# Patient Record
Sex: FEMALE | Race: BLACK OR AFRICAN AMERICAN | NOT HISPANIC OR LATINO | Employment: OTHER | URBAN - METROPOLITAN AREA
[De-identification: names, ages, dates, MRNs, and addresses within clinical notes are randomized per-mention and may not be internally consistent; named-entity substitution may affect disease eponyms.]

---

## 2017-04-03 ENCOUNTER — HOSPITAL ENCOUNTER (EMERGENCY)
Facility: HOSPITAL | Age: 57
Discharge: HOME | End: 2017-04-03
Attending: EMERGENCY MEDICINE
Payer: MEDICAID

## 2017-04-03 VITALS
HEIGHT: 62 IN | WEIGHT: 119 LBS | HEART RATE: 72 BPM | OXYGEN SATURATION: 97 % | RESPIRATION RATE: 17 BRPM | DIASTOLIC BLOOD PRESSURE: 70 MMHG | TEMPERATURE: 98 F | SYSTOLIC BLOOD PRESSURE: 128 MMHG | BODY MASS INDEX: 21.9 KG/M2

## 2017-04-03 DIAGNOSIS — M94.0 COSTOCHONDRITIS, ACUTE: ICD-10-CM

## 2017-04-03 DIAGNOSIS — R07.9 CHEST PAIN, UNSPECIFIED TYPE: Primary | ICD-10-CM

## 2017-04-03 DIAGNOSIS — R07.9 CHEST PAIN: ICD-10-CM

## 2017-04-03 LAB
ALBUMIN SERPL BCP-MCNC: 4.1 G/DL
ALP SERPL-CCNC: 40 U/L
ALT SERPL W/O P-5'-P-CCNC: 10 U/L
ANION GAP SERPL CALC-SCNC: 12 MMOL/L
AST SERPL-CCNC: 15 U/L
BASOPHILS # BLD AUTO: 0.02 K/UL
BASOPHILS NFR BLD: 0.2 %
BILIRUB SERPL-MCNC: 0.5 MG/DL
BNP SERPL-MCNC: <10 PG/ML
BUN SERPL-MCNC: 16 MG/DL
CALCIUM SERPL-MCNC: 9.4 MG/DL
CHLORIDE SERPL-SCNC: 107 MMOL/L
CO2 SERPL-SCNC: 23 MMOL/L
CREAT SERPL-MCNC: 0.9 MG/DL
D DIMER PPP IA.FEU-MCNC: <0.19 MG/L FEU
DIFFERENTIAL METHOD: ABNORMAL
EOSINOPHIL # BLD AUTO: 0.1 K/UL
EOSINOPHIL NFR BLD: 1.4 %
ERYTHROCYTE [DISTWIDTH] IN BLOOD BY AUTOMATED COUNT: 14.1 %
EST. GFR  (AFRICAN AMERICAN): >60 ML/MIN/1.73 M^2
EST. GFR  (NON AFRICAN AMERICAN): >60 ML/MIN/1.73 M^2
GLUCOSE SERPL-MCNC: 81 MG/DL
HCT VFR BLD AUTO: 35.9 %
HGB BLD-MCNC: 12.7 G/DL
INR PPP: 1
LYMPHOCYTES # BLD AUTO: 3.6 K/UL
LYMPHOCYTES NFR BLD: 44.7 %
MCH RBC QN AUTO: 31.4 PG
MCHC RBC AUTO-ENTMCNC: 35.4 %
MCV RBC AUTO: 89 FL
MONOCYTES # BLD AUTO: 0.7 K/UL
MONOCYTES NFR BLD: 8.8 %
NEUTROPHILS # BLD AUTO: 3.7 K/UL
NEUTROPHILS NFR BLD: 44.9 %
PLATELET # BLD AUTO: 292 K/UL
PMV BLD AUTO: 9.2 FL
POTASSIUM SERPL-SCNC: 3.6 MMOL/L
PROT SERPL-MCNC: 7 G/DL
PROTHROMBIN TIME: 10.7 SEC
RBC # BLD AUTO: 4.04 M/UL
SODIUM SERPL-SCNC: 142 MMOL/L
TROPONIN I SERPL DL<=0.01 NG/ML-MCNC: <0.006 NG/ML
WBC # BLD AUTO: 8.14 K/UL

## 2017-04-03 PROCEDURE — 85610 PROTHROMBIN TIME: CPT

## 2017-04-03 PROCEDURE — 85025 COMPLETE CBC W/AUTO DIFF WBC: CPT

## 2017-04-03 PROCEDURE — 93010 ELECTROCARDIOGRAM REPORT: CPT | Mod: ,,, | Performed by: INTERNAL MEDICINE

## 2017-04-03 PROCEDURE — 80053 COMPREHEN METABOLIC PANEL: CPT

## 2017-04-03 PROCEDURE — 84484 ASSAY OF TROPONIN QUANT: CPT

## 2017-04-03 PROCEDURE — 85379 FIBRIN DEGRADATION QUANT: CPT

## 2017-04-03 PROCEDURE — 83880 ASSAY OF NATRIURETIC PEPTIDE: CPT

## 2017-04-03 PROCEDURE — 93005 ELECTROCARDIOGRAM TRACING: CPT

## 2017-04-03 PROCEDURE — 99284 EMERGENCY DEPT VISIT MOD MDM: CPT | Mod: 25

## 2017-04-03 PROCEDURE — 36415 COLL VENOUS BLD VENIPUNCTURE: CPT

## 2017-04-03 PROCEDURE — 25000003 PHARM REV CODE 250: Performed by: EMERGENCY MEDICINE

## 2017-04-03 RX ORDER — MONTELUKAST SODIUM 10 MG/1
10 TABLET ORAL DAILY
COMMUNITY
End: 2017-05-09

## 2017-04-03 RX ORDER — HYDROCHLOROTHIAZIDE 12.5 MG/1
12.5 CAPSULE ORAL DAILY
COMMUNITY
End: 2017-05-09 | Stop reason: SDUPTHER

## 2017-04-03 RX ORDER — ASPIRIN 325 MG
325 TABLET ORAL
Status: DISCONTINUED | OUTPATIENT
Start: 2017-04-03 | End: 2017-04-03 | Stop reason: HOSPADM

## 2017-04-03 RX ORDER — HYDROCODONE BITARTRATE AND ACETAMINOPHEN 10; 325 MG/1; MG/1
1 TABLET ORAL EVERY 4 HOURS PRN
Qty: 11 TABLET | Refills: 0 | Status: SHIPPED | OUTPATIENT
Start: 2017-04-03 | End: 2017-05-09 | Stop reason: ALTCHOICE

## 2017-04-03 RX ORDER — LOSARTAN POTASSIUM AND HYDROCHLOROTHIAZIDE 12.5; 5 MG/1; MG/1
1 TABLET ORAL DAILY
COMMUNITY
End: 2017-05-09

## 2017-04-03 RX ORDER — NITROGLYCERIN 0.4 MG/1
0.4 TABLET SUBLINGUAL
Status: COMPLETED | OUTPATIENT
Start: 2017-04-03 | End: 2017-04-03

## 2017-04-03 RX ORDER — LORAZEPAM 2 MG/ML
1 INJECTION INTRAMUSCULAR
Status: DISCONTINUED | OUTPATIENT
Start: 2017-04-03 | End: 2017-04-03

## 2017-04-03 RX ADMIN — NITROGLYCERIN 0.4 MG: 0.4 TABLET SUBLINGUAL at 03:04

## 2017-04-03 NOTE — DISCHARGE INSTRUCTIONS
Noncardiac Chest Pain    Based on your visit today, the health care provider doesnt know what is causing your chest pain. In most cases, people who come to the emergency department with chest pain dont have a problem with their heart. Instead, the pain is caused by other conditions. These may be problems with the lungs, muscles, bones, digestive tract, nerves, or mental health.  Lung problems  · Inflammation around the lungs (pleurisy)  · Collapsed lung (pneumothorax)  · Fluid around the lungs (pleural effusion)  · Lung cancer. This is a rare cause of chest pain.  Muscle or bone problems  · Inflamed cartilage between the ribs (pleurisy)  · Fibromyalgia  · Rheumatoid arthritis  Digestive system problems  · Reflux  · Stomach ulcer  · Spasms of the esophagus  · Gall stones  · Gallbladder inflammation  Mental health conditions  · Panic or anxiety attacks  · Emotional distress  Your condition doesnt seem serious and your pain doesnt appear to be coming from your heart. But sometimes the signs of a serious problem take more time to appear. Watch for the warning signs listed below.  Home care  Follow these guidelines when caring for yourself at home:  · Rest today and avoid strenuous activity.  · Take any prescribed medicine as directed.  Follow-up care  Follow up with your health care provider, or as advised, if you dont start to feel better within 24 hours.  When to seek medical advice  Call your health care provider right away if any of these occur:  · A change in the type of pain. Call if it feels different, becomes more serious, lasts longer, or begins to spread into your shoulder, arm, neck, jaw, or back.  · Shortness of breath  · You feel more pain when you breathe  · Cough with dark-colored mucus or blood  · Weakness, dizziness, or fainting  · Fever of 100.4ºF (38ºC) or higher, or as directed by your health care provider  · Swelling, pain, or redness in one leg  Date Last Reviewed: 11/24/2014  © 5393-7297  [employer]. [address] [place] [address], [place] [address]. All rights reserved. This information is not intended as a substitute for professional medical care. Always follow your healthcare professional's instructions.          Uncertain Causes of Chest Pain    Chest pain can happen for a number of reasons. Sometimes the cause can't be determined. If your condition does not seem serious, and your pain does not appear to be coming from your heart, your healthcare provider may recommend watching it closely. Sometimes the signs of a serious problem take more time to appear. Many problems not related to your heart can cause chest pain.These include:  · Musculoskeletal. Costochondritis, an inflammation of the tissues around the ribs that can occur from trauma or overuse injuries  · Respiratory. Pneumonia, pneumothorax, or pneumonitis (inflammation of the lining of the chest and lungs)  · Gastrointestinal. Esophageal reflux, heartburn, or gallbladder disease  · Anxiety and panic disorders  · Nerve compression and neuritis  · Miscellaneous problems such as aortic aneurysm or pulmonary embolism (a blood clot in the lungs)  Home care  After your visit, follow these recommendations:  · Rest today and avoid strenuous activity.  · Take any prescribed medicine as directed.  · Be aware of any recurrent chest pain and notice any changes  Follow-up care  Follow up with your healthcare provider if you do not start to feel better within 24 hours, or as advised.  Call 911  Call 911 if any of these occur:  · A change in the type of pain: if it feels different, becomes more severe, lasts longer, or begins to spread into your shoulder, arm, neck, jaw or back  · Shortness of breath or increased pain with breathing  · Weakness, dizziness, or fainting  · Rapid heart beat  · Crushing sensation in your chest  When to seek medical advice  Call your healthcare provider right away if any of the following occur:  · Cough with dark  colored sputum (phlegm) or blood  · Fever of 100.4ºF (38ºC) or higher, or as directed by your healthcare provider  · Swelling, pain or redness in one leg  · Shortness of breath  Date Last Reviewed: 12/30/2015  © 3234-3457 [employer]. [address] [place] [address]. All rights reserved. This information is not intended as a substitute for professional medical care. Always follow your healthcare professional's instructions.

## 2017-04-03 NOTE — ED PROVIDER NOTES
"SCRIBE #1 NOTE: I, Ever Foss, am scribing for, and in the presence of, Ferny Peoples MD. I have scribed the entire note.     SCRIBE #2 NOTE: I, Corinne Mack, am scribing for, and in the presence of,  Jamel Bey MD. I have scribed the remaining portions of the note not scribed by Scribe #1.     History      Chief Complaint   Patient presents with    Chest Pain     pt states she is having chest pains and when she walks if feels like her heart is being pulled down       Review of patient's allergies indicates:  No Known Allergies     HPI   HPI    4/3/2017, 3:14 PM   History obtained from the patient      History of Present Illness: Karyna Geller is a 57 y.o. female patient who presents to the Emergency Department for midsternal CP which onset gradually 4 days ago. Sxs are intermittent and moderate in severity. Pain is described as a sharp pain. Pt reports a "pulling" sensation in her chest when her left heel strikes the ground. Sxs exacerbated with deep inhalation. There are no other mitigating or exacerbating factors noted. No associated sxs.  Pt denies any fever, N/V/D, palpitations, numbness, diaphoresis, arm pain, leg swelling, calf pain, SOB, cough, and all other sxs at this time. Pt is a daily smoker. No further complaints or concerns at this time.       Arrival mode: Personal vehicle      PCP: Francesca Craig MD       Past Medical History:  Past Medical History:   Diagnosis Date    Anemia     Aneurysm     Anxiety     Asthma     Bipolar disorder     Depression     Schizophrenia     Stroke     Subarachnoid hemorrhage        Past Surgical History:  Past Surgical History:   Procedure Laterality Date    BUNIONECTOMY      PARTIAL HYSTERECTOMY      TRANSCRANIAL DOPPLER STUDY - COMPLETE  8/28/2014         TRANSCRANIAL DOPPLER STUDY - COMPLETE  8/29/2014         TRANSCRANIAL DOPPLER STUDY - COMPLETE  8/30/2014         TRANSCRANIAL DOPPLER STUDY - COMPLETE  8/31/2014         " TRANSCRANIAL DOPPLER STUDY - COMPLETE  9/1/2014         TRANSCRANIAL DOPPLER STUDY - COMPLETE  9/2/2014         TRANSCRANIAL DOPPLER STUDY - COMPLETE  9/3/2014         TRANSCRANIAL DOPPLER STUDY - COMPLETE  9/4/2014         TRANSCRANIAL DOPPLER STUDY - COMPLETE  9/5/2014         TRANSCRANIAL DOPPLER STUDY - COMPLETE  9/6/2014              Family History:  Family History   Problem Relation Age of Onset    Diabetes Mother     Cancer Mother     Glaucoma Mother     Diabetes Sister     Cancer Sister     Cancer Brother        Social History:  Social History     Social History Main Topics    Smoking status: Current Every Day Smoker     Packs/day: 1.00     Types: Cigarettes     Last attempt to quit: 9/1/2014    Smokeless tobacco: unknown    Alcohol use Yes      Comment: occasionally    Drug use: No    Sexual activity: unknown       ROS   Review of Systems   Constitutional: Negative for fever.   HENT: Negative for sore throat.    Respiratory: Negative for cough and shortness of breath.    Cardiovascular: Positive for chest pain. Negative for palpitations and leg swelling.   Gastrointestinal: Negative for abdominal pain, constipation, diarrhea, nausea and vomiting.   Genitourinary: Negative for dysuria.   Musculoskeletal: Negative for back pain.   Skin: Negative for rash.   Neurological: Negative for dizziness, syncope, weakness, numbness and headaches.   Hematological: Does not bruise/bleed easily.     Physical Exam    Initial Vitals   BP Pulse Resp Temp SpO2   04/03/17 1436 04/03/17 1436 04/03/17 1436 04/03/17 1436 04/03/17 1436   131/70 105 16 98.3 °F (36.8 °C) 96 %      Physical Exam  Nursing Notes and Vital Signs Reviewed.  Constitutional: Patient is in no acute distress. Awake and alert. Well-developed and well-nourished.  Head: Atraumatic. Normocephalic.  Eyes: PERRL. EOM intact. Conjunctivae are not pale. No scleral icterus.  ENT: Mucous membranes are moist. Oropharynx is clear and symmetric.    Neck:  "Supple. Full ROM. No lymphadenopathy.  Cardiovascular: Regular rate. Regular rhythm. No murmurs, rubs, or gallops. Distal pulses are 2+ and symmetric.  Pulmonary/Chest: No respiratory distress. Clear to auscultation bilaterally. No wheezing, rales, or rhonchi.  Abdominal: Soft and non-distended.  There is no tenderness.  No rebound, guarding, or rigidity. Good bowel sounds.  Genitourinary: No CVA tenderness  Musculoskeletal: Moves all extremities. Reproducible pain to left 4th intercostal space. No obvious deformities. No edema. No calf tenderness.  Skin: Warm and dry.  Neurological:  Alert, awake, and appropriate.  Normal speech.  No acute focal neurological deficits are appreciated.  Psychiatric: Normal affect. Good eye contact. Appropriate in content.    ED Course    Procedures  ED Vital Signs:  Vitals:    04/03/17 1436 04/03/17 1450 04/03/17 1556 04/03/17 1647   BP: 131/70  123/75 128/70   Pulse: 105 85 71 72   Resp: 16  20 17   Temp: 98.3 °F (36.8 °C)      TempSrc: Oral      SpO2: 96%  98% 97%   Weight: 54 kg (119 lb)      Height: 5' 2" (1.575 m)          Abnormal Lab Results:  Labs Reviewed   CBC W/ AUTO DIFFERENTIAL - Abnormal; Notable for the following:        Result Value    Hematocrit 35.9 (*)     MCH 31.4 (*)     All other components within normal limits    Narrative:     PLEASE REVIEW ORDER START TIME BEFORE MARKING SPECIMEN  COLLECTED.   COMPREHENSIVE METABOLIC PANEL - Abnormal; Notable for the following:     Alkaline Phosphatase 40 (*)     All other components within normal limits    Narrative:     PLEASE REVIEW ORDER START TIME BEFORE MARKING SPECIMEN  COLLECTED.   PROTIME-INR    Narrative:     PLEASE REVIEW ORDER START TIME BEFORE MARKING SPECIMEN  COLLECTED.   TROPONIN I    Narrative:     PLEASE REVIEW ORDER START TIME BEFORE MARKING SPECIMEN  COLLECTED.   B-TYPE NATRIURETIC PEPTIDE    Narrative:     PLEASE REVIEW ORDER START TIME BEFORE MARKING SPECIMEN  COLLECTED.   D DIMER, QUANTITATIVE   D " DIMER, QUANTITATIVE    Narrative:     PLEASE REVIEW ORDER START TIME BEFORE MARKING SPECIMEN  COLLECTED.        All Lab Results:  Results for orders placed or performed during the hospital encounter of 04/03/17   CBC auto differential   Result Value Ref Range    WBC 8.14 3.90 - 12.70 K/uL    RBC 4.04 4.00 - 5.40 M/uL    Hemoglobin 12.7 12.0 - 16.0 g/dL    Hematocrit 35.9 (L) 37.0 - 48.5 %    MCV 89 82 - 98 fL    MCH 31.4 (H) 27.0 - 31.0 pg    MCHC 35.4 32.0 - 36.0 %    RDW 14.1 11.5 - 14.5 %    Platelets 292 150 - 350 K/uL    MPV 9.2 9.2 - 12.9 fL    Gran # 3.7 1.8 - 7.7 K/uL    Lymph # 3.6 1.0 - 4.8 K/uL    Mono # 0.7 0.3 - 1.0 K/uL    Eos # 0.1 0.0 - 0.5 K/uL    Baso # 0.02 0.00 - 0.20 K/uL    Gran% 44.9 38.0 - 73.0 %    Lymph% 44.7 18.0 - 48.0 %    Mono% 8.8 4.0 - 15.0 %    Eosinophil% 1.4 0.0 - 8.0 %    Basophil% 0.2 0.0 - 1.9 %    Differential Method Automated    Comprehensive metabolic panel   Result Value Ref Range    Sodium 142 136 - 145 mmol/L    Potassium 3.6 3.5 - 5.1 mmol/L    Chloride 107 95 - 110 mmol/L    CO2 23 23 - 29 mmol/L    Glucose 81 70 - 110 mg/dL    BUN, Bld 16 6 - 20 mg/dL    Creatinine 0.9 0.5 - 1.4 mg/dL    Calcium 9.4 8.7 - 10.5 mg/dL    Total Protein 7.0 6.0 - 8.4 g/dL    Albumin 4.1 3.5 - 5.2 g/dL    Total Bilirubin 0.5 0.1 - 1.0 mg/dL    Alkaline Phosphatase 40 (L) 55 - 135 U/L    AST 15 10 - 40 U/L    ALT 10 10 - 44 U/L    Anion Gap 12 8 - 16 mmol/L    eGFR if African American >60 >60 mL/min/1.73 m^2    eGFR if non African American >60 >60 mL/min/1.73 m^2   Protime-INR   Result Value Ref Range    Prothrombin Time 10.7 9.0 - 12.5 sec    INR 1.0 0.8 - 1.2   Troponin I   Result Value Ref Range    Troponin I <0.006 0.000 - 0.026 ng/mL   B-Type natriuretic peptide (BNP)   Result Value Ref Range    BNP <10 0 - 99 pg/mL   D dimer, quantitative   Result Value Ref Range    D-Dimer <0.19 <0.50 mg/L FEU         Imaging Results:  Imaging Results         X-Ray Chest PA And Lateral (Final result)  Result time:  04/03/17 15:35:51    Final result by Judd Mills III, MD (04/03/17 15:35:51)    Impression:     No acute disease identified in the chest.      Electronically signed by: JUDD MILLS MD  Date:     04/03/17  Time:    15:35     Narrative:    XR CHEST PA AND LATERAL    Clinical history: Chest Pain    Findings: The lungs appear clear of active disease. There is no evidence of pneumonia, mass, effusion, pneumothorax or other acute pulmonary disease.  Cardiomediastinal silhouette is within normal limits.  No acute osseous abnormality detected.                    The EKG was ordered, reviewed, and independently interpreted by the ED provider.  Interpretation time: 14:42  Rate: 87 BPM  Rhythm: normal sinus rhythm  Interpretation: No acute ST changes. No STEMI.      The Emergency Provider reviewed the vital signs and test results, which are outlined above.    ED Discussion     4:00 PM: Dr. Peoples transfers care of pt to Dr. Bey, pending lab results.    4:58 PM: Reassessed pt at this time. Discussed with pt all pertinent ED information and results. Discussed pt dx and plan of tx. Gave pt all f/u and return to the ED instructions. All questions and concerns were addressed at this time. Pt expresses understanding of information and instructions, and is comfortable with plan to discharge. Pt is stable for discharge.    I discussed with patient and/or family/caretaker that evaluation in the ED does not suggest any emergent or life threatening medical conditions requiring immediate intervention beyond what was provided in the ED, and I believe patient is safe for discharge.  Regardless, an unremarkable evaluation in the ED does not preclude the development or presence of a serious of life threatening condition. As such, patient was instructed to return immediately for any worsening or change in current symptoms.          ED Medication(s):  Medications   nitroGLYCERIN SL tablet 0.4 mg (0.4 mg Sublingual Given  4/3/17 1556)       Discharge Medication List as of 4/3/2017  4:53 PM      START taking these medications    Details   hydrocodone-acetaminophen 10-325mg (NORCO)  mg Tab Take 1 tablet by mouth every 4 (four) hours as needed for Pain., Starting 4/3/2017, Until Discontinued, Print             Follow-up Information     Follow up with Francesca Craig MD In 2 days.    Specialty:  Family Medicine    Contact information:    [address] [facility] [address] [phone]          Follow up with [facility] - .    Specialty:  Emergency Medicine    Why:  If symptoms worsen    Contact information:    [address]  [facility] [address] [phone]            Medical Decision Making    Medical Decision Making:   Clinical Tests:   Lab Tests: Reviewed and Ordered  Radiological Study: Reviewed and Ordered  Medical Tests: Reviewed and Ordered     Additional MDM:   Smoking Cessation: The patient was counseled on tobacco related  health complications.        Scribe Attestation:   Scribe #1: I performed the above scribed service and the documentation accurately describes the services I performed. I attest to the accuracy of the note.    Attending:   Physician Attestation Statement for Scribe #1: I, Ferny Peoples MD, personally performed the services described in this documentation, as scribed by Ever Foss, in my presence, and it is both accurate and complete.       Scribe Attestation:   Scribe #2: I performed the above scribed service and the documentation accurately describes the services I performed. I attest to the accuracy of the note.    Attending Attestation:           Physician Attestation for Scribe:    Physician Attestation Statement for Scribe #2: I, Jamel Bey MD, reviewed documentation, as scribed by Corinne Mack in my presence, and it is both accurate and complete. I also acknowledge and confirm the content of the note done by  Scribe #1.          Clinical Impression       ICD-10-CM ICD-9-CM   1. Chest pain, unspecified type R07.9 786.50   2. Chest pain R07.9 786.50   3. Costochondritis, acute M94.0 733.6       Disposition:   Disposition: Discharged  Condition: Stable         Jamel Bey MD  04/04/17 0034

## 2017-04-03 NOTE — ED AVS SNAPSHOT
[facility] -   [address] [facility] [address]               Karyna Geller   4/3/2017  2:38 PM   ED    Description:  Female : 1960   Department:  [facility]           Your Care was Coordinated By:     Provider Role From To    Jamel Bey MD Attending Provider 17 [phone] --      Reason for Visit     Chest Pain           Diagnoses this Visit        Comments    Chest pain, unspecified type    -  Primary     Chest pain         Costochondritis, acute           ED Disposition     None           To Do List           Follow-up Information     Follow up with Francesca Craig MD In 2 days.    Specialty:  Family Medicine    Contact information:    [address] [facility] [address] [phone]          Follow up with [facility].    Specialty:  Emergency Medicine    Why:  If symptoms worsen    Contact information:    [address] [place] [address] [phone]       These Medications        Disp Refills Start End    hydrocodone-acetaminophen 10-325mg (NORCO)  mg Tab 11 tablet 0 4/3/2017     Take 1 tablet by mouth every 4 (four) hours as needed for Pain. - Oral    Pharmacy: [facility] [address] [place] [address] -  [address] AT [facility] Ph #: [phone]         [facility]     [facility] Nurse Care Line - 24/7 Assistance  Unless otherwise directed by your provider, please contact [facility], our nurse care line that is available for 24/7 assistance.     Registered nurses in the [facility] provide: appointment scheduling, clinical advisement, health education, and other advisory services.  Call: [phone] (toll free)               Medications           Message regarding Medications     Verify the changes and/or additions to your medication regime listed below are the same as discussed with  your clinician today.  If any of these changes or additions are incorrect, please notify your healthcare provider.        START taking these NEW medications        Refills    hydrocodone-acetaminophen 10-325mg (NORCO)  mg Tab 0    Sig: Take 1 tablet by mouth every 4 (four) hours as needed for Pain.    Class: Print    Route: Oral      These medications were administered today        Dose Freq    aspirin tablet 325 mg 325 mg ED 1 Time    Sig: Take 1 tablet (325 mg total) by mouth ED 1 Time.    Class: Normal    Route: Oral    nitroGLYCERIN SL tablet 0.4 mg 0.4 mg ED 1 Time    Sig: Place 1 tablet (0.4 mg total) under the tongue ED 1 Time.    Class: Normal    Route: Sublingual           Verify that the below list of medications is an accurate representation of the medications you are currently taking.  If none reported, the list may be blank. If incorrect, please contact your healthcare provider. Carry this list with you in case of emergency.           Current Medications     alendronate (FOSAMAX) 70 MG tablet Take 70 mg by mouth every 7 days.    atorvastatin (LIPITOR) 40 MG tablet Take 1 tablet (40 mg total) by mouth every evening.    cyproheptadine (PERIACTIN) 4 mg tablet Take 4 mg by mouth 2 (two) times daily as needed.     escitalopram (LEXAPRO) 10 MG tablet Take 10 mg by mouth every evening.     hydrochlorothiazide (MICROZIDE) 12.5 mg capsule Take 12.5 mg by mouth once daily.    losartan-hydrochlorothiazide 50-12.5 mg (HYZAAR) 50-12.5 mg per tablet Take 1 tablet by mouth once daily.    montelukast (SINGULAIR) 10 mg tablet Take 10 mg by mouth every evening.    omeprazole (PRILOSEC) 20 MG capsule Take 20 mg by mouth once daily.    oxcarbazepine (TRILEPTAL) 300 MG Tab Take 300 mg by mouth 3 (three) times daily.     acetaminophen (TYLENOL) 325 MG tablet Take 2 tablets (650 mg total) by mouth every 6 (six) hours as needed.    alprazolam (XANAX) 0.5 MG tablet Take 0.5 mg by mouth before breakfast.     aspirin tablet  "325 mg Take 1 tablet (325 mg total) by mouth ED 1 Time.    hydrocodone-acetaminophen 10-325mg (NORCO)  mg Tab Take 1 tablet by mouth every 4 (four) hours as needed for Pain.    hydrOXYzine pamoate (VISTARIL) 25 MG Cap Take 1 capsule (25 mg total) by mouth every 6 (six) hours as needed (nausea, anxiety).    ibuprofen (ADVIL,MOTRIN) 800 MG tablet Take 800 mg by mouth 3 (three) times daily.    nimodipine (NIMOTOP) 30 MG Cap Take 2 capsules (60 mg total) by mouth every 4 (four) hours.    ondansetron (ZOFRAN-ODT) 8 MG TbDL Take 1 tablet (8 mg total) by mouth every 8 (eight) hours as needed (Nausea).    oxycodone (ROXICODONE) 5 MG immediate release tablet Take 1 tablet (5 mg total) by mouth every 6 (six) hours as needed for Pain.    polyethylene glycol (GLYCOLAX) 17 gram PwPk Take by mouth.    quetiapine 200 mg oral tb24 (SEROQUEL XR) 200 MG Tb24 Take by mouth once daily.           Clinical Reference Information           Your Vitals Were     BP Pulse Temp Resp Height Weight    123/75 71 98.3 °F (36.8 °C) (Oral) 20 5' 2" (1.575 m) 54 kg (119 lb)    SpO2 BMI             98% 21.77 kg/m2         Allergies as of 4/3/2017     No Known Allergies      Immunizations Administered on Date of Encounter - 4/3/2017     None      ED Micro, Lab, POCT     Start Ordered       Status Ordering Provider    04/03/17 1634 04/03/17 1633  D dimer, quantitative  Add-on      Completed     04/03/17 1519 04/03/17 1519  D dimer, quantitative  Once      Final result     04/03/17 1516 04/03/17 1519  CBC auto differential  STAT      Final result     04/03/17 1516 04/03/17 1519  Comprehensive metabolic panel  STAT      Final result     04/03/17 1516 04/03/17 1519  Protime-INR  STAT      Final result     04/03/17 1516 04/03/17 1519  Troponin I  Now then every 3 hours     Comments:  PLEASE REVIEW ORDER START TIME BEFORE MARKING SPECIMEN COLLECTED.   Start Status   04/03/17 1516 Final result   04/03/17 1816 Acknowledged       Acknowledged     04/03/17 " 1516 04/03/17 1519  B-Type natriuretic peptide (BNP)  STAT      Final result       ED Imaging Orders     Start Ordered       Status Ordering Provider    04/03/17 1516 04/03/17 1519  X-Ray Chest PA And Lateral  1 time imaging      Final result         Discharge Instructions         Noncardiac Chest Pain    Based on your visit today, the health care provider doesnt know what is causing your chest pain. In most cases, people who come to the emergency department with chest pain dont have a problem with their heart. Instead, the pain is caused by other conditions. These may be problems with the lungs, muscles, bones, digestive tract, nerves, or mental health.  Lung problems  · Inflammation around the lungs (pleurisy)  · Collapsed lung (pneumothorax)  · Fluid around the lungs (pleural effusion)  · Lung cancer. This is a rare cause of chest pain.  Muscle or bone problems  · Inflamed cartilage between the ribs (pleurisy)  · Fibromyalgia  · Rheumatoid arthritis  Digestive system problems  · Reflux  · Stomach ulcer  · Spasms of the esophagus  · Gall stones  · Gallbladder inflammation  Mental health conditions  · Panic or anxiety attacks  · Emotional distress  Your condition doesnt seem serious and your pain doesnt appear to be coming from your heart. But sometimes the signs of a serious problem take more time to appear. Watch for the warning signs listed below.  Home care  Follow these guidelines when caring for yourself at home:  · Rest today and avoid strenuous activity.  · Take any prescribed medicine as directed.  Follow-up care  Follow up with your health care provider, or as advised, if you dont start to feel better within 24 hours.  When to seek medical advice  Call your health care provider right away if any of these occur:  · A change in the type of pain. Call if it feels different, becomes more serious, lasts longer, or begins to spread into your shoulder, arm, neck, jaw, or back.  · Shortness of breath  · You  feel more pain when you breathe  · Cough with dark-colored mucus or blood  · Weakness, dizziness, or fainting  · Fever of 100.4ºF (38ºC) or higher, or as directed by your health care provider  · Swelling, pain, or redness in one leg  Date Last Reviewed: 11/24/2014  © 6949-7839 [employer]. [address] [place] [address]. All rights reserved. This information is not intended as a substitute for professional medical care. Always follow your healthcare professional's instructions.          Uncertain Causes of Chest Pain    Chest pain can happen for a number of reasons. Sometimes the cause can't be determined. If your condition does not seem serious, and your pain does not appear to be coming from your heart, your healthcare provider may recommend watching it closely. Sometimes the signs of a serious problem take more time to appear. Many problems not related to your heart can cause chest pain.These include:  · Musculoskeletal. Costochondritis, an inflammation of the tissues around the ribs that can occur from trauma or overuse injuries  · Respiratory. Pneumonia, pneumothorax, or pneumonitis (inflammation of the lining of the chest and lungs)  · Gastrointestinal. Esophageal reflux, heartburn, or gallbladder disease  · Anxiety and panic disorders  · Nerve compression and neuritis  · Miscellaneous problems such as aortic aneurysm or pulmonary embolism (a blood clot in the lungs)  Home care  After your visit, follow these recommendations:  · Rest today and avoid strenuous activity.  · Take any prescribed medicine as directed.  · Be aware of any recurrent chest pain and notice any changes  Follow-up care  Follow up with your healthcare provider if you do not start to feel better within 24 hours, or as advised.  Call 911  Call 911 if any of these occur:  · A change in the type of pain: if it feels different, becomes more severe, lasts longer, or begins to spread into your shoulder, arm, neck, jaw or  back  · Shortness of breath or increased pain with breathing  · Weakness, dizziness, or fainting  · Rapid heart beat  · Crushing sensation in your chest  When to seek medical advice  Call your healthcare provider right away if any of the following occur:  · Cough with dark colored sputum (phlegm) or blood  · Fever of 100.4ºF (38ºC) or higher, or as directed by your healthcare provider  · Swelling, pain or redness in one leg  · Shortness of breath  Date Last Reviewed: 12/30/2015  © 1223-6674 [employer]. [address] [place] [address]. All rights reserved. This information is not intended as a substitute for professional medical care. Always follow your healthcare professional's instructions.          Your Scheduled Appointments     Apr 26, 2017  9:20 AM CDT   Established Patient Visit with DIMA Boothe MD   [facility] ([facility])    [address]  [place] [id number] [phone]              Smoking Cessation     If you would like to quit smoking:   You may be eligible for free services if you are a [place] resident and started smoking cigarettes before September 1, 1988.  Call the Smoking Cessation Trust ([facility]) toll free at (318) 645-8474 or (712) 828-7200.   Call 1-800-QUIT-NOW if you do not meet the above criteria.   Contact us via email: [email or web address]   View our website for more information: [email or web address]         [facility]  complies with applicable Federal civil rights laws and does not discriminate on the basis of race, color, national origin, age, disability, or sex.        Language Assistance Services     ATTENTION: Language assistance services are available, free of charge. Please call [phone].      ATENCIÓN: Si habla español, tiene a hebert disposición servicios gratuitos de asistencia lingüística. Llame al [phone].     CHÚ Ý: N?u b?n nói Ti?ng Vi?t, có các d?ch v? h? tr? ngôn ng? mi?n phí dàn  aide jenkins?jakob DURAN?i s? [phone].

## 2017-04-25 RX ORDER — ALBUTEROL SULFATE 0.83 MG/ML
2.5 SOLUTION RESPIRATORY (INHALATION) 2 TIMES DAILY PRN
COMMUNITY
End: 2022-03-10 | Stop reason: SDUPTHER

## 2017-04-25 RX ORDER — TRAMADOL HYDROCHLORIDE 50 MG/1
50 TABLET ORAL EVERY 8 HOURS PRN
COMMUNITY
End: 2017-05-09 | Stop reason: SDUPTHER

## 2017-04-25 RX ORDER — TIOTROPIUM BROMIDE 18 UG/1
18 CAPSULE ORAL; RESPIRATORY (INHALATION) DAILY
COMMUNITY
End: 2017-05-09

## 2017-04-25 RX ORDER — QUETIAPINE FUMARATE 300 MG/1
300 TABLET, FILM COATED ORAL NIGHTLY
COMMUNITY
End: 2019-04-23 | Stop reason: DRUGHIGH

## 2017-04-26 ENCOUNTER — TELEPHONE (OUTPATIENT)
Dept: INTERNAL MEDICINE | Facility: CLINIC | Age: 57
End: 2017-04-26

## 2017-04-26 ENCOUNTER — OFFICE VISIT (OUTPATIENT)
Dept: INTERNAL MEDICINE | Facility: CLINIC | Age: 57
End: 2017-04-26
Payer: MEDICAID

## 2017-04-26 VITALS
BODY MASS INDEX: 21.87 KG/M2 | RESPIRATION RATE: 16 BRPM | HEIGHT: 63 IN | HEART RATE: 73 BPM | DIASTOLIC BLOOD PRESSURE: 72 MMHG | WEIGHT: 123.44 LBS | OXYGEN SATURATION: 98 % | TEMPERATURE: 97 F | SYSTOLIC BLOOD PRESSURE: 130 MMHG

## 2017-04-26 DIAGNOSIS — R63.0 ANOREXIA: Chronic | ICD-10-CM

## 2017-04-26 DIAGNOSIS — Z79.899 LONG-TERM CURRENT USE OF HIGH RISK MEDICATION OTHER THAN ANTICOAGULANT: Chronic | ICD-10-CM

## 2017-04-26 DIAGNOSIS — I60.2: Primary | ICD-10-CM

## 2017-04-26 DIAGNOSIS — E78.5 HYPERLIPIDEMIA, UNSPECIFIED HYPERLIPIDEMIA TYPE: ICD-10-CM

## 2017-04-26 DIAGNOSIS — I10 ESSENTIAL HYPERTENSION: ICD-10-CM

## 2017-04-26 DIAGNOSIS — F20.5 RESIDUAL SCHIZOPHRENIA: Chronic | ICD-10-CM

## 2017-04-26 DIAGNOSIS — Z11.59 NEED FOR HEPATITIS C SCREENING TEST: ICD-10-CM

## 2017-04-26 DIAGNOSIS — F41.9 ANXIETY: Chronic | ICD-10-CM

## 2017-04-26 DIAGNOSIS — F17.210 CIGARETTE NICOTINE DEPENDENCE WITHOUT COMPLICATION: Chronic | ICD-10-CM

## 2017-04-26 DIAGNOSIS — R73.03 PRE-DIABETES: Chronic | ICD-10-CM

## 2017-04-26 PROCEDURE — 99214 OFFICE O/P EST MOD 30 MIN: CPT | Mod: S$PBB,,, | Performed by: FAMILY MEDICINE

## 2017-04-26 PROCEDURE — 99214 OFFICE O/P EST MOD 30 MIN: CPT | Mod: PBBFAC,PO | Performed by: FAMILY MEDICINE

## 2017-04-26 PROCEDURE — 99999 PR PBB SHADOW E&M-EST. PATIENT-LVL IV: CPT | Mod: PBBFAC,,, | Performed by: FAMILY MEDICINE

## 2017-04-26 NOTE — ASSESSMENT & PLAN NOTE
Based on the report of the patient and information available to me at present, this condition appears to be STABLE. Most recent hemoglobin A1c was 6.5 percent.

## 2017-04-26 NOTE — ASSESSMENT & PLAN NOTE
Based on the report of the patient and information available to me at present, this condition appears to be STABLE/COMPENSATED. She has MILD residual ataxia, but it does not appear to be progressing. She demonstrated safe mobility using a cane. I reviewed her previous neurosurgical office records, and it reveals that she is overdue for follow-up with her neurosurgeon for surveillance.

## 2017-04-26 NOTE — ASSESSMENT & PLAN NOTE
Based on the report of the patient and information available to me at present, this condition appears to be FAIRLY CONTROLLED, and this condition appears to be STABLE.

## 2017-04-26 NOTE — ASSESSMENT & PLAN NOTE
Based on the report of the patient and information available to me at present, this condition appears to be FAIRLY CONTROLLED, and this condition appears to be STABLE. She continues to follow with her psychiatrist and mental health counselor. She reports that she is tolerating her medications her psychotropic medications well. She is demonstrating no abnormal movements to suggest development of tardive dyskinesia.

## 2017-04-26 NOTE — PROGRESS NOTES
NOTE: Documentation entered by me for this encounter was done in part using speech-recognition technology. Although I have made an effort to ensure accuracy, malapropisms may exist and should be interpreted in context. GALLO Boothe MD    Patient ID: Karyna Geller is a 57 y.o. female.    CHIEF COMPLAINT   Follow-up   reevaluate blood pressure, poor appetite, metabolism, etc.      HISTORY OF PRESENT ILLNESS:   Schizophrenia  Based on the report of the patient and information available to me at present, this condition appears to be FAIRLY CONTROLLED, and this condition appears to be STABLE. She continues to follow with her psychiatrist and mental health counselor. She reports that she is tolerating her medications her psychotropic medications well. She is demonstrating no abnormal movements to suggest development of tardive dyskinesia.    Anxiety  Based on the report of the patient and information available to me at present, this condition appears to be FAIRLY CONTROLLED, and this condition appears to be STABLE.     Cigarette nicotine dependence without complication  She is currently at the preparation stage of smoking cessation (getting ready to quit). Smoking cessation encouraged. She refused referral for smoking cessation services.    Pre-diabetes  Based on the report of the patient and information available to me at present, this condition appears to be STABLE. Most recent hemoglobin A1c was 6.5 percent.    Anorexia  Based on the report of the patient and information available to me at present, this condition appears to be STABLE. This is a chronic problem that she reports has been going on for years. She has had extensive GI evaluation, and no definitive etiology has been identified. Her weight measured today is 2 pounds increased from her weight in November, 2016. She reports no melena, blood in stool, diarrhea, or vomiting.    Long-term current use of high risk medication other than  anticoagulant  She is on a large number of medications. She did not bring her medications with her, and she appears to have a degree of lack of understanding of her medications. I am concerned about her ability to correctly manage her medications. We discussed strategies to help her with this. She agreed to bring her prescription bottles with her to her next appointment, and I am going to request a prescription dispensing record from her pharmacy so that I can evaluate her adherence to the medication instructions.    Subarachnoid hemorrhage from aneurysm of right anterior communicating artery  Based on the report of the patient and information available to me at present, this condition appears to be STABLE/COMPENSATED. She has MILD residual ataxia, but it does not appear to be progressing. She demonstrated safe mobility using a cane. I reviewed her previous neurosurgical office records, and it reveals that she is overdue for follow-up with her neurosurgeon for surveillance.    Hypertension  Based on the report of the patient and information available to me at present, this condition appears to be WELL CONTROLLED, and this condition appears to be STABLE.     Hyperlipidemia  Appears to be tolerating statin for dyslipidemia without apparent myositis or hepatic dysfunction.    HEALTH MAINTENANCE: She is delinquent on a number of aspects of age-appropriate health maintenance. She agreed to return soon so that we could discuss this further and remedy any shortcomings.    REVIEW OF SYSTEMS:   CONSTITUTIONAL: No fever reported.  CARDIOVASCULAR: No chest pain reported.  PULMONARY: No trouble breathing reported.    PHYSICAL EXAM:   CONSTITUTIONAL: Vital signs noted. No apparent distress. Does not appear acutely ill or septic. Appears adequately hydrated.  HEENT: Normocephalic. Atraumatic. External ears unremarkable. Oropharynx moist.  PULM: Lungs clear. Breathing unlabored.  CV: Heart regular.  GI: Abdomen is soft and  nontender.  DERM: Warm and moist.  PSYCHIATRIC: Alert and oriented x 3. Mood is grossly neutral. Affect appropriate. Judgment and insight not grossly compromised.    ASSESSMENT:       1. Subarachnoid hemorrhage from aneurysm of right anterior communicating artery    2. Essential hypertension    3. Hyperlipidemia, unspecified hyperlipidemia type    4. Pre-diabetes    5. Anorexia    6. Cigarette nicotine dependence without complication    7. Residual schizophrenia    8. Anxiety    9. Need for hepatitis C screening test    10. Long-term current use of high risk medication other than anticoagulant             PLAN:   Subarachnoid hemorrhage from aneurysm of right anterior communicating artery  -     Ambulatory referral to Neurosurgery    Essential hypertension  -     Comprehensive metabolic panel; Future; Expected date: 4/26/17  -     Lipid panel; Future; Expected date: 4/26/17    Hyperlipidemia, unspecified hyperlipidemia type  -     Lipid panel; Future; Expected date: 4/26/17  -     Hemoglobin A1c; Future; Expected date: 4/26/17    Pre-diabetes  -     Hemoglobin A1c; Future; Expected date: 4/26/17    Anorexia    Cigarette nicotine dependence without complication    Residual schizophrenia    Anxiety    Need for hepatitis C screening test  -     Hepatitis C antibody; Future; Expected date: 4/26/17    Long-term current use of high risk medication other than anticoagulant        Medications Discontinued During This Encounter   Medication Reason    ibuprofen (ADVIL,MOTRIN) 800 MG tablet Therapy completed    nimodipine (NIMOTOP) 30 MG Cap Therapy completed    oxycodone (ROXICODONE) 5 MG immediate release tablet Therapy completed

## 2017-04-26 NOTE — ASSESSMENT & PLAN NOTE
She is on a large number of medications. She did not bring her medications with her, and she appears to have a degree of lack of understanding of her medications. I am concerned about her ability to correctly manage her medications. We discussed strategies to help her with this. She agreed to bring her prescription bottles with her to her next appointment, and I am going to request a prescription dispensing record from her pharmacy so that I can evaluate her adherence to the medication instructions.

## 2017-04-26 NOTE — TELEPHONE ENCOUNTER
Per Dr. Boothe, called pt pharmacy and s/w pharmacist, requested report of recent filled rx's to clinic F#[phone].

## 2017-04-26 NOTE — ASSESSMENT & PLAN NOTE
Based on the report of the patient and information available to me at present, this condition appears to be STABLE. This is a chronic problem that she reports has been going on for years. She has had extensive GI evaluation, and no definitive etiology has been identified. Her weight measured today is 2 pounds increased from her weight in November, 2016. She reports no melena, blood in stool, diarrhea, or vomiting.

## 2017-04-26 NOTE — ASSESSMENT & PLAN NOTE
She is currently at the preparation stage of smoking cessation (getting ready to quit). Smoking cessation encouraged. She refused referral for smoking cessation services.

## 2017-04-26 NOTE — MR AVS SNAPSHOT
[facility]  [address] [facility] [place] [address]  Phone: [phone]  Fax: [phone]                  Karyna Geller   2017 9:20 AM   Office Visit    Description:  Female : 1960   Provider:  Anurag Boothe MD   Department:  [facility] - Internal Medicine           Reason for Visit     Follow-up           Diagnoses this Visit        Comments    Subarachnoid hemorrhage from aneurysm of right anterior communicating artery    -  Primary     Cigarette nicotine dependence without complication         Residual schizophrenia         Essential hypertension         Hyperlipidemia, unspecified hyperlipidemia type         Anxiety         Pre-diabetes         Anorexia         Need for hepatitis C screening test         Long-term current use of high risk medication other than anticoagulant                To Do List           Future Appointments        Provider Department Dept Phone    5/3/2017 8:40 AM LABORATORY, [address] [facility] [phone]    2017 10:20 AM Anurag Boothe MD [facility] [phone]      Goals (5 Years of Data)     None      Follow-Up and Disposition     Return in about 2 weeks (around 5/10/2017) for review test results, re-evaluate chronic conditions.      [facility]     [facility] Care [facility] -  Assistance  Unless otherwise directed by your provider, please contact [facility], our nurse care line that is available for  assistance.     Registered nurses in the [facility] provide: appointment scheduling, clinical advisement, health education, and other advisory services.  Call: [phone] (toll free)               Medications           Message regarding Medications     Verify the changes and/or additions to your medication regime listed below are the same as discussed with your clinician today.  If any of these changes or additions are incorrect, please notify your healthcare  provider.        STOP taking these medications     ibuprofen (ADVIL,MOTRIN) 800 MG tablet Take 800 mg by mouth 3 (three) times daily.    nimodipine (NIMOTOP) 30 MG Cap Take 2 capsules (60 mg total) by mouth every 4 (four) hours.    oxycodone (ROXICODONE) 5 MG immediate release tablet Take 1 tablet (5 mg total) by mouth every 6 (six) hours as needed for Pain.           Verify that the below list of medications is an accurate representation of the medications you are currently taking.  If none reported, the list may be blank. If incorrect, please contact your healthcare provider. Carry this list with you in case of emergency.           Current Medications     acetaminophen (TYLENOL) 325 MG tablet Take 2 tablets (650 mg total) by mouth every 6 (six) hours as needed.    albuterol (PROVENTIL) 2.5 mg /3 mL (0.083 %) nebulizer solution Take 2.5 mg by nebulization 2 (two) times daily as needed for Wheezing. Rescue    alendronate (FOSAMAX) 70 MG tablet Take 70 mg by mouth every 7 days.    alprazolam (XANAX) 0.5 MG tablet Take 0.5 mg by mouth daily as needed.     atorvastatin (LIPITOR) 40 MG tablet Take 1 tablet (40 mg total) by mouth every evening.    CALCIUM CARBONATE/VITAMIN D3 (CALCIUM 600 + D,3, ORAL) Take 2 tablets by mouth once daily.    cyproheptadine (PERIACTIN) 4 mg tablet Take 4 mg by mouth 3 (three) times daily.     escitalopram (LEXAPRO) 10 MG tablet Take 20 mg by mouth once daily.     hydrochlorothiazide (MICROZIDE) 12.5 mg capsule Take 12.5 mg by mouth once daily.    hydrocodone-acetaminophen 10-325mg (NORCO)  mg Tab Take 1 tablet by mouth every 4 (four) hours as needed for Pain.    hydrOXYzine pamoate (VISTARIL) 25 MG Cap Take 1 capsule (25 mg total) by mouth every 6 (six) hours as needed (nausea, anxiety).    losartan-hydrochlorothiazide 50-12.5 mg (HYZAAR) 50-12.5 mg per tablet Take 1 tablet by mouth once daily.    montelukast (SINGULAIR) 10 mg tablet Take 10 mg by mouth once daily.     omeprazole  "(PRILOSEC) 20 MG capsule Take 20 mg by mouth every morning.     ondansetron (ZOFRAN-ODT) 8 MG TbDL Take 1 tablet (8 mg total) by mouth every 8 (eight) hours as needed (Nausea).    oxcarbazepine (TRILEPTAL) 300 MG Tab Take 600 mg by mouth 2 (two) times daily.     polyethylene glycol (GLYCOLAX) 17 gram PwPk Take by mouth as needed.     quetiapine (SEROQUEL) 300 MG Tab Take 300 mg by mouth every evening.    tiotropium (SPIRIVA) 18 mcg inhalation capsule Inhale 18 mcg into the lungs once daily. Controller    tramadol (ULTRAM) 50 mg tablet Take 50 mg by mouth every 8 (eight) hours as needed for Pain.           Clinical Reference Information           Your Vitals Were     BP Pulse Temp Resp Height Weight    130/72 (BP Location: Right arm, Patient Position: Sitting, BP Method: Manual) 73 96.8 °F (36 °C) (Tympanic) 16 5' 3" (1.6 m) 56 kg (123 lb 7.3 oz)    SpO2 BMI             98% 21.87 kg/m2         Blood Pressure          Most Recent Value    BP  130/72      Allergies as of 4/26/2017     No Known Allergies      Immunizations Administered on Date of Encounter - 4/26/2017     None      Orders Placed During Today's Visit      Normal Orders This Visit    Ambulatory referral to Neurosurgery     Future Labs/Procedures Expected by Expires    Comprehensive metabolic panel  4/26/2017 6/25/2018    Hemoglobin A1c  4/26/2017 6/25/2018    Hepatitis C antibody  4/26/2017 6/25/2018    Lipid panel  4/26/2017 6/25/2018      Instructions    Please bring all your prescription bottles with you to your next appointment.    Taking an Active Role in Your Medicines  Take the time to learn about your medicine. For instance, why are you taking it? What does it do? Work with your healthcare providers to get the answers you need.    Ask questions about your medicine  Find out the following information:  · What is the name of the medicine?  · Why do I need to take it? When should I take it?  · How should I take it: with water? with food? on an empty " stomach?  · How much do I take?  · What do I do if I miss a dose?  · What side effects could it cause and which ones should I call the healthcare provider about?  · Are there any foods or medicines I should avoid while taking this medicine?  · Will this medicine change how my other medicines work?  Take an active role  Actions to take include the following:  · Fill all your prescriptions at the same pharmacy. This keeps your medicine history in one place.  · Talk to the pharmacist. Make sure you understand how to take each medicine. Ask for a fact sheet about each one.  · Tell your healthcare provider and pharmacist about all the prescription and over-the-counter medicines you take. This includes vitamins, nutrition or health supplements, alcohol or other drugs, and herbal remedies.  · Tell your healthcare provider and pharmacist if you have any medical conditions or allergies to any medicine or food, or if you are pregnant or breastfeeding.  · Keep a list of all your medicines. Use the sample to the right as a guide for the type of information needed.  Name of medicine:    Taken for:    Dose:    Time(s) to take it:     Date Last Reviewed: 8/1/2016  © 4075-4995 [employer]. [address] [place] [address]. All rights reserved. This information is not intended as a substitute for professional medical care. Always follow your healthcare professional's instructions.        Taking Medicine Safely    Medicine is given to help treat or prevent illness. But if you dont take it correctly, it might not help. It might even harm you. Your healthcare provider or pharmacist can help you learn the right way to take your medicine. Listed below are some tips to help you take medicine safely.  Safety tips  Do's and don'ts include the following:   · Have a routine for taking each medicine. Make it part of something you do each day, such as brushing your teeth or eating a meal.  · When you go to the hospital  or your healthcare providers office, bring all your current medicines in their original boxes or bottles. If you cant do that, bring an up-to-date list of your medicines.  · Don't stop taking a prescription medicine unless your healthcare provider tells you to. Doing so could make your condition worse.  · Don't share medicines.  · Let your healthcare provider and pharmacist know of any allergies you have and if there have been any changes in your health since you were last prescribed these medicines.   · Taking prescription medicines with alcohol, street drugs, herbs, supplements, or even some over-the-counter medicines can be harmful. Talk to your healthcare provider or pharmacist before using any of these things while taking a prescription medicine.  · When filling your prescriptions, try using the same pharmacy for all your medicines. If not, let the pharmacist know what medicines you are already on.  · Consider putting a week's worth of medicines in a container marked for each day of the week.   · Keep medicines out of the reach of children and pets. Be sure all medicine bottles have safety caps.  · Keep narcotics and sedatives out of sight or in a locked box or safe.  · Keep your medicines in a dry and cool location (not in the bathroom.)   · Don't use medicine that has  or that doesnt look or smell right. Get rid of it properly. To find out the right way to get rid of medicine:  ¨ Call your [facility] or [facility] hospital [employer] household trash and recycling service and ask if a medicine take-back program is available in your community.  ¨ Call your local pharmacy and ask the right way to get rid of the medicine.  ¨ Go to www.fda.gov/ForConsumers/ConsumerUpdates/svn139848 to learn how to get rid of medicines safely.  · Medicine that comes in a container for a single dose should be used only 1 time. If you use the container a second time, it may have germs in it that can cause illness. These illnesses include  hepatitis B and C. They also include infections of the brain or spinal cord (meningitis and epidural abscess).   Using generic medicines  Medicines have brand names and generic (chemical) names. When a medicine is first made, it is sold only under its brand name. Later, it can be made and sold as a generic. Generic medicines cost less than brand-name medicines and most work just as well. Most people can use the generic medicine instead of the brand-name medicine, unless their healthcare provider says otherwise.   Date Last Reviewed: 8/1/2016  © 8196-8441 [employer]. [address] [place] [address]. All rights reserved. This information is not intended as a substitute for professional medical care. Always follow your healthcare professional's instructions.             Smoking Cessation     If you would like to quit smoking:   You may be eligible for free services if you are a [place] resident and started smoking cigarettes before September 1, 1988.  Call the Smoking Cessation Trust ([facility]) toll free at [phone]) [phone] or [phone].   Call 1-800-QUIT-NOW if you do not meet the above criteria.   Contact us via email: [email or web address]   View our website for more information: [email or web address]        Language Assistance Services     ATTENTION: Language assistance services are available, free of charge. Please call [phone].      ATENCIÓN: Si habla español, tiene a hebert disposición servicios gratuitos de asistencia lingüística. Llame al [phone].     CHÚ Ý: N?u b?n nói Ti?ng Vi?t, có các d?ch v? h? tr? ngôn ng? mi?n phí dành cho b?n. G?i s? [phone].         [facility] complies with applicable Federal civil rights laws and does not discriminate on the basis of race, color, national origin, age, disability, or sex.

## 2017-04-26 NOTE — PATIENT INSTRUCTIONS
Please bring all your prescription bottles with you to your next appointment.    Taking an Active Role in Your Medicines  Take the time to learn about your medicine. For instance, why are you taking it? What does it do? Work with your healthcare providers to get the answers you need.    Ask questions about your medicine  Find out the following information:  · What is the name of the medicine?  · Why do I need to take it? When should I take it?  · How should I take it: with water? with food? on an empty stomach?  · How much do I take?  · What do I do if I miss a dose?  · What side effects could it cause and which ones should I call the healthcare provider about?  · Are there any foods or medicines I should avoid while taking this medicine?  · Will this medicine change how my other medicines work?  Take an active role  Actions to take include the following:  · Fill all your prescriptions at the same pharmacy. This keeps your medicine history in one place.  · Talk to the pharmacist. Make sure you understand how to take each medicine. Ask for a fact sheet about each one.  · Tell your healthcare provider and pharmacist about all the prescription and over-the-counter medicines you take. This includes vitamins, nutrition or health supplements, alcohol or other drugs, and herbal remedies.  · Tell your healthcare provider and pharmacist if you have any medical conditions or allergies to any medicine or food, or if you are pregnant or breastfeeding.  · Keep a list of all your medicines. Use the sample to the right as a guide for the type of information needed.  Name of medicine:    Taken for:    Dose:    Time(s) to take it:     Date Last Reviewed: 8/1/2016 © 2000-2016 [employer]. [address] [place] [address], [place] [address]. All rights reserved. This information is not intended as a substitute for professional medical care. Always follow your healthcare professional's instructions.        Taking Medicine  Safely    Medicine is given to help treat or prevent illness. But if you dont take it correctly, it might not help. It might even harm you. Your healthcare provider or pharmacist can help you learn the right way to take your medicine. Listed below are some tips to help you take medicine safely.  Safety tips  Do's and don'ts include the following:   · Have a routine for taking each medicine. Make it part of something you do each day, such as brushing your teeth or eating a meal.  · When you go to the hospital or your healthcare providers office, bring all your current medicines in their original boxes or bottles. If you cant do that, bring an up-to-date list of your medicines.  · Don't stop taking a prescription medicine unless your healthcare provider tells you to. Doing so could make your condition worse.  · Don't share medicines.  · Let your healthcare provider and pharmacist know of any allergies you have and if there have been any changes in your health since you were last prescribed these medicines.   · Taking prescription medicines with alcohol, street drugs, herbs, supplements, or even some over-the-counter medicines can be harmful. Talk to your healthcare provider or pharmacist before using any of these things while taking a prescription medicine.  · When filling your prescriptions, try using the same pharmacy for all your medicines. If not, let the pharmacist know what medicines you are already on.  · Consider putting a week's worth of medicines in a container marked for each day of the week.   · Keep medicines out of the reach of children and pets. Be sure all medicine bottles have safety caps.  · Keep narcotics and sedatives out of sight or in a locked box or safe.  · Keep your medicines in a dry and cool location (not in the bathroom.)   · Don't use medicine that has  or that doesnt look or smell right. Get rid of it properly. To find out the right way to get rid of medicine:  ¨ Call your city  or [facility] household trash and recycling service and ask if a medicine take-back program is available in your community.  ¨ Call your local pharmacy and ask the right way to get rid of the medicine.  ¨ Go to www.fda.gov/ForConsumers/ConsumerUpdates/eyn726710 to learn how to get rid of medicines safely.  · Medicine that comes in a container for a single dose should be used only 1 time. If you use the container a second time, it may have germs in it that can cause illness. These illnesses include hepatitis B and C. They also include infections of the brain or spinal cord (meningitis and epidural abscess).   Using generic medicines  Medicines have brand names and generic (chemical) names. When a medicine is first made, it is sold only under its brand name. Later, it can be made and sold as a generic. Generic medicines cost less than brand-name medicines and most work just as well. Most people can use the generic medicine instead of the brand-name medicine, unless their healthcare provider says otherwise.   Date Last Reviewed: 8/1/2016 © 2000-2016 [employer]. [address] [place] [address], [place] [address]. All rights reserved. This information is not intended as a substitute for professional medical care. Always follow your healthcare professional's instructions.

## 2017-05-03 ENCOUNTER — LAB VISIT (OUTPATIENT)
Dept: LAB | Facility: HOSPITAL | Age: 57
End: 2017-05-03
Attending: FAMILY MEDICINE
Payer: MEDICAID

## 2017-05-03 DIAGNOSIS — E78.5 HYPERLIPIDEMIA, UNSPECIFIED HYPERLIPIDEMIA TYPE: ICD-10-CM

## 2017-05-03 DIAGNOSIS — Z11.59 NEED FOR HEPATITIS C SCREENING TEST: ICD-10-CM

## 2017-05-03 DIAGNOSIS — R73.03 PRE-DIABETES: Chronic | ICD-10-CM

## 2017-05-03 DIAGNOSIS — I10 ESSENTIAL HYPERTENSION: ICD-10-CM

## 2017-05-03 LAB
ALBUMIN SERPL BCP-MCNC: 4.2 G/DL
ALP SERPL-CCNC: 47 U/L
ALT SERPL W/O P-5'-P-CCNC: 8 U/L
ANION GAP SERPL CALC-SCNC: 6 MMOL/L
AST SERPL-CCNC: 14 U/L
BILIRUB SERPL-MCNC: 0.3 MG/DL
BUN SERPL-MCNC: 12 MG/DL
CALCIUM SERPL-MCNC: 10.3 MG/DL
CHLORIDE SERPL-SCNC: 97 MMOL/L
CHOLEST/HDLC SERPL: 2.5 {RATIO}
CO2 SERPL-SCNC: 32 MMOL/L
CREAT SERPL-MCNC: 1 MG/DL
EST. GFR  (AFRICAN AMERICAN): >60 ML/MIN/1.73 M^2
EST. GFR  (NON AFRICAN AMERICAN): >60 ML/MIN/1.73 M^2
GLUCOSE SERPL-MCNC: 90 MG/DL
HDL/CHOLESTEROL RATIO: 39.7 %
HDLC SERPL-MCNC: 204 MG/DL
HDLC SERPL-MCNC: 81 MG/DL
LDLC SERPL CALC-MCNC: 111.6 MG/DL
NONHDLC SERPL-MCNC: 123 MG/DL
POTASSIUM SERPL-SCNC: 4.2 MMOL/L
PROT SERPL-MCNC: 7.3 G/DL
SODIUM SERPL-SCNC: 135 MMOL/L
TRIGL SERPL-MCNC: 57 MG/DL

## 2017-05-03 PROCEDURE — 80053 COMPREHEN METABOLIC PANEL: CPT

## 2017-05-03 PROCEDURE — 86803 HEPATITIS C AB TEST: CPT

## 2017-05-03 PROCEDURE — 80061 LIPID PANEL: CPT

## 2017-05-03 PROCEDURE — 36415 COLL VENOUS BLD VENIPUNCTURE: CPT

## 2017-05-03 PROCEDURE — 83036 HEMOGLOBIN GLYCOSYLATED A1C: CPT

## 2017-05-04 LAB
ESTIMATED AVG GLUCOSE: 131 MG/DL
HBA1C MFR BLD HPLC: 6.2 %
HCV AB SERPL QL IA: NEGATIVE

## 2017-05-09 ENCOUNTER — OFFICE VISIT (OUTPATIENT)
Dept: INTERNAL MEDICINE | Facility: CLINIC | Age: 57
End: 2017-05-09
Payer: MEDICAID

## 2017-05-09 ENCOUNTER — LAB VISIT (OUTPATIENT)
Dept: LAB | Facility: HOSPITAL | Age: 57
End: 2017-05-09
Attending: FAMILY MEDICINE
Payer: MEDICAID

## 2017-05-09 VITALS
SYSTOLIC BLOOD PRESSURE: 118 MMHG | OXYGEN SATURATION: 96 % | WEIGHT: 123.25 LBS | BODY MASS INDEX: 21.84 KG/M2 | DIASTOLIC BLOOD PRESSURE: 62 MMHG | HEIGHT: 63 IN | HEART RATE: 75 BPM | RESPIRATION RATE: 16 BRPM | TEMPERATURE: 97 F

## 2017-05-09 DIAGNOSIS — E78.5 HYPERLIPIDEMIA, UNSPECIFIED HYPERLIPIDEMIA TYPE: Chronic | ICD-10-CM

## 2017-05-09 DIAGNOSIS — Z12.31 SCREENING MAMMOGRAM, ENCOUNTER FOR: ICD-10-CM

## 2017-05-09 DIAGNOSIS — F20.5 RESIDUAL SCHIZOPHRENIA: Chronic | ICD-10-CM

## 2017-05-09 DIAGNOSIS — Z59.7 INSUFFICIENT SOCIAL INSURANCE AND WELFARE SUPPORT: Chronic | ICD-10-CM

## 2017-05-09 DIAGNOSIS — R73.03 PRE-DIABETES: Chronic | ICD-10-CM

## 2017-05-09 DIAGNOSIS — F41.9 ANXIETY: Chronic | ICD-10-CM

## 2017-05-09 DIAGNOSIS — Z79.899 LONG-TERM CURRENT USE OF HIGH RISK MEDICATION OTHER THAN ANTICOAGULANT: Chronic | ICD-10-CM

## 2017-05-09 DIAGNOSIS — Z90.710 HISTORY OF HYSTERECTOMY FOR BENIGN DISEASE: Chronic | ICD-10-CM

## 2017-05-09 DIAGNOSIS — H53.10 SUBJECTIVE VISION DISTURBANCE, BILATERAL: Chronic | ICD-10-CM

## 2017-05-09 DIAGNOSIS — M19.91 PRIMARY LOCALIZED OSTEOARTHROSIS OF MULTIPLE SITES: Chronic | ICD-10-CM

## 2017-05-09 DIAGNOSIS — I10 ESSENTIAL HYPERTENSION: ICD-10-CM

## 2017-05-09 DIAGNOSIS — F31.9 BIPOLAR 1 DISORDER: Chronic | ICD-10-CM

## 2017-05-09 DIAGNOSIS — F17.210 CIGARETTE NICOTINE DEPENDENCE WITHOUT COMPLICATION: Chronic | ICD-10-CM

## 2017-05-09 DIAGNOSIS — Z23 NEED FOR PNEUMOCOCCAL VACCINATION: ICD-10-CM

## 2017-05-09 DIAGNOSIS — K21.9 GASTRO-ESOPHAGEAL REFLUX DISEASE WITHOUT ESOPHAGITIS: Chronic | ICD-10-CM

## 2017-05-09 DIAGNOSIS — R73.03 PRE-DIABETES: Primary | Chronic | ICD-10-CM

## 2017-05-09 DIAGNOSIS — Z91.199 NONADHERENCE TO MEDICAL TREATMENT: Chronic | ICD-10-CM

## 2017-05-09 LAB
CREAT UR-MCNC: 158 MG/DL
MICROALBUMIN UR DL<=1MG/L-MCNC: 6 UG/ML
MICROALBUMIN/CREATININE RATIO: 3.8 UG/MG

## 2017-05-09 PROCEDURE — 82570 ASSAY OF URINE CREATININE: CPT

## 2017-05-09 PROCEDURE — 99999 PR PBB SHADOW E&M-EST. PATIENT-LVL V: CPT | Mod: PBBFAC,,, | Performed by: FAMILY MEDICINE

## 2017-05-09 PROCEDURE — 99215 OFFICE O/P EST HI 40 MIN: CPT | Mod: S$PBB,,, | Performed by: FAMILY MEDICINE

## 2017-05-09 RX ORDER — TRAMADOL HYDROCHLORIDE 50 MG/1
50 TABLET ORAL EVERY 8 HOURS PRN
Qty: 90 TABLET | Refills: 2 | Status: SHIPPED | OUTPATIENT
Start: 2017-05-09 | End: 2017-05-09 | Stop reason: CLARIF

## 2017-05-09 RX ORDER — OMEPRAZOLE 20 MG/1
20 CAPSULE, DELAYED RELEASE ORAL EVERY MORNING
Qty: 30 CAPSULE | Refills: 11 | Status: SHIPPED | OUTPATIENT
Start: 2017-05-09 | End: 2017-10-05 | Stop reason: ALTCHOICE

## 2017-05-09 RX ORDER — TRAMADOL HYDROCHLORIDE 50 MG/1
50 TABLET ORAL EVERY 8 HOURS PRN
Qty: 60 TABLET | Refills: 2 | Status: SHIPPED | OUTPATIENT
Start: 2017-05-09 | End: 2017-05-09 | Stop reason: CLARIF

## 2017-05-09 RX ORDER — HYDROCHLOROTHIAZIDE 12.5 MG/1
12.5 CAPSULE ORAL DAILY
Qty: 30 CAPSULE | Refills: 11 | Status: SHIPPED | OUTPATIENT
Start: 2017-05-09 | End: 2017-10-05 | Stop reason: ALTCHOICE

## 2017-05-09 RX ORDER — TRAMADOL HYDROCHLORIDE 50 MG/1
50 TABLET ORAL EVERY 8 HOURS PRN
Qty: 90 TABLET | Refills: 2 | Status: SHIPPED | OUTPATIENT
Start: 2017-05-09 | End: 2017-06-26 | Stop reason: SDUPTHER

## 2017-05-09 SDOH — SOCIAL DETERMINANTS OF HEALTH (SDOH): INSUFFICIENT SOCIAL INSURANCE AND WELFARE SUPPORT: Z59.7

## 2017-05-09 NOTE — ASSESSMENT & PLAN NOTE
In large part due to her mental health conditions, she appears to be having a major difficulty adhering to her prescribed medication regimen, and she has a very limited understanding/comprehension of her current medication use. For example, she reported that she believed that her current medication list was accurate and she was taking all these medications. However, review of records obtained from her pharmacy reveals that she has not filled her losartan HCT, cyproheptadine, a TORP a statin, montelukast asked, and alendronate since February. The pharmacy has no record of her filling Spiriva within the last several months. We discussed strategies to help her be more adherent with her medication regimen, and I have discontinued the medications that she obviously has not been taking, because of her nonadherence and lack of compelling indication for the medications.

## 2017-05-09 NOTE — ASSESSMENT & PLAN NOTE
Appears well-controlled on hydrochlorothiazide 12.5 mg daily. No angina or angina equivalent symptoms reported. She reports no active cardiovascular symptoms. She last saw her cardiologist a couple of years ago, and apparently she was deemed to be stable from a cardiovascular standpoint. Nevertheless, she agreed to schedule an appointment with her cardiologist for a reassessment.

## 2017-05-09 NOTE — ASSESSMENT & PLAN NOTE
This appears well controlled when she takes her omeprazole on a consistent basis. However, she says that she often forgets to take the medication and has breakthrough typical GERD symptoms.

## 2017-05-09 NOTE — ASSESSMENT & PLAN NOTE
She reports MILD decreased visual acuity bilaterally, particularly when transitioning from near vision to distance vision, and vice versa, gradual ONSET over the last year or so. She says that she continues to drive herself I cautioned her about driving with impaired vision and with impaired mobility due to her stroke and likely slowed reflexes due to medication side effects.

## 2017-05-09 NOTE — ASSESSMENT & PLAN NOTE
Her hemoglobin A1c dropped from 6.5% to 6.2%. She is on no medications for diabetes or prediabetes. It appears that her Medicaid insurer is of the impression that she has overt diabetes, and they are requesting that I order for her urine microalbumin and diabetic eye exam.

## 2017-05-09 NOTE — PROGRESS NOTES
NOTE: Documentation entered by me for this encounter was done in part using speech-recognition technology. Although I have made an effort to ensure accuracy, malapropisms may exist and should be interpreted in context. GALLO Boothe MD    Patient ID: Karyna Geller is a 57 y.o. female.    CHIEF COMPLAINT   Follow-up (Review abnormal lab results, discuss medications,)    HISTORY OF PRESENT ILLNESS:   Cigarette nicotine dependence without complication  She is currently at the contemplation stage of smoking cessation (thinking about quitting but not ready to quit). Smoking cessation encouraged.     Primary localized osteoarthrosis of multiple sites  She reports continued typical osteoarthritis, primarily of weightbearing joints (hips, knees, low back). Tramadol 3 times daily provide satisfactory analgesia. She is able to ambulate with a cane. We are avoiding NSAIDs due to her history of cerebral hemorrhage. She is demonstrating no behaviors to suggest inappropriate use of her prescription medication.    Insufficient social insurance and welfare support  In large part due to her mental health conditions, she appears to be having a major difficulty adhering to her prescribed medication regimen, and she has a very limited understanding/comprehension of her current medication use. For example, she reported that she believed that her current medication list was accurate and she was taking all these medications. However, review of records obtained from her pharmacy reveals that she has not filled her losartan HCT, cyproheptadine, a TORP a statin, montelukast asked, and alendronate since February. The pharmacy has no record of her filling Spiriva within the last several months. We discussed strategies to help her be more adherent with her medication regimen, and I have discontinued the medications that she obviously has not been taking, because of her nonadherence and lack of compelling indication for the  medications.    Hypertension  Appears well-controlled on hydrochlorothiazide 12.5 mg daily. No angina or angina equivalent symptoms reported. She reports no active cardiovascular symptoms. She last saw her cardiologist a couple of years ago, and apparently she was deemed to be stable from a cardiovascular standpoint. Nevertheless, she agreed to schedule an appointment with her cardiologist for a reassessment.    Hyperlipidemia  I reviewed her recent lab results, including lipid panel. As noted above, it appears that she has not been taking her atorvastatin for the last 2-3 months (she still has the prescription bottle from February, and it is essentially full). Her prior stroke was not thromboembolic in origin, but was hemorrhagic due to an aneurysm. She reports no recent stroke or TIA symptoms.    Gastro-esophageal reflux disease without esophagitis  This appears well controlled when she takes her omeprazole on a consistent basis. However, she says that she often forgets to take the medication and has breakthrough typical GERD symptoms.    Bipolar 1 disorder  Based on the report of the patient and information available to me at present, this condition appears to be FAIRLY CONTROLLED, and this condition appears to be STABLE. She continues to follow with her psychiatrist and mental health counselor.    Anxiety  Based on the report of the patient and information available to me at present, this condition appears to be FAIRLY CONTROLLED, and this condition appears to be STABLE. She continues to follow with her psychiatrist and mental health counselor.    Schizophrenia  Based on the report of the patient and information available to me at present, this condition appears to be FAIRLY CONTROLLED, and this condition appears to be STABLE. She continues to follow with her psychiatrist and mental health counselor.    Pre-diabetes  Her hemoglobin A1c dropped from 6.5% to 6.2%. She is on no medications for diabetes or  prediabetes. It appears that her Medicaid insurer is of the impression that she has overt diabetes, and they are requesting that I order for her urine microalbumin and diabetic eye exam.    Subjective vision disturbance, bilateral  She reports MILD decreased visual acuity bilaterally, particularly when transitioning from near vision to distance vision, and vice versa, gradual ONSET over the last year or so. She says that she continues to drive herself I cautioned her about driving with impaired vision and with impaired mobility due to her stroke and likely slowed reflexes due to medication side effects.      REVIEW OF SYSTEMS:   CONSTITUTIONAL: No fever reported.  CARDIOVASCULAR: No angina or angina equivalent symptoms reported.  PULMONARY: No trouble breathing reported.  PSYCHIATRIC: No suicidal ideations reported.  NEUROLOGIC: No new focal motor deficits reported.  ENDOCRINE: No polyuria or polydipsia reported.  GASTROINTESTINAL: No constipation or diarrhea reported.  GENITOURINARY: No dysuria or hematuria reported.  ENT: No sore throat reported.  OPHTHALMOLOGIC: Subjective visual disturbances as described above.  HEMATOLOGIC: No bleeding problems reported.  MUSCULOSKELETAL: No recent injuries reported.  DERMATOLOGIC: No rash reported.  REMAINDER OF COMPLETE REVIEW OF SYSTEMS is negative or noncontributory to present illness except as noted herein.    PHYSICAL EXAM:   CONSTITUTIONAL: No apparent distress. Does not appear acutely ill or septic. Appears adequately hydrated.  HEENT: External ENT grossly unremarkable. Hearing grossly intact. Oropharynx moist.  NECK: Neck supple without meningismus. Trachea midline.  PULM: Lungs clear. Breathing unlabored.  CV: Heart regular. No jugular venous distention. No carotid bruit.  GI: Abdomen soft and nontender. Bowel sounds present.  DERM: Skin warm and moist with normal turgor.   NEURO: Ambulates with a cane. No gross deficits of cranial nerve  III-XII.  PRE-DIABETIC FOOT EXAM: Exam of feet reveals no open wounds, ulcerations, significant calluses, or evidence of arterial insufficiency. 10 g monofilament sensation is intact.  PSYCH: Alert and oriented x 3. Mood is grossly neutral. Affect appropriate. Her understanding of her health conditions and her recollection of relatively recent treatments (test results, etc.) is somewhat limited, but her judgment and insight do not otherwise appear grossly compromised.  MSK: Ambulates with a cane.    ASSESSMENT:       1. Pre-diabetes    2. Essential hypertension    3. Residual schizophrenia    4. Long-term current use of high risk medication other than anticoagulant    5. Cigarette nicotine dependence without complication    6. Bipolar 1 disorder    7. Gastro-esophageal reflux disease without esophagitis    8. Nonadherence to medical treatment    9. Subjective vision disturbance, bilateral    10. Screening mammogram, encounter for    11. Need for pneumococcal vaccination    12. History of hysterectomy for benign disease    13. Insufficient social insurance and welfare support    14. Primary localized osteoarthrosis of multiple sites    15. Hyperlipidemia, unspecified hyperlipidemia type    16. Anxiety             PLAN:   Pre-diabetes  -     MICROALBUMIN / CREATININE RATIO URINE  -     Ambulatory referral to Optometry  -     MICROALBUMIN / CREATININE RATIO URINE; Future; Expected date: 5/9/17    Essential hypertension  -     hydrochlorothiazide (MICROZIDE) 12.5 mg capsule; Take 1 capsule (12.5 mg total) by mouth once daily. for blood pressure and heart  Dispense: 30 capsule; Refill: 11  -     MICROALBUMIN / CREATININE RATIO URINE  -     Ambulatory referral to Optometry  -     MICROALBUMIN / CREATININE RATIO URINE; Future; Expected date: 5/9/17    Residual schizophrenia  -     Ambulatory referral to Social Work    Long-term current use of high risk medication other than anticoagulant  -     Ambulatory referral to  Social Work    Cigarette nicotine dependence without complication    Bipolar 1 disorder    Gastro-esophageal reflux disease without esophagitis  -     omeprazole (PRILOSEC) 20 MG capsule; Take 1 capsule (20 mg total) by mouth every morning. for stomach acid  Dispense: 30 capsule; Refill: 11    Nonadherence to medical treatment    Subjective vision disturbance, bilateral  -     Ambulatory referral to Optometry    Screening mammogram, encounter for  -     Mammo Digital Screening Bilateral With CAD; Future; Expected date: 5/9/17    Need for pneumococcal vaccination  -     Pneumococcal Polysaccharide Vaccine (23 Valent) (SQ/IM)    History of hysterectomy for benign disease    Insufficient social insurance and welfare support  -     Ambulatory referral to Social Work    Primary localized osteoarthrosis of multiple sites  -     Discontinue: tramadol (ULTRAM) 50 mg tablet; Take 1 tablet (50 mg total) by mouth every 8 (eight) hours as needed for Pain.  Dispense: 60 tablet; Refill: 2  -     Discontinue: tramadol (ULTRAM) 50 mg tablet; Take 1 tablet (50 mg total) by mouth every 8 (eight) hours as needed for Pain.  Dispense: 90 tablet; Refill: 2  -     tramadol (ULTRAM) 50 mg tablet; Take 1 tablet (50 mg total) by mouth every 8 (eight) hours as needed for Pain.  Dispense: 90 tablet; Refill: 2    Hyperlipidemia, unspecified hyperlipidemia type    Anxiety        Medications Discontinued During This Encounter   Medication Reason    hydrOXYzine pamoate (VISTARIL) 25 MG Cap Patient no longer taking    tiotropium (SPIRIVA) 18 mcg inhalation capsule Patient no longer taking    polyethylene glycol (GLYCOLAX) 17 gram PwPk Patient no longer taking    montelukast (SINGULAIR) 10 mg tablet Patient no longer taking    losartan-hydrochlorothiazide 50-12.5 mg (HYZAAR) 50-12.5 mg per tablet Patient no longer taking    ondansetron (ZOFRAN-ODT) 8 MG TbDL Patient no longer taking    alendronate (FOSAMAX) 70 MG tablet Patient no longer  taking    cyproheptadine (PERIACTIN) 4 mg tablet Patient no longer taking    atorvastatin (LIPITOR) 40 MG tablet Patient no longer taking    hydrochlorothiazide (MICROZIDE) 12.5 mg capsule Reorder    omeprazole (PRILOSEC) 20 MG capsule Reorder    hydrocodone-acetaminophen 10-325mg (NORCO)  mg Tab Alternate therapy    tramadol (ULTRAM) 50 mg tablet Reorder    tramadol (ULTRAM) 50 mg tablet Error    tramadol (ULTRAM) 50 mg tablet Error

## 2017-05-09 NOTE — ASSESSMENT & PLAN NOTE
She reports continued typical osteoarthritis, primarily of weightbearing joints (hips, knees, low back). Tramadol 3 times daily provide satisfactory analgesia. She is able to ambulate with a cane. We are avoiding NSAIDs due to her history of cerebral hemorrhage. She is demonstrating no behaviors to suggest inappropriate use of her prescription medication.

## 2017-05-09 NOTE — MR AVS SNAPSHOT
[facility] Medicine  [address] [place] [address]  Phone: [phone]  Fax: [phone]                  Karyna Geller   2017 11:20 AM   Office Visit    Description:  Female : 1960   Provider:  CECILIA Boothe MD   Department:  [facility] - Internal Medicine           Reason for Visit     Follow-up     Belepharitis           Diagnoses this Visit        Comments    Pre-diabetes    -  Primary     Essential hypertension         Residual schizophrenia         Long-term current use of high risk medication other than anticoagulant         Cigarette nicotine dependence without complication         Bipolar 1 disorder         Gastro-esophageal reflux disease without esophagitis         Nonadherence to medical treatment         Subjective vision disturbance, bilateral         Screening mammogram, encounter for         Need for pneumococcal vaccination         History of hysterectomy for benign disease         Insufficient social insurance and welfare support         Primary localized osteoarthrosis of multiple sites                To Do List           Future Appointments        Provider Department Dept Phone    2017 1:50 PM SPECIMEN, [place] [facility] [phone]    2017 2:15 PM [facility] MAMM-SCR [facility] [phone]    2017 9:00 AM CECILIA Boothe MD [facility] [phone]      Goals (5 Years of Data)     None      Follow-Up and Disposition     Return in about 3 months (around 2017) for re-evaluate chronic conditions.       These Medications        Disp Refills Start End    hydrochlorothiazide (MICROZIDE) 12.5 mg capsule 30 capsule 11 2017     Take 1 capsule (12.5 mg total) by mouth once daily. for blood pressure and heart - Oral    Ph[facility]acy: [id number] [place] - [address] [facility] Ph #: [phone]       omeprazole (PRILOSEC) 20 MG capsule 30 capsule 11 2017     Take 1  capsule (20 mg total) by mouth every morning. for stomach acid - Oral    Ph[facility]acy: [id number] - [place]  [address] Ph #: [phone]       tramadol (ULTRAM) 50 mg tablet 90 tablet 2 5/9/2017     Take 1 tablet (50 mg total) by mouth every 8 (eight) hours as needed for Pain. - Oral    Pharmacy: [id number] - [place], [place] - [address] Ph #: [phone]         [facility]     [facility] Nurse Care Line - 24/7 Assistance  Unless otherwise directed by your provider, please contact [facility], our nurse care line that is available for 24/7 assistance.     Registered nurses in the [facility] provide: appointment scheduling, clinical advisement, health education, and other advisory services.  Call: [phone] (toll free)               Medications           Message regarding Medications     Verify the changes and/or additions to your medication regime listed below are the same as discussed with your clinician today.  If any of these changes or additions are incorrect, please notify your healthcare provider.        CHANGE how you are taking these medications     Start Taking Instead of    hydrochlorothiazide (MICROZIDE) 12.5 mg capsule hydrochlorothiazide (MICROZIDE) 12.5 mg capsule    Dosage:  Take 1 capsule (12.5 mg total) by mouth once daily. for blood pressure and heart Dosage:  Take 12.5 mg by mouth once daily.    Reason for Change:  Reorder     omeprazole (PRILOSEC) 20 MG capsule omeprazole (PRILOSEC) 20 MG capsule    Dosage:  Take 1 capsule (20 mg total) by mouth every morning. for stomach acid Dosage:  Take 20 mg by mouth every morning.     Reason for Change:  Reorder     tramadol (ULTRAM) 50 mg tablet tramadol (ULTRAM) 50 mg tablet    Dosage:  Take 1 tablet (50 mg total) by mouth every 8 (eight) hours as needed for Pain. Dosage:  Take 50 mg by mouth every 8 (eight) hours as needed for Pain.    Reason for Change:  Reorder       STOP taking  these medications     hydrOXYzine pamoate (VISTARIL) 25 MG Cap Take 1 capsule (25 mg total) by mouth every 6 (six) hours as needed (nausea, anxiety).    tiotropium (SPIRIVA) 18 mcg inhalation capsule Inhale 18 mcg into the lungs once daily. Controller    polyethylene glycol (GLYCOLAX) 17 gram PwPk Take by mouth as needed.     montelukast (SINGULAIR) 10 mg tablet Take 10 mg by mouth once daily.     losartan-hydrochlorothiazide 50-12.5 mg (HYZAAR) 50-12.5 mg per tablet Take 1 tablet by mouth once daily.    ondansetron (ZOFRAN-ODT) 8 MG TbDL Take 1 tablet (8 mg total) by mouth every 8 (eight) hours as needed (Nausea).    alendronate (FOSAMAX) 70 MG tablet Take 70 mg by mouth every 7 days.    cyproheptadine (PERIACTIN) 4 mg tablet Take 4 mg by mouth 3 (three) times daily.     atorvastatin (LIPITOR) 40 MG tablet Take 1 tablet (40 mg total) by mouth every evening.    hydrocodone-acetaminophen 10-325mg (NORCO)  mg Tab Take 1 tablet by mouth every 4 (four) hours as needed for Pain.           Verify that the below list of medications is an accurate representation of the medications you are currently taking.  If none reported, the list may be blank. If incorrect, please contact your healthcare provider. Carry this list with you in case of emergency.           Current Medications     acetaminophen (TYLENOL) 325 MG tablet Take 2 tablets (650 mg total) by mouth every 6 (six) hours as needed.    albuterol (PROVENTIL) 2.5 mg /3 mL (0.083 %) nebulizer solution Take 2.5 mg by nebulization 2 (two) times daily as needed for Wheezing. Rescue    alprazolam (XANAX) 0.5 MG tablet Take 0.5 mg by mouth daily as needed.     CALCIUM CARBONATE/VITAMIN D3 (CALCIUM 600 + D,3, ORAL) Take 2 tablets by mouth once daily.    escitalopram (LEXAPRO) 10 MG tablet Take 20 mg by mouth once daily.     hydrochlorothiazide (MICROZIDE) 12.5 mg capsule Take 1 capsule (12.5 mg total) by mouth once daily. for blood pressure and heart    omeprazole  "(PRILOSEC) 20 MG capsule Take 1 capsule (20 mg total) by mouth every morning. for stomach acid    oxcarbazepine (TRILEPTAL) 300 MG Tab Take 600 mg by mouth 2 (two) times daily.     quetiapine (SEROQUEL) 300 MG Tab Take 300 mg by mouth every evening.    tramadol (ULTRAM) 50 mg tablet Take 1 tablet (50 mg total) by mouth every 8 (eight) hours as needed for Pain.           Clinical Reference Information           Your Vitals Were     BP Pulse Temp Resp    118/62 (BP Location: Right arm, Patient Position: Sitting, BP Method: Manual) 75 97.2 °F (36.2 °C) (Tympanic) 16    Height Weight SpO2 BMI    5' 3" (1.6 m) 55.9 kg (123 lb 3.8 oz) 96% 21.83 kg/m2      Blood Pressure          Most Recent Value    BP  118/62      Allergies as of 5/9/2017     No Known Allergies      Immunizations Administered on Date of Encounter - 5/9/2017     Name Date Dose VIS Date Route    Pneumococcal Polysaccharide - 23 Valent  Incomplete 0.5 mL 4/24/2015 Intramuscular      Orders Placed During Today's Visit      Normal Orders This Visit    Ambulatory referral to Optometry     Ambulatory referral to Social Work     MICROALBUMIN / CREATININE RATIO URINE     Pneumococcal Polysaccharide Vaccine (23 Valent) (SQ/IM)     Future Labs/Procedures Expected by Expires    Mammo Digital Screening Bilateral With CAD  5/9/2017 7/9/2018    MICROALBUMIN / CREATININE RATIO URINE  5/9/2017 7/8/2018      Smoking Cessation     If you would like to quit smoking:   You may be eligible for free services if you are a [place] resident and started smoking cigarettes before September 1, 1988.  Call the Smoking Cessation Trust ([facility]) toll free at [phone] or [phone].   Call 0-441-QUIT-NOW if you do not meet the above criteria.   Contact us via email: [email or web address]   View our website for more information: [email or web address]        Language Assistance Services     ATTENTION: Language assistance services are available, free of charge. Please " call [phone].      ATENCIÓN: Si habla español, tiene a hebert disposición servicios gratuitos de asistencia lingüística. Llame al [phone].     CHÚ Ý: N?u b?n nói Ti?ng Vi?t, có các d?ch v? h? tr? ngôn ng? mi?n phí dành cho b?n. G?i s? [phone].         [facility] complies with applicable Federal civil rights laws and does not discriminate on the basis of race, color, national origin, age, disability, or sex.

## 2017-05-09 NOTE — ASSESSMENT & PLAN NOTE
She is currently at the contemplation stage of smoking cessation (thinking about quitting but not ready to quit). Smoking cessation encouraged.

## 2017-05-09 NOTE — ASSESSMENT & PLAN NOTE
Based on the report of the patient and information available to me at present, this condition appears to be FAIRLY CONTROLLED, and this condition appears to be STABLE. She continues to follow with her psychiatrist and mental health counselor.

## 2017-05-09 NOTE — ASSESSMENT & PLAN NOTE
I reviewed her recent lab results, including lipid panel. As noted above, it appears that she has not been taking her atorvastatin for the last 2-3 months (she still has the prescription bottle from February, and it is essentially full). Her prior stroke was not thromboembolic in origin, but was hemorrhagic due to an aneurysm. She reports no recent stroke or TIA symptoms.

## 2017-05-09 NOTE — Clinical Note
She has prediabetes, not diabetes. However, it appears that her Medicaid insurance is including her with the diabetic treatment group, requesting periodic A1c, urine microalbumin, etc. I believe I have addressed all these requirements for her over the last 2 office visits, and I performed diabetic foot exam today. Just FYI.

## 2017-05-16 ENCOUNTER — HOSPITAL ENCOUNTER (OUTPATIENT)
Dept: RADIOLOGY | Facility: HOSPITAL | Age: 57
Discharge: HOME | End: 2017-05-16
Attending: FAMILY MEDICINE
Payer: MEDICAID

## 2017-05-16 DIAGNOSIS — Z12.31 SCREENING MAMMOGRAM, ENCOUNTER FOR: ICD-10-CM

## 2017-05-16 PROCEDURE — 77067 SCR MAMMO BI INCL CAD: CPT | Mod: TC

## 2017-05-16 PROCEDURE — 77067 SCR MAMMO BI INCL CAD: CPT | Mod: 26,,, | Performed by: RADIOLOGY

## 2017-05-17 ENCOUNTER — OFFICE VISIT (OUTPATIENT)
Dept: OPHTHALMOLOGY | Facility: CLINIC | Age: 57
End: 2017-05-17
Payer: MEDICAID

## 2017-05-17 DIAGNOSIS — R73.03 PRE-DIABETES: Primary | Chronic | ICD-10-CM

## 2017-05-17 DIAGNOSIS — H52.4 BILATERAL PRESBYOPIA: ICD-10-CM

## 2017-05-17 DIAGNOSIS — H52.03 HYPEROPIA, BILATERAL: ICD-10-CM

## 2017-05-17 DIAGNOSIS — Z01.00 NO RETINOPATHY ON EXAM: ICD-10-CM

## 2017-05-17 PROCEDURE — 99211 OFF/OP EST MAY X REQ PHY/QHP: CPT | Mod: PBBFAC | Performed by: OPTOMETRIST

## 2017-05-17 PROCEDURE — 92004 COMPRE OPH EXAM NEW PT 1/>: CPT | Mod: S$PBB,,, | Performed by: OPTOMETRIST

## 2017-05-17 PROCEDURE — 92015 DETERMINE REFRACTIVE STATE: CPT | Mod: ,,, | Performed by: OPTOMETRIST

## 2017-05-17 PROCEDURE — 99999 PR PBB SHADOW E&M-EST. PATIENT-LVL I: CPT | Mod: PBBFAC,,, | Performed by: OPTOMETRIST

## 2017-05-17 NOTE — LETTER
May 17, 2017      CECILIA Boothe MD  [address] [address]           '[facility]  [address] [place] [address]  Phone: [phone]  Fax: [phone]          Patient: Karyna Geller   MR Number: [id number]   YOB: 1960   Date of Visit: 5/17/2017       Dear Dr. CECILIA Boothe:    Thank you for referring Karyna Geller to me for evaluation. Attached you will find relevant portions of my assessment and plan of care.    If you have questions, please do not hesitate to call me. I look forward to following Karyna Geller along with you.    Sincerely,    Lewis Alejandre, OD    Enclosure  CC:  No Recipients    If you would like to receive this communication electronically, please contact [email or web address] or [phone] to request more information on [email or web address] Link access.    For providers and/or their staff who would like to refer a patient to [facility], please contact us through our one-stop-shop provider referral line, Daiana Tapia, at [phone].    If you feel you have received this communication in error or would no longer like to receive these types of communications, please e-mail [email or web address]

## 2017-05-17 NOTE — PROGRESS NOTES
HPI     Decrease near and distance visual acuity with glasses. Glasses 6 years   old. New patient last eye exam 2 years for contact lenses.  Patient   doesn't wear the contacts much. Update glasses RX. Patient refuses to use   glasses due not being able to see with glasses.       Last edited by Lewis Alejandre, OD on 5/17/2017 11:19 AM.         Assessment /Plan     For exam results, see Encounter Report.    Pre-diabetes    No retinopathy on exam    Hyperopia, bilateral    Bilateral presbyopia    Nuclear cataract, bilateral      No Background Diabetic Retinopathy    Mild cataracts, not surgical    Dispense Final Rx for glasses.  RTC 1 year

## 2017-05-24 ENCOUNTER — TELEPHONE (OUTPATIENT)
Dept: INTERNAL MEDICINE | Facility: CLINIC | Age: 57
End: 2017-05-24

## 2017-05-24 NOTE — TELEPHONE ENCOUNTER
Advised pt Dr. Geller @ [facility] and they sched their own appts, gave her [facility] P#[phone] to call and see if can sched/check status of referral, and advised her to call back with further questions/concerns, pt voiced understanding and conf P#.

## 2017-05-24 NOTE — TELEPHONE ENCOUNTER
----- Message from Cesilia Fernandez sent at 5/22/2017  7:47 AM CDT -----  Contact: Pt  Pt is requesting to speak to the nurse regarding her referral to see Dr. Geller (neurologist). Pls call pt back at [phone].

## 2017-05-26 ENCOUNTER — TELEPHONE (OUTPATIENT)
Dept: NEUROSURGERY | Facility: CLINIC | Age: 57
End: 2017-05-26

## 2017-05-26 DIAGNOSIS — I60.7 CEREBRAL ANEURYSM RUPTURE: Primary | ICD-10-CM

## 2017-05-26 NOTE — TELEPHONE ENCOUNTER
[facility] NOTICE THAT [facility] REQUIRES 3BD TO AUTH CTA. RSLD APPT TO Thursday 6/1 SAME TIME TO OBTAIN AUTH FOR SCAN. LVM WITH PT ABOUT CHANGES.

## 2017-05-26 NOTE — TELEPHONE ENCOUNTER
----- Message from Jayda Buckley sent at 5/25/2017 11:32 AM CDT -----  Contact: PT  Would like a call regarding her symptoms. States she has fallen a few times, and has been having headaches.     She would like to discuss scheduling an appt as soon as possible also.     Call: [phone]

## 2017-05-26 NOTE — TELEPHONE ENCOUNTER
[facility] PT, & SW DR ROY, SCHED CTA IN [place] WHERE SHE LIVES FOR Monday THE 29TH, ALSO SCHED FU WITH DR ROY ON THE FOLLOWING Monday THE 5TH. SHE HAS ALL INFO AND HAS THE LOCATIONS FOR THE APPOINTMENTS. SHE EXPRESSED UNDERSTANDING OF HER NEXT STEPS. APPT SLIP INT HE MAIL ALSO.

## 2017-06-01 ENCOUNTER — HOSPITAL ENCOUNTER (OUTPATIENT)
Dept: RADIOLOGY | Facility: HOSPITAL | Age: 57
Discharge: HOME | End: 2017-06-01
Attending: NEUROLOGICAL SURGERY
Payer: MEDICAID

## 2017-06-01 DIAGNOSIS — I60.7 CEREBRAL ANEURYSM RUPTURE: ICD-10-CM

## 2017-06-01 PROCEDURE — 70496 CT ANGIOGRAPHY HEAD: CPT | Mod: TC

## 2017-06-01 PROCEDURE — 25500020 PHARM REV CODE 255: Performed by: NEUROLOGICAL SURGERY

## 2017-06-01 RX ADMIN — IOHEXOL 100 ML: 350 INJECTION, SOLUTION INTRAVENOUS at 03:06

## 2017-06-05 ENCOUNTER — OFFICE VISIT (OUTPATIENT)
Dept: NEUROSURGERY | Facility: CLINIC | Age: 57
End: 2017-06-05
Payer: MEDICAID

## 2017-06-05 VITALS
TEMPERATURE: 98 F | HEIGHT: 63 IN | BODY MASS INDEX: 20.5 KG/M2 | DIASTOLIC BLOOD PRESSURE: 61 MMHG | SYSTOLIC BLOOD PRESSURE: 117 MMHG | WEIGHT: 115.69 LBS | HEART RATE: 98 BPM

## 2017-06-05 DIAGNOSIS — G44.209 TENSION-TYPE HEADACHE, NOT INTRACTABLE, UNSPECIFIED CHRONICITY PATTERN: ICD-10-CM

## 2017-06-05 DIAGNOSIS — I67.1 CEREBRAL ANEURYSM: Primary | ICD-10-CM

## 2017-06-05 PROCEDURE — 99213 OFFICE O/P EST LOW 20 MIN: CPT | Mod: S$PBB,,, | Performed by: NEUROLOGICAL SURGERY

## 2017-06-05 PROCEDURE — 99213 OFFICE O/P EST LOW 20 MIN: CPT | Mod: PBBFAC | Performed by: NEUROLOGICAL SURGERY

## 2017-06-05 PROCEDURE — 99999 PR PBB SHADOW E&M-EST. PATIENT-LVL III: CPT | Mod: PBBFAC,,, | Performed by: NEUROLOGICAL SURGERY

## 2017-06-05 RX ORDER — BUTALBITAL, ACETAMINOPHEN AND CAFFEINE 50; 325; 40 MG/1; MG/1; MG/1
1 TABLET ORAL EVERY 6 HOURS PRN
Qty: 60 TABLET | Refills: 1 | Status: SHIPPED | OUTPATIENT
Start: 2017-06-05 | End: 2017-06-18 | Stop reason: ALTCHOICE

## 2017-06-05 NOTE — PROGRESS NOTES
This office note has been dictated.  Karyna Geller returned in neurosurgical followup to the office this afternoon.  I   last saw her on 04/06/15.  She has overall been generally neurologically stable,   but does complain of persistent vertex and occipital headaches.  She continues   to have some balance difficulties and left leg weakness and uses a walker or   cane to get around.  She has had no other specific medical issues that have   arisen in the past two years.    On examination today, she is alert and verbal.  Memory seems reasonably good.    Affect is unremarkable.  She has good eye movements.  There is no weakness in   the upper extremities.  Finger-to-nose was done well.  She has some residual   mild weakness in the left leg and also some swelling of the left knee.    CT angiogram was done at [facility] [place] on 06/01/17.  There is some   artifact from the coils.  Ventricular size is normal.  There is no evidence for   new bleeding or other changes.  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  There is no evidence for recurrent aneurysm.  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    The ruptured aneurysm seems well coiled three years after treatment.  I do not   believe we need to continue to follow her for this.  For today, she was given a   prescription for Fioricet for her headaches.  It was recommended that she   followup with [facility] in [place] for headache management.  I did   not make a definite appointment to see her back, but will of course be happy to   see her if any new problems arise.      JORGE/RADHA  dd: 06/05/2017 16:37:15 (CDT)  td: 06/06/2017 15:34:35 (CDT)  Doc ID   #[id number]  Job ID #[id number]    CC: Karyna Geller

## 2017-06-05 NOTE — LETTER
June 5, 2017      CECILIA Boothe MD  [address] [facility] [address]           [facility] - Neurosurgery [facility]  [address]  [place] [address]  Phone: [phone]          Patient: Karyna Geller   MR Number: [id number]   YOB: 1960   Date of Visit: 6/5/2017       Dear Dr. CECILIA Boothe:    Thank you for referring Karyna Geller to me for evaluation. Attached you will find relevant portions of my assessment and plan of care.    If you have questions, please do not hesitate to call me. I look forward to following Karyna Geller along with you.    Sincerely,    Brandon Geller MD    Enclosure  CC:  No Recipients    If you would like to receive this communication electronically, please contact [email or web address] or [phone] to request more information on [employer] access.    For providers and/or their staff who would like to refer a patient to [facility], please contact us through our one-stop-shop provider referral line, [facility] , at [phone].    If you feel you have received this communication in error or would no longer like to receive these types of communications, please e-mail [email or web address]

## 2017-06-18 ENCOUNTER — HOSPITAL ENCOUNTER (EMERGENCY)
Facility: HOSPITAL | Age: 57
Discharge: HOME | End: 2017-06-18
Attending: EMERGENCY MEDICINE
Payer: MEDICAID

## 2017-06-18 VITALS
DIASTOLIC BLOOD PRESSURE: 56 MMHG | WEIGHT: 113 LBS | SYSTOLIC BLOOD PRESSURE: 110 MMHG | OXYGEN SATURATION: 98 % | RESPIRATION RATE: 18 BRPM | HEIGHT: 66 IN | TEMPERATURE: 99 F | BODY MASS INDEX: 18.16 KG/M2 | HEART RATE: 104 BPM

## 2017-06-18 DIAGNOSIS — M79.2 NEUROPATHIC PAIN OF FOOT, UNSPECIFIED LATERALITY: Primary | ICD-10-CM

## 2017-06-18 LAB — ETHANOL SERPL-MCNC: 45 MG/DL

## 2017-06-18 PROCEDURE — 25000003 PHARM REV CODE 250: Performed by: EMERGENCY MEDICINE

## 2017-06-18 PROCEDURE — 99284 EMERGENCY DEPT VISIT MOD MDM: CPT

## 2017-06-18 PROCEDURE — 80320 DRUG SCREEN QUANTALCOHOLS: CPT

## 2017-06-18 RX ORDER — QUETIAPINE FUMARATE 100 MG/1
300 TABLET, FILM COATED ORAL
Status: COMPLETED | OUTPATIENT
Start: 2017-06-18 | End: 2017-06-18

## 2017-06-18 RX ADMIN — QUETIAPINE FUMARATE 300 MG: 100 TABLET, FILM COATED ORAL at 01:06

## 2017-06-18 NOTE — ED NOTES
Pt laying in bed sleeping; respirations even and unlabored, no distress noted. Daughter at bedside.

## 2017-06-18 NOTE — ED PROVIDER NOTES
"SCRIBE #1 NOTE: I, Lewis Adkins, am scribing for, and in the presence of, William White MD. I have scribed the entire note.      History      Chief Complaint   Patient presents with    Foot Pain     sudden, non traumatic footpain.pt ambulatory, bears weight and moves without difficulty.        Review of patient's allergies indicates:  No Known Allergies     HPI   HPI    6/18/2017, 1:20 AM   History obtained from the patient      History of Present Illness: Karyna Geller is a 57 y.o. female patient who presents to the Emergency Department for bilateral foot pain which onset gradually this AM. Symptoms are constant and moderate in severity.  No mitigating or exacerbating factors reported. Pt describes the pain as a "burning, tingling sensation" to the entirety of both feet. Pt states that the pain is so bad that "she just wants her feet cut off." Pt also states that she consumed 1/2 pint of liquor/alcohol PTA. No associated sxs reported. Patient denies any fever, chills, dizziness, gait problem, headache, abd pain, chest pain, n/v/d, and all other sxs at this time. No further complaints or concerns at this time.         Arrival mode: Personal vehicle    PCP: DIMA Boothe MD       Past Medical History:  Past Medical History:   Diagnosis Date    Anemia     Aneurysm     Anorexia 4/26/2017    Anxiety     Asthma     Bipolar disorder     Carpal tunnel syndrome, bilateral     Cerebral aneurysm     Chronic pain syndrome     Cigarette nicotine dependence     Depression     GERD (gastroesophageal reflux disease)     Hyperlipidemia     Hypertension     Insomnia     Osteoporosis     Schizophrenia     Stroke     Subarachnoid hemorrhage        Past Surgical History:  Past Surgical History:   Procedure Laterality Date    BUNIONECTOMY Right     COLONOSCOPY W/ BIOPSIES AND POLYPECTOMY      PARTIAL HYSTERECTOMY      Bilateral Ovaries Remain    TRANSCRANIAL DOPPLER STUDY - COMPLETE  8/28/2014      "    TRANSCRANIAL DOPPLER STUDY - COMPLETE  8/29/2014         TRANSCRANIAL DOPPLER STUDY - COMPLETE  8/30/2014         TRANSCRANIAL DOPPLER STUDY - COMPLETE  8/31/2014         TRANSCRANIAL DOPPLER STUDY - COMPLETE  9/1/2014         TRANSCRANIAL DOPPLER STUDY - COMPLETE  9/2/2014         TRANSCRANIAL DOPPLER STUDY - COMPLETE  9/3/2014         TRANSCRANIAL DOPPLER STUDY - COMPLETE  9/4/2014         TRANSCRANIAL DOPPLER STUDY - COMPLETE  9/5/2014         TRANSCRANIAL DOPPLER STUDY - COMPLETE  9/6/2014         VAGINAL DELIVERY      X 3         Family History:  Family History   Problem Relation Age of Onset    Cancer Mother     Glaucoma Mother     Stroke Mother     Diabetes type II Mother     Heart disease Mother     Hypertension Mother     Brain cancer Sister     Diabetes type II Sister     Cancer Brother     Hypertension Brother     Bone cancer Sister        Social History:  Social History     Social History Main Topics    Smoking status: Current Every Day Smoker     Types: Cigarettes     Start date: 1976    Smokeless tobacco: Never Used      Comment: Current average 10 cigarettes per week.    Alcohol use Yes      Comment: 1-3 times per week, 1-2 drink at a time.    Drug use: No    Sexual activity: No       ROS   Review of Systems   Constitutional: Negative for chills, diaphoresis and fever.   HENT: Negative for sore throat.    Respiratory: Negative for shortness of breath.    Cardiovascular: Negative for chest pain.   Gastrointestinal: Negative for abdominal pain, blood in stool, constipation, diarrhea, nausea and vomiting.   Genitourinary: Negative for difficulty urinating, dysuria, frequency, hematuria and urgency.   Musculoskeletal: Negative for back pain and gait problem.        + bilateral foot pain   Skin: Negative for rash.   Neurological: Negative for dizziness, seizures, syncope, speech difficulty, weakness, light-headedness and headaches.   Hematological: Does not bruise/bleed easily.    All other systems reviewed and are negative.      Physical Exam      Initial Vitals [06/18/17 0119]   BP Pulse Resp Temp SpO2   (!) 181/79 102 19 98.6 °F (37 °C) 99 %       Physical Exam  Nursing Notes and Vital Signs Reviewed.  Constitutional: Patient is in no apparent distress. Well-developed and well-nourished.  Head: Atraumatic. Normocephalic.  Eyes: PERRL. EOM intact. Conjunctivae are not pale. No scleral icterus.  ENT: Mucous membranes are moist. Oropharynx is clear and symmetric.    Neck: Supple. Full ROM. No lymphadenopathy.  Cardiovascular: Regular rate. Regular rhythm. No murmurs, rubs, or gallops. Distal pulses are 2+ and symmetric.  Pulmonary/Chest: No respiratory distress. Clear to auscultation bilaterally. No wheezing, rales, or rhonchi.  Abdominal: Soft and non-distended.  There is no tenderness.  No rebound, guarding, or rigidity. Good bowel sounds.  Genitourinary: No CVA tenderness  Musculoskeletal: Moves all extremities. No obvious deformities. No edema. No calf tenderness.  Right Ankle:  No obvious deformity. There is no swelling.  There is no tenderness.  She is able to bear weight.   Ankle dorsiflexion and ankle plantar flexion are intact.  Intact sensation to light touch. Distal capillary refill takes less than 2 seconds.  PT and DP pulses are 2+ bilaterally.  Left Ankle:  No obvious deformity. There is no swelling.  There is no tenderness.  She is able to bear weight.   Ankle dorsiflexion and ankle plantar flexion are intact.  Intact sensation to light touch. Distal capillary refill takes less than 2 seconds.  PT and DP pulses are 2+ bilaterally.  Skin: Warm and dry.  Neurological:  Alert, awake, and appropriate.  Normal speech.  No acute focal neurological deficits are appreciated.  Psychiatric: Normal affect. Good eye contact. Appropriate in content.    ED Course    Procedures  ED Vital Signs:  Vitals:    06/18/17 0119 06/18/17 0135 06/18/17 0157 06/18/17 0322   BP: (!) 181/79 (!) 144/88   "(!) 110/56   Pulse: 102 80  104   Resp: 19 18 18   Temp: 98.6 °F (37 °C)      TempSrc: Oral      SpO2: 99% 99%  98%   Weight: 54.4 kg (120 lb)  51.3 kg (113 lb)    Height: 5' 6" (1.676 m)          Abnormal Lab Results:  Labs Reviewed   ALCOHOL,MEDICAL (ETHANOL) - Abnormal; Notable for the following:        Result Value    Alcohol, Medical, Serum 45 (*)     All other components within normal limits        All Lab Results:  Results for orders placed or performed during the hospital encounter of 06/18/17   Ethanol   Result Value Ref Range    Alcohol, Medical, Serum 45 (H) <10 mg/dL                The Emergency Provider reviewed the vital signs and test results, which are outlined above.    ED Discussion     4:31 AM: Reassessed pt at this time.  Pt states her condition has improved at this time. Discussed with pt all pertinent ED information and results. Discussed pt dx of neuropathic pain of foot and plan of tx. Gave pt all f/u and return to the ED instructions. All questions and concerns were addressed at this time. Pt expresses understanding of information and instructions, and is comfortable with plan to discharge. Pt is stable for discharge.    I discussed with patient and/or family/caretaker that evaluation in the ED does not suggest any emergent or life threatening medical conditions requiring immediate intervention beyond what was provided in the ED, and I believe patient is safe for discharge.  Regardless, an unremarkable evaluation in the ED does not preclude the development or presence of a serious of life threatening condition. As such, patient was instructed to return immediately for any worsening or change in current symptoms.      ED Medication(s):  Medications   quetiapine tablet 300 mg (300 mg Oral Given 6/18/17 0136)       Discharge Medication List as of 6/18/2017  4:19 AM          Follow-up Information     DIMA Boothe MD in 2 days.    Specialty:  Family Medicine  Contact information:  [address] [facility] [address]  [phone]             [facility] .    Specialty:  Emergency Medicine  Why:  As needed  Contact information:  [address] [facility] [address] [phone]                   Medical Decision Making    Medical Decision Making:   Clinical Tests:   Lab Tests: Ordered and Reviewed           Scribe Attestation:   Scribe #1: I performed the above scribed service and the documentation accurately describes the services I performed. I attest to the accuracy of the note.    Attending:   Physician Attestation Statement for Scribe #1: I, William White MD, personally performed the services described in this documentation, as scribed by Lewis Adkins, in my presence, and it is both accurate and complete.          Clinical Impression       ICD-10-CM ICD-9-CM   1. Neuropathic pain of foot, unspecified laterality G57.90 355.8       Disposition:   Disposition: Discharged  Condition: Stable       William White MD  06/18/17 2022

## 2017-06-18 NOTE — ED NOTES
LOC: The patient is awake, alert and aware of environment with an anxious affect, the patient is oriented x 3 and speaking appropriately.  APPEARANCE: Patient uncomfortably and in no acute distress, patient is clean, patient's clothing is properly fastened.  SKIN: The skin is warm and dry, color consistent with ethnicity, patient has normal skin turgor and moist mucus membranes, skin intact, no breakdown or bruising noted.  MUSCULOSKELETAL: Patient moving all extremities spontaneously, no obvious swelling or deformities noted.  RESPIRATORY: Airway is open and patent, respirations are spontaneous, patient has a normal effort and rate, no accessory muscle use noted, bilateral breath sounds equal and clear to auscultation. Patient reports no shortness of breath, no distress noted.  CARDIAC: Patient has a normal rate and regular rhythm, no periphreal edema noted, capillary refill < 3 seconds.  ABDOMEN: Soft and non tender to palpation, no distention noted, normoactive bowel sounds present in all four quadrants.  NEUROLOGIC: PERRL, 3 mm bilaterally, eyes open spontaneously, behavior appropriate to situation, follows commands, facial expression symmetrical, bilateral hand grasp equal and even, purposeful motor response noted, normal sensation in all extremities when touched with a finger. Complaint of tingling pain to bilateral feet

## 2017-06-21 ENCOUNTER — TELEPHONE (OUTPATIENT)
Dept: INTERNAL MEDICINE | Facility: CLINIC | Age: 57
End: 2017-06-21

## 2017-06-21 NOTE — TELEPHONE ENCOUNTER
----- Message from Francisco Javier Taveras sent at 6/21/2017  7:53 AM CDT -----  Contact: Pt  Pt request call from nurse to get referral to a podiatrist and needs an order for a MRI, Please contact pt at [phone]

## 2017-06-21 NOTE — TELEPHONE ENCOUNTER
S/w pt RE nausea medication request, as well as pt requesting MRI and podiatry referral, advised pt she will need to see Dr. Boothe for appt to discuss/request these things, she voiced understanding. Pt sched for appt on Mon. 06/26/17 @ 3:00pm, pt conf appt date/time.

## 2017-06-26 ENCOUNTER — OFFICE VISIT (OUTPATIENT)
Dept: INTERNAL MEDICINE | Facility: CLINIC | Age: 57
End: 2017-06-26
Payer: MEDICAID

## 2017-06-26 VITALS
TEMPERATURE: 99 F | WEIGHT: 114 LBS | SYSTOLIC BLOOD PRESSURE: 112 MMHG | HEART RATE: 87 BPM | OXYGEN SATURATION: 97 % | RESPIRATION RATE: 18 BRPM | BODY MASS INDEX: 18.32 KG/M2 | DIASTOLIC BLOOD PRESSURE: 70 MMHG | HEIGHT: 66 IN

## 2017-06-26 DIAGNOSIS — R73.03 PRE-DIABETES: Chronic | ICD-10-CM

## 2017-06-26 DIAGNOSIS — I60.2: ICD-10-CM

## 2017-06-26 DIAGNOSIS — G44.209 TENSION HEADACHE: Chronic | ICD-10-CM

## 2017-06-26 DIAGNOSIS — F17.210 CIGARETTE NICOTINE DEPENDENCE WITHOUT COMPLICATION: Chronic | ICD-10-CM

## 2017-06-26 DIAGNOSIS — I10 ESSENTIAL HYPERTENSION: Chronic | ICD-10-CM

## 2017-06-26 DIAGNOSIS — M79.2 PERIPHERAL NEUROPATHIC PAIN: ICD-10-CM

## 2017-06-26 DIAGNOSIS — M19.91 PRIMARY LOCALIZED OSTEOARTHROSIS OF MULTIPLE SITES: Primary | Chronic | ICD-10-CM

## 2017-06-26 PROCEDURE — 99999 PR PBB SHADOW E&M-EST. PATIENT-LVL IV: CPT | Mod: PBBFAC,,, | Performed by: FAMILY MEDICINE

## 2017-06-26 PROCEDURE — 99214 OFFICE O/P EST MOD 30 MIN: CPT | Mod: S$PBB,,, | Performed by: FAMILY MEDICINE

## 2017-06-26 PROCEDURE — 99214 OFFICE O/P EST MOD 30 MIN: CPT | Mod: PBBFAC,PO | Performed by: FAMILY MEDICINE

## 2017-06-26 RX ORDER — BUTALBITAL, ACETAMINOPHEN AND CAFFEINE 50; 325; 40 MG/1; MG/1; MG/1
1 TABLET ORAL EVERY 6 HOURS PRN
COMMUNITY
End: 2017-06-26 | Stop reason: SDUPTHER

## 2017-06-26 RX ORDER — TRAMADOL HYDROCHLORIDE 50 MG/1
50 TABLET ORAL EVERY 8 HOURS PRN
Qty: 90 TABLET | Refills: 2 | Status: SHIPPED | OUTPATIENT
Start: 2017-06-26 | End: 2017-10-05 | Stop reason: SDUPTHER

## 2017-06-26 RX ORDER — BUTALBITAL, ACETAMINOPHEN AND CAFFEINE 50; 325; 40 MG/1; MG/1; MG/1
1 TABLET ORAL EVERY 8 HOURS PRN
Qty: 90 TABLET | Refills: 0 | Status: SHIPPED | OUTPATIENT
Start: 2017-06-26 | End: 2018-03-06 | Stop reason: SDUPTHER

## 2017-06-26 NOTE — ASSESSMENT & PLAN NOTE
Based on the report of the patient and information available to me at present, this condition appears to be SUB-OPTIMALLY CONTROLLED, and this condition appears to be WORSE.

## 2017-06-26 NOTE — PROGRESS NOTES
NOTE: Documentation entered by me for this encounter was done in part using speech-recognition technology. Although I have made an effort to ensure accuracy, malapropisms may exist and should be interpreted in context.  -CECILIA Boothe MD    PATIENT ID: Karyna Geller is a 57 y.o. female.    CHIEF COMPLAINT  Foot Pain (Requesting referral to podiatry for foot pain, tingling, and swelling.) and Nausea (Daily, Requesting rx)      HISTORY OF PRESENT ILLNESS  NOTE: The following condition(s) were addressed during this encounter. The listed order is one of convenience and does not necessarily reflect the chronology of the appointment, nor the relative importance of a condition. It is possible that additional description or status details about condition(s) may be found elsewhere in the documentation for today's encounter.    Problem List Items Addressed This Visit     Subarachnoid hemorrhage from aneurysm of right anterior communicating artery    Current Assessment & Plan     Based on the report of the patient and information available to me at present, this condition appears to be STABLE.          Peripheral neuropathic pain    Current Assessment & Plan     This problem is NEW TO ME. Based on the report of the patient and information available to me at present, this condition REQUIRES FURTHER WORK-UP, EVALUATION, and TREATMENT.          Primary localized osteoarthrosis of multiple sites - Primary (Chronic)    Current Assessment & Plan     Based on the report of the patient and information available to me at present, this condition appears to be FAIRLY CONTROLLED, and this condition appears to be STABLE.          Relevant Medications    tramadol (ULTRAM) 50 mg tablet    Cigarette nicotine dependence without complication (Chronic)    Current Assessment & Plan     She is currently at the contemplation stage of smoking cessation (thinking about quitting but not ready to quit). Smoking cessation encouraged.           Hypertension (Chronic)    Current Assessment & Plan     Based on the report of the patient and information available to me at present, this condition appears to be WELL CONTROLLED.         Pre-diabetes (Chronic)    Current Assessment & Plan     Based on the report of the patient and information available to me at present, this condition appears to be STABLE.          Tension headache (Chronic)    Current Assessment & Plan     Based on the report of the patient and information available to me at present, this condition appears to be SUB-OPTIMALLY CONTROLLED, and this condition appears to be WORSE.          Relevant Medications    butalbital-acetaminophen-caffeine -40 mg (FIORICET, ESGIC) -40 mg per tablet      Other Visit Diagnoses    None.         REVIEW OF SYSTEMS  CONSTITUTIONAL: No fever reported.  CARDIOVASCULAR: No chest pain reported.  PULMONARY: No trouble breathing reported.   ENDOCRINE: No polyuria or polydipsia reported. No symptomatic or otherwise significant hypoglycemia or hyperglycemia reported.     PHYSICAL EXAM  CONSTITUTIONAL: Vital signs (BP, P, T, RR, et al) noted. No apparent distress. Does not appear acutely ill or septic. Appears adequately hydrated.  HEENT: External ENT grossly unremarkable. Hearing grossly intact. Oropharynx moist.  PULM: Lungs clear. Breathing unlabored.  HEART: Auscultation reveals regular rate and rhythm without murmur, gallop or rub. No carotid bruit.  DERM: Skin warm and moist with normal turgor.  NEURO: Strength is grossly normal and reasonably symmetric in major muscle groups bilaterally. There are no gross focal motor deficits or gross deficits of cranial nerves III-XII.  PSYCHIATRIC: Alert and oriented x 3. Mood is grossly neutral. Affect appropriate. Judgment and insight not grossly compromised.   MUSCULOSKELETAL: Mobile using rolling walker.    FOOT EXAM: Inspection of feet reveals no open wounds, ulcerations, significant calluses, or  evidence of arterial insufficiency. 10g monofilament sensation is intact in all 5 locations tested.       DATA REVIEWED  Admission on 06/18/2017, Discharged on 06/18/2017   Component Date Value Ref Range Status    Alcohol, Medical, Serum 06/18/2017 45* <10 mg/dL Final   Lab Visit on 05/09/2017   Component Date Value Ref Range Status    Microalbum.,U,Random 05/09/2017 6.0  ug/mL Final    Creatinine, Random Ur 05/09/2017 158.0  15.0 - 325.0 mg/dL Final    Comment: The random urine reference ranges provided were established   for 24 hour urine collections.  No reference ranges exist for  random urine specimens.  Correlate clinically.      Microalb Creat Ratio 05/09/2017 3.8  0.0 - 30.0 ug/mg Final   Lab Visit on 05/03/2017   Component Date Value Ref Range Status    Sodium 05/03/2017 135* 136 - 145 mmol/L Final    Potassium 05/03/2017 4.2  3.5 - 5.1 mmol/L Final    Chloride 05/03/2017 97  95 - 110 mmol/L Final    CO2 05/03/2017 32* 23 - 29 mmol/L Final    Glucose 05/03/2017 90  70 - 110 mg/dL Final    BUN, Bld 05/03/2017 12  6 - 20 mg/dL Final    Creatinine 05/03/2017 1.0  0.5 - 1.4 mg/dL Final    Calcium 05/03/2017 10.3  8.7 - 10.5 mg/dL Final    Total Protein 05/03/2017 7.3  6.0 - 8.4 g/dL Final    Albumin 05/03/2017 4.2  3.5 - 5.2 g/dL Final    Total Bilirubin 05/03/2017 0.3  0.1 - 1.0 mg/dL Final    Comment: For infants and newborns, interpretation of results should be based  on gestational age, weight and in agreement with clinical  observations.  Premature Infant recommended reference ranges:  Up to 24 hours.............<8.0 mg/dL  Up to 48 hours............<12.0 mg/dL  3-5 days..................<15.0 mg/dL  6-29 days.................<15.0 mg/dL      Alkaline Phosphatase 05/03/2017 47* 55 - 135 U/L Final    AST 05/03/2017 14  10 - 40 U/L Final    ALT 05/03/2017 8* 10 - 44 U/L Final    Anion Gap 05/03/2017 6* 8 - 16 mmol/L Final    eGFR if African American 05/03/2017 >60.0  >60 mL/min/1.73 m^2  Final    eGFR if non African American 05/03/2017 >60.0  >60 mL/min/1.73 m^2 Final    Comment: Calculation used to obtain the estimated glomerular filtration  rate (eGFR) is the CKD-EPI equation. Since race is unknown   in our information system, the eGFR values for   -American and Non--American patients are given   for each creatinine result.      Cholesterol 05/03/2017 204* 120 - 199 mg/dL Final    Comment: The National Cholesterol Education Program (NCEP) has set the  following guidelines (reference ranges) for Cholesterol:  Optimal.....................<200 mg/dL  Borderline High.............200-239 mg/dL  High........................> or = 240 mg/dL      Triglycerides 05/03/2017 57  30 - 150 mg/dL Final    Comment: The National Cholesterol Education Program (NCEP) has set the  following guidelines (reference values) for triglycerides:  Normal......................<150 mg/dL  Borderline High.............150-199 mg/dL  High........................200-499 mg/dL      HDL 05/03/2017 81* 40 - 75 mg/dL Final    Comment: The National Cholesterol Education Program (NCEP) has set the  following guidelines (reference values) for HDL Cholesterol:  Low...............<40 mg/dL  Optimal...........>60 mg/dL      LDL Cholesterol 05/03/2017 111.6  63.0 - 159.0 mg/dL Final    Comment: The National Cholesterol Education Program (NCEP) has set the  following guidelines (reference values) for LDL Cholesterol:  Optimal.......................<130 mg/dL  Borderline High...............130-159 mg/dL  High..........................160-189 mg/dL  Very High.....................>190 mg/dL      HDL/Chol Ratio 05/03/2017 39.7  20.0 - 50.0 % Final    Total Cholesterol/HDL Ratio 05/03/2017 2.5  2.0 - 5.0 Final    Non-HDL Cholesterol 05/03/2017 123  mg/dL Final    Comment: Risk category and Non-HDL cholesterol goals:  Coronary heart disease (CHD)or equivalent (10-year risk of CHD >20%):  Non-HDL cholesterol goal     <130  mg/dL  Two or more CHD risk factors and 10-year risk of CHD <= 20%:  Non-HDL cholesterol goal     <160 mg/dL  0 to 1 CHD risk factor:  Non-HDL cholesterol goal     <190 mg/dL      Hemoglobin A1C 05/04/2017 6.2  4.5 - 6.2 % Final    Comment: According to ADA guidelines, hemoglobin A1C <7.0% represents  optimal control in non-pregnant diabetic patients.  Different  metrics may apply to specific populations.   Standards of Medical Care in Diabetes - 2016.  For the purpose of screening for the presence of diabetes:  <5.7%     Consistent with the absence of diabetes  5.7-6.4%  Consistent with increasing risk for diabetes   (prediabetes)  >or=6.5%  Consistent with diabetes  Currently no consensus exists for use of hemoglobin A1C  for diagnosis of diabetes for children.      Estimated Avg Glucose 05/04/2017 131  68 - 131 mg/dL Final    Hepatitis C Ab 05/04/2017 Negative   Final       Results for orders placed during the hospital encounter of 06/01/17   CTA Head    Narrative EXAM: [id number] HEAD    CLINICAL HISTORY: FU CTA/XH RUPTURED CEREBRAL ANEURYSM    TECHNIQUE: Standard CTA of the intracranial circulation was performed after the administration of intravenous contrast in the arterial phase. This examination was interpreted using multiplanar and 3D reconstructions.    COMPARISON: Previous CT of the head from 09/29/2014 was reviewed.    FINDINGS:    Intracranial:     There are metallic endovascular coils in the right posterior communicating artery region.  The right intracranial internal carotid artery is patent from the skull base to the carotid terminus. The left intracranial internal carotid artery is patent from the skull base to the carotid terminus. The right middle cerebral artery is normal. The left middle cerebral artery is normal. The right anterior cerebral artery is normal. The left anterior cerebral artery demonstrates an aplastic left A1 segment. Anterior communicating artery is normal. The right  posterior cerebral artery is normal. The left posterior cerebral artery is normal. No definite posterior communicating arteries are visualized. No evidence of aneurysm or aneurysm recurrence.     The basilar artery is normal.   Intracranial vertebral arteries demonstrate normal patency to the basilar confluence.    The superior cerebellar arteries are normal. The anterior inferior cerebellar arteries are normal. The posterior inferior cerebellar arteries are normal.     Brain:     No intracranial hemorrhage. There is no mass effect, midline shift, or extra-axial fluid collection. No CTA evidence for cortical-based area of infarction. The ventricles and sulci are appropriate in size and configuration for the patient's age. Basal cisterns are patent. The visualized orbits are normal. Visualized paranasal sinuses and mastoid air cells are normal.    Impression  There are endovascular coils in the region of the right posterior communicating artery without CTA evidence of residual filling.  No additional aneurysms.  No intracranial hemorrhage.    All CT scans at this facility use dose modulation, iterative reconstruction, and/or weight base dosing when appropriate to reduce radiation dose to as low as reasonably achievable.       Electronically signed by: NICOLAS APPIAH M.D.  Date:     06/01/17  Time:    18:48      HEALTH MAINTENANCE - DUE SOON OR OVERDUE  There are no preventive care reminders to display for this patient.     HEALTH MAINTENANCE - COMPLETED  Health Maintenance Topics with due status: Not Due       Topic Last Completion Date    Colonoscopy 04/01/2015    TETANUS VACCINE 10/06/2016    Influenza Vaccine 11/01/2016    Lipid Panel 05/03/2017    Pneumococcal PPSV23 (Medium Risk) 05/09/2017    Mammogram 05/18/2017        CURRENT MEDICATION LIST REVIEWED AND UPDATED  Current Outpatient Prescriptions   Medication Sig    albuterol (PROVENTIL) 2.5 mg /3 mL (0.083 %) nebulizer solution Take 2.5 mg by nebulization 2  (two) times daily as needed for Wheezing. Rescue    alprazolam (XANAX) 0.5 MG tablet Take 0.5 mg by mouth daily as needed.     butalbital-acetaminophen-caffeine -40 mg (FIORICET, ESGIC) -40 mg per tablet Take 1 tablet by mouth every 8 (eight) hours as needed for Pain.    CALCIUM CARBONATE/VITAMIN D3 (CALCIUM 600 + D,3, ORAL) Take 2 tablets by mouth once daily.    escitalopram (LEXAPRO) 10 MG tablet Take 20 mg by mouth once daily.     hydrochlorothiazide (MICROZIDE) 12.5 mg capsule Take 1 capsule (12.5 mg total) by mouth once daily. for blood pressure and heart    omeprazole (PRILOSEC) 20 MG capsule Take 1 capsule (20 mg total) by mouth every morning. for stomach acid    oxcarbazepine (TRILEPTAL) 300 MG Tab Take 600 mg by mouth 2 (two) times daily.     quetiapine (SEROQUEL) 300 MG Tab Take 300 mg by mouth every evening.    tramadol (ULTRAM) 50 mg tablet Take 1 tablet (50 mg total) by mouth every 8 (eight) hours as needed for Pain.     No current facility-administered medications for this visit.        ALLERGY LIST REVIEWED AND UPDATED  Review of patient's allergies indicates:  No Known Allergies    MEDICAL HISTORY REVIEWED AND UPDATED  She has a past medical history of Anemia; Aneurysm; Anorexia; Anxiety; Asthma; Bipolar disorder; Carpal tunnel syndrome, bilateral; Cerebral aneurysm; Chronic pain syndrome; Cigarette nicotine dependence; Depression; GERD (gastroesophageal reflux disease); Hyperlipidemia; Hypertension; Insomnia; Osteoporosis; Schizophrenia; Stroke; and Subarachnoid hemorrhage.    SURGICAL HISTORY REVIEWED AND UPDATED  She has a past surgical history that includes Partial hysterectomy; Bunionectomy (Right); Transcranial Doppler Study - Art (8/28/2014); Transcranial Doppler Study - Art (8/29/2014); Transcranial Doppler Study - Art (8/30/2014); Transcranial Doppler Study - Art (8/31/2014); Transcranial Doppler Study - Art (9/1/2014); Transcranial Doppler Study - Art (9/2/2014);  "Transcranial Doppler Study - Art (9/3/2014); Transcranial Doppler Study - Art (9/4/2014); Transcranial Doppler Study - Art (9/5/2014); Transcranial Doppler Study - Art (9/6/2014); Colonoscopy w/ biopsies and polypectomy; and Vaginal delivery.    SOCIAL HISTORY REVIEWED AND UPDATED  She reports that she has been smoking Cigarettes.  She started smoking about 41 years ago. She has never used smokeless tobacco. She reports that she drinks alcohol. She reports that she does not use drugs.    ASSESSMENT and PLAN  NOTE: Unless specified otherwise, the PLAN for a listed ASSESSMENT is to continue present treatment as prescribed. The planned follow-up and additional "Patient Instructions" may also be found in the After Visit Summary printed and provided to the patient.    Primary localized osteoarthrosis of multiple sites  -     tramadol (ULTRAM) 50 mg tablet; Take 1 tablet (50 mg total) by mouth every 8 (eight) hours as needed for Pain.  Dispense: 90 tablet; Refill: 2    Essential hypertension    Cigarette nicotine dependence without complication    Pre-diabetes    Subarachnoid hemorrhage from aneurysm of right anterior communicating artery    Peripheral neuropathic pain    Tension headache  -     butalbital-acetaminophen-caffeine -40 mg (FIORICET, ESGIC) -40 mg per tablet; Take 1 tablet by mouth every 8 (eight) hours as needed for Pain.  Dispense: 90 tablet; Refill: 0        Medication List with Changes/Refills   Current Medications    ALBUTEROL (PROVENTIL) 2.5 MG /3 ML (0.083 %) NEBULIZER SOLUTION    Take 2.5 mg by nebulization 2 (two) times daily as needed for Wheezing. Rescue    ALPRAZOLAM (XANAX) 0.5 MG TABLET    Take 0.5 mg by mouth daily as needed.     CALCIUM CARBONATE/VITAMIN D3 (CALCIUM 600 + D,3, ORAL)    Take 2 tablets by mouth once daily.    ESCITALOPRAM (LEXAPRO) 10 MG TABLET    Take 20 mg by mouth once daily.     HYDROCHLOROTHIAZIDE (MICROZIDE) 12.5 MG CAPSULE    Take 1 capsule (12.5 mg total) by " mouth once daily. for blood pressure and heart    OMEPRAZOLE (PRILOSEC) 20 MG CAPSULE    Take 1 capsule (20 mg total) by mouth every morning. for stomach acid    OXCARBAZEPINE (TRILEPTAL) 300 MG TAB    Take 600 mg by mouth 2 (two) times daily.     QUETIAPINE (SEROQUEL) 300 MG TAB    Take 300 mg by mouth every evening.   Changed and/or Refilled Medications    Modified Medication Previous Medication    BUTALBITAL-ACETAMINOPHEN-CAFFEINE -40 MG (FIORICET, ESGIC) -40 MG PER TABLET butalbital-acetaminophen-caffeine -40 mg (FIORICET, ESGIC) -40 mg per tablet       Take 1 tablet by mouth every 8 (eight) hours as needed for Pain.    Take 1 tablet by mouth every 6 (six) hours as needed for Pain.    TRAMADOL (ULTRAM) 50 MG TABLET tramadol (ULTRAM) 50 mg tablet       Take 1 tablet (50 mg total) by mouth every 8 (eight) hours as needed for Pain.    Take 1 tablet (50 mg total) by mouth every 8 (eight) hours as needed for Pain.   Discontinued Medications    ACETAMINOPHEN (TYLENOL) 325 MG TABLET    Take 2 tablets (650 mg total) by mouth every 6 (six) hours as needed.       Return in 3 months (on 9/26/2017), or if symptoms worsen or fail to improve.

## 2017-07-26 NOTE — ASSESSMENT & PLAN NOTE
Based on the report of the patient and information available to me at present, this condition appears to be STABLE.

## 2017-07-26 NOTE — ASSESSMENT & PLAN NOTE
Based on the report of the patient and information available to me at present, this condition appears to be WELL CONTROLLED.

## 2017-10-05 ENCOUNTER — OFFICE VISIT (OUTPATIENT)
Dept: INTERNAL MEDICINE | Facility: CLINIC | Age: 57
End: 2017-10-05
Payer: MEDICAID

## 2017-10-05 VITALS
SYSTOLIC BLOOD PRESSURE: 98 MMHG | TEMPERATURE: 97 F | WEIGHT: 117.06 LBS | OXYGEN SATURATION: 98 % | DIASTOLIC BLOOD PRESSURE: 58 MMHG | BODY MASS INDEX: 18.81 KG/M2 | HEART RATE: 89 BPM | HEIGHT: 66 IN

## 2017-10-05 DIAGNOSIS — I10 ESSENTIAL HYPERTENSION: Chronic | ICD-10-CM

## 2017-10-05 DIAGNOSIS — Z59.7: Chronic | ICD-10-CM

## 2017-10-05 DIAGNOSIS — F17.210 CIGARETTE NICOTINE DEPENDENCE WITHOUT COMPLICATION: Chronic | ICD-10-CM

## 2017-10-05 DIAGNOSIS — K21.9 GASTRO-ESOPHAGEAL REFLUX DISEASE WITHOUT ESOPHAGITIS: Primary | Chronic | ICD-10-CM

## 2017-10-05 DIAGNOSIS — M19.91 PRIMARY LOCALIZED OSTEOARTHROSIS OF MULTIPLE SITES: Chronic | ICD-10-CM

## 2017-10-05 PROCEDURE — 90686 IIV4 VACC NO PRSV 0.5 ML IM: CPT | Mod: PBBFAC,PO

## 2017-10-05 PROCEDURE — 99999 PR PBB SHADOW E&M-EST. PATIENT-LVL IV: CPT | Mod: PBBFAC,,, | Performed by: FAMILY MEDICINE

## 2017-10-05 PROCEDURE — 99214 OFFICE O/P EST MOD 30 MIN: CPT | Mod: PBBFAC,PO,25 | Performed by: FAMILY MEDICINE

## 2017-10-05 PROCEDURE — 99214 OFFICE O/P EST MOD 30 MIN: CPT | Mod: S$PBB,,, | Performed by: FAMILY MEDICINE

## 2017-10-05 RX ORDER — TRAMADOL HYDROCHLORIDE 50 MG/1
50 TABLET ORAL EVERY 8 HOURS PRN
Qty: 90 TABLET | Refills: 5 | Status: SHIPPED | OUTPATIENT
Start: 2017-10-05 | End: 2018-03-06 | Stop reason: SDUPTHER

## 2017-10-05 RX ORDER — ONDANSETRON HYDROCHLORIDE 8 MG/1
8 TABLET, FILM COATED ORAL EVERY 12 HOURS PRN
COMMUNITY
End: 2019-04-23 | Stop reason: SDUPTHER

## 2017-10-05 RX ORDER — PANTOPRAZOLE SODIUM 40 MG/1
40 TABLET, DELAYED RELEASE ORAL DAILY
Qty: 30 TABLET | Refills: 11 | Status: SHIPPED | OUTPATIENT
Start: 2017-10-05 | End: 2018-09-20 | Stop reason: SDUPTHER

## 2017-10-05 SDOH — SOCIAL DETERMINANTS OF HEALTH (SDOH): INSUFFICIENT SOCIAL INSURANCE AND WELFARE SUPPORT: Z59.7

## 2017-10-05 NOTE — ASSESSMENT & PLAN NOTE
BP Readings from Last 3 Encounters:   10/05/17 (!) 98/58   06/26/17 112/70   06/18/17 (!) 110/56     Wt Readings from Last 3 Encounters:   10/05/17 53.1 kg (117 lb 1 oz)   06/26/17 51.7 kg (113 lb 15.7 oz)   06/18/17 51.3 kg (113 lb)     BMI Readings from Last 3 Encounters:   10/05/17 18.89 kg/m²   06/26/17 18.40 kg/m²   06/18/17 18.24 kg/m²     Lab Results   Component Value Date    ESTGFRAFRICA >60.0 05/03/2017    EGFRNONAA >60.0 05/03/2017    CREATININE 1.0 05/03/2017    BUN 12 05/03/2017     PLAN: d/c HCTZ

## 2017-10-05 NOTE — LETTER
10/05/2017        Karyna Geller  [address] [place] [address]   1960; MRN [id number]    Dear Karyna,    Here is a copy of all your labs done this year. Please share these with your psychiatrist.    Let me know if you need anything else.    Thanks for letting me care for you.    Kind regards,     CECILIA Boothe MD, FAAFP                                        PATIENT:   Karyna Geller   YOB: 1960       Admission on 2017, Discharged on 2017   Component Date Value Ref Range Status    Alcohol, Medical, Serum 2017 45* <10 mg/dL Final   Lab Visit on 2017   Component Date Value Ref Range Status    Microalbum.,U,Random 2017 6.0  ug/mL Final    Creatinine, Random Ur 2017 158.0  15.0 - 325.0 mg/dL Final    Comment: The random urine reference ranges provided were established   for 24 hour urine collections.  No reference ranges exist for  random urine specimens.  Correlate clinically.      Microalb Creat Ratio 2017 3.8  0.0 - 30.0 ug/mg Final   Lab Visit on 2017   Component Date Value Ref Range Status    Sodium 2017 135* 136 - 145 mmol/L Final    Potassium 2017 4.2  3.5 - 5.1 mmol/L Final    Chloride 2017 97  95 - 110 mmol/L Final    CO2 2017 32* 23 - 29 mmol/L Final    Glucose 2017 90  70 - 110 mg/dL Final    BUN, Bld 2017 12  6 - 20 mg/dL Final    Creatinine 2017 1.0  0.5 - 1.4 mg/dL Final    Calcium 2017 10.3  8.7 - 10.5 mg/dL Final    Total Protein 2017 7.3  6.0 - 8.4 g/dL Final    Albumin 2017 4.2  3.5 - 5.2 g/dL Final    Total Bilirubin 2017 0.3  0.1 - 1.0 mg/dL Final    Comment: For infants and newborns, interpretation of results should be based  on gestational age, weight and in agreement with clinical  observations.  Premature Infant recommended reference ranges:  Up to 24 hours.............<8.0 mg/dL  Up to 48 hours............<12.0  mg/dL  3-5 days..................<15.0 mg/dL  6-29 days.................<15.0 mg/dL      Alkaline Phosphatase 05/03/2017 47* 55 - 135 U/L Final    AST 05/03/2017 14  10 - 40 U/L Final    ALT 05/03/2017 8* 10 - 44 U/L Final    Anion Gap 05/03/2017 6* 8 - 16 mmol/L Final    eGFR if African American 05/03/2017 >60.0  >60 mL/min/1.73 m^2 Final    eGFR if non African American 05/03/2017 >60.0  >60 mL/min/1.73 m^2 Final    Comment: Calculation used to obtain the estimated glomerular filtration  rate (eGFR) is the CKD-EPI equation. Since race is unknown   in our information system, the eGFR values for   -American and Non--American patients are given   for each creatinine result.      Cholesterol 05/03/2017 204* 120 - 199 mg/dL Final    Comment: The National Cholesterol Education Program (NCEP) has set the  following guidelines (reference ranges) for Cholesterol:  Optimal.....................<200 mg/dL  Borderline High.............200-239 mg/dL  High........................> or = 240 mg/dL      Triglycerides 05/03/2017 57  30 - 150 mg/dL Final    Comment: The National Cholesterol Education Program (NCEP) has set the  following guidelines (reference values) for triglycerides:  Normal......................<150 mg/dL  Borderline High.............150-199 mg/dL  High........................200-499 mg/dL      HDL 05/03/2017 81* 40 - 75 mg/dL Final    Comment: The National Cholesterol Education Program (NCEP) has set the  following guidelines (reference values) for HDL Cholesterol:  Low...............<40 mg/dL  Optimal...........>60 mg/dL      LDL Cholesterol 05/03/2017 111.6  63.0 - 159.0 mg/dL Final    Comment: The National Cholesterol Education Program (NCEP) has set the  following guidelines (reference values) for LDL Cholesterol:  Optimal.......................<130 mg/dL  Borderline High...............130-159 mg/dL  High..........................160-189 mg/dL  Very High.....................>190 mg/dL       HDL/Chol Ratio 05/03/2017 39.7  20.0 - 50.0 % Final    Total Cholesterol/HDL Ratio 05/03/2017 2.5  2.0 - 5.0 Final    Non-HDL Cholesterol 05/03/2017 123  mg/dL Final    Comment: Risk category and Non-HDL cholesterol goals:  Coronary heart disease (CHD)or equivalent (10-year risk of CHD >20%):  Non-HDL cholesterol goal     <130 mg/dL  Two or more CHD risk factors and 10-year risk of CHD <= 20%:  Non-HDL cholesterol goal     <160 mg/dL  0 to 1 CHD risk factor:  Non-HDL cholesterol goal     <190 mg/dL      Hemoglobin A1C 05/04/2017 6.2  4.5 - 6.2 % Final    Comment: According to ADA guidelines, hemoglobin A1C <7.0% represents  optimal control in non-pregnant diabetic patients.  Different  metrics may apply to specific populations.   Standards of Medical Care in Diabetes - 2016.  For the purpose of screening for the presence of diabetes:  <5.7%     Consistent with the absence of diabetes  5.7-6.4%  Consistent with increasing risk for diabetes   (prediabetes)  >or=6.5%  Consistent with diabetes  Currently no consensus exists for use of hemoglobin A1C  for diagnosis of diabetes for children.      Estimated Avg Glucose 05/04/2017 131  68 - 131 mg/dL Final    Hepatitis C Ab 05/04/2017 Negative   Final   Admission on 04/03/2017, Discharged on 04/03/2017   Component Date Value Ref Range Status    WBC 04/03/2017 8.14  3.90 - 12.70 K/uL Final    RBC 04/03/2017 4.04  4.00 - 5.40 M/uL Final    Hemoglobin 04/03/2017 12.7  12.0 - 16.0 g/dL Final    Hematocrit 04/03/2017 35.9* 37.0 - 48.5 % Final    MCV 04/03/2017 89  82 - 98 fL Final    MCH 04/03/2017 31.4* 27.0 - 31.0 pg Final    MCHC 04/03/2017 35.4  32.0 - 36.0 % Final    RDW 04/03/2017 14.1  11.5 - 14.5 % Final    Platelets 04/03/2017 292  150 - 350 K/uL Final    MPV 04/03/2017 9.2  9.2 - 12.9 fL Final    Gran # 04/03/2017 3.7  1.8 - 7.7 K/uL Final    Lymph # 04/03/2017 3.6  1.0 - 4.8 K/uL Final    Mono # 04/03/2017 0.7  0.3 - 1.0 K/uL Final    Eos #  04/03/2017 0.1  0.0 - 0.5 K/uL Final    Baso # 04/03/2017 0.02  0.00 - 0.20 K/uL Final    Gran% 04/03/2017 44.9  38.0 - 73.0 % Final    Lymph% 04/03/2017 44.7  18.0 - 48.0 % Final    Mono% 04/03/2017 8.8  4.0 - 15.0 % Final    Eosinophil% 04/03/2017 1.4  0.0 - 8.0 % Final    Basophil% 04/03/2017 0.2  0.0 - 1.9 % Final    Differential Method 04/03/2017 Automated   Final    Sodium 04/03/2017 142  136 - 145 mmol/L Final    Potassium 04/03/2017 3.6  3.5 - 5.1 mmol/L Final    Chloride 04/03/2017 107  95 - 110 mmol/L Final    CO2 04/03/2017 23  23 - 29 mmol/L Final    Glucose 04/03/2017 81  70 - 110 mg/dL Final    BUN, Bld 04/03/2017 16  6 - 20 mg/dL Final    Creatinine 04/03/2017 0.9  0.5 - 1.4 mg/dL Final    Calcium 04/03/2017 9.4  8.7 - 10.5 mg/dL Final    Total Protein 04/03/2017 7.0  6.0 - 8.4 g/dL Final    Albumin 04/03/2017 4.1  3.5 - 5.2 g/dL Final    Total Bilirubin 04/03/2017 0.5  0.1 - 1.0 mg/dL Final    Comment: For infants and newborns, interpretation of results should be based  on gestational age, weight and in agreement with clinical  observations.  Premature Infant recommended reference ranges:  Up to 24 hours.............<8.0 mg/dL  Up to 48 hours............<12.0 mg/dL  3-5 days..................<15.0 mg/dL  6-29 days.................<15.0 mg/dL      Alkaline Phosphatase 04/03/2017 40* 55 - 135 U/L Final    AST 04/03/2017 15  10 - 40 U/L Final    ALT 04/03/2017 10  10 - 44 U/L Final    Anion Gap 04/03/2017 12  8 - 16 mmol/L Final    eGFR if African American 04/03/2017 >60  >60 mL/min/1.73 m^2 Final    eGFR if non African American 04/03/2017 >60  >60 mL/min/1.73 m^2 Final    Comment: Calculation used to obtain the estimated glomerular filtration  rate (eGFR) is the CKD-EPI equation. Since race is unknown   in our information system, the eGFR values for   -American and Non--American patients are given   for each creatinine result.      Prothrombin Time 04/03/2017 10.7   9.0 - 12.5 sec Final    INR 04/03/2017 1.0  0.8 - 1.2 Final    Comment: Coumadin Therapy:  2.0 - 3.0 for INR for all indicators except mechanical heart valves  and antiphospholipid syndromes which should use 2.5 - 3.5.      Troponin I 04/03/2017 <0.006  0.000 - 0.026 ng/mL Final    Comment: The reference interval for Troponin I represents the 99th percentile   cutoff   for our facility and is consistent with 3rd generation assay   performance.      BNP 04/03/2017 <10  0 - 99 pg/mL Final    D-Dimer 04/03/2017 <0.19  <0.50 mg/L FEU Final    Comment: The quantitative D-dimer assay should be used as an aid in   the diagnosis of deep vein thrombosis and pulmonary embolism  in patients with the appropriate presentation and clinical  history. Causes of a positive (>0.50 mg/L FEU) D-Dimer test  include, but are not limited to: DVT, PE, DIC, thrombolytic   therapy, anticoagulant therapy, recent surgery, trauma, or   pregnancy, disseminated malignancy, aortic aneurysm, cirrhosis,  and severe infection. False negative results may occur in   patients with distal DVT.

## 2017-10-23 NOTE — PROGRESS NOTES
HEALTH MAINTENANCE REVIEW  Health Maintenance   Topic Date Due    Mammogram  05/18/2019    Lipid Panel  05/03/2022    Colonoscopy  04/01/2025    Hepatitis C Screening  Completed        HEALTH MAINTENANCE INTERVENTIONS - DUE OR DUE SOON  There are no preventive care reminders to display for this patient.    FUTURE APPOINTMENTS  Future Appointments  Date Time Provider Department Center   5/23/2018 10:30 AM Lewis Alejandre OD [facility]       CHIEF COMPLAINT  Osteoarthritis      HISTORY OF PRESENT ILLNESS  PROBLEM/CONDITION: Osteoarthritis appears fairly controlled on chronic stable dose tramadol. She is demonstrating no behaviors to suggest inappropriate use of her medication.    PROBLEM/CONDITION:Hypertension is asymptomatic and she appears supra-therapeutic on current treatment. She reports no angina or angina equivalent symptoms, orthopnea, syncope or near syncope.    PROBLEM/CONDITION: Gastroesophageal reflux disease appears fairly well-controlled. She is noted to be on dual proton pump inhibitor therapy, so I instructed her to discontinue the omeprazole and continue the pen taupe resolved.    PROBLEM/CONDITION:She remains pre-contemplative regarding smoking cessation.    No other complaints or concerns reported.    Problem List Items Addressed This Visit     Primary localized osteoarthrosis of multiple sites (Chronic)    Relevant Medications    tramadol (ULTRAM) 50 mg tablet    Cigarette nicotine dependence without complication (Chronic)    Hypertension (Chronic)    Current Assessment & Plan     BP Readings from Last 3 Encounters:   10/05/17 (!) 98/58   06/26/17 112/70   06/18/17 (!) 110/56     Wt Readings from Last 3 Encounters:   10/05/17 53.1 kg (117 lb 1 oz)   06/26/17 51.7 kg (113 lb 15.7 oz)   06/18/17 51.3 kg (113 lb)     BMI Readings from Last 3 Encounters:   10/05/17 18.89 kg/m²   06/26/17 18.40 kg/m²   06/18/17 18.24 kg/m²     Lab Results   Component Value Date    ELISA  ">60.0 05/03/2017    EGFRNONAA >60.0 05/03/2017    CREATININE 1.0 05/03/2017    BUN 12 05/03/2017     PLAN: d/c HCTZ         Gastro-esophageal reflux disease without esophagitis - Primary (Chronic)    Relevant Medications    pantoprazole (PROTONIX) 40 MG tablet    Insufficient social insurance or welfare support (Chronic)          REVIEW OF SYSTEMS  CONSTITUTIONAL: No fever reported.  CARDIOVASCULAR: No chest pain reported.  PULMONARY: No trouble breathing reported.     PHYSICAL EXAM  Vitals:    10/05/17 1414   BP: (!) 98/58   BP Location: Right arm   Patient Position: Sitting   BP Method: Small (Manual)   Pulse: 89   Temp: 97.2 °F (36.2 °C)   TempSrc: Tympanic   SpO2: 98%   Weight: 53.1 kg (117 lb 1 oz)   Height: 5' 6" (1.676 m)     CONSTITUTIONAL: Vital signs noted. No apparent distress. Does not appear acutely ill or septic. Appears adequately hydrated.  HEENT: External ENT grossly unremarkable. Hearing grossly intact. Oropharynx moist.  PULM: Lungs clear. Breathing unlabored.  HEART: Auscultation reveals regular rate and rhythm without murmur, gallop or rub.  DERM: Skin warm and moist with normal turgor.  NEURO: There are no gross focal motor deficits or gross deficits of cranial nerves III-XII.  PSYCHIATRIC: Alert and oriented x 3. Mood is grossly neutral. Affect appropriate. Judgment and insight not grossly compromised.  MUSCULOSKELETAL: Grossly normal stance and gait.     PAST MEDICAL HISTORY, FAMILY HISTORY, SOCIAL HISTORY, CURRENT MEDICATION LIST, and ALLERGY LIST reviewed by me (CECILIA Boothe MD) and are updated consistent with the patient's report.    ASSESSMENT and PLAN  Gastro-esophageal reflux disease without esophagitis  -     pantoprazole (PROTONIX) 40 MG tablet; Take 1 tablet (40 mg total) by mouth once daily.  Dispense: 30 tablet; Refill: 11    Essential hypertension    Insufficient social insurance or welfare support    Cigarette nicotine dependence without complication    Primary " localized osteoarthrosis of multiple sites  -     tramadol (ULTRAM) 50 mg tablet; Take 1 tablet (50 mg total) by mouth every 8 (eight) hours as needed for Pain.  Dispense: 90 tablet; Refill: 5    Other orders  -     Influenza - Quadrivalent (3 years & older) (PF)        Medication List with Changes/Refills   New Medications    PANTOPRAZOLE (PROTONIX) 40 MG TABLET    Take 1 tablet (40 mg total) by mouth once daily.   Current Medications    ALBUTEROL (PROVENTIL) 2.5 MG /3 ML (0.083 %) NEBULIZER SOLUTION    Take 2.5 mg by nebulization 2 (two) times daily as needed for Wheezing. Rescue    ALPRAZOLAM (XANAX) 0.5 MG TABLET    Take 0.5 mg by mouth daily as needed.     BUTALBITAL-ACETAMINOPHEN-CAFFEINE -40 MG (FIORICET, ESGIC) -40 MG PER TABLET    Take 1 tablet by mouth every 8 (eight) hours as needed for Pain.    CALCIUM CARBONATE/VITAMIN D3 (CALCIUM 600 + D,3, ORAL)    Take 2 tablets by mouth once daily.    ESCITALOPRAM (LEXAPRO) 10 MG TABLET    Take 20 mg by mouth once daily.     ONDANSETRON (ZOFRAN) 8 MG TABLET    Take 8 mg by mouth every 12 (twelve) hours as needed for Nausea.    OXCARBAZEPINE (TRILEPTAL) 300 MG TAB    Take 600 mg by mouth 2 (two) times daily.     QUETIAPINE (SEROQUEL) 300 MG TAB    Take 300 mg by mouth every evening.   Changed and/or Refilled Medications    Modified Medication Previous Medication    TRAMADOL (ULTRAM) 50 MG TABLET tramadol (ULTRAM) 50 mg tablet       Take 1 tablet (50 mg total) by mouth every 8 (eight) hours as needed for Pain.    Take 1 tablet (50 mg total) by mouth every 8 (eight) hours as needed for Pain.   Discontinued Medications    HYDROCHLOROTHIAZIDE (MICROZIDE) 12.5 MG CAPSULE    Take 1 capsule (12.5 mg total) by mouth once daily. for blood pressure and heart    OMEPRAZOLE (PRILOSEC) 20 MG CAPSULE    Take 1 capsule (20 mg total) by mouth every morning. for stomach acid       Return in about 6 months (around 4/5/2018) for re-evaluate problem(s) discussed  "today.    ABOUT THIS DOCUMENTATION:  · The order of the conditions listed in the HPI is one of convenience and does not necessarily reflect the chronology of the appointment, nor the relative importance of a condition. It is possible that additional description or status details about condition(s) may be found elsewhere in the documentation for today's encounter.  · Documentation entered by me for this encounter was done in part using speech-recognition technology. Although I have made an effort to ensure accuracy, "sound like" errors may exist and should be interpreted in context.                        -CECILIA Boothe MD    There are no Patient Instructions on file for this visit.    "

## 2017-12-11 ENCOUNTER — TELEPHONE (OUTPATIENT)
Dept: INTERNAL MEDICINE | Facility: CLINIC | Age: 57
End: 2017-12-11

## 2017-12-11 NOTE — TELEPHONE ENCOUNTER
Per e-request received, initated prior auth request to [employer] for Tramadol 50mg, submitted online via [email or web address] Request [id number].

## 2017-12-12 ENCOUNTER — TELEPHONE (OUTPATIENT)
Dept: INTERNAL MEDICINE | Facility: CLINIC | Age: 57
End: 2017-12-12

## 2017-12-12 NOTE — TELEPHONE ENCOUNTER
----- Message from Meme Brewer sent at 12/12/2017 11:29 AM CST -----  Contact: Pt  Pt needs a prior authorization on her TRAMADOL. Please give pt a call at ..[phone] (home)         ..  [id number] - [place] - [address]  [address]  [place] [address]  Phone: [phone] Fax: [phone]

## 2017-12-13 ENCOUNTER — TELEPHONE (OUTPATIENT)
Dept: INTERNAL MEDICINE | Facility: CLINIC | Age: 57
End: 2017-12-13

## 2017-12-13 NOTE — TELEPHONE ENCOUNTER
----- Message from Perla Chu sent at 12/13/2017  8:19 AM CST -----  Contact: Patient  Patient returned call to [facility]. She can be contacted at [phone].    Thanks,  Perla

## 2017-12-13 NOTE — TELEPHONE ENCOUNTER
Spoke with patient advised pre auth has already been started by Shelly Atkinson, voices understanding

## 2017-12-29 ENCOUNTER — TELEPHONE (OUTPATIENT)
Dept: INTERNAL MEDICINE | Facility: CLINIC | Age: 57
End: 2017-12-29

## 2017-12-29 NOTE — TELEPHONE ENCOUNTER
Per e-request received, initiated prior auth request for tramadol 50mg rx to pt's insurance [facility], submitted online via [email or web address] Request [id number].

## 2018-03-06 ENCOUNTER — OFFICE VISIT (OUTPATIENT)
Dept: INTERNAL MEDICINE | Facility: CLINIC | Age: 58
End: 2018-03-06
Payer: MEDICAID

## 2018-03-06 VITALS
BODY MASS INDEX: 20.3 KG/M2 | TEMPERATURE: 97 F | OXYGEN SATURATION: 98 % | DIASTOLIC BLOOD PRESSURE: 74 MMHG | SYSTOLIC BLOOD PRESSURE: 126 MMHG | HEIGHT: 66 IN | HEART RATE: 85 BPM | WEIGHT: 126.31 LBS

## 2018-03-06 DIAGNOSIS — J30.2 CHRONIC SEASONAL ALLERGIC RHINITIS, UNSPECIFIED TRIGGER: Chronic | ICD-10-CM

## 2018-03-06 DIAGNOSIS — Z79.899 LONG-TERM CURRENT USE OF HIGH RISK MEDICATION OTHER THAN ANTICOAGULANT: Chronic | ICD-10-CM

## 2018-03-06 DIAGNOSIS — M19.91 PRIMARY LOCALIZED OSTEOARTHROSIS OF MULTIPLE SITES: Primary | Chronic | ICD-10-CM

## 2018-03-06 DIAGNOSIS — F25.0 SCHIZOAFFECTIVE DISORDER, BIPOLAR TYPE: Chronic | ICD-10-CM

## 2018-03-06 DIAGNOSIS — G44.209 TENSION HEADACHE: Chronic | ICD-10-CM

## 2018-03-06 DIAGNOSIS — G56.03 BILATERAL CARPAL TUNNEL SYNDROME: Chronic | ICD-10-CM

## 2018-03-06 PROBLEM — R73.03 PRE-DIABETES: Chronic | Status: RESOLVED | Noted: 2017-04-26 | Resolved: 2018-03-06

## 2018-03-06 PROCEDURE — 99214 OFFICE O/P EST MOD 30 MIN: CPT | Mod: S$PBB,,, | Performed by: FAMILY MEDICINE

## 2018-03-06 PROCEDURE — 99214 OFFICE O/P EST MOD 30 MIN: CPT | Mod: PBBFAC,PO | Performed by: FAMILY MEDICINE

## 2018-03-06 PROCEDURE — 99999 PR PBB SHADOW E&M-EST. PATIENT-LVL IV: CPT | Mod: PBBFAC,,, | Performed by: FAMILY MEDICINE

## 2018-03-06 RX ORDER — BUTALBITAL, ACETAMINOPHEN AND CAFFEINE 50; 325; 40 MG/1; MG/1; MG/1
1 TABLET ORAL EVERY 8 HOURS PRN
Qty: 90 TABLET | Refills: 0 | Status: SHIPPED | OUTPATIENT
Start: 2018-03-06 | End: 2018-12-07 | Stop reason: SDUPTHER

## 2018-03-06 RX ORDER — FLUTICASONE PROPIONATE 50 MCG
2 SPRAY, SUSPENSION (ML) NASAL DAILY
Qty: 1 BOTTLE | Refills: 11 | Status: SHIPPED | OUTPATIENT
Start: 2018-03-06 | End: 2019-03-06

## 2018-03-06 RX ORDER — IBUPROFEN 200 MG
1 TABLET ORAL
COMMUNITY
End: 2018-11-15

## 2018-03-06 RX ORDER — PNEUMOCOCCAL VACCINE POLYVALENT 25; 25; 25; 25; 25; 25; 25; 25; 25; 25; 25; 25; 25; 25; 25; 25; 25; 25; 25; 25; 25; 25; 25 UG/.5ML; UG/.5ML; UG/.5ML; UG/.5ML; UG/.5ML; UG/.5ML; UG/.5ML; UG/.5ML; UG/.5ML; UG/.5ML; UG/.5ML; UG/.5ML; UG/.5ML; UG/.5ML; UG/.5ML; UG/.5ML; UG/.5ML; UG/.5ML; UG/.5ML; UG/.5ML; UG/.5ML; UG/.5ML; UG/.5ML
INJECTION, SOLUTION INTRAMUSCULAR; SUBCUTANEOUS
Refills: 0 | COMMUNITY
Start: 2018-02-16 | End: 2019-04-23 | Stop reason: ALTCHOICE

## 2018-03-06 RX ORDER — TRAMADOL HYDROCHLORIDE 50 MG/1
50 TABLET ORAL EVERY 8 HOURS PRN
Qty: 90 TABLET | Refills: 5 | Status: SHIPPED | OUTPATIENT
Start: 2018-03-06 | End: 2018-09-20 | Stop reason: SDUPTHER

## 2018-03-06 NOTE — PROGRESS NOTES
HEALTH MAINTENANCE REVIEW  Health Maintenance   Topic Date Due    Zoster Vaccine  01/19/2018    Mammogram  05/18/2019    Lipid Panel  05/03/2022    Pneumococcal PPSV23 (Medium Risk) (2) 01/19/2023    Colonoscopy  04/01/2025    TETANUS VACCINE  10/06/2026    Hepatitis C Screening  Completed    Influenza Vaccine  Completed        HEALTH MAINTENANCE INTERVENTIONS - DUE OR DUE SOON  Health Maintenance Due   Topic Date Due    Zoster Vaccine  01/19/2018       FUTURE APPOINTMENTS  Future Appointments  Date Time Provider Department Center   3/6/2018 4:40 PM CECILIA Boothe MD [facility]   3/7/2018 8:45 AM LABORATORY, [facility]   5/23/2018 10:30 AM [facility]       CHIEF COMPLAINT  Follow-up      HISTORY OF PRESENT ILLNESS  PROBLEM/CONDITION: Her schizoaffective disorder appears compensated and well-controlled. She continues to follow with her psychiatrist. I received correspondence from her psychiatrist, Dr. Tim Lewis ([facility], [address] [place] [address], PHONE [phone], FAX: [phone]) requesting that I order complete blood count, fasting labs, comprehensive metabolic panel, and hemoglobin A1c due to her current chronic psychotropic medication drug use.    PROBLEM/CONDITION: Her chronic recurrent tension type headaches appear reasonably well controlled. She says that she needs/uses the butalbital/acetaminophen/caffeine typically no more than 2 tablets per week. She still has some remaining from the prescription for quantity of 60 provided in August of last year.    PROBLEM/CONDITION: She has long-standing chronic osteoarthritic pain of low back and weight-bearing joints, and intermittent carpal tunnel syndrome symptoms. She reports no loss of strength in hands. She has been on stable dose chronic tramadol 3 times daily with satisfactory symptom relief. I have observed no behaviors to  "suggest inappropriate use of prescribed medications. [place] Board of Pharmacy Controlled Prescription Drug Monitoring database was queried and showed no activity to suggest abuse, diversion, or other inappropriate use of prescription medications.    PROBLEM/CONDITION: She reports chronic allergic rhinitis symptoms manifest primarily as clear postnasal drip, EXACERBATED by changing seasons, but without clearly identified offending allergen. She has never tried nasal corticosteroids.    No other complaints or concerns reported.    Problem List Items Addressed This Visit     Primary localized osteoarthrosis of multiple sites - Primary (Chronic)    Relevant Medications    traMADol (ULTRAM) 50 mg tablet    Schizoaffective disorder, bipolar type (Chronic)    Relevant Orders    CBC auto differential    Lipid panel    Comprehensive metabolic panel    Hemoglobin A1c    Long-term current use of high risk medication other than anticoagulant (Chronic)    Relevant Orders    CBC auto differential    Lipid panel    Comprehensive metabolic panel    Hemoglobin A1c    Tension headache (Chronic)    Relevant Medications    butalbital-acetaminophen-caffeine -40 mg (FIORICET, ESGIC) -40 mg per tablet    Bilateral carpal tunnel syndrome (Chronic)    Relevant Medications    traMADol (ULTRAM) 50 mg tablet    Chronic seasonal allergic rhinitis (Chronic)    Relevant Medications    fluticasone (FLONASE) 50 mcg/actuation nasal spray          REVIEW OF SYSTEMS  PSYCHIATRIC: No suicidal ideations or hallucinations reported.  NEUROLOGIC: No seizures or disordered movement reported.  ENDOCRINE: No polyuria or polydipsia reported.    PHYSICAL EXAM  Vitals:    03/06/18 1116   BP: 126/74   BP Location: Right arm   Patient Position: Sitting   BP Method: Medium (Manual)   Pulse: 85   Temp: 96.7 °F (35.9 °C)   TempSrc: Tympanic   SpO2: 98%   Weight: 57.3 kg (126 lb 5.2 oz)   Height: 5' 6" (1.676 m)     CONST: Vital signs noted. No apparent " distress. Does not appear acutely ill or septic. Appears adequately hydrated.  EYES: Pupils equal and reactive. Extraocular movements intact. Sclerae anicteric. Lids and conjunctiva unremarkable.  ENT: External ENT grossly unremarkable. Ear canals and visualized tympanic membranes are unremarkable. Hearing grossly intact. Nasal mucosa pink-blue and boggy. Sinuses nontender to percussion. Oropharynx moist without lesion, inflammation or exudate. Posterior oropharynx is symmetric.  NECK: Trachea midline. No significant cervical lymphadenopathy.  PULM: Lungs clear. Breathing unlabored.  HEART: Auscultation reveals regular rate and rhythm without murmur, gallop or rub.  DERM: Skin warm and moist with normal turgor.  NEURO: Strength is reasonably symmetric without gross focal motor deficits or gross deficits of cranial nerves III-XII.  PSYCH: Alert and oriented x 3. Mood is grossly euthymic. Affect appropriate. Judgment and insight not grossly compromised.  MSK: Grossly normal stance and gait.     PAST MEDICAL HISTORY, FAMILY HISTORY, SOCIAL HISTORY, CURRENT MEDICATION LIST, and ALLERGY LIST reviewed by me (CECILIA Boothe MD) and are updated consistent with the patient's report.    ASSESSMENT and PLAN  Primary localized osteoarthrosis of multiple sites  -     traMADol (ULTRAM) 50 mg tablet; Take 1 tablet (50 mg total) by mouth every 8 (eight) hours as needed for Pain.  Dispense: 90 tablet; Refill: 5    Tension headache  -     butalbital-acetaminophen-caffeine -40 mg (FIORICET, ESGIC) -40 mg per tablet; Take 1 tablet by mouth every 8 (eight) hours as needed for Headaches.  Dispense: 90 tablet; Refill: 0    Schizoaffective disorder, bipolar type  -     CBC auto differential; Future; Expected date: 03/07/2018  -     Lipid panel; Future; Expected date: 03/07/2018  -     Comprehensive metabolic panel; Future; Expected date: 03/07/2018  -     Hemoglobin A1c; Future; Expected date: 03/07/2018    Bilateral carpal  tunnel syndrome  -     traMADol (ULTRAM) 50 mg tablet; Take 1 tablet (50 mg total) by mouth every 8 (eight) hours as needed for Pain.  Dispense: 90 tablet; Refill: 5    Chronic seasonal allergic rhinitis, unspecified trigger  -     fluticasone (FLONASE) 50 mcg/actuation nasal spray; 2 sprays (100 mcg total) by Each Nare route once daily.  Dispense: 1 Bottle; Refill: 11    Long-term current use of high risk medication other than anticoagulant  -     CBC auto differential; Future; Expected date: 03/07/2018  -     Lipid panel; Future; Expected date: 03/07/2018  -     Comprehensive metabolic panel; Future; Expected date: 03/07/2018  -     Hemoglobin A1c; Future; Expected date: 03/07/2018        PRESCRIPTION MEDICATION MANAGEMENT  Medication List with Changes/Refills   New Medications    FLUTICASONE (FLONASE) 50 MCG/ACTUATION NASAL SPRAY    2 sprays (100 mcg total) by Each Nare route once daily.   Current Medications    ALBUTEROL (PROVENTIL) 2.5 MG /3 ML (0.083 %) NEBULIZER SOLUTION    Take 2.5 mg by nebulization 2 (two) times daily as needed for Wheezing. Rescue    ALPRAZOLAM (XANAX) 0.5 MG TABLET    Take 0.5 mg by mouth daily as needed.     CALCIUM CARBONATE/VITAMIN D3 (CALCIUM 600 + D,3, ORAL)    Take 2 tablets by mouth once daily.    ESCITALOPRAM (LEXAPRO) 10 MG TABLET    Take 20 mg by mouth once daily.     FLUCELVAX QUAD 4860-2684, PF, 60 MCG (15 MCG X 4)/0.5 ML SYRG VACCINE    inject 0.5 milliliter intramuscularly    NICOTINE (NICODERM CQ) 21 MG/24 HR    Place 1 patch onto the skin every 24 hours.    ONDANSETRON (ZOFRAN) 8 MG TABLET    Take 8 mg by mouth every 12 (twelve) hours as needed for Nausea.    OXCARBAZEPINE (TRILEPTAL) 300 MG TAB    Take 600 mg by mouth 2 (two) times daily.     PANTOPRAZOLE (PROTONIX) 40 MG TABLET    Take 1 tablet (40 mg total) by mouth once daily.    PNEUMOVAX 23 25 MCG/0.5 ML SYRG    inject 0.5 milliliter intramuscularly    QUETIAPINE (SEROQUEL) 300 MG TAB    Take 300 mg by mouth every  "evening.   Changed and/or Refilled Medications    Modified Medication Previous Medication    BUTALBITAL-ACETAMINOPHEN-CAFFEINE -40 MG (FIORICET, ESGIC) -40 MG PER TABLET butalbital-acetaminophen-caffeine -40 mg (FIORICET, ESGIC) -40 mg per tablet       Take 1 tablet by mouth every 8 (eight) hours as needed for Headaches.    Take 1 tablet by mouth every 8 (eight) hours as needed for Pain.    TRAMADOL (ULTRAM) 50 MG TABLET tramadol (ULTRAM) 50 mg tablet       Take 1 tablet (50 mg total) by mouth every 8 (eight) hours as needed for Pain.    Take 1 tablet (50 mg total) by mouth every 8 (eight) hours as needed for Pain.       Follow-up in about 6 months (around 8/21/2018) for re-evaluate problem(s) discussed today, any new complaints or concerns.    ABOUT THIS DOCUMENTATION:  · The order of the conditions listed in the HPI is one of convenience and does not necessarily reflect the chronology of the appointment, nor the relative importance of a condition. It is possible that additional description or status details about condition(s) may be found elsewhere in the EHR documentation for today's encounter.  · Documentation entered by me for this encounter was done in part using speech-recognition technology. Although I have made an effort to ensure accuracy, "sound like" errors may exist and should be interpreted in context.                        -CECILIA Boothe MD    There are no Patient Instructions on file for this visit.    "

## 2018-03-06 NOTE — Clinical Note
Please see that a copy of her labs get sent to her psychiatrist, Dr. Tim Lewis ([facility], [address] [place] [address], [facility], [place] [address], PHONE [phone], FAX: [phone]). Thanks!

## 2018-03-08 ENCOUNTER — LAB VISIT (OUTPATIENT)
Dept: LAB | Facility: HOSPITAL | Age: 58
End: 2018-03-08
Attending: FAMILY MEDICINE
Payer: MEDICAID

## 2018-03-08 DIAGNOSIS — Z79.899 LONG-TERM CURRENT USE OF HIGH RISK MEDICATION OTHER THAN ANTICOAGULANT: Chronic | ICD-10-CM

## 2018-03-08 DIAGNOSIS — F25.0 SCHIZOAFFECTIVE DISORDER, BIPOLAR TYPE: Chronic | ICD-10-CM

## 2018-03-08 LAB
ALBUMIN SERPL BCP-MCNC: 4.1 G/DL
ALP SERPL-CCNC: 50 U/L
ALT SERPL W/O P-5'-P-CCNC: 14 U/L
ANION GAP SERPL CALC-SCNC: 8 MMOL/L
AST SERPL-CCNC: 18 U/L
BASOPHILS # BLD AUTO: 0.04 K/UL
BASOPHILS NFR BLD: 0.7 %
BILIRUB SERPL-MCNC: 0.5 MG/DL
BUN SERPL-MCNC: 14 MG/DL
CALCIUM SERPL-MCNC: 9.6 MG/DL
CHLORIDE SERPL-SCNC: 103 MMOL/L
CHOLEST SERPL-MCNC: 173 MG/DL
CHOLEST/HDLC SERPL: 2.3 {RATIO}
CO2 SERPL-SCNC: 28 MMOL/L
CREAT SERPL-MCNC: 0.8 MG/DL
DACRYOCYTES BLD QL SMEAR: ABNORMAL
DIFFERENTIAL METHOD: ABNORMAL
EOSINOPHIL # BLD AUTO: 0.1 K/UL
EOSINOPHIL NFR BLD: 1 %
ERYTHROCYTE [DISTWIDTH] IN BLOOD BY AUTOMATED COUNT: 13.4 %
EST. GFR  (AFRICAN AMERICAN): >60 ML/MIN/1.73 M^2
EST. GFR  (NON AFRICAN AMERICAN): >60 ML/MIN/1.73 M^2
ESTIMATED AVG GLUCOSE: 114 MG/DL
GLUCOSE SERPL-MCNC: 85 MG/DL
HBA1C MFR BLD HPLC: 5.6 %
HCT VFR BLD AUTO: 37.3 %
HDLC SERPL-MCNC: 76 MG/DL
HDLC SERPL: 43.9 %
HGB BLD-MCNC: 12.8 G/DL
LDLC SERPL CALC-MCNC: 90 MG/DL
LYMPHOCYTES # BLD AUTO: 3.4 K/UL
LYMPHOCYTES NFR BLD: 58.9 %
MCH RBC QN AUTO: 30.8 PG
MCHC RBC AUTO-ENTMCNC: 34.3 G/DL
MCV RBC AUTO: 90 FL
MONOCYTES # BLD AUTO: 0.4 K/UL
MONOCYTES NFR BLD: 7.3 %
NEUTROPHILS # BLD AUTO: 1.9 K/UL
NEUTROPHILS NFR BLD: 32.1 %
NONHDLC SERPL-MCNC: 97 MG/DL
PLATELET # BLD AUTO: 290 K/UL
PMV BLD AUTO: 8.3 FL
POIKILOCYTOSIS BLD QL SMEAR: SLIGHT
POTASSIUM SERPL-SCNC: 3.7 MMOL/L
PROT SERPL-MCNC: 7.1 G/DL
RBC # BLD AUTO: 4.15 M/UL
SODIUM SERPL-SCNC: 139 MMOL/L
TARGETS BLD QL SMEAR: ABNORMAL
TRIGL SERPL-MCNC: 35 MG/DL
WBC # BLD AUTO: 5.79 K/UL

## 2018-03-08 PROCEDURE — 80053 COMPREHEN METABOLIC PANEL: CPT

## 2018-03-08 PROCEDURE — 83036 HEMOGLOBIN GLYCOSYLATED A1C: CPT

## 2018-03-08 PROCEDURE — 85025 COMPLETE CBC W/AUTO DIFF WBC: CPT

## 2018-03-08 PROCEDURE — 36415 COLL VENOUS BLD VENIPUNCTURE: CPT

## 2018-03-08 PROCEDURE — 80061 LIPID PANEL: CPT

## 2018-03-12 NOTE — PROGRESS NOTES
"TASKS:  (1) Set a "Remind Me" reminder in Epic to verify in 1 week that she has read the patient portal message I sent with my interpretation of her test results.  (2) If she has not read the message by then, call her to share my message with her by phone.  (3) If you can't reach her by phone (personally, not voice-mail) after trying for 2 business days, then copy-and-past my message in a letter (correspondence) to her using the "[email or web address]" Smart Phrase, and print and mail the letter to her.    Thanks!      CECILIA Boothe MD  --------------------------------------------------------------------------------   PATIENT CONTACT INFORMATION  Home Phone      [phone]  Work Phone      Not on file.  [employer]          [phone]    --------------------------------------------------------------------------------   Karyna's future appointments include:  5/23/2018  10:30 AM   Lewis Alejandre OD         [facility]  --------------------------------------------------------------------------------    Karyna Vogel.    I have reviewed the results of recent tests you had done.    The CBC (complete blood count) checks for anemia and measures the red blood cells, white blood cells, and platelets in your blood. Your CBC is ESSENTIALLY NORMAL.    The CMP (comprehensive metabolic panel) checks kidney function, liver function, sodium, potassium, glucose (sugar) and other aspects of your metabolism. Your CMP is ESSENTIALLY NORMAL.    The hemoglobin A1c ("A1c") test is used to test for diabetes. Your hemoglobin A1c is NORMAL.    Your lipid (cholesterol) panel shows that your cholesterol levels are GOOD.    To learn more about your test results and what they mean, click on the "About this test" link from your [facility] account ([email or web address]) or from your [employer] smartphone carlie. Also, we can discuss your results further when you return for your next appointment.    Thank you for letting me care for you. I look " forward to seeing you again. Let me know if you have any questions in the meantime.    Wishing you health and happiness,    CECILIA Boothe MD  ----------------------------------------------------------------  UPCOMING APPOINTMENTS  ----------------------------------------------------------------  Your future appointments include:  5/23/2018  10:30 AM   Lewis [place], OD         [facility]    ----------------------------------------------------------------  TO SCHEDULE OR CHANGE AN APPOINTMENT  ----------------------------------------------------------------  *Login to your [facility] account at [email or web address]  *Use the [employer] carlie on your mobile device   or  *Call our appointment desk at [phone].    ----------------------------------------------------------------  ADDITIONAL IMPORTANT INFORMATION  ----------------------------------------------------------------  *[employer] is NOT to be used for urgent needs.  *Although we try to respond to messages within 2 business days, a response can take longer, depending on circumstances.  *For medical emergencies, dial 911.    You can get help with [employer] and [facility] by email at  [email or web address] or by phone at [phone]. The [facility] Patient Support Team is available Monday through Friday from 9 a.m. until 5 p.m.  (After hours, you can leave a message requesting assistance.)

## 2018-06-04 ENCOUNTER — TELEPHONE (OUTPATIENT)
Dept: INTERNAL MEDICINE | Facility: CLINIC | Age: 58
End: 2018-06-04

## 2018-06-04 RX ORDER — HYDROCHLOROTHIAZIDE 12.5 MG/1
CAPSULE ORAL
Qty: 30 CAPSULE | Refills: 2 | OUTPATIENT
Start: 2018-06-04

## 2018-06-04 RX ORDER — CYPROHEPTADINE HYDROCHLORIDE 4 MG/1
4 TABLET ORAL 3 TIMES DAILY
Qty: 90 TABLET | Refills: 0 | OUTPATIENT
Start: 2018-06-04

## 2018-06-04 NOTE — TELEPHONE ENCOUNTER
Medication refused due to failing protocol.    Requested Prescriptions   Pending Prescriptions Disp Refills    hydroCHLOROthiazide (MICROZIDE) 12.5 mg capsule [Pharmacy Med Name: HYDROCHLOROT 12.5 MG     CAP] 30 capsule 2     Sig: TAKE ONE CAPSULE BY MOUTH ONE TIME DAILY FOR BLOOD PRESSURE AND HEART    Diuretics Protocol Failed    5/26/2018 11:28 AM       Failed - Active on medication list       Passed - Recent visit with authorizing provider       Passed - No active pregnancy on record       Passed - Blood pressure on record    BP Readings from Last 3 Encounters:   03/06/18 126/74   10/05/17 (!) 98/58   06/26/17 112/70            Passed - Normal serum potassium in past 6 months    Lab Results   Component Value Date    K 3.7 03/08/2018                 Passed - Normal serum sodium in past 6 months    Lab Results   Component Value Date     03/08/2018                 Passed - Normal serum creatinine in past 6 months    Lab Results   Component Value Date    CREATININE 0.8 03/08/2018             Passed - Normal BUN in past 12 months    Lab Results   Component Value Date    BUN 14 03/08/2018                Please contact her to offer a follow-up appointment to discuss her treatment options.

## 2018-06-04 NOTE — TELEPHONE ENCOUNTER
Medication refused.    Requested Prescriptions   Pending Prescriptions Disp Refills    cyproheptadine (PERIACTIN) 4 mg tablet 90 tablet 0     Sig: Take 1 tablet (4 mg total) by mouth 3 (three) times daily.    There is no refill protocol information for this order          Please contact her to offer a follow-up appointment to discuss her treatment options.

## 2018-06-04 NOTE — TELEPHONE ENCOUNTER
Notified patient Dr. Boothe received 3 requests from pharmacy for previously discontinued medications. Informed patient per Dr. Boothe, patient would need follow-up appointment to address need for restarting medications in question (Periactin, Singulair, and hydrochlorothiazide). Patient opted for the following appointment:    Future Appointments  Date Time Provider Department Center   6/14/2018 8:00 AM CECILIA Boothe MD [facility]     Patient verbalized understanding of appointment date, time, and clinic location.

## 2018-07-05 RX ORDER — HYDROCHLOROTHIAZIDE 12.5 MG/1
CAPSULE ORAL
Qty: 30 CAPSULE | Refills: 2 | OUTPATIENT
Start: 2018-07-05

## 2018-07-25 RX ORDER — HYDROCHLOROTHIAZIDE 12.5 MG/1
CAPSULE ORAL
Qty: 30 CAPSULE | Refills: 2 | OUTPATIENT
Start: 2018-07-25

## 2018-07-29 ENCOUNTER — HOSPITAL ENCOUNTER (EMERGENCY)
Facility: HOSPITAL | Age: 58
Discharge: HOME | End: 2018-07-29
Attending: EMERGENCY MEDICINE
Payer: MEDICAID

## 2018-07-29 VITALS
BODY MASS INDEX: 23.29 KG/M2 | HEIGHT: 62 IN | TEMPERATURE: 99 F | HEART RATE: 86 BPM | DIASTOLIC BLOOD PRESSURE: 70 MMHG | OXYGEN SATURATION: 96 % | SYSTOLIC BLOOD PRESSURE: 133 MMHG | WEIGHT: 126.56 LBS | RESPIRATION RATE: 16 BRPM

## 2018-07-29 DIAGNOSIS — S91.109A AVULSION OF SKIN OF TOE, INITIAL ENCOUNTER: ICD-10-CM

## 2018-07-29 DIAGNOSIS — S99.922A INJURY OF TOE ON LEFT FOOT, INITIAL ENCOUNTER: ICD-10-CM

## 2018-07-29 DIAGNOSIS — M79.675 PAIN OF TOE OF LEFT FOOT: Primary | ICD-10-CM

## 2018-07-29 PROCEDURE — 25000003 PHARM REV CODE 250: Performed by: REGISTERED NURSE

## 2018-07-29 PROCEDURE — 99283 EMERGENCY DEPT VISIT LOW MDM: CPT | Mod: 25

## 2018-07-29 RX ORDER — HYDROCODONE BITARTRATE AND ACETAMINOPHEN 10; 325 MG/1; MG/1
1 TABLET ORAL
Status: COMPLETED | OUTPATIENT
Start: 2018-07-29 | End: 2018-07-29

## 2018-07-29 RX ADMIN — HYDROCODONE BITARTRATE AND ACETAMINOPHEN 1 TABLET: 10; 325 TABLET ORAL at 07:07

## 2018-07-29 NOTE — ED PROVIDER NOTES
SCRIBE #1 NOTE: I, Anila Sharpe, am scribing for, and in the presence of, Anthony Weber Jr., FNP. I have scribed the entire note.      History      Chief Complaint   Patient presents with    Toe Injury     pain and swelling to left great toe        Review of patient's allergies indicates:  No Known Allergies     HPI   HPI    7/29/2018, 6:31 PM   History obtained from the patient      History of Present Illness: Karyna Geller is a 58 y.o. female patient who presents to the Emergency Department for L great toe injury which onset today. Pt reports hitting her foot on an iron door. Symptoms are constant and moderate in severity. No mitigating or exacerbating factors reported. Patient denies any fever, chills, increased erythema/swelling to the area, decreased ROM to L foot, extremity numbness/weakness, n/v and all other sxs at this time. No further complaints or concerns at this time.         Arrival mode: Personal vehicle    PCP: DIMA Boothe MD       Past Medical History:  Past Medical History:   Diagnosis Date    Anemia     Aneurysm     Anorexia 4/26/2017    Anxiety     Asthma     Bipolar disorder     Carpal tunnel syndrome, bilateral     Cerebral aneurysm     Chronic pain syndrome     Cigarette nicotine dependence     Depression     GERD (gastroesophageal reflux disease)     Hyperlipidemia     Hypertension     Insomnia     Osteoporosis     Schizophrenia     Stroke     Subarachnoid hemorrhage        Past Surgical History:  Past Surgical History:   Procedure Laterality Date    BUNIONECTOMY Right     COLONOSCOPY W/ BIOPSIES AND POLYPECTOMY      PARTIAL HYSTERECTOMY      Bilateral Ovaries Remain    TRANSCRANIAL DOPPLER STUDY - COMPLETE  8/28/2014         TRANSCRANIAL DOPPLER STUDY - COMPLETE  8/29/2014         TRANSCRANIAL DOPPLER STUDY - COMPLETE  8/30/2014         TRANSCRANIAL DOPPLER STUDY - COMPLETE  8/31/2014         TRANSCRANIAL DOPPLER STUDY - COMPLETE  9/1/2014          TRANSCRANIAL DOPPLER STUDY - COMPLETE  9/2/2014         TRANSCRANIAL DOPPLER STUDY - COMPLETE  9/3/2014         TRANSCRANIAL DOPPLER STUDY - COMPLETE  9/4/2014         TRANSCRANIAL DOPPLER STUDY - COMPLETE  9/5/2014         TRANSCRANIAL DOPPLER STUDY - COMPLETE  9/6/2014         VAGINAL DELIVERY      X 3         Family History:  Family History   Problem Relation Age of Onset    Cancer Mother     Glaucoma Mother     Stroke Mother     Diabetes type II Mother     Heart disease Mother     Hypertension Mother     Brain cancer Sister     Diabetes type II Sister     Cancer Brother     Hypertension Brother     Bone cancer Sister        Social History:  Social History     Social History Main Topics    Smoking status: Current Every Day Smoker     Packs/day: 0.20     Types: Cigarettes     Start date: 1976    Smokeless tobacco: Never Used    Alcohol use Yes      Comment: 1-3 times per week, 1-2 drink at a time.    Drug use: No    Sexual activity: No       ROS   Review of Systems   Constitutional: Negative for chills, diaphoresis, fatigue and fever.   HENT: Negative for congestion and sore throat.    Respiratory: Negative for cough and shortness of breath.    Cardiovascular: Negative for chest pain.   Gastrointestinal: Negative for abdominal pain, diarrhea, nausea and vomiting.   Genitourinary: Negative for dysuria, frequency, hematuria and urgency.   Musculoskeletal: Negative for arthralgias, back pain, myalgias and neck pain.        (+) L great toe injury  (-) Decreased ROM of L foot  (-) Increased swelling to area  (-) Increased erythema to area   Skin: Negative for rash.   Neurological: Negative for dizziness, weakness, numbness and headaches.   Hematological: Does not bruise/bleed easily.   All other systems reviewed and are negative.    Physical Exam      Initial Vitals [07/29/18 1813]   BP Pulse Resp Temp SpO2   (!) 146/73 98 17 98.4 °F (36.9 °C) 95 %      MAP       --          Physical  "Exam  Nursing Notes and Vital Signs Reviewed.  Constitutional: Patient is in no acute distress. Well-developed and well-nourished.  Head: Atraumatic. Normocephalic.  Eyes: PERRL. EOM intact. Conjunctivae are not pale. No scleral icterus.  ENT: Mucous membranes are moist. Oropharynx is clear and symmetric.    Neck: Supple. Full ROM. No lymphadenopathy.  Cardiovascular: Regular rate. Regular rhythm. No murmurs, rubs, or gallops. Distal pulses are 2+ and symmetric.  Pulmonary/Chest: No respiratory distress. Clear to auscultation bilaterally. No wheezing or rales.  Abdominal: Soft and non-distended.  There is no tenderness.  No rebound, guarding, or rigidity. Good bowel sounds.  Musculoskeletal: Moves all extremities. No obvious deformities. No edema. No calf tenderness.  Skin: Warm and dry. Superficial skin avulsion to distal aspect of the great toe. No nail bed involvement.  Neurological:  Alert, awake, and appropriate.  Normal speech.  No acute focal neurological deficits are appreciated.  Psychiatric: Normal affect. Good eye contact. Appropriate in content.    ED Course    Procedures  ED Vital Signs:  Vitals:    07/29/18 1813 07/29/18 2001   BP: (!) 146/73 133/70   Pulse: 98 86   Resp: 17 16   Temp: 98.4 °F (36.9 °C) 98.6 °F (37 °C)   TempSrc: Oral Oral   SpO2: 95% 96%   Weight: 57.4 kg (126 lb 8.7 oz)    Height: 5' 2" (1.575 m)        Abnormal Lab Results:  Labs Reviewed - No data to display     All Lab Results:  None    Imaging Results:  Imaging Results          X-Ray Toe 2 or More Views Left (Final result)  Result time 07/29/18 19:17:01    Final result by Feliz Son MD (07/29/18 19:17:01)                 Impression:      Negative exam.      Electronically signed by: Feliz Son MD  Date:    07/29/2018  Time:    19:17             Narrative:    EXAMINATION:  XR TOE 2 OR MORE VIEWS LEFT    CLINICAL HISTORY:  left great toe pain;    TECHNIQUE:  Standard radiography " performed.    COMPARISON:  None    FINDINGS:  Bone density and architecture are normal.  No acute findings.                                        The Emergency Provider reviewed the vital signs and test results, which are outlined above.    ED Discussion     7:26 PM: Reassessed pt at this time.  Pt states her condition has improved at this time. Discussed with pt all pertinent ED information and results. Discussed pt dx  and plan of tx. Gave pt all f/u and return to the ED instructions. All questions and concerns were addressed at this time. Pt expresses understanding of information and instructions, and is comfortable with plan to discharge. Pt is stable for discharge.      ED Medication(s):  Medications   HYDROcodone-acetaminophen  mg per tablet 1 tablet (1 tablet Oral Given 7/29/18 5855)       Discharge Medication List as of 7/29/2018  7:25 PM          Follow-up Information     IDMA Boothe MD In 3 days.    Specialty:  Family Medicine  Contact information:  [address] [place] [address] [phone]                     Medical Decision Making    Medical Decision Making:   Clinical Tests:   Radiological Study: Reviewed and Ordered           Scribe Attestation:   Scribe #1: I performed the above scribed service and the documentation accurately describes the services I performed. I attest to the accuracy of the note.    Attending:   Physician Attestation Statement for Scribe #1: I, VERA Levin Jr., personally performed the services described in this documentation, as scribed by Anila Sharpe, in my presence, and it is both accurate and complete.          Clinical Impression       ICD-10-CM ICD-9-CM   1. Pain of toe of left foot M79.675 729.5   2. Avulsion of skin of toe, initial encounter S91.109A 893.0   3. Injury of toe on left foot, initial encounter S99.922A 959.7       Disposition:   Disposition: Discharged  Condition: Stable         VERA Levin Jr.  07/31/18  2413

## 2018-08-21 ENCOUNTER — TELEPHONE (OUTPATIENT)
Dept: INTERNAL MEDICINE | Facility: CLINIC | Age: 58
End: 2018-08-21

## 2018-08-21 NOTE — TELEPHONE ENCOUNTER
----- Message from Bridgette Scott sent at 8/21/2018  8:04 AM CDT -----  Contact: pt  The pt wants to be worked in before 10/4 for a med refill appt, the pt can be reached at [phone]///[id number]

## 2018-08-30 RX ORDER — HYDROCHLOROTHIAZIDE 12.5 MG/1
CAPSULE ORAL
Qty: 30 CAPSULE | Refills: 2 | OUTPATIENT
Start: 2018-08-30

## 2018-08-30 NOTE — TELEPHONE ENCOUNTER
This refill request was NOT approved for the reason stated below.    TASK: Please notify her by copying-and-pasting my note note into a patient-portal message AND/OR by phone call. Thanks.     Karyna Vogel.    I am not able to approve your refill request for the following reason:  According to my records, this is not supposed to be one of your current medications.    Please schedule an appointment at your earliest convenience. When you return, we can discuss strategies to help you avoid running out of your medications like this.    Thanks for letting me care for you.    Kind regards,    CECILIA Boothe MD

## 2018-08-30 NOTE — TELEPHONE ENCOUNTER
Spoke with patients daughter advised her the patient needs a appointment, she states she will call back to schedule

## 2018-09-18 ENCOUNTER — TELEPHONE (OUTPATIENT)
Dept: INTERNAL MEDICINE | Facility: CLINIC | Age: 58
End: 2018-09-18

## 2018-09-18 NOTE — TELEPHONE ENCOUNTER
----- Message from Meme Brewer sent at 9/18/2018  3:02 PM CDT -----  Contact: Pt daughter (Joshua)  Please give pt daughter a call at [phone] regarding some messages her mom left to be seen and no one has returned her call.

## 2018-09-18 NOTE — TELEPHONE ENCOUNTER
----- Message from Akil Sánchez sent at 9/18/2018  3:49 PM CDT -----  Contact: Pt. Daughter   Pt daughter is returning  Missed call ..[phone] (home)

## 2018-09-20 ENCOUNTER — OFFICE VISIT (OUTPATIENT)
Dept: INTERNAL MEDICINE | Facility: CLINIC | Age: 58
End: 2018-09-20
Payer: MEDICAID

## 2018-09-20 VITALS
DIASTOLIC BLOOD PRESSURE: 75 MMHG | HEIGHT: 62 IN | SYSTOLIC BLOOD PRESSURE: 138 MMHG | BODY MASS INDEX: 20.53 KG/M2 | HEART RATE: 85 BPM | OXYGEN SATURATION: 97 % | TEMPERATURE: 98 F | WEIGHT: 111.56 LBS

## 2018-09-20 DIAGNOSIS — I10 ESSENTIAL HYPERTENSION: Chronic | ICD-10-CM

## 2018-09-20 DIAGNOSIS — F31.9 BIPOLAR 1 DISORDER: Chronic | ICD-10-CM

## 2018-09-20 DIAGNOSIS — Z59.7: Chronic | ICD-10-CM

## 2018-09-20 DIAGNOSIS — M19.91 PRIMARY LOCALIZED OSTEOARTHROSIS OF MULTIPLE SITES: Chronic | ICD-10-CM

## 2018-09-20 DIAGNOSIS — K21.9 GASTRO-ESOPHAGEAL REFLUX DISEASE WITHOUT ESOPHAGITIS: Chronic | ICD-10-CM

## 2018-09-20 DIAGNOSIS — F25.0 SCHIZOAFFECTIVE DISORDER, BIPOLAR TYPE: Chronic | ICD-10-CM

## 2018-09-20 DIAGNOSIS — G62.9 PERIPHERAL POLYNEUROPATHY: Primary | Chronic | ICD-10-CM

## 2018-09-20 PROCEDURE — 99999 PR PBB SHADOW E&M-EST. PATIENT-LVL IV: CPT | Mod: PBBFAC,,, | Performed by: FAMILY MEDICINE

## 2018-09-20 PROCEDURE — 99214 OFFICE O/P EST MOD 30 MIN: CPT | Mod: PBBFAC,PO | Performed by: FAMILY MEDICINE

## 2018-09-20 PROCEDURE — 99214 OFFICE O/P EST MOD 30 MIN: CPT | Mod: S$PBB,,, | Performed by: FAMILY MEDICINE

## 2018-09-20 RX ORDER — PANTOPRAZOLE SODIUM 40 MG/1
40 TABLET, DELAYED RELEASE ORAL DAILY
Qty: 30 TABLET | Refills: 11 | Status: SHIPPED | OUTPATIENT
Start: 2018-09-20 | End: 2019-07-25 | Stop reason: SDUPTHER

## 2018-09-20 RX ORDER — TRAMADOL HYDROCHLORIDE 50 MG/1
50 TABLET ORAL EVERY 8 HOURS PRN
Qty: 90 TABLET | Refills: 5 | Status: SHIPPED | OUTPATIENT
Start: 2018-09-20 | End: 2019-04-23 | Stop reason: SDUPTHER

## 2018-09-20 SDOH — SOCIAL DETERMINANTS OF HEALTH (SDOH): INSUFFICIENT SOCIAL INSURANCE AND WELFARE SUPPORT: Z59.7

## 2018-09-20 NOTE — LETTER
PATIENT:   Karyna Geller,  1960; MRN [id number]  YOB: 1960   DATE OF VISIT:  18    ORDERS  1. ***    SUPPORTING DIAGNOSES    ICD-10-CM ICD-9-CM   1. Peripheral polyneuropathy G62.9 356.9   2. Bipolar 1 disorder F31.9 296.7   3. Insufficient social insurance or welfare support Z59.7 V60.89   4. Schizoaffective disorder, bipolar type F25.0 295.70   5. Essential hypertension I10 401.9          CECILIA Boothe MD

## 2018-09-20 NOTE — PROGRESS NOTES
CHIEF COMPLAINT  Follow-up      HISTORY OF PRESENT ILLNESS      PROBLEM/CONDITION: She persists with tingling of hands and feet in a stocking-glove distribution, which she says that she has had for greater than a year. It is not restricted to the distribution of the median nerve. It is not limiting her function in any way. Her  strength is normal. Gross evaluation of tactile sensation is normal. We discussed differential diagnosis.    PROBLEM/CONDITION: Schizoaffective disorder with bipolar disorder appears reasonably compensated, with a MILD degree of hypomania today. She says that she is following with her psychiatrist regular.    PROBLEM/CONDITION: Hypertension appears well-controlled. No angina or angina equivalent symptoms reported.    PROBLEM/CONDITION: Gastroesophageal reflux disease appears well-controlled.    PROBLEM/CONDITION: Osteoarthritis appears well-controlled.    Problem List Items Addressed This Visit        Neuro    Peripheral polyneuropathy - Primary (Chronic)    Overview     Tingling of hands and feet in stocking-glove distribution.         Relevant Orders    EMG W/ ULTRASOUND AND NERVE CONDUCTION TEST 4 Extremities       Psychiatric    Schizoaffective disorder, bipolar type (Chronic)    Insufficient social insurance or welfare support (Chronic)    Bipolar 1 disorder (Chronic)       Cardiac/Vascular    Hypertension (Chronic)       GI    Gastro-esophageal reflux disease without esophagitis (Chronic)    Relevant Medications    pantoprazole (PROTONIX) 40 MG tablet       Orthopedic    Primary localized osteoarthrosis of multiple sites (Chronic)    Relevant Medications    traMADol (ULTRAM) 50 mg tablet          PAST MEDICAL HISTORY, FAMILY HISTORY and SOCIAL HISTORY, CURRENT MEDICATION LIST, and ALLERGY LIST reviewed by me (CECILIA Boothe MD) and are updated consistent with the patient's report.    REVIEW OF SYSTEMS  CONSTITUTIONAL: No fever reported.  CARDIOVASCULAR: No chest pain  "reported.  PULMONARY: No trouble breathing reported.   PSYCHIATRIC: She reports no no hallucinations and no suicidal ideations.     PHYSICAL EXAM  Vitals:    09/20/18 1018   BP: 138/75   Pulse: 85   Temp: 98.2 °F (36.8 °C)   TempSrc: Oral   SpO2: 97%   Weight: 50.6 kg (111 lb 8.8 oz)   Height: 5' 2" (1.575 m)     CONSTITUTIONAL: Vital signs noted. No apparent distress. Does not appear acutely ill or septic. Appears adequately hydrated.  HEENT: External ENT grossly unremarkable. Hearing grossly intact. Oropharynx moist.  PULM: Lungs clear. Breathing unlabored.  HEART: Auscultation reveals regular rate and rhythm without murmur, gallop or rub.  DERM: Skin warm and moist with normal turgor.  NEURO: There are no gross focal motor deficits or gross deficits of cranial nerves III-XII.  PSYCHIATRIC: Alert and oriented x 3. Mood is mildly hypomanic. Affect is mood-congruent. Judgment and insight not grossly compromised.    ASSESSMENT and PLAN  Peripheral polyneuropathy  -     EMG W/ ULTRASOUND AND NERVE CONDUCTION TEST 4 Extremities; Future; Expected date: 09/27/2018    Bipolar 1 disorder    Insufficient social insurance or welfare support    Schizoaffective disorder, bipolar type    Essential hypertension    Gastro-esophageal reflux disease without esophagitis  -     pantoprazole (PROTONIX) 40 MG tablet; Take 1 tablet (40 mg total) by mouth once daily.  Dispense: 30 tablet; Refill: 11    Primary localized osteoarthrosis of multiple sites  -     traMADol (ULTRAM) 50 mg tablet; Take 1 tablet (50 mg total) by mouth every 8 (eight) hours as needed for Pain.  Dispense: 90 tablet; Refill: 5        PRESCRIPTION MEDICATION MANAGEMENT  Except as noted below, CURRENT MEDICATIONS are to remain unchanged from that listed above.  Medications Ordered This Encounter   Medications    pantoprazole (PROTONIX) 40 MG tablet     Sig: Take 1 tablet (40 mg total) by mouth once daily.     Dispense:  30 tablet     Refill:  11     This REPLACES " "omeprazole. DISCONTINUE any existing prescription omeprazole.    traMADol (ULTRAM) 50 mg tablet     Sig: Take 1 tablet (50 mg total) by mouth every 8 (eight) hours as needed for Pain.     Dispense:  90 tablet     Refill:  5     DISCONTINUE any prior prescription for this drug. This prescription may be filled in a timely manner after completion of prior dispensed supply of this medication.      Medications Discontinued During This Encounter   Medication Reason    FLUCELVAX QUAD 8300-4013, PF, 60 mcg (15 mcg x 4)/0.5 mL Syrg vaccine Patient no longer taking    pantoprazole (PROTONIX) 40 MG tablet Reorder    traMADol (ULTRAM) 50 mg tablet Reorder       Follow-up in about 2 months (around 11/20/2018) for review test results and discuss treatment plan.    There are no Patient Instructions on file for this visit.    ABOUT THIS DOCUMENTATION:  · The order of the conditions listed in the HPI is one of convenience and does not necessarily reflect the chronology of the appointment, nor the relative importance of a condition.  · Documentation entered by me for this encounter was done in part using speech-recognition technology. Although I have made an effort to ensure accuracy, "sound like" errors may exist and should be interpreted in context.                        -CECILIA Boothe MD     "

## 2018-09-21 ENCOUNTER — TELEPHONE (OUTPATIENT)
Dept: PHYSICAL MEDICINE AND REHAB | Facility: CLINIC | Age: 58
End: 2018-09-21

## 2018-09-21 ENCOUNTER — TELEPHONE (OUTPATIENT)
Dept: INTERNAL MEDICINE | Facility: CLINIC | Age: 58
End: 2018-09-21

## 2018-09-21 NOTE — TELEPHONE ENCOUNTER
----- Message from Destin Samuel LPN sent at 9/21/2018 10:31 AM CDT -----  emg is scheduled on 10/23; left a message for the pt.    Lori Nieves    ----- Message -----  From: Ivania Scott LPN  Sent: 9/21/2018   7:34 AM  To: Destin Samuel LPN    Please call patient to schedule EMG

## 2018-09-24 RX ORDER — HYDROCHLOROTHIAZIDE 12.5 MG/1
CAPSULE ORAL
Qty: 30 CAPSULE | Refills: 2 | OUTPATIENT
Start: 2018-09-24

## 2018-09-24 NOTE — TELEPHONE ENCOUNTER
This refill request was NOT approved for the reason stated below.    TASK: Please notify her by copying-and-pasting my note note into a patient-portal message AND/OR by phone call. Thanks.     Karyna Vogel.    I am not able to approve your refill request for the following reason:  My notes reflect that we STOPPED this medication late last year, and based on our discussions at your previous appointments, this is NOT one of your current medications.    We can discuss this further at your next appointment.    Thanks for letting me care for you.    Kind regards,    CECILIA Boothe MD

## 2018-09-25 NOTE — TELEPHONE ENCOUNTER
Left message for patient to call to schedule a office visit to discuss medications, advised that her refill request was denied

## 2018-10-23 ENCOUNTER — OFFICE VISIT (OUTPATIENT)
Dept: PHYSICAL MEDICINE AND REHAB | Facility: CLINIC | Age: 58
End: 2018-10-23
Payer: MEDICAID

## 2018-10-23 VITALS
HEIGHT: 62 IN | HEART RATE: 77 BPM | WEIGHT: 111 LBS | RESPIRATION RATE: 14 BRPM | DIASTOLIC BLOOD PRESSURE: 69 MMHG | BODY MASS INDEX: 20.43 KG/M2 | SYSTOLIC BLOOD PRESSURE: 118 MMHG

## 2018-10-23 DIAGNOSIS — G56.03 BILATERAL CARPAL TUNNEL SYNDROME: Primary | ICD-10-CM

## 2018-10-23 DIAGNOSIS — G56.23 CUBITAL TUNNEL SYNDROME, BILATERAL: ICD-10-CM

## 2018-10-23 DIAGNOSIS — M54.17 LUMBOSACRAL RADICULOPATHY AT S1: ICD-10-CM

## 2018-10-23 PROCEDURE — 99213 OFFICE O/P EST LOW 20 MIN: CPT | Mod: PBBFAC,PO,25 | Performed by: PHYSICAL MEDICINE & REHABILITATION

## 2018-10-23 PROCEDURE — 95885 MUSC TST DONE W/NERV TST LIM: CPT | Mod: PBBFAC,PO | Performed by: PHYSICAL MEDICINE & REHABILITATION

## 2018-10-23 PROCEDURE — 95886 MUSC TEST DONE W/N TEST COMP: CPT | Mod: 26,S$PBB,, | Performed by: PHYSICAL MEDICINE & REHABILITATION

## 2018-10-23 PROCEDURE — 95913 NRV CNDJ TEST 13/> STUDIES: CPT | Mod: PBBFAC,PO | Performed by: PHYSICAL MEDICINE & REHABILITATION

## 2018-10-23 PROCEDURE — 95913 NRV CNDJ TEST 13/> STUDIES: CPT | Mod: 26,S$PBB,, | Performed by: PHYSICAL MEDICINE & REHABILITATION

## 2018-10-23 PROCEDURE — 99204 OFFICE O/P NEW MOD 45 MIN: CPT | Mod: 25,S$PBB,, | Performed by: PHYSICAL MEDICINE & REHABILITATION

## 2018-10-23 PROCEDURE — 99999 PR PBB SHADOW E&M-EST. PATIENT-LVL III: CPT | Mod: PBBFAC,,, | Performed by: PHYSICAL MEDICINE & REHABILITATION

## 2018-10-23 NOTE — LETTER
October 23, 2018      CECILIA Boothe MD  [address] [facility] [address]           [facility]  [address] [address]  Phone: [phone]  Fax: [phone]          Patient: Karyna Geller   MR Number: [id number]   YOB: 1960   Date of Visit: 10/23/2018       Dear Dr. CECILIA Boothe:    Thank you for referring Karyna Geller to me for evaluation. Attached you will find relevant portions of my assessment and plan of care.    If you have questions, please do not hesitate to call me. I look forward to following Karyna Gleler along with you.    Sincerely,    Windy Stephens MD    Enclosure  CC:  No Recipients    If you would like to receive this communication electronically, please contact [email or web address] or [phone] to request more information on [employer] access.    For providers and/or their staff who would like to refer a patient to [facility], please contact us through our one-stop-shop provider referral line, [facility], at [phone].    If you feel you have received this communication in error or would no longer like to receive these types of communications, please e-mail [email or web address]

## 2018-10-23 NOTE — PROGRESS NOTES
[facility] [address] [place] [address]  Phone: [phone]          Full Name: delbert grewal YOB: 1960  Patient ID: [id number]      Visit Date: 10/23/2018 13:57  Age: 58 Years 9 Months Old  Examining Physician: Windy Stephens M.D.  Referring Physician:   Reason for Referral: ue/le numbness    Chief Complaint   Patient presents with    Numbness     hand and feet       HPI: This is a 58 y.o.  female being seen in clinic today for evaluation of chronic numbness/tingling in her hands and feet that is constant throughout the day.  With increased arm usage she may have an increase of discomfort.  She has started to drop things from her hands.  Rest, rubbing, or shaking doesn't provide significant relief.     History obtained from patient    Past family, medical, social, and surgical history reviewed in chart    Review of Systems:     General- denies lethargy, weight change, fever, chills  Head/neck- denies swallowing difficulties  ENT- denies hearing changes  Cardiovascular-denies chest pain  Pulmonary- denies shortness of breath  GI- denies constipation or bowel incontinence  - denies bladder incontinence  Skin- denies wounds or rashes  Musculoskeletal- denies weakness, +pain  Neurologic- +numbness and tingling  Psychiatric-+bipolar, +schizophrenia, +anxiety and depression  Lymphatic-denies swelling  Endocrine- denies hypoglycemic symptoms/DM history  All other pertinent systems negative     Physical Examination:  General: Well developed, well nourished female, NAD  HEENT:NCAT EOMI bilaterally   Pulmonary:Normal respirations    Spinal Examination: CERVICAL  Active ROM is within normal limits.  Inspection: No deformity of spinal alignment.    Spinal Examination: LUMBAR or THORACIC  Active ROM is within normal limits.  Inspection: No deformity of spinal alignment.      Musculoskeletal Tests:  Phalen: neg  Elbow compression (ulnar): neg  Tinels at wrist:  neg    Bilateral Upper and Lower Extremities:  Pulses are 2+ at radial bilaterally.  Shoulder/Elbow/Wrist/Hand ROM wnl  Hip/Knee/Ankle ROM wnl  Bilateral Extremities show normal capillary refill.  No signs of cyanosis, rubor, edema, skin changes, or dysvascular changes of appendages.  Nails appear intact.    Neurological Exam:  Cranial Nerves:  II-XII grossly intact    Manual Muscle Testing: (Motor 5=normal)  4+/5 strength bilateral upper/4/5 bilateral lower extremities    No focal atrophy is noted of either upper or lower extremity.    Bilateral Reflexes:hypo at bic tric br patellar  Stacy's response is absent bilaterally.  No clonus at knee or ankle.    Sensation: tested to light touch  - intact in all four limbs.    Gait: Narrow base and good arm swing.      Entire procedure explained to patient prior to proceeding.  Verbal consent obtained      SNC      Nerve / Sites Rec. Site Onset Lat Peak Lat Amp Segments Distance Velocity     ms ms µV  mm m/s   R Median - Digit II (Antidromic)      Wrist Dig II 3.1 4.0 15.5 Wrist - Dig  46   L Median - Digit II (Antidromic)      Wrist Dig II 3.3 4.2 14.3 Wrist - Dig  42   R Ulnar - Digit V (Antidromic)      Wrist Dig V 2.7 3.6 10.6 Wrist - Dig V 140 52   L Ulnar - Digit V (Antidromic)      Wrist Dig V 2.7 3.4 14.2 Wrist - Dig V 140 52   R Radial - Anatomical snuff box (Forearm)      Forearm Wrist 1.7 2.3 15.1 Forearm - Wrist 100 60   L Radial - Anatomical snuff box (Forearm)      Forearm Wrist 1.8 2.3 12.3 Forearm - Wrist 100 56   R Sural - Ankle (Calf)      Calf Ankle 3.2 4.1 6.7 Calf - Ankle 140 43       MNC      Nerve / Sites Muscle Latency Amplitude Duration Rel Amp Segments Distance Lat Diff Velocity     ms mV ms %  mm ms m/s   R Median - APB      Wrist APB 3.5 7.6 6.4 100 Wrist - APB 80        Elbow APB 8.2 6.8 7.0 89.5 Elbow - Wrist 230 4.6 50   L Median - APB      Wrist APB 4.5 5.9 7.7 100 Wrist - APB 80        Elbow APB 9.4 5.2 8.3 89.2 Elbow - Wrist  230 4.9 47   R Ulnar - ADM      Wrist ADM 2.6 5.1 6.1 100 Wrist - ADM 80        B.Elbow ADM 6.5 5.6 6.9 109 B.Elbow - Wrist 200 3.9 51      A.Elbow ADM 9.0 5.0 7.4 89.2 A.Elbow - B.Elbow 100 2.5 40         A.Elbow - Wrist  6.4    L Ulnar - ADM      Wrist ADM 2.6 5.0 6.9 100 Wrist - ADM 80        B.Elbow ADM 6.3 5.2 6.6 103 B.Elbow - Wrist 210 3.6 58      A.Elbow ADM 10.0 5.0 6.3 96.9 A.Elbow - B.Elbow 120 3.8 32         A.Elbow - Wrist  7.4    R Peroneal - EDB      Ankle EDB 3.6 5.3 5.6 100 Ankle - EDB 80        Fib head EDB 9.7 4.2 6.9 79.2 Fib head - Ankle 310 6.1 50      Pop fossa EDB 11.7 4.2 6.2 99.7 Pop fossa - Fib head 90 2.0 45         Pop fossa - Ankle  8.1    R Tibial - AH      Ankle AH 3.8 6.4 4.3 100 Ankle - AH 80        Pop fossa AH 13.6 5.3 5.5 83 Pop fossa - Ankle 400 9.8 41       EMG            EMG Summary Table     Spontaneous MUAP Recruitment   Muscle IA Fib PSW Fasc Other Amp Dur Polys Pattern Effort   R. Tibialis anterior N None None None . N n N N Max   R. Gastrocnemius (Medial head) N None None None . N n N N Max   R. Abductor hallucis N None None None . Sl Incr Sl Incr 1+ sl red Max   R. Extensor digitorum longus N None None None . N n N sl red Max   R. Peroneus longus N None None None . N n N sl red Max   R. Vastus lateralis N None None None . N n N N Max                                              INTERPRETATION  -Bilateral median motor nerve conduction study showed prolonged latency on the left, normal amplitude, and dec conduction velocity on the left  -Bilateral median sensory nerve conduction study showed prolonged peak latency and normal amplitude  -Bilateral ulnar motor nerve conduction study showed normal latency, amplitude, and dec conduction velocity across the elbows  -Bilateral ulnar sensory nerve conduction study showed normal peak latency and amplitude  -Bilateral radial sensory nerve conduction study showed normal peak latency and amplitude  -Right sural sensory nerve   conduction study showed normal peak latency and amplitude  -Right peroneal motor nerve conduction study showed normal latency, amplitude, and conduction velocity  -Right tibial motor nerve conduction study showed normal latency, amplitude, and conduction velocity  -Needle EMG examination performed to above mentioned muscles       IMPRESSION  1. ABNORMAL study  2. 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  There is also evidence of a CHRONIC lumbar radiculopathy of S1    PLAN  1. Follow up with referring provider: Dr. CECILIA Boothe  2. Cont wrist braces. Handouts on CTS, cubital tunnel, back care, exercise, etc provided. If necessary, can be referred to ortho for left cts release eval as well as PT for back/leg strengthening and modalities   3. This study is good for one year. If symptoms worsen or do not improve, please re-consult.    Windy Stephens M.D.  Physical Medicine and Rehab

## 2018-10-23 NOTE — PATIENT INSTRUCTIONS
Carpal Tunnel Syndrome    Carpal tunnel syndrome is a painful condition of the wrist and arm. It is caused by pressure on the median nerve.  The median nerve is one of the nerves that give feeling and movement to the hand. It passes through a tunnel in the wrist called the carpal tunnel. This tunnel is made up of bones and ligaments. Narrowing of this tunnel or swelling of the tissues inside the tunnel puts pressure on the median nerve. This causes numbness, pins and needles, or electric shooting pains in your hand and forearm. Often the pain is worse at night and may wake you when you are asleep.  Carpal tunnel syndrome may occur during pregnancy and with use of birth control pills. It is more common in workers who must often bend their wrists. It is also common in people who work with power tools that cause strong vibrations.  Home care  · Rest the painful wrist. Avoid repeated bending of the wrist back and forth. This puts pressure on the median nerve. Avoid using power tools with strong vibrations.  · If you were given a splint, wear it at night while you sleep. You may also wear it during the day for comfort.  · Move your fingers and wrists often to avoid stiffness.  · Elevate your arms on pillows when you lie down.  · Try using the unaffected hand more.  · Try not to hold your wrists in a bent, downward position.  · Sometimes changes in the work place may ease symptoms. If you type most of the day, it may help to change the position of your keyboard or add a wrist support. Your wrist should be in a neutral position and not bent back when typing.  · You may use over-the-counter pain medicine to treat pain and inflammation, unless another medicine was prescribed. Anti-inflammatory pain medicines, such as ibuprofen or naproxen may be more effective than acetaminophen, which treats pain, but not inflammation. If you have chronic liver or kidney disease or ever had a stomach ulcer or GI bleeding, talk with your  doctor before using these medicines.  · Opioid pain medicine will only give temporary relief and does not treat the problem. If pain continues, you may need a shot of a steroid drug into your wrist.  · If the above methods fail, you may need surgery. This will open the carpal tunnel and release the pressure on the trapped nerve.  Follow-up care  Follow up with your healthcare provider, or as advised, if the pain doesnt begin to improve within the next week.  If X-rays were taken, you will be notified of any new findings that may affect your care.  When to seek medical advice  Call your healthcare provider right away if any of these occur:  · Pain not improving with the above treatment  · Fingers or hand become cold, blue, numb, or tingly  · Your whole arm becomes swollen or weak  Date Last Reviewed: 11/23/2015  © 0372-3075 [employer]. [address] [place] [address]. All rights reserved. This information is not intended as a substitute for professional medical care. Always follow your healthcare professional's instructions.        Carpal Tunnel Syndrome Prevention Tips  Some repetitive hand activities put you at higher risk for carpal tunnel syndrome (CTS). But you can reduce your risk. Learn how to change the way you use your hands. Below are tips for at home and on the job. Be sure to also follow the hand and wrist safety policies at your workplace.      Keep your wrist in a neutral (straight) position when exercising.      Keep your wrist in neutral  Keep a neutral (straight) wrist position as often as you can. Dont use your wrist in a bent (flexed) position for long periods of time. This includes extended or twisted positions.  Watch your   Dont just use your thumb and index finger to grasp or lift. This can put stress on your wrist. When you can, use your whole hand and all its fingers to grasp an object.  Minimize repetition  Dont move your arms or hands or hold an object in  the same way for long periods of time. Even simple, light tasks can cause injury this way. Instead, alternate tasks or switch hands.  Rest your hands  Give your hands a break from time to time with a rest. Even a few minutes once an hour can help.  Reduce speed and force  Slow down the speed in which you do a forceful, repetitive motion. This gives your wrist time to recover from the effort. Use power tools to help reduce the force.  Strengthen the muscles  Weak muscles may lead to a poor wrist or arm position. Exercises will make your hand and arm muscles stronger. This can help you keep a better position.  Date Last Reviewed: 9/11/2015 © 2000-2017 [employer] [address] [place] [address], [place] [address]. All rights reserved. This information is not intended as a substitute for professional medical care. Always follow your healthcare professional's instructions.        Understanding Carpal Tunnel Syndrome    The carpal tunnel is a narrow space inside the wrist. It is ringed by bone and a band of tough tissue called the transverse carpal ligament. A major nerve called the median nerve runs from the forearm into the hand through the carpal tunnel. Tendons also run through the carpal tunnel.  With carpal tunnel syndrome, the tendons or nearby tissues within the carpal tunnel may swell or thicken. Or the transverse carpal ligament may harden and shorten. This narrows the space in the carpal tunnel and puts pressure on the median nerve. This pressure leads to tingling and numbness of the hand and wrist. In time, the condition can make even simple tasks hard to do.  What causes carpal tunnel syndrome?  Doctors arent entirely clear why the condition occurs. Certain things may make a person more likely to have it. These include:  · Being female  · Being pregnant  · Being overweight  · Having diabetes or rheumatoid arthritis  Symptoms of carpal tunnel syndrome  Symptoms often come and go. At first, symptoms  may occur mainly at night. Later, they may be noticed during the day as well. They may get worse with activities such as driving, reading, typing, or holding a phone. Symptoms can include:  · Tingling and numbness in the hand or wrist  · Sharp pain that shoots up the arm or down to the fingers  · Hand stiffness or cramping, especially in the morning  · Trouble making a fist  · Hand weakness and clumsiness  Treatment for carpal tunnel syndrome  Certain treatments help reduce the pressure on the median nerve and relieve symptoms. Choices for treatment may include one or more of the following:  · Wrist splint. This involves wearing a special brace on the wrist and hand. The splint holds the wrist straight, in a neutral position. This helps keep the carpal tunnel as open as possible.  · Cortisone shots. Cortisone is a medicine that helps reduce swelling. It is injected directly into the wrist. It helps shrink tissues inside the carpal tunnel. This relieves symptoms for a time.  · Pain medicines. You may take over-the-counter or prescription medicines to help reduce swelling and relieve symptoms.  · Surgery. If the condition doesnt respond to other treatments and doesnt go away on its own, you may need surgery. During surgery, the surgeon cuts the transverse carpal ligament to relieve pressure on the median nerve.     When to call your healthcare provider  Call your healthcare provider right away if you have any of these:  · Fever of 100.4°F (38°C) or higher, or as directed  · Symptoms that dont get better, or get worse  · New symptoms   Date Last Reviewed: 3/10/2016  © 8945-2396 [employer]. [address] [place] [address], [place] [address]. All rights reserved. This information is not intended as a substitute for professional medical care. Always follow your healthcare professional's instructions.        What Is Cubital Tunnel Syndrome?  Cubital tunnel syndrome is a set of symptoms that may occur if the ulnar  nerve in your elbow gets pinched. This may happen if you bend or lean on your elbows often.    Your cubital tunnel  The cubital tunnel is a groove in a bone near your elbow. This narrow groove provides a passage for the ulnar nerve, one of the main nerves in your arm. The ulnar nerve can cause funny bone pain if your elbow gets bumped. Your cubital tunnel helps protect this nerve as it passes through your elbow and down to your fingers.  Compressing the ulnar nerve  Bending your elbow compresses the ulnar nerve inside the cubital tunnel. The nerve can get inflamed (irritated) after constant bending and pinching or after getting hurt. Over time, this can lead to pain or numbness. The pain is often felt in your ring fingers and little fingers.     Bending your elbow as you hold a phone can cause problems over time.    What are its symptoms?  · Numbness or tingling in the ring fingers and little fingers  · Loss of finger or hand strength  · Inability to straighten fingers  · Sharp, sudden pain when elbow is touched  The road to healing  You can keep cubital tunnel syndrome from flaring up. Avoid pinching the ulnar nerve by keeping your arm straight as much as you can, even while sleeping. And use phone headsets and elbow pads. If you still have pain, tell your doctor.   Date Last Reviewed: 9/8/2015  © 4507-8262 [employer]. [address] [place] [address], [place] [address]. All rights reserved. This information is not intended as a substitute for professional medical care. Always follow your healthcare professional's instructions.        Exercises to Strengthen Your Lower Back  Strong lower back and abdominal muscles work together to support your spine. The exercises below will help strengthen the lower back. It is important that you begin exercising slowly and increase levels gradually.  Always begin any exercise program with stretching. If you feel pain while doing any of these exercises, stop and talk to  your doctor about a more specific exercise program that better suits your condition.   Low back stretch  The point of stretching is to make you more flexible and increase your range of motion. Stretch only as much as you are able. Stretch slowly. Do not push your stretch to the limit. If at any point you feel pain while stretching, this is your (temporary) limit.  · Lie on your back with your knees bent and both feet on the ground.  · Slowly raise your left knee to your chest as you flatten your lower back against the floor. Hold for 5 seconds.  · Relax and repeat the exercise with your right knee.  · Do 10 of these exercises for each leg.  · Repeat hugging both knees to your chest at the same time.  Building lower back strength  Start your exercise routine with 10 to 30 minutes a day, 1 to 3 times a day.  Initial exercises  Lying on your back:  1. Ankle pumps: Move your foot up and down, towards your head, and then away. Repeat 10 times with each foot.  2. Heel slides: Slowly bend your knee, drawing the heel of your foot towards you. Then slide your heel/foot from you, straightening your knee. Do not lift your foot off the floor (this is not a leg lift).  3. Abdominal contraction: Bend your knees and put your hands on your stomach. Tighten your stomach muscles. Hold for 5 seconds, then relax. Repeat 10 times.  4. Straight leg raise: Bend one leg at the knee and keep the other leg straight. Tighten your stomach muscles. Slowly lift your straight leg 6 to 12 inches off the floor and hold for up to 5 seconds. Repeat 10 times on each side.  Standin. Wall squats: Stand with your back against the wall. Move your feet about 12 inches away from the wall. Tighten your stomach muscles, and slowly bend your knees until they are at about a 45 degree angle. Do not go down too far. Hold about 5 seconds. Then slowly return to your starting position. Repeat 10 times.  2. Heel raises: Stand facing the wall. Slowly raise the  heels of your feet up and down, while keeping your toes on the floor. If you have trouble balancing, you can touch the wall with your hands. Repeat 10 times.  More advanced exercises  When you feel comfortable enough, try these exercises.  1. Kneeling lumbar extension: Begin on your hands and knees. At the same time, raise and straighten your right arm and left leg until they are parallel to the ground. Hold for 2 seconds and come back slowly to a starting position. Repeat with left arm and right leg, alternating 10 times.  2. Prone lumbar extension: Lie face down, arms extended overhead, palms on the floor. At the same time, raise your right arm and left leg as high as comfortably possible. Hold for 10 seconds and slowly return to start. Repeat with left arm and right leg, alternating 10 times. Gradually build up to 20 times. (Advanced: Repeat this exercise raising both arms and both legs a few inches off the floor at the same time. Hold for 5 seconds and release.)  3. Pelvic tilt: Lie on the floor on your back with your knees bent at 90 degrees. Your feet should be flat on the floor. Inhale, exhale, then slowly contract your abdominal muscles bringing your navel toward your spine. Let your pelvis rock back until your lower back is flat on the floor. Hold for 10 seconds while breathing smoothly.  4. Abdominal crunch: Perform a pelvic tilt (above) flattening your lower back against the floor. Holding the tension in your abdominal muscles, take another breath and raise your shoulder blades off the ground (this is not a full sit-up). Keep your head in line with your body (dont bend your neck forward). Hold for 2 seconds, then slowly lower.  Date Last Reviewed: 6/1/2016  © 1059-9771 [employer]. [address] [place] [address], [place] [address]. All rights reserved. This information is not intended as a substitute for professional medical care. Always follow your healthcare professional's  instructions.        Back Care Tips    Caring for your back  These are things you can do to prevent a recurrence of acute back pain and to reduce symptoms from chronic back pain:  · Maintain a healthy weight. If you are overweight, losing weight will help most types of back pain.  · Exercise is an important part of recovery from most types of back pain. The muscles behind and in front of the spine support the back. This means strengthening both the back muscles and the abdominal muscles will provide better support for your spine.   · Swimming and brisk walking are good overall exercises to improve your fitness level.  · Practice safe lifting methods (below).  · Practice good posture when sitting, standing and walking. Avoid prolonged sitting. This puts more stress on the lower back than standing or walking.  · Wear quality shoes with sufficient arch support. Foot and ankle alignment can affect back symptoms. Women should avoid wearing high heels.  · Therapeutic massage can help relax the back muscles without stretching them.  · During the first 24 to 72 hours after an acute injury or flare-up of chronic back pain, apply an ice pack to the painful area for 20 minutes and then remove it for 20 minutes, over a period of 60 to 90 minutes, or several times a day. As a safety precaution, do not use a heating pad at bedtime. Sleeping on a heating pad can lead to skin burns or tissue damage.  · You can alternate ice and heat therapies.  Medications  Talk to your healthcare provider before using medicines, especially if you have other medical problems or are taking other medicines.  · You may use acetaminophen or ibuprofen to control pain, unless your healthcare provider prescribed other pain medicine. If you have chronic conditions like diabetes, liver or kidney disease, stomach ulcers, or gastrointestinal bleeding, or are taking blood thinners, talk with your healthcare provider before taking any medicines.  · Be careful  if you are given prescription pain medicines, narcotics, or medicine for muscle spasm. They can cause drowsiness, affect your coordination, reflexes, and judgment. Do not drive or operate heavy machinery while taking these types of medicines. Take prescription pain medicine only as prescribed by your healthcare provider.  Lumbar stretch  Here is a simple stretching exercise that will help relax muscle spasm and keep your back more limber. If exercise makes your back pain worse, dont do it.  · Lie on your back with your knees bent and both feet on the ground.  · Slowly raise your left knee to your chest as you flatten your lower back against the floor. Hold for 5 seconds.  · Relax and repeat the exercise with your right knee.  · Do 10 of these exercises for each leg.  Safe lifting method  · Dont bend over at the waist to lift an object off the floor.  Instead, bend your knees and hips in a squat.   · Keep your back and head upright  · Hold the object close to your body, directly in front of you.  · Straighten your legs to lift the object.   · Lower the object to the floor in the reverse fashion.  · If you must slide something across the floor, push it.  Posture tips  Sitting  Sit in chairs with straight backs or low-back support. Keep your knees lower than your hips, with your feet flat on the floor.  When driving, sit up straight. Adjust the seat forward so you are not leaning toward the steering wheel.  A small pillow or rolled towel behind your lower back may help if you are driving long distances.   Standing  When standing for long periods, shift most of your weight to one leg at a time. Alternate legs every few minutes.   Sleeping  The best way to sleep is on your side with your knees bent. Put a low pillow under your head to support your neck in a neutral spine position. Avoid thick pillows that bend your neck to one side. Put a pillow between your legs to further relax your lower back. If you sleep on your  back, put pillows under your knees to support your legs in a slightly flexed position. Use a firm mattress. If your mattress sags, replace it, or use a 1/2-inch plywood board under the mattress to add support.  Follow-up care  Follow up with your healthcare provider, or as advised.  If X-rays, a CT scan or an MRI scan were taken, they will be reviewed by a radiologist. You will be notified of any new findings that may affect your care.  Call 911  Seek emergency medical care if any of the following occur:  · Trouble breathing  · Confusion  · Very drowsy  · Fainting or loss of consciousness  · Rapid or very slow heart rate  · Loss of  bowel or bladder control  When to seek medical care  Call your healthcare provider if any of the following occur:  · Pain becomes worse or spreads to your arms or legs  · Weakness or numbness in one or both arms or legs  · Numbness in the groin area  Date Last Reviewed: 6/1/2016  © 2298-5211 [employer]. [address] [place] [address]. All rights reserved. This information is not intended as a substitute for professional medical care. Always follow your healthcare professional's instructions.        Possible Causes of Low Back or Leg Pain    The symptoms in your back or leg may be due to pressure on a nerve. This pressure may be caused by a damaged disk or by abnormal bone growth. Either way, you may feel pain, burning, tingling, or numbness. If you have pressure on a nerve that connects to the sciatic nerve, pain may shoot down your leg.    Pressure from the disk  Constant wear and tear can weaken a disk over time and cause back pain. The disk can then be damaged by a sudden movement or injury. If its soft center begins to bulge, the disk may press on a nerve. Or the outside of the disk may tear, and the soft center may squeeze through and pinch a nerve.    Pressure from bone  As a disk wears out, the vertebrae right above and below the disk begin to touch. This  can put pressure on a nerve. Often, abnormal bone (called bone spurs) grows where the vertebrae rub against each other. This can cause the foramen or the spinal canal to narrow (called stenosis) and press against a nerve.  Date Last Reviewed: 10/4/2015  © 7471-7825 [employer]. [address] [place] [address]. All rights reserved. This information is not intended as a substitute for professional medical care. Always follow your healthcare professional's instructions.        Understanding Lumbar Radiculopathy    Lumbar radiculopathy is irritation or inflammation of a nerve root in the low back. It causes symptoms that spread out from the back down one or both legs. To understand this condition, it helps to understand the parts of the spine:  · Vertebrae. These are bones that stack to form the spine. The lumbar spine contains the 5 bottom vertebrae.  · Disks. These are soft pads of tissue between the vertebrae. They act as shock absorbers for the spine.  · Spinal canal. This is a tunnel formed within the stacked vertebrae. In the lumbar spine, nerves run through this canal.  · Nerves. These branch off and leave the spinal canal, traveling out to parts of the body. As they leave the spinal canal, nerves pass through openings between the vertebrae. The nerve root is the part of the nerve that is closest to the spinal canal.  · Sciatic nerve. This is a large nerve formed from several nerve roots in the low back. This nerve extends down the back of the leg to the foot.  With lumbar radiculopathy, nerve roots in the low back become irritated. This leads to pain and symptoms. The sciatic nerve is commonly involved, so the condition is often called sciatica.  What causes lumbar radiculopathy?  Aging, injury, poor posture, extra body weight, and other issues can lead to problems in the low back. These problems may then irritate nerve roots. They include:  · Damage to a disk in the lumbar spine. The  damaged disk may then press on nearby nerve roots.  · Degeneration from wear and tear, and aging. This can lead to narrowing (stenosis) of the openings between the vertebrae. The narrowed openings press on nerve roots as they leave the spinal canal.  · Unstable spine. This is when a vertebra slips forward. It can then press on a nerve root.  Other, less common things can put pressure on nerves in the low back. These include diabetes, infection, or a tumor.  Symptoms of lumbar radiculopathy  These include:  · Pain in the low back  · Pain, numbness, tingling, or weakness that travels into the buttocks, hip, groin, or leg  · Muscle spasms  Treatment for lumbar radiculopathy  In most cases, your healthcare provider will first try treatments that help relieve symptoms. These may include:  · Prescription and over-the-counter pain medicines. These help relieve pain, swelling, and irritation.  · Limits on positions and activities that increase pain. But lying in bed or avoiding all movement is only recommended for a short period of time.  · Physical therapy, including exercises and stretches. This helps decrease pain and increase movement and function.  · Steroid shots into the lower back. This may help relieve symptoms for a time.  · Weight-loss program. If you are overweight, losing extra pounds may help relieve symptoms.  In some cases, you may need surgery to fix the underlying problem. This depends on the cause, the symptoms, and how long the pain has lasted.  Possible complications  Over time, an irritated and inflamed nerve may become damaged. This may lead to long-lasting (permanent) numbness or weakness in your legs and feet. If symptoms change suddenly or get worse, be sure to let your healthcare provider know.  When to call your healthcare provider  Call your healthcare provider right away if you have any of these:  · New pain or pain that gets worse  · New or increasing weakness, tingling, or numbness in your  leg or foot  · Problems controlling your bladder or bowel   Date Last Reviewed: 3/10/2016  © 4681-3713 [employer]. [address] [place] [address], [place] [address]. All rights reserved. This information is not intended as a substitute for professional medical care. Always follow your healthcare professional's instructions.

## 2018-10-24 RX ORDER — MONTELUKAST SODIUM 10 MG/1
10 TABLET ORAL NIGHTLY
Qty: 30 TABLET | Refills: 0 | OUTPATIENT
Start: 2018-10-24 | End: 2018-11-23

## 2018-10-24 RX ORDER — HYDROCHLOROTHIAZIDE 12.5 MG/1
CAPSULE ORAL
Qty: 30 CAPSULE | Refills: 2 | OUTPATIENT
Start: 2018-10-24

## 2018-10-24 NOTE — TELEPHONE ENCOUNTER
This refill request was NOT approved for the reason stated below.    TASK: Please notify her by copying-and-pasting my note note into a patient-portal message AND/OR by phone call. Thanks.     Karyna Vogel.    I am not able to approve your refill request for the following reason:  These medications are not on your current medication list.    Please schedule an appointment with me and bring all your current prescription bottles with you so that we can make sure that you are taking the correct medications.    Thanks for letting me care for you.    Kind regards,    CECILIA Boothe MD

## 2018-10-24 NOTE — TELEPHONE ENCOUNTER
Notified patient of results, medication, and recommendations. Patient verbalized understanding. States she will discuss medication refills with provider during visit on 11/1/2018.//ddw

## 2018-11-01 ENCOUNTER — TELEPHONE (OUTPATIENT)
Dept: INTERNAL MEDICINE | Facility: CLINIC | Age: 58
End: 2018-11-01

## 2018-11-01 DIAGNOSIS — G44.209 TENSION HEADACHE: Chronic | ICD-10-CM

## 2018-11-01 DIAGNOSIS — Z59.7: Primary | Chronic | ICD-10-CM

## 2018-11-01 DIAGNOSIS — F31.9 BIPOLAR 1 DISORDER: Chronic | ICD-10-CM

## 2018-11-01 RX ORDER — ONDANSETRON HYDROCHLORIDE 8 MG/1
8 TABLET, FILM COATED ORAL EVERY 12 HOURS PRN
Qty: 6 TABLET | Refills: 0 | OUTPATIENT
Start: 2018-11-01

## 2018-11-01 RX ORDER — OXCARBAZEPINE 300 MG/1
600 TABLET, FILM COATED ORAL 2 TIMES DAILY
Qty: 45 TABLET | Refills: 0 | Status: SHIPPED | OUTPATIENT
Start: 2018-11-01 | End: 2020-07-02 | Stop reason: SDUPTHER

## 2018-11-01 RX ORDER — BUTALBITAL, ACETAMINOPHEN AND CAFFEINE 50; 325; 40 MG/1; MG/1; MG/1
1 TABLET ORAL EVERY 8 HOURS PRN
Qty: 90 TABLET | Refills: 0 | OUTPATIENT
Start: 2018-11-01

## 2018-11-01 SDOH — SOCIAL DETERMINANTS OF HEALTH (SDOH): INSUFFICIENT SOCIAL INSURANCE AND WELFARE SUPPORT: Z59.7

## 2018-11-01 NOTE — TELEPHONE ENCOUNTER
TASK: Please read Patient Portal Message (below), and then contact her to verify her receipt and understanding. Thanks.  --------------------------------------------------------------------------------  aKryna Vogel.    I received refill requests for your oxcarbazepine (TRILEPTAL), ondansetron (ZOFRAN), and your FIORICET.    Since I am not the doctor who prescribed your TRILEPTAL and ZOFRAN, you need to request refills of these medicines from the doctor who prescribed them for you.    I gave you a 1 week emergency refill of the TRILEPTAL to make sure you didn't run out.    I will need to see you for re-evaluation before I can give you a prescription for FIORICET.    PLEASE BRING ALL YOUR PRESCRIPTION BOTTLES TO YOUR APPOINTMENT.    Thanks for letting me care for you.    Sincerely,    CECILIA Boothe MD     Medications Ordered This Encounter   Medications    OXcarbazepine (TRILEPTAL) 300 MG Tab     Sig: Take 2 tablets (600 mg total) by mouth 2 (two) times daily.     Dispense:  45 tablet     Refill:  0

## 2018-11-01 NOTE — TELEPHONE ENCOUNTER
Patient needs a refill on trileptal, periactin, fiorcet, and zofran. Pharmacy verified ([facility]).//ddw

## 2018-11-01 NOTE — TELEPHONE ENCOUNTER
Patient was calling about the status of her refills. Informed patient that the medication was sent to the provider for approval. Also informed patient once the provider has responded to the refill request she will receive an update on the status of the medication. Patient states she will not have enough medication until her visit to the office on 11/8/2018.//ddw

## 2018-11-01 NOTE — TELEPHONE ENCOUNTER
----- Message from Maty Aguiar sent at 11/1/2018  2:02 PM CDT -----  Pt is requesting a call from nurse to f/u on a refill.          Please call pt back at [phone]

## 2018-11-02 DIAGNOSIS — G44.209 TENSION HEADACHE: Chronic | ICD-10-CM

## 2018-11-02 RX ORDER — BUTALBITAL, ACETAMINOPHEN AND CAFFEINE 50; 325; 40 MG/1; MG/1; MG/1
TABLET ORAL
Qty: 90 TABLET | Refills: 0 | OUTPATIENT
Start: 2018-11-02

## 2018-11-15 ENCOUNTER — CLINICAL SUPPORT (OUTPATIENT)
Dept: CARDIOLOGY | Facility: CLINIC | Age: 58
End: 2018-11-15
Payer: MEDICAID

## 2018-11-15 ENCOUNTER — OFFICE VISIT (OUTPATIENT)
Dept: INTERNAL MEDICINE | Facility: CLINIC | Age: 58
End: 2018-11-15
Payer: MEDICAID

## 2018-11-15 VITALS
SYSTOLIC BLOOD PRESSURE: 136 MMHG | HEIGHT: 62 IN | HEART RATE: 82 BPM | OXYGEN SATURATION: 96 % | BODY MASS INDEX: 21.14 KG/M2 | DIASTOLIC BLOOD PRESSURE: 84 MMHG | WEIGHT: 114.88 LBS | TEMPERATURE: 97 F

## 2018-11-15 DIAGNOSIS — G56.03 BILATERAL CARPAL TUNNEL SYNDROME: Primary | Chronic | ICD-10-CM

## 2018-11-15 DIAGNOSIS — G56.23 CUBITAL TUNNEL SYNDROME, BILATERAL: ICD-10-CM

## 2018-11-15 DIAGNOSIS — R07.89 OTHER CHEST PAIN: ICD-10-CM

## 2018-11-15 DIAGNOSIS — F25.0 SCHIZOAFFECTIVE DISORDER, BIPOLAR TYPE: Chronic | ICD-10-CM

## 2018-11-15 DIAGNOSIS — R11.0 CHRONIC NAUSEA: ICD-10-CM

## 2018-11-15 DIAGNOSIS — M54.17 LUMBOSACRAL RADICULOPATHY AT S1: ICD-10-CM

## 2018-11-15 DIAGNOSIS — I60.2: ICD-10-CM

## 2018-11-15 DIAGNOSIS — G44.209 TENSION HEADACHE: Chronic | ICD-10-CM

## 2018-11-15 DIAGNOSIS — K21.9 GASTRO-ESOPHAGEAL REFLUX DISEASE WITHOUT ESOPHAGITIS: Chronic | ICD-10-CM

## 2018-11-15 PROCEDURE — 99214 OFFICE O/P EST MOD 30 MIN: CPT | Mod: S$PBB,,, | Performed by: FAMILY MEDICINE

## 2018-11-15 PROCEDURE — 99999 PR PBB SHADOW E&M-EST. PATIENT-LVL V: CPT | Mod: PBBFAC,,, | Performed by: FAMILY MEDICINE

## 2018-11-15 PROCEDURE — 93010 ELECTROCARDIOGRAM REPORT: CPT | Mod: S$PBB,,, | Performed by: INTERNAL MEDICINE

## 2018-11-15 PROCEDURE — 93005 ELECTROCARDIOGRAM TRACING: CPT | Mod: PBBFAC,PO | Performed by: INTERNAL MEDICINE

## 2018-11-15 PROCEDURE — 99215 OFFICE O/P EST HI 40 MIN: CPT | Mod: PBBFAC,25,PO | Performed by: FAMILY MEDICINE

## 2018-11-15 RX ORDER — ONDANSETRON 8 MG/1
8 TABLET, ORALLY DISINTEGRATING ORAL EVERY 8 HOURS PRN
Qty: 30 TABLET | Refills: 0 | Status: SHIPPED | OUTPATIENT
Start: 2018-11-15 | End: 2018-11-16

## 2018-11-15 NOTE — PATIENT INSTRUCTIONS

## 2018-11-15 NOTE — PROGRESS NOTES
CHIEF COMPLAINT  Follow-up      HISTORY OF PRESENT ILLNESS    PROBLEM/CONDITION: I reviewed her nerve conduction study test results with her, showing bilateral carpal tunnel syndrome and bilateral cubital tunnel syndrome and S1 neuropathy. She complains of she says she has been using her carpal tunnel splints regularly, with modest improvement.       PROBLEM/CONDITION: She appears stable from a neurologic standpoint with regard to her cerebral aneurysm. I reviewed the last progress note from her neurosurgeon, who recommended that she see neurology for follow-up of her persistent headaches. She continues to complain of near-daily tension type headaches. She says that Fioricet helps, but based on her report, it sounds as though she takes them in excess when she has access to them. She agreed to see neurology for further evaluation and treatment of her headaches.       PROBLEM/CONDITION: In September, we switch her from omeprazole 20 mg daily to pantoprazole 40 mg daily. It is unclear if if she has been taking the medication, because she is requesting a refill of her omeprazole. I reviewed her current medications with her and emphasized importance of taking them as directed and bringing her medication bottles with her to future appointments. She reports persistent and worsening nausea without emesis, which may be related to gastroesophageal reflux. She agreed to see gastroenterology for further evaluation and treatment.       PROBLEM/CONDITION: Her schizoaffective disorder appears stable, and she continues to follow with her psychiatrist.       PROBLEM/CONDITION: She reports transient atypical chest pain (sharp, focal, not EXACERBATED by physical exertion or stress and not ALLEVIATED by rest). She says that she is not smoking. She reports no syncope or near syncope. She reports no hemoptysis or trouble breathing. She reports no fever. I reviewed her prior electrocardiogram results. My suspicion for cardiac etiologies  "is EXTREMELY low. However, given her prior abnormal EKG, she agreed to accept my referral to cardiology for further evaluation and treatment.        Problem List Items Addressed This Visit        Neuro    Subarachnoid hemorrhage from aneurysm of right anterior communicating artery    Overview     NEUROSURGEON: Brandon Geller MD         Cubital tunnel syndrome, bilateral    Overview     October 23, 2018 EMG: There is electrodiagnostic evidence of a MODERATE demyelinating median neuropathy (Carpal tunnel syndrome) across the LEFT wrist and a MILD demyelinating CTS across the RIGHT wrist. 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  There is also evidence of a CHRONIC lumbar radiculopathy of S1  Dr. Stephens, "mild demyelinating bilaterally-slightly worse on the left."         Relevant Orders    Ambulatory Referral to Physical/Occupational Therapy    Lumbosacral radiculopathy at S1    Overview     October 23, 2018 EMG: There is electrodiagnostic evidence of a MODERATE demyelinating median neuropathy (Carpal tunnel syndrome) across the LEFT wrist and a MILD demyelinating CTS across the RIGHT wrist. 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  There is also evidence of a CHRONIC lumbar radiculopathy of S1         Relevant Orders    Ambulatory Referral to Physical/Occupational Therapy    Tension headache (Chronic)    Relevant Orders    Ambulatory referral to Neurology    Bilateral carpal tunnel syndrome - Primary (Chronic)    Overview     October 23, 2018 EMG: There is electrodiagnostic evidence of a MODERATE demyelinating median neuropathy (Carpal tunnel syndrome) across the LEFT wrist and a MILD demyelinating CTS across the RIGHT wrist. There is additional evidence of a MILD demyelinating ulnar neuropathy (Cubital " "tunnel syndrome) across BILATERAL elbows-slightly worse on the left.  There is also evidence of a CHRONIC lumbar radiculopathy of S1         Relevant Orders    Ambulatory Referral to Physical/Occupational Therapy       Psychiatric    Schizoaffective disorder, bipolar type (Chronic)       GI    Chronic nausea    Relevant Orders    Ambulatory referral to Gastroenterology    Gastro-esophageal reflux disease without esophagitis (Chronic)    Relevant Orders    Ambulatory referral to Gastroenterology      Other Visit Diagnoses     Other chest pain        Relevant Orders    Ambulatory referral to Cardiology    SCHEDULED EKG 12-LEAD (to Muse) (Completed)          PAST MEDICAL HISTORY, FAMILY HISTORY and SOCIAL HISTORY, CURRENT MEDICATION LIST, and ALLERGY LIST reviewed by me (CECILIA Boothe MD) and are updated consistent with the patient's report.    REVIEW OF SYSTEMS  CONSTITUTIONAL: No fever or chills reported.   CARDIOVASCULAR: Non-anginal chest pain as above. No angina or orthopnea reported.   PULMONARY: No hemoptysis or trouble breathing reported.   ENDOCRINE: No polyuria or polydipsia reported.     PHYSICAL EXAM  Vitals:    11/15/18 1050   BP: 136/84   BP Location: Right arm   Patient Position: Sitting   BP Method: Medium (Manual)   Pulse: 82   Temp: 97.2 °F (36.2 °C)   TempSrc: Tympanic   SpO2: 96%   Weight: 52.1 kg (114 lb 13.8 oz)   Height: 5' 2" (1.575 m)     CONSTITUTIONAL: Vital signs noted. No apparent distress. Does not appear acutely ill or septic. Appears adequately hydrated.  HEENT: External ENT grossly unremarkable. Hearing grossly intact. Oropharynx moist.  PULM: Lungs clear. Breathing unlabored.  HEART: Auscultation reveals regular rate and rhythm without murmur, gallop or rub.  DERM: Skin warm and moist with normal turgor.  NEURO: There are no gross focal motor deficits or gross deficits of cranial nerves III-XII.  PSYCHIATRIC: Alert and oriented x 3. Mood is grossly neutral. Affect appropriate. " Judgment and insight not grossly compromised.  MUSCULOSKELETAL: Grossly normal stance and gait.     ASSESSMENT and PLAN  Bilateral carpal tunnel syndrome  -     Ambulatory Referral to Physical/Occupational Therapy    Cubital tunnel syndrome, bilateral  -     Ambulatory Referral to Physical/Occupational Therapy    Schizoaffective disorder, bipolar type    Lumbosacral radiculopathy at S1  -     Ambulatory Referral to Physical/Occupational Therapy    Subarachnoid hemorrhage from aneurysm of right anterior communicating artery    Gastro-esophageal reflux disease without esophagitis  -     Discontinue: ondansetron (ZOFRAN-ODT) 8 MG TbDL; Take 1 tablet (8 mg total) by mouth every 8 (eight) hours as needed (nausea).  Dispense: 30 tablet; Refill: 0  -     Ambulatory referral to Gastroenterology    Chronic nausea  -     Discontinue: ondansetron (ZOFRAN-ODT) 8 MG TbDL; Take 1 tablet (8 mg total) by mouth every 8 (eight) hours as needed (nausea).  Dispense: 30 tablet; Refill: 0  -     Ambulatory referral to Gastroenterology    Other chest pain  -     Ambulatory referral to Cardiology  -     SCHEDULED EKG 12-LEAD (to Muse); Future    Tension headache  -     Ambulatory referral to Neurology        PRESCRIPTION MEDICATION MANAGEMENT     Medications Discontinued During This Encounter   Medication Reason    nicotine (NICODERM CQ) 21 mg/24 hr Patient no longer taking       Follow-up in about 3 weeks (around 12/6/2018).    Patient Instructions     Uncertain Causes of Chest Pain    Chest pain can happen for a number of reasons. Sometimes the cause can't be determined. If your condition does not seem serious, and your pain does not appear to be coming from your heart, your healthcare provider may recommend watching it closely. Sometimes the signs of a serious problem take more time to appear. Many problems not related to your heart can cause chest pain.These include:  · Musculoskeletal. Costochondritis, an inflammation of the tissues  around the ribs that can occur from trauma or overuse injuries  · Respiratory. Pneumonia, pneumothorax, or pneumonitis (inflammation of the lining of the chest and lungs)  · Gastrointestinal. Esophageal reflux, heartburn, or gallbladder disease  · Anxiety and panic disorders  · Nerve compression and neuritis  · Miscellaneous problems such as aortic aneurysm or pulmonary embolism (a blood clot in the lungs)  Home care  After your visit, follow these recommendations:  · Rest today and avoid strenuous activity.  · Take any prescribed medicine as directed.  · Be aware of any recurrent chest pain and notice any changes  Follow-up care  Follow up with your healthcare provider if you do not start to feel better within 24 hours, or as advised.  Call 911  Call 911 if any of these occur:  · A change in the type of pain: if it feels different, becomes more severe, lasts longer, or begins to spread into your shoulder, arm, neck, jaw or back  · Shortness of breath or increased pain with breathing  · Weakness, dizziness, or fainting  · Rapid heart beat  · Crushing sensation in your chest  When to seek medical advice  Call your healthcare provider right away if any of the following occur:  · Cough with dark colored sputum (phlegm) or blood  · Fever of 100.4ºF (38ºC) or higher, or as directed by your healthcare provider  · Swelling, pain or redness in one leg  · Shortness of breath  Date Last Reviewed: 12/30/2015  © 0663-8319 [employer]. [address] [place] [address]. All rights reserved. This information is not intended as a substitute for professional medical care. Always follow your healthcare professional's instructions.            ABOUT THIS DOCUMENTATION:  · The order of the conditions listed in the HPI is one of convenience and does not necessarily reflect the chronology of the appointment, nor the relative importance of a condition.  · Documentation entered by me for this encounter was done in  "part using speech-recognition technology. Although I have made an effort to ensure accuracy, "sound like" errors may exist and should be interpreted in context.                        -CECILIA Boothe MD     "

## 2018-11-16 NOTE — LETTER
FAX  TO: [facility]  FAX: [phone]  FROM: CECILIA Boothe MD  RE: Karyna Geller,  1960; MRN [id number]      Dear Pharmacist:    Thank you for the accompanying notice.    DO NOT FILL her prescription for ondansetron. DISCONTINUE any existing prescription for ondansetron.    Thank you for your contribution to the care of my patients.    Sincerely,     CECILIA Boothe MD  [facility]  [address] [place] [id number] [phone]                          CONFIDENTIALITY NOTICE: The accompanying facsimile is intended solely for the use of the recipient designated above. Document(s) transmitted herewith may contain information that is confidential and privileged. Delivery, distribution or dissemination of this communication other than to the intended recipient is strictly prohibited. If you have received this facsimile in error, please notify [facility]'s Compliance and Privacy Department immediately by telephone at [phone]. Thank you.

## 2018-11-29 DIAGNOSIS — G44.209 TENSION HEADACHE: Chronic | ICD-10-CM

## 2018-11-30 RX ORDER — BUTALBITAL, ACETAMINOPHEN AND CAFFEINE 50; 325; 40 MG/1; MG/1; MG/1
1 TABLET ORAL EVERY 8 HOURS PRN
Qty: 90 TABLET | Refills: 0 | OUTPATIENT
Start: 2018-11-30

## 2018-11-30 RX ORDER — ONDANSETRON HYDROCHLORIDE 8 MG/1
8 TABLET, FILM COATED ORAL EVERY 12 HOURS PRN
Qty: 6 TABLET | OUTPATIENT
Start: 2018-11-30

## 2018-12-04 NOTE — PROGRESS NOTES
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  Has worn CT braces with some improvement.   Surgical Procedure: n/a  Imaging: EMG October 23, 2018 EMG: There is electrodiagnostic evidence of a MODERATE demyelinating median neuropathy (Carpal tunnel syndrome) across the LEFT wrist and a MILD demyelinating CTS across the RIGHT wrist. 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  There is also evidence of a CHRONIC lumbar radiculopathy of S1  Dr. Stephens, "mild demyelinating bilaterally-slightly worse on the left."  Previous Therapy: none    Patient's Goals for Therapy: "To get rid of the pain"    Pain:  R pa>L  Functional Pain Scale Rating 0-10:   7/10 on average  5/10 at best  10/10 at worst  Location: Pain starts at B thumb CMC joints and wrist joints and radiates into forearm.s   Description: Sharp, stabbing, pins  Aggravating Factors: carrying objects  Easing Factors: braces, Tramadol    Occupation:  Disabled      Functional Limitations/Social History:    Previous functional status includes: Independent with all ADLs.     Current FunctionalStatus   Home/Living environment : lives " alone      Limitation of Functional Status as follows:   ADLs/IADLs:  Gripping shopping bags, housework, meal prep, laundry-all impaired    - Feeding: cutting meat, holding utensils    - Bathing: I    - Dressing/Grooming: difficulty combing hair,     - Driving: hands lock on wheel     Leisure: Gardening-limited currently, reading books-holding books are difficult, hands give out or become painful      Past Medical History/Physical Systems Review:   Karyna Geller  has a past medical history of Anemia, Aneurysm, Anorexia, Anxiety, Asthma, Bipolar disorder, Carpal tunnel syndrome, bilateral, Cerebral aneurysm, Chronic pain syndrome, Cigarette nicotine dependence, Depression, GERD (gastroesophageal reflux disease), Hyperlipidemia, Hypertension, Insomnia, Osteoporosis, Schizophrenia, Stroke, and Subarachnoid hemorrhage.    Karyna Geller  has a past surgical history that includes Partial hysterectomy; Bunionectomy (Right); Transcranial Doppler Study - Art (8/28/2014); Transcranial Doppler Study - Art (8/29/2014); Transcranial Doppler Study - Art (8/30/2014); Transcranial Doppler Study - Art (8/31/2014); Transcranial Doppler Study - Art (9/1/2014); Transcranial Doppler Study - Art (9/2/2014); Transcranial Doppler Study - Art (9/3/2014); Transcranial Doppler Study - Art (9/4/2014); Transcranial Doppler Study - Art (9/5/2014); Transcranial Doppler Study - Art (9/6/2014); Colonoscopy w/ biopsies and polypectomy; Vaginal delivery; ANGIOGRAM-CEREBRAL (N/A, 3/31/2015); and ANGIOGRAM-CEREBRAL possible coiling (N/A, 8/27/2014).    Karyna has a current medication list which includes the following prescription(s): albuterol, alprazolam, butalbital-acetaminophen-caffeine -40 mg, calcium carbonate/vitamin d3, escitalopram oxalate, fluticasone, ondansetron, oxcarbazepine, pantoprazole, pneumovax 23, quetiapine, and tramadol.    Review of patient's allergies indicates:  No Known Allergies       Objective      Observation: Presents with B wrist braces donned. Palpable dorsal subluxation at B CMC-1 with R worse than L.  Skin graft L palm from when pt was a child.     Sensation: Intact to light touch. Paresthesias reported intermittently in B hands and into brachiums. Reports increased sensation at volar ulnar palm vs. Dorsal ulnar palm.  Informal Steele-Boby assessment: 3.61 B hands r/u fingertips. Pt felt some 2.83 at both hands but was not reproducible.     Edema:         (in cm) L R   Proximal Wrist Crease 14.6 14.5   MPs 20.0 20.3   Thumb Proximal Phalanx 5.6 5.6       Range of Motion:   Full fist, fair hook fist, L opposition to SF PFC, R opposition to MF only. All labored. Avoids CMC flexion with thumb composite flexion.  Difficulty flattening R palm on table.     Wrist E/F L 88/76 R 74/73   Wrist RD/UD L 15/14 R 21/23   Thumb R/P Abd L 60/50  R 40/39   Thumb MP flex L 0+/75  R  0/55   Thumb IP flex L 0/75  R 0/83   Thumb [place] See above        FA Supinaton 85 B, pronation 80 B  Elbow WNL B                        Manual Muscle Test: L palmar bd 3+/5, R 4/5; 3/5 B wrist flexion  4/5 wrist extension B.                                          and Pinch Strength: not tested at this time due to pain/inflammation.     and Pinch Strength (in pounds, psi's): 1 trial only   Left Right    II 30 25    III     Lateral 0.3 1   Tripod 1.3 0.25   Tip 1 0.25         Special Tests: (-) Tinel's at B CuT, CT, and Guyon's canal, (+) Reverse Phalen's and elbow flexion test though unable to discern which fingers; (-) Phalen's      CMS Impairment/Limitation/Restriction for Quick DASH Survey    Therapist reviewed Quick DASH scores for Karyna Geller on 12/5/2018.   Quick DASH documents entered into [facility] - see Media section.    The Quick DASH Questionnaire- The following scores are based on patient reported assessment at the time of the initial occupational therapy evaluation:    Activity:  1. Open a tight jar:  severe Difficulty  2. Do heavy household chores: moderate Difficulty  3. Carry a shopping bag or briefcase: severe modeDifficulty  4. Wash your back: moderate Difficulty  5.Use a knife to cut food:moderate Difficulty  6.. Recreational activities requiring force through arm: moderate Difficulty    7. Social Limitation: moderatelyLimited  8. Work/ADL Limitation: moderately Limited    Severity of Symptoms (over the past week)  9. Pain: severe  10. Tingling: moderate    11. Sleeping Limitation: moderate Difficulty    Limitation Score: 56%         Treatment     Treatment Time In: 1:50 pm  Treatment Time Out: 2:30 pm  Total Treatment time separate from Evaluation time:40    Karyna received therapeutic exercises for 40 minutes includin reps ea; B  -joint blocks thumb and fingers  -wave, hook, straight fist, fist, lifts  -median nerve glide 3 reps, B    Orthosis Assessment: Modified angles of B prefab wrist cock up orthoses to neutral as they were in moderate extension.   Home Exercise Program/Education:  Issued HEP (see patient instructions in EMR) and educated on modality use for pain management . Exercises were reviewed and Karyna was able to demonstrate them prior to the end of the session.   Pt received a written copy of exercises to perform at home. Karyna demonstrated good  understanding of the education provided.  Pt was advised to perform these exercises free of pain, and to stop performing them if pain occurs.    Patient/Family Education: role of OT, goals for OT, scheduling/cancellations - pt verbalized understanding. Discussed insurance limitations with patient.    Additional Education provided: Precautions, Aggravating Factors, Activity modification, use of cold packs PRN, wearing orthosis at night and weaning out during the day    Assessment     Karyna Geller is a 58 y.o. female referred to outpatient occupational/hand therapy and presents with a medical diagnosis of B CTS and CuTS, resulting in  Paresthesias, pain, edema, decreased A/PROM, strength, and functional use of B UE and demonstrates limitations as described in the chart below.     The patient's rehab potential is Fair.     Anticipated barriers to occupational therapy: anxiety, depression, schizoaffective d/o, insomnia, chronic pain syndrome  Pt has no cultural, educational or language barriers to learning provided.    Profile and History Assessment of Occupational Performance Level of Clinical Decision Making Complexity Score   Occupational Profile:   Karyna Geller is a 58 y.o. female who lives alone and is currently disabilty. Karyna Geller has difficulty with  feeding and grooming  driving/transportation management, shopping and housework/household chores  affecting his/her daily functional abilities. His/her main goal for therapy is be painfree.     Comorbidities:   anxiety, depression, difficulty sleeping, history of CVA and schizoaffective disorder, chronic pain syndrome, HTN, osteoporosis    Medical and Therapy History Review:   Brief               Performance Deficits    Physical:  Muscle Power/Strength  Muscle Endurance   Strength  Pinch Strength  Fine Motor Coordination  Pain  paresthesias    Cognitive:  No Deficits    Psychosocial:    No Deficits     Clinical Decision Making:  moderate    Assessment Process:  Detailed Assessments    Modification/Need for Assistance:  Minimal-Moderate Modifications/Assistance    Intervention Selection:  Multiple Treatment Options       moderate  Based on PMHX, co morbidities , data from assessments and functional level of assistance required with task and clinical presentation directly impacting function.       The following goals were discussed with the patient and patient is in agreement with them as to be addressed in the treatment plan.     Goals:   Short Term Goals: (4 weeks)  1. Pt will be independent with HEP in 3 visits.  2. Pt will report decreased pain to a 5-6/10 at worst.  3.  Pt will report decreased frequency or intensity of paresthesias in B hands  4. Pt will increase L wrist RD/UD by 5 degrees to improve ADL performance.       Long Term Goals: (by discharge)  1. Pt will report decreased pain to 1-2/10 with ADLs.   2. Pt will exhibit a significant increase (30+ points) in the Quick DASH assessment.  3.Pt will exhibit smooth, controlled motion during exercises.   4. Pt will be independent with self management of future exacerbations.   5. Pt will report significant reduction of paresthesias in B hands.         Plan   Certification Period/Plan of care expiration: 12/5/2018 to 2/27/19.    Outpatient Occupational Therapy 2 times weekly for 12 weeks to include the following interventions:       Modalities to decrease pain (moist heat, paraffin, fluidotherapy, US, iontophoresis), manual therapy/joint mobilizations,A/PROM, therapeutic exercises/activities, strengthening, desensitization/sensory re-education, orthotic fitting/fabrication/training PRN, joint protection, energy conservation,  HEP/education as well as any other treatments deemed necessary based on the patient's needs or progress.      DULCE Yanes, CHT

## 2018-12-05 ENCOUNTER — CLINICAL SUPPORT (OUTPATIENT)
Dept: REHABILITATION | Facility: HOSPITAL | Age: 58
End: 2018-12-05
Attending: FAMILY MEDICINE
Payer: MEDICAID

## 2018-12-05 DIAGNOSIS — M79.642 LEFT HAND PAIN: ICD-10-CM

## 2018-12-05 DIAGNOSIS — R20.2 PARESTHESIAS: ICD-10-CM

## 2018-12-05 DIAGNOSIS — M25.531 RIGHT WRIST PAIN: Primary | ICD-10-CM

## 2018-12-05 PROCEDURE — 97166 OT EVAL MOD COMPLEX 45 MIN: CPT

## 2018-12-07 DIAGNOSIS — G44.209 TENSION HEADACHE: Chronic | ICD-10-CM

## 2018-12-10 ENCOUNTER — PATIENT MESSAGE (OUTPATIENT)
Dept: INTERNAL MEDICINE | Facility: CLINIC | Age: 58
End: 2018-12-10

## 2018-12-10 DIAGNOSIS — R94.31 ABNORMAL ECG: Primary | ICD-10-CM

## 2018-12-10 NOTE — TELEPHONE ENCOUNTER
TASK: She has appointment at 10:00 AM today. Her EKG was abnormal. Please schedule her and send her up for EKG to be done before I see her, and have them send copy of EKG back down with her for my review. Thanks.    Problem List Items Addressed This Visit     None      Visit Diagnoses     Abnormal ECG    -  Primary    Relevant Orders    SCHEDULED EKG 12-LEAD (to Muse)

## 2018-12-11 RX ORDER — BUTALBITAL, ACETAMINOPHEN AND CAFFEINE 50; 325; 40 MG/1; MG/1; MG/1
1 TABLET ORAL EVERY 8 HOURS PRN
Qty: 45 TABLET | Refills: 0 | Status: SHIPPED | OUTPATIENT
Start: 2018-12-11 | End: 2019-04-23 | Stop reason: SDUPTHER

## 2018-12-11 NOTE — TELEPHONE ENCOUNTER
I sent eRx refill of the requested medications. An office visit is required prior to additional refills.    TASK: Contact the patient and have her schedule a timely follow-up appointment with me soon to discuss any additional refills and to discuss her abnormal ECG.

## 2018-12-11 NOTE — PROGRESS NOTES
Occupational Therapy Daily Treatment Note     Name: Karyna Sanders [place]  Clinic Number: [id number]    Therapy Diagnosis:   Encounter Diagnoses   Name Primary?   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   she was compliant with home exercise program given last session.   Response to previous treatment:no complaints  Functional change: trying to wear wrist braces more at night and only when painful during the day    Pain: 7/10 on average  5/10 at best  10/10 at worst  Location: Pain starts at B thumb CMC joints and wrist joints and radiates into forearm.s     Objective     Karyna received the following supervised modalities after being cleared for contradictions for 10 minutes:   -Moist Heat applied to B hands x 10 min to decrease pain and  increase blood flow and tissue elasticity prior to treatment.     Karyna received the following manual therapy techniques for 10 minutes:   -STM to B palms and B flexor pronator mass  -Traction with oscillation to B wrists    Karyna received therapeutic exercises for 35 minutes including:  -joint blocks thumb and fingers  -wave, hook, straight fist, fist, lifts  -median nerve glide 3 reps, B  -ulnar nerve glides B  -Extrinsic stretches      Home Exercises and Education Provided     Education provided:   - Cubital tunnel syndrome symptoms, precautions, aggravating factors, and activity modification  -heelbo pad for day use and either towel worn around elbow at night or  pil-o-splint for night use (pt going to see if covered by insurance)  -Thumb CMC arthritis likely cause of some pain at thumb and radial wrist      Written Home Exercises Provided: Patient instructed to cont prior HEP. Added ulnar nerve glides and extrinsic stretches.  Exercises were reviewed and Karyna was able to demonstrate them prior to the end of the session.  Karyna demonstrated good  understanding of the education provided.   .     Assessment     Pt would continue to benefit from skilled OT. Pt would benefit from education and exercises re: thumb CMC arthritis as well.  Pt performs exercises tensely with shoulder hiking and rounding and holding fingers flexed doing joint blocks. Reviewed trying to relax shoulders and neck, performing exercises in a painfree range and with less contortion of fingers for joint blocks by coming across supinated palm with other hand to pinch finger.     - Progress towards goals: Partially met STG 1     Karyna is progressing well towards her goals and there are no updates to goals at this time. Pt prognosis is Fair.     Pt will continue to benefit from skilled outpatient occupational therapy to address the deficits listed in the problem list on initial evaluation provide pt/family education and to maximize pt's level of independence in the home and community environment.     Anticipated barriers to occupational therapy: anxiety, depression, schizoaffective d/o, insomnia, chronic pain syndrome    Pt's spiritual, cultural and educational needs considered and pt agreeable to plan of care and goals.    Goals:  Short Term Goals: (4 weeks)  1. Pt will be independent with HEP in 3 visits. (partially met 12/12/18)  2. Pt will report decreased pain to a 5-6/10 at worst.  3. Pt will report decreased frequency or intensity of paresthesias in B hands  4. Pt will increase L wrist RD/UD by 5 degrees to improve ADL performance.        Long Term Goals: (by discharge)  1. Pt will report decreased  pain to 1-2/10 with ADLs.   2. Pt will exhibit a significant increase (30+ points) in the Quick DASH assessment.  3.Pt will exhibit smooth, controlled motion during exercises.   4. Pt will be independent with self management of future exacerbations.   5. Pt will report significant reduction of paresthesias in B hands    Plan   Continue Occupational Therapy 1-2  times per week for 12 weeks to decrease pain and edema, and increase A/PROM, strength, and functional use of B upper extremities.     Updates/Grading for next session: add median nerve sheath stretch. Provide education and therex for thumb arthritis when able.       DULCE Yanes, CHT     No Exposure

## 2018-12-12 ENCOUNTER — CLINICAL SUPPORT (OUTPATIENT)
Dept: REHABILITATION | Facility: HOSPITAL | Age: 58
End: 2018-12-12
Attending: FAMILY MEDICINE
Payer: MEDICAID

## 2018-12-12 DIAGNOSIS — M79.642 LEFT HAND PAIN: ICD-10-CM

## 2018-12-12 DIAGNOSIS — R20.2 PARESTHESIAS: ICD-10-CM

## 2018-12-12 DIAGNOSIS — M25.531 RIGHT WRIST PAIN: Primary | ICD-10-CM

## 2018-12-12 PROCEDURE — 97110 THERAPEUTIC EXERCISES: CPT

## 2018-12-12 PROCEDURE — 97140 MANUAL THERAPY 1/> REGIONS: CPT

## 2018-12-17 NOTE — PROGRESS NOTES
Occupational Therapy Daily Treatment Note     Name: Karyna Sanders [place]  Clinic Number: [id number]    Therapy Diagnosis:   Encounter Diagnoses   Name Primary?   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 No change in paresthesias.   she was compliant with home exercise program given last session.   Response to previous treatment:no complaints  Functional change: trying to wear wrist braces more at night and only when painful during the day    Pain: 6/10 on average  5/10 at best  10/10 at worst  Location: Pain starts at B thumb CMC joints and wrist joints and radiates into forearm.s     Objective     Wrist E/F L 88/76 R 74/73 (=/=)   Wrist RD/UD L 18/28 (+3/+14) R 23/23 (+2/=)   Thumb R/P Abd L 60/50  R 45/45 (+5/+6)   Thumb MP flex L 0+/75  R  0/60 (+5)   Thumb IP flex L 0/75  R 0/83   Thumb [place] See above         FA Supinaton 85 B, pronation 80 B  Elbow WNL B      Karyna received the following supervised modalities after being cleared for contradictions for 15 minutes:   -Moist Heat applied to L hands x 15 min to decrease pain and  increase blood flow and tissue elasticity prior to treatment.   -Fluidotherapy x 15 min to R hand/arm  to decrease pain, desensitize, and increase tissue extensibility.       Karyna received the following manual therapy techniques for 3 minutes:   -STM to B palms and B flexor pronator mass (NT)  -Traction with oscillation to B wrists     Karyna received therapeutic exercises for 42 minutes including:  -joint  blocks thumb and fingers  -wave, hook, straight fist, fist, lifts  -median nerve glide 3 reps, B  -ulnar nerve glides B  -Extrinsic stretches, B 2 reps ea, 20-30 sec holds  -Median nerve sheath stretch, B 10 sec holds 3 reps  -AAROM wrist all planes, B  -in hand manipulation with medium pom poms 2 containers each  -wrist maze 3 min B  -Wrist AROM with dowel over wedge 20x ea 2 ways  -       Home Exercises and Education Provided     Education provided:   -Median nerve sheath stretches.     Written Home Exercises Provided: Patient instructed to cont prior HEP. Added Median nerve sheath stretches to HEP.   Exercises were reviewed and Karyna was able to demonstrate them prior to the end of the session.  Karyna demonstrated good  understanding of the education provided.   .     Assessment     Pt would continue to benefit from skilled OT.  Pt with smoother motion at R wrist after fluido compared to RD/UD during measurements. Some cogwheel rigidity like motion at L wrist in flexion with AAROM that resolved after a few reps. Initiated light functional exercises to facilitate smoother, less labored motion. Fatigued post tx. AROM improved for R thumb abd and L wrist RD/UD.     - Progress towards goals: met STG 1, STG 4 partially met    Karyna is progressing well towards her goals and there are no updates to goals at this time. Pt prognosis is Fair.     Pt will continue to benefit from skilled outpatient occupational therapy to address the deficits listed in the problem list on initial evaluation provide pt/family education and to maximize pt's level of independence in the home and community environment.     Anticipated barriers to occupational therapy: anxiety, depression, schizoaffective d/o, insomnia, chronic pain syndrome    Pt's spiritual, cultural and educational needs considered and pt agreeable to plan of care and goals.    Goals:  Short Term Goals: (4 weeks)  1. Pt will be independent with HEP in 3 visits. (met  12/18/18)  2. Pt will report decreased pain to a 5-6/10 at worst.  3. Pt will report decreased frequency or intensity of paresthesias in B hands  4. Pt will increase L wrist RD/UD by 5 degrees to improve ADL performance.  (in progress 12/18/18)        Long Term Goals: (by discharge)  1. Pt will report decreased pain to 1-2/10 with ADLs.   2. Pt will exhibit a significant increase (30+ points) in the Quick DASH assessment.  3.Pt will exhibit smooth, controlled motion during exercises.   4. Pt will be independent with self management of future exacerbations.   5. Pt will report significant reduction of paresthesias in B hands    Plan   Continue Occupational Therapy 1-2  times per week for 12 weeks to decrease pain and edema, and increase A/PROM, strength, and functional use of B upper extremities.     Updates/Grading for next session: Provide education and therex for thumb arthritis when able.       DULCE Yanes, CHT

## 2018-12-18 ENCOUNTER — CLINICAL SUPPORT (OUTPATIENT)
Dept: REHABILITATION | Facility: HOSPITAL | Age: 58
End: 2018-12-18
Attending: FAMILY MEDICINE
Payer: MEDICAID

## 2018-12-18 DIAGNOSIS — M25.531 RIGHT WRIST PAIN: Primary | ICD-10-CM

## 2018-12-18 DIAGNOSIS — M79.642 LEFT HAND PAIN: ICD-10-CM

## 2018-12-18 DIAGNOSIS — R20.2 PARESTHESIAS: ICD-10-CM

## 2018-12-18 PROCEDURE — 97110 THERAPEUTIC EXERCISES: CPT

## 2018-12-18 PROCEDURE — 97022 WHIRLPOOL THERAPY: CPT

## 2019-01-09 ENCOUNTER — CLINICAL SUPPORT (OUTPATIENT)
Dept: REHABILITATION | Facility: HOSPITAL | Age: 59
End: 2019-01-09
Attending: FAMILY MEDICINE
Payer: MEDICAID

## 2019-01-09 DIAGNOSIS — M25.531 RIGHT WRIST PAIN: Primary | ICD-10-CM

## 2019-01-09 DIAGNOSIS — M79.642 LEFT HAND PAIN: ICD-10-CM

## 2019-01-09 DIAGNOSIS — R20.2 PARESTHESIAS: ICD-10-CM

## 2019-01-09 PROCEDURE — 97110 THERAPEUTIC EXERCISES: CPT

## 2019-01-09 PROCEDURE — 97140 MANUAL THERAPY 1/> REGIONS: CPT

## 2019-01-09 PROCEDURE — 97022 WHIRLPOOL THERAPY: CPT

## 2019-01-09 NOTE — PROGRESS NOTES
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 Base of thumb/wrist has been very painful and does not look "normal to her"  she was compliant with home exercise program given last session.   Response to previous treatment:no complaints  Functional change: none reported  Pain: 6/10 on average  5/10 at best  6/10 at worst  Location: Pain starts at B thumb CMC joints and wrist joints and radiates into forearm.s     Objective        Karyna received the following supervised modalities after being cleared for contradictions for 15 minutes:   -Moist Heat applied to L hands x 15 min to decrease pain and  increase blood flow and tissue elasticity prior to treatment.   -Fluidotherapy x 15 min to R hand/arm  to decrease pain, desensitize, and increase tissue extensibility.       Karyna received the following manual therapy techniques for 8 minutes:   -STM to B palms and B flexor pronator mass and B thumb CMC x 5 min  -Traction with oscillation to B wrists with A/PS/I glides    Karyna received therapeutic exercises for 37 minutes including:  -joint blocks thumb and fingers (NT)  -wave, hook, straight fist, fist, lifts R hand in fluido  -median nerve glide 3 reps, B (NT)  -ulnar nerve glides B (NT)  -Extrinsic stretches, B 2 reps ea, 20-30 sec holds (NT)  -Median nerve sheath stretch, B 10 sec holds 3 reps (NT)  -AAROM wrist all planes, B (NT)  -in hand " manipulation with medium pom poms 2 containers each (NT)  -wrist maze 3 min B (NT)  -Wrist AROM with dowel over wedge 20x ea 2 ways (NT)  -Clip in webspace B, 1 min ea  -Thenar stretch 1 min ea, B  -C pinch position, 10 sec hold, 10x ea, B  -C pinch isometrics, 10 sec holds, 10x ea, B  -1st DI isometrics, 10 sec hold, 10x ea, B      Home Exercises and Education Provided     Education provided:   -Initiated education on thumb CMC arthritis, diagnosis, anatomy, precautions/aggravating factors and initiated discussion on adaptive equipment and activity modification.   -Issued conservative management of arthritis handout and discussed  -Issued Joint Protection handout  -Issued painful thumb HEP to be performed as pain as free as possible     Written Home Exercises Provided: Patient instructed to cont prior HEP. Added painful thumb HEP.  Exercises were reviewed and Karyna was able to demonstrate them prior to the end of the session.  Karyna demonstrated good  understanding of the education provided.   .     Assessment     Pt would continue to benefit from skilled OT.  Pt reported some pain with thumb exercises and fatigue afterwards. Exhibits good pinch posture until moderate pressure applied. Reports pain and paresthesias have not improved significantly at this time.   - Progress towards goals: met STG 2,     Karyna is progressing well towards her goals and there are no updates to goals at this time. Pt prognosis is Fair.     Pt will continue to benefit from skilled outpatient occupational therapy to address the deficits listed in the problem list on initial evaluation provide pt/family education and to maximize pt's level of independence in the home and community environment.     Anticipated barriers to occupational therapy: anxiety, depression, schizoaffective d/o, insomnia, chronic pain syndrome    Pt's spiritual, cultural and educational needs considered and pt agreeable to plan of care and goals.    Goals:  Short Term  Goals: (4 weeks)  1. Pt will be independent with HEP in 3 visits. (met 12/18/18)  2. Pt will report decreased pain to a 5-6/10 at worst. (met 1/9/19)  3. Pt will report decreased frequency or intensity of paresthesias in B hands  4. Pt will increase L wrist RD/UD by 5 degrees to improve ADL performance.  (in progress 12/18/18)        Long Term Goals: (by discharge)  1. Pt will report decreased pain to 1-2/10 with ADLs.   2. Pt will exhibit a significant increase (30+ points) in the Quick DASH assessment.  3.Pt will exhibit smooth, controlled motion during exercises.   4. Pt will be independent with self management of future exacerbations.   5. Pt will report significant reduction of paresthesias in B hands    Plan   Continue Occupational Therapy 1-2  times per week for 12 weeks to decrease pain and edema, and increase A/PROM, strength, and functional use of B upper extremities.     Updates/Grading for next session: Discuss joint protection handout and issue functional solutions catalog. Try push metagrips.        DULCE Yanes, CHT

## 2019-02-06 ENCOUNTER — HOSPITAL ENCOUNTER (EMERGENCY)
Facility: HOSPITAL | Age: 59
Discharge: HOME | End: 2019-02-06
Payer: MEDICAID

## 2019-02-06 VITALS
DIASTOLIC BLOOD PRESSURE: 65 MMHG | OXYGEN SATURATION: 95 % | BODY MASS INDEX: 21.53 KG/M2 | HEIGHT: 62 IN | WEIGHT: 117 LBS | SYSTOLIC BLOOD PRESSURE: 117 MMHG | RESPIRATION RATE: 18 BRPM | HEART RATE: 94 BPM | TEMPERATURE: 98 F

## 2019-02-06 DIAGNOSIS — M25.551 RIGHT HIP PAIN: ICD-10-CM

## 2019-02-06 PROCEDURE — 99283 EMERGENCY DEPT VISIT LOW MDM: CPT | Mod: 25

## 2019-02-06 PROCEDURE — 25000003 PHARM REV CODE 250: Performed by: PHYSICIAN ASSISTANT

## 2019-02-06 RX ORDER — HYDROCODONE BITARTRATE AND ACETAMINOPHEN 7.5; 325 MG/1; MG/1
1 TABLET ORAL
Status: COMPLETED | OUTPATIENT
Start: 2019-02-06 | End: 2019-02-06

## 2019-02-06 RX ORDER — METHYLPREDNISOLONE 4 MG/1
TABLET ORAL
Qty: 1 PACKAGE | Refills: 0 | Status: SHIPPED | OUTPATIENT
Start: 2019-02-06 | End: 2019-04-23 | Stop reason: ALTCHOICE

## 2019-02-06 RX ADMIN — HYDROCODONE BITARTRATE AND ACETAMINOPHEN 1 TABLET: 7.5; 325 TABLET ORAL at 05:02

## 2019-02-06 NOTE — ED PROVIDER NOTES
History      Chief Complaint   Patient presents with    Leg Pain     R leg pain started last night pt denies injury       Review of patient's allergies indicates:  No Known Allergies     HPI   HPI    2/6/2019, 5:51 PM   History obtained from the patient      History of Present Illness: Karyna Geller is a 59 y.o. female patient who presents to the Emergency Department for right lateral hip pain since last night.  Denies injury. Symptoms are constant and moderate in severity.  The patient describes the symptoms as achy.  Denies bladder/bowel dysfunction, fever, saddle anesthesia, or focal weakness.   No further complaints or concerns at this time.           PCP: DIMA Boothe MD       Past Medical History:  Past Medical History:   Diagnosis Date    Anemia     Aneurysm     Anorexia 4/26/2017    Anxiety     Asthma     Bipolar disorder     Carpal tunnel syndrome, bilateral     Cerebral aneurysm     Chronic pain syndrome     Cigarette nicotine dependence     Depression     GERD (gastroesophageal reflux disease)     Hyperlipidemia     Hypertension     Insomnia     Osteoporosis     Schizophrenia     Stroke     Subarachnoid hemorrhage          Past Surgical History:  Past Surgical History:   Procedure Laterality Date    ANGIOGRAM-CEREBRAL N/A 3/31/2015    Performed by [facility]me Montgomery [facility] at [facility] OR 2ND FLR    ANGIOGRAM-CEREBRAL possible coiling N/A 8/27/2014    Performed by Nancy Surgeon at [facility] NANCY    BUNIONECTOMY Right     COLONOSCOPY W/ BIOPSIES AND POLYPECTOMY      PARTIAL HYSTERECTOMY      Bilateral Ovaries Remain    TRANSCRANIAL DOPPLER STUDY - COMPLETE  8/28/2014         TRANSCRANIAL DOPPLER STUDY - COMPLETE  8/29/2014         TRANSCRANIAL DOPPLER STUDY - COMPLETE  8/30/2014         TRANSCRANIAL DOPPLER STUDY - COMPLETE  8/31/2014         TRANSCRANIAL DOPPLER STUDY - COMPLETE  9/1/2014         TRANSCRANIAL DOPPLER STUDY - COMPLETE  9/2/2014         TRANSCRANIAL DOPPLER  STUDY - COMPLETE  9/3/2014         TRANSCRANIAL DOPPLER STUDY - COMPLETE  9/4/2014         TRANSCRANIAL DOPPLER STUDY - COMPLETE  9/5/2014         TRANSCRANIAL DOPPLER STUDY - COMPLETE  9/6/2014         VAGINAL DELIVERY      X 3           Family History:  Family History   Problem Relation Age of Onset    Cancer Mother     Glaucoma Mother     Stroke Mother     Diabetes type II Mother     Heart disease Mother     Hypertension Mother     Brain cancer Sister     Diabetes type II Sister     Cancer Brother     Hypertension Brother     Bone cancer Sister            Social History:  Social History     Tobacco Use    Smoking status: Current Every Day Smoker     Packs/day: 0.20     Types: Cigarettes     Start date: 1976    Smokeless tobacco: Never Used   Substance and Sexual Activity    Alcohol use: Yes     Comment: 1-3 times per week, 1-2 drink at a time.    Drug use: No    Sexual activity: No     Partners: Male       ROS   Review of Systems   Constitutional: Negative for chills and fever.   HENT: Negative for sore throat.    Respiratory: Negative for shortness of breath.    Cardiovascular: Negative for chest pain.   Gastrointestinal: Negative for nausea and vomiting.   Genitourinary: Negative for decreased urine volume, difficulty urinating, dysuria and flank pain.   Musculoskeletal: Positive for HIP PAIN. Negative for neck stiffness.   Skin: Negative for rash and wound.   Neurological: Negative for weakness and numbness.   Hematological: Does not bruise/bleed easily.   All other systems reviewed and are negative.    Review of Systems    Physical Exam      Initial Vitals [02/06/19 1708]   BP Pulse Resp Temp SpO2   117/65 94 18 98.4 °F (36.9 °C) 95 %      MAP       --         Physical Exam  Vital signs and nursing notes reviewed.  Constitutional: Patient is in NAD. Awake and alert. Well-developed and well-nourished.  Head: Atraumatic. Normocephalic.  Eyes: PERRL. EOM intact. Conjunctivae nl. No scleral  "icterus.  ENT: Mucous membranes are moist. Oropharynx is clear.  Neck: Supple. No JVD. No lymphadenopathy.  No meningismus.  Nontender  Cardiovascular: Regular rate and rhythm. No murmurs, rubs, or gallops. Distal pulses are 2+ and symmetric.  Pulmonary/Chest: No respiratory distress. Clear to auscultation bilaterally. No wheezing, rales, or rhonchi.  Abdominal: Soft. Non-distended. No TTP. No rebound, guarding, or rigidity. Good bowel sounds.  Genitourinary: No CVA tenderness  Musculoskeletal: Moves all extremities. No edema.   Non tender c/t spine.  Lumbar spine with mild bilateral paraspinous ttp, no midline ttp or step. FROM right hip, no erythema, edema, heat or rash.  Mild lateral ttp.  No groin ttp.    Skin: Warm and dry.  Neurological: Awake and alert. No acute focal neurological deficits are appreciated.  5/5 x 4 strength.  Strong and equal foot plantar and dorsiflexion.  Psychiatric: Normal affect. Good eye contact. Appropriate in content.      ED Course      Procedures  ED Vital Signs:  Vitals:    02/06/19 1708   BP: 117/65   Pulse: 94   Resp: 18   Temp: 98.4 °F (36.9 °C)   TempSrc: Oral   SpO2: 95%   Weight: 53.1 kg (117 lb)   Height: 5' 2" (1.575 m)                 Imaging Results:  Imaging Results          X-Ray Hip 2 View Right (Final result)  Result time 02/06/19 17:33:14    Final result by Amaury Riley III, MD (02/06/19 17:33:14)                 Impression:      Negative right hip series.      Electronically signed by: Amaury Riley MD  Date:    02/06/2019  Time:    17:33             Narrative:    EXAMINATION:  XR HIP 2 VIEW RIGHT    CLINICAL HISTORY:  Pain in right hip    COMPARISON:  None    FINDINGS:  No fracture or dislocation.  No significant arthritic change.  Bony pelvis is grossly intact without fracture or diastasis.                                   The Emergency Provider reviewed the vital signs and test results, which are outlined above.    ED Discussion         Medication(s) " given in the ER:  Medications   HYDROcodone-acetaminophen 7.5-325 mg per tablet 1 tablet (1 tablet Oral Given 2/6/19 8527)           Follow-up Information     L Yonas Boothe MD In 2 days.    Specialty:  Family Medicine  Contact information:  [address]  [place] [address]  [phone]                       Discharge Medication List as of 2/6/2019  5:52 PM      START taking these medications    Details   methylPREDNISolone (MEDROL DOSEPACK) 4 mg tablet As directed, Print                Medical Decision Making        All findings were reviewed with the patient/family in detail.   All remaining questions and concerns were addressed at that time.  Patient/family has been counseled regarding the need for follow-up as well as the indication to return to the emergency room should new or worrisome developments occur.          MDM               Clinical Impression:        ICD-10-CM ICD-9-CM   1. Right hip pain M25.551 719.45             Genevieve Ruiz PA-C  02/06/19 1959

## 2019-04-15 DIAGNOSIS — G44.209 TENSION HEADACHE: Chronic | ICD-10-CM

## 2019-04-15 DIAGNOSIS — M19.91 PRIMARY LOCALIZED OSTEOARTHROSIS OF MULTIPLE SITES: Chronic | ICD-10-CM

## 2019-04-17 RX ORDER — TRAMADOL HYDROCHLORIDE 50 MG/1
TABLET ORAL
Qty: 90 TABLET | Refills: 4 | OUTPATIENT
Start: 2019-04-17

## 2019-04-17 RX ORDER — BUTALBITAL, ACETAMINOPHEN AND CAFFEINE 50; 325; 40 MG/1; MG/1; MG/1
1 TABLET ORAL EVERY 8 HOURS PRN
Qty: 45 TABLET | Refills: 0 | OUTPATIENT
Start: 2019-04-17

## 2019-04-17 NOTE — TELEPHONE ENCOUNTER
I am unable to give her any refills until she comes in and allows me to re-evaluate her. Please contact her to let him know and help her schedule an appointment SOON. Thanks.

## 2019-04-18 ENCOUNTER — TELEPHONE (OUTPATIENT)
Dept: INTERNAL MEDICINE | Facility: CLINIC | Age: 59
End: 2019-04-18

## 2019-04-18 NOTE — TELEPHONE ENCOUNTER
Spoke with pt regarding Rx refill. Pt understands that refill could not be granted until f/u appt was scheduled. Pt scheduled appt for 4/23/2019 with Dr. Boothe. AGUSTIN

## 2019-04-18 NOTE — TELEPHONE ENCOUNTER
Spoke with pt regarding refills. Pt has made f/u appt and informed that Rx was sent to pharmacy of choice. AGUSTIN

## 2019-04-18 NOTE — TELEPHONE ENCOUNTER
----- Message from Gayatri Tran sent at 4/18/2019  8:26 AM CDT -----  patient calling regarding status of med refills called in last week...[phone] (needs today)..    [id number] - [place] - [address] [place] [facility]  [address] [facility]  [place] [address]  Phone: [phone] Fax: [phone]

## 2019-04-23 ENCOUNTER — OFFICE VISIT (OUTPATIENT)
Dept: INTERNAL MEDICINE | Facility: CLINIC | Age: 59
End: 2019-04-23
Payer: MEDICAID

## 2019-04-23 ENCOUNTER — LAB VISIT (OUTPATIENT)
Dept: LAB | Facility: HOSPITAL | Age: 59
End: 2019-04-23
Attending: FAMILY MEDICINE
Payer: MEDICAID

## 2019-04-23 ENCOUNTER — TELEPHONE (OUTPATIENT)
Dept: GASTROENTEROLOGY | Facility: CLINIC | Age: 59
End: 2019-04-23

## 2019-04-23 VITALS
OXYGEN SATURATION: 95 % | HEART RATE: 80 BPM | DIASTOLIC BLOOD PRESSURE: 74 MMHG | WEIGHT: 130.06 LBS | TEMPERATURE: 97 F | BODY MASS INDEX: 23.79 KG/M2 | SYSTOLIC BLOOD PRESSURE: 116 MMHG

## 2019-04-23 DIAGNOSIS — F31.9 BIPOLAR 1 DISORDER: Chronic | ICD-10-CM

## 2019-04-23 DIAGNOSIS — F17.210 CIGARETTE NICOTINE DEPENDENCE WITHOUT COMPLICATION: Chronic | ICD-10-CM

## 2019-04-23 DIAGNOSIS — M19.91 PRIMARY LOCALIZED OSTEOARTHROSIS OF MULTIPLE SITES: Chronic | ICD-10-CM

## 2019-04-23 DIAGNOSIS — M54.17 LUMBOSACRAL RADICULOPATHY AT S1: ICD-10-CM

## 2019-04-23 DIAGNOSIS — F25.0 SCHIZOAFFECTIVE DISORDER, BIPOLAR TYPE: Primary | Chronic | ICD-10-CM

## 2019-04-23 DIAGNOSIS — R94.31 ABNORMAL ECG: ICD-10-CM

## 2019-04-23 DIAGNOSIS — R11.0 CHRONIC NAUSEA: ICD-10-CM

## 2019-04-23 DIAGNOSIS — Z12.39 SCREENING FOR BREAST CANCER: ICD-10-CM

## 2019-04-23 DIAGNOSIS — Z79.899 ENCOUNTER FOR LONG-TERM CURRENT USE OF MEDICATION: ICD-10-CM

## 2019-04-23 DIAGNOSIS — G56.23 CUBITAL TUNNEL SYNDROME, BILATERAL: ICD-10-CM

## 2019-04-23 DIAGNOSIS — F25.0 SCHIZOAFFECTIVE DISORDER, BIPOLAR TYPE: Chronic | ICD-10-CM

## 2019-04-23 DIAGNOSIS — R63.0 ANOREXIA: Chronic | ICD-10-CM

## 2019-04-23 DIAGNOSIS — G44.209 TENSION HEADACHE: Chronic | ICD-10-CM

## 2019-04-23 DIAGNOSIS — G56.03 BILATERAL CARPAL TUNNEL SYNDROME: Chronic | ICD-10-CM

## 2019-04-23 LAB
BASOPHILS # BLD AUTO: 0.03 K/UL (ref 0–0.2)
BASOPHILS NFR BLD: 0.4 % (ref 0–1.9)
DIFFERENTIAL METHOD: ABNORMAL
EOSINOPHIL # BLD AUTO: 0.1 K/UL (ref 0–0.5)
EOSINOPHIL NFR BLD: 1.7 % (ref 0–8)
ERYTHROCYTE [DISTWIDTH] IN BLOOD BY AUTOMATED COUNT: 13.6 % (ref 11.5–14.5)
ESTIMATED AVG GLUCOSE: 111 MG/DL (ref 68–131)
HBA1C MFR BLD HPLC: 5.5 % (ref 4–5.6)
HCT VFR BLD AUTO: 36.6 % (ref 37–48.5)
HGB BLD-MCNC: 12.3 G/DL (ref 12–16)
IMM GRANULOCYTES # BLD AUTO: 0.01 K/UL (ref 0–0.04)
IMM GRANULOCYTES NFR BLD AUTO: 0.1 % (ref 0–0.5)
LYMPHOCYTES # BLD AUTO: 3.9 K/UL (ref 1–4.8)
LYMPHOCYTES NFR BLD: 46.9 % (ref 18–48)
MCH RBC QN AUTO: 31.2 PG (ref 27–31)
MCHC RBC AUTO-ENTMCNC: 33.6 G/DL (ref 32–36)
MCV RBC AUTO: 93 FL (ref 82–98)
MONOCYTES # BLD AUTO: 0.7 K/UL (ref 0.3–1)
MONOCYTES NFR BLD: 8.7 % (ref 4–15)
NEUTROPHILS # BLD AUTO: 3.6 K/UL (ref 1.8–7.7)
NEUTROPHILS NFR BLD: 42.2 % (ref 38–73)
NRBC BLD-RTO: 0 /100 WBC
PLATELET # BLD AUTO: 294 K/UL (ref 150–350)
PMV BLD AUTO: 10.2 FL (ref 9.2–12.9)
RBC # BLD AUTO: 3.94 M/UL (ref 4–5.4)
WBC # BLD AUTO: 8.4 K/UL (ref 3.9–12.7)

## 2019-04-23 PROCEDURE — 85025 COMPLETE CBC W/AUTO DIFF WBC: CPT

## 2019-04-23 PROCEDURE — 99214 OFFICE O/P EST MOD 30 MIN: CPT | Mod: S$PBB,,, | Performed by: FAMILY MEDICINE

## 2019-04-23 PROCEDURE — 80053 COMPREHEN METABOLIC PANEL: CPT

## 2019-04-23 PROCEDURE — 99999 PR PBB SHADOW E&M-EST. PATIENT-LVL V: ICD-10-PCS | Mod: PBBFAC,,, | Performed by: FAMILY MEDICINE

## 2019-04-23 PROCEDURE — 99214 PR OFFICE/OUTPT VISIT, EST, LEVL IV, 30-39 MIN: ICD-10-PCS | Mod: S$PBB,,, | Performed by: FAMILY MEDICINE

## 2019-04-23 PROCEDURE — 36415 COLL VENOUS BLD VENIPUNCTURE: CPT

## 2019-04-23 PROCEDURE — 80061 LIPID PANEL: CPT

## 2019-04-23 PROCEDURE — 99999 PR PBB SHADOW E&M-EST. PATIENT-LVL V: CPT | Mod: PBBFAC,,, | Performed by: FAMILY MEDICINE

## 2019-04-23 PROCEDURE — 99215 OFFICE O/P EST HI 40 MIN: CPT | Mod: PBBFAC,PN | Performed by: FAMILY MEDICINE

## 2019-04-23 PROCEDURE — 83036 HEMOGLOBIN GLYCOSYLATED A1C: CPT

## 2019-04-23 RX ORDER — ATORVASTATIN CALCIUM 40 MG/1
TABLET, FILM COATED ORAL
COMMUNITY
End: 2021-01-06 | Stop reason: SDUPTHER

## 2019-04-23 RX ORDER — TRAMADOL HYDROCHLORIDE 50 MG/1
TABLET ORAL
COMMUNITY
End: 2019-04-23 | Stop reason: SDUPTHER

## 2019-04-23 RX ORDER — MONTELUKAST SODIUM 10 MG/1
TABLET ORAL
COMMUNITY
End: 2019-04-23 | Stop reason: ALTCHOICE

## 2019-04-23 RX ORDER — CITALOPRAM 40 MG/1
TABLET, FILM COATED ORAL
COMMUNITY
End: 2019-04-23 | Stop reason: ALTCHOICE

## 2019-04-23 RX ORDER — QUETIAPINE FUMARATE 100 MG/1
100 TABLET, FILM COATED ORAL DAILY
COMMUNITY

## 2019-04-23 RX ORDER — BUTALBITAL, ACETAMINOPHEN AND CAFFEINE 50; 325; 40 MG/1; MG/1; MG/1
1 TABLET ORAL EVERY 8 HOURS PRN
Qty: 45 TABLET | Refills: 1 | Status: SHIPPED | OUTPATIENT
Start: 2019-04-23 | End: 2019-07-25 | Stop reason: SDUPTHER

## 2019-04-23 RX ORDER — PHENOL/SODIUM PHENOLATE
AEROSOL, SPRAY (ML) MUCOUS MEMBRANE
COMMUNITY
End: 2019-04-23

## 2019-04-23 RX ORDER — VARENICLINE TARTRATE 0.5 MG/1
TABLET, FILM COATED ORAL
COMMUNITY
End: 2019-07-25

## 2019-04-23 RX ORDER — CYPROHEPTADINE HYDROCHLORIDE 4 MG/1
TABLET ORAL
COMMUNITY
End: 2019-04-23 | Stop reason: ALTCHOICE

## 2019-04-23 RX ORDER — ALENDRONATE SODIUM 70 MG/1
TABLET ORAL
COMMUNITY
End: 2019-04-23

## 2019-04-23 RX ORDER — ONDANSETRON 8 MG/1
TABLET, ORALLY DISINTEGRATING ORAL
COMMUNITY
End: 2019-07-25 | Stop reason: SDUPTHER

## 2019-04-23 RX ORDER — NITROGLYCERIN 0.4 MG/1
TABLET SUBLINGUAL
COMMUNITY
End: 2019-05-02 | Stop reason: SDUPTHER

## 2019-04-23 RX ORDER — HYDROXYZINE HYDROCHLORIDE 25 MG/1
TABLET, FILM COATED ORAL
COMMUNITY
End: 2019-04-23

## 2019-04-23 RX ORDER — TIZANIDINE 4 MG/1
TABLET ORAL
COMMUNITY
End: 2022-01-27

## 2019-04-23 RX ORDER — QUETIAPINE FUMARATE 200 MG/1
TABLET, FILM COATED ORAL
COMMUNITY
End: 2019-04-23 | Stop reason: DRUGHIGH

## 2019-04-23 RX ORDER — FLUTICASONE PROPIONATE AND SALMETEROL 250; 50 UG/1; UG/1
POWDER RESPIRATORY (INHALATION)
COMMUNITY
End: 2022-03-10 | Stop reason: SDUPTHER

## 2019-04-23 RX ORDER — TRAMADOL HYDROCHLORIDE 50 MG/1
50 TABLET ORAL EVERY 8 HOURS PRN
Qty: 90 TABLET | Refills: 5 | Status: SHIPPED | OUTPATIENT
Start: 2019-04-23 | End: 2019-07-25 | Stop reason: SDUPTHER

## 2019-04-23 RX ORDER — POLYETHYLENE GLYCOL 3350 17 G/17G
17 POWDER, FOR SOLUTION ORAL
COMMUNITY
End: 2021-04-08 | Stop reason: SDUPTHER

## 2019-04-23 RX ORDER — OXCARBAZEPINE 300 MG/1
TABLET, FILM COATED ORAL
COMMUNITY
End: 2019-07-25

## 2019-04-23 NOTE — ASSESSMENT & PLAN NOTE
Stable.  Unchanged.  She says that she has not yet run out of her Fioricet, indicating that she is averaging fewer than 15 tablets per week.  I have previously referred her to Neurology for second opinion, but it appears that this appointment was never scheduled.  I am asking my staff to remedy this.

## 2019-04-23 NOTE — ASSESSMENT & PLAN NOTE
Smoking about 0.3 PPD. She is currently at the contemplation stage of smoking cessation (thinking about quitting but not ready to quit). Smoking cessation encouraged.

## 2019-04-23 NOTE — ASSESSMENT & PLAN NOTE
She reports longstanding chronic nausea and anorexia, but she is gaining weight.  I referred her to Gastroenterology, but it appears that this appointment has not been scheduled.  I have asked my staff to remedy this.

## 2019-04-23 NOTE — TELEPHONE ENCOUNTER
----- Message from Donald Patel sent at 4/23/2019  3:43 PM CDT -----  Contact: pt  She's needing to scheduling a first available  NP appt per Dr. Boothe, Pls see orders in pt's chart, she can be  reached at [phone] (cell)

## 2019-04-23 NOTE — PROGRESS NOTES
CHIEF COMPLAINT  Follow-up      ENCOUNTER DIAGNOSES  1. Schizoaffective disorder, bipolar type    2. Abnormal ECG    3. Bipolar 1 disorder    4. Encounter for long-term current use of medication    5. Primary localized osteoarthrosis of multiple sites    6. Tension headache    7. Cigarette nicotine dependence without complication    8. Bilateral carpal tunnel syndrome    9. Cubital tunnel syndrome, bilateral    10. Lumbosacral radiculopathy at S1    11. Chronic nausea    12. Anorexia    13. Screening for breast cancer        NEW ORDERS SUMMARY  Orders Placed This Encounter    CBC auto differential    Lipid panel    Comprehensive metabolic panel    Hemoglobin A1c    Ambulatory referral to Smoking Cessation Program    SCHEDULED EKG 12-LEAD (to [facility])    traMADol (ULTRAM) 50 mg tablet    butalbital-acetaminophen-caffeine -40 mg (FIORICET, ESGIC) -40 mg per tablet       HISTORY, ASSESSMENT, and PLAN    Problem List Items Addressed This Visit        Neuro    Bilateral carpal tunnel syndrome (Chronic)    Overview     October 23, 2018 EMG: There is electrodiagnostic evidence of a MODERATE demyelinating median neuropathy (Carpal tunnel syndrome) across the LEFT wrist and a MILD demyelinating CTS across the RIGHT wrist. 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  There is also evidence of a CHRONIC lumbar radiculopathy of S1         Current Assessment & Plan     Stable.  She reports no new symptoms. I referred her for physical therapy, but it appears that she canceled her appointment.  She is not motivated to pursue additional treatment at this point.         Cubital tunnel syndrome, bilateral    Overview     October 23, 2018 EMG: There is electrodiagnostic evidence of a MODERATE demyelinating median neuropathy (Carpal tunnel syndrome) across the LEFT wrist and a MILD demyelinating CTS across the RIGHT  "wrist. 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  There is also evidence of a CHRONIC lumbar radiculopathy of S1  Dr. Stephens, "mild demyelinating bilaterally-slightly worse on the left."         Current Assessment & Plan     Stable.  She reports no new symptoms. I referred her for physical therapy, but it appears that she canceled her appointment.  She is not motivated to pursue additional treatment at this point.         Lumbosacral radiculopathy at S1    Overview     October 23, 2018 EMG: There is electrodiagnostic evidence of a MODERATE demyelinating median neuropathy (Carpal tunnel syndrome) across the LEFT wrist and a MILD demyelinating CTS across the RIGHT wrist. 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  There is also evidence of a CHRONIC lumbar radiculopathy of S1         Current Assessment & Plan     Stable.  She reports no new symptoms. I referred her for physical therapy, but it appears that she canceled her appointment.  She is not motivated to pursue additional treatment at this point.         Tension headache (Chronic)    Current Assessment & Plan     Stable.  Unchanged.  She says that she has not yet run out of her Fioricet, indicating that she is averaging fewer than 15 tablets per week.  I have previously referred her to Neurology for second opinion, but it appears that this appointment was never scheduled.  I am asking my staff to remedy this.         Relevant Medications    butalbital-acetaminophen-caffeine -40 mg (FIORICET, ESGIC) -40 mg per tablet       Psychiatric    Bipolar 1 disorder (Chronic)    Current Assessment & Plan     Appears stable/compensated.  I am ordering labs at request of psychiatrist.         Relevant Orders    CBC auto differential (Completed)    Lipid " panel (Completed)    Comprehensive metabolic panel (Completed)    Hemoglobin A1c (Completed)    Schizoaffective disorder, bipolar type - Primary (Chronic)    Current Assessment & Plan     Appears stable/compensated.  I am ordering labs at request of psychiatrist.         Relevant Orders    CBC auto differential (Completed)    Lipid panel (Completed)    Comprehensive metabolic panel (Completed)    Hemoglobin A1c (Completed)       Cardiac/Vascular    Abnormal ECG    Overview     November 15, 2018 ECG  Vent. Rate : 064 BPM     Atrial Rate : 064 BPM     P-R Int : 126 ms          QRS Dur : 080 ms      QT Int : 398 ms       P-R-T Axes : 044 054 052 degrees     QTc Int : 410 ms    Normal sinus rhythm  Septal infarct (cited on or before 06-SEP-2014)  Abnormal ECG  When compared with ECG of 03-APR-2017 14:42,  Questionable change in initial forces of Septal leads         Relevant Orders    SCHEDULED EKG 12-LEAD (to [place])       GI    Chronic nausea    Current Assessment & Plan     She reports longstanding chronic nausea and anorexia, but she is gaining weight.  I referred her to Gastroenterology, but it appears that this appointment has not been scheduled.  I have asked my staff to remedy this.            Orthopedic    Primary localized osteoarthrosis of multiple sites (Chronic)    Current Assessment & Plan     She reports satisfactory symptom relief with stable dose tramadol, without adverse effects. She is demonstrating no behaviors to suggest inappropriate use of prescribed medications. [place] Board of Pharmacy Controlled Prescription Drug Monitoring database was queried and showed no activity to suggest abuse, diversion, or other inappropriate use of prescription medications.          Relevant Medications    traMADol (ULTRAM) 50 mg tablet       Other    Anorexia (Chronic)    Current Assessment & Plan     She reports longstanding chronic nausea and anorexia, but she is gaining weight.  I referred her to  Gastroenterology, but it appears that this appointment has not been scheduled.  I have asked my staff to remedy this.         Cigarette nicotine dependence without complication (Chronic)    Current Assessment & Plan     Smoking about 0.3 PPD. She is currently at the contemplation stage of smoking cessation (thinking about quitting but not ready to quit). Smoking cessation encouraged.          Relevant Orders    Ambulatory referral to Smoking Cessation Program      Other Visit Diagnoses     Encounter for long-term current use of medication        Relevant Orders    CBC auto differential (Completed)    Lipid panel (Completed)    Comprehensive metabolic panel (Completed)    Hemoglobin A1c (Completed)    Screening for breast cancer                    Outpatient Medications Prior to Visit   Medication Sig Dispense Refill    albuterol (PROVENTIL) 2.5 mg /3 mL (0.083 %) nebulizer solution Take 2.5 mg by nebulization 2 (two) times daily as needed for Wheezing. Rescue      ALBUTEROL SULFATE INHL albuterol sulfate      alprazolam (XANAX) 0.5 MG tablet Take 0.5 mg by mouth daily as needed.       atorvastatin (LIPITOR) 40 MG tablet atorvastatin 40 mg tablet   TK 1 T PO QPM      CALCIUM CARBONATE/VITAMIN D3 (CALCIUM 600 + D,3, ORAL) Take 2 tablets by mouth once daily.      escitalopram (LEXAPRO) 10 MG tablet Take 20 mg by mouth once daily.       OXcarbazepine (TRILEPTAL) 300 MG Tab Take 2 tablets (600 mg total) by mouth 2 (two) times daily. 45 tablet 0    OXcarbazepine (TRILEPTAL) 300 MG Tab oxcarbazepine 300 mg tablet   Take 1 tablet twice a day by oral route.      pantoprazole (PROTONIX) 40 MG tablet Take 1 tablet (40 mg total) by mouth once daily. 30 tablet 11    polyethylene glycol (GLYCOLAX) 17 gram/dose powder 17 g.      QUEtiapine (SEROQUEL) 100 MG Tab Take 100 mg by mouth once daily.      cyproheptadine (PERIACTIN) 4 mg tablet cyproheptadine 4 mg tablet   TAKE ONE TABLET BY MOUTH 3 TIMES A DAY FOR 30 DAYS.       traMADol (ULTRAM) 50 mg tablet Take 1 tablet (50 mg total) by mouth every 8 (eight) hours as needed for Pain. 90 tablet 5    fluticasone-salmeterol diskus inhaler 250-50 mcg Advair Diskus 250 mcg-50 mcg/dose powder for inhalation   Inhale 1 puff twice a day by inhalation route.      ondansetron (ZOFRAN-ODT) 8 MG TbDL ondansetron 8 mg disintegrating tablet   DISSOLVE 1 T ON THE TONGUE Q 8 H PRF NAUSEA      tiZANidine (ZANAFLEX) 4 MG tablet tizanidine 4 mg tablet   TK 1 T PO BID      varenicline (CHANTIX) 0.5 MG Tab Chantix 0.5 mg tablet   Take 1 tablet twice a day by oral route for 3 days.      alendronate (FOSAMAX) 70 MG tablet alendronate 70 mg tablet   Take 1 tablet every week by oral route.      butalbital-acetaminophen-caffeine -40 mg (FIORICET, ESGIC) -40 mg per tablet Take 1 tablet by mouth every 8 (eight) hours as needed for Headaches. - APPOINTMENT REQUIRED FOR MORE REFILLS 45 tablet 0    citalopram (CELEXA) 40 MG tablet citalopram 40 mg tablet   Take 1 tablet every day by oral route.      hydrOXYzine HCl (ATARAX) 25 MG tablet hydroxyzine HCl 25 mg tablet   Take 1 tablet 4 times a day by oral route.      methylPREDNISolone (MEDROL DOSEPACK) 4 mg tablet As directed 1 Package 0    montelukast (SINGULAIR) 10 mg tablet Singulair 10 mg tablet   Take 1 tablet every day by oral route.      nitroGLYCERIN (NITROSTAT) 0.4 MG SL tablet Nitrostat 0.4 mg sublingual tablet   PLACE 1 TABLET (0.4 MG) BY SUBLINGUAL ROUTE AT THE 1ST SIGN OF ATTACK; MAY REPEAT EVERY 5 MIN UNTIL RELIEF; IF PAIN PERSISTS AFTER 3 TABLETS IN 15 MIN, PROMPT MEDICAL ATTENTION IS RECOMMENDED      omeprazole 20 mg TbEC omeprazole 20 mg capsule,delayed release   Take 1 capsule every day by oral route.      ondansetron (ZOFRAN) 8 MG tablet Take 8 mg by mouth every 12 (twelve) hours as needed for Nausea.      peg 3350-electrolytes 227.1-21.5-6.36 gram PwPk 4,000 mLs.      PNEUMOVAX 23 25 mcg/0.5 mL Syrg inject 0.5  milliliter intramuscularly  0    QUEtiapine (SEROQUEL) 200 MG Tab quetiapine 200 mg tablet   Take 1 tablet twice a day by oral route.      quetiapine (SEROQUEL) 300 MG Tab Take 300 mg by mouth every evening.      tiotropium bromide (SPIRIVA WITH HANDIHALER INHL) Spiriva with HandiHaler      traMADol (ULTRAM) 50 mg tablet tramadol 50 mg disintegrating tablet       No facility-administered medications prior to visit.         Medications Ordered This Encounter   Medications    butalbital-acetaminophen-caffeine -40 mg (FIORICET, ESGIC) -40 mg per tablet     Sig: Take 1 tablet by mouth every 8 (eight) hours as needed for Headaches. DO NOT TAKE MORE THAN 5 PER WEEK     Dispense:  45 tablet     Refill:  1    traMADol (ULTRAM) 50 mg tablet     Sig: Take 1 tablet (50 mg total) by mouth every 8 (eight) hours as needed for Pain.     Dispense:  90 tablet     Refill:  5     Medically necessary.       Medications Discontinued During This Encounter   Medication Reason    alendronate (FOSAMAX) 70 MG tablet Patient no longer taking    citalopram (CELEXA) 40 MG tablet Alternate therapy    hydrOXYzine HCl (ATARAX) 25 MG tablet Patient no longer taking    methylPREDNISolone (MEDROL DOSEPACK) 4 mg tablet Therapy completed    montelukast (SINGULAIR) 10 mg tablet Discontinued by another clinician    omeprazole 20 mg TbEC Patient no longer taking    ondansetron (ZOFRAN) 8 MG tablet Duplicate Order    PNEUMOVAX 23 25 mcg/0.5 mL Syrg Therapy completed    peg 3350-electrolytes 227.1-21.5-6.36 gram PwPk Therapy completed    QUEtiapine (SEROQUEL) 200 MG Tab Dose adjustment    quetiapine (SEROQUEL) 300 MG Tab Dose adjustment    tiotropium bromide (SPIRIVA WITH HANDIHALER INHL) Patient no longer taking    traMADol (ULTRAM) 50 mg tablet Duplicate Order    traMADol (ULTRAM) 50 mg tablet Reorder    butalbital-acetaminophen-caffeine -40 mg (FIORICET, ESGIC) -40 mg per tablet Reorder    cyproheptadine  "(PERIACTIN) 4 mg tablet Therapy completed        Follow up in about 3 months (around 2019).    REVIEW OF SYSTEMS  CARDIOVASCULAR: No angina or orthopnea reported.   NEUROLOGIC: No seizures or focal deficits reported.     PHYSICAL EXAM  Vitals:    19 1407   BP: 116/74   BP Location: Right arm   Patient Position: Sitting   BP Method: Medium (Manual)   Pulse: 80   Temp: 96.7 °F (35.9 °C)   TempSrc: Tympanic   SpO2: 95%   Weight: 59 kg (130 lb 1.1 oz)     CONSTITUTIONAL: Vital signs noted. No apparent distress. Does not appear acutely ill or septic. Appears adequately hydrated.  HEENT: External ENT grossly unremarkable. Hearing grossly intact. Oropharynx moist.  PULM: Lungs clear. Breathing unlabored.  HEART: Auscultation reveals regular rate and rhythm without murmur, gallop or rub.  DERM: Skin warm and moist with normal turgor.  NEURO: There are no gross focal motor deficits or gross deficits of cranial nerves III-XII.  PSYCHIATRIC: Alert and conversant. Mood grossly neutral. Judgment and insight not grossly compromised.     Documentation entered by me for this encounter may have been done in part using speech-recognition technology. Although I have made an effort to ensure accuracy, "sound like" errors may exist and should be interpreted in context. -CECILIA Boothe MD              2019        LAKEISHA TRISTAN MD  [facility]  [address] [place] [address]           AND VIA -[phone]     RE:  Karyna Geller ,  1960     Dear Dr. Tristan,    Thank you for taking care of Karyna.    The labs you requested are attached.    Let me know if I can do anything else to help.    Sincerely,     CECILIA Boothe MD  [facility] [address] [place] [address]  PH: [phone]  FX: [phone]    "

## 2019-04-23 NOTE — ASSESSMENT & PLAN NOTE
She reports satisfactory symptom relief with stable dose tramadol, without adverse effects. She is demonstrating no behaviors to suggest inappropriate use of prescribed medications. [place] Board  Pharmacy Controlled Prescription Drug Monitoring database was queried and showed no activity to suggest abuse, diversion, or other inappropriate use of prescription medications.

## 2019-04-23 NOTE — Clinical Note
Please verify that her labs were sent to her psychiatrist as provided for in the documentation of her progress note for her last appointment with me.  Thanks.

## 2019-04-23 NOTE — LETTER
2019        LAKEISHA LEWIS MD  [facility]  [address] [place] [address]           AND VIA -[phone]     RE:  Karyna Geller ,  1960     Dear Dr. Lewis,    Thank you for taking care of Karyna.    The labs you requested are attached.    Let me know if I can do anything else to help.    Sincerely,     CECILIA Boothe MD  [facility] [address] [place] [address]  PH: [phone]  FX: [phone]

## 2019-04-23 NOTE — ASSESSMENT & PLAN NOTE
Stable.  She reports no new symptoms. I referred her for physical therapy, but it appears that she canceled her appointment.  She is not motivated to pursue additional treatment at this point.

## 2019-04-24 LAB
ALBUMIN SERPL BCP-MCNC: 3.9 G/DL (ref 3.5–5.2)
ALP SERPL-CCNC: 53 U/L (ref 55–135)
ALT SERPL W/O P-5'-P-CCNC: 12 U/L (ref 10–44)
ANION GAP SERPL CALC-SCNC: 6 MMOL/L (ref 8–16)
AST SERPL-CCNC: 18 U/L (ref 10–40)
BILIRUB SERPL-MCNC: 0.1 MG/DL (ref 0.1–1)
BUN SERPL-MCNC: 15 MG/DL (ref 8–23)
CALCIUM SERPL-MCNC: 9.7 MG/DL (ref 8.7–10.5)
CHLORIDE SERPL-SCNC: 105 MMOL/L (ref 95–110)
CHOLEST SERPL-MCNC: 201 MG/DL (ref 120–199)
CHOLEST/HDLC SERPL: 2.8 {RATIO} (ref 2–5)
CO2 SERPL-SCNC: 31 MMOL/L (ref 23–29)
CREAT SERPL-MCNC: 1 MG/DL (ref 0.5–1.4)
EST. GFR  (AFRICAN AMERICAN): >60 ML/MIN/1.73 M^2
EST. GFR  (NON AFRICAN AMERICAN): >60 ML/MIN/1.73 M^2
GLUCOSE SERPL-MCNC: 82 MG/DL (ref 70–110)
HDLC SERPL-MCNC: 71 MG/DL (ref 40–75)
HDLC SERPL: 35.3 % (ref 20–50)
LDLC SERPL CALC-MCNC: 99.2 MG/DL (ref 63–159)
NONHDLC SERPL-MCNC: 130 MG/DL
POTASSIUM SERPL-SCNC: 4.1 MMOL/L (ref 3.5–5.1)
PROT SERPL-MCNC: 6.8 G/DL (ref 6–8.4)
SODIUM SERPL-SCNC: 142 MMOL/L (ref 136–145)
TRIGL SERPL-MCNC: 154 MG/DL (ref 30–150)

## 2019-04-30 ENCOUNTER — OFFICE VISIT (OUTPATIENT)
Dept: GASTROENTEROLOGY | Facility: CLINIC | Age: 59
End: 2019-04-30
Payer: MEDICAID

## 2019-04-30 ENCOUNTER — HOSPITAL ENCOUNTER (OUTPATIENT)
Dept: RADIOLOGY | Facility: HOSPITAL | Age: 59
Discharge: HOME | End: 2019-04-30
Attending: FAMILY MEDICINE
Payer: MEDICAID

## 2019-04-30 VITALS
DIASTOLIC BLOOD PRESSURE: 78 MMHG | HEIGHT: 62 IN | BODY MASS INDEX: 23 KG/M2 | WEIGHT: 125 LBS | HEART RATE: 68 BPM | SYSTOLIC BLOOD PRESSURE: 142 MMHG

## 2019-04-30 DIAGNOSIS — Z12.39 SCREENING FOR BREAST CANCER: ICD-10-CM

## 2019-04-30 DIAGNOSIS — R14.0 ABDOMINAL BLOATING: Primary | ICD-10-CM

## 2019-04-30 DIAGNOSIS — R11.0 NAUSEA: ICD-10-CM

## 2019-04-30 PROCEDURE — 77067 MAMMO DIGITAL SCREENING BILAT WITH TOMOSYNTHESIS_CAD: ICD-10-PCS | Mod: 26,,, | Performed by: RADIOLOGY

## 2019-04-30 PROCEDURE — 77067 SCR MAMMO BI INCL CAD: CPT | Mod: TC

## 2019-04-30 PROCEDURE — 77067 SCR MAMMO BI INCL CAD: CPT | Mod: 26,,, | Performed by: RADIOLOGY

## 2019-04-30 PROCEDURE — 99999 PR PBB SHADOW E&M-EST. PATIENT-LVL III: ICD-10-PCS | Mod: PBBFAC,,, | Performed by: NURSE PRACTITIONER

## 2019-04-30 PROCEDURE — 99213 OFFICE O/P EST LOW 20 MIN: CPT | Mod: PBBFAC,PN | Performed by: NURSE PRACTITIONER

## 2019-04-30 PROCEDURE — 77063 BREAST TOMOSYNTHESIS BI: CPT | Mod: 26,,, | Performed by: RADIOLOGY

## 2019-04-30 PROCEDURE — 99999 PR PBB SHADOW E&M-EST. PATIENT-LVL III: CPT | Mod: PBBFAC,,, | Performed by: NURSE PRACTITIONER

## 2019-04-30 PROCEDURE — 99204 PR OFFICE/OUTPT VISIT, NEW, LEVL IV, 45-59 MIN: ICD-10-PCS | Mod: S$PBB,,, | Performed by: NURSE PRACTITIONER

## 2019-04-30 PROCEDURE — 77063 MAMMO DIGITAL SCREENING BILAT WITH TOMOSYNTHESIS_CAD: ICD-10-PCS | Mod: 26,,, | Performed by: RADIOLOGY

## 2019-04-30 PROCEDURE — 99204 OFFICE O/P NEW MOD 45 MIN: CPT | Mod: S$PBB,,, | Performed by: NURSE PRACTITIONER

## 2019-04-30 NOTE — PROGRESS NOTES
Clinic Consult:  [facility] Consultation Note    Reason for Consult:  The primary encounter diagnosis was Abdominal bloating. A diagnosis of Nausea was also pertinent to this visit.    PCP: DIMA Boothe   [address] [facility] / [place] [address]    HPI:  This is a 59 y.o. female here for evaluation of the above. She was referred by primary care for chronic nausea. Onset of symptoms started 4 years ago. She denies any vomiting. She has associated abdominal bloating. Nausea and bloating are intermittent and may occur at any point in the day. She is unable to identify any aggravating or relieving factors. She denies any constipation. No hematochezia or melena.     Review of Systems   Constitutional: Negative for fever, malaise/fatigue and weight loss.   HENT: Negative for sore throat.    Respiratory: Negative for cough and wheezing.    Cardiovascular: Negative for chest pain and palpitations.   Gastrointestinal: Positive for nausea. Negative for abdominal pain, blood in stool, constipation, diarrhea, heartburn, melena and vomiting.   Genitourinary: Negative for dysuria and frequency.   Musculoskeletal: Negative for back pain, joint pain, myalgias and neck pain.   Skin: Negative for itching and rash.   Neurological: Negative for dizziness, speech change, seizures, loss of consciousness and headaches.   Psychiatric/Behavioral: Negative for depression and substance abuse. The patient is not nervous/anxious.        Medical History:  has a past medical history of Anemia, Aneurysm, Anorexia (4/26/2017), Anxiety, Asthma, Bipolar disorder, Carpal tunnel syndrome, bilateral, Cerebral aneurysm, Chronic pain syndrome, Cigarette nicotine dependence, Depression, GERD (gastroesophageal reflux disease), Hyperlipidemia, Hypertension, Insomnia, Osteoporosis, Schizophrenia, Stroke, and Subarachnoid hemorrhage.    Surgical History:  has a past surgical history that includes Partial hysterectomy; Bunionectomy (Right);  Transcranial Doppler Study - Art (8/28/2014); Transcranial Doppler Study - Art (8/29/2014); Transcranial Doppler Study - Art (8/30/2014); Transcranial Doppler Study - Art (8/31/2014); Transcranial Doppler Study - Art (9/1/2014); Transcranial Doppler Study - Art (9/2/2014); Transcranial Doppler Study - Art (9/3/2014); Transcranial Doppler Study - Art (9/4/2014); Transcranial Doppler Study - Art (9/5/2014); Transcranial Doppler Study - Art (9/6/2014); Colonoscopy w/ biopsies and polypectomy; Vaginal delivery; and Hysterectomy.    Family History: family history includes Bone cancer in her sister; Brain cancer in her sister; Cancer in her brother and mother; Diabetes type II in her mother and sister; Glaucoma in her mother; Heart disease in her mother; Hypertension in her brother and mother; Stroke in her mother..     Social History:  reports that she has been smoking cigarettes.  She started smoking about 43 years ago. She has been smoking about 0.20 packs per day. She has never used smokeless tobacco. She reports that she drinks alcohol. She reports that she does not use drugs.    Allergies: Reviewed    Home Medications:   Current Outpatient Medications on File Prior to Visit   Medication Sig Dispense Refill    albuterol (PROVENTIL) 2.5 mg /3 mL (0.083 %) nebulizer solution Take 2.5 mg by nebulization 2 (two) times daily as needed for Wheezing. Rescue      ALBUTEROL SULFATE INHL albuterol sulfate      alprazolam (XANAX) 0.5 MG tablet Take 0.5 mg by mouth daily as needed.       atorvastatin (LIPITOR) 40 MG tablet atorvastatin 40 mg tablet   TK 1 T PO QPM      butalbital-acetaminophen-caffeine -40 mg (FIORICET, ESGIC) -40 mg per tablet Take 1 tablet by mouth every 8 (eight) hours as needed for Headaches. DO NOT TAKE MORE THAN 5 PER WEEK 45 tablet 1    CALCIUM CARBONATE/VITAMIN D3 (CALCIUM 600 + D,3, ORAL) Take 2 tablets by mouth once daily.      escitalopram (LEXAPRO) 10 MG tablet Take 20 mg by mouth  "once daily.       fluticasone-salmeterol diskus inhaler 250-50 mcg Advair Diskus 250 mcg-50 mcg/dose powder for inhalation   Inhale 1 puff twice a day by inhalation route.      nitroGLYCERIN (NITROSTAT) 0.4 MG SL tablet Nitrostat 0.4 mg sublingual tablet   PLACE 1 TABLET (0.4 MG) BY SUBLINGUAL ROUTE AT THE 1ST SIGN OF ATTACK; MAY REPEAT EVERY 5 MIN UNTIL RELIEF; IF PAIN PERSISTS AFTER 3 TABLETS IN 15 MIN, PROMPT MEDICAL ATTENTION IS RECOMMENDED      ondansetron (ZOFRAN-ODT) 8 MG TbDL ondansetron 8 mg disintegrating tablet   DISSOLVE 1 T ON THE TONGUE Q 8 H PRF NAUSEA      OXcarbazepine (TRILEPTAL) 300 MG Tab Take 2 tablets (600 mg total) by mouth 2 (two) times daily. 45 tablet 0    OXcarbazepine (TRILEPTAL) 300 MG Tab oxcarbazepine 300 mg tablet   Take 1 tablet twice a day by oral route.      pantoprazole (PROTONIX) 40 MG tablet Take 1 tablet (40 mg total) by mouth once daily. 30 tablet 11    polyethylene glycol (GLYCOLAX) 17 gram/dose powder 17 g.      QUEtiapine (SEROQUEL) 100 MG Tab Take 100 mg by mouth once daily.      tiZANidine (ZANAFLEX) 4 MG tablet tizanidine 4 mg tablet   TK 1 T PO BID      traMADol (ULTRAM) 50 mg tablet Take 1 tablet (50 mg total) by mouth every 8 (eight) hours as needed for Pain. 90 tablet 5    varenicline (CHANTIX) 0.5 MG Tab Chantix 0.5 mg tablet   Take 1 tablet twice a day by oral route for 3 days.       No current facility-administered medications on file prior to visit.        Physical Exam:  BP (!) 142/78   Pulse 68   Ht 5' 2" (1.575 m)   Wt 56.7 kg (125 lb)   BMI 22.86 kg/m²   Body mass index is 22.86 kg/m².  Physical Exam   Constitutional: She is oriented to person, place, and time and well-developed, well-nourished, and in no distress. No distress.   HENT:   Head: Normocephalic.   Eyes: Pupils are equal, round, and reactive to light. Conjunctivae are normal.   Cardiovascular: Normal rate, regular rhythm and normal heart sounds.   Pulmonary/Chest: Effort normal and " breath sounds normal. No respiratory distress.   Abdominal: Soft. Bowel sounds are normal. She exhibits no distension. There is no tenderness.   Neurological: She is alert and oriented to person, place, and time. No cranial nerve deficit.   Skin: Skin is warm and dry. No rash noted.   Psychiatric: Mood and affect normal.       Labs: Pertinent labs reviewed.    Assessment:  1. Abdominal bloating    2. Nausea         Recommendations:  - check xray and ultrasound  - further recommendations to follow   -     X-Ray Abdomen Flat And Erect; Future; Expected date: 04/30/2019  -     US Abdomen Limited; Future; Expected date: 04/30/2019    Follow up to be determined after results.    Thank you so much for allowing me to participate in the care of Karyna Sanders LORENZA Ellis

## 2019-04-30 NOTE — LETTER
April 30, 2019      CECILIA Boothe MD  [address] [facility] [address]           [facility]  [address] [facility] [address]  Phone: [phone]  Fax: [phone]          Patient: Karyna Geller   MR Number: [id number]   YOB: 1960   Date of Visit: 4/30/2019       Dear Dr. CECILIA Boothe:    Thank you for referring Karyna Geller to me for evaluation. Attached you will find relevant portions of my assessment and plan of care.    If you have questions, please do not hesitate to call me. I look forward to following Karyna Geller along with you.    Sincerely,    Charlene Medina, NP    Enclosure  CC:  No Recipients    If you would like to receive this communication electronically, please contact [email or web address] or [phone] to request more information on [employer] Link access.    For providers and/or their staff who would like to refer a patient to [facility], please contact us through our one-stop-shop provider referral line, [facility], at [phone].    If you feel you have received this communication in error or would no longer like to receive these types of communications, please e-mail [email or web address]

## 2019-05-01 ENCOUNTER — TELEPHONE (OUTPATIENT)
Dept: RADIOLOGY | Facility: HOSPITAL | Age: 59
End: 2019-05-01

## 2019-05-02 ENCOUNTER — HOSPITAL ENCOUNTER (OUTPATIENT)
Dept: RADIOLOGY | Facility: HOSPITAL | Age: 59
Discharge: HOME | End: 2019-05-02
Attending: NURSE PRACTITIONER
Payer: MEDICAID

## 2019-05-02 ENCOUNTER — CLINICAL SUPPORT (OUTPATIENT)
Dept: CARDIOLOGY | Facility: CLINIC | Age: 59
End: 2019-05-02
Payer: MEDICAID

## 2019-05-02 ENCOUNTER — OFFICE VISIT (OUTPATIENT)
Dept: CARDIOLOGY | Facility: CLINIC | Age: 59
End: 2019-05-02
Payer: MEDICAID

## 2019-05-02 VITALS
HEART RATE: 72 BPM | BODY MASS INDEX: 23.13 KG/M2 | WEIGHT: 125.69 LBS | SYSTOLIC BLOOD PRESSURE: 144 MMHG | HEIGHT: 62 IN | DIASTOLIC BLOOD PRESSURE: 84 MMHG

## 2019-05-02 DIAGNOSIS — K82.4 GALLBLADDER POLYP: Primary | ICD-10-CM

## 2019-05-02 DIAGNOSIS — I10 ESSENTIAL HYPERTENSION: Chronic | ICD-10-CM

## 2019-05-02 DIAGNOSIS — R11.0 NAUSEA: ICD-10-CM

## 2019-05-02 DIAGNOSIS — I60.2: ICD-10-CM

## 2019-05-02 DIAGNOSIS — I10 HTN (HYPERTENSION): ICD-10-CM

## 2019-05-02 DIAGNOSIS — R14.0 ABDOMINAL BLOATING: ICD-10-CM

## 2019-05-02 DIAGNOSIS — F17.200 SMOKER: ICD-10-CM

## 2019-05-02 DIAGNOSIS — F17.210 CIGARETTE NICOTINE DEPENDENCE WITHOUT COMPLICATION: Chronic | ICD-10-CM

## 2019-05-02 DIAGNOSIS — R07.89 OTHER CHEST PAIN: Primary | ICD-10-CM

## 2019-05-02 DIAGNOSIS — E78.5 HYPERLIPIDEMIA, UNSPECIFIED HYPERLIPIDEMIA TYPE: Chronic | ICD-10-CM

## 2019-05-02 PROCEDURE — 99204 OFFICE O/P NEW MOD 45 MIN: CPT | Mod: S$PBB,,, | Performed by: INTERNAL MEDICINE

## 2019-05-02 PROCEDURE — 74019 XR ABDOMEN FLAT AND ERECT: ICD-10-PCS | Mod: 26,,, | Performed by: RADIOLOGY

## 2019-05-02 PROCEDURE — 76705 US ABDOMEN LIMITED: ICD-10-PCS | Mod: 26,,, | Performed by: RADIOLOGY

## 2019-05-02 PROCEDURE — 99999 PR PBB SHADOW E&M-EST. PATIENT-LVL III: ICD-10-PCS | Mod: PBBFAC,,, | Performed by: INTERNAL MEDICINE

## 2019-05-02 PROCEDURE — 99204 PR OFFICE/OUTPT VISIT, NEW, LEVL IV, 45-59 MIN: ICD-10-PCS | Mod: S$PBB,,, | Performed by: INTERNAL MEDICINE

## 2019-05-02 PROCEDURE — 76705 ECHO EXAM OF ABDOMEN: CPT | Mod: 26,,, | Performed by: RADIOLOGY

## 2019-05-02 PROCEDURE — 76705 ECHO EXAM OF ABDOMEN: CPT | Mod: TC

## 2019-05-02 PROCEDURE — 74019 RADEX ABDOMEN 2 VIEWS: CPT | Mod: 26,,, | Performed by: RADIOLOGY

## 2019-05-02 PROCEDURE — 99213 OFFICE O/P EST LOW 20 MIN: CPT | Mod: PBBFAC,25 | Performed by: INTERNAL MEDICINE

## 2019-05-02 PROCEDURE — 74019 RADEX ABDOMEN 2 VIEWS: CPT | Mod: TC

## 2019-05-02 PROCEDURE — 93005 ELECTROCARDIOGRAM TRACING: CPT | Mod: PBBFAC | Performed by: INTERNAL MEDICINE

## 2019-05-02 PROCEDURE — 93010 EKG 12-LEAD: ICD-10-PCS | Mod: S$PBB,,, | Performed by: INTERNAL MEDICINE

## 2019-05-02 PROCEDURE — 93010 ELECTROCARDIOGRAM REPORT: CPT | Mod: S$PBB,,, | Performed by: INTERNAL MEDICINE

## 2019-05-02 PROCEDURE — 99999 PR PBB SHADOW E&M-EST. PATIENT-LVL III: CPT | Mod: PBBFAC,,, | Performed by: INTERNAL MEDICINE

## 2019-05-02 RX ORDER — NITROGLYCERIN 0.4 MG/1
0.4 TABLET SUBLINGUAL EVERY 5 MIN PRN
Qty: 25 TABLET | Refills: 5 | Status: SHIPPED | OUTPATIENT
Start: 2019-05-02 | End: 2020-02-07

## 2019-05-02 NOTE — LETTER
May 2, 2019      CECILIA Boothe MD  [address]  [place] [address]           [facility]  [address]  [place] [address]  Phone: [phone]  Fax: [phone]          Patient: Karyna Geller   MR Number: [id number]   YOB: 1960   Date of Visit: 5/2/2019       Dear Dr. CECILIA Boothe:    Thank you for referring Karyna Geller to me for evaluation. Attached you will find relevant portions of my assessment and plan of care.    If you have questions, please do not hesitate to call me. I look forward to following Karyna Geller along with you.    Sincerely,    Kaveh Acharya MD    Enclosure  CC:  No Recipients    If you would like to receive this communication electronically, please contact [email or web address] or [phone] to request more information on [employer] access.    For providers and/or their staff who would like to refer a patient to [facility], please contact us through our one-stop-shop provider referral line, [facility], at [phone].    If you feel you have received this communication in error or would no longer like to receive these types of communications, please e-mail [email or web address]

## 2019-05-03 DIAGNOSIS — I10 HTN (HYPERTENSION): Primary | ICD-10-CM

## 2019-05-08 ENCOUNTER — HOSPITAL ENCOUNTER (OUTPATIENT)
Dept: RADIOLOGY | Facility: HOSPITAL | Age: 59
Discharge: HOME | End: 2019-05-08
Attending: FAMILY MEDICINE
Payer: MEDICAID

## 2019-05-08 DIAGNOSIS — R92.8 ABNORMAL MAMMOGRAM: ICD-10-CM

## 2019-05-08 PROCEDURE — 77061 MAMMO DIGITAL DIAGNOSTIC LEFT WITH TOMOSYNTHESIS_CAD: ICD-10-PCS | Mod: 26,LT,, | Performed by: RADIOLOGY

## 2019-05-08 PROCEDURE — 76642 ULTRASOUND BREAST LIMITED: CPT | Mod: 26,LT,, | Performed by: RADIOLOGY

## 2019-05-08 PROCEDURE — 77065 MAMMO DIGITAL DIAGNOSTIC LEFT WITH TOMOSYNTHESIS_CAD: ICD-10-PCS | Mod: 26,LT,, | Performed by: RADIOLOGY

## 2019-05-08 PROCEDURE — 77061 BREAST TOMOSYNTHESIS UNI: CPT | Mod: TC,LT

## 2019-05-08 PROCEDURE — 76642 ULTRASOUND BREAST LIMITED: CPT | Mod: TC,LT

## 2019-05-08 PROCEDURE — 76642 US BREAST LEFT LIMITED: ICD-10-PCS | Mod: 26,LT,, | Performed by: RADIOLOGY

## 2019-05-08 PROCEDURE — 77061 BREAST TOMOSYNTHESIS UNI: CPT | Mod: 26,LT,, | Performed by: RADIOLOGY

## 2019-05-08 PROCEDURE — 77065 DX MAMMO INCL CAD UNI: CPT | Mod: 26,LT,, | Performed by: RADIOLOGY

## 2019-05-08 PROCEDURE — 77065 DX MAMMO INCL CAD UNI: CPT | Mod: TC,LT

## 2019-05-10 ENCOUNTER — TELEPHONE (OUTPATIENT)
Dept: RADIOLOGY | Facility: HOSPITAL | Age: 59
End: 2019-05-10

## 2019-05-13 ENCOUNTER — CLINICAL SUPPORT (OUTPATIENT)
Dept: SMOKING CESSATION | Facility: CLINIC | Age: 59
End: 2019-05-13
Payer: COMMERCIAL

## 2019-05-13 VITALS — DIASTOLIC BLOOD PRESSURE: 80 MMHG | SYSTOLIC BLOOD PRESSURE: 140 MMHG | HEART RATE: 88 BPM

## 2019-05-13 DIAGNOSIS — F17.210 MODERATE SMOKER (20 OR LESS PER DAY): Primary | ICD-10-CM

## 2019-05-13 PROCEDURE — 99999 PR PBB SHADOW E&M-EST. PATIENT-LVL II: ICD-10-PCS | Mod: PBBFAC,,,

## 2019-05-13 PROCEDURE — 99404 PR PREVENT COUNSEL,INDIV,60 MIN: ICD-10-PCS | Mod: S$GLB,,,

## 2019-05-13 PROCEDURE — 99999 PR PBB SHADOW E&M-EST. PATIENT-LVL II: CPT | Mod: PBBFAC,,,

## 2019-05-13 PROCEDURE — 99404 PREV MED CNSL INDIV APPRX 60: CPT | Mod: S$GLB,,,

## 2019-05-13 RX ORDER — DIPHENHYDRAMINE HCL 25 MG
4 CAPSULE ORAL
Qty: 310 EACH | Refills: 0 | Status: SHIPPED | OUTPATIENT
Start: 2019-05-13 | End: 2019-07-25

## 2019-05-13 RX ORDER — IBUPROFEN 200 MG
1 TABLET ORAL DAILY
Qty: 14 PATCH | Refills: 0 | Status: SHIPPED | OUTPATIENT
Start: 2019-05-13 | End: 2022-09-22

## 2019-05-13 NOTE — Clinical Note
1st time in program. Patient quit for 1 year then 2 years cod turkey. She is ready to quit but not very sure if she can. She wanted to try Chantix but does have contraindicated diagnosis. Will try 21 mg nicotine patch and 4 mg nicotine gum. Did explain program requirements in detail an she stated she understood. The patient will continue individual sessions and medication monitoring by CTTS. Prescribed medication management will be by practitoner.

## 2019-05-28 ENCOUNTER — TELEPHONE (OUTPATIENT)
Dept: SMOKING CESSATION | Facility: CLINIC | Age: 59
End: 2019-05-28

## 2019-05-28 NOTE — TELEPHONE ENCOUNTER
1st attempt left message with Kalyn about 0800 appt. Explained not here and wanted to see if coming. Gave the other cell number to call. Stated she was not home and had left. Explained would like to know how she is doing and if needed anything. Left [phone] and  number to call to answer questions or the reschedule.

## 2019-06-10 ENCOUNTER — TELEPHONE (OUTPATIENT)
Dept: SMOKING CESSATION | Facility: CLINIC | Age: 59
End: 2019-06-10

## 2019-06-10 NOTE — TELEPHONE ENCOUNTER
2nd attempt follow up for progress and support.  Left [phone] number to call for update and needs.

## 2019-06-17 ENCOUNTER — TELEPHONE (OUTPATIENT)
Dept: SMOKING CESSATION | Facility: CLINIC | Age: 59
End: 2019-06-17

## 2019-06-17 NOTE — TELEPHONE ENCOUNTER
3rd attempt to follow up for progress and support.  Left [phone] number to call for update and needs.  Will let patient contact us.

## 2019-07-16 ENCOUNTER — PATIENT OUTREACH (OUTPATIENT)
Dept: ADMINISTRATIVE | Facility: HOSPITAL | Age: 59
End: 2019-07-16

## 2019-07-22 ENCOUNTER — TELEPHONE (OUTPATIENT)
Dept: INTERNAL MEDICINE | Facility: CLINIC | Age: 59
End: 2019-07-22

## 2019-07-22 NOTE — TELEPHONE ENCOUNTER
Spoke with patient and she wanted a sooner appointment with Dr. Boothe since she wasn't the one to cancel her original appointment and the next available was in October. Rescheduled patient for 11:20 am on 7/25/19. She verbalized understanding.

## 2019-07-22 NOTE — TELEPHONE ENCOUNTER
----- Message from Bridgette Scott sent at 7/22/2019  3:22 PM CDT -----  Contact: pt  Type:  Sooner Apoointment Request    Caller is requesting a sooner appointment.  Caller declined first available appointment listed below.  Caller will not accept being placed on the waitlist and is requesting a message be sent to doctor.  Name of Caller: the pt   When is the first available appointment? 10/24/2019  Symptoms: 3 month f/u///baj  Would the patient rather a call back or a response via [facility]? Call back  Best Call Back Number: [phone]  Additional Information: n/a

## 2019-07-24 ENCOUNTER — OFFICE VISIT (OUTPATIENT)
Dept: NEUROLOGY | Facility: CLINIC | Age: 59
End: 2019-07-24
Payer: MEDICAID

## 2019-07-24 VITALS
BODY MASS INDEX: 22.8 KG/M2 | HEIGHT: 62 IN | DIASTOLIC BLOOD PRESSURE: 81 MMHG | WEIGHT: 123.88 LBS | SYSTOLIC BLOOD PRESSURE: 130 MMHG | HEART RATE: 87 BPM

## 2019-07-24 DIAGNOSIS — F17.200 SMOKER: ICD-10-CM

## 2019-07-24 DIAGNOSIS — M54.17 LUMBOSACRAL RADICULOPATHY AT S1: ICD-10-CM

## 2019-07-24 DIAGNOSIS — Z59.7: Chronic | ICD-10-CM

## 2019-07-24 DIAGNOSIS — F25.0 SCHIZOAFFECTIVE DISORDER, BIPOLAR TYPE: Chronic | ICD-10-CM

## 2019-07-24 DIAGNOSIS — Z86.79 HISTORY OF SUBARACHNOID HEMORRHAGE: ICD-10-CM

## 2019-07-24 DIAGNOSIS — G56.23 CUBITAL TUNNEL SYNDROME, BILATERAL: ICD-10-CM

## 2019-07-24 DIAGNOSIS — G44.209 TENSION HEADACHE: Chronic | ICD-10-CM

## 2019-07-24 DIAGNOSIS — H53.10 SUBJECTIVE VISION DISTURBANCE, BILATERAL: Chronic | ICD-10-CM

## 2019-07-24 DIAGNOSIS — E78.5 HYPERLIPIDEMIA, UNSPECIFIED HYPERLIPIDEMIA TYPE: Chronic | ICD-10-CM

## 2019-07-24 DIAGNOSIS — I60.2: ICD-10-CM

## 2019-07-24 DIAGNOSIS — G43.119 INTRACTABLE MIGRAINE WITH AURA WITHOUT STATUS MIGRAINOSUS: Primary | ICD-10-CM

## 2019-07-24 DIAGNOSIS — R73.03 PRE-DIABETES: Chronic | ICD-10-CM

## 2019-07-24 DIAGNOSIS — F31.9 BIPOLAR 1 DISORDER: Chronic | ICD-10-CM

## 2019-07-24 DIAGNOSIS — G56.03 BILATERAL CARPAL TUNNEL SYNDROME: Chronic | ICD-10-CM

## 2019-07-24 DIAGNOSIS — Z23 NEED FOR PNEUMOCOCCAL VACCINATION: ICD-10-CM

## 2019-07-24 DIAGNOSIS — G62.9 PERIPHERAL POLYNEUROPATHY: Chronic | ICD-10-CM

## 2019-07-24 DIAGNOSIS — I10 ESSENTIAL HYPERTENSION: Chronic | ICD-10-CM

## 2019-07-24 DIAGNOSIS — Z79.899 LONG-TERM CURRENT USE OF HIGH RISK MEDICATION OTHER THAN ANTICOAGULANT: Chronic | ICD-10-CM

## 2019-07-24 DIAGNOSIS — R07.89 OTHER CHEST PAIN: ICD-10-CM

## 2019-07-24 DIAGNOSIS — F17.210 CIGARETTE NICOTINE DEPENDENCE WITHOUT COMPLICATION: Chronic | ICD-10-CM

## 2019-07-24 DIAGNOSIS — G44.40 MEDICATION OVERUSE HEADACHE: ICD-10-CM

## 2019-07-24 DIAGNOSIS — F41.9 ANXIETY: Chronic | ICD-10-CM

## 2019-07-24 DIAGNOSIS — K21.9 GASTRO-ESOPHAGEAL REFLUX DISEASE WITHOUT ESOPHAGITIS: Chronic | ICD-10-CM

## 2019-07-24 DIAGNOSIS — R94.31 ABNORMAL ECG: ICD-10-CM

## 2019-07-24 DIAGNOSIS — J30.2 CHRONIC SEASONAL ALLERGIC RHINITIS: Chronic | ICD-10-CM

## 2019-07-24 DIAGNOSIS — Z90.710 HISTORY OF HYSTERECTOMY FOR BENIGN DISEASE: Chronic | ICD-10-CM

## 2019-07-24 DIAGNOSIS — R63.0 ANOREXIA: Chronic | ICD-10-CM

## 2019-07-24 DIAGNOSIS — R11.0 CHRONIC NAUSEA: ICD-10-CM

## 2019-07-24 DIAGNOSIS — M19.91 PRIMARY LOCALIZED OSTEOARTHROSIS OF MULTIPLE SITES: Chronic | ICD-10-CM

## 2019-07-24 DIAGNOSIS — Z91.199 NONADHERENCE TO MEDICAL TREATMENT: Chronic | ICD-10-CM

## 2019-07-24 PROBLEM — Z12.31 SCREENING MAMMOGRAM, ENCOUNTER FOR: Status: RESOLVED | Noted: 2017-05-09 | Resolved: 2019-07-24

## 2019-07-24 PROCEDURE — 99204 OFFICE O/P NEW MOD 45 MIN: CPT | Mod: S$PBB,,, | Performed by: PSYCHIATRY & NEUROLOGY

## 2019-07-24 PROCEDURE — 99204 PR OFFICE/OUTPT VISIT, NEW, LEVL IV, 45-59 MIN: ICD-10-PCS | Mod: S$PBB,,, | Performed by: PSYCHIATRY & NEUROLOGY

## 2019-07-24 PROCEDURE — 99999 PR PBB SHADOW E&M-EST. PATIENT-LVL IV: CPT | Mod: PBBFAC,,, | Performed by: PSYCHIATRY & NEUROLOGY

## 2019-07-24 PROCEDURE — 99214 OFFICE O/P EST MOD 30 MIN: CPT | Mod: PBBFAC | Performed by: PSYCHIATRY & NEUROLOGY

## 2019-07-24 PROCEDURE — 99999 PR PBB SHADOW E&M-EST. PATIENT-LVL IV: ICD-10-PCS | Mod: PBBFAC,,, | Performed by: PSYCHIATRY & NEUROLOGY

## 2019-07-24 RX ORDER — AMITRIPTYLINE HYDROCHLORIDE 75 MG/1
75 TABLET ORAL NIGHTLY
Qty: 30 TABLET | Refills: 11 | Status: SHIPPED | OUTPATIENT
Start: 2019-07-24 | End: 2020-12-10

## 2019-07-24 SDOH — SOCIAL DETERMINANTS OF HEALTH (SDOH): INSUFFICIENT SOCIAL INSURANCE AND WELFARE SUPPORT: Z59.7

## 2019-07-24 NOTE — PATIENT INSTRUCTIONS
Amitriptyline tablets  What is this medicine?  AMITRIPTYLINE (a wayne TRIP ti jenny) is used to treat depression.  How should I use this medicine?  Take this medicine by mouth with a drink of water. Follow the directions on the prescription label. You can take the tablets with or without food. Take your medicine at regular intervals. Do not take it more often than directed. Do not stop taking this medicine suddenly except upon the advice of your doctor. Stopping this medicine too quickly may cause serious side effects or your condition may worsen.  A special MedGuide will be given to you by the pharmacist with each prescription and refill. Be sure to read this information carefully each time.  Talk to your pediatrician regarding the use of this medicine in children. Special care may be needed.  What side effects may I notice from receiving this medicine?  Side effects that you should report to your doctor or health care professional as soon as possible:  · allergic reactions like skin rash, itching or hives, swelling of the face, lips, or tongue  · abnormal production of milk in females  · breast enlargement in both males and females  · breathing problems  · confusion, hallucinations  · fast, irregular heartbeat  · fever with increased sweating  · muscle stiffness, or spasms  · pain or difficulty passing urine, loss of bladder control  · seizures  · suicidal thoughts or other mood changes  · swelling of the testicles  · tingling, pain, or numbness in the feet or hands  · yellowing of the eyes or skin  Side effects that usually do not require medical attention (report to your doctor or health care professional if they continue or are bothersome):  · change in sex drive or performance  · constipation or diarrhea  · nausea, vomiting  · weight gain or loss  What may interact with this medicine?  Do not take this medicine with any of the following medications:  · arsenic trioxide  · certain medicines used to regulate  abnormal heartbeat or to treat other heart conditions  · cisapride  · droperidol  · halofantrine  · linezolid  · MAOIs like Carbex, Eldepryl, Marplan, Nardil, and Parnate  · methylene blue  · other medicines for mental depression  · phenothiazines like perphenazine, thioridazine and chlorpromazine  · pimozide  · probucol  · procarbazine  · sparfloxacin  · Karla's Wort  · ziprasidone  This medicine may also interact with the following medications:  · atropine and related drugs like hyoscyamine, scopolamine, tolterodine and others  · barbiturate medicines for inducing sleep or treating seizures, like phenobarbital  · cimetidine  · disulfiram  · ethchlorvynol  · thyroid hormones such as levothyroxine  What if I miss a dose?  If you miss a dose, take it as soon as you can. If it is almost time for your next dose, take only that dose. Do not take double or extra doses.  Where should I keep my medicine?  Keep out of the reach of children.  Store at room temperature between 20 and 25 degrees C (68 and 77 degrees F). Throw away any unused medicine after the expiration date.  What should I tell my health care provider before I take this medicine?  They need to know if you have any of these conditions:  · an alcohol problem  · asthma, difficulty breathing  · bipolar disorder or schizophrenia  · difficulty passing urine, prostate trouble  · glaucoma  · heart disease or previous heart attack  · liver disease  · over active thyroid  · seizures  · thoughts or plans of suicide, a previous suicide attempt, or family history of suicide attempt  · an unusual or allergic reaction to amitriptyline, other medicines, foods, dyes, or preservatives  · pregnant or trying to get pregnant  · breast-feeding  What should I watch for while using this medicine?  Tell your doctor if your symptoms do not get better or if they get worse. Visit your doctor or health care professional for regular checks on your progress. Because it may take several  weeks to see the full effects of this medicine, it is important to continue your treatment as prescribed by your doctor.  Patients and their families should watch out for new or worsening thoughts of suicide or depression. Also watch out for sudden changes in feelings such as feeling anxious, agitated, panicky, irritable, hostile, aggressive, impulsive, severely restless, overly excited and hyperactive, or not being able to sleep. If this happens, especially at the beginning of treatment or after a change in dose, call your health care professional.  You may get drowsy or dizzy. Do not drive, use machinery, or do anything that needs mental alertness until you know how this medicine affects you. Do not stand or sit up quickly, especially if you are an older patient. This reduces the risk of dizzy or fainting spells. Alcohol may interfere with the effect of this medicine. Avoid alcoholic drinks.  Do not treat yourself for coughs, colds, or allergies without asking your doctor or health care professional for advice. Some ingredients can increase possible side effects.  Your mouth may get dry. Chewing sugarless gum or sucking hard candy, and drinking plenty of water will help. Contact your doctor if the problem does not go away or is severe.  This medicine may cause dry eyes and blurred vision. If you wear contact lenses you may feel some discomfort. Lubricating drops may help. See your eye doctor if the problem does not go away or is severe.  This medicine can cause constipation. Try to have a bowel movement at least every 2 to 3 days. If you do not have a bowel movement for 3 days, call your doctor or health care professional.  This medicine can make you more sensitive to the sun. Keep out of the sun. If you cannot avoid being in the sun, wear protective clothing and use sunscreen. Do not use sun lamps or tanning beds/booths.  NOTE:This sheet is a summary. It may not cover all possible information. If you have  questions about this medicine, talk to your doctor, pharmacist, or health care provider. Copyright© 2017 [employer]        Preventing Migraine Headaches: Medicines and Lifestyle Changes     Going to bed and getting up at the same time each day, including weekends, may help prevent migraines.     A migraine is a type of severe headache. Having a migraine can be very painful. But there are steps you can take to help prevent migraines.  Medicines to help prevent migraines  · Your healthcare provider may prescribe certain medicines to help prevent migraines. These medicines may need to be taken daily. Or they may only need to be taken at times when youre likely to have a migraine.  · Common medicines used to help prevent migraines include:  ¨ Triptans (serotonin receptor agonists)  ¨ Nonsteroidal anti-inflammatory drugs (available over-the-counter)  ¨ Beta-blockers  ¨ Anticonvulsants  ¨ Tricyclic antidepressants  ¨ Calcium channel blockers  ¨ Certain vitamins, minerals, and plant extracts  ¨ Botulinum toxin injection (Botox) for certain chronic migraines   ¨ CGRP (calcitonin gene-related peptide) agnonists are being reviewed by the Food and Drug Administration (FDA)  Lifestyle changes for long-term prevention  Here are some suggestions:  · Exercise. Regular exercise can help prevent migraines and improve your health. (If exercise triggers your migraines, talk to your healthcare provider.)  · Keep regular habits. Dont skip or delay meals. Drink plenty of water. And go to bed and get up at about the same time each day. This includes weekends.  · Try alternative treatments. These are treatments that do not involve the use of medicines or surgery. They may help relieve symptoms and prevent migraines. Some treatment options include biofeedback and acupuncture. Ask your healthcare provider to tell you more about these treatments if you have questions.  · Limit caffeine. You may find that caffeine helps relieve pain  during an attack. But too much caffeine can also trigger migraines. So, limit the amount of caffeine you consume.  Date Last Reviewed: 10/11/2015  © 7073-6437 [employer]. [address] [place] [address], [place] [address]. All rights reserved. This information is not intended as a substitute for professional medical care. Always follow your healthcare professional's instructions.        Acetaminophen; Butalbital; Caffeine tablets or capsules  What is this medicine?  ACETAMINOPHEN; BUTALBITAL; CAFFEINE (a set a ZIGGY madalyn fen; byoo YONI bi yoni; KAF een) is a pain reliever. It is used to treat tension headaches.  How should I use this medicine?  Take this medicine by mouth with a full glass of water. Follow the directions on the prescription label. If the medicine upsets your stomach, take the medicine with food or milk. Do not take more than you are told to take.  Talk to your pediatrician regarding the use of this medicine in children. Special care may be needed.  What side effects may I notice from receiving this medicine?  Side effects that you should report to your doctor or health care professional as soon as possible:  · allergic reactions like skin rash, itching or hives, swelling of the face, lips, or tongue  · breathing problems  · confusion  · feeling faint or lightheaded, falls  · redness, blistering, peeling or loosening of the skin, including inside the mouth  · seizure  · stomach pain  · yellowing of the eyes or skin  Side effects that usually do not require medical attention (report to your doctor or health care professional if they continue or are bothersome):  · constipation  · nausea, vomiting  What may interact with this medicine?  · alcohol or medicines that contain alcohol  · antidepressants, especially MAOIs like isocarboxazid, phenelzine, tranylcypromine, and selegiline  · antihistamines  · benzodiazepines  · carbamazepine  · isoniazid  · medicines for pain like pentazocine, buprenorphine,  butorphanol, nalbuphine, tramadol, and propoxyphene  · muscle relaxants  · naltrexone  · phenobarbital, phenytoin, and fosphenytoin  · phenothiazines like perphenazine, thioridazine, chlorpromazine, mesoridazine, fluphenazine, prochlorperazine, promazine, and trifluoperazine  · voriconazole  What if I miss a dose?  If you miss a dose, take it as soon as you can. If it is almost time for your next dose, take only that dose. Do not take double or extra doses.  Where should I keep my medicine?  Keep out of the reach of children. This medicine can be abused. Keep your medicine in a safe place to protect it from theft. Do not share this medicine with anyone. Selling or giving away this medicine is dangerous and against the law.  This medicine may cause accidental overdose and death if it taken by other adults, children, or pets. Mix any unused medicine with a substance like cat litter or coffee grounds. Then throw the medicine away in a sealed container like a sealed bag or a coffee can with a lid. Do not use the medicine after the expiration date.  Store at room temperature between 15 and 30 degrees C (59 and 86 degrees F).  What should I tell my health care provider before I take this medicine?  They need to know if you have any of these conditions:  · drug abuse or addiction  · heart or circulation problems  · if you often drink alcohol  · kidney disease or problems going to the bathroom  · liver disease  · lung disease, asthma, or breathing problems  · porphyria  · an unusual or allergic reaction to acetaminophen, butalbital or other barbiturates, caffeine, other medicines, foods, dyes, or preservatives  · pregnant or trying to get pregnant  · breast-feeding  What should I watch for while using this medicine?  Tell your doctor or health care professional if your pain does not go away, if it gets worse, or if you have new or a different type of pain. You may develop tolerance to the medicine. Tolerance means that you  will need a higher dose of the medicine for pain relief. Tolerance is normal and is expected if you take the medicine for a long time.  Do not suddenly stop taking your medicine because you may develop a severe reaction. Your body becomes used to the medicine. This does NOT mean you are addicted. Addiction is a behavior related to getting and using a drug for a non-medical reason. If you have pain, you have a medical reason to take pain medicine. Your doctor will tell you how much medicine to take. If your doctor wants you to stop the medicine, the dose will be slowly lowered over time to avoid any side effects.  You may get drowsy or dizzy when you first start taking the medicine or change doses. Do not drive, use machinery, or do anything that may be dangerous until you know how the medicine affects you. Stand or sit up slowly.  Do not take other medicines that contain acetaminophen with this medicine. Always read labels carefully. If you have questions, ask your doctor or pharmacist.  If you take too much acetaminophen get medical help right away. Too much acetaminophen can be very dangerous and cause liver damage. Even if you do not have symptoms, it is important to get help right away.  NOTE:This sheet is a summary. It may not cover all possible information. If you have questions about this medicine, talk to your doctor, pharmacist, or health care provider. Copyright© 2017 [employer]

## 2019-07-24 NOTE — PROGRESS NOTES
"Subjective:       Patient ID: Karyna Geller is a 59 y.o. female.    Chief Complaint: Headache and Dizziness    HPI       The patient is here for headache evaluation. The patient sustained Aneurysmal SAH in 2014 S/P RT PCOM coiling. Since then she has been having headaches. On 06- CTA head showed no new change or additional aneurysms. The headaches progress from different areas and involves different sides with a throbbing nature. It can involve the right side, the left side or both sides. The patient does report visual aura. The headache is associated with nausea and light, sound, temperature and smell sensitivity.  The patient was recently started on barbiturate-narcotic-caffeine-based medication and unfortunately she has been taking it daily. The headaches last for several hours. The patient feels "down" during the headache with no racing.  The headache builds up slowly towards the middle of the day or the end of the day.   The headache is associated with scalp sensitivity. Lack of sleep is a significant trigger of the headache and she does not sleep well at night.  No neck pain with radiation. No TMJ problems.  The patient denies blurry vision and ear ringing. The headache is not positional or postural but worsens with movement and valsalva. Sleep help lessen the headache. No history of seizures. No vertigo. No blacking out.  No fever, chills or rigors. No history of significant memory loss..  The patient cannot think of food that aggravates the headache.       Review of Systems   Constitutional: Negative for appetite change and fatigue.   HENT: Negative for hearing loss and tinnitus.    Eyes: Negative for photophobia and visual disturbance.   Respiratory: Negative for apnea and shortness of breath.    Cardiovascular: Negative for chest pain and palpitations.   Gastrointestinal: Negative for nausea and vomiting.   Endocrine: Negative for cold intolerance and heat intolerance.   Genitourinary: " Negative for difficulty urinating and urgency.   Musculoskeletal: Negative for arthralgias, back pain, gait problem, joint swelling, myalgias, neck pain and neck stiffness.   Skin: Negative for color change and rash.   Allergic/Immunologic: Negative for environmental allergies and immunocompromised state.   Neurological: Positive for headaches. Negative for dizziness, tremors, seizures, syncope, facial asymmetry, speech difficulty, weakness, light-headedness and numbness.   Hematological: Negative for adenopathy. Does not bruise/bleed easily.   Psychiatric/Behavioral: Positive for dysphoric mood. Negative for agitation, behavioral problems, confusion, decreased concentration, hallucinations, self-injury, sleep disturbance and suicidal ideas. The patient is nervous/anxious. The patient is not hyperactive.          Current Outpatient Medications:     albuterol (PROVENTIL) 2.5 mg /3 mL (0.083 %) nebulizer solution, Take 2.5 mg by nebulization 2 (two) times daily as needed for Wheezing. Rescue, Disp: , Rfl:     ALBUTEROL SULFATE INHL, albuterol sulfate, Disp: , Rfl:     alprazolam (XANAX) 0.5 MG tablet, Take 0.5 mg by mouth daily as needed. , Disp: , Rfl:     atorvastatin (LIPITOR) 40 MG tablet, atorvastatin 40 mg tablet  TK 1 T PO QPM, Disp: , Rfl:     butalbital-acetaminophen-caffeine -40 mg (FIORICET, ESGIC) -40 mg per tablet, Take 1 tablet by mouth every 8 (eight) hours as needed for Headaches. DO NOT TAKE MORE THAN 5 PER WEEK, Disp: 45 tablet, Rfl: 1    CALCIUM CARBONATE/VITAMIN D3 (CALCIUM 600 + D,3, ORAL), Take 2 tablets by mouth once daily., Disp: , Rfl:     escitalopram (LEXAPRO) 10 MG tablet, Take 20 mg by mouth once daily. , Disp: , Rfl:     fluticasone-salmeterol diskus inhaler 250-50 mcg, Advair Diskus 250 mcg-50 mcg/dose powder for inhalation  Inhale 1 puff twice a day by inhalation route., Disp: , Rfl:     nicotine (NICODERM CQ) 21 mg/24 hr, Place 1 patch onto the skin once daily.,  Disp: 14 patch, Rfl: 0    nicotine polacrilex (NICORETTE) 4 MG Gum, Take 1 each (4 mg total) by mouth 6 (six) times daily., Disp: 310 each, Rfl: 0    nitroGLYCERIN (NITROSTAT) 0.4 MG SL tablet, Place 1 tablet (0.4 mg total) under the tongue every 5 (five) minutes as needed for Chest pain., Disp: 25 tablet, Rfl: 5    ondansetron (ZOFRAN-ODT) 8 MG TbDL, ondansetron 8 mg disintegrating tablet  DISSOLVE 1 T ON THE TONGUE Q 8 H PRF NAUSEA, Disp: , Rfl:     OXcarbazepine (TRILEPTAL) 300 MG Tab, Take 2 tablets (600 mg total) by mouth 2 (two) times daily., Disp: 45 tablet, Rfl: 0    OXcarbazepine (TRILEPTAL) 300 MG Tab, oxcarbazepine 300 mg tablet  Take 1 tablet twice a day by oral route., Disp: , Rfl:     pantoprazole (PROTONIX) 40 MG tablet, Take 1 tablet (40 mg total) by mouth once daily., Disp: 30 tablet, Rfl: 11    polyethylene glycol (GLYCOLAX) 17 gram/dose powder, 17 g., Disp: , Rfl:     QUEtiapine (SEROQUEL) 100 MG Tab, Take 100 mg by mouth once daily., Disp: , Rfl:     tiZANidine (ZANAFLEX) 4 MG tablet, tizanidine 4 mg tablet  TK 1 T PO BID, Disp: , Rfl:     traMADol (ULTRAM) 50 mg tablet, Take 1 tablet (50 mg total) by mouth every 8 (eight) hours as needed for Pain., Disp: 90 tablet, Rfl: 5    varenicline (CHANTIX) 0.5 MG Tab, Chantix 0.5 mg tablet  Take 1 tablet twice a day by oral route for 3 days., Disp: , Rfl:     amitriptyline (ELAVIL) 75 MG tablet, Take 1 tablet (75 mg total) by mouth every evening., Disp: 30 tablet, Rfl: 11  Past Medical History:   Diagnosis Date    Anemia     Aneurysm     Anorexia 4/26/2017    Anxiety     Asthma     Bipolar disorder     Carpal tunnel syndrome, bilateral     Cerebral aneurysm     Chronic pain syndrome     Cigarette nicotine dependence     Depression     GERD (gastroesophageal reflux disease)     Hyperlipidemia     Hypertension     Insomnia     Osteoporosis     Schizophrenia     Stroke     Subarachnoid hemorrhage      Past Surgical History:    Procedure Laterality Date    ANGIOGRAM-CEREBRAL N/A 3/31/2015    Performed by [facility] at [facility] OR 2ND FLR    ANGIOGRAM-CEREBRAL possible coiling N/A 8/27/2014    Performed by Nancy Surgeon at [facility] NANCY    BUNIONECTOMY Right     COLONOSCOPY W/ BIOPSIES AND POLYPECTOMY      HYSTERECTOMY      PARTIAL HYSTERECTOMY      Bilateral Ovaries Remain    TRANSCRANIAL DOPPLER STUDY - COMPLETE  8/28/2014         TRANSCRANIAL DOPPLER STUDY - COMPLETE  8/29/2014         TRANSCRANIAL DOPPLER STUDY - COMPLETE  8/30/2014         TRANSCRANIAL DOPPLER STUDY - COMPLETE  8/31/2014         TRANSCRANIAL DOPPLER STUDY - COMPLETE  9/1/2014         TRANSCRANIAL DOPPLER STUDY - COMPLETE  9/2/2014         TRANSCRANIAL DOPPLER STUDY - COMPLETE  9/3/2014         TRANSCRANIAL DOPPLER STUDY - COMPLETE  9/4/2014         TRANSCRANIAL DOPPLER STUDY - COMPLETE  9/5/2014         TRANSCRANIAL DOPPLER STUDY - COMPLETE  9/6/2014         VAGINAL DELIVERY      X 3     Social History     Socioeconomic History    Marital status:      Spouse name: Not on file    Number of children: 3    Years of education: 11th Grade    Highest education level: Not on file   Occupational History    Not on file   Social Needs    Financial resource strain: Not on file    Food insecurity:     Worry: Not on file     Inability: Not on file    Transportation needs:     Medical: Not on file     Non-medical: Not on file   Tobacco Use    Smoking status: Current Every Day Smoker     Packs/day: 1.00     Years: 51.00     Pack years: 51.00     Types: Cigarettes, Cigars, Vaping with nicotine     Start date: 1976    Smokeless tobacco: Never Used    Tobacco comment: in smoking program 5/13/19   Substance and Sexual Activity    Alcohol use: Yes     Comment: 1-3 times per week, 1-2 drink at a time.    Drug use: No    Sexual activity: Never     Partners: Male   Lifestyle    Physical activity:     Days per week: Not on file     Minutes per  session: Not on file    Stress: Not on file   Relationships    Social connections:     Talks on phone: Not on file     Gets together: Not on file     Attends Rastafarian service: Not on file     Active member of club or organization: Not on file     Attends meetings of clubs or organizations: Not on file     Relationship status: Not on file   Other Topics Concern    Not on file   Social History Narrative    Patient describes herself as disabled.       Objective:     Vital signs reviewed     GENERAL APPEARANCE:     The patient looks comfortable.    No signs of medical or psychiatric distress.    Normal breathing pattern.    No dysmorphic features    Normal eye contact.     GENERAL MEDICAL EXAM:    HEENT:  Head is atraumatic normocephalic. No tender temporal arteries.     Neck and Axillae: No JVD. No carotid bruits. No thyromegaly. No lymphadenopathy.    Cardiopulmonary: No cyanosis. No tachypnea. Normal respiratory effort.  Clear breath sounds. Normal heart sounds with regular rhythm and no murmurs.    Gastrointestinal:  No stomas or lesions. No hernias.  Abdomen is soft non-tender. No masses or organomegaly.    Skin, Hair and Nails: No pathognonomic skin rash. No neurofibromatosis.   No stigmata of autoimmune disease.     Limbs: No varicose veins. No edema. Symmetric pulses.     Muskoskeletal: No deformities.No spine tenderness.   No signs of longstanding neuropathy. No dislocations or fractures.        Neurologic Exam     Mental Status   Oriented to person, place, and time.   Registration: recalls 3 of 3 objects. Recall at 5 minutes: recalls 3 of 3 objects. Follows 3 step commands.   Attention: normal. Concentration: normal.   Speech: speech is normal   Level of consciousness: alert  Knowledge: good and consistent with education. Able to perform simple calculations.   Able to name object. Able to read. Able to repeat. Able to write. Normal comprehension.     Cranial Nerves     CN II   Visual fields full to  confrontation.   Visual acuity: normal  Right visual field deficit: none  Left visual field deficit: none     CN III, IV, VI   Pupils are equal, round, and reactive to light.  Extraocular motions are normal.   Right pupil: Size: 2 mm. Shape: regular. Reactivity: brisk. Consensual response: intact. Accommodation: intact.   Left pupil: Size: 2 mm. Shape: regular. Reactivity: brisk. Consensual response: intact. Accommodation: intact.   CN III: no CN III palsy  CN VI: no CN VI palsy  Nystagmus: none   Diplopia: none  Ophthalmoparesis: none  Upgaze: normal  Downgaze: normal  Conjugate gaze: present  Vestibulo-ocular reflex: present    CN V   Facial sensation intact.   Right facial sensation deficit: none  Left facial sensation deficit: none  Right corneal reflex: normal  Left corneal reflex: normal    CN VII   Right facial weakness: none  Left facial weakness: none  Right taste: normal  Left taste: normal    CN VIII   CN VIII normal.   Hearing: intact  Right Rinne: AC > BC  Left Rinne: AC > BC  Mejias: does not lateralize     CN IX, X   CN IX normal.   CN X normal.   Palate: symmetric  Right gag reflex: normal  Left gag reflex: normal    CN XI   CN XI normal.   Right sternocleidomastoid strength: normal  Left sternocleidomastoid strength: normal  Right trapezius strength: normal  Left trapezius strength: normal    CN XII   CN XII normal.   Tongue: not atrophic  Fasciculations: absent  Tongue deviation: none    Motor Exam   Muscle bulk: normal  Overall muscle tone: normal  Right arm tone: normal  Left arm tone: normal  Right arm pronator drift: absent  Left arm pronator drift: absent  Right leg tone: normal  Left leg tone: normal    Strength   Strength 5/5 throughout.   Right neck flexion: 5/5  Left neck flexion: 5/5  Right neck extension: 5/5  Left neck extension: 5/5  Right deltoid: 5/5  Left deltoid: 5/5  Right biceps: 5/5  Left biceps: 5/5  Right triceps: 5/5  Left triceps: 5/5  Right wrist flexion: 5/5  Left wrist  flexion: 5/5  Right wrist extension: 5/5  Left wrist extension: 5/5  Right interossei: 5/5  Left interossei: 5/5  Right abdominals: 5/5  Left abdominals: 5/5  Right iliopsoas: 5/5  Left iliopsoas: 5/5  Right quadriceps: 5/5  Left quadriceps: 5/5  Right hamstrin/5  Left hamstrin/5  Right glutei: 5/5  Left glutei: 5/5  Right anterior tibial: 5/5  Left anterior tibial: 5/5  Right posterior tibial: 5/5  Left posterior tibial: 5/5  Right peroneal: 5/5  Left peroneal: 5/5  Right gastroc: 5/5  Left gastroc: 5/5    Sensory Exam   Light touch normal.   Right arm light touch: normal  Left arm light touch: normal  Right leg light touch: normal  Left leg light touch: normal  Vibration normal.   Right arm vibration: normal  Left arm vibration: normal  Right leg vibration: normal  Left leg vibration: normal  Proprioception normal.   Right arm proprioception: normal  Left arm proprioception: normal  Right leg proprioception: normal  Left leg proprioception: normal  Pinprick normal.   Right arm pinprick: normal  Left arm pinprick: normal  Right leg pinprick: normal  Left leg pinprick: normal  Graphesthesia: normal  Stereognosis: normal    Gait, Coordination, and Reflexes     Gait  Gait: normal    Coordination   Romberg: negative  Finger to nose coordination: normal  Heel to shin coordination: normal  Tandem walking coordination: normal    Tremor   Resting tremor: absent  Intention tremor: absent  Action tremor: absent    Reflexes   Right brachioradialis: 2+  Left brachioradialis: 2+  Right biceps: 2+  Left biceps: 2+  Right triceps: 2+  Left triceps: 2+  Right patellar: 2+  Left patellar: 2+  Right achilles: 2+  Left achilles: 2+  Right : 2+  Left : 2+  Right plantar: normal  Left plantar: normal  Right Stacy: absent  Left Stacy: absent  Right ankle clonus: absent  Left ankle clonus: absent  Right pendular knee jerk: absent  Left pendular knee jerk: absent      Lab Results   Component Value Date    WBC 8.40  04/23/2019    HGB 12.3 04/23/2019    HCT 36.6 (L) 04/23/2019    MCV 93 04/23/2019     04/23/2019     Sodium   Date Value Ref Range Status   04/23/2019 142 136 - 145 mmol/L Final     Potassium   Date Value Ref Range Status   04/23/2019 4.1 3.5 - 5.1 mmol/L Final     Chloride   Date Value Ref Range Status   04/23/2019 105 95 - 110 mmol/L Final     CO2   Date Value Ref Range Status   04/23/2019 31 (H) 23 - 29 mmol/L Final     Glucose   Date Value Ref Range Status   04/23/2019 82 70 - 110 mg/dL Final     BUN, Bld   Date Value Ref Range Status   04/23/2019 15 8 - 23 mg/dL Final     Creatinine   Date Value Ref Range Status   04/23/2019 1.0 0.5 - 1.4 mg/dL Final     Calcium   Date Value Ref Range Status   04/23/2019 9.7 8.7 - 10.5 mg/dL Final     Total Protein   Date Value Ref Range Status   04/23/2019 6.8 6.0 - 8.4 g/dL Final     Albumin   Date Value Ref Range Status   04/23/2019 3.9 3.5 - 5.2 g/dL Final     Total Bilirubin   Date Value Ref Range Status   04/23/2019 0.1 0.1 - 1.0 mg/dL Final     Comment:     For infants and newborns, interpretation of results should be based  on gestational age, weight and in agreement with clinical  observations.  Premature Infant recommended reference ranges:  Up to 24 hours.............<8.0 mg/dL  Up to 48 hours............<12.0 mg/dL  3-5 days..................<15.0 mg/dL  6-29 days.................<15.0 mg/dL       Alkaline Phosphatase   Date Value Ref Range Status   04/23/2019 53 (L) 55 - 135 U/L Final     AST   Date Value Ref Range Status   04/23/2019 18 10 - 40 U/L Final     ALT   Date Value Ref Range Status   04/23/2019 12 10 - 44 U/L Final     Anion Gap   Date Value Ref Range Status   04/23/2019 6 (L) 8 - 16 mmol/L Final     eGFR if    Date Value Ref Range Status   04/23/2019 >60.0 >60 mL/min/1.73 m^2 Final     eGFR if non    Date Value Ref Range Status   04/23/2019 >60.0 >60 mL/min/1.73 m^2 Final     Comment:     Calculation used to obtain  the estimated glomerular filtration  rate (eGFR) is the CKD-EPI equation.        No results found for: [id number]  Lab Results   Component Value Date    TSH 0.728 08/27/2014 06-     CTA head showed no new change or additional aneurysms.    Reviewed the neuroimaging independently       Assessment:       1. Intractable migraine with aura without status migrainosus    2. Schizoaffective disorder, bipolar type    3. Bipolar 1 disorder    4. Anxiety    5. Cigarette nicotine dependence without complication    6. Essential hypertension    7. Hyperlipidemia, unspecified hyperlipidemia type    8. Anorexia    9. Long-term current use of high risk medication other than anticoagulant    10. Gastro-esophageal reflux disease without esophagitis    11. Nonadherence to medical treatment    12. Subjective vision disturbance, bilateral    13. History of hysterectomy for benign disease    14. Insufficient social insurance or welfare support    15. Primary localized osteoarthrosis of multiple sites    16. Peripheral polyneuropathy    17. Tension headache    18. Bilateral carpal tunnel syndrome    19. Chronic seasonal allergic rhinitis    20. Pre-diabetes    21. Subarachnoid hemorrhage from aneurysm of right anterior communicating artery    22. Need for pneumococcal vaccination    23. Cubital tunnel syndrome, bilateral    24. Lumbosacral radiculopathy at S1    25. Chronic nausea    26. Abnormal ECG    27. Other chest pain    28. Smoker    29. History of subarachnoid hemorrhage    30. Medication overuse headache        Plan:         CLASSICAL MIGRAINE VS. POST-COILING HEADACHE      Headache Diary     Lifestyle changes:    Good sleep hygiene  Avoid triggers like certain foods, TV and computer work   Minimize physical and emotional stress  Smoking avoidance and cessation  Tapering off caffeine   Good hydration   Small frequent meals   Moderate 30-minute-long aerobic exercises 3 times/week         Abortive  medications:    Triptans and NSAIDs C/I due SAH and Stroke.    Limit Fioricet to only 10-15 tabs a month     Preventative medications:    Since the patients headache is very frequent a lengthy discussion about preventative medications was carried out.  The patient understands that prevention means DECREASING frequency and severity and NOT elimination.    Amitriptyline/Elavil which can cause sleepiness, dry eyes, dry mouth, urinary retention and rarely cardiac arrhythmias          MEDICAL/SURGICAL COMORBIDITIES     All relevant medical comorbidities noted and managed by primary care physician and medical care team.          RTC in 2-3 months                   Judson Sawant MD, FAAN    Attending Neurologist/Epileptologist         Diplomate, American Board-Psychiatry and Neurology (Neurology)    Diplomate, American Board-Clinical Neurophysiology (Epilpesy-Neuromuscular)     Fellow, American Academy of Neurology

## 2019-07-25 ENCOUNTER — OFFICE VISIT (OUTPATIENT)
Dept: INTERNAL MEDICINE | Facility: CLINIC | Age: 59
End: 2019-07-25
Payer: MEDICAID

## 2019-07-25 VITALS
HEART RATE: 73 BPM | OXYGEN SATURATION: 98 % | SYSTOLIC BLOOD PRESSURE: 98 MMHG | BODY MASS INDEX: 22.48 KG/M2 | HEIGHT: 62 IN | WEIGHT: 122.13 LBS | TEMPERATURE: 97 F | DIASTOLIC BLOOD PRESSURE: 76 MMHG

## 2019-07-25 DIAGNOSIS — R11.0 CHRONIC NAUSEA: Primary | Chronic | ICD-10-CM

## 2019-07-25 DIAGNOSIS — Z12.2 ENCOUNTER FOR SCREENING FOR LUNG CANCER: ICD-10-CM

## 2019-07-25 DIAGNOSIS — K21.9 GASTRO-ESOPHAGEAL REFLUX DISEASE WITHOUT ESOPHAGITIS: Chronic | ICD-10-CM

## 2019-07-25 DIAGNOSIS — Z12.9 SCREENING FOR CANCER: ICD-10-CM

## 2019-07-25 DIAGNOSIS — J30.2 CHRONIC SEASONAL ALLERGIC RHINITIS: Chronic | ICD-10-CM

## 2019-07-25 DIAGNOSIS — F17.210 CIGARETTE NICOTINE DEPENDENCE WITHOUT COMPLICATION: Chronic | ICD-10-CM

## 2019-07-25 DIAGNOSIS — G44.209 TENSION HEADACHE: Chronic | ICD-10-CM

## 2019-07-25 DIAGNOSIS — M19.91 PRIMARY LOCALIZED OSTEOARTHROSIS OF MULTIPLE SITES: Chronic | ICD-10-CM

## 2019-07-25 PROBLEM — R94.31 ABNORMAL ECG: Status: RESOLVED | Noted: 2019-04-23 | Resolved: 2019-07-25

## 2019-07-25 PROCEDURE — 99214 PR OFFICE/OUTPT VISIT, EST, LEVL IV, 30-39 MIN: ICD-10-PCS | Mod: S$PBB,,, | Performed by: FAMILY MEDICINE

## 2019-07-25 PROCEDURE — 99214 OFFICE O/P EST MOD 30 MIN: CPT | Mod: PBBFAC | Performed by: FAMILY MEDICINE

## 2019-07-25 PROCEDURE — 99999 PR PBB SHADOW E&M-EST. PATIENT-LVL IV: ICD-10-PCS | Mod: PBBFAC,,, | Performed by: FAMILY MEDICINE

## 2019-07-25 PROCEDURE — 99214 OFFICE O/P EST MOD 30 MIN: CPT | Mod: S$PBB,,, | Performed by: FAMILY MEDICINE

## 2019-07-25 PROCEDURE — 99999 PR PBB SHADOW E&M-EST. PATIENT-LVL IV: CPT | Mod: PBBFAC,,, | Performed by: FAMILY MEDICINE

## 2019-07-25 RX ORDER — ONDANSETRON 8 MG/1
8 TABLET, ORALLY DISINTEGRATING ORAL 2 TIMES DAILY PRN
Qty: 45 TABLET | Refills: 5 | Status: SHIPPED | OUTPATIENT
Start: 2019-07-25 | End: 2020-02-02

## 2019-07-25 RX ORDER — FLUTICASONE PROPIONATE 50 MCG
2 SPRAY, SUSPENSION (ML) NASAL DAILY
Qty: 1 BOTTLE | Refills: 11 | Status: SHIPPED | OUTPATIENT
Start: 2019-07-25 | End: 2020-07-02 | Stop reason: SDUPTHER

## 2019-07-25 RX ORDER — BUTALBITAL, ACETAMINOPHEN AND CAFFEINE 50; 325; 40 MG/1; MG/1; MG/1
1 TABLET ORAL EVERY 8 HOURS PRN
Qty: 45 TABLET | Refills: 1 | Status: SHIPPED | OUTPATIENT
Start: 2019-07-25 | End: 2019-09-20 | Stop reason: SDUPTHER

## 2019-07-25 RX ORDER — CYPROHEPTADINE HYDROCHLORIDE 4 MG/1
4 TABLET ORAL 3 TIMES DAILY PRN
Qty: 90 TABLET | Refills: 11 | Status: SHIPPED | OUTPATIENT
Start: 2019-07-25 | End: 2020-07-02 | Stop reason: SDUPTHER

## 2019-07-25 RX ORDER — PANTOPRAZOLE SODIUM 40 MG/1
40 TABLET, DELAYED RELEASE ORAL DAILY
Qty: 30 TABLET | Refills: 11 | Status: SHIPPED | OUTPATIENT
Start: 2019-07-25 | End: 2020-07-02 | Stop reason: SDUPTHER

## 2019-07-25 RX ORDER — TRAMADOL HYDROCHLORIDE 50 MG/1
50 TABLET ORAL EVERY 8 HOURS PRN
Qty: 90 TABLET | Refills: 5 | Status: SHIPPED | OUTPATIENT
Start: 2019-07-25 | End: 2020-02-02

## 2019-07-25 NOTE — PROGRESS NOTES
CHIEF COMPLAINT  Follow-up and Medication Refill      HISTORY, ASSESSMENT, and PLAN    1. Chronic nausea    2. Gastro-esophageal reflux disease without esophagitis    3. Primary localized osteoarthrosis of multiple sites    4. Tension headache    5. Chronic seasonal allergic rhinitis    6. Screening for cancer    7. Encounter for screening for lung cancer    8. Cigarette nicotine dependence without complication      Nausea is improved and she is eating better with Periactin. She wants to continue that medicine.    Gastroesophageal reflux disease symptoms are controlled when she takes her pantoprazole.    Chronic osteoarthritis of multiple sites, primarily weight-bearing joints and back, stable. Satisfactory symptom control with current treatment.    Episodic tension type headache control with relatively in frequent use of Fioricet.    Allergy symptoms mildly worse.    We discussed risks and benefits of lung cancer screening.    Smoking cessation encouraged.    Orders Placed This Encounter    CT Chest Lung Screening Low Dose    cyproheptadine (PERIACTIN) 4 mg tablet    pantoprazole (PROTONIX) 40 MG tablet    traMADol (ULTRAM) 50 mg tablet    butalbital-acetaminophen-caffeine -40 mg (FIORICET, ESGIC) -40 mg per tablet    fluticasone propionate (FLONASE) 50 mcg/actuation nasal spray    ondansetron (ZOFRAN-ODT) 8 MG TbDL       Problem List Items Addressed This Visit        Neuro    Tension headache (Chronic)    Relevant Medications    butalbital-acetaminophen-caffeine -40 mg (FIORICET, ESGIC) -40 mg per tablet       GI    Chronic nausea - Primary (Chronic)    Relevant Medications    cyproheptadine (PERIACTIN) 4 mg tablet    ondansetron (ZOFRAN-ODT) 8 MG TbDL    Gastro-esophageal reflux disease without esophagitis (Chronic)    Relevant Medications    pantoprazole (PROTONIX) 40 MG tablet       Orthopedic    Primary localized osteoarthrosis of multiple sites (Chronic)    Relevant Medications     traMADol (ULTRAM) 50 mg tablet       Other    Chronic seasonal allergic rhinitis (Chronic)    Relevant Medications    fluticasone propionate (FLONASE) 50 mcg/actuation nasal spray    Cigarette nicotine dependence without complication (Chronic)    Relevant Orders    CT Chest Lung Screening Low Dose      Other Visit Diagnoses     Screening for cancer        Encounter for screening for lung cancer        Relevant Orders    CT Chest Lung Screening Low Dose           Outpatient Medications Prior to Visit   Medication Sig Dispense Refill    albuterol (PROVENTIL) 2.5 mg /3 mL (0.083 %) nebulizer solution Take 2.5 mg by nebulization 2 (two) times daily as needed for Wheezing. Rescue      ALBUTEROL SULFATE INHL albuterol sulfate      alprazolam (XANAX) 0.5 MG tablet Take 0.5 mg by mouth daily as needed.       atorvastatin (LIPITOR) 40 MG tablet atorvastatin 40 mg tablet   TK 1 T PO QPM      CALCIUM CARBONATE/VITAMIN D3 (CALCIUM 600 + D,3, ORAL) Take 2 tablets by mouth once daily.      escitalopram (LEXAPRO) 10 MG tablet Take 20 mg by mouth once daily.       fluticasone-salmeterol diskus inhaler 250-50 mcg Advair Diskus 250 mcg-50 mcg/dose powder for inhalation   Inhale 1 puff twice a day by inhalation route.      OXcarbazepine (TRILEPTAL) 300 MG Tab Take 2 tablets (600 mg total) by mouth 2 (two) times daily. 45 tablet 0    polyethylene glycol (GLYCOLAX) 17 gram/dose powder 17 g.      QUEtiapine (SEROQUEL) 100 MG Tab Take 100 mg by mouth once daily.      tiZANidine (ZANAFLEX) 4 MG tablet tizanidine 4 mg tablet   TK 1 T PO BID      butalbital-acetaminophen-caffeine -40 mg (FIORICET, ESGIC) -40 mg per tablet Take 1 tablet by mouth every 8 (eight) hours as needed for Headaches. DO NOT TAKE MORE THAN 5 PER WEEK 45 tablet 1    ondansetron (ZOFRAN-ODT) 8 MG TbDL ondansetron 8 mg disintegrating tablet   DISSOLVE 1 T ON THE TONGUE Q 8 H PRF NAUSEA      pantoprazole (PROTONIX) 40 MG tablet Take 1 tablet  (40 mg total) by mouth once daily. 30 tablet 11    traMADol (ULTRAM) 50 mg tablet Take 1 tablet (50 mg total) by mouth every 8 (eight) hours as needed for Pain. 90 tablet 5    amitriptyline (ELAVIL) 75 MG tablet Take 1 tablet (75 mg total) by mouth every evening. 30 tablet 11    nicotine (NICODERM CQ) 21 mg/24 hr Place 1 patch onto the skin once daily. 14 patch 0    nitroGLYCERIN (NITROSTAT) 0.4 MG SL tablet Place 1 tablet (0.4 mg total) under the tongue every 5 (five) minutes as needed for Chest pain. 25 tablet 5    nicotine polacrilex (NICORETTE) 4 MG Gum Take 1 each (4 mg total) by mouth 6 (six) times daily. 310 each 0    OXcarbazepine (TRILEPTAL) 300 MG Tab oxcarbazepine 300 mg tablet   Take 1 tablet twice a day by oral route.      varenicline (CHANTIX) 0.5 MG Tab Chantix 0.5 mg tablet   Take 1 tablet twice a day by oral route for 3 days.       No facility-administered medications prior to visit.         Medications Ordered This Encounter   Medications    butalbital-acetaminophen-caffeine -40 mg (FIORICET, ESGIC) -40 mg per tablet     Sig: Take 1 tablet by mouth every 8 (eight) hours as needed for Headaches. DO NOT TAKE MORE THAN 5 PER WEEK     Dispense:  45 tablet     Refill:  1    cyproheptadine (PERIACTIN) 4 mg tablet     Sig: Take 1 tablet (4 mg total) by mouth 3 (three) times daily as needed (FOR APPETITE).     Dispense:  90 tablet     Refill:  11    fluticasone propionate (FLONASE) 50 mcg/actuation nasal spray     Si sprays (100 mcg total) by Each Nare route once daily.     Dispense:  1 Bottle     Refill:  11    ondansetron (ZOFRAN-ODT) 8 MG TbDL     Sig: Take 1 tablet (8 mg total) by mouth 2 (two) times daily as needed (FOR NAUSEA).     Dispense:  45 tablet     Refill:  5    pantoprazole (PROTONIX) 40 MG tablet     Sig: Take 1 tablet (40 mg total) by mouth once daily.     Dispense:  30 tablet     Refill:  11    traMADol (ULTRAM) 50 mg tablet     Sig: Take 1 tablet (50 mg  "total) by mouth every 8 (eight) hours as needed for Pain.     Dispense:  90 tablet     Refill:  5     Medically necessary.       Medications Discontinued During This Encounter   Medication Reason    nicotine polacrilex (NICORETTE) 4 MG Gum Patient no longer taking    OXcarbazepine (TRILEPTAL) 300 MG Tab Patient no longer taking    varenicline (CHANTIX) 0.5 MG Tab Patient no longer taking    ondansetron (ZOFRAN-ODT) 8 MG TbDL Reorder    pantoprazole (PROTONIX) 40 MG tablet Reorder    traMADol (ULTRAM) 50 mg tablet Reorder    butalbital-acetaminophen-caffeine -40 mg (FIORICET, ESGIC) -40 mg per tablet Reorder        Follow up in about 5 months (around 12/25/2019) for re-evaluate problem(s) discussed today.    REVIEW OF SYSTEMS  CONSTITUTIONAL: No fever reported.  CARDIOVASCULAR: No chest pain reported.  PULMONARY: No trouble breathing reported.     PHYSICAL EXAM  Vitals:    07/25/19 1142   BP: 98/76   BP Location: Right arm   Patient Position: Sitting   Pulse: 73   Temp: 96.6 °F (35.9 °C)   TempSrc: Tympanic   SpO2: 98%   Weight: 55.4 kg (122 lb 2.2 oz)   Height: 5' 2" (1.575 m)     CONSTITUTIONAL: Vital signs noted. No apparent distress. Does not appear acutely ill or septic. Appears adequately hydrated.  ENT: Oropharynx moist.  PULM: Breathing unlabored.  CV: Heart regular.  ABDOMEN: Soft and nontender. Bowel sounds present.   DERM: Skin normothermic.  PSYCHIATRIC: Alert and conversant. Grossly oriented. Mood is grossly neutral. Affect appropriate. Judgment and insight not grossly compromised.     Documentation entered by me for this encounter may have been done in part using speech-recognition technology. Although I have made an effort to ensure accuracy, "sound like" errors may exist and should be interpreted in context. -CECILIA Boothe MD.   "

## 2019-08-20 ENCOUNTER — TELEPHONE (OUTPATIENT)
Dept: INTERNAL MEDICINE | Facility: CLINIC | Age: 59
End: 2019-08-20

## 2019-08-20 NOTE — TELEPHONE ENCOUNTER
----- Message from Meme Brewer sent at 8/20/2019 10:04 AM CDT -----  Contact: Pt  Please give pt a call at .[phone] (home) she is calling to see if she can be worked in for severe headaches. Due to medicaid December is the next available.

## 2019-08-20 NOTE — TELEPHONE ENCOUNTER
Spoke with patient and she stated that she is still having severe headaches and wants to know if she can be worked in for an appointment as soon as possible.

## 2019-08-20 NOTE — TELEPHONE ENCOUNTER
Spoke with patient and informed her that Dr. Boothe said if her headache is severe and the Fioricet is not helping, then she should report to the emergency room. She stated that her blood pressure is also high at 161/114 now and earlier today it was higher. I informed her due to her history of stroke, she should go directly to the emergency room. She verbalized understanding.

## 2019-09-20 DIAGNOSIS — G44.209 TENSION HEADACHE: Chronic | ICD-10-CM

## 2019-10-08 ENCOUNTER — TELEPHONE (OUTPATIENT)
Dept: INTERNAL MEDICINE | Facility: CLINIC | Age: 59
End: 2019-10-08

## 2019-10-08 NOTE — TELEPHONE ENCOUNTER
----- Message from Tammie Aparicio sent at 10/8/2019 11:24 AM CDT -----  Contact: pt  .Type:  Sooner Apoointment Request    Caller is requesting a sooner appointment.  Caller declined first available appointment listed below.  Caller will not accept being placed on the waitlist and is requesting a message be sent to doctor.  Name of Caller:.Karyna Geller  When is the first available appointment?12/04/19  Symptoms:n/a  Would the patient rather a call back or a response via [facility]? Call back  Best Call Back Number:[phone]  Additional Information: pt would like to come in 10/11/19 in the evening around 4 due to sister only transportion

## 2019-10-08 NOTE — TELEPHONE ENCOUNTER
Spoke with patient. She would like to know if you will see her this Friday 10/11/19 at 4 pm. She has someone who can only bring her at that time. It will be for a f/u and med refill. Told her I will have to check with Dr Boothe to get the ok and I will call back once he responds. She only prefers to see you / no mid levels.

## 2019-10-09 RX ORDER — BUTALBITAL, ACETAMINOPHEN AND CAFFEINE 50; 325; 40 MG/1; MG/1; MG/1
1 TABLET ORAL EVERY 8 HOURS PRN
Qty: 45 TABLET | Refills: 0 | Status: SHIPPED | OUTPATIENT
Start: 2019-10-09 | End: 2019-12-02 | Stop reason: SDUPTHER

## 2019-10-09 NOTE — TELEPHONE ENCOUNTER
Requested Prescriptions     Signed Prescriptions Disp Refills    butalbital-acetaminophen-caffeine -40 mg (FIORICET, ESGIC) -40 mg per tablet 45 tablet 0     Sig: TAKE 1 TABLET BY MOUTH EVERY 8 (EIGHT) HOURS AS NEEDED FOR HEADACHES. DO NOT TAKE MORE THAN 5 PER WEEK     Authorizing Provider: CECILIA CHEUNG

## 2019-10-24 ENCOUNTER — TELEPHONE (OUTPATIENT)
Dept: NEUROLOGY | Facility: CLINIC | Age: 59
End: 2019-10-24

## 2019-10-24 ENCOUNTER — OFFICE VISIT (OUTPATIENT)
Dept: NEUROLOGY | Facility: CLINIC | Age: 59
End: 2019-10-24
Payer: MEDICAID

## 2019-10-24 ENCOUNTER — HOSPITAL ENCOUNTER (OUTPATIENT)
Dept: RADIOLOGY | Facility: HOSPITAL | Age: 59
Discharge: HOME | End: 2019-10-24
Attending: PSYCHIATRY & NEUROLOGY
Payer: MEDICAID

## 2019-10-24 VITALS
HEIGHT: 62 IN | WEIGHT: 140 LBS | BODY MASS INDEX: 25.76 KG/M2 | DIASTOLIC BLOOD PRESSURE: 72 MMHG | HEART RATE: 82 BPM | SYSTOLIC BLOOD PRESSURE: 118 MMHG | RESPIRATION RATE: 16 BRPM

## 2019-10-24 DIAGNOSIS — F17.210 CIGARETTE NICOTINE DEPENDENCE WITHOUT COMPLICATION: Chronic | ICD-10-CM

## 2019-10-24 DIAGNOSIS — Z86.79 HISTORY OF SUBARACHNOID HEMORRHAGE: ICD-10-CM

## 2019-10-24 DIAGNOSIS — Z59.7: Chronic | ICD-10-CM

## 2019-10-24 DIAGNOSIS — F17.200 SMOKER: ICD-10-CM

## 2019-10-24 DIAGNOSIS — K21.9 GASTRO-ESOPHAGEAL REFLUX DISEASE WITHOUT ESOPHAGITIS: Chronic | ICD-10-CM

## 2019-10-24 DIAGNOSIS — I10 ESSENTIAL HYPERTENSION: Chronic | ICD-10-CM

## 2019-10-24 DIAGNOSIS — Z90.710 HISTORY OF HYSTERECTOMY FOR BENIGN DISEASE: Chronic | ICD-10-CM

## 2019-10-24 DIAGNOSIS — G43.119 INTRACTABLE MIGRAINE WITH AURA WITHOUT STATUS MIGRAINOSUS: ICD-10-CM

## 2019-10-24 DIAGNOSIS — Z79.899 LONG-TERM CURRENT USE OF HIGH RISK MEDICATION OTHER THAN ANTICOAGULANT: Chronic | ICD-10-CM

## 2019-10-24 DIAGNOSIS — G44.209 TENSION HEADACHE: Chronic | ICD-10-CM

## 2019-10-24 DIAGNOSIS — I67.1 CEREBRAL ANEURYSM: ICD-10-CM

## 2019-10-24 DIAGNOSIS — Z91.199 NONADHERENCE TO MEDICAL TREATMENT: Chronic | ICD-10-CM

## 2019-10-24 DIAGNOSIS — Z23 NEED FOR PNEUMOCOCCAL VACCINATION: ICD-10-CM

## 2019-10-24 DIAGNOSIS — M19.91 PRIMARY LOCALIZED OSTEOARTHROSIS OF MULTIPLE SITES: Chronic | ICD-10-CM

## 2019-10-24 DIAGNOSIS — M54.17 LUMBOSACRAL RADICULOPATHY AT S1: ICD-10-CM

## 2019-10-24 DIAGNOSIS — R07.89 OTHER CHEST PAIN: ICD-10-CM

## 2019-10-24 DIAGNOSIS — R11.0 CHRONIC NAUSEA: Chronic | ICD-10-CM

## 2019-10-24 DIAGNOSIS — H53.10 SUBJECTIVE VISION DISTURBANCE, BILATERAL: Chronic | ICD-10-CM

## 2019-10-24 DIAGNOSIS — I67.1 CEREBRAL ANEURYSM: Primary | ICD-10-CM

## 2019-10-24 DIAGNOSIS — F31.9 BIPOLAR 1 DISORDER: Chronic | ICD-10-CM

## 2019-10-24 DIAGNOSIS — G56.23 CUBITAL TUNNEL SYNDROME, BILATERAL: ICD-10-CM

## 2019-10-24 DIAGNOSIS — G62.9 PERIPHERAL POLYNEUROPATHY: Chronic | ICD-10-CM

## 2019-10-24 DIAGNOSIS — G56.03 BILATERAL CARPAL TUNNEL SYNDROME: Chronic | ICD-10-CM

## 2019-10-24 DIAGNOSIS — F41.9 ANXIETY: Chronic | ICD-10-CM

## 2019-10-24 DIAGNOSIS — I60.2: ICD-10-CM

## 2019-10-24 DIAGNOSIS — J30.2 CHRONIC SEASONAL ALLERGIC RHINITIS: Chronic | ICD-10-CM

## 2019-10-24 DIAGNOSIS — R63.0 ANOREXIA: Chronic | ICD-10-CM

## 2019-10-24 DIAGNOSIS — G44.40 MEDICATION OVERUSE HEADACHE: ICD-10-CM

## 2019-10-24 DIAGNOSIS — F25.0 SCHIZOAFFECTIVE DISORDER, BIPOLAR TYPE: Primary | Chronic | ICD-10-CM

## 2019-10-24 DIAGNOSIS — E78.5 HYPERLIPIDEMIA, UNSPECIFIED HYPERLIPIDEMIA TYPE: Chronic | ICD-10-CM

## 2019-10-24 PROCEDURE — 99214 PR OFFICE/OUTPT VISIT, EST, LEVL IV, 30-39 MIN: ICD-10-PCS | Mod: S$PBB,,, | Performed by: PSYCHIATRY & NEUROLOGY

## 2019-10-24 PROCEDURE — 99999 PR PBB SHADOW E&M-EST. PATIENT-LVL IV: CPT | Mod: PBBFAC,,, | Performed by: PSYCHIATRY & NEUROLOGY

## 2019-10-24 PROCEDURE — 99214 OFFICE O/P EST MOD 30 MIN: CPT | Mod: S$PBB,,, | Performed by: PSYCHIATRY & NEUROLOGY

## 2019-10-24 PROCEDURE — 25500020 PHARM REV CODE 255: Performed by: PSYCHIATRY & NEUROLOGY

## 2019-10-24 PROCEDURE — 99999 PR PBB SHADOW E&M-EST. PATIENT-LVL IV: ICD-10-PCS | Mod: PBBFAC,,, | Performed by: PSYCHIATRY & NEUROLOGY

## 2019-10-24 PROCEDURE — 70496 CT ANGIOGRAPHY HEAD: CPT | Mod: TC

## 2019-10-24 PROCEDURE — 99214 OFFICE O/P EST MOD 30 MIN: CPT | Mod: PBBFAC | Performed by: PSYCHIATRY & NEUROLOGY

## 2019-10-24 RX ADMIN — IOHEXOL 100 ML: 350 INJECTION, SOLUTION INTRAVENOUS at 01:10

## 2019-10-24 SDOH — SOCIAL DETERMINANTS OF HEALTH (SDOH): INSUFFICIENT SOCIAL INSURANCE AND WELFARE SUPPORT: Z59.7

## 2019-10-24 NOTE — PATIENT INSTRUCTIONS
What Is a Brain Aneurysm?  Arteries are the blood vessels that lead from the heart to organs such as the brain. A brain aneurysm is a balloon-like bulge in the wall of a brain artery. If this bulge tears and bleeds, nearby cells may be damaged. A brain aneurysm can occur in an artery wall that is weak or has a defect. Aneurysm is often associated with hardening of the arteries. High blood pressure, heredity, smoking, alcohol abuse, cocaine abuse, and a head injury are also risk factors.    Symptoms  In most cases, a brain aneurysm has no symptoms until it bleeds or tears. Symptoms of bleeding or tearing include a combination of:  · Severe headache, nausea, and vomiting  · Neck stiffness  · Brief blackout  · Confusion or sluggishness  · Vision or speech problems  · Paralysis or weakness on one side of the body  · Clumsiness  · Jerking movements, such as seizures or convulsions  · Coma  Prompt treatment can save a life  A brain aneurysm needs to be evaluated immediately and treated if possible. Doing so may save a person's life. Some aneurysmal bleeding can only be treated with supportive medical care. If the aneurysm has bled, treatment may not reverse the resulting brain damage. But in many cases, surgery may help prevent more bleeding, remove trapped blood in and around the brain, or relieve excessive brain pressure. Other forms of therapy, such as envascular coiling or microvascular clipping, to prevent additional bleeding may be considered.  Note to family and friends  Your loved ones healthcare team will answer any questions you have. After special tests are done and the cause is known, specialists are called. Treatment will begin right away if the aneurysm has already bled. The person may be too ill to know whats going on. You may need to decide on the extent of his or her treatment. Choose a few family members to talk to the healthcare team. These family members can share what they learn with others. Doing  this will make it simpler to keep everyone informed. Sometimes, the aneurysm leads to devastating brain injury that results in such severe injury that life support is required. Sometimes, even the most intensive treatment is not effective in saving someone's life.   Date Last Reviewed: 9/14/2015 © 2000-2017 [employer]. [address] [place] [address], [place] [address]. All rights reserved. This information is not intended as a substitute for professional medical care. Always follow your healthcare professional's instructions.        Diagnosing a Brain Aneurysm  Often, the first symptom of a brain aneurysm is a sudden, severe headache. Many patients describe it as the worst headache of their lives. A physical exam and a health history help to pinpoint the problem. If a brain aneurysm is suspected, special tests can confirm it. Test results can also help your health care team plan treatment.        A CT scan can show blood that has leaked from a torn aneurysm.    A CT scan  A computerized tomography (CT) scan is a fast and painless test that creates an image of your brain. It shows whether any blood has leaked around or into your brain. In some cases, CT angiography may be done. This test produces an enhanced image that can show a brain aneurysm. For the test, a contrast fluid is injected into your vein. This fluid travels to your brain arteries. Then the CT scan is done to locate bleeding or other problems.  An MRI scan  A magnetic resonance imaging (MRI) scan uses computer-generated radio waves and a powerful magnetic field to create detailed pictures of the brain. MR angiography is performed similarly to CT angiography above, produces more detailed images of blood vessels.  A spinal tap  Cerebrospinal fluid (CSF) flows in and around your brain. It also flows around your spinal cord. A spinal tap, also called a lumbar puncture, can be done to show if blood has leaked into your CSF. Your health care provider  numbs your lower back with a local anesthetic (pain medicine). Then he or she inserts a needle into your lower spine. Fluid is removed through the needle and examined to check if blood is present and rule out other problems, such as infection.     An arteriogram uses a contrast fluid to show an aneurysm.    An arteriogram (or angiogram)  An arteriogram shows the size, shape, and location of an aneurysm. It can also reveal any vasospasm. This can occur after an aneurysm ruptures. It results in limiting the normal blood flow to your brain. For this procedure, you receive a local pain medicine. Then, your doctor guides a thin tube (catheter) through your arteries, from your groin to your neck. Contrast fluid is released. It travels to your brain. X-rays are then taken, showing your arteries and any aneurysms. This test has some risks. Your doctor will  explain them before the test. If vasospasm is present, treatments to limit its effects can be used.  Date Last Reviewed: 9/14/2015 © 2000-2017 [employer]. [address] [place] [address]. All rights reserved. This information is not intended as a substitute for professional medical care. Always follow your healthcare professional's instructions.        Bleeding Brain Aneurysms  When an aneurysm bleeds, most often the bleeding stops quickly on its own. But if the blood touches brain cells, the cells may be damaged. Blood in the cerebrospinal fluid (CSF) increases pressure on the brain, which can damage brain tissue. Leaked blood may also touch nearby arteries. This may cause these arteries to spasm and narrow, which decreases oxygen flow to the brain. This can cause further damage to the brain.    Damage to brain cells  Blood from an aneurysm can leak into the CSF in the space around the brain (the subarachnoid space). The pool of blood forms a clot, called a hematoma. Blood can irritate, damage, or destroy nearby brain cells. This may cause  problems with body functions or mental skills.    Brain fluid buildup  Blood from a torn aneurysm can block CSF circulation. This can lead to fluid buildup and increased pressure on the brain. The open spaces in the brain (ventricles) then enlarge. This problem is called hydrocephalus. It can make a patient lethargic, and confused. To remove leaked blood and trapped CSF, a drain may be placed in the ventricles.    Narrowing arteries  An artery may clamp down if leaked blood touches it. This response, called vasospasm, may happen up to 14 days after an aneurysm bleeds. Vasospasm can decrease blood needed in other parts of the brain. It can be fatal. To treat vasospasm, the person's blood pressure and fluid intake are increased. Drugs such as calcium channel blockers can be given in your veins or arteries. This increases the force of the blood and widens the artery.  A note to the family  The health care team will want to prevent further bleeding and control complications. The timing of surgery may depend on your loved ones condition. After treatment, your loved one will be closely observed to see how well the surgery worked.   Date Last Reviewed: 9/14/2015  © 8541-4083 [employer]. [address] [place] [address]. All rights reserved. This information is not intended as a substitute for professional medical care. Always follow your healthcare professional's instructions.        Types of Brain Aneurysms  There are several types of brain aneurysms classified by shape. These are saccular (also known as berry aneurysms), which are by far the most common. The less common fusiform and dissecting types. Brain aneurysms may also be classified by size (small, large, and giant), by their location, or by the cause such as a mycotic aneurysm (caused by an infection).  Most aneurysms occur where an artery branches, often at the base of the brain. The treatment options vary, depending on the type of  aneurysm, its size, and its location.    Date Last Reviewed: 9/14/2015  © 8578-0191 [employer]. [address] [place] [address], [place] [address]. All rights reserved. This information is not intended as a substitute for professional medical care. Always follow your healthcare professional's instructions.

## 2019-10-24 NOTE — TELEPHONE ENCOUNTER
----- Message from Judson Sawant MD sent at 10/24/2019  4:50 PM CDT -----  [id number]    CTA Brain No new changes     Patient is status post prior posterior communicating artery aneurysm coiling.  No new aneurysms are identified.  No evidence of residual filling or enlargement of the aneurysm identified.  No interval change compared to the prior study.

## 2019-10-24 NOTE — PROGRESS NOTES
[id number]    CTA Brain No new changes     Patient is status post prior posterior communicating artery aneurysm coiling.  No new aneurysms are identified.  No evidence of residual filling or enlargement of the aneurysm identified.  No interval change compared to the prior study.

## 2019-10-24 NOTE — PROGRESS NOTES
"Subjective:       Patient ID: Karyna Geller is a 59 y.o. female.    Chief Complaint: Headache    Headache    Pertinent negatives include no back pain, dizziness, hearing loss, nausea, neck pain, numbness, photophobia, seizures, tinnitus, vomiting or weakness.        BACKGROUND HISTORY       The patient is here for headache evaluation. The patient sustained Aneurysmal SAH in 2014 S/P RT PCOM coiling. Since then she has been having headaches. On 06- CTA head showed no new change or additional aneurysms. The headaches progress from different areas and involves different sides with a throbbing nature. It can involve the right side, the left side or both sides. The patient does report visual aura. The headache is associated with nausea and light, sound, temperature and smell sensitivity.  The patient was recently started on barbiturate-narcotic-caffeine-based medication and unfortunately she has been taking it daily. The headaches last for several hours. The patient feels "down" during the headache with no racing.  The headache builds up slowly towards the middle of the day or the end of the day.   The headache is associated with scalp sensitivity. Lack of sleep is a significant trigger of the headache and she does not sleep well at night.  No neck pain with radiation. No TMJ problems.  The patient denies blurry vision and ear ringing. The headache is not positional or postural but worsens with movement and valsalva. Sleep help lessen the headache. No history of seizures. No vertigo. No blacking out.  No fever, chills or rigors. No history of significant memory loss..  The patient cannot think of food that aggravates the headache. Tried Amitriptyline/Elavil.      INTERVAL HISTORY    Amitriptyline/Elavil 75 mg QHS has reduced the headaches by 45-50%.    No red flags for SAH.        Review of Systems   Constitutional: Negative for appetite change and fatigue.   HENT: Negative for hearing loss and tinnitus.  "   Eyes: Negative for photophobia and visual disturbance.   Respiratory: Negative for apnea and shortness of breath.    Cardiovascular: Negative for chest pain and palpitations.   Gastrointestinal: Negative for nausea and vomiting.   Endocrine: Negative for cold intolerance and heat intolerance.   Genitourinary: Negative for difficulty urinating and urgency.   Musculoskeletal: Negative for arthralgias, back pain, gait problem, joint swelling, myalgias, neck pain and neck stiffness.   Skin: Negative for color change and rash.   Allergic/Immunologic: Negative for environmental allergies and immunocompromised state.   Neurological: Positive for headaches. Negative for dizziness, tremors, seizures, syncope, facial asymmetry, speech difficulty, weakness, light-headedness and numbness.   Hematological: Negative for adenopathy. Does not bruise/bleed easily.   Psychiatric/Behavioral: Positive for dysphoric mood. Negative for agitation, behavioral problems, confusion, decreased concentration, hallucinations, self-injury, sleep disturbance and suicidal ideas. The patient is nervous/anxious. The patient is not hyperactive.          Current Outpatient Medications:     albuterol (PROVENTIL) 2.5 mg /3 mL (0.083 %) nebulizer solution, Take 2.5 mg by nebulization 2 (two) times daily as needed for Wheezing. Rescue, Disp: , Rfl:     ALBUTEROL SULFATE INHL, albuterol sulfate, Disp: , Rfl:     alprazolam (XANAX) 0.5 MG tablet, Take 0.5 mg by mouth daily as needed. , Disp: , Rfl:     amitriptyline (ELAVIL) 75 MG tablet, Take 1 tablet (75 mg total) by mouth every evening., Disp: 30 tablet, Rfl: 11    atorvastatin (LIPITOR) 40 MG tablet, atorvastatin 40 mg tablet  TK 1 T PO QPM, Disp: , Rfl:     butalbital-acetaminophen-caffeine -40 mg (FIORICET, ESGIC) -40 mg per tablet, TAKE 1 TABLET BY MOUTH EVERY 8 (EIGHT) HOURS AS NEEDED FOR HEADACHES. DO NOT TAKE MORE THAN 5 PER WEEK, Disp: 45 tablet, Rfl: 0    CALCIUM  CARBONATE/VITAMIN D3 (CALCIUM 600 + D,3, ORAL), Take 2 tablets by mouth once daily., Disp: , Rfl:     cyproheptadine (PERIACTIN) 4 mg tablet, Take 1 tablet (4 mg total) by mouth 3 (three) times daily as needed (FOR APPETITE)., Disp: 90 tablet, Rfl: 11    escitalopram (LEXAPRO) 10 MG tablet, Take 20 mg by mouth once daily. , Disp: , Rfl:     fluticasone propionate (FLONASE) 50 mcg/actuation nasal spray, 2 sprays (100 mcg total) by Each Nare route once daily., Disp: 1 Bottle, Rfl: 11    fluticasone-salmeterol diskus inhaler 250-50 mcg, Advair Diskus 250 mcg-50 mcg/dose powder for inhalation  Inhale 1 puff twice a day by inhalation route., Disp: , Rfl:     nicotine (NICODERM CQ) 21 mg/24 hr, Place 1 patch onto the skin once daily., Disp: 14 patch, Rfl: 0    nitroGLYCERIN (NITROSTAT) 0.4 MG SL tablet, Place 1 tablet (0.4 mg total) under the tongue every 5 (five) minutes as needed for Chest pain., Disp: 25 tablet, Rfl: 5    ondansetron (ZOFRAN-ODT) 8 MG TbDL, Take 1 tablet (8 mg total) by mouth 2 (two) times daily as needed (FOR NAUSEA)., Disp: 45 tablet, Rfl: 5    OXcarbazepine (TRILEPTAL) 300 MG Tab, Take 2 tablets (600 mg total) by mouth 2 (two) times daily., Disp: 45 tablet, Rfl: 0    pantoprazole (PROTONIX) 40 MG tablet, Take 1 tablet (40 mg total) by mouth once daily., Disp: 30 tablet, Rfl: 11    polyethylene glycol (GLYCOLAX) 17 gram/dose powder, 17 g., Disp: , Rfl:     QUEtiapine (SEROQUEL) 100 MG Tab, Take 100 mg by mouth once daily., Disp: , Rfl:     tiZANidine (ZANAFLEX) 4 MG tablet, tizanidine 4 mg tablet  TK 1 T PO BID, Disp: , Rfl:     traMADol (ULTRAM) 50 mg tablet, Take 1 tablet (50 mg total) by mouth every 8 (eight) hours as needed for Pain., Disp: 90 tablet, Rfl: 5  Past Medical History:   Diagnosis Date    Anemia     Aneurysm     Anorexia 4/26/2017    Anxiety     Asthma     Bipolar disorder     Carpal tunnel syndrome, bilateral     Cerebral aneurysm     Chronic nausea 11/15/2018     Chronic pain syndrome     Cigarette nicotine dependence     Depression     GERD (gastroesophageal reflux disease)     Hyperlipidemia     Hypertension     Insomnia     Osteoporosis     Schizophrenia     Stroke     Subarachnoid hemorrhage      Past Surgical History:   Procedure Laterality Date    BUNIONECTOMY Right     COLONOSCOPY W/ BIOPSIES AND POLYPECTOMY      HYSTERECTOMY      PARTIAL HYSTERECTOMY      Bilateral Ovaries Remain    TRANSCRANIAL DOPPLER STUDY - COMPLETE  8/28/2014         TRANSCRANIAL DOPPLER STUDY - COMPLETE  8/29/2014         TRANSCRANIAL DOPPLER STUDY - COMPLETE  8/30/2014         TRANSCRANIAL DOPPLER STUDY - COMPLETE  8/31/2014         TRANSCRANIAL DOPPLER STUDY - COMPLETE  9/1/2014         TRANSCRANIAL DOPPLER STUDY - COMPLETE  9/2/2014         TRANSCRANIAL DOPPLER STUDY - COMPLETE  9/3/2014         TRANSCRANIAL DOPPLER STUDY - COMPLETE  9/4/2014         TRANSCRANIAL DOPPLER STUDY - COMPLETE  9/5/2014         TRANSCRANIAL DOPPLER STUDY - COMPLETE  9/6/2014         VAGINAL DELIVERY      X 3     Social History     Socioeconomic History    Marital status:      Spouse name: Not on file    Number of children: 3    Years of education: 11th Grade    Highest education level: Not on file   Occupational History    Not on file   Social Needs    Financial resource strain: Not on file    Food insecurity:     Worry: Not on file     Inability: Not on file    Transportation needs:     Medical: Not on file     Non-medical: Not on file   Tobacco Use    Smoking status: Current Every Day Smoker     Packs/day: 1.00     Years: 51.00     Pack years: 51.00     Types: Cigarettes, Cigars, Vaping with nicotine     Start date: 1976    Smokeless tobacco: Never Used    Tobacco comment: in smoking program 5/13/19   Substance and Sexual Activity    Alcohol use: Yes     Comment: 1-3 times per week, 1-2 drink at a time.    Drug use: No    Sexual activity: Never     Partners:  Male   Lifestyle    Physical activity:     Days per week: Not on file     Minutes per session: Not on file    Stress: Not on file   Relationships    Social connections:     Talks on phone: Not on file     Gets together: Not on file     Attends Baptism service: Not on file     Active member of club or organization: Not on file     Attends meetings of clubs or organizations: Not on file     Relationship status: Not on file   Other Topics Concern    Not on file   Social History Narrative    Patient describes herself as disabled.       Objective:     Vital signs reviewed     GENERAL APPEARANCE:     The patient looks comfortable.    No signs of medical or psychiatric distress.    Normal breathing pattern.    No dysmorphic features    Normal eye contact.     GENERAL MEDICAL EXAM:    HEENT:  Head is atraumatic normocephalic.     Neck and Axillae: No JVD.     Cardiopulmonary: No cyanosis. No tachypnea. Normal respiratory effort.    Gastrointestinal:  No stomas or lesions. No hernias.    Skin, Hair and Nails: No pathognonomic skin rash. No neurofibromatosis. No stigmata of autoimmune disease.     Limbs: No varicose veins. No edema.     Muskoskeletal: No deformities.No signs of longstanding neuropathy. No dislocations or fractures.        Neurologic Exam     Mental Status   Oriented to person, place, and time.   Registration: recalls 3 of 3 objects. Recall at 5 minutes: recalls 3 of 3 objects. Follows 3 step commands.   Attention: normal. Concentration: normal.   Speech: speech is normal   Level of consciousness: alert  Knowledge: good and consistent with education. Able to perform simple calculations.   Able to name object. Able to read. Able to repeat. Able to write. Normal comprehension.     Cranial Nerves     CN II   Visual fields full to confrontation.   Visual acuity: normal  Right visual field deficit: none  Left visual field deficit: none     CN III, IV, VI   Pupils are equal, round, and reactive to  light.  Extraocular motions are normal.   Right pupil: Size: 2 mm. Shape: regular. Reactivity: brisk. Consensual response: intact. Accommodation: intact.   Left pupil: Size: 2 mm. Shape: regular. Reactivity: brisk. Consensual response: intact. Accommodation: intact.   CN III: no CN III palsy  CN VI: no CN VI palsy  Nystagmus: none   Diplopia: none  Ophthalmoparesis: none  Upgaze: normal  Downgaze: normal  Conjugate gaze: present  Vestibulo-ocular reflex: present    CN V   Facial sensation intact.   Right facial sensation deficit: none  Left facial sensation deficit: none  Right corneal reflex: normal  Left corneal reflex: normal    CN VII   Right facial weakness: none  Left facial weakness: none  Right taste: normal  Left taste: normal    CN VIII   CN VIII normal.   Hearing: intact  Right Rinne: AC > BC  Left Rinne: AC > BC  Mejias: does not lateralize     CN IX, X   CN IX normal.   CN X normal.   Palate: symmetric  Right gag reflex: normal  Left gag reflex: normal    CN XI   CN XI normal.   Right sternocleidomastoid strength: normal  Left sternocleidomastoid strength: normal  Right trapezius strength: normal  Left trapezius strength: normal    CN XII   CN XII normal.   Tongue: not atrophic  Fasciculations: absent  Tongue deviation: none    Motor Exam   Muscle bulk: normal  Overall muscle tone: normal  Right arm tone: normal  Left arm tone: normal  Right arm pronator drift: absent  Left arm pronator drift: absent  Right leg tone: normal  Left leg tone: normal    Strength   Strength 5/5 throughout.   Right neck flexion: 5/5  Left neck flexion: 5/5  Right neck extension: 5/5  Left neck extension: 5/5  Right deltoid: 5/5  Left deltoid: 5/5  Right biceps: 5/5  Left biceps: 5/5  Right triceps: 5/5  Left triceps: 5/5  Right wrist flexion: 5/5  Left wrist flexion: 5/5  Right wrist extension: 5/5  Left wrist extension: 5/5  Right interossei: 5/5  Left interossei: 5/5  Right abdominals: 5/5  Left abdominals: 5/5  Right  iliopsoas: 5/5  Left iliopsoas: 5/5  Right quadriceps: 5/5  Left quadriceps: 5/5  Right hamstrin/5  Left hamstrin/5  Right glutei: 5/5  Left glutei: 5/5  Right anterior tibial: 5/5  Left anterior tibial: 5/5  Right posterior tibial: 5/5  Left posterior tibial: 5/5  Right peroneal: 5/5  Left peroneal: 5/5  Right gastroc: 5/5  Left gastroc: 5/5    Sensory Exam   Light touch normal.   Right arm light touch: normal  Left arm light touch: normal  Right leg light touch: normal  Left leg light touch: normal  Vibration normal.   Right arm vibration: normal  Left arm vibration: normal  Right leg vibration: normal  Left leg vibration: normal  Proprioception normal.   Right arm proprioception: normal  Left arm proprioception: normal  Right leg proprioception: normal  Left leg proprioception: normal  Pinprick normal.   Right arm pinprick: normal  Left arm pinprick: normal  Right leg pinprick: normal  Left leg pinprick: normal  Graphesthesia: normal  Stereognosis: normal    Gait, Coordination, and Reflexes     Gait  Gait: normal    Coordination   Romberg: negative  Finger to nose coordination: normal  Heel to shin coordination: normal  Tandem walking coordination: normal    Tremor   Resting tremor: absent  Intention tremor: absent  Action tremor: absent    Reflexes   Right brachioradialis: 2+  Left brachioradialis: 2+  Right biceps: 2+  Left biceps: 2+  Right triceps: 2+  Left triceps: 2+  Right patellar: 2+  Left patellar: 2+  Right achilles: 2+  Left achilles: 2+  Right : 2+  Left : 2+  Right plantar: normal  Left plantar: normal  Right Stacy: absent  Left Stacy: absent  Right ankle clonus: absent  Left ankle clonus: absent  Right pendular knee jerk: absent  Left pendular knee jerk: absent      Lab Results   Component Value Date    WBC 8.40 2019    HGB 12.3 2019    HCT 36.6 (L) 2019    MCV 93 2019     2019     Sodium   Date Value Ref Range Status   2019 142 136 -  145 mmol/L Final     Potassium   Date Value Ref Range Status   04/23/2019 4.1 3.5 - 5.1 mmol/L Final     Chloride   Date Value Ref Range Status   04/23/2019 105 95 - 110 mmol/L Final     CO2   Date Value Ref Range Status   04/23/2019 31 (H) 23 - 29 mmol/L Final     Glucose   Date Value Ref Range Status   04/23/2019 82 70 - 110 mg/dL Final     BUN, Bld   Date Value Ref Range Status   04/23/2019 15 8 - 23 mg/dL Final     Creatinine   Date Value Ref Range Status   04/23/2019 1.0 0.5 - 1.4 mg/dL Final     Calcium   Date Value Ref Range Status   04/23/2019 9.7 8.7 - 10.5 mg/dL Final     Total Protein   Date Value Ref Range Status   04/23/2019 6.8 6.0 - 8.4 g/dL Final     Albumin   Date Value Ref Range Status   04/23/2019 3.9 3.5 - 5.2 g/dL Final     Total Bilirubin   Date Value Ref Range Status   04/23/2019 0.1 0.1 - 1.0 mg/dL Final     Comment:     For infants and newborns, interpretation of results should be based  on gestational age, weight and in agreement with clinical  observations.  Premature Infant recommended reference ranges:  Up to 24 hours.............<8.0 mg/dL  Up to 48 hours............<12.0 mg/dL  3-5 days..................<15.0 mg/dL  6-29 days.................<15.0 mg/dL       Alkaline Phosphatase   Date Value Ref Range Status   04/23/2019 53 (L) 55 - 135 U/L Final     AST   Date Value Ref Range Status   04/23/2019 18 10 - 40 U/L Final     ALT   Date Value Ref Range Status   04/23/2019 12 10 - 44 U/L Final     Anion Gap   Date Value Ref Range Status   04/23/2019 6 (L) 8 - 16 mmol/L Final     eGFR if    Date Value Ref Range Status   04/23/2019 >60.0 >60 mL/min/1.73 m^2 Final     eGFR if non    Date Value Ref Range Status   04/23/2019 >60.0 >60 mL/min/1.73 m^2 Final     Comment:     Calculation used to obtain the estimated glomerular filtration  rate (eGFR) is the CKD-EPI equation.        No results found for: [id number]  Lab Results   Component Value Date    TSH 0.728  08/27/2014 06-     CTA head showed no new change or additional aneurysms.    Reviewed the neuroimaging independently       Assessment:       1. Schizoaffective disorder, bipolar type    2. Bipolar 1 disorder    3. Anxiety    4. Cigarette nicotine dependence without complication    5. Essential hypertension    6. Hyperlipidemia, unspecified hyperlipidemia type    7. Anorexia    8. Long-term current use of high risk medication other than anticoagulant    9. Gastro-esophageal reflux disease without esophagitis    10. Nonadherence to medical treatment    11. Subjective vision disturbance, bilateral    12. History of hysterectomy for benign disease    13. Insufficient social insurance or welfare support    14. Primary localized osteoarthrosis of multiple sites    15. Peripheral polyneuropathy    16. Tension headache    17. Bilateral carpal tunnel syndrome    18. Chronic seasonal allergic rhinitis    19. Chronic nausea    20. Subarachnoid hemorrhage from aneurysm of right anterior communicating artery    21. Need for pneumococcal vaccination    22. Cubital tunnel syndrome, bilateral    23. Lumbosacral radiculopathy at S1    24. Other chest pain    25. Smoker    26. History of subarachnoid hemorrhage    27. Intractable migraine with aura without status migrainosus    28. Medication overuse headache        Plan:             CLASSICAL MIGRAINE VS. POST-COILING HEADACHE      Headache Diary     Lifestyle changes:    Good sleep hygiene  Avoid triggers like certain foods, TV and computer work   Minimize physical and emotional stress  Smoking avoidance and cessation  Tapering off caffeine   Good hydration   Small frequent meals   Moderate 30-minute-long aerobic exercises 3 times/week         Abortive medications:    Triptans and NSAIDs C/I due SAH and Stroke.    Limit Fioricet to only 10-15 tabs a month     Preventative medications:        Continue Amitriptyline/Elavil which can cause sleepiness, dry eyes, dry  mouth, urinary retention and rarely cardiac arrhythmias        ANEURYSMALSAH in 2014 S/P RT PCOM COILING      CTA Brain annually and now    Avoid blood thinners    BP control    Avoid straining/valsalva or anything that could increase ICP          MEDICAL/SURGICAL COMORBIDITIES     All relevant medical comorbidities noted and managed by primary care physician and medical care team.          RTC in 6 months                   Judson Sawant MD, FAAN    Attending Neurologist/Epileptologist         Diplomate, American Board-Psychiatry and Neurology (Neurology)    Diplomate, American Board-Clinical Neurophysiology (Epilpesy-Neuromuscular)     Fellow, American Academy of Neurology

## 2019-10-25 ENCOUNTER — TELEPHONE (OUTPATIENT)
Dept: NEUROLOGY | Facility: CLINIC | Age: 59
End: 2019-10-25

## 2019-10-25 NOTE — TELEPHONE ENCOUNTER
----- Message from Henrietta Perry sent at 10/25/2019  8:14 AM CDT -----  Contact: Patient  Type:  Patient Returning Call    Who Called:  Patient  Who Left Message for Patient: Zee  Does the patient know what this is regarding?:  no  Best Call Back Number:  [phone] (home)    Additional Information:  na

## 2019-10-29 DIAGNOSIS — G44.209 TENSION HEADACHE: Chronic | ICD-10-CM

## 2019-10-29 DIAGNOSIS — M19.91 PRIMARY LOCALIZED OSTEOARTHROSIS OF MULTIPLE SITES: Chronic | ICD-10-CM

## 2019-10-29 NOTE — TELEPHONE ENCOUNTER
----- Message from Mary Torres sent at 10/29/2019  1:35 PM CDT -----  Contact: Pt  Type:  Patient Returning Call    Who Called:Karyna Geller  Who Left Message for Patient: MARIBELL NARAYAN  Does the patient know what this is regarding?:  Would the patient rather a call back or a response via [facility]? Call Back  Best Call Back Number:[phone] (home)   Additional Information:

## 2019-10-29 NOTE — TELEPHONE ENCOUNTER
----- Message from Jacquelin Atkinson sent at 10/29/2019 11:06 AM CDT -----  Contact: pt  Type:  RX Refill Request    Who Called: pt   Refill or New Rx:refill  RX Name and Strength:esgic & tramadol  How is the patient currently taking it? (ex. 1XDay):as needed  Is this a 30 day or 90 day RX:30  Preferred Pharmacy with phone number:.  [id number] [place] - [address] [facility]  [address] [place] [address]  Phone: [phone] Fax: [phone]  Local or Mail Order:local  Ordering Provider:Dr Boothe  Would the patient rather a call back or a response via [facility]? Call back  Best Call Back Number:[phone]  Additional Information: .    Thank you

## 2019-10-29 NOTE — TELEPHONE ENCOUNTER
Spoke with patient and informed her that she has refills left of her Tramadol at the [facility] pharmacy on [address]and that I am sending her refill request for Fioricet to Dr. Boothe to approve or deny. She verbalized understanding.

## 2019-10-30 RX ORDER — BUTALBITAL, ACETAMINOPHEN AND CAFFEINE 50; 325; 40 MG/1; MG/1; MG/1
1 TABLET ORAL EVERY 8 HOURS PRN
Qty: 45 TABLET | Refills: 0 | OUTPATIENT
Start: 2019-10-30

## 2019-10-31 NOTE — TELEPHONE ENCOUNTER
REFILL REFUSED. REASON:  Refill not needed. See below.        Requested Prescriptions     Refused Prescriptions Disp Refills    butalbital-acetaminophen-caffeine -40 mg (FIORICET, ESGIC) -40 mg per tablet 45 tablet 0     Sig: Take 1 tablet by mouth every 8 (eight) hours as needed for Headaches. DO NOT TAKE MORE THAN 5 PER WEEK     Refused By: DIMA CHEUNG     Reason for Refusal: Refill not appropriate

## 2019-11-15 ENCOUNTER — TELEPHONE (OUTPATIENT)
Dept: SMOKING CESSATION | Facility: CLINIC | Age: 59
End: 2019-11-15

## 2019-12-02 DIAGNOSIS — G44.209 TENSION HEADACHE: Chronic | ICD-10-CM

## 2019-12-06 ENCOUNTER — TELEPHONE (OUTPATIENT)
Dept: SMOKING CESSATION | Facility: CLINIC | Age: 59
End: 2019-12-06

## 2019-12-06 RX ORDER — BUTALBITAL, ACETAMINOPHEN AND CAFFEINE 50; 325; 40 MG/1; MG/1; MG/1
1 TABLET ORAL EVERY 8 HOURS PRN
Qty: 45 TABLET | Refills: 0 | Status: SHIPPED | OUTPATIENT
Start: 2019-12-06 | End: 2020-04-23 | Stop reason: SDUPTHER

## 2019-12-06 NOTE — TELEPHONE ENCOUNTER
REFILL APPROVED     Future Appointments   Date Time Provider Department Center   12/19/2019  2:00 PM CECILIA Boothe MD [facility]        Requested Prescriptions     Signed Prescriptions Disp Refills    butalbital-acetaminophen-caffeine -40 mg (FIORICET, ESGIC) -40 mg per tablet 45 tablet 0     Sig: TAKE 1 TABLET BY MOUTH EVERY 8 (EIGHT) HOURS AS NEEDED FOR HEADACHES. DO NOT TAKE MORE THAN 5 PER WEEK     Authorizing Provider: CECILIA BOOTHE

## 2019-12-26 ENCOUNTER — TELEPHONE (OUTPATIENT)
Dept: SMOKING CESSATION | Facility: CLINIC | Age: 59
End: 2019-12-26

## 2020-02-01 DIAGNOSIS — M19.91 PRIMARY LOCALIZED OSTEOARTHROSIS OF MULTIPLE SITES: Chronic | ICD-10-CM

## 2020-02-01 DIAGNOSIS — R11.0 CHRONIC NAUSEA: Chronic | ICD-10-CM

## 2020-02-02 RX ORDER — TRAMADOL HYDROCHLORIDE 50 MG/1
TABLET ORAL
Qty: 90 TABLET | Refills: 0 | Status: SHIPPED | OUTPATIENT
Start: 2020-02-02 | End: 2020-03-16

## 2020-02-02 RX ORDER — ONDANSETRON 8 MG/1
TABLET, ORALLY DISINTEGRATING ORAL
Qty: 45 TABLET | Refills: 0 | Status: SHIPPED | OUTPATIENT
Start: 2020-02-02 | End: 2020-03-16

## 2020-02-03 NOTE — TELEPHONE ENCOUNTER
ACTION NEEDED:  Please contact her to verify her receipt and understanding of my message (below) and to help her schedule her appointment. Thanks.  --------------------------------------------------------------------------------  --------------------------------------------------------------------------------   Karyna Vogel.    I received a request for refill for your medicine listed below. I approved a limited refill of your medicine.    IMPORTANT: Before I can give you any additional refills, I'm going to need to re-evaluate you with an office visit.    Please take the time right now to schedule your appointment within 3 weeks. You can schedule your appointment from your [facility] account or from the [facility] carlie on your smartphone or by calling our appointment desk at [phone].    Take care, and be well.     -LM    Medications Ordered This Encounter   Medications    ondansetron (ZOFRAN-ODT) 8 MG TbDL     Sig: DISSOLVE ONE TABLET IN MOUTH TWICE A DAY AS NEEDED FOR NAUSEA     Dispense:  45 tablet     Refill:  0    traMADol (ULTRAM) 50 mg tablet     Sig: TAKE ONE TABLET BY MOUTH EVERY EIGHT HOURS AS NEEDED FOR PAIN     Dispense:  90 tablet     Refill:  0     GREATER THAN 7-DAY SUPPLY IS MEDICALLY NECESSARY.

## 2020-02-06 NOTE — TELEPHONE ENCOUNTER
Spoke with patient and verified that she got her patient message from Dr. Boothe about her prescription refills and that she has an appointment set up with him within the next 3 weeks. She verbalized understanding and already has a follow up appointment made.

## 2020-02-07 RX ORDER — NITROGLYCERIN 0.4 MG/1
0.4 TABLET SUBLINGUAL EVERY 5 MIN PRN
Qty: 25 TABLET | Refills: 4 | Status: SHIPPED | OUTPATIENT
Start: 2020-02-07 | End: 2020-03-17

## 2020-03-16 DIAGNOSIS — M19.91 PRIMARY LOCALIZED OSTEOARTHROSIS OF MULTIPLE SITES: Chronic | ICD-10-CM

## 2020-03-16 DIAGNOSIS — R11.0 CHRONIC NAUSEA: Chronic | ICD-10-CM

## 2020-03-16 RX ORDER — TRAMADOL HYDROCHLORIDE 50 MG/1
50 TABLET ORAL EVERY 8 HOURS PRN
Qty: 90 TABLET | Refills: 1 | Status: SHIPPED | OUTPATIENT
Start: 2020-03-16 | End: 2020-05-26 | Stop reason: SDUPTHER

## 2020-03-16 RX ORDER — ONDANSETRON 8 MG/1
TABLET, ORALLY DISINTEGRATING ORAL
Qty: 45 TABLET | Refills: 2 | Status: SHIPPED | OUTPATIENT
Start: 2020-03-16 | End: 2020-07-02 | Stop reason: SDUPTHER

## 2020-03-16 NOTE — TELEPHONE ENCOUNTER
----- Message from Ludmila Adams sent at 3/16/2020  2:55 PM CDT -----  Contact: pt  .Type:  RX Refill Request    Who Called:  pt  Refill or New Rx: refill   RX Name and Strength: tramadol and zofran   How is the patient currently taking it? (ex. 1XDay):  Is this a 30 day or 90 day RX:   Preferred Pharmacy with phone number:       [id number] - [place] - [address] [address]  [address] [address]  Phone: [phone] Fax: [phone]      Local or Mail Order: local   Ordering Provider:   Would the patient rather a call back or a response via [facility]?call back   Best Call Back Number: [phone] (home)   Additional Information:

## 2020-03-16 NOTE — TELEPHONE ENCOUNTER
ACTION NEEDED:  Please contact her to verify her receipt and understanding of my message (below) and to help her schedule this. Thanks.  Orders Placed This Encounter    ondansetron (ZOFRAN-ODT) 8 MG TbDL    traMADoL (ULTRAM) 50 mg tablet     --------------------------------------------------------------------------------  --------------------------------------------------------------------------------   Karyna Vogel.     I received a request for refill for your medicine listed below. You are overdue to come in for re-evaluation. However, because of the COVID-19 pandemic, I approved a limited refill of your medicine.    IMPORTANT: Before I can give you any additional refills, I'm going to need to re-evaluate you with an office visit.    Your next appointment with me is currently scheduled for Thursday, March 19, 2020 at 2:40 PM.    Because of the COVID-19 pandemic, I recommend that you reschedule that appointment to a later date, but BEFORE you need any more refills.    If you have any difficulty, send my staff a message, and they will be glad to help.    I look forward to seeing you at your next appointment.     Thanks for letting me care for you, thanks for trusting us with your healthcare needs, and thanks for using [facility].    Sincerely,    CECILIA Boothe MD     Medications Ordered This Encounter   Medications    ondansetron (ZOFRAN-ODT) 8 MG TbDL     Sig: DISSOLVE ONE TABLET IN MOUTH TWICE A DAY AS NEEDED FOR NAUSEA     Dispense:  45 tablet     Refill:  2     APPOINTMENT REQUIRED FOR MORE REFILLS    traMADoL (ULTRAM) 50 mg tablet     Sig: Take 1 tablet (50 mg total) by mouth every 8 (eight) hours as needed for Pain.     Dispense:  90 tablet     Refill:  1     GREATER THAN 7-DAY SUPPLY IS MEDICALLY NECESSARY. APPOINTMENT REQUIRED FOR MORE REFILLS

## 2020-03-17 RX ORDER — NITROGLYCERIN 0.4 MG/1
0.4 TABLET SUBLINGUAL EVERY 5 MIN PRN
Qty: 25 TABLET | Refills: 4 | Status: SHIPPED | OUTPATIENT
Start: 2020-03-17 | End: 2023-03-15 | Stop reason: SDUPTHER

## 2020-03-23 NOTE — TELEPHONE ENCOUNTER
Spoke with patient and informed her of medication refills and needed follow up appointment. She verbalized understanding.

## 2020-04-22 ENCOUNTER — TELEPHONE (OUTPATIENT)
Dept: NEUROLOGY | Facility: CLINIC | Age: 60
End: 2020-04-22

## 2020-04-23 DIAGNOSIS — G44.209 TENSION HEADACHE: Chronic | ICD-10-CM

## 2020-04-23 RX ORDER — BUTALBITAL, ACETAMINOPHEN AND CAFFEINE 50; 325; 40 MG/1; MG/1; MG/1
1 TABLET ORAL EVERY 8 HOURS PRN
Qty: 45 TABLET | Refills: 0 | Status: SHIPPED | OUTPATIENT
Start: 2020-04-23 | End: 2020-07-02 | Stop reason: SDUPTHER

## 2020-05-14 ENCOUNTER — TELEPHONE (OUTPATIENT)
Dept: SMOKING CESSATION | Facility: CLINIC | Age: 60
End: 2020-05-14

## 2020-05-21 ENCOUNTER — CLINICAL SUPPORT (OUTPATIENT)
Dept: SMOKING CESSATION | Facility: CLINIC | Age: 60
End: 2020-05-21
Payer: COMMERCIAL

## 2020-05-21 DIAGNOSIS — F17.200 NICOTINE DEPENDENCE: Primary | ICD-10-CM

## 2020-05-21 PROCEDURE — 99406 BEHAV CHNG SMOKING 3-10 MIN: CPT | Mod: S$GLB,,,

## 2020-05-21 PROCEDURE — 99406 PR TOBACCO USE CESSATION INTERMEDIATE 3-10 MINUTES: ICD-10-PCS | Mod: S$GLB,,,

## 2020-05-21 NOTE — PROGRESS NOTES
Called pt to f/u on her 12 month smoking cessation quit status. Pt's lizabeth stated she is still smoking. Informed her she has benefits available and is able to rejoin. Daughter stated she will let her know. Informed her of benefit period, phone follow ups, and contact information. Will complete smart form and resolve quit #1 episode.

## 2020-05-26 DIAGNOSIS — M19.91 PRIMARY LOCALIZED OSTEOARTHROSIS OF MULTIPLE SITES: Chronic | ICD-10-CM

## 2020-05-26 NOTE — TELEPHONE ENCOUNTER
----- Message from Albertina Newman sent at 5/26/2020  9:10 AM CDT -----  Contact: patient  Type:  RX Refill Request    Who Called: patient  Refill or New Rx:refill  RX Name and Strength:Tramadol. 50mg  How is the patient currently taking it? (ex. 1XDay):once daily  Is this a 30 day or 90 day RX:  [facility] number[facility]  Local or Mail Order:local  Ordering Provider:Dr Boothe  Would the patient rather a call back or a response via [facility]? call  Best Call Back Number:[phone]  Additional Information:

## 2020-05-27 RX ORDER — TRAMADOL HYDROCHLORIDE 50 MG/1
50 TABLET ORAL EVERY 8 HOURS PRN
Qty: 90 TABLET | Refills: 0 | Status: SHIPPED | OUTPATIENT
Start: 2020-05-27 | End: 2020-07-02 | Stop reason: SDUPTHER

## 2020-05-29 DIAGNOSIS — M19.91 PRIMARY LOCALIZED OSTEOARTHROSIS OF MULTIPLE SITES: Chronic | ICD-10-CM

## 2020-06-26 DIAGNOSIS — M19.91 PRIMARY LOCALIZED OSTEOARTHROSIS OF MULTIPLE SITES: Chronic | ICD-10-CM

## 2020-06-26 RX ORDER — TRAMADOL HYDROCHLORIDE 50 MG/1
50 TABLET ORAL EVERY 8 HOURS PRN
Qty: 90 TABLET | Refills: 0 | Status: CANCELLED | OUTPATIENT
Start: 2020-06-26

## 2020-06-26 NOTE — TELEPHONE ENCOUNTER
----- Message from Aguilar Key sent at 6/26/2020  7:39 AM CDT -----  Contact: self  Type:  RX Refill Request    Who Called: pt  Refill or New Rx:refill  RX Name and Strength:tramadol  How is the patient currently taking it? (ex. 1XDay): as needed  Is this a 30 day or 90 day Rx:n/a  Preferred Pharmacy with phone number:  [id number] [place] - [address] [facility]  [address] [address] [place] [address]  Phone: [phone] Fax: [phone]  Local or Mail Order:local  Ordering Provider:kenny  Would the patient rather a call back or a response via [facility]? Call back  Best Call Back Number:[phone]  Additional Information: none

## 2020-06-26 NOTE — TELEPHONE ENCOUNTER
Spoke with patient and informed her that I am waiting on a response from Dr. Boothe to see when I can schedule her appointment.

## 2020-06-28 RX ORDER — TRAMADOL HYDROCHLORIDE 50 MG/1
TABLET ORAL
Qty: 90 TABLET | Refills: 0 | OUTPATIENT
Start: 2020-06-28

## 2020-06-29 NOTE — TELEPHONE ENCOUNTER
Please contact her to let her know that I am unable to give her any refills of this medicine until she allows me to re-evaluate her with an appointment. Offer to schedule an appointment for her if she doesn't already have one scheduled. Thanks.    Requested Prescriptions   Pending Prescriptions Disp Refills    traMADoL (ULTRAM) 50 mg tablet [Pharmacy Med Name: TRAMADOL HCL 50 MG       TAB] 90 tablet 0     Sig: TAKE ONE TABLET BY MOUTH EVERY EIGHT HOURS AS NEEDED FOR PAIN

## 2020-07-02 ENCOUNTER — OFFICE VISIT (OUTPATIENT)
Dept: INTERNAL MEDICINE | Facility: CLINIC | Age: 60
End: 2020-07-02
Payer: MEDICAID

## 2020-07-02 ENCOUNTER — LAB VISIT (OUTPATIENT)
Dept: LAB | Facility: HOSPITAL | Age: 60
End: 2020-07-02
Attending: FAMILY MEDICINE
Payer: MEDICAID

## 2020-07-02 VITALS
DIASTOLIC BLOOD PRESSURE: 84 MMHG | WEIGHT: 156.75 LBS | OXYGEN SATURATION: 97 % | HEIGHT: 62 IN | TEMPERATURE: 96 F | SYSTOLIC BLOOD PRESSURE: 128 MMHG | HEART RATE: 84 BPM | BODY MASS INDEX: 28.84 KG/M2

## 2020-07-02 DIAGNOSIS — Z79.899 ENCOUNTER FOR LONG-TERM CURRENT USE OF MEDICATION: ICD-10-CM

## 2020-07-02 DIAGNOSIS — R11.0 CHRONIC NAUSEA: Chronic | ICD-10-CM

## 2020-07-02 DIAGNOSIS — G44.209 TENSION HEADACHE: Chronic | ICD-10-CM

## 2020-07-02 DIAGNOSIS — F31.9 BIPOLAR 1 DISORDER: Chronic | ICD-10-CM

## 2020-07-02 DIAGNOSIS — M19.91 PRIMARY LOCALIZED OSTEOARTHROSIS OF MULTIPLE SITES: Primary | Chronic | ICD-10-CM

## 2020-07-02 DIAGNOSIS — K21.9 GASTRO-ESOPHAGEAL REFLUX DISEASE WITHOUT ESOPHAGITIS: Chronic | ICD-10-CM

## 2020-07-02 DIAGNOSIS — J30.2 CHRONIC SEASONAL ALLERGIC RHINITIS: Chronic | ICD-10-CM

## 2020-07-02 LAB
ALBUMIN SERPL BCP-MCNC: 3.8 G/DL (ref 3.5–5.2)
ALP SERPL-CCNC: 73 U/L (ref 55–135)
ALT SERPL W/O P-5'-P-CCNC: 15 U/L (ref 10–44)
ANION GAP SERPL CALC-SCNC: 6 MMOL/L (ref 8–16)
AST SERPL-CCNC: 19 U/L (ref 10–40)
BILIRUB SERPL-MCNC: 0.1 MG/DL (ref 0.1–1)
BUN SERPL-MCNC: 10 MG/DL (ref 8–23)
CALCIUM SERPL-MCNC: 9.6 MG/DL (ref 8.7–10.5)
CHLORIDE SERPL-SCNC: 104 MMOL/L (ref 95–110)
CO2 SERPL-SCNC: 30 MMOL/L (ref 23–29)
CREAT SERPL-MCNC: 0.9 MG/DL (ref 0.5–1.4)
ERYTHROCYTE [DISTWIDTH] IN BLOOD BY AUTOMATED COUNT: 14.2 % (ref 11.5–14.5)
EST. GFR  (AFRICAN AMERICAN): >60 ML/MIN/1.73 M^2
EST. GFR  (NON AFRICAN AMERICAN): >60 ML/MIN/1.73 M^2
GLUCOSE SERPL-MCNC: 96 MG/DL (ref 70–110)
HCT VFR BLD AUTO: 39.1 % (ref 37–48.5)
HGB BLD-MCNC: 12.5 G/DL (ref 12–16)
MCH RBC QN AUTO: 30.3 PG (ref 27–31)
MCHC RBC AUTO-ENTMCNC: 32 G/DL (ref 32–36)
MCV RBC AUTO: 95 FL (ref 82–98)
PLATELET # BLD AUTO: 334 K/UL (ref 150–350)
PMV BLD AUTO: 9.9 FL (ref 9.2–12.9)
POTASSIUM SERPL-SCNC: 3.9 MMOL/L (ref 3.5–5.1)
PROT SERPL-MCNC: 6.8 G/DL (ref 6–8.4)
RBC # BLD AUTO: 4.13 M/UL (ref 4–5.4)
SODIUM SERPL-SCNC: 140 MMOL/L (ref 136–145)
WBC # BLD AUTO: 8.88 K/UL (ref 3.9–12.7)

## 2020-07-02 PROCEDURE — 80053 COMPREHEN METABOLIC PANEL: CPT

## 2020-07-02 PROCEDURE — 99999 PR PBB SHADOW E&M-EST. PATIENT-LVL III: CPT | Mod: PBBFAC,,, | Performed by: FAMILY MEDICINE

## 2020-07-02 PROCEDURE — 99214 PR OFFICE/OUTPT VISIT, EST, LEVL IV, 30-39 MIN: ICD-10-PCS | Mod: S$PBB,,, | Performed by: FAMILY MEDICINE

## 2020-07-02 PROCEDURE — 80183 DRUG SCRN QUANT OXCARBAZEPIN: CPT

## 2020-07-02 PROCEDURE — 99214 OFFICE O/P EST MOD 30 MIN: CPT | Mod: S$PBB,,, | Performed by: FAMILY MEDICINE

## 2020-07-02 PROCEDURE — 99999 PR PBB SHADOW E&M-EST. PATIENT-LVL III: ICD-10-PCS | Mod: PBBFAC,,, | Performed by: FAMILY MEDICINE

## 2020-07-02 PROCEDURE — 36415 COLL VENOUS BLD VENIPUNCTURE: CPT

## 2020-07-02 PROCEDURE — 85027 COMPLETE CBC AUTOMATED: CPT

## 2020-07-02 PROCEDURE — 99213 OFFICE O/P EST LOW 20 MIN: CPT | Mod: PBBFAC | Performed by: FAMILY MEDICINE

## 2020-07-02 RX ORDER — PANTOPRAZOLE SODIUM 40 MG/1
40 TABLET, DELAYED RELEASE ORAL DAILY
Qty: 30 TABLET | Refills: 11 | Status: SHIPPED | OUTPATIENT
Start: 2020-07-02 | End: 2021-02-22

## 2020-07-02 RX ORDER — BUTALBITAL, ACETAMINOPHEN AND CAFFEINE 50; 325; 40 MG/1; MG/1; MG/1
1 TABLET ORAL EVERY 8 HOURS PRN
Qty: 45 TABLET | Refills: 0 | Status: SHIPPED | OUTPATIENT
Start: 2020-07-02 | End: 2020-08-07

## 2020-07-02 RX ORDER — OXCARBAZEPINE 600 MG/1
600 TABLET, FILM COATED ORAL 2 TIMES DAILY
Qty: 60 TABLET | Refills: 5 | Status: SHIPPED | OUTPATIENT
Start: 2020-07-02 | End: 2021-04-08 | Stop reason: SDUPTHER

## 2020-07-02 RX ORDER — TRAMADOL HYDROCHLORIDE 50 MG/1
50 TABLET ORAL EVERY 8 HOURS PRN
Qty: 90 TABLET | Refills: 5 | Status: SHIPPED | OUTPATIENT
Start: 2020-07-02 | End: 2021-01-20 | Stop reason: SDUPTHER

## 2020-07-02 RX ORDER — CYPROHEPTADINE HYDROCHLORIDE 4 MG/1
4 TABLET ORAL 3 TIMES DAILY PRN
Qty: 90 TABLET | Refills: 11 | Status: SHIPPED | OUTPATIENT
Start: 2020-07-02 | End: 2021-01-06 | Stop reason: SDUPTHER

## 2020-07-02 RX ORDER — ONDANSETRON 8 MG/1
8 TABLET, ORALLY DISINTEGRATING ORAL EVERY 12 HOURS PRN
Qty: 45 TABLET | Refills: 5 | Status: SHIPPED | OUTPATIENT
Start: 2020-07-02 | End: 2021-04-08 | Stop reason: SDUPTHER

## 2020-07-02 RX ORDER — FLUTICASONE PROPIONATE 50 MCG
2 SPRAY, SUSPENSION (ML) NASAL DAILY
Qty: 16 G | Refills: 11 | Status: SHIPPED | OUTPATIENT
Start: 2020-07-02 | End: 2021-04-08 | Stop reason: SDUPTHER

## 2020-07-02 NOTE — PROGRESS NOTES
CHIEF COMPLAINT  Follow-up and Medication Refill      DIAGNOSES, HISTORY, ASSESSMENT, AND PLAN  Assessment   1. Primary localized osteoarthrosis of multiple sites    2. Bipolar 1 disorder    3. Chronic nausea    4. Chronic seasonal allergic rhinitis    5. Gastro-esophageal reflux disease without esophagitis    6. Tension headache    7. Encounter for long-term current use of medication         Plan   Problem List Items Addressed This Visit     Bipolar 1 disorder (Chronic)    Current Assessment & Plan     Compensated/controlled and stable.  Mood appears euthymic and reportedly stable.         Relevant Medications    OXcarbazepine (TRILEPTAL) 600 MG Tab    Other Relevant Orders    CBC Without Differential    Comprehensive metabolic panel    OXCARBAZEPINE METABOLITE ([facility])    Chronic nausea (Chronic)    Current Assessment & Plan     Compensated/controlled and stable.          Relevant Medications    ondansetron (ZOFRAN-ODT) 8 MG TbDL    cyproheptadine (PERIACTIN) 4 mg tablet    Chronic seasonal allergic rhinitis (Chronic)    Current Assessment & Plan     Compensated/controlled and stable.         Relevant Medications    fluticasone propionate (FLONASE) 50 mcg/actuation nasal spray    Gastro-esophageal reflux disease without esophagitis (Chronic)    Current Assessment & Plan     Well controlled when she takes PPI.         Relevant Medications    pantoprazole (PROTONIX) 40 MG tablet    Primary localized osteoarthrosis of multiple sites - Primary (Chronic)    Current Assessment & Plan     She reports satisfactory pain relief with her current medications, and she says that they result in increased mobility and level of functioning.  No sedation or other significant side effects reported. She is demonstrating no behaviors to suggest inappropriate use of prescribed medications. [place] Board of Pharmacy Controlled Prescription Drug Monitoring database was queried and showed no activity to suggest abuse, diversion, or other  inappropriate use of prescription medications.         Relevant Medications    traMADoL (ULTRAM) 50 mg tablet    Tension headache (Chronic)    Current Assessment & Plan     Chronic recurrence, unchanged in characteristics.  She says that she needs/uses the Fioricet a maximum of 5 times per week.         Relevant Medications    butalbital-acetaminophen-caffeine -40 mg (FIORICET, ESGIC) -40 mg per tablet      Other Visit Diagnoses     Encounter for long-term current use of medication        Relevant Orders    CBC Without Differential    Comprehensive metabolic panel    OXCARBAZEPINE METABOLITE ([facility])          Orders Placed This Encounter    CBC Without Differential    Comprehensive metabolic panel    OXCARBAZEPINE METABOLITE ([facility])    OXcarbazepine (TRILEPTAL) 600 MG Tab    ondansetron (ZOFRAN-ODT) 8 MG TbDL    cyproheptadine (PERIACTIN) 4 mg tablet    fluticasone propionate (FLONASE) 50 mcg/actuation nasal spray    pantoprazole (PROTONIX) 40 MG tablet    traMADoL (ULTRAM) 50 mg tablet    butalbital-acetaminophen-caffeine -40 mg (FIORICET, ESGIC) -40 mg per tablet       Outpatient Medications Prior to Visit   Medication Sig Dispense Refill    albuterol (PROVENTIL) 2.5 mg /3 mL (0.083 %) nebulizer solution Take 2.5 mg by nebulization 2 (two) times daily as needed for Wheezing. Rescue      ALBUTEROL SULFATE INHL albuterol sulfate      alprazolam (XANAX) 0.5 MG tablet Take 0.5 mg by mouth daily as needed.       amitriptyline (ELAVIL) 75 MG tablet Take 1 tablet (75 mg total) by mouth every evening. 30 tablet 11    CALCIUM CARBONATE/VITAMIN D3 (CALCIUM 600 + D,3, ORAL) Take 2 tablets by mouth once daily.      escitalopram (LEXAPRO) 10 MG tablet Take 20 mg by mouth once daily.       nitroGLYCERIN (NITROSTAT) 0.4 MG SL tablet PLACE 1 TABLET (0.4 MG TOTAL) UNDER THE TONGUE EVERY 5 (FIVE) MINUTES AS NEEDED FOR CHEST PAIN. 25 tablet 4    polyethylene glycol (GLYCOLAX) 17 gram/dose powder  17 g.      QUEtiapine (SEROQUEL) 100 MG Tab Take 100 mg by mouth once daily.      butalbital-acetaminophen-caffeine -40 mg (FIORICET, ESGIC) -40 mg per tablet Take 1 tablet by mouth every 8 (eight) hours as needed for Headaches. DO NOT TAKE MORE THAN 5 PER WEEK 45 tablet 0    cyproheptadine (PERIACTIN) 4 mg tablet Take 1 tablet (4 mg total) by mouth 3 (three) times daily as needed (FOR APPETITE). 90 tablet 11    fluticasone propionate (FLONASE) 50 mcg/actuation nasal spray 2 sprays (100 mcg total) by Each Nare route once daily. 1 Bottle 11    ondansetron (ZOFRAN-ODT) 8 MG TbDL DISSOLVE ONE TABLET IN MOUTH TWICE A DAY AS NEEDED FOR NAUSEA 45 tablet 2    OXcarbazepine (TRILEPTAL) 300 MG Tab Take 2 tablets (600 mg total) by mouth 2 (two) times daily. 45 tablet 0    pantoprazole (PROTONIX) 40 MG tablet Take 1 tablet (40 mg total) by mouth once daily. 30 tablet 11    traMADoL (ULTRAM) 50 mg tablet Take 1 tablet (50 mg total) by mouth every 8 (eight) hours as needed for Pain. 90 tablet 0    atorvastatin (LIPITOR) 40 MG tablet atorvastatin 40 mg tablet   TK 1 T PO QPM      fluticasone-salmeterol diskus inhaler 250-50 mcg Advair Diskus 250 mcg-50 mcg/dose powder for inhalation   Inhale 1 puff twice a day by inhalation route.      nicotine (NICODERM CQ) 21 mg/24 hr Place 1 patch onto the skin once daily. (Patient not taking: Reported on 7/2/2020) 14 patch 0    tiZANidine (ZANAFLEX) 4 MG tablet tizanidine 4 mg tablet   TK 1 T PO BID       No facility-administered medications prior to visit.         Medications Discontinued During This Encounter   Medication Reason    OXcarbazepine (TRILEPTAL) 300 MG Tab Reorder    cyproheptadine (PERIACTIN) 4 mg tablet Reorder    pantoprazole (PROTONIX) 40 MG tablet Reorder    fluticasone propionate (FLONASE) 50 mcg/actuation nasal spray Reorder    ondansetron (ZOFRAN-ODT) 8 MG TbDL Reorder    butalbital-acetaminophen-caffeine -40 mg (FIORICET, ESGIC)  -40 mg per tablet Reorder    traMADoL (ULTRAM) 50 mg tablet Reorder        Medications Ordered This Encounter   Medications    butalbital-acetaminophen-caffeine -40 mg (FIORICET, ESGIC) -40 mg per tablet     Sig: Take 1 tablet by mouth every 8 (eight) hours as needed for Headaches. DO NOT TAKE MORE THAN 5 PER WEEK     Dispense:  45 tablet     Refill:  0    cyproheptadine (PERIACTIN) 4 mg tablet     Sig: Take 1 tablet (4 mg total) by mouth 3 (three) times daily as needed (FOR APPETITE).     Dispense:  90 tablet     Refill:  11    fluticasone propionate (FLONASE) 50 mcg/actuation nasal spray     Si sprays (100 mcg total) by Each Nostril route once daily.     Dispense:  16 g     Refill:  11    ondansetron (ZOFRAN-ODT) 8 MG TbDL     Sig: Take 1 tablet (8 mg total) by mouth every 12 (twelve) hours as needed (for nausea).     Dispense:  45 tablet     Refill:  5    OXcarbazepine (TRILEPTAL) 600 MG Tab     Sig: Take 1 tablet (600 mg total) by mouth 2 (two) times daily.     Dispense:  60 tablet     Refill:  5     NOTE DOSE CHANGE. (Prior dose was (300mg x 2) BID.) DISCONTINUE any prior prescription for this drug.    pantoprazole (PROTONIX) 40 MG tablet     Sig: Take 1 tablet (40 mg total) by mouth once daily.     Dispense:  30 tablet     Refill:  11    traMADoL (ULTRAM) 50 mg tablet     Sig: Take 1 tablet (50 mg total) by mouth every 8 (eight) hours as needed for Pain.     Dispense:  90 tablet     Refill:  5     GREATER THAN 7-DAY SUPPLY IS MEDICALLY NECESSARY.       Follow up in about 6 months (around 2021) for re-evaluate problem(s) discussed today.     Subjective   Review of Systems   Constitutional: Negative for chills and fever.   Respiratory: Negative for cough, chest tightness and shortness of breath.    Cardiovascular: Negative for chest pain.   Endocrine: Negative for polydipsia and polyuria.   Psychiatric/Behavioral: Negative for agitation, behavioral problems, hallucinations and  "suicidal ideas. The patient is not nervous/anxious.         Objective   Vitals:    07/02/20 1546   BP: 128/84   BP Location: Right arm   Patient Position: Sitting   BP Method: Large (Manual)   Pulse: 84   Temp: 96 °F (35.6 °C)   TempSrc: Tympanic   SpO2: 97%   Weight: 71.1 kg (156 lb 12 oz)   Height: 5' 2" (1.575 m)     Physical Exam  Vitals signs reviewed.   Constitutional:       General: She is not in acute distress.     Appearance: Normal appearance. She is not ill-appearing, toxic-appearing or diaphoretic.   HENT:      Head: Normocephalic and atraumatic.      Ears:      Comments: Hearing grossly intact.  Eyes:      General: No scleral icterus.  Cardiovascular:      Rate and Rhythm: Normal rate.   Pulmonary:      Effort: Pulmonary effort is normal.   Neurological:      General: No focal deficit present.      Mental Status: She is alert and oriented to person, place, and time.   Psychiatric:         Mood and Affect: Mood normal.         Behavior: Behavior normal.         Judgment: Judgment normal.           Documentation entered by me for this encounter may have been done in part using speech-recognition technology. Although I have made an effort to ensure accuracy, "sound like" errors may exist and should be interpreted in context. -CECILIA Boothe MD.  "

## 2020-07-02 NOTE — ASSESSMENT & PLAN NOTE
She reports satisfactory pain relief with her current medications, and she says that they result in increased mobility and level of functioning.  No sedation or other significant side effects reported. She is demonstrating no behaviors to suggest inappropriate use of prescribed medications. [place] Board of Pharmacy Controlled Prescription Drug Monitoring database was queried and showed no activity to suggest abuse, diversion, or other inappropriate use of prescription medications.

## 2020-07-02 NOTE — ASSESSMENT & PLAN NOTE
Chronic recurrence, unchanged in characteristics.  She says that she needs/uses the Fioricet a maximum of 5 times per week.

## 2020-07-08 LAB — OXCARBAZEPINE METABOLITE: 13 MCG/ML (ref 3–35)

## 2020-07-13 ENCOUNTER — HOSPITAL ENCOUNTER (EMERGENCY)
Facility: HOSPITAL | Age: 60
Discharge: HOME | End: 2020-07-13
Attending: EMERGENCY MEDICINE
Payer: MEDICAID

## 2020-07-13 VITALS
BODY MASS INDEX: 28.89 KG/M2 | WEIGHT: 157 LBS | OXYGEN SATURATION: 99 % | HEIGHT: 62 IN | TEMPERATURE: 98 F | HEART RATE: 67 BPM | SYSTOLIC BLOOD PRESSURE: 143 MMHG | RESPIRATION RATE: 18 BRPM | DIASTOLIC BLOOD PRESSURE: 70 MMHG

## 2020-07-13 DIAGNOSIS — R10.9 ABDOMINAL PAIN, UNSPECIFIED ABDOMINAL LOCATION: Primary | ICD-10-CM

## 2020-07-13 LAB
ALBUMIN SERPL BCP-MCNC: 4.1 G/DL (ref 3.5–5.2)
ALP SERPL-CCNC: 54 U/L (ref 55–135)
ALT SERPL W/O P-5'-P-CCNC: 15 U/L (ref 10–44)
ANION GAP SERPL CALC-SCNC: 12 MMOL/L (ref 8–16)
AST SERPL-CCNC: 18 U/L (ref 10–40)
BACTERIA #/AREA URNS HPF: NEGATIVE /HPF
BASOPHILS # BLD AUTO: 0.03 K/UL (ref 0–0.2)
BASOPHILS NFR BLD: 0.4 % (ref 0–1.9)
BILIRUB SERPL-MCNC: 0.4 MG/DL (ref 0.1–1)
BILIRUB UR QL STRIP: NEGATIVE
BUN SERPL-MCNC: 12 MG/DL (ref 8–23)
CALCIUM SERPL-MCNC: 9.7 MG/DL (ref 8.7–10.5)
CHLORIDE SERPL-SCNC: 102 MMOL/L (ref 95–110)
CLARITY UR: CLEAR
CO2 SERPL-SCNC: 26 MMOL/L (ref 23–29)
COLOR UR: YELLOW
CREAT SERPL-MCNC: 0.9 MG/DL (ref 0.5–1.4)
DIFFERENTIAL METHOD: NORMAL
EOSINOPHIL # BLD AUTO: 0.1 K/UL (ref 0–0.5)
EOSINOPHIL NFR BLD: 1.6 % (ref 0–8)
ERYTHROCYTE [DISTWIDTH] IN BLOOD BY AUTOMATED COUNT: 14.1 % (ref 11.5–14.5)
EST. GFR  (AFRICAN AMERICAN): >60 ML/MIN/1.73 M^2
EST. GFR  (NON AFRICAN AMERICAN): >60 ML/MIN/1.73 M^2
GLUCOSE SERPL-MCNC: 99 MG/DL (ref 70–110)
GLUCOSE UR QL STRIP: NEGATIVE
HCT VFR BLD AUTO: 37.7 % (ref 37–48.5)
HGB BLD-MCNC: 12.4 G/DL (ref 12–16)
HGB UR QL STRIP: ABNORMAL
HYALINE CASTS #/AREA URNS LPF: 2 /LPF
IMM GRANULOCYTES # BLD AUTO: 0.02 K/UL (ref 0–0.04)
IMM GRANULOCYTES NFR BLD AUTO: 0.3 % (ref 0–0.5)
KETONES UR QL STRIP: NEGATIVE
LEUKOCYTE ESTERASE UR QL STRIP: NEGATIVE
LIPASE SERPL-CCNC: 29 U/L (ref 4–60)
LYMPHOCYTES # BLD AUTO: 2.6 K/UL (ref 1–4.8)
LYMPHOCYTES NFR BLD: 35.3 % (ref 18–48)
MCH RBC QN AUTO: 29.9 PG (ref 27–31)
MCHC RBC AUTO-ENTMCNC: 32.9 G/DL (ref 32–36)
MCV RBC AUTO: 91 FL (ref 82–98)
MICROSCOPIC COMMENT: ABNORMAL
MONOCYTES # BLD AUTO: 0.7 K/UL (ref 0.3–1)
MONOCYTES NFR BLD: 8.8 % (ref 4–15)
NEUTROPHILS # BLD AUTO: 4 K/UL (ref 1.8–7.7)
NEUTROPHILS NFR BLD: 53.6 % (ref 38–73)
NITRITE UR QL STRIP: NEGATIVE
NRBC BLD-RTO: 0 /100 WBC
PH UR STRIP: 6 [PH] (ref 5–8)
PLATELET # BLD AUTO: 310 K/UL (ref 150–350)
PMV BLD AUTO: 9.5 FL (ref 9.2–12.9)
POTASSIUM SERPL-SCNC: 3.7 MMOL/L (ref 3.5–5.1)
PROT SERPL-MCNC: 7.3 G/DL (ref 6–8.4)
PROT UR QL STRIP: NEGATIVE
RBC # BLD AUTO: 4.15 M/UL (ref 4–5.4)
RBC #/AREA URNS HPF: 10 /HPF (ref 0–4)
SODIUM SERPL-SCNC: 140 MMOL/L (ref 136–145)
SP GR UR STRIP: 1.02 (ref 1–1.03)
SQUAMOUS #/AREA URNS HPF: 1 /HPF
URN SPEC COLLECT METH UR: ABNORMAL
UROBILINOGEN UR STRIP-ACNC: ABNORMAL EU/DL
WBC # BLD AUTO: 7.39 K/UL (ref 3.9–12.7)
WBC #/AREA URNS HPF: 0 /HPF (ref 0–5)

## 2020-07-13 PROCEDURE — 80053 COMPREHEN METABOLIC PANEL: CPT

## 2020-07-13 PROCEDURE — 83690 ASSAY OF LIPASE: CPT

## 2020-07-13 PROCEDURE — 36415 COLL VENOUS BLD VENIPUNCTURE: CPT

## 2020-07-13 PROCEDURE — 85025 COMPLETE CBC W/AUTO DIFF WBC: CPT

## 2020-07-13 PROCEDURE — 99283 EMERGENCY DEPT VISIT LOW MDM: CPT

## 2020-07-13 PROCEDURE — 81001 URINALYSIS AUTO W/SCOPE: CPT

## 2020-07-13 RX ORDER — DICYCLOMINE HYDROCHLORIDE 20 MG/1
20 TABLET ORAL 4 TIMES DAILY
Qty: 8 TABLET | Refills: 0 | Status: SHIPPED | OUTPATIENT
Start: 2020-07-13 | End: 2020-07-15

## 2020-07-14 NOTE — DISCHARGE INSTRUCTIONS
Please return if any severe abdominal pain.  Please return for fever chills nausea vomiting weakness.

## 2020-07-14 NOTE — ED NOTES
Pt reports onset earlier with pain to right side and abdomen with pain now resolved and dc orders noted.

## 2020-07-14 NOTE — ED PROVIDER NOTES
Encounter Date: 7/13/2020       History     Chief Complaint   Patient presents with    Abdominal Pain     Chief complaint is diffuse abdominal pain described as a crampy pain starting at 2:00 a.m. today.  It was associated with 1 episode of nausea vomiting.  At the present time she has absolutely no complaints of abdominal pain cramping nausea.  She denies any fever chills earache sore throat.  No complaints of chest pain at the present time no complaints of productive cough.  No complaints of trouble urinating.        Review of patient's allergies indicates:  No Known Allergies  Past Medical History:   Diagnosis Date    Anemia     Aneurysm     Anorexia 4/26/2017    Anxiety     Asthma     Bipolar disorder     Carpal tunnel syndrome, bilateral     Cerebral aneurysm     Chronic nausea 11/15/2018    Chronic pain syndrome     Cigarette nicotine dependence     Depression     GERD (gastroesophageal reflux disease)     Hyperlipidemia     Hypertension     Insomnia     Osteoporosis     Schizophrenia     Stroke     Subarachnoid hemorrhage      Past Surgical History:   Procedure Laterality Date    BUNIONECTOMY Right     COLONOSCOPY W/ BIOPSIES AND POLYPECTOMY      HYSTERECTOMY      PARTIAL HYSTERECTOMY      Bilateral Ovaries Remain    TRANSCRANIAL DOPPLER STUDY - COMPLETE  8/28/2014         TRANSCRANIAL DOPPLER STUDY - COMPLETE  8/29/2014         TRANSCRANIAL DOPPLER STUDY - COMPLETE  8/30/2014         TRANSCRANIAL DOPPLER STUDY - COMPLETE  8/31/2014         TRANSCRANIAL DOPPLER STUDY - COMPLETE  9/1/2014         TRANSCRANIAL DOPPLER STUDY - COMPLETE  9/2/2014         TRANSCRANIAL DOPPLER STUDY - COMPLETE  9/3/2014         TRANSCRANIAL DOPPLER STUDY - COMPLETE  9/4/2014         TRANSCRANIAL DOPPLER STUDY - COMPLETE  9/5/2014         TRANSCRANIAL DOPPLER STUDY - COMPLETE  9/6/2014         VAGINAL DELIVERY      X 3     Family History   Problem Relation Age of Onset    Cancer Mother      Glaucoma Mother     Stroke Mother     Diabetes type II Mother     Heart disease Mother     Hypertension Mother     Brain cancer Sister     Diabetes type II Sister     Cancer Brother     Hypertension Brother     Bone cancer Sister      Social History     Tobacco Use    Smoking status: Current Every Day Smoker     Packs/day: 1.00     Years: 51.00     Pack years: 51.00     Types: Cigarettes, Cigars, Vaping with nicotine     Start date: 1976    Smokeless tobacco: Never Used    Tobacco comment: in smoking program 5/13/19   Substance Use Topics    Alcohol use: Yes     Comment: 1-3 times per week, 1-2 drink at a time.    Drug use: No     Review of Systems   Constitutional: Negative for chills and fever.   HENT: Negative for ear pain, rhinorrhea and sore throat.    Eyes: Negative for pain and visual disturbance.   Respiratory: Negative for cough and shortness of breath.    Cardiovascular: Negative for chest pain and palpitations.   Gastrointestinal: Positive for abdominal pain. Negative for constipation, diarrhea, nausea and vomiting.   Genitourinary: Negative for dysuria, frequency, hematuria and urgency.   Musculoskeletal: Negative for back pain, joint swelling and myalgias.   Skin: Negative for rash.   Neurological: Negative for dizziness, seizures, weakness and headaches.   Psychiatric/Behavioral: Negative for dysphoric mood. The patient is not nervous/anxious.        Physical Exam     Initial Vitals [07/13/20 1542]   BP Pulse Resp Temp SpO2   (!) 158/103 82 20 98.4 °F (36.9 °C) 99 %      MAP       --         Physical Exam    Nursing note and vitals reviewed.  Constitutional: She appears well-developed and well-nourished.   HENT:   Head: Normocephalic and atraumatic.   Eyes: Conjunctivae, EOM and lids are normal. Pupils are equal, round, and reactive to light.   Neck: Trachea normal and normal range of motion. Neck supple. No thyroid mass and no thyromegaly present.   Cardiovascular: Normal rate, regular  rhythm and normal heart sounds.   Pulmonary/Chest: Effort normal and breath sounds normal.   Abdominal: Soft. Normal appearance and bowel sounds are normal. She exhibits no distension. There is no abdominal tenderness. There is no rebound and no guarding.   Musculoskeletal: Normal range of motion.   Neurological: She is alert and oriented to person, place, and time. She has normal strength and normal reflexes. No cranial nerve deficit or sensory deficit.   Skin: Skin is warm and dry.   Psychiatric: She has a normal mood and affect. Her speech is normal and behavior is normal. Judgment and thought content normal.         ED Course   Procedures  Labs Reviewed   COMPREHENSIVE METABOLIC PANEL - Abnormal; Notable for the following components:       Result Value    Alkaline Phosphatase 54 (*)     All other components within normal limits   URINALYSIS, REFLEX TO URINE CULTURE - Abnormal; Notable for the following components:    Occult Blood UA 1+ (*)     Urobilinogen, UA 2.0-3.0 (*)     All other components within normal limits    Narrative:     Specimen Source->Urine   URINALYSIS MICROSCOPIC - Abnormal; Notable for the following components:    RBC, UA 10 (*)     Hyaline Casts, UA 2 (*)     All other components within normal limits    Narrative:     Specimen Source->Urine   CBC W/ AUTO DIFFERENTIAL   LIPASE          Imaging Results    None          Medical Decision Making:   Initial Assessment:   Chief complaint is abdominal cramping pain.  Differential Diagnosis:   Includes bowel obstruction, gallbladder attack, appendicitis, diverticulitis, pancreatitis hepatitis among others  ED Management:  The patient physical exam is absolutely no abdominal pain.  No rebound good bowel sounds no tenderness.  Lab workup was reviewed and appears normal.  She will be discharged with instructions and Bentyl                   ED Course as of Jul 13 1942   Mon Jul 13, 2020   1543 Generalized abdominal pain radiating to right flank  NV      [KN]      ED Course User Index  [KN] Odalis Gan NP                Clinical Impression:       ICD-10-CM ICD-9-CM   1. Abdominal pain, unspecified abdominal location  R10.9 789.00                                Anthony Murillo MD  07/13/20 1959

## 2020-07-31 DIAGNOSIS — G44.209 TENSION HEADACHE: Chronic | ICD-10-CM

## 2020-08-07 RX ORDER — BUTALBITAL, ACETAMINOPHEN AND CAFFEINE 50; 325; 40 MG/1; MG/1; MG/1
1 TABLET ORAL EVERY 8 HOURS PRN
Qty: 45 TABLET | Refills: 3 | Status: SHIPPED | OUTPATIENT
Start: 2020-08-07 | End: 2021-04-08 | Stop reason: SDUPTHER

## 2020-08-07 NOTE — TELEPHONE ENCOUNTER
REFILL APPROVED    Medications Ordered This Encounter   Medications    butalbital-acetaminophen-caffeine -40 mg (FIORICET, ESGIC) -40 mg per tablet     Sig: TAKE 1 TABLET BY MOUTH EVERY 8 (EIGHT) HOURS AS NEEDED FOR HEADACHES. DO NOT TAKE MORE THAN 5 PER WEEK     Dispense:  45 tablet     Refill:  3     Each prescription should last 9 weeks. Dispense accordingly. Do not request premature refills.

## 2020-12-11 ENCOUNTER — PATIENT MESSAGE (OUTPATIENT)
Dept: OTHER | Facility: OTHER | Age: 60
End: 2020-12-11

## 2020-12-31 ENCOUNTER — TELEPHONE (OUTPATIENT)
Dept: INTERNAL MEDICINE | Facility: CLINIC | Age: 60
End: 2020-12-31

## 2020-12-31 NOTE — TELEPHONE ENCOUNTER
Spoke with patient and informed her that Dr. Boothe is away working at the hospital and that we need to cancel her appointment for 01/07/2021. She verbalized understanding, however, states that she needs to see another doctor as soon as possible for her medication refills. I informed her that I will reach out to the on call physician and see if there is an available appointment soon. She verbalized understanding.

## 2021-01-06 ENCOUNTER — OFFICE VISIT (OUTPATIENT)
Dept: INTERNAL MEDICINE | Facility: CLINIC | Age: 61
End: 2021-01-06
Payer: MEDICAID

## 2021-01-06 ENCOUNTER — HOSPITAL ENCOUNTER (OUTPATIENT)
Dept: CARDIOLOGY | Facility: HOSPITAL | Age: 61
Discharge: HOME | End: 2021-01-06
Payer: MEDICAID

## 2021-01-06 ENCOUNTER — TELEPHONE (OUTPATIENT)
Dept: INTERNAL MEDICINE | Facility: CLINIC | Age: 61
End: 2021-01-06

## 2021-01-06 ENCOUNTER — PATIENT MESSAGE (OUTPATIENT)
Dept: INTERNAL MEDICINE | Facility: CLINIC | Age: 61
End: 2021-01-06

## 2021-01-06 VITALS
OXYGEN SATURATION: 97 % | TEMPERATURE: 99 F | SYSTOLIC BLOOD PRESSURE: 150 MMHG | BODY MASS INDEX: 29.78 KG/M2 | HEART RATE: 89 BPM | DIASTOLIC BLOOD PRESSURE: 92 MMHG | HEIGHT: 62 IN | WEIGHT: 161.81 LBS

## 2021-01-06 DIAGNOSIS — Z11.4 SCREENING FOR HIV (HUMAN IMMUNODEFICIENCY VIRUS): ICD-10-CM

## 2021-01-06 DIAGNOSIS — R11.0 CHRONIC NAUSEA: Chronic | ICD-10-CM

## 2021-01-06 DIAGNOSIS — I10 ESSENTIAL HYPERTENSION: Chronic | ICD-10-CM

## 2021-01-06 DIAGNOSIS — E78.2 MIXED HYPERLIPIDEMIA: Chronic | ICD-10-CM

## 2021-01-06 DIAGNOSIS — F17.200 SMOKER: ICD-10-CM

## 2021-01-06 DIAGNOSIS — H53.8 BLURRED VISION, BILATERAL: ICD-10-CM

## 2021-01-06 DIAGNOSIS — F17.210 CIGARETTE NICOTINE DEPENDENCE WITHOUT COMPLICATION: Primary | Chronic | ICD-10-CM

## 2021-01-06 DIAGNOSIS — Z12.2 ENCOUNTER FOR SCREENING FOR MALIGNANT NEOPLASM OF RESPIRATORY ORGANS: ICD-10-CM

## 2021-01-06 DIAGNOSIS — Z12.31 ENCOUNTER FOR SCREENING MAMMOGRAM FOR MALIGNANT NEOPLASM OF BREAST: ICD-10-CM

## 2021-01-06 PROCEDURE — 93010 ELECTROCARDIOGRAM REPORT: CPT | Mod: ,,, | Performed by: INTERNAL MEDICINE

## 2021-01-06 PROCEDURE — 93005 ELECTROCARDIOGRAM TRACING: CPT

## 2021-01-06 PROCEDURE — 99214 PR OFFICE/OUTPT VISIT, EST, LEVL IV, 30-39 MIN: ICD-10-PCS | Mod: S$PBB,,, | Performed by: PHYSICIAN ASSISTANT

## 2021-01-06 PROCEDURE — 99214 OFFICE O/P EST MOD 30 MIN: CPT | Mod: S$PBB,,, | Performed by: PHYSICIAN ASSISTANT

## 2021-01-06 PROCEDURE — 99215 OFFICE O/P EST HI 40 MIN: CPT | Mod: PBBFAC,25 | Performed by: PHYSICIAN ASSISTANT

## 2021-01-06 PROCEDURE — 93010 EKG 12-LEAD: ICD-10-PCS | Mod: ,,, | Performed by: INTERNAL MEDICINE

## 2021-01-06 PROCEDURE — 99999 PR PBB SHADOW E&M-EST. PATIENT-LVL V: CPT | Mod: PBBFAC,,, | Performed by: PHYSICIAN ASSISTANT

## 2021-01-06 PROCEDURE — 99999 PR PBB SHADOW E&M-EST. PATIENT-LVL V: ICD-10-PCS | Mod: PBBFAC,,, | Performed by: PHYSICIAN ASSISTANT

## 2021-01-06 RX ORDER — NEBULIZER AND COMPRESSOR
EACH MISCELLANEOUS
Qty: 1 EACH | Refills: 0 | Status: SHIPPED | OUTPATIENT
Start: 2021-01-06 | End: 2021-01-06 | Stop reason: SDUPTHER

## 2021-01-06 RX ORDER — NEBULIZER AND COMPRESSOR
EACH MISCELLANEOUS
Qty: 1 EACH | Refills: 0 | Status: SHIPPED | OUTPATIENT
Start: 2021-01-06 | End: 2021-01-07 | Stop reason: SDUPTHER

## 2021-01-06 RX ORDER — CYPROHEPTADINE HYDROCHLORIDE 4 MG/1
4 TABLET ORAL 3 TIMES DAILY PRN
Qty: 90 TABLET | Refills: 11 | Status: SHIPPED | OUTPATIENT
Start: 2021-01-06 | End: 2021-02-22 | Stop reason: ALTCHOICE

## 2021-01-06 RX ORDER — AMLODIPINE BESYLATE 5 MG/1
5 TABLET ORAL DAILY
Qty: 30 TABLET | Refills: 1 | Status: SHIPPED | OUTPATIENT
Start: 2021-01-06 | End: 2021-02-22 | Stop reason: SDUPTHER

## 2021-01-06 RX ORDER — ATORVASTATIN CALCIUM 40 MG/1
TABLET, FILM COATED ORAL
Qty: 90 TABLET | Refills: 3 | Status: SHIPPED | OUTPATIENT
Start: 2021-01-06 | End: 2021-01-07

## 2021-01-07 DIAGNOSIS — I10 ESSENTIAL HYPERTENSION: Chronic | ICD-10-CM

## 2021-01-07 DIAGNOSIS — E78.2 MIXED HYPERLIPIDEMIA: Chronic | ICD-10-CM

## 2021-01-07 RX ORDER — NEBULIZER AND COMPRESSOR
EACH MISCELLANEOUS
Qty: 1 EACH | Refills: 0 | Status: SHIPPED | OUTPATIENT
Start: 2021-01-07

## 2021-01-07 RX ORDER — ATORVASTATIN CALCIUM 40 MG/1
80 TABLET, FILM COATED ORAL NIGHTLY
Qty: 90 TABLET | Refills: 3 | Status: SHIPPED | OUTPATIENT
Start: 2021-01-07 | End: 2021-04-08 | Stop reason: ALTCHOICE

## 2021-01-15 ENCOUNTER — HOSPITAL ENCOUNTER (OUTPATIENT)
Dept: RADIOLOGY | Facility: HOSPITAL | Age: 61
Discharge: HOME | End: 2021-01-15
Attending: PHYSICIAN ASSISTANT
Payer: MEDICAID

## 2021-01-15 VITALS — BODY MASS INDEX: 29.78 KG/M2 | WEIGHT: 161.81 LBS | HEIGHT: 62 IN

## 2021-01-15 DIAGNOSIS — F17.200 SMOKER: ICD-10-CM

## 2021-01-15 DIAGNOSIS — F17.210 CIGARETTE NICOTINE DEPENDENCE WITHOUT COMPLICATION: ICD-10-CM

## 2021-01-15 DIAGNOSIS — Z12.2 ENCOUNTER FOR SCREENING FOR MALIGNANT NEOPLASM OF RESPIRATORY ORGANS: ICD-10-CM

## 2021-01-15 DIAGNOSIS — Z12.31 ENCOUNTER FOR SCREENING MAMMOGRAM FOR MALIGNANT NEOPLASM OF BREAST: ICD-10-CM

## 2021-01-15 PROCEDURE — 71271 CT CHEST LUNG SCREENING LOW DOSE: ICD-10-PCS | Mod: 26,,, | Performed by: RADIOLOGY

## 2021-01-15 PROCEDURE — 77067 SCR MAMMO BI INCL CAD: CPT | Mod: TC

## 2021-01-15 PROCEDURE — 77063 BREAST TOMOSYNTHESIS BI: CPT | Mod: 26,,, | Performed by: RADIOLOGY

## 2021-01-15 PROCEDURE — 71271 CT THORAX LUNG CANCER SCR C-: CPT | Mod: 26,,, | Performed by: RADIOLOGY

## 2021-01-15 PROCEDURE — 77063 MAMMO DIGITAL SCREENING BILAT WITH TOMO: ICD-10-PCS | Mod: 26,,, | Performed by: RADIOLOGY

## 2021-01-15 PROCEDURE — 77067 MAMMO DIGITAL SCREENING BILAT WITH TOMO: ICD-10-PCS | Mod: 26,,, | Performed by: RADIOLOGY

## 2021-01-15 PROCEDURE — 77067 SCR MAMMO BI INCL CAD: CPT | Mod: 26,,, | Performed by: RADIOLOGY

## 2021-01-15 PROCEDURE — 71271 CT THORAX LUNG CANCER SCR C-: CPT | Mod: TC

## 2021-01-20 DIAGNOSIS — M19.91 PRIMARY LOCALIZED OSTEOARTHROSIS OF MULTIPLE SITES: Chronic | ICD-10-CM

## 2021-01-20 RX ORDER — TRAMADOL HYDROCHLORIDE 50 MG/1
50 TABLET ORAL EVERY 8 HOURS PRN
Qty: 90 TABLET | Refills: 0 | Status: SHIPPED | OUTPATIENT
Start: 2021-01-20 | End: 2021-02-22 | Stop reason: SDUPTHER

## 2021-02-05 DIAGNOSIS — M19.91 PRIMARY LOCALIZED OSTEOARTHROSIS OF MULTIPLE SITES: Chronic | ICD-10-CM

## 2021-02-05 RX ORDER — TRAMADOL HYDROCHLORIDE 50 MG/1
TABLET ORAL
Qty: 90 TABLET | OUTPATIENT
Start: 2021-02-05

## 2021-02-22 ENCOUNTER — OFFICE VISIT (OUTPATIENT)
Dept: INTERNAL MEDICINE | Facility: CLINIC | Age: 61
End: 2021-02-22
Payer: MEDICAID

## 2021-02-22 ENCOUNTER — PATIENT MESSAGE (OUTPATIENT)
Dept: ADMINISTRATIVE | Facility: OTHER | Age: 61
End: 2021-02-22

## 2021-02-22 VITALS
DIASTOLIC BLOOD PRESSURE: 84 MMHG | BODY MASS INDEX: 29.82 KG/M2 | OXYGEN SATURATION: 97 % | HEIGHT: 62 IN | TEMPERATURE: 98 F | HEART RATE: 84 BPM | WEIGHT: 162.06 LBS | SYSTOLIC BLOOD PRESSURE: 136 MMHG

## 2021-02-22 DIAGNOSIS — M19.91 PRIMARY LOCALIZED OSTEOARTHROSIS OF MULTIPLE SITES: Chronic | ICD-10-CM

## 2021-02-22 DIAGNOSIS — K21.9 GASTRO-ESOPHAGEAL REFLUX DISEASE WITHOUT ESOPHAGITIS: Chronic | ICD-10-CM

## 2021-02-22 DIAGNOSIS — F17.210 CIGARETTE NICOTINE DEPENDENCE WITHOUT COMPLICATION: Chronic | ICD-10-CM

## 2021-02-22 DIAGNOSIS — E78.00 PURE HYPERCHOLESTEROLEMIA: Chronic | ICD-10-CM

## 2021-02-22 DIAGNOSIS — R73.03 PREDIABETES: ICD-10-CM

## 2021-02-22 DIAGNOSIS — F25.0 SCHIZOAFFECTIVE DISORDER, BIPOLAR TYPE: Chronic | ICD-10-CM

## 2021-02-22 DIAGNOSIS — G56.03 BILATERAL CARPAL TUNNEL SYNDROME: Chronic | ICD-10-CM

## 2021-02-22 DIAGNOSIS — I10 ESSENTIAL HYPERTENSION: Primary | Chronic | ICD-10-CM

## 2021-02-22 PROCEDURE — 99396 PR PREVENTIVE VISIT,EST,40-64: ICD-10-PCS | Mod: S$PBB,,, | Performed by: FAMILY MEDICINE

## 2021-02-22 PROCEDURE — 99213 OFFICE O/P EST LOW 20 MIN: CPT | Mod: PBBFAC | Performed by: FAMILY MEDICINE

## 2021-02-22 PROCEDURE — 99999 PR PBB SHADOW E&M-EST. PATIENT-LVL III: CPT | Mod: PBBFAC,,, | Performed by: FAMILY MEDICINE

## 2021-02-22 PROCEDURE — 99396 PREV VISIT EST AGE 40-64: CPT | Mod: S$PBB,,, | Performed by: FAMILY MEDICINE

## 2021-02-22 PROCEDURE — 99999 PR PBB SHADOW E&M-EST. PATIENT-LVL III: ICD-10-PCS | Mod: PBBFAC,,, | Performed by: FAMILY MEDICINE

## 2021-02-22 RX ORDER — OMEPRAZOLE 20 MG/1
40 CAPSULE, DELAYED RELEASE ORAL DAILY
Qty: 180 CAPSULE | Refills: 4 | Status: SHIPPED | OUTPATIENT
Start: 2021-02-22 | End: 2022-03-10 | Stop reason: SDUPTHER

## 2021-02-22 RX ORDER — AMLODIPINE BESYLATE 5 MG/1
5 TABLET ORAL DAILY
Qty: 90 TABLET | Refills: 4 | Status: SHIPPED | OUTPATIENT
Start: 2021-02-22 | End: 2022-03-10 | Stop reason: SDUPTHER

## 2021-02-22 RX ORDER — TRAMADOL HYDROCHLORIDE 50 MG/1
50 TABLET ORAL EVERY 8 HOURS PRN
Qty: 90 TABLET | Refills: 5 | Status: SHIPPED | OUTPATIENT
Start: 2021-02-22 | End: 2021-08-13

## 2021-02-22 RX ORDER — OMEPRAZOLE 20 MG/1
2 CAPSULE, DELAYED RELEASE ORAL DAILY
COMMUNITY
End: 2021-02-22 | Stop reason: SDUPTHER

## 2021-02-22 RX ORDER — CHLORTHALIDONE 25 MG/1
25 TABLET ORAL DAILY
Qty: 30 TABLET | Refills: 1 | Status: SHIPPED | OUTPATIENT
Start: 2021-02-22 | End: 2021-04-08 | Stop reason: SDUPTHER

## 2021-03-27 ENCOUNTER — PATIENT MESSAGE (OUTPATIENT)
Dept: ADMINISTRATIVE | Facility: OTHER | Age: 61
End: 2021-03-27

## 2021-04-07 ENCOUNTER — PATIENT OUTREACH (OUTPATIENT)
Dept: ADMINISTRATIVE | Facility: OTHER | Age: 61
End: 2021-04-07

## 2021-04-08 ENCOUNTER — LAB VISIT (OUTPATIENT)
Dept: LAB | Facility: HOSPITAL | Age: 61
End: 2021-04-08
Attending: FAMILY MEDICINE
Payer: MEDICAID

## 2021-04-08 ENCOUNTER — OFFICE VISIT (OUTPATIENT)
Dept: OPHTHALMOLOGY | Facility: CLINIC | Age: 61
End: 2021-04-08
Payer: MEDICAID

## 2021-04-08 ENCOUNTER — OFFICE VISIT (OUTPATIENT)
Dept: INTERNAL MEDICINE | Facility: CLINIC | Age: 61
End: 2021-04-08
Payer: MEDICAID

## 2021-04-08 VITALS
BODY MASS INDEX: 29.35 KG/M2 | TEMPERATURE: 97 F | OXYGEN SATURATION: 98 % | HEART RATE: 76 BPM | DIASTOLIC BLOOD PRESSURE: 76 MMHG | WEIGHT: 160.5 LBS | SYSTOLIC BLOOD PRESSURE: 112 MMHG

## 2021-04-08 DIAGNOSIS — K21.9 GASTRO-ESOPHAGEAL REFLUX DISEASE WITHOUT ESOPHAGITIS: ICD-10-CM

## 2021-04-08 DIAGNOSIS — G44.209 TENSION HEADACHE: Chronic | ICD-10-CM

## 2021-04-08 DIAGNOSIS — R11.0 CHRONIC NAUSEA: Chronic | ICD-10-CM

## 2021-04-08 DIAGNOSIS — E78.2 MIXED HYPERLIPIDEMIA: Chronic | ICD-10-CM

## 2021-04-08 DIAGNOSIS — K59.09 CHRONIC CONSTIPATION: ICD-10-CM

## 2021-04-08 DIAGNOSIS — G56.03 BILATERAL CARPAL TUNNEL SYNDROME: Chronic | ICD-10-CM

## 2021-04-08 DIAGNOSIS — H52.03 HYPEROPIA WITH ASTIGMATISM AND PRESBYOPIA, BILATERAL: ICD-10-CM

## 2021-04-08 DIAGNOSIS — H25.13 NUCLEAR SCLEROSIS, BILATERAL: ICD-10-CM

## 2021-04-08 DIAGNOSIS — M19.91 PRIMARY LOCALIZED OSTEOARTHROSIS OF MULTIPLE SITES: Chronic | ICD-10-CM

## 2021-04-08 DIAGNOSIS — I10 ESSENTIAL HYPERTENSION: Chronic | ICD-10-CM

## 2021-04-08 DIAGNOSIS — R73.03 PREDIABETES: Primary | ICD-10-CM

## 2021-04-08 DIAGNOSIS — H53.8 BLURRED VISION, BILATERAL: ICD-10-CM

## 2021-04-08 DIAGNOSIS — E78.00 PURE HYPERCHOLESTEROLEMIA: Chronic | ICD-10-CM

## 2021-04-08 DIAGNOSIS — G43.119 INTRACTABLE MIGRAINE WITH AURA WITHOUT STATUS MIGRAINOSUS: ICD-10-CM

## 2021-04-08 DIAGNOSIS — I10 ESSENTIAL HYPERTENSION: Primary | Chronic | ICD-10-CM

## 2021-04-08 DIAGNOSIS — H52.203 HYPEROPIA WITH ASTIGMATISM AND PRESBYOPIA, BILATERAL: ICD-10-CM

## 2021-04-08 DIAGNOSIS — F25.0 SCHIZOAFFECTIVE DISORDER, BIPOLAR TYPE: Chronic | ICD-10-CM

## 2021-04-08 DIAGNOSIS — J30.2 CHRONIC SEASONAL ALLERGIC RHINITIS: Chronic | ICD-10-CM

## 2021-04-08 DIAGNOSIS — H52.4 HYPEROPIA WITH ASTIGMATISM AND PRESBYOPIA, BILATERAL: ICD-10-CM

## 2021-04-08 PROBLEM — R07.89 OTHER CHEST PAIN: Status: RESOLVED | Noted: 2019-05-02 | Resolved: 2021-04-08

## 2021-04-08 PROBLEM — Z23 NEED FOR PNEUMOCOCCAL VACCINATION: Status: RESOLVED | Noted: 2017-05-09 | Resolved: 2021-04-08

## 2021-04-08 PROBLEM — H53.10 SUBJECTIVE VISION DISTURBANCE, BILATERAL: Chronic | Status: RESOLVED | Noted: 2017-05-09 | Resolved: 2021-04-08

## 2021-04-08 PROCEDURE — 92004 PR EYE EXAM, NEW PATIENT,COMPREHESV: ICD-10-PCS | Mod: S$PBB,,, | Performed by: OPTOMETRIST

## 2021-04-08 PROCEDURE — 92015 DETERMINE REFRACTIVE STATE: CPT | Mod: ,,, | Performed by: OPTOMETRIST

## 2021-04-08 PROCEDURE — 99999 PR PBB SHADOW E&M-EST. PATIENT-LVL IV: ICD-10-PCS | Mod: PBBFAC,,, | Performed by: OPTOMETRIST

## 2021-04-08 PROCEDURE — 99999 PR PBB SHADOW E&M-EST. PATIENT-LVL IV: ICD-10-PCS | Mod: PBBFAC,,, | Performed by: FAMILY MEDICINE

## 2021-04-08 PROCEDURE — 99214 OFFICE O/P EST MOD 30 MIN: CPT | Mod: PBBFAC,27 | Performed by: OPTOMETRIST

## 2021-04-08 PROCEDURE — 92004 COMPRE OPH EXAM NEW PT 1/>: CPT | Mod: S$PBB,,, | Performed by: OPTOMETRIST

## 2021-04-08 PROCEDURE — 99396 PREV VISIT EST AGE 40-64: CPT | Mod: S$PBB,,, | Performed by: FAMILY MEDICINE

## 2021-04-08 PROCEDURE — 99999 PR PBB SHADOW E&M-EST. PATIENT-LVL IV: CPT | Mod: PBBFAC,,, | Performed by: OPTOMETRIST

## 2021-04-08 PROCEDURE — 99999 PR PBB SHADOW E&M-EST. PATIENT-LVL IV: CPT | Mod: PBBFAC,,, | Performed by: FAMILY MEDICINE

## 2021-04-08 PROCEDURE — 84132 ASSAY OF SERUM POTASSIUM: CPT | Performed by: FAMILY MEDICINE

## 2021-04-08 PROCEDURE — 99396 PR PREVENTIVE VISIT,EST,40-64: ICD-10-PCS | Mod: S$PBB,,, | Performed by: FAMILY MEDICINE

## 2021-04-08 PROCEDURE — 36415 COLL VENOUS BLD VENIPUNCTURE: CPT | Performed by: FAMILY MEDICINE

## 2021-04-08 PROCEDURE — 99214 OFFICE O/P EST MOD 30 MIN: CPT | Mod: PBBFAC | Performed by: FAMILY MEDICINE

## 2021-04-08 PROCEDURE — 92015 PR REFRACTION: ICD-10-PCS | Mod: ,,, | Performed by: OPTOMETRIST

## 2021-04-08 RX ORDER — POLYETHYLENE GLYCOL 3350 17 G/17G
17 POWDER, FOR SOLUTION ORAL DAILY PRN
Qty: 510 G | Refills: 11 | Status: SHIPPED | OUTPATIENT
Start: 2021-04-08 | End: 2023-11-16 | Stop reason: SDUPTHER

## 2021-04-08 RX ORDER — ONDANSETRON 8 MG/1
8 TABLET, ORALLY DISINTEGRATING ORAL EVERY 12 HOURS PRN
Qty: 45 TABLET | Refills: 11 | Status: SHIPPED | OUTPATIENT
Start: 2021-04-08 | End: 2022-03-10

## 2021-04-08 RX ORDER — BUTALBITAL, ACETAMINOPHEN AND CAFFEINE 50; 325; 40 MG/1; MG/1; MG/1
1 TABLET ORAL EVERY 8 HOURS PRN
Qty: 45 TABLET | Refills: 3 | Status: SHIPPED | OUTPATIENT
Start: 2021-04-08 | End: 2022-03-10 | Stop reason: SDUPTHER

## 2021-04-08 RX ORDER — AMITRIPTYLINE HYDROCHLORIDE 75 MG/1
75 TABLET ORAL NIGHTLY
Qty: 90 TABLET | Refills: 3 | Status: SHIPPED | OUTPATIENT
Start: 2021-04-08 | End: 2022-03-10 | Stop reason: SDUPTHER

## 2021-04-08 RX ORDER — ATORVASTATIN CALCIUM 80 MG/1
80 TABLET, FILM COATED ORAL NIGHTLY
Qty: 90 TABLET | Refills: 3 | Status: SHIPPED | OUTPATIENT
Start: 2021-04-08 | End: 2022-03-10 | Stop reason: SDUPTHER

## 2021-04-08 RX ORDER — OXCARBAZEPINE 600 MG/1
150 TABLET, FILM COATED ORAL 2 TIMES DAILY
COMMUNITY

## 2021-04-08 RX ORDER — ESCITALOPRAM OXALATE 20 MG
20 TABLET ORAL DAILY
COMMUNITY
Start: 2021-02-12 | End: 2023-03-15

## 2021-04-08 RX ORDER — FLUTICASONE PROPIONATE 50 MCG
2 SPRAY, SUSPENSION (ML) NASAL DAILY
Qty: 16 G | Refills: 11 | Status: SHIPPED | OUTPATIENT
Start: 2021-04-08 | End: 2022-03-10 | Stop reason: SDUPTHER

## 2021-04-08 RX ORDER — CHLORTHALIDONE 25 MG/1
25 TABLET ORAL NIGHTLY
Qty: 90 TABLET | Refills: 3 | Status: SHIPPED | OUTPATIENT
Start: 2021-04-08 | End: 2022-03-10

## 2021-04-09 LAB — POTASSIUM SERPL-SCNC: 3.7 MMOL/L (ref 3.5–5.1)

## 2021-05-02 ENCOUNTER — PATIENT MESSAGE (OUTPATIENT)
Dept: ADMINISTRATIVE | Facility: HOSPITAL | Age: 61
End: 2021-05-02

## 2021-08-10 ENCOUNTER — PATIENT MESSAGE (OUTPATIENT)
Dept: INTERNAL MEDICINE | Facility: CLINIC | Age: 61
End: 2021-08-10

## 2021-09-14 ENCOUNTER — TELEPHONE (OUTPATIENT)
Dept: INTERNAL MEDICINE | Facility: CLINIC | Age: 61
End: 2021-09-14

## 2021-09-15 ENCOUNTER — HOSPITAL ENCOUNTER (EMERGENCY)
Facility: HOSPITAL | Age: 61
Discharge: HOME | End: 2021-09-15
Attending: EMERGENCY MEDICINE
Payer: MEDICAID

## 2021-09-15 VITALS
BODY MASS INDEX: 25.32 KG/M2 | DIASTOLIC BLOOD PRESSURE: 77 MMHG | RESPIRATION RATE: 18 BRPM | WEIGHT: 138.44 LBS | OXYGEN SATURATION: 98 % | SYSTOLIC BLOOD PRESSURE: 125 MMHG | HEART RATE: 107 BPM

## 2021-09-15 DIAGNOSIS — S60.221A CONTUSION OF RIGHT HAND, INITIAL ENCOUNTER: Primary | ICD-10-CM

## 2021-09-15 PROCEDURE — 99283 EMERGENCY DEPT VISIT LOW MDM: CPT | Mod: 25

## 2021-10-06 ENCOUNTER — OFFICE VISIT (OUTPATIENT)
Dept: INTERNAL MEDICINE | Facility: CLINIC | Age: 61
End: 2021-10-06
Payer: MEDICAID

## 2021-10-06 ENCOUNTER — HOSPITAL ENCOUNTER (EMERGENCY)
Facility: HOSPITAL | Age: 61
Discharge: HOME | End: 2021-10-06
Attending: EMERGENCY MEDICINE
Payer: MEDICAID

## 2021-10-06 VITALS
BODY MASS INDEX: 25.15 KG/M2 | SYSTOLIC BLOOD PRESSURE: 114 MMHG | HEART RATE: 78 BPM | OXYGEN SATURATION: 95 % | DIASTOLIC BLOOD PRESSURE: 70 MMHG | WEIGHT: 137.5 LBS | TEMPERATURE: 97 F | RESPIRATION RATE: 18 BRPM

## 2021-10-06 VITALS
WEIGHT: 140.19 LBS | OXYGEN SATURATION: 96 % | TEMPERATURE: 97 F | BODY MASS INDEX: 25.8 KG/M2 | HEIGHT: 62 IN | HEART RATE: 98 BPM

## 2021-10-06 DIAGNOSIS — Z20.822 LAB TEST NEGATIVE FOR COVID-19 VIRUS: Primary | ICD-10-CM

## 2021-10-06 DIAGNOSIS — Z20.822 SUSPECTED COVID-19 VIRUS INFECTION: ICD-10-CM

## 2021-10-06 DIAGNOSIS — R06.02 SHORTNESS OF BREATH: Primary | ICD-10-CM

## 2021-10-06 LAB
ALBUMIN SERPL BCP-MCNC: 3.7 G/DL (ref 3.5–5.2)
ALP SERPL-CCNC: 57 U/L (ref 55–135)
ALT SERPL W/O P-5'-P-CCNC: 17 U/L (ref 10–44)
ANION GAP SERPL CALC-SCNC: 8 MMOL/L (ref 8–16)
AST SERPL-CCNC: 17 U/L (ref 10–40)
BASOPHILS # BLD AUTO: 0.01 K/UL (ref 0–0.2)
BASOPHILS NFR BLD: 0.1 % (ref 0–1.9)
BILIRUB SERPL-MCNC: 0.2 MG/DL (ref 0.1–1)
BUN SERPL-MCNC: 6 MG/DL (ref 8–23)
CALCIUM SERPL-MCNC: 9.9 MG/DL (ref 8.7–10.5)
CHLORIDE SERPL-SCNC: 103 MMOL/L (ref 95–110)
CK SERPL-CCNC: 61 U/L (ref 20–180)
CO2 SERPL-SCNC: 28 MMOL/L (ref 23–29)
CREAT SERPL-MCNC: 0.9 MG/DL (ref 0.5–1.4)
CRP SERPL-MCNC: 1 MG/L (ref 0–8.2)
CTP QC/QA: YES
DIFFERENTIAL METHOD: ABNORMAL
EOSINOPHIL # BLD AUTO: 0 K/UL (ref 0–0.5)
EOSINOPHIL NFR BLD: 0 % (ref 0–8)
ERYTHROCYTE [DISTWIDTH] IN BLOOD BY AUTOMATED COUNT: 14.6 % (ref 11.5–14.5)
EST. GFR  (AFRICAN AMERICAN): >60 ML/MIN/1.73 M^2
EST. GFR  (NON AFRICAN AMERICAN): >60 ML/MIN/1.73 M^2
FERRITIN SERPL-MCNC: 20 NG/ML (ref 20–300)
GLUCOSE SERPL-MCNC: 160 MG/DL (ref 70–110)
HCT VFR BLD AUTO: 36.1 % (ref 37–48.5)
HGB BLD-MCNC: 12 G/DL (ref 12–16)
IMM GRANULOCYTES # BLD AUTO: 0.03 K/UL (ref 0–0.04)
IMM GRANULOCYTES NFR BLD AUTO: 0.4 % (ref 0–0.5)
LACTATE SERPL-SCNC: 1.3 MMOL/L (ref 0.5–2.2)
LDH SERPL L TO P-CCNC: 187 U/L (ref 110–260)
LYMPHOCYTES # BLD AUTO: 1.3 K/UL (ref 1–4.8)
LYMPHOCYTES NFR BLD: 16.5 % (ref 18–48)
MCH RBC QN AUTO: 30.1 PG (ref 27–31)
MCHC RBC AUTO-ENTMCNC: 33.2 G/DL (ref 32–36)
MCV RBC AUTO: 91 FL (ref 82–98)
MONOCYTES # BLD AUTO: 0.2 K/UL (ref 0.3–1)
MONOCYTES NFR BLD: 2.5 % (ref 4–15)
NEUTROPHILS # BLD AUTO: 6.4 K/UL (ref 1.8–7.7)
NEUTROPHILS NFR BLD: 80.5 % (ref 38–73)
NRBC BLD-RTO: 0 /100 WBC
PLATELET # BLD AUTO: 301 K/UL (ref 150–450)
PMV BLD AUTO: 8.6 FL (ref 9.2–12.9)
POTASSIUM SERPL-SCNC: 3.9 MMOL/L (ref 3.5–5.1)
PROT SERPL-MCNC: 6.7 G/DL (ref 6–8.4)
RBC # BLD AUTO: 3.99 M/UL (ref 4–5.4)
SARS-COV-2 RDRP RESP QL NAA+PROBE: NEGATIVE
SODIUM SERPL-SCNC: 139 MMOL/L (ref 136–145)
TROPONIN I SERPL DL<=0.01 NG/ML-MCNC: <0.006 NG/ML (ref 0–0.03)
WBC # BLD AUTO: 7.94 K/UL (ref 3.9–12.7)

## 2021-10-06 PROCEDURE — 84484 ASSAY OF TROPONIN QUANT: CPT | Performed by: NURSE PRACTITIONER

## 2021-10-06 PROCEDURE — 80053 COMPREHEN METABOLIC PANEL: CPT | Performed by: NURSE PRACTITIONER

## 2021-10-06 PROCEDURE — 99999 PR PBB SHADOW E&M-EST. PATIENT-LVL III: CPT | Mod: PBBFAC,,, | Performed by: FAMILY MEDICINE

## 2021-10-06 PROCEDURE — 83605 ASSAY OF LACTIC ACID: CPT | Performed by: NURSE PRACTITIONER

## 2021-10-06 PROCEDURE — 99213 PR OFFICE/OUTPT VISIT, EST, LEVL III, 20-29 MIN: ICD-10-PCS | Mod: S$PBB,,, | Performed by: FAMILY MEDICINE

## 2021-10-06 PROCEDURE — 99213 OFFICE O/P EST LOW 20 MIN: CPT | Mod: S$PBB,,, | Performed by: FAMILY MEDICINE

## 2021-10-06 PROCEDURE — 93005 ELECTROCARDIOGRAM TRACING: CPT

## 2021-10-06 PROCEDURE — 99213 OFFICE O/P EST LOW 20 MIN: CPT | Mod: PBBFAC,25,27 | Performed by: FAMILY MEDICINE

## 2021-10-06 PROCEDURE — 85025 COMPLETE CBC W/AUTO DIFF WBC: CPT | Performed by: NURSE PRACTITIONER

## 2021-10-06 PROCEDURE — 93010 EKG 12-LEAD: ICD-10-PCS | Mod: ,,, | Performed by: INTERNAL MEDICINE

## 2021-10-06 PROCEDURE — 83615 LACTATE (LD) (LDH) ENZYME: CPT | Performed by: NURSE PRACTITIONER

## 2021-10-06 PROCEDURE — 82550 ASSAY OF CK (CPK): CPT | Performed by: NURSE PRACTITIONER

## 2021-10-06 PROCEDURE — 86140 C-REACTIVE PROTEIN: CPT | Performed by: NURSE PRACTITIONER

## 2021-10-06 PROCEDURE — 93010 ELECTROCARDIOGRAM REPORT: CPT | Mod: ,,, | Performed by: INTERNAL MEDICINE

## 2021-10-06 PROCEDURE — 82728 ASSAY OF FERRITIN: CPT | Performed by: NURSE PRACTITIONER

## 2021-10-06 PROCEDURE — 99999 PR PBB SHADOW E&M-EST. PATIENT-LVL III: ICD-10-PCS | Mod: PBBFAC,,, | Performed by: FAMILY MEDICINE

## 2021-10-06 PROCEDURE — U0002 COVID-19 LAB TEST NON-CDC: HCPCS | Performed by: NURSE PRACTITIONER

## 2021-10-06 PROCEDURE — 99284 EMERGENCY DEPT VISIT MOD MDM: CPT | Mod: 25

## 2021-10-08 ENCOUNTER — TELEPHONE (OUTPATIENT)
Dept: INTERNAL MEDICINE | Facility: CLINIC | Age: 61
End: 2021-10-08

## 2021-12-10 ENCOUNTER — TELEPHONE (OUTPATIENT)
Dept: INTERNAL MEDICINE | Facility: CLINIC | Age: 61
End: 2021-12-10

## 2021-12-10 ENCOUNTER — OFFICE VISIT (OUTPATIENT)
Dept: INTERNAL MEDICINE | Facility: CLINIC | Age: 61
End: 2021-12-10
Payer: MEDICAID

## 2021-12-10 VITALS
SYSTOLIC BLOOD PRESSURE: 120 MMHG | OXYGEN SATURATION: 97 % | WEIGHT: 138.88 LBS | HEIGHT: 62 IN | HEART RATE: 100 BPM | DIASTOLIC BLOOD PRESSURE: 70 MMHG | BODY MASS INDEX: 25.55 KG/M2 | TEMPERATURE: 97 F

## 2021-12-10 DIAGNOSIS — Z12.2 ENCOUNTER FOR SCREENING FOR LUNG CANCER: ICD-10-CM

## 2021-12-10 DIAGNOSIS — F25.0 SCHIZOAFFECTIVE DISORDER, BIPOLAR TYPE: Chronic | ICD-10-CM

## 2021-12-10 DIAGNOSIS — Z11.1 SCREENING FOR TUBERCULOSIS: ICD-10-CM

## 2021-12-10 DIAGNOSIS — Z11.4 SCREENING FOR HIV WITHOUT PRESENCE OF RISK FACTORS: ICD-10-CM

## 2021-12-10 DIAGNOSIS — I10 ESSENTIAL HYPERTENSION: Chronic | ICD-10-CM

## 2021-12-10 DIAGNOSIS — E78.00 PURE HYPERCHOLESTEROLEMIA: Chronic | ICD-10-CM

## 2021-12-10 DIAGNOSIS — Z00.00 PREVENTATIVE HEALTH CARE: ICD-10-CM

## 2021-12-10 DIAGNOSIS — R63.4 WEIGHT LOSS: Primary | ICD-10-CM

## 2021-12-10 DIAGNOSIS — R68.81 EARLY SATIETY: ICD-10-CM

## 2021-12-10 DIAGNOSIS — Z79.891 CHRONIC USE OF OPIATE DRUG FOR THERAPEUTIC PURPOSE: ICD-10-CM

## 2021-12-10 DIAGNOSIS — Z11.59 ENCOUNTER FOR HEPATITIS C SCREENING TEST FOR LOW RISK PATIENT: ICD-10-CM

## 2021-12-10 DIAGNOSIS — G24.01 TARDIVE DYSKINESIA: Chronic | ICD-10-CM

## 2021-12-10 DIAGNOSIS — Z91.199 NONADHERENCE TO MEDICAL TREATMENT: Chronic | ICD-10-CM

## 2021-12-10 DIAGNOSIS — M19.91 PRIMARY LOCALIZED OSTEOARTHROSIS OF MULTIPLE SITES: Chronic | ICD-10-CM

## 2021-12-10 DIAGNOSIS — J30.2 CHRONIC SEASONAL ALLERGIC RHINITIS: Chronic | ICD-10-CM

## 2021-12-10 DIAGNOSIS — F17.210 CIGARETTE NICOTINE DEPENDENCE WITHOUT COMPLICATION: ICD-10-CM

## 2021-12-10 DIAGNOSIS — F31.9 BIPOLAR 1 DISORDER: Chronic | ICD-10-CM

## 2021-12-10 DIAGNOSIS — R63.0 ANOREXIA: ICD-10-CM

## 2021-12-10 DIAGNOSIS — R73.03 PREDIABETES: ICD-10-CM

## 2021-12-10 DIAGNOSIS — R73.09 ELEVATED GLUCOSE: ICD-10-CM

## 2021-12-10 DIAGNOSIS — R10.84 GENERALIZED ABDOMINAL PAIN: ICD-10-CM

## 2021-12-10 LAB
AMPHET+METHAMPHET UR QL: NEGATIVE
BARBITURATES UR QL SCN>200 NG/ML: NEGATIVE
BENZODIAZ UR QL SCN>200 NG/ML: NEGATIVE
BILIRUB UR QL STRIP: NEGATIVE
BZE UR QL SCN: NEGATIVE
CANNABINOIDS UR QL SCN: NEGATIVE
CHOLEST SERPL-MCNC: 182 MG/DL (ref 120–199)
CHOLEST/HDLC SERPL: 2.8 {RATIO} (ref 2–5)
CLARITY UR: ABNORMAL
COLOR UR: YELLOW
CREAT SERPL-MCNC: 0.7 MG/DL (ref 0.5–1.4)
CREAT UR-MCNC: 158 MG/DL (ref 15–325)
EST. GFR  (AFRICAN AMERICAN): >60 ML/MIN/1.73 M^2
EST. GFR  (NON AFRICAN AMERICAN): >60 ML/MIN/1.73 M^2
ESTIMATED AVG GLUCOSE: 123 MG/DL (ref 68–131)
ETHANOL UR-MCNC: <10 MG/DL
GLUCOSE UR QL STRIP: NEGATIVE
HBA1C MFR BLD: 5.9 % (ref 4–5.6)
HDLC SERPL-MCNC: 66 MG/DL (ref 40–75)
HDLC SERPL: 36.3 % (ref 20–50)
HGB UR QL STRIP: ABNORMAL
KETONES UR QL STRIP: NEGATIVE
LDLC SERPL CALC-MCNC: 104.6 MG/DL (ref 63–159)
LEUKOCYTE ESTERASE UR QL STRIP: NEGATIVE
METHADONE UR QL SCN>300 NG/ML: NEGATIVE
NITRITE UR QL STRIP: NEGATIVE
NONHDLC SERPL-MCNC: 116 MG/DL
OPIATES UR QL SCN: NEGATIVE
PCP UR QL SCN>25 NG/ML: NEGATIVE
PH UR STRIP: 8 [PH] (ref 5–8)
PROT UR QL STRIP: NEGATIVE
SP GR UR STRIP: 1.01 (ref 1–1.03)
TOXICOLOGY INFORMATION: NORMAL
TRIGL SERPL-MCNC: 57 MG/DL (ref 30–150)
TSH SERPL DL<=0.005 MIU/L-ACNC: 1.36 UIU/ML (ref 0.4–4)
URN SPEC COLLECT METH UR: ABNORMAL

## 2021-12-10 PROCEDURE — 87389 HIV-1 AG W/HIV-1&-2 AB AG IA: CPT | Performed by: FAMILY MEDICINE

## 2021-12-10 PROCEDURE — 80061 LIPID PANEL: CPT | Performed by: FAMILY MEDICINE

## 2021-12-10 PROCEDURE — 99214 OFFICE O/P EST MOD 30 MIN: CPT | Mod: PBBFAC | Performed by: FAMILY MEDICINE

## 2021-12-10 PROCEDURE — 86480 TB TEST CELL IMMUN MEASURE: CPT | Performed by: FAMILY MEDICINE

## 2021-12-10 PROCEDURE — 82565 ASSAY OF CREATININE: CPT | Performed by: FAMILY MEDICINE

## 2021-12-10 PROCEDURE — 99999 PR PBB SHADOW E&M-EST. PATIENT-LVL IV: ICD-10-PCS | Mod: PBBFAC,,, | Performed by: FAMILY MEDICINE

## 2021-12-10 PROCEDURE — 86803 HEPATITIS C AB TEST: CPT | Performed by: FAMILY MEDICINE

## 2021-12-10 PROCEDURE — 80307 DRUG TEST PRSMV CHEM ANLYZR: CPT | Performed by: FAMILY MEDICINE

## 2021-12-10 PROCEDURE — 99215 PR OFFICE/OUTPT VISIT, EST, LEVL V, 40-54 MIN: ICD-10-PCS | Mod: S$PBB,,, | Performed by: FAMILY MEDICINE

## 2021-12-10 PROCEDURE — 83036 HEMOGLOBIN GLYCOSYLATED A1C: CPT | Performed by: FAMILY MEDICINE

## 2021-12-10 PROCEDURE — 81003 URINALYSIS AUTO W/O SCOPE: CPT | Performed by: FAMILY MEDICINE

## 2021-12-10 PROCEDURE — 84443 ASSAY THYROID STIM HORMONE: CPT | Performed by: FAMILY MEDICINE

## 2021-12-10 PROCEDURE — 99215 OFFICE O/P EST HI 40 MIN: CPT | Mod: S$PBB,,, | Performed by: FAMILY MEDICINE

## 2021-12-10 PROCEDURE — 99999 PR PBB SHADOW E&M-EST. PATIENT-LVL IV: CPT | Mod: PBBFAC,,, | Performed by: FAMILY MEDICINE

## 2021-12-10 RX ORDER — CYPROHEPTADINE HYDROCHLORIDE 4 MG/1
TABLET ORAL
COMMUNITY
Start: 2021-10-12 | End: 2021-12-10

## 2021-12-10 RX ORDER — CYPROHEPTADINE HYDROCHLORIDE 4 MG/1
8 TABLET ORAL
Qty: 180 TABLET | Refills: 5 | Status: SHIPPED | OUTPATIENT
Start: 2021-12-10 | End: 2022-06-11

## 2021-12-10 RX ORDER — TRAMADOL HYDROCHLORIDE 50 MG/1
50 TABLET ORAL EVERY 8 HOURS PRN
Qty: 90 TABLET | Refills: 2 | Status: SHIPPED | OUTPATIENT
Start: 2021-12-10 | End: 2022-03-10 | Stop reason: SDUPTHER

## 2021-12-10 RX ORDER — MIRTAZAPINE 15 MG/1
15 TABLET, FILM COATED ORAL NIGHTLY
COMMUNITY
Start: 2021-11-10 | End: 2023-03-15

## 2021-12-13 LAB
HCV AB SERPL QL IA: NEGATIVE
HIV 1+2 AB+HIV1 P24 AG SERPL QL IA: NEGATIVE

## 2021-12-14 LAB
GAMMA INTERFERON BACKGROUND BLD IA-ACNC: 0.07 IU/ML
M TB IFN-G CD4+ BCKGRND COR BLD-ACNC: 0.05 IU/ML
MITOGEN IGNF BCKGRD COR BLD-ACNC: >10 IU/ML
TB GOLD PLUS: NEGATIVE
TB2 - NIL: 0.06 IU/ML

## 2021-12-30 ENCOUNTER — PATIENT MESSAGE (OUTPATIENT)
Dept: RADIOLOGY | Facility: HOSPITAL | Age: 61
End: 2021-12-30
Payer: MEDICAID

## 2022-01-03 ENCOUNTER — HOSPITAL ENCOUNTER (OUTPATIENT)
Dept: RADIOLOGY | Facility: HOSPITAL | Age: 62
Discharge: HOME | End: 2022-01-03
Attending: FAMILY MEDICINE
Payer: MEDICAID

## 2022-01-03 DIAGNOSIS — R10.84 GENERALIZED ABDOMINAL PAIN: ICD-10-CM

## 2022-01-03 DIAGNOSIS — Z12.2 ENCOUNTER FOR SCREENING FOR LUNG CANCER: ICD-10-CM

## 2022-01-03 DIAGNOSIS — F17.210 CIGARETTE NICOTINE DEPENDENCE WITHOUT COMPLICATION: ICD-10-CM

## 2022-01-03 PROCEDURE — 25500020 PHARM REV CODE 255: Performed by: FAMILY MEDICINE

## 2022-01-03 PROCEDURE — 74177 CT ABD & PELVIS W/CONTRAST: CPT | Mod: 26,,, | Performed by: RADIOLOGY

## 2022-01-03 PROCEDURE — A9698 NON-RAD CONTRAST MATERIALNOC: HCPCS | Performed by: FAMILY MEDICINE

## 2022-01-03 PROCEDURE — 71271 CT THORAX LUNG CANCER SCR C-: CPT | Mod: 26,,, | Performed by: RADIOLOGY

## 2022-01-03 PROCEDURE — 74177 CT ABDOMEN PELVIS WITH CONTRAST: ICD-10-PCS | Mod: 26,,, | Performed by: RADIOLOGY

## 2022-01-03 PROCEDURE — 74177 CT ABD & PELVIS W/CONTRAST: CPT | Mod: TC

## 2022-01-03 PROCEDURE — 71271 CT CHEST LUNG SCREENING LOW DOSE: ICD-10-PCS | Mod: 26,,, | Performed by: RADIOLOGY

## 2022-01-03 PROCEDURE — 71271 CT THORAX LUNG CANCER SCR C-: CPT | Mod: TC

## 2022-01-03 RX ADMIN — IOHEXOL 75 ML: 350 INJECTION, SOLUTION INTRAVENOUS at 08:01

## 2022-01-03 RX ADMIN — IOHEXOL 1000 ML: 12 SOLUTION ORAL at 07:01

## 2022-01-05 PROBLEM — I70.0 ATHEROSCLEROSIS OF AORTA: Chronic | Status: ACTIVE | Noted: 2022-01-05

## 2022-01-06 NOTE — PROGRESS NOTES
Karyna Vogel.    I'm happy to report that these results are fine and do not require a change in treatment at this point.    Thanks for letting me care for you, and thanks for trusting [facility] with your healthcare needs.    All the best,    CECILIA Boothe MD

## 2022-01-06 NOTE — PROGRESS NOTES
"Hi, Karyna.    I am proud of you for getting your lung cancer screening done!    I was very happy to see that the radiologist interpreted your test result findings as "BENIGN."    This is considered a NEGATIVE screening test result. (In this case, negative means that nothing bad was found.)    Thanks for letting me care for you, and thanks for trusting [facility] with your healthcare needs.    Sincerely,    CECILIA Boothe MD "

## 2022-01-20 LAB — NONINV COLON CA DNA+OCC BLD SCRN STL QL: NEGATIVE

## 2022-01-25 ENCOUNTER — HOSPITAL ENCOUNTER (OUTPATIENT)
Dept: RADIOLOGY | Facility: HOSPITAL | Age: 62
Discharge: HOME | End: 2022-01-25
Attending: FAMILY MEDICINE
Payer: MEDICAID

## 2022-01-25 VITALS — WEIGHT: 138.88 LBS | BODY MASS INDEX: 25.55 KG/M2 | HEIGHT: 62 IN

## 2022-01-25 DIAGNOSIS — Z00.00 PREVENTATIVE HEALTH CARE: ICD-10-CM

## 2022-01-25 PROCEDURE — 77067 SCR MAMMO BI INCL CAD: CPT | Mod: TC

## 2022-01-25 PROCEDURE — 77067 SCR MAMMO BI INCL CAD: CPT | Mod: 26,,, | Performed by: RADIOLOGY

## 2022-01-25 PROCEDURE — 77063 BREAST TOMOSYNTHESIS BI: CPT | Mod: 26,,, | Performed by: RADIOLOGY

## 2022-01-25 PROCEDURE — 77063 MAMMO DIGITAL SCREENING BILAT WITH TOMO: ICD-10-PCS | Mod: 26,,, | Performed by: RADIOLOGY

## 2022-01-25 PROCEDURE — 77063 BREAST TOMOSYNTHESIS BI: CPT | Mod: TC

## 2022-01-25 PROCEDURE — 77067 MAMMO DIGITAL SCREENING BILAT WITH TOMO: ICD-10-PCS | Mod: 26,,, | Performed by: RADIOLOGY

## 2022-01-25 NOTE — PROGRESS NOTES
Hi, Karyna.    I am happy to report that your recent breast imaging did NOT show evidence of cancer.    An annual mammogram is the best test to screen for breast cancer, but it is not perfect, and it can miss some cancers. So, even though your mammogram was normal, if you notice any lump or change in one of your breasts, please schedule an appointment with me for a proper evaluation.    Thanks for letting me care for you, and thanks for trusting [facility] with your healthcare needs.    Sincerely,    CECILIA Boothe MD

## 2022-01-27 ENCOUNTER — OFFICE VISIT (OUTPATIENT)
Dept: GASTROENTEROLOGY | Facility: CLINIC | Age: 62
End: 2022-01-27
Payer: MEDICAID

## 2022-01-27 VITALS
DIASTOLIC BLOOD PRESSURE: 70 MMHG | HEIGHT: 62 IN | SYSTOLIC BLOOD PRESSURE: 120 MMHG | BODY MASS INDEX: 26.37 KG/M2 | HEART RATE: 82 BPM | WEIGHT: 143.31 LBS

## 2022-01-27 DIAGNOSIS — K82.4 GALLBLADDER POLYP: ICD-10-CM

## 2022-01-27 DIAGNOSIS — R63.4 WEIGHT LOSS: Primary | ICD-10-CM

## 2022-01-27 DIAGNOSIS — R68.81 EARLY SATIETY: ICD-10-CM

## 2022-01-27 DIAGNOSIS — R63.0 ANOREXIA: ICD-10-CM

## 2022-01-27 PROCEDURE — 1159F MED LIST DOCD IN RCRD: CPT | Mod: CPTII,,, | Performed by: NURSE PRACTITIONER

## 2022-01-27 PROCEDURE — 3078F DIAST BP <80 MM HG: CPT | Mod: CPTII,,, | Performed by: NURSE PRACTITIONER

## 2022-01-27 PROCEDURE — 99999 PR PBB SHADOW E&M-EST. PATIENT-LVL V: ICD-10-PCS | Mod: PBBFAC,,, | Performed by: NURSE PRACTITIONER

## 2022-01-27 PROCEDURE — 99215 OFFICE O/P EST HI 40 MIN: CPT | Mod: PBBFAC | Performed by: NURSE PRACTITIONER

## 2022-01-27 PROCEDURE — 99214 OFFICE O/P EST MOD 30 MIN: CPT | Mod: S$PBB,,, | Performed by: NURSE PRACTITIONER

## 2022-01-27 PROCEDURE — 1159F PR MEDICATION LIST DOCUMENTED IN MEDICAL RECORD: ICD-10-PCS | Mod: CPTII,,, | Performed by: NURSE PRACTITIONER

## 2022-01-27 PROCEDURE — 3074F PR MOST RECENT SYSTOLIC BLOOD PRESSURE < 130 MM HG: ICD-10-PCS | Mod: CPTII,,, | Performed by: NURSE PRACTITIONER

## 2022-01-27 PROCEDURE — 99999 PR PBB SHADOW E&M-EST. PATIENT-LVL V: CPT | Mod: PBBFAC,,, | Performed by: NURSE PRACTITIONER

## 2022-01-27 PROCEDURE — 1160F PR REVIEW ALL MEDS BY PRESCRIBER/CLIN PHARMACIST DOCUMENTED: ICD-10-PCS | Mod: CPTII,,, | Performed by: NURSE PRACTITIONER

## 2022-01-27 PROCEDURE — 3078F PR MOST RECENT DIASTOLIC BLOOD PRESSURE < 80 MM HG: ICD-10-PCS | Mod: CPTII,,, | Performed by: NURSE PRACTITIONER

## 2022-01-27 PROCEDURE — 3074F SYST BP LT 130 MM HG: CPT | Mod: CPTII,,, | Performed by: NURSE PRACTITIONER

## 2022-01-27 PROCEDURE — 3008F PR BODY MASS INDEX (BMI) DOCUMENTED: ICD-10-PCS | Mod: CPTII,,, | Performed by: NURSE PRACTITIONER

## 2022-01-27 PROCEDURE — 3008F BODY MASS INDEX DOCD: CPT | Mod: CPTII,,, | Performed by: NURSE PRACTITIONER

## 2022-01-27 PROCEDURE — 1160F RVW MEDS BY RX/DR IN RCRD: CPT | Mod: CPTII,,, | Performed by: NURSE PRACTITIONER

## 2022-01-27 PROCEDURE — 99214 PR OFFICE/OUTPT VISIT, EST, LEVL IV, 30-39 MIN: ICD-10-PCS | Mod: S$PBB,,, | Performed by: NURSE PRACTITIONER

## 2022-01-27 RX ORDER — SOD SULF/POT CHLORIDE/MAG SULF 1.479 G
12 TABLET ORAL DAILY
Qty: 24 TABLET | Refills: 0 | Status: ON HOLD | OUTPATIENT
Start: 2022-01-27 | End: 2022-02-07 | Stop reason: HOSPADM

## 2022-01-27 RX ORDER — ONDANSETRON 4 MG/1
TABLET, ORALLY DISINTEGRATING ORAL
Qty: 4 TABLET | Refills: 0 | Status: SHIPPED | OUTPATIENT
Start: 2022-01-27 | End: 2023-03-15 | Stop reason: SDUPTHER

## 2022-01-27 RX ORDER — METHOCARBAMOL 750 MG/1
TABLET, FILM COATED ORAL
COMMUNITY
Start: 2021-10-15 | End: 2022-01-27

## 2022-01-27 NOTE — PROGRESS NOTES
Clinic Consult:  [facility] Consultation Note    Reason for Consult:  The primary encounter diagnosis was Weight loss. Diagnoses of Anorexia, Early satiety, and Gallbladder polyp were also pertinent to this visit.    PCP: DIMA Boothe   [id number] [facility] / [place] [id number]    HPI:  This is a 62 y.o. female here for evaluation of weight loss and loss of apatite. She has a documented weight loss of 24.5 lbs over the last year but is currently stable. She was recently placed on periactin which she believes is helping.     She has a history of gallbladder polyp found on ultrasound in 2019. She had a recent CT chest/abdomen/pelvis that showed normal gallbladder.   She has not had a recent colonoscopy.     Review of Systems   Constitutional: Positive for weight loss.   Gastrointestinal: Negative for abdominal pain, blood in stool, constipation, diarrhea, heartburn, melena, nausea and vomiting.       Medical History:  has a past medical history of Anemia, Aneurysm, Anorexia (4/26/2017), Anxiety, Asthma, Atherosclerosis of aorta (1/5/2022), Bipolar disorder, Carpal tunnel syndrome, bilateral, Cerebral aneurysm, Chronic nausea (11/15/2018), Chronic pain syndrome, Cigarette nicotine dependence, Depression, Essential hypertension (8/29/2014), GERD (gastroesophageal reflux disease), Hyperlipidemia, Hypertension, Insomnia, Osteoporosis, Pure hypercholesterolemia (9/8/2014), Schizophrenia, Stroke, and Subarachnoid hemorrhage.    Surgical History:  has a past surgical history that includes Partial hysterectomy; Bunionectomy (Right); Transcranial Doppler Study - Art (8/28/2014); Transcranial Doppler Study - Art (8/29/2014); Transcranial Doppler Study - Art (8/30/2014); Transcranial Doppler Study - Art (8/31/2014); Transcranial Doppler Study - Art (9/1/2014); Transcranial Doppler Study - Art (9/2/2014); Transcranial Doppler Study - Art (9/3/2014); Transcranial Doppler Study - Art (9/4/2014); Transcranial  Doppler Study - Art (9/5/2014); Transcranial Doppler Study - Art (9/6/2014); Colonoscopy w/ biopsies and polypectomy; Vaginal delivery; and Hysterectomy.    Family History: family history includes Bone cancer in her sister; Brain cancer in her sister; Cancer in her brother and mother; Diabetes type II in her mother and sister; Glaucoma in her mother; Heart disease in her mother; Hypertension in her brother and mother; Stroke in her mother..     Social History:  reports that she has been smoking cigarettes, cigars, and vaping with nicotine. She started smoking about 46 years ago. She has a 51.00 pack-year smoking history. She has never used smokeless tobacco. She reports current alcohol use. She reports that she does not use drugs.    Allergies: Reviewed    Home Medications:   Current Outpatient Medications on File Prior to Visit   Medication Sig Dispense Refill    albuterol (PROVENTIL) 2.5 mg /3 mL (0.083 %) nebulizer solution Take 2.5 mg by nebulization 2 (two) times daily as needed for Wheezing. Rescue      ALBUTEROL SULFATE INHL albuterol sulfate      alprazolam (XANAX) 0.5 MG tablet Take 0.5 mg by mouth daily as needed.       amitriptyline (ELAVIL) 75 MG tablet Take 1 tablet (75 mg total) by mouth every evening. 90 tablet 3    amLODIPine (NORVASC) 5 MG tablet Take 1 tablet (5 mg total) by mouth once daily. 90 tablet 4    atorvastatin (LIPITOR) 80 MG tablet Take 1 tablet (80 mg total) by mouth every evening. 90 tablet 3    BLOOD PRESSURE CUFF Misc Use to check blood pressure once daily 1 each 0    butalbital-acetaminophen-caffeine -40 mg (FIORICET, ESGIC) -40 mg per tablet Take 1 tablet by mouth every 8 (eight) hours as needed for Headaches. DO NOT TAKE MORE THAN 5 PER WEEK 45 tablet 3    CALCIUM CARBONATE/VITAMIN D3 (CALCIUM 600 + D,3, ORAL) Take 2 tablets by mouth once daily.      chlorthalidone (HYGROTEN) 25 MG Tab Take 1 tablet (25 mg total) by mouth every evening. 90 tablet 3     "cyproheptadine (PERIACTIN) 4 mg tablet Take 2 tablets (8 mg total) by mouth 3 (three) times daily before meals. 180 tablet 5    fluticasone propionate (FLONASE) 50 mcg/actuation nasal spray 2 sprays (100 mcg total) by Each Nostril route once daily. 16 g 11    fluticasone-salmeterol diskus inhaler 250-50 mcg Advair Diskus 250 mcg-50 mcg/dose powder for inhalation   Inhale 1 puff twice a day by inhalation route.      mirtazapine (REMERON) 15 MG tablet Take 15 mg by mouth nightly.      nitroGLYCERIN (NITROSTAT) 0.4 MG SL tablet PLACE 1 TABLET (0.4 MG TOTAL) UNDER THE TONGUE EVERY 5 (FIVE) MINUTES AS NEEDED FOR CHEST PAIN. 25 tablet 4    omeprazole (PRILOSEC) 20 MG capsule Take 2 capsules (40 mg total) by mouth once daily. 180 capsule 4    ondansetron (ZOFRAN-ODT) 8 MG TbDL Take 1 tablet (8 mg total) by mouth every 12 (twelve) hours as needed (for nausea). 45 tablet 11    OXcarbazepine (TRILEPTAL) 600 MG Tab Take 150 mg by mouth 2 (two) times daily.      polyethylene glycol (GLYCOLAX) 17 gram/dose powder Take 17 g by mouth daily as needed (for constipation). 510 g 11    QUEtiapine (SEROQUEL) 100 MG Tab Take 100 mg by mouth once daily.      traMADoL (ULTRAM) 50 mg tablet Take 1 tablet (50 mg total) by mouth every 8 (eight) hours as needed for Pain. 90 tablet 2    LEXAPRO 20 mg tablet Take 20 mg by mouth once daily.      nicotine (NICODERM CQ) 21 mg/24 hr Place 1 patch onto the skin once daily. (Patient not taking: No sig reported) 14 patch 0    [DISCONTINUED] methocarbamoL (ROBAXIN) 750 MG Tab       [DISCONTINUED] tiZANidine (ZANAFLEX) 4 MG tablet tizanidine 4 mg tablet   TK 1 T PO BID       No current facility-administered medications on file prior to visit.       Physical Exam:  /70 (BP Location: Left arm, Patient Position: Sitting, BP Method: Medium (Manual))   Pulse 82   Ht 5' 2" (1.575 m)   Wt 65 kg (143 lb 4.8 oz)   BMI 26.21 kg/m²   Body mass index is 26.21 kg/m².  Physical " Exam  Constitutional:       General: She is not in acute distress.  HENT:      Head: Normocephalic and atraumatic.   Eyes:      General: No scleral icterus.     Conjunctiva/sclera: Conjunctivae normal.   Cardiovascular:      Rate and Rhythm: Normal rate and regular rhythm.      Heart sounds: No murmur heard.      Pulmonary:      Effort: Pulmonary effort is normal. No respiratory distress.      Breath sounds: Normal breath sounds. No wheezing.   Abdominal:      General: Abdomen is flat. Bowel sounds are normal.      Palpations: Abdomen is soft.      Tenderness: There is no abdominal tenderness.   Skin:     General: Skin is warm and dry.   Neurological:      General: No focal deficit present.      Mental Status: She is alert and oriented to person, place, and time.      Cranial Nerves: No cranial nerve deficit.   Psychiatric:         Mood and Affect: Mood normal.         Judgment: Judgment normal.         Labs: Pertinent labs reviewed.    Assessment:  1. Weight loss    2. Anorexia    3. Early satiety    4. Gallbladder polyp      Unclear etiology of weight loss and loss of appetite.   Gallbladder polyp no longer identified on cross-sectional imaging.   No recent EGD or colonoscopy.     Recommendations:   - EGD and colonoscopy     Weight loss  -     Ambulatory referral/consult to Gastroenterology  -     Case Request Endoscopy: EGD (ESOPHAGOGASTRODUODENOSCOPY), COLONOSCOPY  -     sod sulf-pot chloride-mag sulf (SUTAB) 1.479-0.188- 0.225 gram tablet; Take 12 tablets by mouth once daily. Take according to package instructions with indicated amount of water.  Dispense: 24 tablet; Refill: 0  -     ondansetron (ZOFRAN-ODT) 4 MG TbDL; 1-2 tablets PO every 6 hours as needed for nausea/vomiting while completing bowel prep  Dispense: 4 tablet; Refill: 0    Anorexia  -     Ambulatory referral/consult to Gastroenterology  -     Case Request Endoscopy: EGD (ESOPHAGOGASTRODUODENOSCOPY), COLONOSCOPY  -     sod sulf-pot chloride-mag  sulf (SUTAB) 1.479-0.188- 0.225 gram tablet; Take 12 tablets by mouth once daily. Take according to package instructions with indicated amount of water.  Dispense: 24 tablet; Refill: 0  -     ondansetron (ZOFRAN-ODT) 4 MG TbDL; 1-2 tablets PO every 6 hours as needed for nausea/vomiting while completing bowel prep  Dispense: 4 tablet; Refill: 0    Early satiety  -     Ambulatory referral/consult to Gastroenterology  -     Case Request Endoscopy: EGD (ESOPHAGOGASTRODUODENOSCOPY), COLONOSCOPY  -     sod sulf-pot chloride-mag sulf (SUTAB) 1.479-0.188- 0.225 gram tablet; Take 12 tablets by mouth once daily. Take according to package instructions with indicated amount of water.  Dispense: 24 tablet; Refill: 0  -     ondansetron (ZOFRAN-ODT) 4 MG TbDL; 1-2 tablets PO every 6 hours as needed for nausea/vomiting while completing bowel prep  Dispense: 4 tablet; Refill: 0    Gallbladder polyp      Follow up to be determined after results/ procedure(s).    Thank you so much for allowing me to participate in the care of Karyna LORENZA Thomas

## 2022-02-04 ENCOUNTER — TELEPHONE (OUTPATIENT)
Dept: PREADMISSION TESTING | Facility: HOSPITAL | Age: 62
End: 2022-02-04
Payer: MEDICAID

## 2022-02-04 ENCOUNTER — ANESTHESIA EVENT (OUTPATIENT)
Dept: ENDOSCOPY | Facility: HOSPITAL | Age: 62
End: 2022-02-04
Payer: MEDICAID

## 2022-02-04 RX ORDER — SODIUM, POTASSIUM,MAG SULFATES 17.5-3.13G
1 SOLUTION, RECONSTITUTED, ORAL ORAL DAILY
Qty: 1 KIT | Refills: 0 | Status: ON HOLD | OUTPATIENT
Start: 2022-02-04 | End: 2022-02-07 | Stop reason: HOSPADM

## 2022-02-04 NOTE — TELEPHONE ENCOUNTER
After speaking to Dr. Rebolledo, she stated the patient's history is more from 2014 and is ok to proceed with colonoscopy/EGD. Spoke with patient. Explained everything in depth (30+ minutes on phone with patient), call patient's pharmacy to confirm they have prep in stock, sent order to NP for prep, answered all questions, and reviewed all instructions multiple times and sent via [employer]. Patient is aware of arrival time of NOON here at the [facility] and to take second dose of prep at 5 am and to be on ONLY clear liquids all day Sunday. Her daughter will be with her and she is aware of her needing to stay the entire time. Rapid covid will be performed upon arrival.

## 2022-02-04 NOTE — PRE-PROCEDURE INSTRUCTIONS
After speaking to Dr. Rebolledo, she stated the patient's history is more from 2014 and is ok to proceed with colonoscopy/EGD. Spoke with patient. Explained everything in depth (30+ minutes on phone with patient), call patient's pharmacy to confirm they have prep in stock, sent order to NP for prep, answered all questions, and reviewed all instructions multiple times and sent via [employer]. Patient is aware of arrival time of NOON here at the [facility] and to take second dose of prep at 5 am and to be on ONLY clear liquids all day Sunday. Her daughter will be with her and she is aware of her needing to stay the entire time. Rapid covid will be performed upon arrival.

## 2022-02-04 NOTE — ANESTHESIA PREPROCEDURE EVALUATION
02/04/2022  Karyna Geller is a 62 y.o., female.    Anesthesia Evaluation    I have reviewed the Patient Summary Reports.    I have reviewed the Nursing Notes. I have reviewed the NPO Status.   I have reviewed the Medications.     Review of Systems  Social:  Smoker, Alcohol Use    Hematology/Oncology:         -- Anemia:   EENT/Dental:   chronic allergic rhinitis   Cardiovascular:   Hypertension hyperlipidemia    Pulmonary:   Asthma    Hepatic/GI:   GERD    Musculoskeletal:   Arthritis   Denies Musculoskeletal General/Symptoms    Neurological:   CVA Neuromuscular Disease, Headaches    Psych:   Psychiatric History anxiety Schizophrenia  Bipolar d.o.Sleep Disorder and Insomnia. Sleep Disorder and Insomnia.        Physical Exam  General:  Well nourished    Airway/Jaw/Neck:  Airway Findings: Mouth Opening: Normal Tongue: Normal  General Airway Assessment: Adult  Mallampati: II  TM Distance: Normal, at least 6 cm     Eyes/Ears/Nose:  Eyes/Ears/Nose Findings:    Dental:  Dental Findings: Upper Dentures, Lower Dentures        Mental Status:  Mental Status Findings:  Cooperative, Alert and Oriented         Anesthesia Plan  Type of Anesthesia, risks & benefits discussed:  Anesthesia Type:  general    Patient's Preference:   Plan Factors:          Intra-op Monitoring Plan: standard ASA monitors  Intra-op Monitoring Plan Comments:   Post Op Pain Control Plan: per primary service following discharge from PACU  Post Op Pain Control Plan Comments:     Induction:   IV  Beta Blocker:  Patient is not currently on a Beta-Blocker (No further documentation required).       Informed Consent: Patient understands risks and agrees with Anesthesia plan.  Questions answered. Anesthesia consent signed with patient.  ASA Score: 3     Day of Surgery Review of History & Physical:    H&P update referred to the provider.         Ready  For Surgery From Anesthesia Perspective.     Past Medical History:   Diagnosis Date    Anemia     Aneurysm     Anorexia 4/26/2017    Anxiety     Asthma     Atherosclerosis of aorta 1/5/2022    CT ABDOMEN PELVIS WITH CONTRAST 01/03/2021 FINDINGS: Mild atherosclerosis within the abdominal aorta which is nonaneurysmal.    Bipolar disorder     Carpal tunnel syndrome, bilateral     Cerebral aneurysm     Chronic nausea 11/15/2018    Chronic pain syndrome     Cigarette nicotine dependence     Depression     Essential hypertension 8/29/2014    GERD (gastroesophageal reflux disease)     Hyperlipidemia     Hypertension     Insomnia     Osteoporosis     Pure hypercholesterolemia 9/8/2014    Schizophrenia     Stroke     Subarachnoid hemorrhage      Past Surgical History:   Procedure Laterality Date    BUNIONECTOMY Right     COLONOSCOPY W/ BIOPSIES AND POLYPECTOMY      HYSTERECTOMY      PARTIAL HYSTERECTOMY      Bilateral Ovaries Remain    TRANSCRANIAL DOPPLER STUDY - COMPLETE  8/28/2014         TRANSCRANIAL DOPPLER STUDY - COMPLETE  8/29/2014         TRANSCRANIAL DOPPLER STUDY - COMPLETE  8/30/2014         TRANSCRANIAL DOPPLER STUDY - COMPLETE  8/31/2014         TRANSCRANIAL DOPPLER STUDY - COMPLETE  9/1/2014         TRANSCRANIAL DOPPLER STUDY - COMPLETE  9/2/2014         TRANSCRANIAL DOPPLER STUDY - COMPLETE  9/3/2014         TRANSCRANIAL DOPPLER STUDY - COMPLETE  9/4/2014         TRANSCRANIAL DOPPLER STUDY - COMPLETE  9/5/2014         TRANSCRANIAL DOPPLER STUDY - COMPLETE  9/6/2014         VAGINAL DELIVERY      X 3

## 2022-02-07 ENCOUNTER — HOSPITAL ENCOUNTER (OUTPATIENT)
Facility: HOSPITAL | Age: 62
Discharge: HOME | End: 2022-02-07
Attending: INTERNAL MEDICINE | Admitting: INTERNAL MEDICINE
Payer: MEDICAID

## 2022-02-07 ENCOUNTER — ANESTHESIA (OUTPATIENT)
Dept: ENDOSCOPY | Facility: HOSPITAL | Age: 62
End: 2022-02-07
Payer: MEDICAID

## 2022-02-07 VITALS
HEART RATE: 90 BPM | DIASTOLIC BLOOD PRESSURE: 69 MMHG | OXYGEN SATURATION: 99 % | SYSTOLIC BLOOD PRESSURE: 119 MMHG | TEMPERATURE: 97 F | RESPIRATION RATE: 20 BRPM | WEIGHT: 137.88 LBS | BODY MASS INDEX: 25.37 KG/M2 | HEIGHT: 62 IN

## 2022-02-07 DIAGNOSIS — R68.81 EARLY SATIETY: ICD-10-CM

## 2022-02-07 DIAGNOSIS — R63.4 WEIGHT LOSS: Primary | ICD-10-CM

## 2022-02-07 LAB — SARS-COV-2 RNA NPH QL NAA+NON-PROBE: NOT DETECTED

## 2022-02-07 PROCEDURE — 88305 TISSUE EXAM BY PATHOLOGIST: CPT | Mod: 26,,, | Performed by: PATHOLOGY

## 2022-02-07 PROCEDURE — 88342 IMHCHEM/IMCYTCHM 1ST ANTB: CPT | Performed by: PATHOLOGY

## 2022-02-07 PROCEDURE — 43239 PR EGD, FLEX, W/BIOPSY, SGL/MULTI: ICD-10-PCS | Mod: 51,,, | Performed by: INTERNAL MEDICINE

## 2022-02-07 PROCEDURE — 88342 CHG IMMUNOCYTOCHEMISTRY: ICD-10-PCS | Mod: 26,,, | Performed by: PATHOLOGY

## 2022-02-07 PROCEDURE — 45380 PR COLONOSCOPY,BIOPSY: ICD-10-PCS | Mod: ,,, | Performed by: INTERNAL MEDICINE

## 2022-02-07 PROCEDURE — 88305 TISSUE EXAM BY PATHOLOGIST: CPT | Mod: 59 | Performed by: PATHOLOGY

## 2022-02-07 PROCEDURE — 43239 EGD BIOPSY SINGLE/MULTIPLE: CPT | Mod: 51,,, | Performed by: INTERNAL MEDICINE

## 2022-02-07 PROCEDURE — D9220A PRA ANESTHESIA: ICD-10-PCS | Mod: ANES,,, | Performed by: STUDENT IN AN ORGANIZED HEALTH CARE EDUCATION/TRAINING PROGRAM

## 2022-02-07 PROCEDURE — 37000009 HC ANESTHESIA EA ADD 15 MINS: Performed by: INTERNAL MEDICINE

## 2022-02-07 PROCEDURE — 45380 COLONOSCOPY AND BIOPSY: CPT | Performed by: INTERNAL MEDICINE

## 2022-02-07 PROCEDURE — 88342 IMHCHEM/IMCYTCHM 1ST ANTB: CPT | Mod: 26,,, | Performed by: PATHOLOGY

## 2022-02-07 PROCEDURE — 63600175 PHARM REV CODE 636 W HCPCS: Performed by: INTERNAL MEDICINE

## 2022-02-07 PROCEDURE — 25000003 PHARM REV CODE 250: Performed by: NURSE ANESTHETIST, CERTIFIED REGISTERED

## 2022-02-07 PROCEDURE — 63600175 PHARM REV CODE 636 W HCPCS: Performed by: NURSE ANESTHETIST, CERTIFIED REGISTERED

## 2022-02-07 PROCEDURE — D9220A PRA ANESTHESIA: Mod: CRNA,,, | Performed by: NURSE ANESTHETIST, CERTIFIED REGISTERED

## 2022-02-07 PROCEDURE — 45380 COLONOSCOPY AND BIOPSY: CPT | Mod: ,,, | Performed by: INTERNAL MEDICINE

## 2022-02-07 PROCEDURE — D9220A PRA ANESTHESIA: ICD-10-PCS | Mod: CRNA,,, | Performed by: NURSE ANESTHETIST, CERTIFIED REGISTERED

## 2022-02-07 PROCEDURE — 43239 EGD BIOPSY SINGLE/MULTIPLE: CPT | Performed by: INTERNAL MEDICINE

## 2022-02-07 PROCEDURE — 27201012 HC FORCEPS, HOT/COLD, DISP: Performed by: INTERNAL MEDICINE

## 2022-02-07 PROCEDURE — 00813 ANES UPR LWR GI NDSC PX: CPT | Performed by: INTERNAL MEDICINE

## 2022-02-07 PROCEDURE — D9220A PRA ANESTHESIA: Mod: ANES,,, | Performed by: STUDENT IN AN ORGANIZED HEALTH CARE EDUCATION/TRAINING PROGRAM

## 2022-02-07 PROCEDURE — 88305 TISSUE EXAM BY PATHOLOGIST: ICD-10-PCS | Mod: 26,,, | Performed by: PATHOLOGY

## 2022-02-07 PROCEDURE — 37000008 HC ANESTHESIA 1ST 15 MINUTES: Performed by: INTERNAL MEDICINE

## 2022-02-07 RX ORDER — LIDOCAINE HCL/PF 100 MG/5ML
SYRINGE (ML) INTRAVENOUS
Status: DISCONTINUED | OUTPATIENT
Start: 2022-02-07 | End: 2022-02-07

## 2022-02-07 RX ORDER — MIDAZOLAM HYDROCHLORIDE 1 MG/ML
INJECTION INTRAMUSCULAR; INTRAVENOUS
Status: DISCONTINUED | OUTPATIENT
Start: 2022-02-07 | End: 2022-02-07

## 2022-02-07 RX ORDER — SODIUM CHLORIDE 0.9 % (FLUSH) 0.9 %
10 SYRINGE (ML) INJECTION
Status: DISCONTINUED | OUTPATIENT
Start: 2022-02-07 | End: 2022-02-07 | Stop reason: HOSPADM

## 2022-02-07 RX ORDER — SODIUM CHLORIDE, SODIUM LACTATE, POTASSIUM CHLORIDE, CALCIUM CHLORIDE 600; 310; 30; 20 MG/100ML; MG/100ML; MG/100ML; MG/100ML
INJECTION, SOLUTION INTRAVENOUS CONTINUOUS
Status: DISCONTINUED | OUTPATIENT
Start: 2022-02-07 | End: 2023-08-29 | Stop reason: ALTCHOICE

## 2022-02-07 RX ORDER — PROPOFOL 10 MG/ML
VIAL (ML) INTRAVENOUS
Status: DISCONTINUED | OUTPATIENT
Start: 2022-02-07 | End: 2022-02-07

## 2022-02-07 RX ORDER — SODIUM CHLORIDE, SODIUM LACTATE, POTASSIUM CHLORIDE, CALCIUM CHLORIDE 600; 310; 30; 20 MG/100ML; MG/100ML; MG/100ML; MG/100ML
INJECTION, SOLUTION INTRAVENOUS CONTINUOUS
Status: DISCONTINUED | OUTPATIENT
Start: 2022-02-07 | End: 2022-02-07 | Stop reason: HOSPADM

## 2022-02-07 RX ADMIN — PROPOFOL 20 MG: 10 INJECTION, EMULSION INTRAVENOUS at 11:02

## 2022-02-07 RX ADMIN — SODIUM CHLORIDE, SODIUM LACTATE, POTASSIUM CHLORIDE, AND CALCIUM CHLORIDE: .6; .31; .03; .02 INJECTION, SOLUTION INTRAVENOUS at 11:02

## 2022-02-07 RX ADMIN — GLYCOPYRROLATE 0.2 MG: 0.2 INJECTION, SOLUTION INTRAMUSCULAR; INTRAVITREAL at 11:02

## 2022-02-07 RX ADMIN — MIDAZOLAM HYDROCHLORIDE 1 MG: 1 INJECTION INTRAMUSCULAR; INTRAVENOUS at 11:02

## 2022-02-07 RX ADMIN — Medication 50 MG: at 11:02

## 2022-02-07 RX ADMIN — PROPOFOL 50 MG: 10 INJECTION, EMULSION INTRAVENOUS at 11:02

## 2022-02-07 RX ADMIN — PROPOFOL 100 MG: 10 INJECTION, EMULSION INTRAVENOUS at 11:02

## 2022-02-07 NOTE — H&P
Short Stay Endoscopy History and Physical    PCP - DIMA Boothe MD    Procedure - EGD and colonoscopy  ASA - 2  Mallampati - per anesthesia  History of Anesthesia problems - no  Family history Anesthesia problems -  no     HPI:  This is a 62 y.o. female here for evaluation of :   Active Hospital Problems    Diagnosis  POA    *Weight loss, involuntary, due to anorexia [R63.4]  Yes    Anorexia [R63.0]  Yes    Early satiety [R68.81]  Yes      Resolved Hospital Problems   No resolved problems to display.         Health Maintenance       Date Due Completion Date    COVID-19 Vaccine (1) Never done ---    LDCT Lung Screen 01/03/2023 1/3/2022    Pneumococcal Vaccines (Age 0-64) (2 of 2 - PPSV23) 01/19/2023 5/9/2017    Mammogram 01/25/2023 1/25/2022    Override on 1/16/2015: Done    Colorectal Cancer Screening 01/11/2025 1/11/2022    Lipid Panel 12/10/2026 12/10/2021    Override on 3/11/2016: Done    TETANUS VACCINE 03/02/2028 3/2/2018        ROS:  CONSTITUTIONAL: Denies weight change,  fatigue, fevers, chills, night sweats.  CARDIOVASCULAR: Denies chest pain, shortness of breath, orthopnea and edema.  RESPIRATORY: Denies cough, hemoptysis, dyspnea, and wheezing.  GI: See HPI.    Medical History:   Past Medical History:   Diagnosis Date    Anemia     Aneurysm     Anorexia 4/26/2017    Anxiety     Asthma     Atherosclerosis of aorta 1/5/2022    CT ABDOMEN PELVIS WITH CONTRAST 01/03/2021 FINDINGS: Mild atherosclerosis within the abdominal aorta which is nonaneurysmal.    Bipolar disorder     Carpal tunnel syndrome, bilateral     Cerebral aneurysm     Chronic nausea 11/15/2018    Chronic pain syndrome     Cigarette nicotine dependence     Depression     Essential hypertension 8/29/2014    GERD (gastroesophageal reflux disease)     Hyperlipidemia     Hypertension     Insomnia     Osteoporosis     Pure hypercholesterolemia 9/8/2014    Schizophrenia     Stroke     Subarachnoid hemorrhage         Surgical History:   Past Surgical History:   Procedure Laterality Date    BUNIONECTOMY Right     COLONOSCOPY W/ BIOPSIES AND POLYPECTOMY      HYSTERECTOMY      PARTIAL HYSTERECTOMY      Bilateral Ovaries Remain    TRANSCRANIAL DOPPLER STUDY - COMPLETE  8/28/2014         TRANSCRANIAL DOPPLER STUDY - COMPLETE  8/29/2014         TRANSCRANIAL DOPPLER STUDY - COMPLETE  8/30/2014         TRANSCRANIAL DOPPLER STUDY - COMPLETE  8/31/2014         TRANSCRANIAL DOPPLER STUDY - COMPLETE  9/1/2014         TRANSCRANIAL DOPPLER STUDY - COMPLETE  9/2/2014         TRANSCRANIAL DOPPLER STUDY - COMPLETE  9/3/2014         TRANSCRANIAL DOPPLER STUDY - COMPLETE  9/4/2014         TRANSCRANIAL DOPPLER STUDY - COMPLETE  9/5/2014         TRANSCRANIAL DOPPLER STUDY - COMPLETE  9/6/2014         VAGINAL DELIVERY      X 3       Family History:   Family History   Problem Relation Age of Onset    Cancer Mother     Glaucoma Mother     Stroke Mother     Diabetes type II Mother     Heart disease Mother     Hypertension Mother     Brain cancer Sister     Diabetes type II Sister     Cancer Brother     Hypertension Brother     Bone cancer Sister        Social History:   Social History     Tobacco Use    Smoking status: Current Every Day Smoker     Packs/day: 1.00     Years: 51.00     Pack years: 51.00     Types: Cigarettes, Cigars, Vaping with nicotine     Start date: 1976    Smokeless tobacco: Never Used    Tobacco comment: in smoking program 5/13/19   Substance Use Topics    Alcohol use: Yes     Comment: 1-3 times per week, 1-2 drink at a time.    Drug use: No       Allergies:   Review of patient's allergies indicates:  No Known Allergies    Medications:   No current facility-administered medications on file prior to encounter.     Current Outpatient Medications on File Prior to Encounter   Medication Sig Dispense Refill    albuterol (PROVENTIL) 2.5 mg /3 mL (0.083 %) nebulizer solution Take 2.5 mg by nebulization  2 (two) times daily as needed for Wheezing. Rescue      alprazolam (XANAX) 0.5 MG tablet Take 0.5 mg by mouth daily as needed.       amitriptyline (ELAVIL) 75 MG tablet Take 1 tablet (75 mg total) by mouth every evening. 90 tablet 3    amLODIPine (NORVASC) 5 MG tablet Take 1 tablet (5 mg total) by mouth once daily. 90 tablet 4    atorvastatin (LIPITOR) 80 MG tablet Take 1 tablet (80 mg total) by mouth every evening. 90 tablet 3    LEXAPRO 20 mg tablet Take 20 mg by mouth once daily.      mirtazapine (REMERON) 15 MG tablet Take 15 mg by mouth nightly.      omeprazole (PRILOSEC) 20 MG capsule Take 2 capsules (40 mg total) by mouth once daily. 180 capsule 4    OXcarbazepine (TRILEPTAL) 600 MG Tab Take 150 mg by mouth 2 (two) times daily.      QUEtiapine (SEROQUEL) 100 MG Tab Take 100 mg by mouth once daily.      traMADoL (ULTRAM) 50 mg tablet Take 1 tablet (50 mg total) by mouth every 8 (eight) hours as needed for Pain. 90 tablet 2    ALBUTEROL SULFATE INHL albuterol sulfate      BLOOD PRESSURE CUFF Misc Use to check blood pressure once daily 1 each 0    butalbital-acetaminophen-caffeine -40 mg (FIORICET, ESGIC) -40 mg per tablet Take 1 tablet by mouth every 8 (eight) hours as needed for Headaches. DO NOT TAKE MORE THAN 5 PER WEEK 45 tablet 3    CALCIUM CARBONATE/VITAMIN D3 (CALCIUM 600 + D,3, ORAL) Take 2 tablets by mouth once daily.      chlorthalidone (HYGROTEN) 25 MG Tab Take 1 tablet (25 mg total) by mouth every evening. 90 tablet 3    cyproheptadine (PERIACTIN) 4 mg tablet Take 2 tablets (8 mg total) by mouth 3 (three) times daily before meals. 180 tablet 5    fluticasone propionate (FLONASE) 50 mcg/actuation nasal spray 2 sprays (100 mcg total) by Each Nostril route once daily. 16 g 11    fluticasone-salmeterol diskus inhaler 250-50 mcg Advair Diskus 250 mcg-50 mcg/dose powder for inhalation   Inhale 1 puff twice a day by inhalation route.      nicotine (NICODERM CQ) 21 mg/24 hr  Place 1 patch onto the skin once daily. (Patient not taking: No sig reported) 14 patch 0    nitroGLYCERIN (NITROSTAT) 0.4 MG SL tablet PLACE 1 TABLET (0.4 MG TOTAL) UNDER THE TONGUE EVERY 5 (FIVE) MINUTES AS NEEDED FOR CHEST PAIN. 25 tablet 4    ondansetron (ZOFRAN-ODT) 4 MG TbDL 1-2 tablets PO every 6 hours as needed for nausea/vomiting while completing bowel prep 4 tablet 0    ondansetron (ZOFRAN-ODT) 8 MG TbDL Take 1 tablet (8 mg total) by mouth every 12 (twelve) hours as needed (for nausea). 45 tablet 11    polyethylene glycol (GLYCOLAX) 17 gram/dose powder Take 17 g by mouth daily as needed (for constipation). 510 g 11    sod sulf-pot chloride-mag sulf (SUTAB) 1.479-0.188- 0.225 gram tablet Take 12 tablets by mouth once daily. Take according to package instructions with indicated amount of water. 24 tablet 0       Physical Exam:  Vital Signs: There were no vitals filed for this visit.  General Appearance: Well appearing in no acute distress  ENT: OP clear  Chest: CTA B  CV: RRR, no m/r/g  Abd: s/nt/nd/nabs  Ext: no edema    Labs:Reviewed    IMP:  Active Hospital Problems    Diagnosis  POA    *Weight loss, involuntary, due to anorexia [R63.4]  Yes    Anorexia [R63.0]  Yes    Early satiety [R68.81]  Yes      Resolved Hospital Problems   No resolved problems to display.         Plan:   I have explained the risks and benefits of upper endoscopy and colonoscopy to the patient including but not limited to bleeding, perforation, infection, and death. The patient wishes to proceed.

## 2022-02-07 NOTE — ANESTHESIA POSTPROCEDURE EVALUATION
Anesthesia Post Evaluation    Patient: Karyna Geller    Procedure(s) Performed: Procedure(s) (LRB):  EGD (ESOPHAGOGASTRODUODENOSCOPY) (N/A)  COLONOSCOPY (N/A)    Final Anesthesia Type: general      Patient location during evaluation: PACU  Patient participation: Yes- Able to Participate  Level of consciousness: awake and alert and oriented  Post-procedure vital signs: reviewed and stable  Pain management: adequate  Airway patency: patent    PONV status at discharge: No PONV  Anesthetic complications: no      Cardiovascular status: blood pressure returned to baseline and stable  Respiratory status: unassisted, spontaneous ventilation and room air  Hydration status: euvolemic  Follow-up not needed.          Vitals Value Taken Time   /69 02/07/22 1236   Temp 36.3 °C (97.4 °F) 02/07/22 1207   Pulse 90 02/07/22 1236   Resp 20 02/07/22 1236   SpO2 99 % 02/07/22 1236         Event Time   Out of Recovery 12:38:05         Pain/Asa Score: Asa Score: 10 (2/7/2022 12:27 PM)

## 2022-02-07 NOTE — [employer] PATIENT INSTRUCTIONS
Discharge Summary/Instructions after an Endoscopic Procedure  Patient Name: Karyna Geller  Patient MRN: [id number]  Patient YOB: 1960 Monday, February 7, 2022  Kamila Lund MD  Dear patient,  As a result of recent federal legislation (The Federal Cures Act), you may   receive lab or pathology results from your procedure in your [facility]   account before your physician is able to contact you. Your physician or   their representative will relay the results to you with their   recommendations at their soonest availability.  Thank you,  RESTRICTIONS:  During your procedure today, you received medications for sedation.  These   medications may affect your judgment, balance and coordination.  Therefore,   for 24 hours, you have the following restrictions:   - DO NOT drive a car, operate machinery, make legal/financial decisions,   sign important papers or drink alcohol.    ACTIVITY:  Today: no heavy lifting, straining or running due to procedural   sedation/anesthesia.  The following day: return to full activity including work.  DIET:  Eat and drink normally unless instructed otherwise.     TREATMENT FOR COMMON SIDE EFFECTS:  - Mild abdominal pain, nausea, belching, bloating or excessive gas:  rest,   eat lightly and use a heating pad.  - Sore Throat: treat with throat lozenges and/or gargle with warm salt   water.  - Because air was used during the procedure, expelling large amounts of air   from your rectum or belching is normal.  - If a bowel prep was taken, you may not have a bowel movement for 1-3 days.    This is normal.  SYMPTOMS TO WATCH FOR AND REPORT TO YOUR PHYSICIAN:  1. Abdominal pain or bloating, other than gas cramps.  2. Chest pain.  3. Back pain.  4. Signs of infection such as: chills or fever occurring within 24 hours   after the procedure.  5. Rectal bleeding, which would show as bright red, maroon, or black stools.   (A tablespoon of blood from the rectum is not serious, especially  if   hemorrhoids are present.)  6. Vomiting.  7. Weakness or dizziness.  GO DIRECTLY TO THE NEAREST EMERGENCY ROOM IF YOU HAVE ANY OF THE FOLLOWING:      Difficulty breathing              Chills and/or fever over 101 F   Persistent vomiting and/or vomiting blood   Severe abdominal pain   Severe chest pain   Black, tarry stools   Bleeding- more than one tablespoon   Any other symptom or condition that you feel may need urgent attention  Your doctor recommends these additional instructions:  If any biopsies were taken, your doctors clinic will contact you in 1 to 2   weeks with any results.  - Discharge patient to home (via wheelchair).   - Resume previous diet.   - Continue present medications.   - Await pathology results.   - Repeat colonoscopy in 7 years for surveillance.   - Telephone GI clinic for pathology results in 2 weeks.   - Patient has a contact number available for emergencies.  The signs and   symptoms of potential delayed complications were discussed with the   patient.  Return to normal activities tomorrow.  Written discharge   instructions were provided to the patient.  For questions, problems or results please call your physician Kamila Lund MD at Work:  [phone]  If you have any questions about the above instructions, call the GI   department at [phone] or call the endoscopy unit at [phone]   from 7am until 3 pm.  [facility] [place], EMERGENCY ROOM PHONE NUMBER:   [phone]  IF A COMPLICATION OR EMERGENCY SITUATION ARISES AND YOU ARE UNABLE TO REACH   YOUR PHYSICIAN - GO DIRECTLY TO THE EMERGENCY ROOM.  I have read or have had read to me these discharge instructions for my   procedure and have received a written copy.  I understand these   instructions and will follow-up with my physician if I have any questions.     __________________________________       _____________________________________  Nurse Signature                                           Patient/Designated   Responsible Party Signature  MD Kamila Sheth MD  2/7/2022 12:06:17 PM  [employer]

## 2022-02-07 NOTE — PLAN OF CARE
Discharge instructions reviewed with pt and family, handouts given, verbalized understanding with no further questions at this time. Doctor spoke to pt at bedside, reviewed procedure and answered questions aware they are awaiting biopsy results with MD telephone number provided per AVS sheet. VSS on LA, no pain or nausea noted, tolerating po fluids without difficulty, no other complaints noted. Fall precautions reviewed, consents in chart, PIV to be removed at discharge.

## 2022-02-07 NOTE — TRANSFER OF CARE
"Anesthesia Transfer of Care Note    Patient: Karyna Geller    Procedure(s) Performed: Procedure(s) (LRB):  EGD (ESOPHAGOGASTRODUODENOSCOPY) (N/A)  COLONOSCOPY (N/A)    Patient location: PACU    Anesthesia Type: general    Transport from OR: Transported from OR on room air with adequate spontaneous ventilation    Post pain: adequate analgesia    Post assessment: no apparent anesthetic complications    Post vital signs: stable    Level of consciousness: awake    Nausea/Vomiting: no nausea/vomiting    Complications: none    Transfer of care protocol was followed      Last vitals:   Visit Vitals  BP (!) 140/65 (BP Location: Right arm, Patient Position: Sitting)   Pulse 101   Temp 36.6 °C (97.8 °F) (Temporal)   Resp 18   Ht 5' 2" (1.575 m)   Wt 62.5 kg (137 lb 14.4 oz)   SpO2 98%   Breastfeeding No   BMI 25.22 kg/m²     "

## 2022-02-07 NOTE — [employer] PATIENT INSTRUCTIONS
Discharge Summary/Instructions after an Endoscopic Procedure  Patient Name: Karyna Geller  Patient MRN: [id number]  Patient YOB: 1960 Monday, February 7, 2022  Kamila Lund MD  Dear patient,  As a result of recent federal legislation (The Federal Cures Act), you may   receive lab or pathology results from your procedure in your [facility]   account before your physician is able to contact you. Your physician or   their representative will relay the results to you with their   recommendations at their soonest availability.  Thank you,  RESTRICTIONS:  During your procedure today, you received medications for sedation.  These   medications may affect your judgment, balance and coordination.  Therefore,   for 24 hours, you have the following restrictions:   - DO NOT drive a car, operate machinery, make legal/financial decisions,   sign important papers or drink alcohol.    ACTIVITY:  Today: no heavy lifting, straining or running due to procedural   sedation/anesthesia.  The following day: return to full activity including work.  DIET:  Eat and drink normally unless instructed otherwise.     TREATMENT FOR COMMON SIDE EFFECTS:  - Mild abdominal pain, nausea, belching, bloating or excessive gas:  rest,   eat lightly and use a heating pad.  - Sore Throat: treat with throat lozenges and/or gargle with warm salt   water.  - Because air was used during the procedure, expelling large amounts of air   from your rectum or belching is normal.  - If a bowel prep was taken, you may not have a bowel movement for 1-3 days.    This is normal.  SYMPTOMS TO WATCH FOR AND REPORT TO YOUR PHYSICIAN:  1. Abdominal pain or bloating, other than gas cramps.  2. Chest pain.  3. Back pain.  4. Signs of infection such as: chills or fever occurring within 24 hours   after the procedure.  5. Rectal bleeding, which would show as bright red, maroon, or black stools.   (A tablespoon of blood from the rectum is not serious, especially  if   hemorrhoids are present.)  6. Vomiting.  7. Weakness or dizziness.  GO DIRECTLY TO THE NEAREST EMERGENCY ROOM IF YOU HAVE ANY OF THE FOLLOWING:      Difficulty breathing              Chills and/or fever over 101 F   Persistent vomiting and/or vomiting blood   Severe abdominal pain   Severe chest pain   Black, tarry stools   Bleeding- more than one tablespoon   Any other symptom or condition that you feel may need urgent attention  Your doctor recommends these additional instructions:  If any biopsies were taken, your doctors clinic will contact you in 1 to 2   weeks with any results.  - Discharge patient to home (via wheelchair).   - Resume previous diet.   - Continue present medications.   - Await pathology results.   - Telephone GI clinic for pathology results in 2 weeks.   - Patient has a contact number available for emergencies.  The signs and   symptoms of potential delayed complications were discussed with the   patient.  Return to normal activities tomorrow.  Written discharge   instructions were provided to the patient.   - Resume anticoagulant at prior dose.  For questions, problems or results please call your physician Kamila Lund MD at Work:  [phone]  If you have any questions about the above instructions, call the GI   department at [phone] or call the endoscopy unit at [phone]   from 7am until 3 pm.  [facility] [place], EMERGENCY ROOM PHONE NUMBER:   [phone]  IF A COMPLICATION OR EMERGENCY SITUATION ARISES AND YOU ARE UNABLE TO REACH   YOUR PHYSICIAN - GO DIRECTLY TO THE EMERGENCY ROOM.  I have read or have had read to me these discharge instructions for my   procedure and have received a written copy.  I understand these   instructions and will follow-up with my physician if I have any questions.     __________________________________       _____________________________________  Nurse Signature                                           Patient/Designated   Responsible Party Signature  MD Kamila Sheth MD  2/7/2022 12:07:51 PM  [employer]

## 2022-02-07 NOTE — DISCHARGE SUMMARY
[facility]  Discharge Note  Short Stay    Procedure(s) (LRB):  EGD (ESOPHAGOGASTRODUODENOSCOPY) (N/A)  COLONOSCOPY (N/A)    OUTCOME: Patient tolerated treatment/procedure well without complication and is now ready for discharge.    DISPOSITION: Home or Self Care    FINAL DIAGNOSIS:  Weight loss    FOLLOWUP: With primary care provider    DISCHARGE INSTRUCTIONS:  No discharge procedures on file.

## 2022-02-14 LAB
FINAL PATHOLOGIC DIAGNOSIS: NORMAL
GROSS: NORMAL
Lab: NORMAL

## 2022-03-10 ENCOUNTER — OFFICE VISIT (OUTPATIENT)
Dept: INTERNAL MEDICINE | Facility: CLINIC | Age: 62
End: 2022-03-10
Payer: MEDICAID

## 2022-03-10 VITALS
HEIGHT: 62 IN | TEMPERATURE: 97 F | BODY MASS INDEX: 27.55 KG/M2 | OXYGEN SATURATION: 97 % | HEART RATE: 85 BPM | DIASTOLIC BLOOD PRESSURE: 86 MMHG | SYSTOLIC BLOOD PRESSURE: 138 MMHG | WEIGHT: 149.69 LBS

## 2022-03-10 DIAGNOSIS — D12.6 TUBULAR ADENOMA OF COLON: ICD-10-CM

## 2022-03-10 DIAGNOSIS — F17.210 CIGARETTE NICOTINE DEPENDENCE WITHOUT COMPLICATION: Chronic | ICD-10-CM

## 2022-03-10 DIAGNOSIS — J41.0 SIMPLE CHRONIC BRONCHITIS: ICD-10-CM

## 2022-03-10 DIAGNOSIS — J30.2 CHRONIC SEASONAL ALLERGIC RHINITIS: Chronic | ICD-10-CM

## 2022-03-10 DIAGNOSIS — K21.9 GASTRO-ESOPHAGEAL REFLUX DISEASE WITHOUT ESOPHAGITIS: Chronic | ICD-10-CM

## 2022-03-10 DIAGNOSIS — I10 ESSENTIAL HYPERTENSION: Chronic | ICD-10-CM

## 2022-03-10 DIAGNOSIS — J20.9 CHRONIC BRONCHITIS WITH ACUTE EXACERBATION: ICD-10-CM

## 2022-03-10 DIAGNOSIS — M19.91 PRIMARY LOCALIZED OSTEOARTHROSIS OF MULTIPLE SITES: Chronic | ICD-10-CM

## 2022-03-10 DIAGNOSIS — G43.119 INTRACTABLE MIGRAINE WITH AURA WITHOUT STATUS MIGRAINOSUS: ICD-10-CM

## 2022-03-10 DIAGNOSIS — F31.9 BIPOLAR 1 DISORDER: Chronic | ICD-10-CM

## 2022-03-10 DIAGNOSIS — J42 CHRONIC BRONCHITIS WITH ACUTE EXACERBATION: ICD-10-CM

## 2022-03-10 DIAGNOSIS — Z00.00 PREVENTATIVE HEALTH CARE: Primary | ICD-10-CM

## 2022-03-10 DIAGNOSIS — E78.00 PURE HYPERCHOLESTEROLEMIA: Chronic | ICD-10-CM

## 2022-03-10 DIAGNOSIS — G44.209 TENSION HEADACHE: Chronic | ICD-10-CM

## 2022-03-10 DIAGNOSIS — F25.0 SCHIZOAFFECTIVE DISORDER, BIPOLAR TYPE: Chronic | ICD-10-CM

## 2022-03-10 PROCEDURE — 99396 PR PREVENTIVE VISIT,EST,40-64: ICD-10-PCS | Mod: S$PBB,,, | Performed by: FAMILY MEDICINE

## 2022-03-10 PROCEDURE — 99214 OFFICE O/P EST MOD 30 MIN: CPT | Mod: PBBFAC | Performed by: FAMILY MEDICINE

## 2022-03-10 PROCEDURE — 1160F PR REVIEW ALL MEDS BY PRESCRIBER/CLIN PHARMACIST DOCUMENTED: ICD-10-PCS | Mod: CPTII,,, | Performed by: FAMILY MEDICINE

## 2022-03-10 PROCEDURE — 99999 PR PBB SHADOW E&M-EST. PATIENT-LVL IV: ICD-10-PCS | Mod: PBBFAC,,, | Performed by: FAMILY MEDICINE

## 2022-03-10 PROCEDURE — 99999 PR PBB SHADOW E&M-EST. PATIENT-LVL IV: CPT | Mod: PBBFAC,,, | Performed by: FAMILY MEDICINE

## 2022-03-10 PROCEDURE — 1160F RVW MEDS BY RX/DR IN RCRD: CPT | Mod: CPTII,,, | Performed by: FAMILY MEDICINE

## 2022-03-10 PROCEDURE — 1159F MED LIST DOCD IN RCRD: CPT | Mod: CPTII,,, | Performed by: FAMILY MEDICINE

## 2022-03-10 PROCEDURE — 1159F PR MEDICATION LIST DOCUMENTED IN MEDICAL RECORD: ICD-10-PCS | Mod: CPTII,,, | Performed by: FAMILY MEDICINE

## 2022-03-10 PROCEDURE — 3079F PR MOST RECENT DIASTOLIC BLOOD PRESSURE 80-89 MM HG: ICD-10-PCS | Mod: CPTII,,, | Performed by: FAMILY MEDICINE

## 2022-03-10 PROCEDURE — 3008F PR BODY MASS INDEX (BMI) DOCUMENTED: ICD-10-PCS | Mod: CPTII,,, | Performed by: FAMILY MEDICINE

## 2022-03-10 PROCEDURE — 3079F DIAST BP 80-89 MM HG: CPT | Mod: CPTII,,, | Performed by: FAMILY MEDICINE

## 2022-03-10 PROCEDURE — 3075F PR MOST RECENT SYSTOLIC BLOOD PRESS GE 130-139MM HG: ICD-10-PCS | Mod: CPTII,,, | Performed by: FAMILY MEDICINE

## 2022-03-10 PROCEDURE — 3008F BODY MASS INDEX DOCD: CPT | Mod: CPTII,,, | Performed by: FAMILY MEDICINE

## 2022-03-10 PROCEDURE — 3075F SYST BP GE 130 - 139MM HG: CPT | Mod: CPTII,,, | Performed by: FAMILY MEDICINE

## 2022-03-10 PROCEDURE — 99396 PREV VISIT EST AGE 40-64: CPT | Mod: S$PBB,,, | Performed by: FAMILY MEDICINE

## 2022-03-10 RX ORDER — BUTALBITAL, ACETAMINOPHEN AND CAFFEINE 50; 325; 40 MG/1; MG/1; MG/1
1 TABLET ORAL EVERY 8 HOURS PRN
Qty: 45 TABLET | Refills: 3 | Status: SHIPPED | OUTPATIENT
Start: 2022-03-10 | End: 2022-09-22 | Stop reason: SDUPTHER

## 2022-03-10 RX ORDER — PREDNISONE 10 MG/1
TABLET ORAL
Qty: 20 TABLET | Refills: 0 | Status: SHIPPED | OUTPATIENT
Start: 2022-03-10 | End: 2022-03-18

## 2022-03-10 RX ORDER — TRAMADOL HYDROCHLORIDE 50 MG/1
50 TABLET ORAL EVERY 8 HOURS PRN
Qty: 90 TABLET | Refills: 5 | Status: SHIPPED | OUTPATIENT
Start: 2022-03-10 | End: 2022-09-22 | Stop reason: SDUPTHER

## 2022-03-10 RX ORDER — OMEPRAZOLE 20 MG/1
40 CAPSULE, DELAYED RELEASE ORAL DAILY
Qty: 180 CAPSULE | Refills: 4 | Status: SHIPPED | OUTPATIENT
Start: 2022-03-10 | End: 2022-09-22 | Stop reason: SDUPTHER

## 2022-03-10 RX ORDER — AMITRIPTYLINE HYDROCHLORIDE 75 MG/1
75 TABLET ORAL NIGHTLY
Qty: 90 TABLET | Refills: 3 | Status: SHIPPED | OUTPATIENT
Start: 2022-03-10 | End: 2022-09-22 | Stop reason: SDUPTHER

## 2022-03-10 RX ORDER — ATORVASTATIN CALCIUM 80 MG/1
80 TABLET, FILM COATED ORAL NIGHTLY
Qty: 90 TABLET | Refills: 3 | Status: SHIPPED | OUTPATIENT
Start: 2022-03-10 | End: 2022-09-22 | Stop reason: SDUPTHER

## 2022-03-10 RX ORDER — NEBULIZER AND COMPRESSOR
EACH MISCELLANEOUS
Qty: 1 EACH | Refills: 0 | Status: SHIPPED | OUTPATIENT
Start: 2022-03-10

## 2022-03-10 RX ORDER — FLUTICASONE PROPIONATE AND SALMETEROL 250; 50 UG/1; UG/1
1 POWDER RESPIRATORY (INHALATION) 2 TIMES DAILY
Qty: 60 EACH | Refills: 11 | Status: SHIPPED | OUTPATIENT
Start: 2022-03-10 | End: 2023-08-29

## 2022-03-10 RX ORDER — DOXYCYCLINE 100 MG/1
100 CAPSULE ORAL 2 TIMES DAILY
Qty: 20 CAPSULE | Refills: 0 | Status: SHIPPED | OUTPATIENT
Start: 2022-03-10 | End: 2022-03-20

## 2022-03-10 RX ORDER — AMLODIPINE BESYLATE 5 MG/1
5 TABLET ORAL DAILY
Qty: 90 TABLET | Refills: 4 | Status: SHIPPED | OUTPATIENT
Start: 2022-03-10 | End: 2022-09-22 | Stop reason: SDUPTHER

## 2022-03-10 RX ORDER — ALBUTEROL SULFATE 0.83 MG/ML
2.5 SOLUTION RESPIRATORY (INHALATION) EVERY 6 HOURS PRN
Qty: 150 ML | Refills: 1 | Status: SHIPPED | OUTPATIENT
Start: 2022-03-10 | End: 2023-08-29 | Stop reason: SDUPTHER

## 2022-03-10 RX ORDER — FLUTICASONE PROPIONATE 50 MCG
2 SPRAY, SUSPENSION (ML) NASAL DAILY
Qty: 16 G | Refills: 11 | Status: SHIPPED | OUTPATIENT
Start: 2022-03-10 | End: 2022-09-22 | Stop reason: SDUPTHER

## 2022-03-10 NOTE — ASSESSMENT & PLAN NOTE
BP Readings from Last 6 Encounters:   03/10/22 138/86   02/07/22 119/69   01/27/22 120/70   12/10/21 120/70   10/06/21 114/70   09/15/21 125/77      Last 5 Patient Entered Readings                Current 30 Day Average: 122/77  Recent Readings 3/7/2022 3/7/2022 3/6/2022 3/6/2022 3/4/2022   SBP (mmHg) 93 139 112 123 120   DBP (mmHg) 66 120 70 85 70   Pulse 96 95 85 110 99             She says she hasn't been taking the chlorthalidone for months.

## 2022-03-10 NOTE — PROGRESS NOTES
WELLNESS VISIT (PREVENTIVE SERVICES)  3/10/22 10:30 AM CST  LAST ENCOUNTER WITH ME:  12/10/2021   HEALTH MAINTENANCE INTERVENTIONS - UP TO DATE  Health Maintenance Topics with due status: Not Due       Topic Last Completion Date    TETANUS VACCINE 03/02/2018    Lipid Panel 12/10/2021    LDCT Lung Screen 01/03/2022    Mammogram 01/25/2022    Colorectal Cancer Screening 02/07/2022       HEALTH MAINTENANCE INTERVENTIONS - DUE OR DUE SOON  Health Maintenance Due   Topic Date Due    COVID-19 Vaccine (1) Never done     CHIEF COMPLAINT: Annual Wellness Visit (Preventive Services)    She describes daily productive cough on majority of days for > last 2 years, consistent with chronic obstructive bronchitis, which is consistent with her smoking history. No fever, chest pain, or hemoptysis reported.     DIAGNOSES SPECIFICALLY EVALUATED AND TREATED THIS ENCOUNTER  1. Preventative health care    2. Tubular adenoma of transverse colon (Next colonoscopy due 12/2026)    3. Essential hypertension  - amLODIPine (NORVASC) 5 MG tablet; Take 1 tablet (5 mg total) by mouth once daily.  Dispense: 90 tablet; Refill: 4    4. Cigarette nicotine dependence without complication    5. Simple chronic bronchitis  - albuterol (PROVENTIL) 2.5 mg /3 mL (0.083 %) nebulizer solution; Take 3 mLs (2.5 mg total) by nebulization every 6 (six) hours as needed for Wheezing. Rescue  Dispense: 150 mL; Refill: 1  - nebulizer accessories Kit; NEBULIZER ACCESSORIES - Use as directed for nebulized medications prescribed by me.  Dispense: 10 kit; Refill: 11  - nebulizer and compressor Heaven; NEBULIZER DEVICE - Use as directed  Dispense: 1 each; Refill: 0  - Ambulatory referral/consult to Pulmonology; Future  - fluticasone-salmeterol diskus inhaler 250-50 mcg; Inhale 1 puff into the lungs 2 (two) times a day. Controller  Dispense: 60 each; Refill: 11    6. Intractable migraine with aura without status migrainosus  - amitriptyline (ELAVIL) 75 MG tablet; Take 1  tablet (75 mg total) by mouth every evening.  Dispense: 90 tablet; Refill: 3    7. Pure hypercholesterolemia  - atorvastatin (LIPITOR) 80 MG tablet; Take 1 tablet (80 mg total) by mouth every evening.  Dispense: 90 tablet; Refill: 3    8. Tension headache  - butalbital-acetaminophen-caffeine -40 mg (FIORICET, ESGIC) -40 mg per tablet; Take 1 tablet by mouth every 8 (eight) hours as needed for Headaches. DO NOT TAKE MORE THAN 5 PER WEEK  Dispense: 45 tablet; Refill: 3    9. Chronic seasonal allergic rhinitis  - fluticasone propionate (FLONASE) 50 mcg/actuation nasal spray; 2 sprays (100 mcg total) by Each Nostril route once daily.  Dispense: 16 g; Refill: 11    10. Primary localized osteoarthrosis of multiple sites  - traMADoL (ULTRAM) 50 mg tablet; Take 1 tablet (50 mg total) by mouth every 8 (eight) hours as needed for Pain.  Dispense: 90 tablet; Refill: 5    11. Gastro-esophageal reflux disease with gastritis but without esophagitis  - omeprazole (PRILOSEC) 20 MG capsule; Take 2 capsules (40 mg total) by mouth once daily.  Dispense: 180 capsule; Refill: 4    12. Chronic bronchitis with acute exacerbation  - doxycycline (VIBRAMYCIN) 100 MG Cap; Take 1 capsule (100 mg total) by mouth 2 (two) times daily. for 10 days  Dispense: 20 capsule; Refill: 0  - predniSONE (DELTASONE) 10 MG tablet; Take 4 tablets (40 mg total) by mouth once daily for 2 days, THEN 3 tablets (30 mg total) once daily for 2 days, THEN 2 tablets (20 mg total) once daily for 2 days, THEN 1 tablet (10 mg total) once daily for 2 days.  Dispense: 20 tablet; Refill: 0    13. Schizoaffective disorder, bipolar type    14. Bipolar 1 disorder     Follow up in about 5 months (around 8/10/2022).    Problem List Items Addressed This Visit     Primary localized osteoarthrosis of multiple sites (Chronic)    Relevant Medications    traMADoL (ULTRAM) 50 mg tablet    Schizoaffective disorder, bipolar type (Chronic)    Overview     Psychiatrist = Marilin  Heaven Hernández MD           Cigarette nicotine dependence without complication (Chronic)    Current Assessment & Plan     Smoking 1 PPD. She is currently at the contemplation stage of cessation (thinking about quitting). Cessation encouraged.           Essential hypertension (Chronic)    Current Assessment & Plan     BP Readings from Last 6 Encounters:   03/10/22 138/86   02/07/22 119/69   01/27/22 120/70   12/10/21 120/70   10/06/21 114/70   09/15/21 125/77      Last 5 Patient Entered Readings                Current 30 Day Average: 122/77  Recent Readings 3/7/2022 3/7/2022 3/6/2022 3/6/2022 3/4/2022   SBP (mmHg) 93 139 112 123 120   DBP (mmHg) 66 120 70 85 70   Pulse 96 95 85 110 99             She says she hasn't been taking the chlorthalidone for months.           Relevant Medications    amLODIPine (NORVASC) 5 MG tablet    Pure hypercholesterolemia (Chronic)    Relevant Medications    atorvastatin (LIPITOR) 80 MG tablet    Gastro-esophageal reflux disease without esophagitis (Chronic)    Relevant Medications    omeprazole (PRILOSEC) 20 MG capsule    Bipolar 1 disorder (Chronic)    Overview     Psychiatrist = Marilin Hernández MD           Tension headache (Chronic)    Relevant Medications    butalbital-acetaminophen-caffeine -40 mg (FIORICET, ESGIC) -40 mg per tablet    Chronic seasonal allergic rhinitis (Chronic)    Relevant Medications    fluticasone propionate (FLONASE) 50 mcg/actuation nasal spray    Intractable migraine with aura without status migrainosus    Relevant Medications    amitriptyline (ELAVIL) 75 MG tablet    Tubular adenoma of transverse colon (Next colonoscopy due 12/2026)    Chronic bronchitis    Relevant Medications    albuterol (PROVENTIL) 2.5 mg /3 mL (0.083 %) nebulizer solution    nebulizer accessories Kit    nebulizer and compressor Heaven    fluticasone-salmeterol diskus inhaler 250-50 mcg    Other Relevant Orders    Ambulatory referral/consult to Pulmonology      Other Visit  Diagnoses     [facility] health care    -  Primary    Chronic bronchitis with acute exacerbation          Unless noted herein, any chronic conditions are represented as and appear compensated/controlled and stable, and no other significant complaints or concerns were reported.    PRESCRIPTION DRUG MANAGEMENT  Outpatient Medications Prior to Visit   Medication Sig Dispense Refill    alprazolam (XANAX) 0.5 MG tablet Take 0.5 mg by mouth daily as needed.       BLOOD PRESSURE CUFF Misc Use to check blood pressure once daily 1 each 0    CALCIUM CARBONATE/VITAMIN D3 (CALCIUM 600 + D,3, ORAL) Take 2 tablets by mouth once daily.      cyproheptadine (PERIACTIN) 4 mg tablet Take 2 tablets (8 mg total) by mouth 3 (three) times daily before meals. 180 tablet 5    LEXAPRO 20 mg tablet Take 20 mg by mouth once daily.      mirtazapine (REMERON) 15 MG tablet Take 15 mg by mouth nightly.      nicotine (NICODERM CQ) 21 mg/24 hr Place 1 patch onto the skin once daily. 14 patch 0    nitroGLYCERIN (NITROSTAT) 0.4 MG SL tablet PLACE 1 TABLET (0.4 MG TOTAL) UNDER THE TONGUE EVERY 5 (FIVE) MINUTES AS NEEDED FOR CHEST PAIN. 25 tablet 4    ondansetron (ZOFRAN-ODT) 4 MG TbDL 1-2 tablets PO every 6 hours as needed for nausea/vomiting while completing bowel prep 4 tablet 0    OXcarbazepine (TRILEPTAL) 600 MG Tab Take 150 mg by mouth 2 (two) times daily.      polyethylene glycol (GLYCOLAX) 17 gram/dose powder Take 17 g by mouth daily as needed (for constipation). 510 g 11    QUEtiapine (SEROQUEL) 100 MG Tab Take 100 mg by mouth once daily.      albuterol (PROVENTIL) 2.5 mg /3 mL (0.083 %) nebulizer solution Take 2.5 mg by nebulization 2 (two) times daily as needed for Wheezing. Rescue      ALBUTEROL SULFATE INHL albuterol sulfate      amitriptyline (ELAVIL) 75 MG tablet Take 1 tablet (75 mg total) by mouth every evening. 90 tablet 3    atorvastatin (LIPITOR) 80 MG tablet Take 1 tablet (80 mg total) by mouth every evening. 90  tablet 3    butalbital-acetaminophen-caffeine -40 mg (FIORICET, ESGIC) -40 mg per tablet Take 1 tablet by mouth every 8 (eight) hours as needed for Headaches. DO NOT TAKE MORE THAN 5 PER WEEK 45 tablet 3    fluticasone propionate (FLONASE) 50 mcg/actuation nasal spray 2 sprays (100 mcg total) by Each Nostril route once daily. 16 g 11    fluticasone-salmeterol diskus inhaler 250-50 mcg Advair Diskus 250 mcg-50 mcg/dose powder for inhalation   Inhale 1 puff twice a day by inhalation route.      traMADoL (ULTRAM) 50 mg tablet Take 1 tablet (50 mg total) by mouth every 8 (eight) hours as needed for Pain. 90 tablet 2    amLODIPine (NORVASC) 5 MG tablet Take 1 tablet (5 mg total) by mouth once daily. 90 tablet 4    chlorthalidone (HYGROTEN) 25 MG Tab Take 1 tablet (25 mg total) by mouth every evening. 90 tablet 3    omeprazole (PRILOSEC) 20 MG capsule Take 2 capsules (40 mg total) by mouth once daily. 180 capsule 4    ondansetron (ZOFRAN-ODT) 8 MG TbDL Take 1 tablet (8 mg total) by mouth every 12 (twelve) hours as needed (for nausea). 45 tablet 11     Facility-Administered Medications Prior to Visit   Medication Dose Route Frequency Provider Last Rate Last Admin    lactated ringers infusion   Intravenous Continuous Kamlia Lund  mL/hr at 02/07/22 1125 Rate Change at 02/07/22 1125     Medications Discontinued During This Encounter   Medication Reason    chlorthalidone (HYGROTEN) 25 MG Tab Patient no longer taking    ALBUTEROL SULFATE INHL Reorder    albuterol (PROVENTIL) 2.5 mg /3 mL (0.083 %) nebulizer solution Reorder    ondansetron (ZOFRAN-ODT) 8 MG TbDL Patient no longer taking    fluticasone-salmeterol diskus inhaler 250-50 mcg Reorder    omeprazole (PRILOSEC) 20 MG capsule Reorder    amLODIPine (NORVASC) 5 MG tablet Reorder    amitriptyline (ELAVIL) 75 MG tablet Reorder    fluticasone propionate (FLONASE) 50 mcg/actuation nasal spray Reorder     butalbital-acetaminophen-caffeine -40 mg (FIORICET, ESGIC) -40 mg per tablet Reorder    atorvastatin (LIPITOR) 80 MG tablet Reorder    traMADoL (ULTRAM) 50 mg tablet Reorder     Medications Ordered This Encounter   Medications    albuterol (PROVENTIL) 2.5 mg /3 mL (0.083 %) nebulizer solution     Sig: Take 3 mLs (2.5 mg total) by nebulization every 6 (six) hours as needed for Wheezing. Rescue     Dispense:  150 mL     Refill:  1     DISCONTINUE any prescription for this drug issued prior to 03/10/2022.    amitriptyline (ELAVIL) 75 MG tablet     Sig: Take 1 tablet (75 mg total) by mouth every evening.     Dispense:  90 tablet     Refill:  3     DISCONTINUE any prescription for this drug issued prior to 2021.    amLODIPine (NORVASC) 5 MG tablet     Sig: Take 1 tablet (5 mg total) by mouth once daily.     Dispense:  90 tablet     Refill:  4     DISCONTINUE any prescription for this drug issued prior to 03/10/2022.    atorvastatin (LIPITOR) 80 MG tablet     Sig: Take 1 tablet (80 mg total) by mouth every evening.     Dispense:  90 tablet     Refill:  3     DISCONTINUE any prescription for this drug issued prior to 03/10/2022.    butalbital-acetaminophen-caffeine -40 mg (FIORICET, ESGIC) -40 mg per tablet     Sig: Take 1 tablet by mouth every 8 (eight) hours as needed for Headaches. DO NOT TAKE MORE THAN 5 PER WEEK     Dispense:  45 tablet     Refill:  3     DISCONTINUE any prescription for this drug issued prior to 03/10/2022.    doxycycline (VIBRAMYCIN) 100 MG Cap     Sig: Take 1 capsule (100 mg total) by mouth 2 (two) times daily. for 10 days     Dispense:  20 capsule     Refill:  0     DISCONTINUE any prescription for this drug issued prior to 03/10/2022.    fluticasone propionate (FLONASE) 50 mcg/actuation nasal spray     Si sprays (100 mcg total) by Each Nostril route once daily.     Dispense:  16 g     Refill:  11     DISCONTINUE any prescription for this drug issued  "prior to 03/10/2022.    fluticasone-salmeterol diskus inhaler 250-50 mcg     Sig: Inhale 1 puff into the lungs 2 (two) times a day. Controller     Dispense:  60 each     Refill:  11     DISCONTINUE any prescription for this drug issued prior to 03/10/2022.    nebulizer accessories Kit     Sig: NEBULIZER ACCESSORIES - Use as directed for nebulized medications prescribed by me.     Dispense:  10 kit     Refill:  11     DISCONTINUE any prescription for this drug issued prior to 03/10/2022.    nebulizer and compressor Heaven     Sig: NEBULIZER DEVICE - Use as directed     Dispense:  1 each     Refill:  0     DISCONTINUE any prescription for this drug issued prior to 03/10/2022.    omeprazole (PRILOSEC) 20 MG capsule     Sig: Take 2 capsules (40 mg total) by mouth once daily.     Dispense:  180 capsule     Refill:  4     DISCONTINUE any prescription for this drug issued prior to 03/10/2022.    predniSONE (DELTASONE) 10 MG tablet     Sig: Take 4 tablets (40 mg total) by mouth once daily for 2 days, THEN 3 tablets (30 mg total) once daily for 2 days, THEN 2 tablets (20 mg total) once daily for 2 days, THEN 1 tablet (10 mg total) once daily for 2 days.     Dispense:  20 tablet     Refill:  0    traMADoL (ULTRAM) 50 mg tablet     Sig: Take 1 tablet (50 mg total) by mouth every 8 (eight) hours as needed for Pain.     Dispense:  90 tablet     Refill:  5     DISCONTINUE any prescription for this drug issued prior to 03/10/2022. GREATER THAN 7-DAY SUPPLY IS MEDICALLY NECESSARY.     PHYSICAL EXAM  Vitals:    03/10/22 1017   BP: 138/86   BP Location: Left arm   Patient Position: Sitting   BP Method: Medium (Manual)   Pulse: 85   Temp: 96.8 °F (36 °C)   TempSrc: Tympanic   SpO2: 97%   Weight: 67.9 kg (149 lb 11.1 oz)   Height: 5' 2" (1.575 m)   Physical Exam  Vitals reviewed.   Constitutional:       General: She is not in acute distress.     Appearance: Normal appearance. She is not ill-appearing or diaphoretic. "   Cardiovascular:      Rate and Rhythm: Normal rate and regular rhythm.   Pulmonary:      Effort: Pulmonary effort is normal. No respiratory distress.      Breath sounds: Wheezing present.   Skin:     General: Skin is warm and dry.   Neurological:      Mental Status: She is alert and oriented to person, place, and time. Mental status is at baseline.   Psychiatric:         Mood and Affect: Mood normal.         Behavior: Behavior normal.         Judgment: Judgment normal.          PAST MEDICAL HISTORY  Karyna has a past medical history of Anemia, Aneurysm, Anorexia, Anxiety, Asthma, Atherosclerosis of aorta, Bipolar disorder, Carpal tunnel syndrome, bilateral, Cerebral aneurysm, Chronic nausea, Chronic pain syndrome, Cigarette nicotine dependence, Depression, Essential hypertension, GERD (gastroesophageal reflux disease), Hyperlipidemia, Hypertension, Insomnia, Osteoporosis, Pure hypercholesterolemia, Schizophrenia, Stroke, and Subarachnoid hemorrhage.    SURGICAL HISTORY  Karyna has a past surgical history that includes Partial hysterectomy; Bunionectomy (Right); Transcranial Doppler Study - Art (8/28/2014); Transcranial Doppler Study - Art (8/29/2014); Transcranial Doppler Study - Art (8/30/2014); Transcranial Doppler Study - Art (8/31/2014); Transcranial Doppler Study - Art (9/1/2014); Transcranial Doppler Study - Art (9/2/2014); Transcranial Doppler Study - Art (9/3/2014); Transcranial Doppler Study - Art (9/4/2014); Transcranial Doppler Study - Art (9/5/2014); Transcranial Doppler Study - Art (9/6/2014); Colonoscopy w/ biopsies and polypectomy; Vaginal delivery; Hysterectomy; Esophagogastroduodenoscopy (N/A, 2/7/2022); and Colonoscopy (N/A, 2/7/2022).    FAMILY HISTORY  Karyna family history includes Bone cancer in her sister; Brain cancer in her sister; Cancer in her brother and mother; Diabetes type II in her mother and sister; Glaucoma in her mother; Heart disease in her mother; Hypertension in her brother and  "mother; Stroke in her mother.     ALLERGIES  Review of patient's allergies indicates:  No Known Allergies    SOCIAL HISTORY  Karyna  reports that she has been smoking cigarettes, cigars, and vaping with nicotine. She started smoking about 46 years ago. She has a 51.00 pack-year smoking history. She has never used smokeless tobacco. She reports current alcohol use. She reports that she does not use drugs.     Documentation entered by me for this encounter may have been done in part using speech-recognition technology. Although I have made an effort to ensure accuracy, "sound like" errors may exist and should be interpreted in context.   "

## 2022-03-10 NOTE — ASSESSMENT & PLAN NOTE
Smoking 1 PPD. She is currently at the contemplation stage of cessation (thinking about quitting). Cessation encouraged.

## 2022-03-10 NOTE — Clinical Note
Hi, Imi.  Karyna says she hasn't taken her chlorthalidone in several months, so I D/C'd it.  This is just FYI only. No action required unless you need to contact the patient.  Thank you for your help caring for our patients!  -TRACEY

## 2022-03-23 ENCOUNTER — TELEPHONE (OUTPATIENT)
Dept: INTERNAL MEDICINE | Facility: CLINIC | Age: 62
End: 2022-03-23
Payer: MEDICAID

## 2022-03-23 NOTE — TELEPHONE ENCOUNTER
----- Message from Leanne Jones sent at 3/23/2022 10:46 AM CDT -----  .Type:  RX Refill Request    Refill or New Rx: Nebulizer  Machine   RX Name and Strength:   How is the patient currently taking it? (ex. 1XDay):     Is this a 30 day or 90 day RX:   Preferred Pharmacy with phone number:   [id number] - [facility] [place] - [address]  [address] [address] [place] [address]  Phone: [phone] Fax: [phone]    Would the patient rather a call back or a response via [facility]?   Best Call Back Number:  [phone]  Additional Information: Authorization is needed for neb machine

## 2022-04-05 ENCOUNTER — PATIENT MESSAGE (OUTPATIENT)
Dept: ADMINISTRATIVE | Facility: OTHER | Age: 62
End: 2022-04-05
Payer: MEDICAID

## 2022-05-09 ENCOUNTER — PATIENT MESSAGE (OUTPATIENT)
Dept: SMOKING CESSATION | Facility: CLINIC | Age: 62
End: 2022-05-09
Payer: MEDICAID

## 2022-05-19 ENCOUNTER — TELEPHONE (OUTPATIENT)
Dept: INTERNAL MEDICINE | Facility: CLINIC | Age: 62
End: 2022-05-19
Payer: MEDICAID

## 2022-05-19 ENCOUNTER — HOSPITAL ENCOUNTER (EMERGENCY)
Facility: HOSPITAL | Age: 62
Discharge: HOME | End: 2022-05-19
Attending: EMERGENCY MEDICINE
Payer: MEDICAID

## 2022-05-19 VITALS
DIASTOLIC BLOOD PRESSURE: 80 MMHG | WEIGHT: 144.38 LBS | HEIGHT: 62 IN | TEMPERATURE: 98 F | OXYGEN SATURATION: 95 % | SYSTOLIC BLOOD PRESSURE: 134 MMHG | RESPIRATION RATE: 20 BRPM | BODY MASS INDEX: 26.57 KG/M2 | HEART RATE: 90 BPM

## 2022-05-19 DIAGNOSIS — M62.838 MUSCLE SPASM: Primary | ICD-10-CM

## 2022-05-19 DIAGNOSIS — R53.1 WEAKNESS: ICD-10-CM

## 2022-05-19 LAB
ALBUMIN SERPL BCP-MCNC: 4 G/DL (ref 3.5–5.2)
ALP SERPL-CCNC: 63 U/L (ref 55–135)
ALT SERPL W/O P-5'-P-CCNC: 14 U/L (ref 10–44)
ANION GAP SERPL CALC-SCNC: 12 MMOL/L (ref 8–16)
AST SERPL-CCNC: 21 U/L (ref 10–40)
BASOPHILS # BLD AUTO: 0.02 K/UL (ref 0–0.2)
BASOPHILS NFR BLD: 0.2 % (ref 0–1.9)
BILIRUB SERPL-MCNC: 0.4 MG/DL (ref 0.1–1)
BUN SERPL-MCNC: 7 MG/DL (ref 8–23)
CALCIUM SERPL-MCNC: 9.9 MG/DL (ref 8.7–10.5)
CHLORIDE SERPL-SCNC: 105 MMOL/L (ref 95–110)
CK SERPL-CCNC: 174 U/L (ref 20–180)
CO2 SERPL-SCNC: 25 MMOL/L (ref 23–29)
CREAT SERPL-MCNC: 0.9 MG/DL (ref 0.5–1.4)
DIFFERENTIAL METHOD: ABNORMAL
EOSINOPHIL # BLD AUTO: 0.1 K/UL (ref 0–0.5)
EOSINOPHIL NFR BLD: 1.1 % (ref 0–8)
ERYTHROCYTE [DISTWIDTH] IN BLOOD BY AUTOMATED COUNT: 13.8 % (ref 11.5–14.5)
EST. GFR  (AFRICAN AMERICAN): >60 ML/MIN/1.73 M^2
EST. GFR  (NON AFRICAN AMERICAN): >60 ML/MIN/1.73 M^2
GLUCOSE SERPL-MCNC: 99 MG/DL (ref 70–110)
HCT VFR BLD AUTO: 35.9 % (ref 37–48.5)
HGB BLD-MCNC: 12.4 G/DL (ref 12–16)
IMM GRANULOCYTES # BLD AUTO: 0.02 K/UL (ref 0–0.04)
IMM GRANULOCYTES NFR BLD AUTO: 0.2 % (ref 0–0.5)
LYMPHOCYTES # BLD AUTO: 2.7 K/UL (ref 1–4.8)
LYMPHOCYTES NFR BLD: 32.3 % (ref 18–48)
MCH RBC QN AUTO: 30.5 PG (ref 27–31)
MCHC RBC AUTO-ENTMCNC: 34.5 G/DL (ref 32–36)
MCV RBC AUTO: 88 FL (ref 82–98)
MONOCYTES # BLD AUTO: 0.6 K/UL (ref 0.3–1)
MONOCYTES NFR BLD: 7.2 % (ref 4–15)
NEUTROPHILS # BLD AUTO: 4.9 K/UL (ref 1.8–7.7)
NEUTROPHILS NFR BLD: 59 % (ref 38–73)
NRBC BLD-RTO: 0 /100 WBC
PLATELET # BLD AUTO: 294 K/UL (ref 150–450)
PMV BLD AUTO: 9 FL (ref 9.2–12.9)
POTASSIUM SERPL-SCNC: 3.7 MMOL/L (ref 3.5–5.1)
PROT SERPL-MCNC: 6.8 G/DL (ref 6–8.4)
RBC # BLD AUTO: 4.06 M/UL (ref 4–5.4)
SODIUM SERPL-SCNC: 142 MMOL/L (ref 136–145)
TROPONIN I SERPL DL<=0.01 NG/ML-MCNC: <0.006 NG/ML (ref 0–0.03)
WBC # BLD AUTO: 8.31 K/UL (ref 3.9–12.7)

## 2022-05-19 PROCEDURE — 84484 ASSAY OF TROPONIN QUANT: CPT | Performed by: NURSE PRACTITIONER

## 2022-05-19 PROCEDURE — 99285 EMERGENCY DEPT VISIT HI MDM: CPT | Mod: 25

## 2022-05-19 PROCEDURE — 82550 ASSAY OF CK (CPK): CPT | Performed by: NURSE PRACTITIONER

## 2022-05-19 PROCEDURE — 63600175 PHARM REV CODE 636 W HCPCS: Performed by: EMERGENCY MEDICINE

## 2022-05-19 PROCEDURE — 85025 COMPLETE CBC W/AUTO DIFF WBC: CPT | Performed by: NURSE PRACTITIONER

## 2022-05-19 PROCEDURE — 93010 EKG 12-LEAD: ICD-10-PCS | Mod: ,,, | Performed by: INTERNAL MEDICINE

## 2022-05-19 PROCEDURE — 93005 ELECTROCARDIOGRAM TRACING: CPT

## 2022-05-19 PROCEDURE — 80053 COMPREHEN METABOLIC PANEL: CPT | Performed by: NURSE PRACTITIONER

## 2022-05-19 PROCEDURE — 96374 THER/PROPH/DIAG INJ IV PUSH: CPT

## 2022-05-19 PROCEDURE — 93010 ELECTROCARDIOGRAM REPORT: CPT | Mod: ,,, | Performed by: INTERNAL MEDICINE

## 2022-05-19 RX ORDER — CYCLOBENZAPRINE HCL 10 MG
5 TABLET ORAL 3 TIMES DAILY PRN
Qty: 15 TABLET | Refills: 0 | Status: SHIPPED | OUTPATIENT
Start: 2022-05-19 | End: 2023-08-29

## 2022-05-19 RX ORDER — DIAZEPAM 10 MG/2ML
2 INJECTION INTRAMUSCULAR
Status: COMPLETED | OUTPATIENT
Start: 2022-05-19 | End: 2022-05-19

## 2022-05-19 RX ADMIN — DIAZEPAM 2 MG: 10 INJECTION, SOLUTION INTRAMUSCULAR; INTRAVENOUS at 12:05

## 2022-05-19 NOTE — ED PROVIDER NOTES
SCRIBE #1 NOTE: I, Aime Toledo, am scribing for, and in the presence of, Gustavo Vang Jr., MD. I have scribed the entire note.      History      Chief Complaint   Patient presents with    Spasms     Pt reports L upper arm spasm started 3 days ago but has now spread to entire body. Concerned about hx stroke but denies any speech issue, weakness, facial asymmetry       Review of patient's allergies indicates:  No Known Allergies     HPI   HPI    5/19/2022, 12:20 PM   History obtained from the patient      History of Present Illness: Karyna Geller is a 62 y.o. female patient with a PMHx of subarachnoid hemorrhage who presents to the Emergency Department for generalized muscle spasms, onset 3 days PTA. Pt states that the spasms start in her LUE and radiate to her entire body. Symptoms are intermittent and moderate in severity. No mitigating or exacerbating factors reported. Associated sxs intermittent generalized tingling. Patient denies any fever, chills, n/v/d, SOB, CP, weakness, dizziness, headache, and all other sxs at this time. No prior Tx reported. No further complaints or concerns at this time.     Arrival mode: Personal vehicle    PCP: DIMA Boothe MD       Past Medical History:  Past Medical History:   Diagnosis Date    Anemia     Aneurysm     Anorexia 4/26/2017    Anxiety     Asthma     Atherosclerosis of aorta 1/5/2022    CT ABDOMEN PELVIS WITH CONTRAST 01/03/2021 FINDINGS: Mild atherosclerosis within the abdominal aorta which is nonaneurysmal.    Bipolar disorder     Carpal tunnel syndrome, bilateral     Cerebral aneurysm     Chronic nausea 11/15/2018    Chronic pain syndrome     Cigarette nicotine dependence     Depression     Essential hypertension 8/29/2014    GERD (gastroesophageal reflux disease)     Hyperlipidemia     Hypertension     Insomnia     Osteoporosis     Pure hypercholesterolemia 9/8/2014    Schizophrenia     Stroke     Subarachnoid hemorrhage         Past Surgical History:  Past Surgical History:   Procedure Laterality Date    BUNIONECTOMY Right     COLONOSCOPY N/A 2/7/2022    Procedure: COLONOSCOPY;  Surgeon: Kamila Lund MD;  Location: [facility];  Service: Endoscopy;  Laterality: N/A;    COLONOSCOPY W/ BIOPSIES AND POLYPECTOMY      ESOPHAGOGASTRODUODENOSCOPY N/A 2/7/2022    Procedure: EGD (ESOPHAGOGASTRODUODENOSCOPY);  Surgeon: Kamila Lund MD;  Location: [facility];  Service: Endoscopy;  Laterality: N/A;    HYSTERECTOMY      PARTIAL HYSTERECTOMY      Bilateral Ovaries Remain    TRANSCRANIAL DOPPLER STUDY - COMPLETE  8/28/2014         TRANSCRANIAL DOPPLER STUDY - COMPLETE  8/29/2014         TRANSCRANIAL DOPPLER STUDY - COMPLETE  8/30/2014         TRANSCRANIAL DOPPLER STUDY - COMPLETE  8/31/2014         TRANSCRANIAL DOPPLER STUDY - COMPLETE  9/1/2014         TRANSCRANIAL DOPPLER STUDY - COMPLETE  9/2/2014         TRANSCRANIAL DOPPLER STUDY - COMPLETE  9/3/2014         TRANSCRANIAL DOPPLER STUDY - COMPLETE  9/4/2014         TRANSCRANIAL DOPPLER STUDY - COMPLETE  9/5/2014         TRANSCRANIAL DOPPLER STUDY - COMPLETE  9/6/2014         VAGINAL DELIVERY      X 3         Family History:  Family History   Problem Relation Age of Onset    Cancer Mother     Glaucoma Mother     Stroke Mother     Diabetes type II Mother     Heart disease Mother     Hypertension Mother     Brain cancer Sister     Diabetes type II Sister     Cancer Brother     Hypertension Brother     Bone cancer Sister        Social History:  Social History     Tobacco Use    Smoking status: Current Every Day Smoker     Packs/day: 1.00     Years: 51.00     Pack years: 51.00     Types: Cigarettes, Cigars, Vaping with nicotine     Start date: 1976    Smokeless tobacco: Never Used    Tobacco comment: in smoking program 5/13/19   Substance and Sexual Activity    Alcohol use: Yes     Comment: 1-3 times per week, 1-2 drink at a time.    Drug use: No    Sexual  activity: Never     Partners: Male       ROS   Review of Systems   Constitutional: Negative for chills and fever.   HENT: Negative for sore throat.    Respiratory: Negative for shortness of breath.    Cardiovascular: Negative for chest pain.   Gastrointestinal: Negative for constipation, diarrhea, nausea and vomiting.   Genitourinary: Negative for dysuria.   Musculoskeletal: Positive for myalgias (generalized intermittent muscle spasms). Negative for back pain.   Skin: Negative for rash.   Neurological: Positive for numbness (intermittent, generalized). Negative for dizziness, weakness, light-headedness and headaches.   Hematological: Does not bruise/bleed easily.   All other systems reviewed and are negative.    Physical Exam      Initial Vitals [05/19/22 1047]   BP Pulse Resp Temp SpO2   (!) 169/106 109 20 99 °F (37.2 °C) 97 %      MAP       --          Physical Exam  Nursing Notes and Vital Signs Reviewed.  Constitutional: Patient is in no acute distress. Well-developed and well-nourished.  Head: Atraumatic. Normocephalic.  Eyes:  EOM intact.  No scleral icterus.  ENT: Mucous membranes are moist.  Nares clear   Neck:  Full ROM. No JVD.  Cardiovascular: Regular rate. Regular rhythm No murmurs, rubs, or gallops. Distal pulses are 2+ and symmetric  Pulmonary/Chest: No respiratory distress. Clear to auscultation bilaterally. No wheezing or rales.  Equal chest wall rise bilaterally  Abdominal: Soft and non-distended.  There is no tenderness.  No rebound, guarding, or rigidity. Good bowel sounds.  Genitourinary: No CVA tenderness.  No suprapubic tenderness  Musculoskeletal: Moves all extremities. No obvious deformities.  5 x 5 strength in all extremities   Skin: Warm and dry.  Neurological:  Alert, awake, and appropriate.  Normal speech.  No acute focal neurological deficits are appreciated.  Two through 12 intact bilaterally.  Psychiatric: Normal affect. Good eye contact. Appropriate in content.    ED Course   "  Procedures  ED Vital Signs:  Vitals:    05/19/22 1047 05/19/22 1230   BP: (!) 169/106 125/72   Pulse: 109 94   Resp: 20 16   Temp: 99 °F (37.2 °C) 98 °F (36.7 °C)   TempSrc: Oral Oral   SpO2: 97% 96%   Weight: 65.5 kg (144 lb 6.4 oz)    Height: 5' 2" (1.575 m)        Abnormal Lab Results:  Labs Reviewed   CBC W/ AUTO DIFFERENTIAL - Abnormal; Notable for the following components:       Result Value    Hematocrit 35.9 (*)     MPV 9.0 (*)     All other components within normal limits   COMPREHENSIVE METABOLIC PANEL - Abnormal; Notable for the following components:    BUN 7 (*)     All other components within normal limits   CK   TROPONIN I        All Lab Results:  Results for orders placed or performed during the hospital encounter of 05/19/22   CBC auto differential   Result Value Ref Range    WBC 8.31 3.90 - 12.70 K/uL    RBC 4.06 4.00 - 5.40 M/uL    Hemoglobin 12.4 12.0 - 16.0 g/dL    Hematocrit 35.9 (L) 37.0 - 48.5 %    MCV 88 82 - 98 fL    MCH 30.5 27.0 - 31.0 pg    MCHC 34.5 32.0 - 36.0 g/dL    RDW 13.8 11.5 - 14.5 %    Platelets 294 150 - 450 K/uL    MPV 9.0 (L) 9.2 - 12.9 fL    Immature Granulocytes 0.2 0.0 - 0.5 %    Gran # (ANC) 4.9 1.8 - 7.7 K/uL    Immature Grans (Abs) 0.02 0.00 - 0.04 K/uL    Lymph # 2.7 1.0 - 4.8 K/uL    Mono # 0.6 0.3 - 1.0 K/uL    Eos # 0.1 0.0 - 0.5 K/uL    Baso # 0.02 0.00 - 0.20 K/uL    nRBC 0 0 /100 WBC    Gran % 59.0 38.0 - 73.0 %    Lymph % 32.3 18.0 - 48.0 %    Mono % 7.2 4.0 - 15.0 %    Eosinophil % 1.1 0.0 - 8.0 %    Basophil % 0.2 0.0 - 1.9 %    Differential Method Automated    Comprehensive metabolic panel   Result Value Ref Range    Sodium 142 136 - 145 mmol/L    Potassium 3.7 3.5 - 5.1 mmol/L    Chloride 105 95 - 110 mmol/L    CO2 25 23 - 29 mmol/L    Glucose 99 70 - 110 mg/dL    BUN 7 (L) 8 - 23 mg/dL    Creatinine 0.9 0.5 - 1.4 mg/dL    Calcium 9.9 8.7 - 10.5 mg/dL    Total Protein 6.8 6.0 - 8.4 g/dL    Albumin 4.0 3.5 - 5.2 g/dL    Total Bilirubin 0.4 0.1 - 1.0 mg/dL "    Alkaline Phosphatase 63 55 - 135 U/L    AST 21 10 - 40 U/L    ALT 14 10 - 44 U/L    Anion Gap 12 8 - 16 mmol/L    eGFR if African American >60 >60 mL/min/1.73 m^2    eGFR if non African American >60 >60 mL/min/1.73 m^2   CPK   Result Value Ref Range     20 - 180 U/L   Troponin I   Result Value Ref Range    Troponin I <0.006 0.000 - 0.026 ng/mL     Imaging Results:  Imaging Results          CT Head Without Contrast (Final result)  Result time 05/19/22 13:01:58    Final result by Amaury Centeno MD (05/19/22 13:01:58)                 Impression:      No acute findings.  If there is additional clinical concern for acute infarct, MRI with diffusion weighted imaging recommended.      Electronically signed by: Amaury Centeno MD  Date:    05/19/2022  Time:    13:01             Narrative:    EXAMINATION:  CT HEAD WITHOUT CONTRAST    CLINICAL HISTORY:  Altered;    TECHNIQUE:  Low dose axial CT images obtained throughout the head without intravenous contrast. Sagittal and coronal reconstructions were performed.  All CT scans at this facility use dose modulation, iterative reconstruction and/or weight based dosing when appropriate to reduce radiation dose to as low as reasonably achievable.    COMPARISON:  09/29/2014    FINDINGS:  Intracranial compartment:    The brain parenchyma demonstrates areas of decreased attenuation with mild to moderate periventricular white matter consistent with chronic microvascular ischemic changes..  No parenchymal mass, hemorrhage, edema or major vascular distribution infarct.  Vascular calcifications are noted.  Artifact in the suprasellar region on the right consistent with prior aneurysm clip recoiling.    Mild prominence of the sulci and ventricles are consistent with age-related involutional changes.    No extra-axial blood or fluid collections.    Skull/extracranial contents (limited evaluation): No fracture. Mastoid air cells and paranasal sinuses are essentially clear.                                X-Ray Chest 1 View (Final result)  Result time 05/19/22 13:02:30    Final result by Zia Kitchen MD (05/19/22 13:02:30)                 Impression:      No acute abnormality.      Electronically signed by: Zia Kitchen  Date:    05/19/2022  Time:    13:02             Narrative:    EXAMINATION:  XR CHEST 1 VIEW    CLINICAL HISTORY:  Weakness    TECHNIQUE:  Single frontal view of the chest was performed.    COMPARISON:  Multiple priors.    FINDINGS:  The lungs are clear, with normal appearance of pulmonary vasculature and no pleural effusion or pneumothorax.    The cardiac silhouette is normal in size. The hilar and mediastinal contours are unremarkable.    Bones are intact.                               The EKG was ordered, reviewed, and independently interpreted by the ED provider.  Interpretation time: 12:18  Rate: 88 BPM  Rhythm: Sinus rhythm with occasional PVCs  Interpretation: No acute ST changes. No STEMI.           The Emergency Provider reviewed the vital signs and test results, which are outlined above.    ED Discussion     1:07 PM: Reassessed pt at this time. Discussed with pt all pertinent ED information and results. Discussed pt dx and plan of tx. Gave pt all f/u and return to the ED instructions. All questions and concerns were addressed at this time. Pt expresses understanding of information and instructions, and is comfortable with plan to discharge. Pt is stable for discharge.    I discussed with patient and/or family/caretaker that evaluation in the ED does not suggest any emergent or life threatening medical conditions requiring immediate intervention beyond what was provided in the ED, and I believe patient is safe for discharge.  Regardless, an unremarkable evaluation in the ED does not preclude the development or presence of a serious of life threatening condition. As such, patient was instructed to return immediately for any worsening or change in current  symptoms.      patient is stable nontoxic looks very well.  Workup is benign and negative with the head CT that is normal for her with 3 days of symptoms.  Does not appear to be a TIA but more muscular spasm.  Will treat with Flexeril and close follow-up.  Patient verbalized understanding room with all instructions reliable.  She is stable safe discharge my opinion.    ED Medication(s):  Medications   diazePAM injection 2 mg (2 mg Intravenous Given 5/19/22 4589)        Follow-up Information     L Yonas Boothe MD.    Specialty: Family Medicine  Contact information:  [id number]  [place] [address] [phone]                        New Prescriptions    CYCLOBENZAPRINE (FLEXERIL) 10 MG TABLET    Take 0.5 tablets (5 mg total) by mouth 3 (three) times daily as needed for Muscle spasms.         Medical Decision Making    Medical Decision Making:   Clinical Tests:   Lab Tests: Ordered and Reviewed  Radiological Study: Ordered and Reviewed  Medical Tests: Ordered and Reviewed           Scribe Attestation:   Scribe #1: I performed the above scribed service and the documentation accurately describes the services I performed. I attest to the accuracy of the note.    Attending:   Physician Attestation Statement for Scribe #1: I, Gustavo Vang Jr., MD, personally performed the services described in this documentation, as scribed by Aime Toledo, in my presence, and it is both accurate and complete.          Clinical Impression       ICD-10-CM ICD-9-CM   1. Muscle spasm  M62.838 728.85   2. Weakness  R53.1 780.79       Disposition:   Disposition: Discharged  Condition: Stable         Gustavo Vang Jr., MD  05/19/22 1322

## 2022-05-19 NOTE — FIRST PROVIDER EVALUATION
"Medical screening exam completed.  I have conducted a focused provider triage encounter, findings are as follows:    Brief history of present illness:  62-year-old female with complaint of muscle spasm and left arm that is progressed to entire body.  Patient reports difficulty walking secondary to muscle spasm.    Vitals:    05/19/22 1047   BP: (!) 169/106   BP Location: Left arm   Patient Position: Sitting   Pulse: 109   Resp: 20   Temp: 99 °F (37.2 °C)   TempSrc: Oral   SpO2: 97%   Weight: 65.5 kg (144 lb 6.4 oz)   Height: 5' 2" (1.575 m)       Pertinent physical exam:  AAOX3  "

## 2022-05-19 NOTE — TELEPHONE ENCOUNTER
----- Message from Camilla Hilario sent at 5/18/2022  4:41 PM CDT -----  Contact: self/[phone]  Patient is a stroke patient and is in severe pain would like to receive a callback regarding a refill on some type of muscle relaxer pill to help her. She said her face and body is hurting muscle spasms.  Her hand are closing up on her.      Please call her back at [phone]  Thanks KL

## 2022-05-19 NOTE — TELEPHONE ENCOUNTER
Spoke with pt regarding her having muscle spasms face and body pain pt was informed to go to the E.R. the patient stated she understand.//LB

## 2022-06-09 DIAGNOSIS — R63.0 ANOREXIA: ICD-10-CM

## 2022-06-09 DIAGNOSIS — R63.4 WEIGHT LOSS: ICD-10-CM

## 2022-06-09 NOTE — TELEPHONE ENCOUNTER
Refill Routing Note   Medication(s) are not appropriate for processing by [facility] for the following reason(s):      - Outside of protocol    [facility] action(s):  Route          Medication reconciliation completed: No     Appointments  past 12m or future 3m with PCP    Date Provider   Last Visit   3/10/2022 CECILIA Boothe MD   Next Visit   9/22/2022 CECILIA Boothe MD   ED visits in past 90 days: 1        Note composed:11:15 AM 06/09/2022

## 2022-06-09 NOTE — TELEPHONE ENCOUNTER
No new care gaps identified.  [facility] Embedded Care Gaps. Reference number: [id number]. 6/09/2022   10:28:19 AM PHANIT

## 2022-06-11 RX ORDER — CYPROHEPTADINE HYDROCHLORIDE 4 MG/1
TABLET ORAL
Qty: 180 TABLET | Refills: 5 | Status: SHIPPED | OUTPATIENT
Start: 2022-06-11 | End: 2022-09-22 | Stop reason: SDUPTHER

## 2022-06-12 NOTE — TELEPHONE ENCOUNTER
REFILL APPROVED. Will address further refills at upcoming appointment with me listed below.  Requested Prescriptions   Pending Prescriptions Disp Refills    cyproheptadine (PERIACTIN) 4 mg tablet [Pharmacy Med Name: CYPROHEPTAD 4 MG         TAB] 180 tablet 5     Sig: TAKE 2 TABLETS BY MOUTH THREE TIMES DAILY BEFORE MEALS    #LMRX   --------------------------------  Future Appointments   Date Time Provider Department Center   9/22/2022  2:00 PM CECILIA Boothe MD [facility]

## 2022-08-10 ENCOUNTER — TELEPHONE (OUTPATIENT)
Dept: INTERNAL MEDICINE | Facility: CLINIC | Age: 62
End: 2022-08-10
Payer: MEDICAID

## 2022-08-10 NOTE — TELEPHONE ENCOUNTER
----- Message from Blair Aguiar sent at 8/10/2022 12:36 PM CDT -----  Contact: [phone]  Type:  Sooner Apoointment Request    Caller is requesting a sooner appointment.  Caller declined first available appointment listed below.  Caller will not accept being placed on the waitlist and is requesting a message be sent to doctor.  Name of Caller:Karyna   When is the first available appointment?09/22/022  Symptoms:blood pressure elevated  Would the patient rather a call back or a response via [facility]? Call back   Best Call Back Number:[phone]   Additional Information:

## 2022-08-10 NOTE — TELEPHONE ENCOUNTER
Spoke with pt regarding elevated blood pressure pt stated that her number are in her chart. The pt is schedule for 8/24/22.//LB

## 2022-08-12 NOTE — TELEPHONE ENCOUNTER
Spoke with patient and gave her the message per Dr. Boothe that she should keep her appointment with Brie Jhaveri PA-C on 8/22/22 to address her high blood pressure. She verbalized understanding.

## 2022-08-22 ENCOUNTER — OFFICE VISIT (OUTPATIENT)
Dept: INTERNAL MEDICINE | Facility: CLINIC | Age: 62
End: 2022-08-22
Payer: MEDICAID

## 2022-08-22 ENCOUNTER — HOSPITAL ENCOUNTER (OUTPATIENT)
Dept: RADIOLOGY | Facility: HOSPITAL | Age: 62
Discharge: HOME | End: 2022-08-22
Attending: PHYSICIAN ASSISTANT
Payer: MEDICAID

## 2022-08-22 VITALS
OXYGEN SATURATION: 95 % | HEIGHT: 62 IN | TEMPERATURE: 99 F | BODY MASS INDEX: 24.99 KG/M2 | DIASTOLIC BLOOD PRESSURE: 82 MMHG | WEIGHT: 135.81 LBS | HEART RATE: 93 BPM | SYSTOLIC BLOOD PRESSURE: 128 MMHG
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
## 2022-08-22 DIAGNOSIS — I10 ESSENTIAL HYPERTENSION: Chronic | ICD-10-CM

## 2022-08-22 PROCEDURE — 99999 PR PBB SHADOW E&M-EST. PATIENT-LVL V: ICD-10-PCS | Mod: PBBFAC,,, | Performed by: PHYSICIAN ASSISTANT

## 2022-08-22 PROCEDURE — 73562 XR KNEE ORTHO LEFT: ICD-10-PCS | Mod: 26,LT,, | Performed by: RADIOLOGY

## 2022-08-22 PROCEDURE — 3074F SYST BP LT 130 MM HG: CPT | Mod: CPTII,,, | Performed by: PHYSICIAN ASSISTANT

## 2022-08-22 PROCEDURE — 99215 OFFICE O/P EST HI 40 MIN: CPT | Mod: PBBFAC | Performed by: PHYSICIAN ASSISTANT

## 2022-08-22 PROCEDURE — 73562 X-RAY EXAM OF KNEE 3: CPT | Mod: 26,LT,, | Performed by: RADIOLOGY

## 2022-08-22 PROCEDURE — 99214 OFFICE O/P EST MOD 30 MIN: CPT | Mod: S$PBB,,, | Performed by: PHYSICIAN ASSISTANT

## 2022-08-22 PROCEDURE — 73562 X-RAY EXAM OF KNEE 3: CPT | Mod: TC,LT

## 2022-08-22 PROCEDURE — 3008F BODY MASS INDEX DOCD: CPT | Mod: CPTII,,, | Performed by: PHYSICIAN ASSISTANT

## 2022-08-22 PROCEDURE — 1159F PR MEDICATION LIST DOCUMENTED IN MEDICAL RECORD: ICD-10-PCS | Mod: CPTII,,, | Performed by: PHYSICIAN ASSISTANT

## 2022-08-22 PROCEDURE — 3079F PR MOST RECENT DIASTOLIC BLOOD PRESSURE 80-89 MM HG: ICD-10-PCS | Mod: CPTII,,, | Performed by: PHYSICIAN ASSISTANT

## 2022-08-22 PROCEDURE — 1160F RVW MEDS BY RX/DR IN RCRD: CPT | Mod: CPTII,,, | Performed by: PHYSICIAN ASSISTANT

## 2022-08-22 PROCEDURE — 72080 X-RAY EXAM THORACOLMB 2/> VW: CPT | Mod: TC

## 2022-08-22 PROCEDURE — 1160F PR REVIEW ALL MEDS BY PRESCRIBER/CLIN PHARMACIST DOCUMENTED: ICD-10-PCS | Mod: CPTII,,, | Performed by: PHYSICIAN ASSISTANT

## 2022-08-22 PROCEDURE — 73560 X-RAY EXAM OF KNEE 1 OR 2: CPT | Mod: 26,RT,, | Performed by: RADIOLOGY

## 2022-08-22 PROCEDURE — 72080 X-RAY EXAM THORACOLMB 2/> VW: CPT | Mod: 26,,, | Performed by: RADIOLOGY

## 2022-08-22 PROCEDURE — 73560 X-RAY EXAM OF KNEE 1 OR 2: CPT | Mod: TC,RT

## 2022-08-22 PROCEDURE — 99999 PR PBB SHADOW E&M-EST. PATIENT-LVL V: CPT | Mod: PBBFAC,,, | Performed by: PHYSICIAN ASSISTANT

## 2022-08-22 PROCEDURE — 3079F DIAST BP 80-89 MM HG: CPT | Mod: CPTII,,, | Performed by: PHYSICIAN ASSISTANT

## 2022-08-22 PROCEDURE — 73560 XR KNEE ORTHO LEFT: ICD-10-PCS | Mod: 26,RT,, | Performed by: RADIOLOGY

## 2022-08-22 PROCEDURE — 72080 XR THORACOLUMBAR SPINE AP LATERAL: ICD-10-PCS | Mod: 26,,, | Performed by: RADIOLOGY

## 2022-08-22 PROCEDURE — 99214 PR OFFICE/OUTPT VISIT, EST, LEVL IV, 30-39 MIN: ICD-10-PCS | Mod: S$PBB,,, | Performed by: PHYSICIAN ASSISTANT

## 2022-08-22 PROCEDURE — 1159F MED LIST DOCD IN RCRD: CPT | Mod: CPTII,,, | Performed by: PHYSICIAN ASSISTANT

## 2022-08-22 PROCEDURE — 3008F PR BODY MASS INDEX (BMI) DOCUMENTED: ICD-10-PCS | Mod: CPTII,,, | Performed by: PHYSICIAN ASSISTANT

## 2022-08-22 PROCEDURE — 3074F PR MOST RECENT SYSTOLIC BLOOD PRESSURE < 130 MM HG: ICD-10-PCS | Mod: CPTII,,, | Performed by: PHYSICIAN ASSISTANT

## 2022-08-22 NOTE — PROGRESS NOTES
Subjective:      Patient ID: Karyna Sanders is a 62 y.o. female.    Chief Complaint: Hypertension    HPI   Ms. Sanders is here today for a follow up for her blood pressure. Concerned her BP is not controlled. Having technical issues with digital HTN program.   Blood pressure at home is running 140s systolic over 80s diastolic. Based on her chart and her readings it has been controlled and in good range. Pt needs to discuss her technical problems with the [facility].   Psychiatrist: Marilin Hernández MD. Up to date.     Also had a fall last Thursday in the kitchen. Fell forward on her knees. Would like her left knee checked out today. No weakness or instability with walking. TTP. No bruising or obvious swelling.   Also some pain in her mid back as well. No abrasions.       Patient Active Problem List   Diagnosis    Schizoaffective disorder, bipolar type    Bipolar 1 disorder    Anxiety    Cigarette nicotine dependence without complication    Subarachnoid hemorrhage from aneurysm of right anterior communicating artery    Essential hypertension    Pure hypercholesterolemia    Prediabetes    Weight loss, involuntary, due to anorexia    Long-term current use of high risk medication other than anticoagulant    Gastro-esophageal reflux disease without esophagitis    Nonadherence to medical treatment    History of hysterectomy for benign disease    Insufficient social insurance or welfare support    Primary localized osteoarthrosis of multiple sites    Peripheral polyneuropathy    Tension headache    Bilateral carpal tunnel syndrome    Chronic seasonal allergic rhinitis    Cubital tunnel syndrome, bilateral    Lumbosacral radiculopathy at S1    Chronic nausea    History of subarachnoid hemorrhage    Intractable migraine with aura without status migrainosus    Medication overuse headache    Chronic use of tramadol for therapeutic purpose    Tardive dyskinesia    Anorexia    Early satiety     Atherosclerosis of aorta    Tubular adenoma of transverse colon (Next colonoscopy due 12/2026)    Chronic bronchitis       Current Outpatient Medications:     albuterol (PROVENTIL) 2.5 mg /3 mL (0.083 %) nebulizer solution, Take 3 mLs (2.5 mg total) by nebulization every 6 (six) hours as needed for Wheezing. Rescue, Disp: 150 mL, Rfl: 1    alprazolam (XANAX) 0.5 MG tablet, Take 0.5 mg by mouth daily as needed. , Disp: , Rfl:     amitriptyline (ELAVIL) 75 MG tablet, Take 1 tablet (75 mg total) by mouth every evening., Disp: 90 tablet, Rfl: 3    amLODIPine (NORVASC) 5 MG tablet, Take 1 tablet (5 mg total) by mouth once daily., Disp: 90 tablet, Rfl: 4    atorvastatin (LIPITOR) 80 MG tablet, Take 1 tablet (80 mg total) by mouth every evening., Disp: 90 tablet, Rfl: 3    BLOOD PRESSURE CUFF Misc, Use to check blood pressure once daily, Disp: 1 each, Rfl: 0    butalbital-acetaminophen-caffeine -40 mg (FIORICET, ESGIC) -40 mg per tablet, Take 1 tablet by mouth every 8 (eight) hours as needed for Headaches. DO NOT TAKE MORE THAN 5 PER WEEK, Disp: 45 tablet, Rfl: 3    CALCIUM CARBONATE/VITAMIN D3 (CALCIUM 600 + D,3, ORAL), Take 2 tablets by mouth once daily., Disp: , Rfl:     cyclobenzaprine (FLEXERIL) 10 MG tablet, Take 0.5 tablets (5 mg total) by mouth 3 (three) times daily as needed for Muscle spasms., Disp: 15 tablet, Rfl: 0    cyproheptadine (PERIACTIN) 4 mg tablet, TAKE 2 TABLETS BY MOUTH THREE TIMES DAILY BEFORE MEALS, Disp: 180 tablet, Rfl: 5    fluticasone propionate (FLONASE) 50 mcg/actuation nasal spray, 2 sprays (100 mcg total) by Each Nostril route once daily., Disp: 16 g, Rfl: 11    fluticasone-salmeterol diskus inhaler 250-50 mcg, Inhale 1 puff into the lungs 2 (two) times a day. Controller, Disp: 60 each, Rfl: 11    LEXAPRO 20 mg tablet, Take 20 mg by mouth once daily., Disp: , Rfl:     mirtazapine (REMERON) 15 MG tablet, Take 15 mg by mouth nightly., Disp: , Rfl:     nebulizer  accessories Kit, NEBULIZER ACCESSORIES - Use as directed for nebulized medications prescribed by me., Disp: 10 kit, Rfl: 11    nebulizer and compressor [place], NEBULIZER DEVICE - Use as directed, Disp: 1 each, Rfl: 0    nicotine (NICODERM CQ) 21 mg/24 hr, Place 1 patch onto the skin once daily., Disp: 14 patch, Rfl: 0    nitroGLYCERIN (NITROSTAT) 0.4 MG SL tablet, PLACE 1 TABLET (0.4 MG TOTAL) UNDER THE TONGUE EVERY 5 (FIVE) MINUTES AS NEEDED FOR CHEST PAIN., Disp: 25 tablet, Rfl: 4    omeprazole (PRILOSEC) 20 MG capsule, Take 2 capsules (40 mg total) by mouth once daily., Disp: 180 capsule, Rfl: 4    ondansetron (ZOFRAN-ODT) 4 MG TbDL, 1-2 tablets PO every 6 hours as needed for nausea/vomiting while completing bowel prep, Disp: 4 tablet, Rfl: 0    OXcarbazepine (TRILEPTAL) 600 MG Tab, Take 150 mg by mouth 2 (two) times daily., Disp: , Rfl:     polyethylene glycol (GLYCOLAX) 17 gram/dose powder, Take 17 g by mouth daily as needed (for constipation)., Disp: 510 g, Rfl: 11    QUEtiapine (SEROQUEL) 100 MG Tab, Take 100 mg by mouth once daily., Disp: , Rfl:     traMADoL (ULTRAM) 50 mg tablet, Take 1 tablet (50 mg total) by mouth every 8 (eight) hours as needed for Pain., Disp: 90 tablet, Rfl: 5  No current facility-administered medications for this visit.    Facility-Administered Medications Ordered in Other Visits:     lactated ringers infusion, , Intravenous, Continuous, Kamila Lund MD, Last Rate: 100 mL/hr at 02/07/22 1125, Rate Change at 02/07/22 1125  Health Maintenance Due   Topic Date Due    COVID-19 Vaccine (1) Never done       Review of Systems   Constitutional: Negative for activity change, appetite change, chills, diaphoresis, fatigue, fever and unexpected weight change.   HENT: Negative.  Negative for congestion, hearing loss, postnasal drip, rhinorrhea, sore throat, trouble swallowing and voice change.    Eyes: Negative.  Negative for visual disturbance.   Respiratory: Negative.  Negative  "for cough, choking, chest tightness and shortness of breath.    Cardiovascular: Negative for chest pain, palpitations and leg swelling.   Gastrointestinal: Negative for abdominal distention, abdominal pain, blood in stool, constipation, diarrhea, nausea and vomiting.   Endocrine: Negative for cold intolerance, heat intolerance, polydipsia and polyuria.   Genitourinary: Negative.  Negative for difficulty urinating and frequency.   Musculoskeletal: Positive for arthralgias, back pain, joint swelling and myalgias. Negative for gait problem, neck pain and neck stiffness.   Skin: Negative for color change, pallor, rash and wound.   Neurological: Negative for dizziness, tremors, weakness, light-headedness, numbness and headaches.   Hematological: Negative for adenopathy.   Psychiatric/Behavioral: Negative for behavioral problems, confusion, self-injury, sleep disturbance and suicidal ideas. The patient is not nervous/anxious.      Objective:   /82 (BP Location: Right arm, Patient Position: Sitting, BP Method: Medium (Manual))   Pulse 93   Temp 98.6 °F (37 °C) (Tympanic)   Ht 5' 2" (1.575 m)   Wt 61.6 kg (135 lb 12.9 oz)   SpO2 95%   BMI 24.84 kg/m²     Physical Exam  Vitals reviewed.   Constitutional:       General: She is not in acute distress.     Appearance: Normal appearance. She is well-developed. She is not ill-appearing, toxic-appearing or diaphoretic.   HENT:      Head: Normocephalic and atraumatic.      Right Ear: External ear normal.      Left Ear: External ear normal.      Nose: Nose normal.   Eyes:      Conjunctiva/sclera: Conjunctivae normal.      Pupils: Pupils are equal, round, and reactive to light.   Cardiovascular:      Rate and Rhythm: Normal rate and regular rhythm.      Heart sounds: Normal heart sounds. No murmur heard.    No friction rub. No gallop.   Pulmonary:      Effort: Pulmonary effort is normal. No respiratory distress.      Breath sounds: Normal breath sounds. No wheezing or " rales.   Chest:      Chest wall: No tenderness.   Abdominal:      General: There is no distension.      Palpations: Abdomen is soft.      Tenderness: There is no abdominal tenderness.   Musculoskeletal:      Cervical back: Normal range of motion and neck supple.      Thoracic back: Spasms and tenderness present. No swelling, edema, deformity, signs of trauma, lacerations or bony tenderness. Normal range of motion. No scoliosis.      Lumbar back: Normal.      Left knee: Swelling and bony tenderness present. No deformity, effusion, erythema, ecchymosis, lacerations or crepitus. Decreased range of motion. Tenderness present over the medial joint line, lateral joint line and patellar tendon. No LCL laxity or MCL laxity.Normal alignment, normal meniscus and normal patellar mobility.   Lymphadenopathy:      Cervical: No cervical adenopathy.   Skin:     General: Skin is warm and dry.   Neurological:      Mental Status: She is alert and oriented to person, place, and time.   Psychiatric:         Behavior: Behavior normal.         Thought Content: Thought content normal.         Judgment: Judgment normal.         Assessment:     1. Fall, initial encounter    2. Essential hypertension      Plan:   Fall, initial encounter  -     X-ray Knee Ortho Left; Future; Expected date: 08/22/2022  -     X-Ray Thoracolumbar Spine AP Lateral; Future; Expected date: 08/22/2022  -tylenol extra strength three times a day  -RICE    Essential hypertension  -log bp at home for 2 weeks  -bring log and bp cuff to follow up in 2 weeks.       -stop at [facility] on [address] to get set up with digital HTN program.     Follow up in about 2 weeks (around 9/5/2022), or if symptoms worsen or fail to improve.

## 2022-08-23 ENCOUNTER — HOSPITAL ENCOUNTER (EMERGENCY)
Facility: HOSPITAL | Age: 62
Discharge: HOME | End: 2022-08-23
Attending: EMERGENCY MEDICINE
Payer: MEDICAID

## 2022-08-23 VITALS
DIASTOLIC BLOOD PRESSURE: 78 MMHG | RESPIRATION RATE: 22 BRPM | HEART RATE: 62 BPM | WEIGHT: 135.81 LBS | SYSTOLIC BLOOD PRESSURE: 146 MMHG | TEMPERATURE: 99 F | BODY MASS INDEX: 24.84 KG/M2 | OXYGEN SATURATION: 98 %

## 2022-08-23 DIAGNOSIS — S83.003A SUBLUXATION OF PATELLOFEMORAL JOINT, UNSPECIFIED LATERALITY, INITIAL ENCOUNTER: Primary | ICD-10-CM

## 2022-08-23 DIAGNOSIS — R07.9 CHEST PAIN, UNSPECIFIED TYPE: Primary | ICD-10-CM

## 2022-08-23 DIAGNOSIS — R53.1 WEAKNESS: ICD-10-CM

## 2022-08-23 DIAGNOSIS — F41.9 ANXIETY: ICD-10-CM

## 2022-08-23 DIAGNOSIS — R07.9 CHEST PAIN: ICD-10-CM

## 2022-08-23 LAB
ALBUMIN SERPL BCP-MCNC: 3.8 G/DL (ref 3.5–5.2)
ALP SERPL-CCNC: 57 U/L (ref 55–135)
ALT SERPL W/O P-5'-P-CCNC: 8 U/L (ref 10–44)
ANION GAP SERPL CALC-SCNC: 11 MMOL/L (ref 8–16)
AST SERPL-CCNC: 14 U/L (ref 10–40)
BASOPHILS # BLD AUTO: 0.04 K/UL (ref 0–0.2)
BASOPHILS NFR BLD: 0.5 % (ref 0–1.9)
BILIRUB SERPL-MCNC: 0.3 MG/DL (ref 0.1–1)
BNP SERPL-MCNC: <10 PG/ML (ref 0–99)
BUN SERPL-MCNC: 10 MG/DL (ref 8–23)
CALCIUM SERPL-MCNC: 9.5 MG/DL (ref 8.7–10.5)
CHLORIDE SERPL-SCNC: 108 MMOL/L (ref 95–110)
CO2 SERPL-SCNC: 20 MMOL/L (ref 23–29)
CREAT SERPL-MCNC: 0.9 MG/DL (ref 0.5–1.4)
DIFFERENTIAL METHOD: NORMAL
EOSINOPHIL # BLD AUTO: 0.1 K/UL (ref 0–0.5)
EOSINOPHIL NFR BLD: 1.7 % (ref 0–8)
ERYTHROCYTE [DISTWIDTH] IN BLOOD BY AUTOMATED COUNT: 14 % (ref 11.5–14.5)
EST. GFR  (NO RACE VARIABLE): >60 ML/MIN/1.73 M^2
GLUCOSE SERPL-MCNC: 101 MG/DL (ref 70–110)
HCT VFR BLD AUTO: 38.6 % (ref 37–48.5)
HGB BLD-MCNC: 12.9 G/DL (ref 12–16)
IMM GRANULOCYTES # BLD AUTO: 0.02 K/UL (ref 0–0.04)
IMM GRANULOCYTES NFR BLD AUTO: 0.3 % (ref 0–0.5)
LYMPHOCYTES # BLD AUTO: 3.7 K/UL (ref 1–4.8)
LYMPHOCYTES NFR BLD: 47.1 % (ref 18–48)
MCH RBC QN AUTO: 29.6 PG (ref 27–31)
MCHC RBC AUTO-ENTMCNC: 33.4 G/DL (ref 32–36)
MCV RBC AUTO: 89 FL (ref 82–98)
MONOCYTES # BLD AUTO: 0.7 K/UL (ref 0.3–1)
MONOCYTES NFR BLD: 9 % (ref 4–15)
NEUTROPHILS # BLD AUTO: 3.2 K/UL (ref 1.8–7.7)
NEUTROPHILS NFR BLD: 41.4 % (ref 38–73)
NRBC BLD-RTO: 0 /100 WBC
PLATELET # BLD AUTO: 335 K/UL (ref 150–450)
PMV BLD AUTO: 9.4 FL (ref 9.2–12.9)
POTASSIUM SERPL-SCNC: 3.8 MMOL/L (ref 3.5–5.1)
PROT SERPL-MCNC: 6.7 G/DL (ref 6–8.4)
RBC # BLD AUTO: 4.36 M/UL (ref 4–5.4)
SODIUM SERPL-SCNC: 139 MMOL/L (ref 136–145)
TROPONIN I SERPL DL<=0.01 NG/ML-MCNC: 0.01 NG/ML (ref 0–0.03)
TROPONIN I SERPL DL<=0.01 NG/ML-MCNC: <0.006 NG/ML (ref 0–0.03)
WBC # BLD AUTO: 7.77 K/UL (ref 3.9–12.7)

## 2022-08-23 PROCEDURE — 93010 ELECTROCARDIOGRAM REPORT: CPT | Mod: ,,, | Performed by: INTERNAL MEDICINE

## 2022-08-23 PROCEDURE — 84484 ASSAY OF TROPONIN QUANT: CPT | Mod: 91 | Performed by: EMERGENCY MEDICINE

## 2022-08-23 PROCEDURE — 85025 COMPLETE CBC W/AUTO DIFF WBC: CPT | Performed by: EMERGENCY MEDICINE

## 2022-08-23 PROCEDURE — 99285 EMERGENCY DEPT VISIT HI MDM: CPT | Mod: 25

## 2022-08-23 PROCEDURE — 93005 ELECTROCARDIOGRAM TRACING: CPT

## 2022-08-23 PROCEDURE — 83880 ASSAY OF NATRIURETIC PEPTIDE: CPT | Performed by: EMERGENCY MEDICINE

## 2022-08-23 PROCEDURE — 25000003 PHARM REV CODE 250: Performed by: EMERGENCY MEDICINE

## 2022-08-23 PROCEDURE — 80053 COMPREHEN METABOLIC PANEL: CPT | Performed by: EMERGENCY MEDICINE

## 2022-08-23 PROCEDURE — 93010 ELECTROCARDIOGRAM REPORT: CPT | Mod: 77,,, | Performed by: INTERNAL MEDICINE

## 2022-08-23 PROCEDURE — 93010 EKG 12-LEAD: ICD-10-PCS | Mod: 77,,, | Performed by: INTERNAL MEDICINE

## 2022-08-23 PROCEDURE — 93010 EKG 12-LEAD: ICD-10-PCS | Mod: ,,, | Performed by: INTERNAL MEDICINE

## 2022-08-23 RX ORDER — NAPROXEN SODIUM 220 MG/1
162 TABLET, FILM COATED ORAL
Status: DISCONTINUED | OUTPATIENT
Start: 2022-08-23 | End: 2022-08-23 | Stop reason: HOSPADM

## 2022-08-23 RX ORDER — HYDROXYZINE PAMOATE 25 MG/1
25 CAPSULE ORAL EVERY 6 HOURS PRN
Qty: 30 CAPSULE | Refills: 0 | Status: SHIPPED | OUTPATIENT
Start: 2022-08-23 | End: 2023-08-29

## 2022-08-23 RX ORDER — ACETAMINOPHEN 500 MG
1000 TABLET ORAL
Status: COMPLETED | OUTPATIENT
Start: 2022-08-23 | End: 2022-08-23

## 2022-08-23 RX ADMIN — ACETAMINOPHEN 1000 MG: 500 TABLET ORAL at 07:08

## 2022-08-23 NOTE — ED PROVIDER NOTES
SCRIBE #1 NOTE: I, Elizabet Bran, am scribing for, and in the presence of, Angel Jhaveri Jr., MD. I have scribed the entire note.      History      Chief Complaint   Patient presents with    Chest Pain     C/o chest pain x's 2-5 mins ago. Stabbing pain w/ SOB & headache. Took 2 NTG tabs prior to coming to ER.       Review of patient's allergies indicates:   Allergen Reactions    Aspirin         HPI   HPI    8/23/2022, 6:45 AM   History obtained from the patient      History of Present Illness: Karyna Sanedrs is a 62 y.o. female patient with a PMHx of aneurysm, subarachnoid hemorrhage, anxiety, asthma, atherosclerosis of aorta, bipolar disorder, cerebral aneurysm, essential HTN, HLD, and pure hypercholesteremia who presents to the Emergency Department for CP which onset suddenly 2 to 5 minutes PTA while she was sitting in a chair. Symptoms are constant and moderate in severity. No mitigating or exacerbating factors reported. Associated sxs include generalized weakness, generalized numbness, generalized paresthesias, a headache, and SOB. Patient denies any fever, chills, N/V/D, diaphoresis, blood in stool, dysuria, hematuria, and all other sxs at this time. Prior Tx includes 2 NTG taken PTA. The pt is unsure of when her last stress test or heart catheterization was. No further complaints or concerns at this time.         Arrival mode: Personal vehicle    PCP: DIMA Boothe MD       Past Medical History:  Past Medical History:   Diagnosis Date    Anemia     Aneurysm     Anorexia 4/26/2017    Anxiety     Asthma     Atherosclerosis of aorta 1/5/2022    CT ABDOMEN PELVIS WITH CONTRAST 01/03/2021 FINDINGS: Mild atherosclerosis within the abdominal aorta which is nonaneurysmal.    Bipolar disorder     Carpal tunnel syndrome, bilateral     Cerebral aneurysm     Chronic nausea 11/15/2018    Chronic pain syndrome     Cigarette nicotine dependence     Depression     Essential hypertension 8/29/2014     GERD (gastroesophageal reflux disease)     Hyperlipidemia     Hypertension     Insomnia     Osteoporosis     Pure hypercholesterolemia 9/8/2014    Schizophrenia     Stroke     Subarachnoid hemorrhage        Past Surgical History:  Past Surgical History:   Procedure Laterality Date    BUNIONECTOMY Right     COLONOSCOPY N/A 2/7/2022    Procedure: COLONOSCOPY;  Surgeon: Kamila Lund MD;  [facility] END[facility];  Service: Endoscopy;  Laterality: N/A;    COLONOSCOPY W/ BIOPSIES AND POLYPECTOMY      ESOPHAGOGASTRODUODENOSCOPY N/A 2/7/2022    Procedure: EGD (ESOPHAGOGASTRODUODENOSCOPY);  Surgeon: Kamila Lund MD;  Location: [facility] ENDO;  Service: Endoscopy;  Laterality: N/A;    HYSTERECTOMY      PARTIAL HYSTERECTOMY      Bilateral Ovaries Remain    TRANSCRANIAL DOPPLER STUDY - COMPLETE  8/28/2014         TRANSCRANIAL DOPPLER STUDY - COMPLETE  8/29/2014         TRANSCRANIAL DOPPLER STUDY - COMPLETE  8/30/2014         TRANSCRANIAL DOPPLER STUDY - COMPLETE  8/31/2014         TRANSCRANIAL DOPPLER STUDY - COMPLETE  9/1/2014         TRANSCRANIAL DOPPLER STUDY - COMPLETE  9/2/2014         TRANSCRANIAL DOPPLER STUDY - COMPLETE  9/3/2014         TRANSCRANIAL DOPPLER STUDY - COMPLETE  9/4/2014         TRANSCRANIAL DOPPLER STUDY - COMPLETE  9/5/2014         TRANSCRANIAL DOPPLER STUDY - COMPLETE  9/6/2014         VAGINAL DELIVERY      X 3         Family History:  Family History   Problem Relation Age of Onset    Cancer Mother     Glaucoma Mother     Stroke Mother     Diabetes type II Mother     Heart disease Mother     Hypertension Mother     Brain cancer Sister     Diabetes type II Sister     Cancer Brother     Hypertension Brother     Bone cancer Sister        Social History:  Social History     Tobacco Use    Smoking status: Current Every Day Smoker     Packs/day: 1.00     Years: 51.00     Pack years: 51.00     Types: Cigarettes, Cigars, Vaping with nicotine     Start date: 1976     Smokeless tobacco: Never Used    Tobacco comment: in smoking program 5/13/19   Substance and Sexual Activity    Alcohol use: Yes     Comment: 1-3 times per week, 1-2 drink at a time.    Drug use: No    Sexual activity: Never     Partners: Male       ROS   Review of Systems   Constitutional: Negative for chills, diaphoresis and fever.   HENT: Negative for sore throat.    Respiratory: Positive for shortness of breath.    Cardiovascular: Positive for chest pain.   Gastrointestinal: Negative for blood in stool, diarrhea, nausea and vomiting.   Genitourinary: Negative for dysuria and hematuria.   Musculoskeletal: Negative for back pain.   Skin: Negative for rash.   Neurological: Positive for weakness (generalized), numbness (generalized) and headaches.        (+) generalized paresthesias   Hematological: Does not bruise/bleed easily.   All other systems reviewed and are negative.    Physical Exam      Initial Vitals [08/23/22 0620]   BP Pulse Resp Temp SpO2   (!) 144/86 89 (!) 28 98.7 °F (37.1 °C) 100 %      MAP       --          Physical Exam  Nursing Notes and Vital Signs Reviewed.  Constitutional: Patient is in no acute distress. Well-developed and well-nourished.  Head: Atraumatic. Normocephalic.  Eyes: PERRL. EOM intact. Conjunctivae are not pale. No scleral icterus.  ENT: Mucous membranes are moist. Oropharynx is clear and symmetric.    Neck: Supple. Full ROM. No lymphadenopathy.  Cardiovascular: Tachycardic rate. Regular rhythm. No murmurs, rubs, or gallops. Distal pulses are 2+ and symmetric.  Pulmonary/Chest: No respiratory distress. Clear to auscultation bilaterally. No wheezing or rales.  Abdominal: Soft and non-distended.  There is no tenderness.  No rebound, guarding, or rigidity.   Musculoskeletal: Moves all extremities. No obvious deformities. No edema.  Skin: Warm and dry.  Neurological:  Alert, awake, and appropriate.  Normal speech.  No acute focal neurological deficits are  appreciated.  Psychiatric: Anxious. Good eye contact. Appropriate in content.    ED Course    Procedures  ED Vital Signs:  Vitals:    08/23/22 0620 08/23/22 0635 08/23/22 0636 08/23/22 0650   BP: (!) 144/86 118/60 (!) 172/90 132/67   Pulse: 89   69   Resp: (!) 28   (!) 30   Temp: 98.7 °F (37.1 °C)      TempSrc: Oral      SpO2: 100%   99%   Weight: 61.6 kg (135 lb 12.9 oz)       08/23/22 0652 08/23/22 0702 08/23/22 0730 08/23/22 0810   BP:   135/77 (!) 142/76   Pulse: 74 70 78 70   Resp:   (!) 22 (!) 23   Temp:       TempSrc:       SpO2:   97% 97%   Weight:        08/23/22 0830 08/23/22 0900 08/23/22 0920 08/23/22 1020   BP: 128/62 132/62 (!) 146/82 139/86   Pulse: 61 66 69 71   Resp: 20 (!) 26 16 (!) 21   Temp:       TempSrc:       SpO2: 98% 97% 96% 96%   Weight:        08/23/22 1115 08/23/22 1200   BP: 129/61 (!) 146/78   Pulse: 63 62   Resp: 17 (!) 22   Temp:     TempSrc:     SpO2: 97% 98%   Weight:         Abnormal Lab Results:  Labs Reviewed   COMPREHENSIVE METABOLIC PANEL - Abnormal; Notable for the following components:       Result Value    CO2 20 (*)     ALT 8 (*)     All other components within normal limits   CBC W/ AUTO DIFFERENTIAL   TROPONIN I   B-TYPE NATRIURETIC PEPTIDE   TROPONIN I        All Lab Results:  Results for orders placed or performed during the hospital encounter of 08/23/22   CBC auto differential   Result Value Ref Range    WBC 7.77 3.90 - 12.70 K/uL    RBC 4.36 4.00 - 5.40 M/uL    Hemoglobin 12.9 12.0 - 16.0 g/dL    Hematocrit 38.6 37.0 - 48.5 %    MCV 89 82 - 98 fL    MCH 29.6 27.0 - 31.0 pg    MCHC 33.4 32.0 - 36.0 g/dL    RDW 14.0 11.5 - 14.5 %    Platelets 335 150 - 450 K/uL    MPV 9.4 9.2 - 12.9 fL    Immature Granulocytes 0.3 0.0 - 0.5 %    Gran # (ANC) 3.2 1.8 - 7.7 K/uL    Immature Grans (Abs) 0.02 0.00 - 0.04 K/uL    Lymph # 3.7 1.0 - 4.8 K/uL    Mono # 0.7 0.3 - 1.0 K/uL    Eos # 0.1 0.0 - 0.5 K/uL    Baso # 0.04 0.00 - 0.20 K/uL    nRBC 0 0 /100 WBC    Gran % 41.4 38.0 - 73.0  %    Lymph % 47.1 18.0 - 48.0 %    Mono % 9.0 4.0 - 15.0 %    Eosinophil % 1.7 0.0 - 8.0 %    Basophil % 0.5 0.0 - 1.9 %    Differential Method Automated    Comprehensive metabolic panel   Result Value Ref Range    Sodium 139 136 - 145 mmol/L    Potassium 3.8 3.5 - 5.1 mmol/L    Chloride 108 95 - 110 mmol/L    CO2 20 (L) 23 - 29 mmol/L    Glucose 101 70 - 110 mg/dL    BUN 10 8 - 23 mg/dL    Creatinine 0.9 0.5 - 1.4 mg/dL    Calcium 9.5 8.7 - 10.5 mg/dL    Total Protein 6.7 6.0 - 8.4 g/dL    Albumin 3.8 3.5 - 5.2 g/dL    Total Bilirubin 0.3 0.1 - 1.0 mg/dL    Alkaline Phosphatase 57 55 - 135 U/L    AST 14 10 - 40 U/L    ALT 8 (L) 10 - 44 U/L    Anion Gap 11 8 - 16 mmol/L    eGFR >60 >60 mL/min/1.73 m^2   Troponin I #1   Result Value Ref Range    Troponin I 0.010 0.000 - 0.026 ng/mL   BNP   Result Value Ref Range    BNP <10 0 - 99 pg/mL   Troponin I #2   Result Value Ref Range    Troponin I <0.006 0.000 - 0.026 ng/mL         Imaging Results:  Imaging Results          CT Head Without Contrast (Final result)  Result time 08/23/22 07:59:18    Final result by Karyna Cortes MD (08/23/22 07:59:18)                 Impression:      No acute intracranial abnormality.      Electronically signed by: Karyna Cortes  Date:    08/23/2022  Time:    07:59             Narrative:    EXAMINATION:  CT HEAD WITHOUT CONTRAST    CLINICAL HISTORY:  Neuro deficit, acute, stroke suspected; Weakness    TECHNIQUE:  Low dose axial CT images obtained throughout the head without intravenous contrast. Sagittal and coronal reconstructions were performed.  All CT scans at this facility are performed using dose optimization techniques including the following: automated exposure control; adjustment of the mA and/or kV; use of iterative reconstruction technique.    COMPARISON:  CT head without contrast 05/19/2022    FINDINGS:  Intracranial compartment:    Intracranial evaluation somewhat limited secondary to metallic artifact from right  suprasellar aneurysm coiling.  Ventricles and sulci are normal in size for age without evidence of hydrocephalus. No extra-axial blood or fluid collections.    The brain parenchyma appears normal. No parenchymal mass, hemorrhage, edema or major vascular distribution infarct.    Skull/extracranial contents (limited evaluation): No fracture. Mastoid air cells and paranasal sinuses are essentially clear.                               X-Ray Chest AP Portable (Final result)  Result time 08/23/22 07:33:45    Final result by Amaury Centeno MD (08/23/22 07:33:45)                 Impression:      No acute process seen.      Electronically signed by: Amaury Centeno MD  Date:    08/23/2022  Time:    07:33             Narrative:    EXAMINATION:  XR CHEST AP PORTABLE    CLINICAL HISTORY:  Chest Pain;    FINDINGS:  Single view of the chest.  Comparison 05/19/2022.    Cardiac silhouette is normal.  Aorta demonstrates atherosclerotic disease.  The lungs demonstrate no evidence of active disease.  No evidence of pleural effusion or pneumothorax.  Bones appear intact.  Scattered degenerative change.                               The EKG was ordered, reviewed, and independently interpreted by the ED provider.  Interpretation time: 6:48  Rate: 67 BPM  Rhythm: normal sinus rhythm  Interpretation: No acute ST changes. No STEMI.           The Emergency Provider reviewed the vital signs and test results, which are outlined above.    ED Discussion     11:32 AM: Reassessed pt at this time. Discussed with pt all pertinent ED information and results. Discussed pt dx and plan of tx. Gave pt all f/u and return to the ED instructions. All questions and concerns were addressed at this time. Pt expresses understanding of information and instructions, and is comfortable with plan to discharge. Pt is stable for discharge.    I discussed with patient and/or family/caretaker that evaluation in the ED does not suggest any emergent or life threatening  medical conditions requiring immediate intervention beyond what was provided in the ED, and I believe patient is safe for discharge.  Regardless, an unremarkable evaluation in the ED does not preclude the development or presence of a serious of life threatening condition. As such, patient was instructed to return immediately for any worsening or change in current symptoms.    Regarding CHEST PAIN, I advised the patient that chest pain can be caused by a range of conditions, from not serious to life-threatening such as: heart attack or a blood clot in your lungs, angina indicating poor blood flow to the heart; infection, inflammation, or a fracture in the bones or cartilage in chest wall; a digestive problem, such as acid reflux or a stomach ulcer; or even an anxiety attack.  Instructed patient to follow up with primary healthcare provider for reevaluation and possible diagnostic studies to find the actual cause of the chest pain. Patient was instructed to call 911 or go to the nearest emergency department if they develop any of the following signs of a heart attack: squeezing, pressure, or pain in the chest that lasts longer than five minutes or returns; discomfort or pain in the back, neck, jaw, stomach, or arm; trouble breathing; nausea or vomiting; lightheadedness;  or a sudden cold sweat, especially with chest pain or trouble breathing.  Also return to the emergency department the chest discomfort gets worse (even with medicine); cough or vomit blood; have bowel movements that are black or bloody; cannot stop vomiting; or develop difficulty swallowing.    Regarding ANXIETY, I discussed signs and symptoms of anxiety with patient including: tachycardia, dysrhythmias, tachypnea, diaphoresis, trembling, dizziness, diarrhea, dry mouth, and difficulty swallowing.  Patient was encouraged to eat a well-balanced, healthy diet; get plenty of rest; exercise daily; limit caffeine and alcohol intake; participate in  meditation; talk with family and/or friends about things that may be considered stressful; and keep a diary of feelings and stress triggers.  Patient was instructed to take medications as prescribed and follow up with primary care provider for long term management. I recommended that the patient return to the emergency department if they: feel lightheaded or too dizzy to stand up; develop feelings that they want to hurt themselves or someone else; or develop chest pain, tightness, or heaviness that radiates to the shoulders, arms, jaw, neck, or back.          ED Medication(s):  Medications   acetaminophen tablet 1,000 mg (1,000 mg Oral Given 8/23/22 0725)        Follow-up Information     L Yonas Boothe MD. Schedule an appointment as soon as possible for a visit in 1 week.    Specialty: Family Medicine  Contact information:  [address]  [place] [address]  [phone]             [facility] Cardiology [facility]. Schedule an appointment as soon as possible for a visit in 1 week.    Specialty: Cardiology  Contact information:  [address] [facility] [id number] [phone]  Additional information:  Please park on the [address] side and use the Clinic entrance. Check in on the 3rd floor, to the right.           [facility] - Emergency Dept..    Specialty: Emergency Medicine  Why: As needed, If symptoms worsen  Contact information:  [address]  [facility] [id number] [phone]                       Discharge Medication List as of 8/23/2022 11:34 AM      START taking these medications    Details   hydrOXYzine pamoate (VISTARIL) 25 MG Cap Take 1 capsule (25 mg total) by mouth every 6 (six) hours as needed (anxiety)., Starting Tue 8/23/2022, Print              Medication List      START taking these medications    hydrOXYzine pamoate 25 MG Cap  Commonly known as: VISTARIL  Take 1 capsule (25 mg total) by mouth every 6 (six) hours as needed (anxiety).        ASK  your doctor about these medications    albuterol 2.5 mg /3 mL (0.083 %) nebulizer solution  Commonly known as: PROVENTIL  Take 3 mLs (2.5 mg total) by nebulization every 6 (six) hours as needed for Wheezing. Rescue     ALPRAZolam 0.5 MG tablet  Commonly known as: XANAX     amitriptyline 75 MG tablet  Commonly known as: ELAVIL  Take 1 tablet (75 mg total) by mouth every evening.     amLODIPine 5 MG tablet  Commonly known as: NORVASC  Take 1 tablet (5 mg total) by mouth once daily.     atorvastatin 80 MG tablet  Commonly known as: LIPITOR  Take 1 tablet (80 mg total) by mouth every evening.     BLOOD PRESSURE CUFF Misc  Generic drug: miscellaneous medical supply  Use to check blood pressure once daily     butalbital-acetaminophen-caffeine -40 mg -40 mg per tablet  Commonly known as: FIORICET, ESGIC  Take 1 tablet by mouth every 8 (eight) hours as needed for Headaches. DO NOT TAKE MORE THAN 5 PER WEEK     CALCIUM 600 + D(3) ORAL     cyclobenzaprine 10 MG tablet  Commonly known as: FLEXERIL  Take 0.5 tablets (5 mg total) by mouth 3 (three) times daily as needed for Muscle spasms.     cyproheptadine 4 mg tablet  Commonly known as: PERIACTIN  TAKE 2 TABLETS BY MOUTH THREE TIMES DAILY BEFORE MEALS     fluticasone propionate 50 mcg/actuation nasal spray  Commonly known as: FLONASE  2 sprays (100 mcg total) by Each Nostril route once daily.     fluticasone-salmeterol 250-50 mcg/dose 250-50 mcg/dose diskus inhaler  Commonly known as: ADVAIR  Inhale 1 puff into the lungs 2 (two) times a day. Controller     LEXAPRO 20 MG tablet  Generic drug: EScitalopram oxalate     mirtazapine 15 MG tablet  Commonly known as: REMERON     nebulizer accessories Kit  NEBULIZER ACCESSORIES - Use as directed for nebulized medications prescribed by me.     nebulizer and compressor [place]  NEBULIZER DEVICE - Use as directed     nicotine 21 mg/24 hr  Commonly known as: NICODERM CQ  Place 1 patch onto the skin once daily.     nitroGLYCERIN  0.4 MG SL tablet  Commonly known as: NITROSTAT  PLACE 1 TABLET (0.4 MG TOTAL) UNDER THE TONGUE EVERY 5 (FIVE) MINUTES AS NEEDED FOR CHEST PAIN.     omeprazole 20 MG capsule  Commonly known as: PRILOSEC  Take 2 capsules (40 mg total) by mouth once daily.     ondansetron 4 MG Tbdl  Commonly known as: ZOFRAN-ODT  1-2 tablets PO every 6 hours as needed for nausea/vomiting while completing bowel prep     OXcarbazepine 600 MG Tab  Commonly known as: TRILEPTAL     polyethylene glycol 17 gram/dose powder  Commonly known as: GLYCOLAX  Take 17 g by mouth daily as needed (for constipation).     QUEtiapine 100 MG Tab  Commonly known as: SEROQUEL     traMADoL 50 mg tablet  Commonly known as: ULTRAM  Take 1 tablet (50 mg total) by mouth every 8 (eight) hours as needed for Pain.           Where to Get Your Medications      You can get these medications from any pharmacy    Bring a paper prescription for each of these medications  · hydrOXYzine pamoate 25 MG Cap           Medical Decision Making    Medical Decision Making:   Clinical Tests:   Lab Tests: Ordered and Reviewed  Radiological Study: Ordered and Reviewed  Medical Tests: Ordered and Reviewed           Scribe Attestation:   Scribe #1: I performed the above scribed service and the documentation accurately describes the services I performed. I attest to the accuracy of the note.    Attending:   Physician Attestation Statement for Scribe #1: I, Angel Jhaveri Jr., MD, personally performed the services described in this documentation, as scribed by Eilzabet Bran, in my presence, and it is both accurate and complete.          Clinical Impression       ICD-10-CM ICD-9-CM   1. Chest pain, unspecified type  R07.9 786.50   2. Chest pain  R07.9 786.50   3. Weakness  R53.1 780.79   4. Anxiety  F41.9 300.00       Disposition:   Disposition: Discharged  Condition: Stable         Angel Jhaveri Jr., MD  08/24/22 0715

## 2022-09-01 ENCOUNTER — TELEPHONE (OUTPATIENT)
Dept: ORTHOPEDICS | Facility: CLINIC | Age: 62
End: 2022-09-01
Payer: MEDICAID

## 2022-09-01 NOTE — TELEPHONE ENCOUNTER
Contacted patient to schedule appointment for left knee from referral. Patient thought she had already been scheduled but when looking at her appointments, there was no appointment listed.  Was able to schedule patient appointment with Dr. Michel at the [facility] location on September 6 at 1 pm.  Patient verbalized understanding of appt time, date and location.

## 2022-09-06 ENCOUNTER — OFFICE VISIT (OUTPATIENT)
Dept: ORTHOPEDICS | Facility: CLINIC | Age: 62
End: 2022-09-06
Payer: MEDICAID

## 2022-09-06 ENCOUNTER — OFFICE VISIT (OUTPATIENT)
Dept: INTERNAL MEDICINE | Facility: CLINIC | Age: 62
End: 2022-09-06
Payer: MEDICAID

## 2022-09-06 VITALS
WEIGHT: 138.25 LBS | HEIGHT: 62 IN | HEART RATE: 80 BPM | TEMPERATURE: 98 F | SYSTOLIC BLOOD PRESSURE: 138 MMHG | OXYGEN SATURATION: 98 % | DIASTOLIC BLOOD PRESSURE: 80 MMHG | BODY MASS INDEX: 25.44 KG/M2

## 2022-09-06 VITALS — WEIGHT: 138 LBS | HEIGHT: 62 IN | BODY MASS INDEX: 25.4 KG/M2

## 2022-09-06 DIAGNOSIS — S83.003A SUBLUXATION OF PATELLOFEMORAL JOINT, UNSPECIFIED LATERALITY, INITIAL ENCOUNTER: ICD-10-CM

## 2022-09-06 DIAGNOSIS — R63.4 WEIGHT LOSS: ICD-10-CM

## 2022-09-06 DIAGNOSIS — F41.9 ANXIETY: Chronic | ICD-10-CM

## 2022-09-06 DIAGNOSIS — S80.02XA CONTUSION OF LEFT PATELLA, INITIAL ENCOUNTER: Primary | ICD-10-CM

## 2022-09-06 DIAGNOSIS — I10 ESSENTIAL HYPERTENSION: Primary | Chronic | ICD-10-CM

## 2022-09-06 PROCEDURE — 3008F PR BODY MASS INDEX (BMI) DOCUMENTED: ICD-10-PCS | Mod: CPTII,,, | Performed by: STUDENT IN AN ORGANIZED HEALTH CARE EDUCATION/TRAINING PROGRAM

## 2022-09-06 PROCEDURE — 99999 PR PBB SHADOW E&M-EST. PATIENT-LVL III: CPT | Mod: PBBFAC,,, | Performed by: PHYSICIAN ASSISTANT

## 2022-09-06 PROCEDURE — 99213 OFFICE O/P EST LOW 20 MIN: CPT | Mod: S$PBB,,, | Performed by: PHYSICIAN ASSISTANT

## 2022-09-06 PROCEDURE — 3008F BODY MASS INDEX DOCD: CPT | Mod: CPTII,,, | Performed by: PHYSICIAN ASSISTANT

## 2022-09-06 PROCEDURE — 1159F MED LIST DOCD IN RCRD: CPT | Mod: CPTII,,, | Performed by: PHYSICIAN ASSISTANT

## 2022-09-06 PROCEDURE — 99213 OFFICE O/P EST LOW 20 MIN: CPT | Mod: PBBFAC,27 | Performed by: PHYSICIAN ASSISTANT

## 2022-09-06 PROCEDURE — 99203 PR OFFICE/OUTPT VISIT, NEW, LEVL III, 30-44 MIN: ICD-10-PCS | Mod: S$PBB,25,, | Performed by: STUDENT IN AN ORGANIZED HEALTH CARE EDUCATION/TRAINING PROGRAM

## 2022-09-06 PROCEDURE — 3008F BODY MASS INDEX DOCD: CPT | Mod: CPTII,,, | Performed by: STUDENT IN AN ORGANIZED HEALTH CARE EDUCATION/TRAINING PROGRAM

## 2022-09-06 PROCEDURE — 1159F PR MEDICATION LIST DOCUMENTED IN MEDICAL RECORD: ICD-10-PCS | Mod: CPTII,,, | Performed by: PHYSICIAN ASSISTANT

## 2022-09-06 PROCEDURE — 99213 PR OFFICE/OUTPT VISIT, EST, LEVL III, 20-29 MIN: ICD-10-PCS | Mod: S$PBB,,, | Performed by: PHYSICIAN ASSISTANT

## 2022-09-06 PROCEDURE — 3075F SYST BP GE 130 - 139MM HG: CPT | Mod: CPTII,,, | Performed by: PHYSICIAN ASSISTANT

## 2022-09-06 PROCEDURE — 1159F PR MEDICATION LIST DOCUMENTED IN MEDICAL RECORD: ICD-10-PCS | Mod: CPTII,,, | Performed by: STUDENT IN AN ORGANIZED HEALTH CARE EDUCATION/TRAINING PROGRAM

## 2022-09-06 PROCEDURE — 99999 PR PBB SHADOW E&M-EST. PATIENT-LVL V: ICD-10-PCS | Mod: PBBFAC,,, | Performed by: STUDENT IN AN ORGANIZED HEALTH CARE EDUCATION/TRAINING PROGRAM

## 2022-09-06 PROCEDURE — 1159F MED LIST DOCD IN RCRD: CPT | Mod: CPTII,,, | Performed by: STUDENT IN AN ORGANIZED HEALTH CARE EDUCATION/TRAINING PROGRAM

## 2022-09-06 PROCEDURE — 99999 PR PBB SHADOW E&M-EST. PATIENT-LVL V: CPT | Mod: PBBFAC,,, | Performed by: STUDENT IN AN ORGANIZED HEALTH CARE EDUCATION/TRAINING PROGRAM

## 2022-09-06 PROCEDURE — 1160F RVW MEDS BY RX/DR IN RCRD: CPT | Mod: CPTII,,, | Performed by: STUDENT IN AN ORGANIZED HEALTH CARE EDUCATION/TRAINING PROGRAM

## 2022-09-06 PROCEDURE — 99999 PR PBB SHADOW E&M-EST. PATIENT-LVL III: ICD-10-PCS | Mod: PBBFAC,,, | Performed by: PHYSICIAN ASSISTANT

## 2022-09-06 PROCEDURE — 99203 OFFICE O/P NEW LOW 30 MIN: CPT | Mod: S$PBB,25,, | Performed by: STUDENT IN AN ORGANIZED HEALTH CARE EDUCATION/TRAINING PROGRAM

## 2022-09-06 PROCEDURE — 3079F PR MOST RECENT DIASTOLIC BLOOD PRESSURE 80-89 MM HG: ICD-10-PCS | Mod: CPTII,,, | Performed by: PHYSICIAN ASSISTANT

## 2022-09-06 PROCEDURE — 1160F PR REVIEW ALL MEDS BY PRESCRIBER/CLIN PHARMACIST DOCUMENTED: ICD-10-PCS | Mod: CPTII,,, | Performed by: STUDENT IN AN ORGANIZED HEALTH CARE EDUCATION/TRAINING PROGRAM

## 2022-09-06 PROCEDURE — 3075F PR MOST RECENT SYSTOLIC BLOOD PRESS GE 130-139MM HG: ICD-10-PCS | Mod: CPTII,,, | Performed by: PHYSICIAN ASSISTANT

## 2022-09-06 PROCEDURE — 99215 OFFICE O/P EST HI 40 MIN: CPT | Mod: PBBFAC | Performed by: STUDENT IN AN ORGANIZED HEALTH CARE EDUCATION/TRAINING PROGRAM

## 2022-09-06 PROCEDURE — 3079F DIAST BP 80-89 MM HG: CPT | Mod: CPTII,,, | Performed by: PHYSICIAN ASSISTANT

## 2022-09-06 PROCEDURE — 3008F PR BODY MASS INDEX (BMI) DOCUMENTED: ICD-10-PCS | Mod: CPTII,,, | Performed by: PHYSICIAN ASSISTANT

## 2022-09-06 RX ORDER — ALPRAZOLAM 0.25 MG/1
0.25 TABLET ORAL DAILY PRN
COMMUNITY
Start: 2022-08-28 | End: 2023-08-29

## 2022-09-06 RX ORDER — ESCITALOPRAM OXALATE 10 MG/1
10 TABLET, FILM COATED ORAL DAILY
COMMUNITY
Start: 2022-08-10 | End: 2023-03-15

## 2022-09-06 RX ORDER — BUPROPION HYDROCHLORIDE 100 MG/1
100 TABLET, FILM COATED ORAL EVERY MORNING
COMMUNITY
Start: 2022-06-08 | End: 2023-03-15

## 2022-09-06 RX ORDER — BUPROPION HYDROCHLORIDE 150 MG/1
150 TABLET, EXTENDED RELEASE ORAL DAILY
COMMUNITY
Start: 2022-08-10 | End: 2023-03-15

## 2022-09-06 NOTE — PROGRESS NOTES
Subjective:      Patient ID: Karyna Sanders is a 62 y.o. female.    Chief Complaint: Follow-up    HPI  Ms. Sanders is here today for a follow up for her BP.   Pt states that her machine isn't working right. Brought it to the O [facility] today.  Readings Aug 23: 119/76, Set 1 118/73, Sept 2 128/80, sept 3 120/78, and sept 4 134/87/    Went to ER for chest pain. ER workup was good. Probably anxiety attack. Symptoms have resolved.   Scheduled to see PCP 9/22.     Scheduled to see ortho today for her knee pain.    Wt Readings from Last 3 Encounters:   09/06/22 1001 62.7 kg (138 lb 3.7 oz)   08/23/22 0620 61.6 kg (135 lb 12.9 oz)   08/22/22 0955 61.6 kg (135 lb 12.9 oz)      BP Readings from Last 3 Encounters:   09/06/22 138/80   08/23/22 (!) 146/78   08/22/22 128/82      Patient Active Problem List   Diagnosis    Schizoaffective disorder, bipolar type    Bipolar 1 disorder    Anxiety    Cigarette nicotine dependence without complication    Subarachnoid hemorrhage from aneurysm of right anterior communicating artery    Essential hypertension    Pure hypercholesterolemia    Prediabetes    Weight loss, involuntary, due to anorexia    Long-term current use of high risk medication other than anticoagulant    Gastro-esophageal reflux disease without esophagitis    Nonadherence to medical treatment    History of hysterectomy for benign disease    Insufficient social insurance or welfare support    Primary localized osteoarthrosis of multiple sites    Peripheral polyneuropathy    Tension headache    Bilateral carpal tunnel syndrome    Chronic seasonal allergic rhinitis    Cubital tunnel syndrome, bilateral    Lumbosacral radiculopathy at S1    Chronic nausea    Chest pain    History of subarachnoid hemorrhage    Intractable migraine with aura without status migrainosus    Medication overuse headache    Chronic use of tramadol for therapeutic purpose    Tardive dyskinesia    Anorexia    Early satiety    Atherosclerosis of aorta     Tubular adenoma of transverse colon (Next colonoscopy due 12/2026)    Chronic bronchitis       Current Outpatient Medications:     albuterol (PROVENTIL) 2.5 mg /3 mL (0.083 %) nebulizer solution, Take 3 mLs (2.5 mg total) by nebulization every 6 (six) hours as needed for Wheezing. Rescue, Disp: 150 mL, Rfl: 1    alprazolam (XANAX) 0.5 MG tablet, Take 0.5 mg by mouth daily as needed. , Disp: , Rfl:     amitriptyline (ELAVIL) 75 MG tablet, Take 1 tablet (75 mg total) by mouth every evening., Disp: 90 tablet, Rfl: 3    amLODIPine (NORVASC) 5 MG tablet, Take 1 tablet (5 mg total) by mouth once daily., Disp: 90 tablet, Rfl: 4    atorvastatin (LIPITOR) 80 MG tablet, Take 1 tablet (80 mg total) by mouth every evening., Disp: 90 tablet, Rfl: 3    BLOOD PRESSURE CUFF Misc, Use to check blood pressure once daily, Disp: 1 each, Rfl: 0    butalbital-acetaminophen-caffeine -40 mg (FIORICET, ESGIC) -40 mg per tablet, Take 1 tablet by mouth every 8 (eight) hours as needed for Headaches. DO NOT TAKE MORE THAN 5 PER WEEK, Disp: 45 tablet, Rfl: 3    CALCIUM CARBONATE/VITAMIN D3 (CALCIUM 600 + D,3, ORAL), Take 2 tablets by mouth once daily., Disp: , Rfl:     cyclobenzaprine (FLEXERIL) 10 MG tablet, Take 0.5 tablets (5 mg total) by mouth 3 (three) times daily as needed for Muscle spasms., Disp: 15 tablet, Rfl: 0    cyproheptadine (PERIACTIN) 4 mg tablet, TAKE 2 TABLETS BY MOUTH THREE TIMES DAILY BEFORE MEALS, Disp: 180 tablet, Rfl: 5    fluticasone propionate (FLONASE) 50 mcg/actuation nasal spray, 2 sprays (100 mcg total) by Each Nostril route once daily., Disp: 16 g, Rfl: 11    fluticasone-salmeterol diskus inhaler 250-50 mcg, Inhale 1 puff into the lungs 2 (two) times a day. Controller, Disp: 60 each, Rfl: 11    hydrOXYzine pamoate (VISTARIL) 25 MG Cap, Take 1 capsule (25 mg total) by mouth every 6 (six) hours as needed (anxiety)., Disp: 30 capsule, Rfl: 0    LEXAPRO 20 mg tablet, Take 20 mg by mouth once daily.,  Disp: , Rfl:     mirtazapine (REMERON) 15 MG tablet, Take 15 mg by mouth nightly., Disp: , Rfl:     nebulizer accessories Kit, NEBULIZER ACCESSORIES - Use as directed for nebulized medications prescribed by me., Disp: 10 kit, Rfl: 11    nebulizer and compressor [place], NEBULIZER DEVICE - Use as directed, Disp: 1 each, Rfl: 0    nicotine (NICODERM CQ) 21 mg/24 hr, Place 1 patch onto the skin once daily., Disp: 14 patch, Rfl: 0    nitroGLYCERIN (NITROSTAT) 0.4 MG SL tablet, PLACE 1 TABLET (0.4 MG TOTAL) UNDER THE TONGUE EVERY 5 (FIVE) MINUTES AS NEEDED FOR CHEST PAIN., Disp: 25 tablet, Rfl: 4    omeprazole (PRILOSEC) 20 MG capsule, Take 2 capsules (40 mg total) by mouth once daily., Disp: 180 capsule, Rfl: 4    ondansetron (ZOFRAN-ODT) 4 MG TbDL, 1-2 tablets PO every 6 hours as needed for nausea/vomiting while completing bowel prep, Disp: 4 tablet, Rfl: 0    OXcarbazepine (TRILEPTAL) 600 MG Tab, Take 150 mg by mouth 2 (two) times daily., Disp: , Rfl:     polyethylene glycol (GLYCOLAX) 17 gram/dose powder, Take 17 g by mouth daily as needed (for constipation)., Disp: 510 g, Rfl: 11    QUEtiapine (SEROQUEL) 100 MG Tab, Take 100 mg by mouth once daily., Disp: , Rfl:     traMADoL (ULTRAM) 50 mg tablet, Take 1 tablet (50 mg total) by mouth every 8 (eight) hours as needed for Pain., Disp: 90 tablet, Rfl: 5    ALPRAZolam (XANAX) 0.25 MG tablet, Take 0.25 mg by mouth daily as needed., Disp: , Rfl:     buPROPion (WELLBUTRIN SR) 150 MG TBSR 12 hr tablet, Take 150 mg by mouth once daily., Disp: , Rfl:     LEXAPRO 10 mg tablet, Take 10 mg by mouth once daily., Disp: , Rfl:     WELLBUTRIN  mg TBSR 12 hr tablet, Take 100 mg by mouth every morning., Disp: , Rfl:   No current facility-administered medications for this visit.    Facility-Administered Medications Ordered in Other Visits:     lactated ringers infusion, , Intravenous, Continuous, Kamila Lund MD, Last Rate: 100 mL/hr at 02/07/22 1125, Rate Change at 02/07/22  "1125    Review of Systems   Constitutional:  Negative for activity change, appetite change, chills, diaphoresis, fatigue, fever and unexpected weight change.   HENT: Negative.  Negative for congestion, hearing loss, postnasal drip, rhinorrhea, sore throat, trouble swallowing and voice change.    Eyes: Negative.  Negative for visual disturbance.   Respiratory: Negative.  Negative for cough, choking, chest tightness and shortness of breath.    Cardiovascular:  Negative for chest pain, palpitations and leg swelling.   Gastrointestinal:  Negative for abdominal distention, abdominal pain, blood in stool, constipation, diarrhea, nausea and vomiting.   Endocrine: Negative for cold intolerance, heat intolerance, polydipsia and polyuria.   Genitourinary: Negative.  Negative for difficulty urinating and frequency.   Musculoskeletal:  Negative for arthralgias, back pain, gait problem, joint swelling and myalgias.   Skin:  Negative for color change, pallor, rash and wound.   Neurological:  Negative for dizziness, tremors, weakness, light-headedness, numbness and headaches.   Hematological:  Negative for adenopathy.   Psychiatric/Behavioral:  Negative for behavioral problems, confusion, self-injury, sleep disturbance and suicidal ideas. The patient is not nervous/anxious.    Objective:   /80   Pulse 80   Temp 98.2 °F (36.8 °C)   Ht 5' 2" (1.575 m)   Wt 62.7 kg (138 lb 3.7 oz)   SpO2 98%   BMI 25.28 kg/m²     Physical Exam  Vitals and nursing note reviewed.   Constitutional:       General: She is not in acute distress.     Appearance: Normal appearance. She is well-developed. She is not ill-appearing, toxic-appearing or diaphoretic.   HENT:      Head: Normocephalic and atraumatic.   Cardiovascular:      Rate and Rhythm: Normal rate and regular rhythm.      Heart sounds: Normal heart sounds. No murmur heard.    No friction rub. No gallop.   Pulmonary:      Effort: Pulmonary effort is normal. No respiratory distress.    "   Breath sounds: Normal breath sounds. No wheezing or rales.   Musculoskeletal:         General: Normal range of motion.   Skin:     General: Skin is warm.      Capillary Refill: Capillary refill takes less than 2 seconds.      Findings: No rash.   Neurological:      Mental Status: She is alert and oriented to person, place, and time.   Psychiatric:         Behavior: Behavior normal.         Thought Content: Thought content normal.         Judgment: Judgment normal.       Assessment:     1. Essential hypertension    2. Subluxation of patellofemoral joint, unspecified laterality, initial encounter    3. Weight loss, involuntary, due to anorexia    4. Anxiety      Plan:   Essential hypertension  -stable and controlled on current meds based on her at home readings.   cont to long. Bring in log to follow up appointment with Dr. Boothe in 2 weeks.     Subluxation of patellofemoral joint, unspecified laterality, initial encounter  -seeing ortho today    Weight loss, involuntary, due to anorexia  -weight stable    Anxiety  -pt states that anxiety is currently stable.  -declines any med adjustments    Follow up if symptoms worsen or fail to improve.

## 2022-09-06 NOTE — PATIENT INSTRUCTIONS
Assessment:  Karyna Sanders is a 62 y.o. female   Chief Complaint   Patient presents with    Left Knee - Pain       Encounter Diagnoses   Name Primary?    Contusion of left patella, initial encounter Yes    Subluxation of patellofemoral joint, unspecified laterality, initial encounter         Plan:  Apply topical Voltaren gel up to four times a day to the affect area as needed for patella contusion. May ice area.       Follow-up: as needed or sooner if there are any problems between now and then.    Thank you for choosing [facility] and Dr. Mayank Michel for your orthopedic & sports medicine care. It is our goal to provide you with exceptional care that will help keep you healthy, active, and get you back in the game.    Please do not hesitate to reach out to us via email, phone, or MyChart with any questions, concerns, or feedback.    If you felt that you received exemplary care today, please consider leaving us feedback on [email or web address] at:  [email or web address]    If you are experiencing pain/discomfort ,or have questions after 5pm and would like to be connected to the [facility] on-call team, please call this number and specify which Sports Medicine provider is treating you: [phone]

## 2022-09-06 NOTE — PROGRESS NOTES
Patient ID: Karyna Sanders  YOB: 1960  MRN: [id number]    Chief Complaint: Pain of the Left Knee      Referred By: Brie Pa PA-C for left knee pain    History of Present Illness: Karyna Sanders is a right-hand dominant 62 y.o. female who presents today with left knee pain. Patient states she had a fall 2 weeks ago. She has tried ice and elevation but neither help. Patient is disabled but does bending and lifting activities doing work at home. Patient states there is no swelling but a constant aching pain.     The patient is active in none.  Occupation: None      Past Medical History:   Past Medical History:   Diagnosis Date    Anemia     Aneurysm     Anorexia 4/26/2017    Anxiety     Asthma     Atherosclerosis of aorta 1/5/2022    CT ABDOMEN PELVIS WITH CONTRAST 01/03/2021 FINDINGS: Mild atherosclerosis within the abdominal aorta which is nonaneurysmal.    Bipolar disorder     Carpal tunnel syndrome, bilateral     Cerebral aneurysm     Chronic nausea 11/15/2018    Chronic pain syndrome     Cigarette nicotine dependence     Depression     Essential hypertension 8/29/2014    GERD (gastroesophageal reflux disease)     Hyperlipidemia     Hypertension     Insomnia     Osteoporosis     Pure hypercholesterolemia 9/8/2014    Schizophrenia     Stroke     Subarachnoid hemorrhage      Past Surgical History:   Procedure Laterality Date    BUNIONECTOMY Right     COLONOSCOPY N/A 2/7/2022    Procedure: COLONOSCOPY;  Surgeon: Kamila Lund MD;  Location: [facility];  Service: Endoscopy;  Laterality: N/A;    COLONOSCOPY W/ BIOPSIES AND POLYPECTOMY      ESOPHAGOGASTRODUODENOSCOPY N/A 2/7/2022    Procedure: EGD (ESOPHAGOGASTRODUODENOSCOPY);  Surgeon: Kamila Lund MD;  Location: [facility];  Service: Endoscopy;  Laterality: N/A;    HYSTERECTOMY      PARTIAL HYSTERECTOMY      Bilateral Ovaries Remain    TRANSCRANIAL DOPPLER STUDY - COMPLETE  8/28/2014         TRANSCRANIAL DOPPLER STUDY -  COMPLETE  8/29/2014         TRANSCRANIAL DOPPLER STUDY - COMPLETE  8/30/2014         TRANSCRANIAL DOPPLER STUDY - COMPLETE  8/31/2014         TRANSCRANIAL DOPPLER STUDY - COMPLETE  9/1/2014         TRANSCRANIAL DOPPLER STUDY - COMPLETE  9/2/2014         TRANSCRANIAL DOPPLER STUDY - COMPLETE  9/3/2014         TRANSCRANIAL DOPPLER STUDY - COMPLETE  9/4/2014         TRANSCRANIAL DOPPLER STUDY - COMPLETE  9/5/2014         TRANSCRANIAL DOPPLER STUDY - COMPLETE  9/6/2014         VAGINAL DELIVERY      X 3     Family History   Problem Relation Age of Onset    Cancer Mother     Glaucoma Mother     Stroke Mother     Diabetes type II Mother     Heart disease Mother     Hypertension Mother     Brain cancer Sister     Diabetes type II Sister     Cancer Brother     Hypertension Brother     Bone cancer Sister      Social History     Socioeconomic History    Marital status:     Number of children: 3    Years of education: 11th Grade   Tobacco Use    Smoking status: Every Day     Packs/day: 1.00     Years: 51.00     Pack years: 51.00     Types: Cigarettes, Cigars, Vaping with nicotine     Start date: 1976    Smokeless tobacco: Never    Tobacco comments:     in smoking program 5/13/19   Substance and Sexual Activity    Alcohol use: Yes     Comment: 1-3 times per week, 1-2 drink at a time.    Drug use: No    Sexual activity: Never     Partners: Male   Social History Narrative    Patient describes herself as disabled.     Medication List with Changes/Refills   Current Medications    ALBUTEROL (PROVENTIL) 2.5 MG /3 ML (0.083 %) NEBULIZER SOLUTION    Take 3 mLs (2.5 mg total) by nebulization every 6 (six) hours as needed for Wheezing. Rescue    ALPRAZOLAM (XANAX) 0.25 MG TABLET    Take 0.25 mg by mouth daily as needed.    ALPRAZOLAM (XANAX) 0.5 MG TABLET    Take 0.5 mg by mouth daily as needed.     AMITRIPTYLINE (ELAVIL) 75 MG TABLET    Take 1 tablet (75 mg total) by mouth every evening.    AMLODIPINE (NORVASC) 5 MG TABLET     Take 1 tablet (5 mg total) by mouth once daily.    ATORVASTATIN (LIPITOR) 80 MG TABLET    Take 1 tablet (80 mg total) by mouth every evening.    BLOOD PRESSURE CUFF MISC    Use to check blood pressure once daily    BUPROPION (WELLBUTRIN SR) 150 MG TBSR 12 HR TABLET    Take 150 mg by mouth once daily.    BUTALBITAL-ACETAMINOPHEN-CAFFEINE -40 MG (FIORICET, ESGIC) -40 MG PER TABLET    Take 1 tablet by mouth every 8 (eight) hours as needed for Headaches. DO NOT TAKE MORE THAN 5 PER WEEK    CALCIUM CARBONATE/VITAMIN D3 (CALCIUM 600 + D,3, ORAL)    Take 2 tablets by mouth once daily.    CYCLOBENZAPRINE (FLEXERIL) 10 MG TABLET    Take 0.5 tablets (5 mg total) by mouth 3 (three) times daily as needed for Muscle spasms.    CYPROHEPTADINE (PERIACTIN) 4 MG TABLET    TAKE 2 TABLETS BY MOUTH THREE TIMES DAILY BEFORE MEALS    FLUTICASONE PROPIONATE (FLONASE) 50 MCG/ACTUATION NASAL SPRAY    2 sprays (100 mcg total) by Each Nostril route once daily.    FLUTICASONE-SALMETEROL DISKUS INHALER 250-50 MCG    Inhale 1 puff into the lungs 2 (two) times a day. Controller    HYDROXYZINE PAMOATE (VISTARIL) 25 MG CAP    Take 1 capsule (25 mg total) by mouth every 6 (six) hours as needed (anxiety).    LEXAPRO 10 MG TABLET    Take 10 mg by mouth once daily.    LEXAPRO 20 MG TABLET    Take 20 mg by mouth once daily.    MIRTAZAPINE (REMERON) 15 MG TABLET    Take 15 mg by mouth nightly.    NEBULIZER ACCESSORIES KIT    NEBULIZER ACCESSORIES - Use as directed for nebulized medications prescribed by me.    NEBULIZER AND COMPRESSOR JAMAR    NEBULIZER DEVICE - Use as directed    NICOTINE (NICODERM CQ) 21 MG/24 HR    Place 1 patch onto the skin once daily.    NITROGLYCERIN (NITROSTAT) 0.4 MG SL TABLET    PLACE 1 TABLET (0.4 MG TOTAL) UNDER THE TONGUE EVERY 5 (FIVE) MINUTES AS NEEDED FOR CHEST PAIN.    OMEPRAZOLE (PRILOSEC) 20 MG CAPSULE    Take 2 capsules (40 mg total) by mouth once daily.    ONDANSETRON (ZOFRAN-ODT) 4 MG TBDL    1-2 tablets  PO every 6 hours as needed for nausea/vomiting while completing bowel prep    OXCARBAZEPINE (TRILEPTAL) 600 MG TAB    Take 150 mg by mouth 2 (two) times daily.    POLYETHYLENE GLYCOL (GLYCOLAX) 17 GRAM/DOSE POWDER    Take 17 g by mouth daily as needed (for constipation).    QUETIAPINE (SEROQUEL) 100 MG TAB    Take 100 mg by mouth once daily.    TRAMADOL (ULTRAM) 50 MG TABLET    Take 1 tablet (50 mg total) by mouth every 8 (eight) hours as needed for Pain.    WELLBUTRIN  MG TBSR 12 HR TABLET    Take 100 mg by mouth every morning.     Review of patient's allergies indicates:   Allergen Reactions    Aspirin        Physical Exam:   Body mass index is 25.24 kg/m².    GENERAL: Well appearing, in no acute distress.  HEAD: Normocephalic and atraumatic.  ENT: External ears and nose grossly normal.  EYES: EOMI bilaterally  PULMONARY: Respirations are grossly even and non-labored.  NEURO: Awake, alert, and oriented x 3.  SKIN: No obvious rashes appreciated.  PSYCH: Mood & affect are appropriate.    Detailed MSK exam:     Left knee exam:   -ROM: extension 0, flexion 130  -TTP: anterior patella  -effusion: none  -Patellar apprehension negative  -Dayo test negative  -stable to varus and valgus stress tests  -Lachman test negative, anterior drawer test negative, posterior drawer test negative    Right knee exam:   -ROM: extension 0, flexion 130  -TTP: none  -effusion: none  -Patellar apprehension negative  -Dayo test negative  -stable to varus and valgus stress tests  -Lachman test negative, anterior drawer test negative, posterior drawer test negative      Imaging:  CT Head Without Contrast  Narrative: EXAMINATION:  CT HEAD WITHOUT CONTRAST    CLINICAL HISTORY:  Neuro deficit, acute, stroke suspected; Weakness    TECHNIQUE:  Low dose axial CT images obtained throughout the head without intravenous contrast. Sagittal and coronal reconstructions were performed.  All CT scans at this facility are performed using dose  optimization techniques including the following: automated exposure control; adjustment of the mA and/or kV; use of iterative reconstruction technique.    COMPARISON:  CT head without contrast 05/19/2022    FINDINGS:  Intracranial compartment:    Intracranial evaluation somewhat limited secondary to metallic artifact from right suprasellar aneurysm coiling.  Ventricles and sulci are normal in size for age without evidence of hydrocephalus. No extra-axial blood or fluid collections.    The brain parenchyma appears normal. No parenchymal mass, hemorrhage, edema or major vascular distribution infarct.    Skull/extracranial contents (limited evaluation): No fracture. Mastoid air cells and paranasal sinuses are essentially clear.  Impression: No acute intracranial abnormality.    Electronically signed by: Karyna Cortes  Date:    08/23/2022  Time:    07:59  X-Ray Chest AP Portable  Narrative: EXAMINATION:  XR CHEST AP PORTABLE    CLINICAL HISTORY:  Chest Pain;    FINDINGS:  Single view of the chest.  Comparison 05/19/2022.    Cardiac silhouette is normal.  Aorta demonstrates atherosclerotic disease.  The lungs demonstrate no evidence of active disease.  No evidence of pleural effusion or pneumothorax.  Bones appear intact.  Scattered degenerative change.  Impression: No acute process seen.    Electronically signed by: Amaury Centeno MD  Date:    08/23/2022  Time:    07:33        Relevant imaging results were reviewed and interpreted by me and per my read shows mild bilateral patellar tilt and lateral subluxation.  This was discussed with the patient and / or family today.     Assessment:  Karyna Sanders is a 62 y.o. female presenting with left knee pain s/p fall 2 weeks ago.   History, physical and radiographs are consistent with a likely diagnosis of patellar contusion.    Plan: topical voltaren and ice. Patient cannot take oral NSAIDs d/t blood thinners. Continue conservative management for pain.   Follow up as  needed. All questions answered.      Contusion of left patella, initial encounter    Subluxation of patellofemoral joint, unspecified laterality, initial encounter  -     Ambulatory referral/consult to Orthopedics       A copy of today's visit note has been sent to the referring provider.     Electronically signed:  Mayank Michel MD, MPH  09/06/2022  1:07 PM

## 2022-09-22 ENCOUNTER — OFFICE VISIT (OUTPATIENT)
Dept: INTERNAL MEDICINE | Facility: CLINIC | Age: 62
End: 2022-09-22
Payer: MEDICAID

## 2022-09-22 VITALS
DIASTOLIC BLOOD PRESSURE: 60 MMHG | HEIGHT: 62 IN | WEIGHT: 136.69 LBS | HEART RATE: 75 BPM | TEMPERATURE: 98 F | OXYGEN SATURATION: 98 % | SYSTOLIC BLOOD PRESSURE: 120 MMHG | BODY MASS INDEX: 25.15 KG/M2

## 2022-09-22 DIAGNOSIS — K21.9 GASTRO-ESOPHAGEAL REFLUX DISEASE WITHOUT ESOPHAGITIS: Chronic | ICD-10-CM

## 2022-09-22 DIAGNOSIS — F31.9 BIPOLAR 1 DISORDER: Chronic | ICD-10-CM

## 2022-09-22 DIAGNOSIS — R63.0 ANOREXIA: Chronic | ICD-10-CM

## 2022-09-22 DIAGNOSIS — Z23 NEED FOR COVID-19 VACCINE: ICD-10-CM

## 2022-09-22 DIAGNOSIS — G44.209 TENSION HEADACHE: Chronic | ICD-10-CM

## 2022-09-22 DIAGNOSIS — I10 ESSENTIAL HYPERTENSION: Primary | Chronic | ICD-10-CM

## 2022-09-22 DIAGNOSIS — J30.2 CHRONIC SEASONAL ALLERGIC RHINITIS: Chronic | ICD-10-CM

## 2022-09-22 DIAGNOSIS — M19.91 PRIMARY LOCALIZED OSTEOARTHROSIS OF MULTIPLE SITES: Chronic | ICD-10-CM

## 2022-09-22 DIAGNOSIS — E78.00 PURE HYPERCHOLESTEROLEMIA: Chronic | ICD-10-CM

## 2022-09-22 DIAGNOSIS — G44.219 EPISODIC TENSION-TYPE HEADACHE, NOT INTRACTABLE: ICD-10-CM

## 2022-09-22 DIAGNOSIS — R73.03 PREDIABETES: Chronic | ICD-10-CM

## 2022-09-22 DIAGNOSIS — Z23 NEED FOR INFLUENZA VACCINATION: ICD-10-CM

## 2022-09-22 PROBLEM — R07.9 CHEST PAIN: Status: RESOLVED | Noted: 2019-05-02 | Resolved: 2022-09-22

## 2022-09-22 PROCEDURE — 99214 OFFICE O/P EST MOD 30 MIN: CPT | Mod: PBBFAC | Performed by: FAMILY MEDICINE

## 2022-09-22 PROCEDURE — 1159F PR MEDICATION LIST DOCUMENTED IN MEDICAL RECORD: ICD-10-PCS | Mod: CPTII,,, | Performed by: FAMILY MEDICINE

## 2022-09-22 PROCEDURE — 1160F RVW MEDS BY RX/DR IN RCRD: CPT | Mod: CPTII,,, | Performed by: FAMILY MEDICINE

## 2022-09-22 PROCEDURE — 3074F PR MOST RECENT SYSTOLIC BLOOD PRESSURE < 130 MM HG: ICD-10-PCS | Mod: CPTII,,, | Performed by: FAMILY MEDICINE

## 2022-09-22 PROCEDURE — 3008F PR BODY MASS INDEX (BMI) DOCUMENTED: ICD-10-PCS | Mod: CPTII,,, | Performed by: FAMILY MEDICINE

## 2022-09-22 PROCEDURE — 99999 PR PBB SHADOW E&M-EST. PATIENT-LVL IV: CPT | Mod: PBBFAC,,, | Performed by: FAMILY MEDICINE

## 2022-09-22 PROCEDURE — 3078F PR MOST RECENT DIASTOLIC BLOOD PRESSURE < 80 MM HG: ICD-10-PCS | Mod: CPTII,,, | Performed by: FAMILY MEDICINE

## 2022-09-22 PROCEDURE — 99999 PR PBB SHADOW E&M-EST. PATIENT-LVL IV: ICD-10-PCS | Mod: PBBFAC,,, | Performed by: FAMILY MEDICINE

## 2022-09-22 PROCEDURE — 99214 PR OFFICE/OUTPT VISIT, EST, LEVL IV, 30-39 MIN: ICD-10-PCS | Mod: S$PBB,,, | Performed by: FAMILY MEDICINE

## 2022-09-22 PROCEDURE — 1159F MED LIST DOCD IN RCRD: CPT | Mod: CPTII,,, | Performed by: FAMILY MEDICINE

## 2022-09-22 PROCEDURE — 3078F DIAST BP <80 MM HG: CPT | Mod: CPTII,,, | Performed by: FAMILY MEDICINE

## 2022-09-22 PROCEDURE — 1160F PR REVIEW ALL MEDS BY PRESCRIBER/CLIN PHARMACIST DOCUMENTED: ICD-10-PCS | Mod: CPTII,,, | Performed by: FAMILY MEDICINE

## 2022-09-22 PROCEDURE — 99214 OFFICE O/P EST MOD 30 MIN: CPT | Mod: S$PBB,,, | Performed by: FAMILY MEDICINE

## 2022-09-22 PROCEDURE — 3074F SYST BP LT 130 MM HG: CPT | Mod: CPTII,,, | Performed by: FAMILY MEDICINE

## 2022-09-22 PROCEDURE — 3008F BODY MASS INDEX DOCD: CPT | Mod: CPTII,,, | Performed by: FAMILY MEDICINE

## 2022-09-22 RX ORDER — ATORVASTATIN CALCIUM 80 MG/1
80 TABLET, FILM COATED ORAL NIGHTLY
Qty: 90 TABLET | Refills: 3 | Status: SHIPPED | OUTPATIENT
Start: 2022-09-22 | End: 2023-08-29 | Stop reason: SDUPTHER

## 2022-09-22 RX ORDER — FLUTICASONE PROPIONATE 50 MCG
2 SPRAY, SUSPENSION (ML) NASAL DAILY
Qty: 16 G | Refills: 3 | Status: SHIPPED | OUTPATIENT
Start: 2022-09-22 | End: 2023-03-15 | Stop reason: SDUPTHER

## 2022-09-22 RX ORDER — OMEPRAZOLE 20 MG/1
40 CAPSULE, DELAYED RELEASE ORAL DAILY
Qty: 180 CAPSULE | Refills: 3 | Status: SHIPPED | OUTPATIENT
Start: 2022-09-22 | End: 2023-03-15 | Stop reason: ALTCHOICE

## 2022-09-22 RX ORDER — BUTALBITAL, ACETAMINOPHEN AND CAFFEINE 50; 325; 40 MG/1; MG/1; MG/1
1 TABLET ORAL EVERY 8 HOURS PRN
Qty: 45 TABLET | Refills: 1 | Status: SHIPPED | OUTPATIENT
Start: 2022-09-22 | End: 2023-03-15 | Stop reason: SDUPTHER

## 2022-09-22 RX ORDER — TRAMADOL HYDROCHLORIDE 50 MG/1
50 TABLET ORAL EVERY 8 HOURS PRN
Qty: 90 TABLET | Refills: 5 | Status: SHIPPED | OUTPATIENT
Start: 2022-09-22 | End: 2023-03-30 | Stop reason: SDUPTHER

## 2022-09-22 RX ORDER — CYPROHEPTADINE HYDROCHLORIDE 4 MG/1
8 TABLET ORAL 3 TIMES DAILY
Qty: 540 TABLET | Refills: 3 | Status: SHIPPED | OUTPATIENT
Start: 2022-09-22 | End: 2023-08-29 | Stop reason: ALTCHOICE

## 2022-09-22 RX ORDER — AMITRIPTYLINE HYDROCHLORIDE 75 MG/1
75 TABLET ORAL NIGHTLY
Qty: 90 TABLET | Refills: 0 | Status: SHIPPED | OUTPATIENT
Start: 2022-09-22 | End: 2023-03-15 | Stop reason: SDUPTHER

## 2022-09-22 RX ORDER — AMLODIPINE BESYLATE 5 MG/1
5 TABLET ORAL DAILY
Qty: 90 TABLET | Refills: 3 | Status: SHIPPED | OUTPATIENT
Start: 2022-09-22 | End: 2023-08-29 | Stop reason: SDUPTHER

## 2022-09-22 NOTE — ASSESSMENT & PLAN NOTE
"Estimated body mass index is 25 kg/m² as calculated from the following:    Height as of this encounter: 5' 2" (1.575 m).    Weight as of this encounter: 62 kg (136 lb 11 oz).   Wt Readings from Last 12 Encounters:   09/22/22 62 kg (136 lb 11 oz)   09/06/22 62.6 kg (138 lb)   09/06/22 62.7 kg (138 lb 3.7 oz)   08/23/22 61.6 kg (135 lb 12.9 oz)   08/22/22 61.6 kg (135 lb 12.9 oz)   05/19/22 65.5 kg (144 lb 6.4 oz)   03/10/22 67.9 kg (149 lb 11.1 oz)   02/07/22 62.5 kg (137 lb 14.4 oz)   01/27/22 65 kg (143 lb 4.8 oz)   01/25/22 63 kg (138 lb 14.2 oz)   12/10/21 63 kg (138 lb 14.2 oz)   10/06/21 62.4 kg (137 lb 8 oz)      "

## 2022-09-22 NOTE — ASSESSMENT & PLAN NOTE
Lab Results   Component Value Date    HGBA1C 5.9 (H) 12/10/2021    HGBA1C 5.9 (H) 01/06/2021    HGBA1C 5.5 04/23/2019    HGBA1C 5.6 03/08/2018    HGBA1C 6.2 05/03/2017     08/23/2022    GLU 99 05/19/2022     (H) 10/06/2021    GLU 89 01/06/2021    GLU 99 07/13/2020

## 2022-09-22 NOTE — PROGRESS NOTES
OFFICE VISIT 9/22/22  2:00 PM CDT    CHIEF COMPLAINT: Follow-up    Karyna reports that her chronic conditions are stable, and she reports no new complaints or concerns. She says she is doing well on her current medicines, she reports preceiving no adverse side-effects and wants to continue current treatment     DIAGNOSES SPECIFICALLY EVALUATED AND TREATED THIS ENCOUNTER  1. Essential hypertension  - amLODIPine (NORVASC) 5 MG tablet; Take 1 tablet (5 mg total) by mouth once daily.  Dispense: 90 tablet; Refill: 3    2. Pure hypercholesterolemia  - atorvastatin (LIPITOR) 80 MG tablet; Take 1 tablet (80 mg total) by mouth every evening.  Dispense: 90 tablet; Refill: 3  - Lipid Panel; Future    3. Chronic seasonal allergic rhinitis  - fluticasone propionate (FLONASE) 50 mcg/actuation nasal spray; 2 sprays (100 mcg total) by Each Nostril route once daily.  Dispense: 16 g; Refill: 3    4. Tension headache  - butalbital-acetaminophen-caffeine -40 mg (FIORICET, ESGIC) -40 mg per tablet; Take 1 tablet by mouth every 8 (eight) hours as needed for Headaches. DO NOT TAKE MORE THAN 5 PER WEEK  Dispense: 45 tablet; Refill: 1    5. Gastro-esophageal reflux disease with gastritis but without esophagitis  - omeprazole (PRILOSEC) 20 MG capsule; Take 2 capsules (40 mg total) by mouth once daily.  Dispense: 180 capsule; Refill: 3    6. Primary localized osteoarthrosis of multiple sites  - traMADoL (ULTRAM) 50 mg tablet; Take 1 tablet (50 mg total) by mouth every 8 (eight) hours as needed for Pain.  Dispense: 90 tablet; Refill: 5    7. Episodic tension-type headache, not intractable  - amitriptyline (ELAVIL) 75 MG tablet; Take 1 tablet (75 mg total) by mouth every evening.  Dispense: 90 tablet; Refill: 0    8. Prediabetes  - Hemoglobin A1C; Future    9. Anorexia  - cyproheptadine (PERIACTIN) 4 mg tablet; Take 2 tablets (8 mg total) by mouth 3 (three) times daily.  Dispense: 540 tablet; Refill: 3    10. Bipolar 1  "disorder    11. Need for COVID-19 vaccine    12. Need for influenza vaccination     --------------------------------------------------------------------------------   IMER CAZARES (MRN [id number], APPT: 9/22/22  2:00 PM CDT)  --------------------------------  Ready to check out. See orders.  Schedule fasting labs soon.  Schedule in-office appointment with me about 5 months from now.  Thanks.  --------------------------------------------------------------------------------   Follow up in about 5 months (around 2/22/2023) for re-evaluate problem(s) discussed today.   No future appointments.  Problem List Items Addressed This Visit       Anorexia (Chronic)    Current Assessment & Plan     Estimated body mass index is 25 kg/m² as calculated from the following:    Height as of this encounter: 5' 2" (1.575 m).    Weight as of this encounter: 62 kg (136 lb 11 oz).   Wt Readings from Last 12 Encounters:   09/22/22 62 kg (136 lb 11 oz)   09/06/22 62.6 kg (138 lb)   09/06/22 62.7 kg (138 lb 3.7 oz)   08/23/22 61.6 kg (135 lb 12.9 oz)   08/22/22 61.6 kg (135 lb 12.9 oz)   05/19/22 65.5 kg (144 lb 6.4 oz)   03/10/22 67.9 kg (149 lb 11.1 oz)   02/07/22 62.5 kg (137 lb 14.4 oz)   01/27/22 65 kg (143 lb 4.8 oz)   01/25/22 63 kg (138 lb 14.2 oz)   12/10/21 63 kg (138 lb 14.2 oz)   10/06/21 62.4 kg (137 lb 8 oz)             Relevant Medications    cyproheptadine (PERIACTIN) 4 mg tablet    Bipolar 1 disorder (Chronic)    Overview     Psychiatrist = Marilin Hernández MD         Current Assessment & Plan     Appears compensated/controlled and stable.         Chronic seasonal allergic rhinitis (Chronic)    Relevant Medications    fluticasone propionate (FLONASE) 50 mcg/actuation nasal spray    Essential hypertension - Primary (Chronic)    Relevant Medications    amLODIPine (NORVASC) 5 MG tablet    Gastro-esophageal reflux disease without esophagitis (Chronic)    Relevant Medications    omeprazole (PRILOSEC) 20 MG capsule    " Prediabetes (Chronic)    Current Assessment & Plan     Lab Results   Component Value Date    HGBA1C 5.9 (H) 12/10/2021    HGBA1C 5.9 (H) 01/06/2021    HGBA1C 5.5 04/23/2019    HGBA1C 5.6 03/08/2018    HGBA1C 6.2 05/03/2017     08/23/2022    GLU 99 05/19/2022     (H) 10/06/2021    GLU 89 01/06/2021    GLU 99 07/13/2020          Relevant Orders    Hemoglobin A1C    Primary localized osteoarthrosis of multiple sites (Chronic)    Relevant Medications    traMADoL (ULTRAM) 50 mg tablet    Pure hypercholesterolemia (Chronic)    Relevant Medications    atorvastatin (LIPITOR) 80 MG tablet    Other Relevant Orders    Lipid Panel    Recurrent tension-type headache, not intractable (Chronic)    Relevant Medications    amitriptyline (ELAVIL) 75 MG tablet    Tension headache (Chronic)    Relevant Medications    butalbital-acetaminophen-caffeine -40 mg (FIORICET, ESGIC) -40 mg per tablet     Other Visit Diagnoses       Need for COVID-19 vaccine        Need for influenza vaccination            Unless noted herein, any chronic conditions are represented as and appear compensated/controlled and stable, and no other significant complaints or concerns were reported.    PRESCRIPTION DRUG MANAGEMENT  Outpatient Medications Prior to Visit   Medication Sig Dispense Refill    albuterol (PROVENTIL) 2.5 mg /3 mL (0.083 %) nebulizer solution Take 3 mLs (2.5 mg total) by nebulization every 6 (six) hours as needed for Wheezing. Rescue 150 mL 1    ALPRAZolam (XANAX) 0.25 MG tablet Take 0.25 mg by mouth daily as needed.      alprazolam (XANAX) 0.5 MG tablet Take 0.5 mg by mouth daily as needed.       BLOOD PRESSURE CUFF Misc Use to check blood pressure once daily 1 each 0    buPROPion (WELLBUTRIN SR) 150 MG TBSR 12 hr tablet Take 150 mg by mouth once daily.      CALCIUM CARBONATE/VITAMIN D3 (CALCIUM 600 + D,3, ORAL) Take 2 tablets by mouth once daily.      cyclobenzaprine (FLEXERIL) 10 MG tablet Take 0.5 tablets (5 mg  total) by mouth 3 (three) times daily as needed for Muscle spasms. 15 tablet 0    hydrOXYzine pamoate (VISTARIL) 25 MG Cap Take 1 capsule (25 mg total) by mouth every 6 (six) hours as needed (anxiety). 30 capsule 0    LEXAPRO 10 mg tablet Take 10 mg by mouth once daily.      LEXAPRO 20 mg tablet Take 20 mg by mouth once daily.      mirtazapine (REMERON) 15 MG tablet Take 15 mg by mouth nightly.      nebulizer accessories Kit NEBULIZER ACCESSORIES - Use as directed for nebulized medications prescribed by me. 10 kit 11    nebulizer and compressor Heaven NEBULIZER DEVICE - Use as directed 1 each 0    nitroGLYCERIN (NITROSTAT) 0.4 MG SL tablet PLACE 1 TABLET (0.4 MG TOTAL) UNDER THE TONGUE EVERY 5 (FIVE) MINUTES AS NEEDED FOR CHEST PAIN. 25 tablet 4    ondansetron (ZOFRAN-ODT) 4 MG TbDL 1-2 tablets PO every 6 hours as needed for nausea/vomiting while completing bowel prep 4 tablet 0    OXcarbazepine (TRILEPTAL) 600 MG Tab Take 150 mg by mouth 2 (two) times daily.      polyethylene glycol (GLYCOLAX) 17 gram/dose powder Take 17 g by mouth daily as needed (for constipation). 510 g 11    QUEtiapine (SEROQUEL) 100 MG Tab Take 100 mg by mouth once daily.      WELLBUTRIN  mg TBSR 12 hr tablet Take 100 mg by mouth every morning.      amitriptyline (ELAVIL) 75 MG tablet Take 1 tablet (75 mg total) by mouth every evening. 90 tablet 3    amLODIPine (NORVASC) 5 MG tablet Take 1 tablet (5 mg total) by mouth once daily. 90 tablet 4    atorvastatin (LIPITOR) 80 MG tablet Take 1 tablet (80 mg total) by mouth every evening. 90 tablet 3    butalbital-acetaminophen-caffeine -40 mg (FIORICET, ESGIC) -40 mg per tablet Take 1 tablet by mouth every 8 (eight) hours as needed for Headaches. DO NOT TAKE MORE THAN 5 PER WEEK 45 tablet 3    cyproheptadine (PERIACTIN) 4 mg tablet TAKE 2 TABLETS BY MOUTH THREE TIMES DAILY BEFORE MEALS 180 tablet 5    fluticasone propionate (FLONASE) 50 mcg/actuation nasal spray 2 sprays (100 mcg  total) by Each Nostril route once daily. 16 g 11    omeprazole (PRILOSEC) 20 MG capsule Take 2 capsules (40 mg total) by mouth once daily. 180 capsule 4    traMADoL (ULTRAM) 50 mg tablet Take 1 tablet (50 mg total) by mouth every 8 (eight) hours as needed for Pain. 90 tablet 5    fluticasone-salmeterol diskus inhaler 250-50 mcg Inhale 1 puff into the lungs 2 (two) times a day. Controller 60 each 11    nicotine (NICODERM CQ) 21 mg/24 hr Place 1 patch onto the skin once daily. 14 patch 0     Facility-Administered Medications Prior to Visit   Medication Dose Route Frequency Provider Last Rate Last Admin    lactated ringers infusion   Intravenous Continuous Kamila Lund  mL/hr at 02/07/22 1125 Rate Change at 02/07/22 1125     Medications Discontinued During This Encounter   Medication Reason    nicotine (NICODERM CQ) 21 mg/24 hr Patient no longer taking    amitriptyline (ELAVIL) 75 MG tablet Reorder    amLODIPine (NORVASC) 5 MG tablet Reorder    atorvastatin (LIPITOR) 80 MG tablet Reorder    butalbital-acetaminophen-caffeine -40 mg (FIORICET, ESGIC) -40 mg per tablet Reorder    fluticasone propionate (FLONASE) 50 mcg/actuation nasal spray Reorder    traMADoL (ULTRAM) 50 mg tablet Reorder    omeprazole (PRILOSEC) 20 MG capsule Reorder    cyproheptadine (PERIACTIN) 4 mg tablet Reorder     Medications Ordered This Encounter   Medications    amitriptyline (ELAVIL) 75 MG tablet     Sig: Take 1 tablet (75 mg total) by mouth every evening.     Dispense:  90 tablet     Refill:  0     Future refill requests need to be sent to her psychiatrist, Heaven Gaston MD.    amLODIPine (NORVASC) 5 MG tablet     Sig: Take 1 tablet (5 mg total) by mouth once daily.     Dispense:  90 tablet     Refill:  3     -    atorvastatin (LIPITOR) 80 MG tablet     Sig: Take 1 tablet (80 mg total) by mouth every evening.     Dispense:  90 tablet     Refill:  3     -    butalbital-acetaminophen-caffeine -40 mg  "(FIORICET, ESGIC) -40 mg per tablet     Sig: Take 1 tablet by mouth every 8 (eight) hours as needed for Headaches. DO NOT TAKE MORE THAN 5 PER WEEK     Dispense:  45 tablet     Refill:  1     Do not fill until patient requests.    cyproheptadine (PERIACTIN) 4 mg tablet     Sig: Take 2 tablets (8 mg total) by mouth 3 (three) times daily.     Dispense:  540 tablet     Refill:  3    fluticasone propionate (FLONASE) 50 mcg/actuation nasal spray     Si sprays (100 mcg total) by Each Nostril route once daily.     Dispense:  16 g     Refill:  3     -    omeprazole (PRILOSEC) 20 MG capsule     Sig: Take 2 capsules (40 mg total) by mouth once daily.     Dispense:  180 capsule     Refill:  3     -    traMADoL (ULTRAM) 50 mg tablet     Sig: Take 1 tablet (50 mg total) by mouth every 8 (eight) hours as needed for Pain.     Dispense:  90 tablet     Refill:  5     GREATER THAN 7-DAY SUPPLY IS MEDICALLY NECESSARY.     PHYSICAL EXAM  Vitals:    22 1354   BP: 120/60   BP Location: Left arm   Patient Position: Sitting   BP Method: Medium (Manual)   Pulse: 75   Temp: 98.3 °F (36.8 °C)   TempSrc: Tympanic   SpO2: 98%   Weight: 62 kg (136 lb 11 oz)   Height: 5' 2" (1.575 m)   CONSTITUTIONAL: Vital signs noted. Appears well.  PSYCH: Alert and conversant. Mood is grossly neutral. Affect appropriate.  PULM: Lungs clear. Breathing unlabored.  HEART: Regular.     Documentation entered by me for this encounter may have been done in part using speech-recognition technology. Although I have made an effort to ensure accuracy, "sound like" errors may exist and should be interpreted in context.  "

## 2022-09-28 ENCOUNTER — PATIENT MESSAGE (OUTPATIENT)
Dept: ADMINISTRATIVE | Facility: OTHER | Age: 62
End: 2022-09-28
Payer: MEDICAID

## 2022-09-28 ENCOUNTER — LAB VISIT (OUTPATIENT)
Dept: LAB | Facility: HOSPITAL | Age: 62
End: 2022-09-28
Attending: FAMILY MEDICINE
Payer: MEDICAID

## 2022-09-28 DIAGNOSIS — R73.03 PREDIABETES: Chronic | ICD-10-CM

## 2022-09-28 DIAGNOSIS — E78.00 PURE HYPERCHOLESTEROLEMIA: Chronic | ICD-10-CM

## 2022-09-28 LAB
CHOLEST SERPL-MCNC: 181 MG/DL (ref 120–199)
CHOLEST/HDLC SERPL: 2.6 {RATIO} (ref 2–5)
ESTIMATED AVG GLUCOSE: 114 MG/DL (ref 68–131)
HBA1C MFR BLD: 5.6 % (ref 4–5.6)
HDLC SERPL-MCNC: 69 MG/DL (ref 40–75)
HDLC SERPL: 38.1 % (ref 20–50)
LDLC SERPL CALC-MCNC: 104.6 MG/DL (ref 63–159)
NONHDLC SERPL-MCNC: 112 MG/DL
TRIGL SERPL-MCNC: 37 MG/DL (ref 30–150)

## 2022-09-28 PROCEDURE — 83036 HEMOGLOBIN GLYCOSYLATED A1C: CPT | Performed by: FAMILY MEDICINE

## 2022-09-28 PROCEDURE — 36415 COLL VENOUS BLD VENIPUNCTURE: CPT | Performed by: FAMILY MEDICINE

## 2022-09-28 PROCEDURE — 80061 LIPID PANEL: CPT | Performed by: FAMILY MEDICINE

## 2022-10-03 NOTE — PROGRESS NOTES
"NORMAL  ----------  If you want to learn more about your test results and what they mean, it's as simple as 1, 2, 3.  1. Log in to [facility] using the [facility] or online at [email or web address].   2. From the "Test Results" tab, click on the test you want to know more about.  3. If using the mobile carlie, click the "Get Info" icon. (The "Get Info" icon looks like a [place] with a lower case letter i inside it.) If using your computer, click the "Get Info" icon or the "About This Test" link."

## 2022-12-06 DIAGNOSIS — R63.0 ANOREXIA: Chronic | ICD-10-CM

## 2022-12-06 NOTE — TELEPHONE ENCOUNTER
No new care gaps identified.  [facility] Embedded Care Gaps. Reference number: [id number]. 12/06/2022   11:36:03 AM CST

## 2022-12-07 RX ORDER — CYPROHEPTADINE HYDROCHLORIDE 4 MG/1
8 TABLET ORAL 3 TIMES DAILY
Qty: 180 TABLET | OUTPATIENT
Start: 2022-12-07

## 2022-12-07 NOTE — TELEPHONE ENCOUNTER
Refill Decision Note   Karyna Sanders  is requesting a refill authorization.  Brief Assessment and Rationale for Refill:  Quick Discontinue     Medication Therapy Plan:  Reordered    Medication Reconciliation Completed: No   Comments:     No Care Gaps recommended.     Note composed:8:51 AM 12/07/2022

## 2023-01-04 ENCOUNTER — TELEPHONE (OUTPATIENT)
Dept: INTERNAL MEDICINE | Facility: CLINIC | Age: 63
End: 2023-01-04
Payer: MEDICARE

## 2023-01-04 NOTE — TELEPHONE ENCOUNTER
Spoke with the pt to schedule appointment the pt is schedule for 2/9/2023.//LB      ----- Message from Jacquelin Atkinson sent at 1/4/2023  9:46 AM CST -----  States she would like the nurse to give her a call. States she needs to schedule a med refill and get a referral. States she has a knot on the bottom of her foot and it want go away and it is hurting. Nothing coming up on the schedule. Please call pt [phone]. Thank you

## 2023-02-08 ENCOUNTER — TELEPHONE (OUTPATIENT)
Dept: INTERNAL MEDICINE | Facility: CLINIC | Age: 63
End: 2023-02-08
Payer: MEDICARE

## 2023-02-15 DIAGNOSIS — Z12.31 OTHER SCREENING MAMMOGRAM: ICD-10-CM

## 2023-02-17 NOTE — TELEPHONE ENCOUNTER
Schedule her soonest available in-office appointment with me.  If either Laz Crocker NP or Brie Jhaveri PA-C have an appointment available at least 30 days before the next available appointment with me, then also schedule her an appointment with one of them to begin addressing her care gaps.  [id number]

## 2023-02-23 ENCOUNTER — TELEPHONE (OUTPATIENT)
Dept: INTERNAL MEDICINE | Facility: CLINIC | Age: 63
End: 2023-02-23
Payer: MEDICARE

## 2023-02-23 NOTE — TELEPHONE ENCOUNTER
----- Message from Lillian No sent at 2/22/2023  2:16 PM CST -----  Contact: pt  Type:  Sooner Apoointment Request    Caller is requesting a sooner appointment.  Caller declined first available appointment listed below.  Caller will not accept being placed on the waitlist and is requesting a message be sent to doctor.  Name of Caller: pt  When is the first available appointment? Pt is oly for 3/16 at 4:30pm (but can't drive after dark)  Symptoms: discuss health issues  Would the patient rather a call back or a response via [facility]? phone  Best Call Back Number: [phone]  Additional Information:  pt needs an early appt due to can't see at night

## 2023-02-23 NOTE — TELEPHONE ENCOUNTER
Returned call in reference to appt.; spoke to patient she is requesting that appt. be cancelled and placed on wait list./CS

## 2023-02-28 ENCOUNTER — HOSPITAL ENCOUNTER (OUTPATIENT)
Dept: RADIOLOGY | Facility: HOSPITAL | Age: 63
Discharge: HOME | End: 2023-02-28
Attending: FAMILY MEDICINE
Payer: MEDICAID

## 2023-02-28 DIAGNOSIS — Z12.31 OTHER SCREENING MAMMOGRAM: ICD-10-CM

## 2023-02-28 PROCEDURE — 77063 MAMMO DIGITAL SCREENING BILAT WITH TOMO: ICD-10-PCS | Mod: 26,,, | Performed by: RADIOLOGY

## 2023-02-28 PROCEDURE — 77067 MAMMO DIGITAL SCREENING BILAT WITH TOMO: ICD-10-PCS | Mod: 26,,, | Performed by: RADIOLOGY

## 2023-02-28 PROCEDURE — 77063 BREAST TOMOSYNTHESIS BI: CPT | Mod: 26,,, | Performed by: RADIOLOGY

## 2023-02-28 PROCEDURE — 77067 SCR MAMMO BI INCL CAD: CPT | Mod: 26,,, | Performed by: RADIOLOGY

## 2023-02-28 PROCEDURE — 77067 SCR MAMMO BI INCL CAD: CPT | Mod: TC

## 2023-03-13 ENCOUNTER — TELEPHONE (OUTPATIENT)
Dept: INTERNAL MEDICINE | Facility: CLINIC | Age: 63
End: 2023-03-13
Payer: MEDICARE

## 2023-03-13 NOTE — TELEPHONE ENCOUNTER
----- Message from Moe Gardner sent at 3/13/2023 10:53 AM CDT -----  Pt is calling in regards to getting an appt scheduled     She needs to have a med check as will l as go over some concerning things about her health       If she can be seen by a np she will accept but only if she can have her meds refilled as well      Pls call her back at ,[phone]     Thank you

## 2023-03-15 ENCOUNTER — OFFICE VISIT (OUTPATIENT)
Dept: INTERNAL MEDICINE | Facility: CLINIC | Age: 63
End: 2023-03-15
Payer: MEDICAID

## 2023-03-15 VITALS
BODY MASS INDEX: 29.94 KG/M2 | HEART RATE: 96 BPM | OXYGEN SATURATION: 99 % | TEMPERATURE: 97 F | HEIGHT: 62 IN | WEIGHT: 162.69 LBS | SYSTOLIC BLOOD PRESSURE: 120 MMHG | DIASTOLIC BLOOD PRESSURE: 90 MMHG

## 2023-03-15 DIAGNOSIS — R68.81 EARLY SATIETY: ICD-10-CM

## 2023-03-15 DIAGNOSIS — G44.209 TENSION HEADACHE: Chronic | ICD-10-CM

## 2023-03-15 DIAGNOSIS — R07.9 CHEST PAIN, UNSPECIFIED TYPE: ICD-10-CM

## 2023-03-15 DIAGNOSIS — M79.641 BILATERAL HAND PAIN: ICD-10-CM

## 2023-03-15 DIAGNOSIS — R63.0 ANOREXIA: Chronic | ICD-10-CM

## 2023-03-15 DIAGNOSIS — F31.9 BIPOLAR 1 DISORDER: Chronic | ICD-10-CM

## 2023-03-15 DIAGNOSIS — K21.9 GASTRO-ESOPHAGEAL REFLUX DISEASE WITHOUT ESOPHAGITIS: Chronic | ICD-10-CM

## 2023-03-15 DIAGNOSIS — Z79.891 CHRONIC USE OF OPIATE DRUG FOR THERAPEUTIC PURPOSE: ICD-10-CM

## 2023-03-15 DIAGNOSIS — I60.2: ICD-10-CM

## 2023-03-15 DIAGNOSIS — G44.219 EPISODIC TENSION-TYPE HEADACHE, NOT INTRACTABLE: ICD-10-CM

## 2023-03-15 DIAGNOSIS — R63.4 WEIGHT LOSS: ICD-10-CM

## 2023-03-15 DIAGNOSIS — F25.0 SCHIZOAFFECTIVE DISORDER, BIPOLAR TYPE: Chronic | ICD-10-CM

## 2023-03-15 DIAGNOSIS — Z79.899 LONG-TERM CURRENT USE OF HIGH RISK MEDICATION OTHER THAN ANTICOAGULANT: Chronic | ICD-10-CM

## 2023-03-15 DIAGNOSIS — K21.9 GASTROESOPHAGEAL REFLUX DISEASE, UNSPECIFIED WHETHER ESOPHAGITIS PRESENT: ICD-10-CM

## 2023-03-15 DIAGNOSIS — E78.00 PURE HYPERCHOLESTEROLEMIA: Chronic | ICD-10-CM

## 2023-03-15 DIAGNOSIS — J30.2 CHRONIC SEASONAL ALLERGIC RHINITIS: Chronic | ICD-10-CM

## 2023-03-15 DIAGNOSIS — R73.03 PREDIABETES: Primary | Chronic | ICD-10-CM

## 2023-03-15 DIAGNOSIS — I70.0 ATHEROSCLEROSIS OF AORTA: Chronic | ICD-10-CM

## 2023-03-15 DIAGNOSIS — Z86.79 HISTORY OF SUBARACHNOID HEMORRHAGE: ICD-10-CM

## 2023-03-15 DIAGNOSIS — F41.9 ANXIETY: Chronic | ICD-10-CM

## 2023-03-15 DIAGNOSIS — Z78.0 ASYMPTOMATIC MENOPAUSAL STATE: ICD-10-CM

## 2023-03-15 DIAGNOSIS — M79.672 FOOT PAIN, LEFT: ICD-10-CM

## 2023-03-15 DIAGNOSIS — F17.210 CIGARETTE NICOTINE DEPENDENCE WITHOUT COMPLICATION: Chronic | ICD-10-CM

## 2023-03-15 DIAGNOSIS — H53.8 BLURRED VISION, BILATERAL: ICD-10-CM

## 2023-03-15 DIAGNOSIS — M79.642 BILATERAL HAND PAIN: ICD-10-CM

## 2023-03-15 DIAGNOSIS — I10 ESSENTIAL HYPERTENSION: Chronic | ICD-10-CM

## 2023-03-15 PROCEDURE — 3080F PR MOST RECENT DIASTOLIC BLOOD PRESSURE >= 90 MM HG: ICD-10-PCS | Mod: CPTII,,, | Performed by: PHYSICIAN ASSISTANT

## 2023-03-15 PROCEDURE — 99999 PR PBB SHADOW E&M-EST. PATIENT-LVL V: CPT | Mod: PBBFAC,,, | Performed by: PHYSICIAN ASSISTANT

## 2023-03-15 PROCEDURE — 3288F FALL RISK ASSESSMENT DOCD: CPT | Mod: CPTII,,, | Performed by: PHYSICIAN ASSISTANT

## 2023-03-15 PROCEDURE — 1101F PT FALLS ASSESS-DOCD LE1/YR: CPT | Mod: CPTII,,, | Performed by: PHYSICIAN ASSISTANT

## 2023-03-15 PROCEDURE — 99999 PR PBB SHADOW E&M-EST. PATIENT-LVL V: ICD-10-PCS | Mod: PBBFAC,,, | Performed by: PHYSICIAN ASSISTANT

## 2023-03-15 PROCEDURE — 3074F PR MOST RECENT SYSTOLIC BLOOD PRESSURE < 130 MM HG: ICD-10-PCS | Mod: CPTII,,, | Performed by: PHYSICIAN ASSISTANT

## 2023-03-15 PROCEDURE — 1160F PR REVIEW ALL MEDS BY PRESCRIBER/CLIN PHARMACIST DOCUMENTED: ICD-10-PCS | Mod: CPTII,,, | Performed by: PHYSICIAN ASSISTANT

## 2023-03-15 PROCEDURE — 1159F PR MEDICATION LIST DOCUMENTED IN MEDICAL RECORD: ICD-10-PCS | Mod: CPTII,,, | Performed by: PHYSICIAN ASSISTANT

## 2023-03-15 PROCEDURE — 3288F PR FALLS RISK ASSESSMENT DOCUMENTED: ICD-10-PCS | Mod: CPTII,,, | Performed by: PHYSICIAN ASSISTANT

## 2023-03-15 PROCEDURE — 3074F SYST BP LT 130 MM HG: CPT | Mod: CPTII,,, | Performed by: PHYSICIAN ASSISTANT

## 2023-03-15 PROCEDURE — 3008F PR BODY MASS INDEX (BMI) DOCUMENTED: ICD-10-PCS | Mod: CPTII,,, | Performed by: PHYSICIAN ASSISTANT

## 2023-03-15 PROCEDURE — 99214 OFFICE O/P EST MOD 30 MIN: CPT | Mod: S$PBB,,, | Performed by: PHYSICIAN ASSISTANT

## 2023-03-15 PROCEDURE — 1101F PR PT FALLS ASSESS DOC 0-1 FALLS W/OUT INJ PAST YR: ICD-10-PCS | Mod: CPTII,,, | Performed by: PHYSICIAN ASSISTANT

## 2023-03-15 PROCEDURE — 3008F BODY MASS INDEX DOCD: CPT | Mod: CPTII,,, | Performed by: PHYSICIAN ASSISTANT

## 2023-03-15 PROCEDURE — 99215 OFFICE O/P EST HI 40 MIN: CPT | Mod: PBBFAC | Performed by: PHYSICIAN ASSISTANT

## 2023-03-15 PROCEDURE — 1160F RVW MEDS BY RX/DR IN RCRD: CPT | Mod: CPTII,,, | Performed by: PHYSICIAN ASSISTANT

## 2023-03-15 PROCEDURE — 99214 PR OFFICE/OUTPT VISIT, EST, LEVL IV, 30-39 MIN: ICD-10-PCS | Mod: S$PBB,,, | Performed by: PHYSICIAN ASSISTANT

## 2023-03-15 PROCEDURE — 3080F DIAST BP >= 90 MM HG: CPT | Mod: CPTII,,, | Performed by: PHYSICIAN ASSISTANT

## 2023-03-15 PROCEDURE — 1159F MED LIST DOCD IN RCRD: CPT | Mod: CPTII,,, | Performed by: PHYSICIAN ASSISTANT

## 2023-03-15 RX ORDER — BUTALBITAL, ACETAMINOPHEN AND CAFFEINE 50; 325; 40 MG/1; MG/1; MG/1
1 TABLET ORAL EVERY 8 HOURS PRN
Qty: 45 TABLET | Refills: 1 | Status: SHIPPED | OUTPATIENT
Start: 2023-03-15 | End: 2023-08-29

## 2023-03-15 RX ORDER — OMEPRAZOLE 40 MG/1
40 CAPSULE, DELAYED RELEASE ORAL DAILY
Qty: 90 CAPSULE | Refills: 1 | Status: SHIPPED | OUTPATIENT
Start: 2023-03-15 | End: 2023-08-29 | Stop reason: SDUPTHER

## 2023-03-15 RX ORDER — AMITRIPTYLINE HYDROCHLORIDE 75 MG/1
75 TABLET ORAL NIGHTLY
Qty: 30 TABLET | Refills: 0 | Status: SHIPPED | OUTPATIENT
Start: 2023-03-15 | End: 2023-08-29

## 2023-03-15 RX ORDER — ONDANSETRON 4 MG/1
4 TABLET, ORALLY DISINTEGRATING ORAL EVERY 8 HOURS PRN
Qty: 30 TABLET | Refills: 0 | Status: SHIPPED | OUTPATIENT
Start: 2023-03-15 | End: 2023-04-14

## 2023-03-15 RX ORDER — FLUTICASONE PROPIONATE 50 MCG
2 SPRAY, SUSPENSION (ML) NASAL DAILY
Qty: 16 G | Refills: 3 | Status: SHIPPED | OUTPATIENT
Start: 2023-03-15 | End: 2023-06-05

## 2023-03-15 RX ORDER — NITROGLYCERIN 0.4 MG/1
0.4 TABLET SUBLINGUAL EVERY 5 MIN PRN
Qty: 20 TABLET | Refills: 0 | Status: SHIPPED | OUTPATIENT
Start: 2023-03-15 | End: 2023-08-29

## 2023-03-15 NOTE — PROGRESS NOTES
Subjective:      Patient ID: Karyna Sanders is a 63 y.o. female.    Chief Complaint: Follow-up      Here today for refills on her medications as well.   Psychiatrist = Marilin Hernández MD. Scheduled to see in 1 month. Needs refill on elavil to last till that apt.   Pt states that she needs a refill on her nitroglycerine for chest pain. Pt reports needing this medication the nitroglycerine 3 times a week. Pt reports a burning sensation in her chest. Comes on randomly, not related to eating. Pt reports improvement when she takes the nitro. Even went to the ER recently for chest pain. Has not seen cardiology recently. Lost to follow up. No chest pain currently.   Needs refill on medications and tramadol. Pt reports that her pharmacist told her she is out of refills on fioricet, tramadol, amlodipine, and amitriptyline. Takes 2 tramadol at a time, 100mg about 3 times a day around 3-4 times a week for her chronic arthritis pain.   Needs refill on zofran for intermittent nausea.     Hand Pain   The incident occurred more than 1 week ago. There was no injury mechanism. The pain is present in the left hand, left fingers, right hand and right fingers. The quality of the pain is described as aching and stabbing. The pain does not radiate. The pain is severe. The pain has been Worsening since the incident. Associated symptoms include chest pain, muscle weakness, numbness and tingling. The symptoms are aggravated by movement and lifting. Treatments tried: tramadol. The treatment provided mild relief.     Also has black painful spots on the bottom of her left foot that come and go. Dark spots that get very scaly and painful. No injury, trauma, or foreign body. Has not tried anything for treatment.   Also overdue for follow up with her vascular neurologist to monitor her brain aneurysms. Requesting an apt.     Patient Active Problem List   Diagnosis    Schizoaffective disorder, bipolar type    Bipolar 1 disorder    Anxiety     Cigarette nicotine dependence without complication    Subarachnoid hemorrhage from aneurysm of right anterior communicating artery    Essential hypertension    Pure hypercholesterolemia    Prediabetes    Anorexia    Long-term current use of high risk medication other than anticoagulant    Gastro-esophageal reflux disease without esophagitis    Nonadherence to medical treatment    History of hysterectomy for benign disease    Insufficient social insurance or welfare support    Primary localized osteoarthrosis of multiple sites    Peripheral polyneuropathy    Tension headache    Bilateral carpal tunnel syndrome    Chronic seasonal allergic rhinitis    Cubital tunnel syndrome, bilateral    Lumbosacral radiculopathy at S1    Chronic nausea    History of subarachnoid hemorrhage    Recurrent tension-type headache, not intractable    Medication overuse headache    Chronic use of tramadol for therapeutic purpose    Tardive dyskinesia    Early satiety    Atherosclerosis of aorta    Tubular adenoma of transverse colon (Next colonoscopy due 12/2026)    Chronic bronchitis         Current Outpatient Medications:     albuterol (PROVENTIL) 2.5 mg /3 mL (0.083 %) nebulizer solution, Take 3 mLs (2.5 mg total) by nebulization every 6 (six) hours as needed for Wheezing. Rescue, Disp: 150 mL, Rfl: 1    ALPRAZolam (XANAX) 0.25 MG tablet, Take 0.25 mg by mouth daily as needed., Disp: , Rfl:     alprazolam (XANAX) 0.5 MG tablet, Take 0.5 mg by mouth daily as needed. , Disp: , Rfl:     amitriptyline (ELAVIL) 75 MG tablet, Take 1 tablet (75 mg total) by mouth every evening., Disp: 30 tablet, Rfl: 0    amLODIPine (NORVASC) 5 MG tablet, Take 1 tablet (5 mg total) by mouth once daily., Disp: 90 tablet, Rfl: 3    atorvastatin (LIPITOR) 80 MG tablet, Take 1 tablet (80 mg total) by mouth every evening., Disp: 90 tablet, Rfl: 3    BLOOD PRESSURE CUFF Misc, Use to check blood pressure once daily, Disp: 1 each, Rfl: 0     butalbital-acetaminophen-caffeine -40 mg (FIORICET, ESGIC) -40 mg per tablet, Take 1 tablet by mouth every 8 (eight) hours as needed for Headaches. DO NOT TAKE MORE THAN 5 PER WEEK, Disp: 45 tablet, Rfl: 1    CALCIUM CARBONATE/VITAMIN D3 (CALCIUM 600 + D,3, ORAL), Take 2 tablets by mouth once daily., Disp: , Rfl:     cyclobenzaprine (FLEXERIL) 10 MG tablet, Take 0.5 tablets (5 mg total) by mouth 3 (three) times daily as needed for Muscle spasms., Disp: 15 tablet, Rfl: 0    cyproheptadine (PERIACTIN) 4 mg tablet, Take 2 tablets (8 mg total) by mouth 3 (three) times daily., Disp: 540 tablet, Rfl: 3    fluticasone propionate (FLONASE) 50 mcg/actuation nasal spray, 2 sprays (100 mcg total) by Each Nostril route once daily., Disp: 16 g, Rfl: 3    fluticasone-salmeterol diskus inhaler 250-50 mcg, Inhale 1 puff into the lungs 2 (two) times a day. Controller, Disp: 60 each, Rfl: 11    hydrOXYzine pamoate (VISTARIL) 25 MG Cap, Take 1 capsule (25 mg total) by mouth every 6 (six) hours as needed (anxiety)., Disp: 30 capsule, Rfl: 0    nebulizer accessories Kit, NEBULIZER ACCESSORIES - Use as directed for nebulized medications prescribed by me., Disp: 10 kit, Rfl: 11    nebulizer and compressor [place], NEBULIZER DEVICE - Use as directed, Disp: 1 each, Rfl: 0    nitroGLYCERIN (NITROSTAT) 0.4 MG SL tablet, Place 1 tablet (0.4 mg total) under the tongue every 5 (five) minutes as needed for Chest pain., Disp: 20 tablet, Rfl: 0    omeprazole (PRILOSEC) 40 MG capsule, Take 1 capsule (40 mg total) by mouth once daily., Disp: 90 capsule, Rfl: 1    ondansetron (ZOFRAN-ODT) 4 MG TbDL, Take 1 tablet (4 mg total) by mouth every 8 (eight) hours as needed (nausea)., Disp: 30 tablet, Rfl: 0    OXcarbazepine (TRILEPTAL) 600 MG Tab, Take 150 mg by mouth 2 (two) times daily., Disp: , Rfl:     polyethylene glycol (GLYCOLAX) 17 gram/dose powder, Take 17 g by mouth daily as needed (for constipation)., Disp: 510 g, Rfl: 11    QUEtiapine  (SEROQUEL) 100 MG Tab, Take 100 mg by mouth once daily., Disp: , Rfl:     traMADoL (ULTRAM) 50 mg tablet, Take 1 tablet (50 mg total) by mouth every 8 (eight) hours as needed for Pain., Disp: 90 tablet, Rfl: 5  No current facility-administered medications for this visit.    Facility-Administered Medications Ordered in Other Visits:     lactated ringers infusion, , Intravenous, Continuous, Kamila Lund MD, Last Rate: 100 mL/hr at 02/07/22 1125, Rate Change at 02/07/22 1125    Review of Systems   Constitutional:  Negative for activity change, appetite change, chills, diaphoresis, fatigue, fever and unexpected weight change.   HENT: Negative.  Negative for congestion, hearing loss, postnasal drip, rhinorrhea, sore throat, trouble swallowing and voice change.    Eyes: Negative.  Negative for visual disturbance.   Respiratory: Negative.  Negative for cough, choking, chest tightness and shortness of breath.    Cardiovascular:  Positive for chest pain. Negative for palpitations and leg swelling.   Gastrointestinal:  Negative for abdominal distention, abdominal pain, blood in stool, constipation, diarrhea, nausea and vomiting.   Endocrine: Negative for cold intolerance, heat intolerance, polydipsia and polyuria.   Genitourinary: Negative.  Negative for difficulty urinating and frequency.   Musculoskeletal:  Positive for arthralgias, joint swelling and myalgias. Negative for back pain, gait problem, neck pain and neck stiffness.   Skin:  Negative for color change, pallor, rash and wound.   Neurological:  Positive for tingling, weakness, numbness and headaches. Negative for dizziness, tremors, seizures, syncope, facial asymmetry, speech difficulty and light-headedness.   Hematological:  Negative for adenopathy.   Psychiatric/Behavioral:  Negative for behavioral problems, confusion, self-injury, sleep disturbance and suicidal ideas. The patient is not nervous/anxious.    Objective:   BP (!) 120/90 (BP Location: Left  "arm, Patient Position: Sitting, BP Method: Large (Manual))   Pulse 96   Temp 97.2 °F (36.2 °C) (Tympanic)   Ht 5' 2" (1.575 m)   Wt 73.8 kg (162 lb 11.2 oz)   SpO2 99%   BMI 29.76 kg/m²     Physical Exam  Vitals and nursing note reviewed.   Constitutional:       General: She is not in acute distress.     Appearance: Normal appearance. She is well-developed. She is not ill-appearing, toxic-appearing or diaphoretic.   HENT:      Head: Normocephalic and atraumatic.      Right Ear: External ear normal.      Left Ear: External ear normal.      Nose: Nose normal.   Eyes:      Conjunctiva/sclera: Conjunctivae normal.      Pupils: Pupils are equal, round, and reactive to light.   Cardiovascular:      Rate and Rhythm: Normal rate and regular rhythm.      Heart sounds: Normal heart sounds. No murmur heard.    No friction rub. No gallop.   Pulmonary:      Effort: Pulmonary effort is normal. No respiratory distress.      Breath sounds: Normal breath sounds. No wheezing or rales.   Chest:      Chest wall: No tenderness.   Abdominal:      General: There is no distension.      Palpations: Abdomen is soft.      Tenderness: There is no abdominal tenderness.   Musculoskeletal:      Right wrist: Normal. No swelling or deformity. Normal range of motion.      Left wrist: Normal. No swelling or deformity. Normal range of motion.      Right hand: Tenderness present. No swelling, deformity or lacerations. Decreased range of motion. Decreased strength. Decreased sensation of the ulnar distribution, median distribution and radial distribution. Normal capillary refill. Normal pulse.      Left hand: Tenderness present. No swelling, deformity or lacerations. Decreased range of motion. Decreased strength. Decreased sensation of the ulnar distribution, median distribution and radial distribution. Normal capillary refill. Normal pulse.      Cervical back: Normal range of motion and neck supple.   Feet:      Right foot:      Skin integrity: " Callus and dry skin present. No ulcer, blister, skin breakdown, erythema, warmth or fissure.      Toenail Condition: Right toenails are normal.   Lymphadenopathy:      Cervical: No cervical adenopathy.   Skin:     General: Skin is warm and dry.      Capillary Refill: Capillary refill takes less than 2 seconds.      Findings: No rash.   Neurological:      Mental Status: She is alert and oriented to person, place, and time.      Motor: No weakness.      Coordination: Coordination normal.      Gait: Gait normal.   Psychiatric:         Mood and Affect: Mood normal.         Behavior: Behavior normal.         Thought Content: Thought content normal.         Judgment: Judgment normal.     Lab Results   Component Value Date    HGBA1C 5.6 09/28/2022     Lab Results   Component Value Date    CHOL 181 09/28/2022    CHOL 182 12/10/2021    CHOL 247 (H) 01/06/2021     Lab Results   Component Value Date    HDL 69 09/28/2022    HDL 66 12/10/2021    HDL 62 01/06/2021     Lab Results   Component Value Date    LDLCALC 104.6 09/28/2022    LDLCALC 104.6 12/10/2021    LDLCALC 172.0 (H) 01/06/2021     Lab Results   Component Value Date    TRIG 37 09/28/2022    TRIG 57 12/10/2021    TRIG 65 01/06/2021     Lab Results   Component Value Date    CHOLHDL 38.1 09/28/2022    CHOLHDL 36.3 12/10/2021    CHOLHDL 25.1 01/06/2021      Lab Results   Component Value Date    CREATININE 0.9 08/23/2022    BUN 10 08/23/2022     08/23/2022    K 3.8 08/23/2022     08/23/2022    CO2 20 (L) 08/23/2022       30+ minutes of total time spent on the encounter, which includes face to face time and non-face to face time preparing to see the patient (eg, review of tests), Obtaining and/or reviewing separately obtained history, documenting clinical information in the electronic or other health record, independently interpreting results (not separately reported) and communicating results to the patient/family/caregiver, or Care coordination (not separately  reported).    Assessment:     1. Prediabetes    2. Pure hypercholesterolemia    3. Essential hypertension    4. Cigarette nicotine dependence without complication    5. Anxiety    6. Bipolar 1 disorder    7. Schizoaffective disorder, bipolar type    8. Long-term current use of high risk medication other than anticoagulant    9. Gastro-esophageal reflux disease without esophagitis    10. Atherosclerosis of aorta    11. Anorexia    12. Chronic use of tramadol for therapeutic purpose    13. Asymptomatic menopausal state    14. Tension headache    15. Episodic tension-type headache, not intractable    16. Chest pain, unspecified type    17. Gastroesophageal reflux disease, unspecified whether esophagitis present    18. Chronic seasonal allergic rhinitis    19. Weight loss    20. Early satiety    21. Subarachnoid hemorrhage from aneurysm of right anterior communicating artery    22. History of subarachnoid hemorrhage    23. Foot pain, left    24. Bilateral hand pain    25. Blurred vision, bilateral      Plan:   Prediabetes  -recent a1c stable    Pure hypercholesterolemia  -stable on lipitor    Essential hypertension  -nurses visit to recheck blood pressure.     Cigarette nicotine dependence without complication  -     CT Chest Lung Screening Low Dose; Future; Expected date: 03/15/2023    Anxiety    Bipolar 1 disorder    Schizoaffective disorder, bipolar type  -see psych as scheduled next month    Long-term current use of high risk medication other than anticoagulant  -see psych. Will message pcp about tramadol refill    Atherosclerosis of aorta  -on appropriate meds    Anorexia  -     ondansetron (ZOFRAN-ODT) 4 MG TbDL; Take 1 tablet (4 mg total) by mouth every 8 (eight) hours as needed (nausea).  Dispense: 30 tablet; Refill: 0    Chronic use of tramadol for therapeutic purpose    Asymptomatic menopausal state  -     DXA Bone Density Axial Skeleton 1 or more sites; Future; Expected date: 03/15/2023    Tension headache  -      butalbital-acetaminophen-caffeine -40 mg (FIORICET, ESGIC) -40 mg per tablet; Take 1 tablet by mouth every 8 (eight) hours as needed for Headaches. DO NOT TAKE MORE THAN 5 PER WEEK  Dispense: 45 tablet; Refill: 1    Episodic tension-type headache, not intractable  -     amitriptyline (ELAVIL) 75 MG tablet; Take 1 tablet (75 mg total) by mouth every evening.  Dispense: 30 tablet; Refill: 0    Chest pain, unspecified type  -     nitroGLYCERIN (NITROSTAT) 0.4 MG SL tablet; Place 1 tablet (0.4 mg total) under the tongue every 5 (five) minutes as needed for Chest pain.  Dispense: 20 tablet; Refill: 0  -     Ambulatory referral/consult to Cardiology; Future; Expected date: 03/22/2023    Gastroesophageal reflux disease, unspecified whether esophagitis present  -     omeprazole (PRILOSEC) 40 MG capsule; Take 1 capsule (40 mg total) by mouth once daily.  Dispense: 90 capsule; Refill: 1    Chronic seasonal allergic rhinitis  -     fluticasone propionate (FLONASE) 50 mcg/actuation nasal spray; 2 sprays (100 mcg total) by Each Nostril route once daily.  Dispense: 16 g; Refill: 3    Weight loss  -     ondansetron (ZOFRAN-ODT) 4 MG TbDL; Take 1 tablet (4 mg total) by mouth every 8 (eight) hours as needed (nausea).  Dispense: 30 tablet; Refill: 0    Early satiety  -     ondansetron (ZOFRAN-ODT) 4 MG TbDL; Take 1 tablet (4 mg total) by mouth every 8 (eight) hours as needed (nausea).  Dispense: 30 tablet; Refill: 0    Subarachnoid hemorrhage from aneurysm of right anterior communicating artery  -     Ambulatory referral/consult to Neurology; Future; Expected date: 03/22/2023    History of subarachnoid hemorrhage  -     Ambulatory referral/consult to Neurology; Future; Expected date: 03/22/2023    Foot pain, left  -     Ambulatory referral/consult to Podiatry; Future; Expected date: 03/22/2023    Bilateral hand pain  -     Ambulatory referral/consult to Orthopedics; Future; Expected date: 03/22/2023    Blurred vision,  bilateral  -     Ambulatory referral/consult to Optometry; Future; Expected date: 03/22/2023    -overdue to see vascular neurology for aneurysm follow up  -follow up with cardiology for further evaluation of the frequent chest pain. Will refill omeprazole to get the GERD under control.       Follow up in about 3 months (around 6/15/2023), or if symptoms worsen or fail to improve.

## 2023-03-22 ENCOUNTER — CLINICAL SUPPORT (OUTPATIENT)
Dept: INTERNAL MEDICINE | Facility: CLINIC | Age: 63
End: 2023-03-22
Payer: MEDICAID

## 2023-03-22 VITALS — DIASTOLIC BLOOD PRESSURE: 82 MMHG | SYSTOLIC BLOOD PRESSURE: 138 MMHG

## 2023-03-22 NOTE — PROGRESS NOTES
Patient is here for a BP Check.  Stated and spelled first&last name with .  Patient denies headaches, dizziness, and/or blurred vision.  Patient can't remember if she had medication or if she takes BP medication.  Checked medication patient is on Amlodipine 5 mg.  BP at 9:09 am 138/82 P 91

## 2023-03-30 ENCOUNTER — PATIENT MESSAGE (OUTPATIENT)
Dept: INTERNAL MEDICINE | Facility: CLINIC | Age: 63
End: 2023-03-30
Payer: MEDICARE

## 2023-03-30 ENCOUNTER — TELEPHONE (OUTPATIENT)
Dept: INTERNAL MEDICINE | Facility: CLINIC | Age: 63
End: 2023-03-30
Payer: MEDICARE

## 2023-05-25 ENCOUNTER — OFFICE VISIT (OUTPATIENT)
Dept: OPHTHALMOLOGY | Facility: CLINIC | Age: 63
End: 2023-05-25
Payer: MEDICARE

## 2023-05-25 DIAGNOSIS — H53.8 BLURRED VISION, BILATERAL: ICD-10-CM

## 2023-05-25 DIAGNOSIS — H52.7 REFRACTIVE ERRORS: ICD-10-CM

## 2023-05-25 DIAGNOSIS — R73.03 PREDIABETES: Chronic | ICD-10-CM

## 2023-05-25 DIAGNOSIS — I10 ESSENTIAL HYPERTENSION: Chronic | ICD-10-CM

## 2023-05-25 DIAGNOSIS — H25.13 CATARACT, NUCLEAR SCLEROTIC SENILE, BILATERAL: Primary | ICD-10-CM

## 2023-05-25 PROCEDURE — 92015 DETERMINE REFRACTIVE STATE: CPT | Mod: ,,, | Performed by: OPTOMETRIST

## 2023-05-25 PROCEDURE — 99999 PR PBB SHADOW E&M-EST. PATIENT-LVL III: CPT | Mod: PBBFAC,,, | Performed by: OPTOMETRIST

## 2023-05-25 PROCEDURE — 92014 PR EYE EXAM, EST PATIENT,COMPREHESV: ICD-10-PCS | Mod: S$PBB,,, | Performed by: OPTOMETRIST

## 2023-05-25 PROCEDURE — 99213 OFFICE O/P EST LOW 20 MIN: CPT | Mod: PBBFAC | Performed by: OPTOMETRIST

## 2023-05-25 PROCEDURE — 92014 COMPRE OPH EXAM EST PT 1/>: CPT | Mod: S$PBB,,, | Performed by: OPTOMETRIST

## 2023-05-25 PROCEDURE — 92015 PR REFRACTION: ICD-10-PCS | Mod: ,,, | Performed by: OPTOMETRIST

## 2023-05-25 PROCEDURE — 99999 PR PBB SHADOW E&M-EST. PATIENT-LVL III: ICD-10-PCS | Mod: PBBFAC,,, | Performed by: OPTOMETRIST

## 2023-05-25 NOTE — PROGRESS NOTES
HPI    Last eye exam in 2022.  PCIOL OU in 2022    Patient states decrease with overall vision.  Pain with the right eye started today.  Not using any otc drops.  Wear Bifocal glasses   Lab Results       Component                Value               Date                       HGBA1C                   5.6                 09/28/2022              Last edited by Lewis Alejandre, OD on 5/25/2023  1:50 PM.            Assessment /Plan     For exam results, see Encounter Report.    Cataract, nuclear sclerotic senile, bilateral    Blurred vision, bilateral  -     Ambulatory referral/consult to Optometry    Prediabetes    Essential hypertension    Refractive errors      Moderate cataracts OU, not surgical  Follow annually.  Worsening    No Background Diabetic Retinopathy  Strict BG control, f/u w/ PCP, and annual DFE  Stressed importance of DM control to preserve vision    No HTN Retinopathy    Dispense Final Rx for glasses.  RTC 1 year  Discussed above and answered questions.

## 2023-05-30 ENCOUNTER — PATIENT MESSAGE (OUTPATIENT)
Dept: OPHTHALMOLOGY | Facility: CLINIC | Age: 63
End: 2023-05-30
Payer: MEDICARE

## 2023-08-29 ENCOUNTER — OFFICE VISIT (OUTPATIENT)
Dept: INTERNAL MEDICINE | Facility: CLINIC | Age: 63
End: 2023-08-29
Payer: MEDICARE

## 2023-08-29 ENCOUNTER — LAB VISIT (OUTPATIENT)
Dept: LAB | Facility: HOSPITAL | Age: 63
End: 2023-08-29
Attending: FAMILY MEDICINE
Payer: MEDICAID

## 2023-08-29 VITALS
BODY MASS INDEX: 31.11 KG/M2 | OXYGEN SATURATION: 98 % | HEIGHT: 62 IN | DIASTOLIC BLOOD PRESSURE: 86 MMHG | TEMPERATURE: 98 F | HEART RATE: 100 BPM | SYSTOLIC BLOOD PRESSURE: 130 MMHG | RESPIRATION RATE: 19 BRPM | WEIGHT: 169.06 LBS

## 2023-08-29 DIAGNOSIS — J41.0 SIMPLE CHRONIC BRONCHITIS: ICD-10-CM

## 2023-08-29 DIAGNOSIS — K21.9 GASTROESOPHAGEAL REFLUX DISEASE, UNSPECIFIED WHETHER ESOPHAGITIS PRESENT: ICD-10-CM

## 2023-08-29 DIAGNOSIS — Z78.0 ASYMPTOMATIC MENOPAUSAL STATE: ICD-10-CM

## 2023-08-29 DIAGNOSIS — R73.03 PREDIABETES: Chronic | ICD-10-CM

## 2023-08-29 DIAGNOSIS — Z12.2 ENCOUNTER FOR SCREENING FOR LUNG CANCER: ICD-10-CM

## 2023-08-29 DIAGNOSIS — F31.9 BIPOLAR 1 DISORDER: Chronic | ICD-10-CM

## 2023-08-29 DIAGNOSIS — F25.0 SCHIZOAFFECTIVE DISORDER, BIPOLAR TYPE: Chronic | ICD-10-CM

## 2023-08-29 DIAGNOSIS — F17.210 CIGARETTE NICOTINE DEPENDENCE WITHOUT COMPLICATION: Chronic | ICD-10-CM

## 2023-08-29 DIAGNOSIS — I70.0 ATHEROSCLEROSIS OF AORTA: Chronic | ICD-10-CM

## 2023-08-29 DIAGNOSIS — E78.00 PURE HYPERCHOLESTEROLEMIA: Chronic | ICD-10-CM

## 2023-08-29 DIAGNOSIS — E66.9 OBESITY, CLASS I, BMI 30-34.9: Chronic | ICD-10-CM

## 2023-08-29 DIAGNOSIS — I10 ESSENTIAL HYPERTENSION: Chronic | ICD-10-CM

## 2023-08-29 DIAGNOSIS — M19.91 PRIMARY LOCALIZED OSTEOARTHROSIS OF MULTIPLE SITES: Chronic | ICD-10-CM

## 2023-08-29 DIAGNOSIS — I10 ESSENTIAL HYPERTENSION: Primary | Chronic | ICD-10-CM

## 2023-08-29 PROBLEM — E66.811 OBESITY, CLASS I, BMI 30-34.9: Chronic | Status: ACTIVE | Noted: 2023-08-29

## 2023-08-29 LAB
ALBUMIN SERPL BCP-MCNC: 4.1 G/DL (ref 3.5–5.2)
ALP SERPL-CCNC: 80 U/L (ref 55–135)
ALT SERPL W/O P-5'-P-CCNC: 14 U/L (ref 10–44)
ANION GAP SERPL CALC-SCNC: 8 MMOL/L (ref 8–16)
AST SERPL-CCNC: 16 U/L (ref 10–40)
BILIRUB SERPL-MCNC: 0.4 MG/DL (ref 0.1–1)
BUN SERPL-MCNC: 10 MG/DL (ref 8–23)
CALCIUM SERPL-MCNC: 9.6 MG/DL (ref 8.7–10.5)
CHLORIDE SERPL-SCNC: 103 MMOL/L (ref 95–110)
CHOLEST SERPL-MCNC: 144 MG/DL (ref 120–199)
CHOLEST/HDLC SERPL: 2.9 {RATIO} (ref 2–5)
CO2 SERPL-SCNC: 26 MMOL/L (ref 23–29)
CREAT SERPL-MCNC: 0.9 MG/DL (ref 0.5–1.4)
EST. GFR  (NO RACE VARIABLE): >60 ML/MIN/1.73 M^2
ESTIMATED AVG GLUCOSE: 128 MG/DL (ref 68–131)
GLUCOSE SERPL-MCNC: 95 MG/DL (ref 70–110)
HBA1C MFR BLD: 6.1 % (ref 4–5.6)
HDLC SERPL-MCNC: 49 MG/DL (ref 40–75)
HDLC SERPL: 34 % (ref 20–50)
LDLC SERPL CALC-MCNC: 83.4 MG/DL (ref 63–159)
NONHDLC SERPL-MCNC: 95 MG/DL
POTASSIUM SERPL-SCNC: 3.9 MMOL/L (ref 3.5–5.1)
PROT SERPL-MCNC: 6.9 G/DL (ref 6–8.4)
SODIUM SERPL-SCNC: 137 MMOL/L (ref 136–145)
TRIGL SERPL-MCNC: 58 MG/DL (ref 30–150)

## 2023-08-29 PROCEDURE — 83036 HEMOGLOBIN GLYCOSYLATED A1C: CPT | Performed by: FAMILY MEDICINE

## 2023-08-29 PROCEDURE — 80053 COMPREHEN METABOLIC PANEL: CPT | Performed by: FAMILY MEDICINE

## 2023-08-29 PROCEDURE — 99999 PR PBB SHADOW E&M-EST. PATIENT-LVL IV: ICD-10-PCS | Mod: PBBFAC,,, | Performed by: FAMILY MEDICINE

## 2023-08-29 PROCEDURE — 36415 COLL VENOUS BLD VENIPUNCTURE: CPT | Performed by: FAMILY MEDICINE

## 2023-08-29 PROCEDURE — 99999 PR PBB SHADOW E&M-EST. PATIENT-LVL IV: CPT | Mod: PBBFAC,,, | Performed by: FAMILY MEDICINE

## 2023-08-29 PROCEDURE — 99214 OFFICE O/P EST MOD 30 MIN: CPT | Mod: PBBFAC | Performed by: FAMILY MEDICINE

## 2023-08-29 PROCEDURE — 99214 OFFICE O/P EST MOD 30 MIN: CPT | Mod: S$PBB,,, | Performed by: FAMILY MEDICINE

## 2023-08-29 PROCEDURE — 80061 LIPID PANEL: CPT | Performed by: FAMILY MEDICINE

## 2023-08-29 PROCEDURE — 99214 PR OFFICE/OUTPT VISIT, EST, LEVL IV, 30-39 MIN: ICD-10-PCS | Mod: S$PBB,,, | Performed by: FAMILY MEDICINE

## 2023-08-29 RX ORDER — OMEPRAZOLE 40 MG/1
40 CAPSULE, DELAYED RELEASE ORAL DAILY
Qty: 90 CAPSULE | Refills: 3 | Status: SHIPPED | OUTPATIENT
Start: 2023-08-29 | End: 2023-11-16 | Stop reason: SDUPTHER

## 2023-08-29 RX ORDER — BUPROPION HYDROCHLORIDE 200 MG/1
200 TABLET, FILM COATED ORAL EVERY MORNING
COMMUNITY
Start: 2023-08-09

## 2023-08-29 RX ORDER — ATORVASTATIN CALCIUM 80 MG/1
80 TABLET, FILM COATED ORAL NIGHTLY
Qty: 90 TABLET | Refills: 3 | Status: SHIPPED | OUTPATIENT
Start: 2023-08-29 | End: 2023-11-16 | Stop reason: SDUPTHER

## 2023-08-29 RX ORDER — TRAMADOL HYDROCHLORIDE 50 MG/1
50 TABLET ORAL EVERY 8 HOURS PRN
Qty: 90 TABLET | Refills: 5 | Status: SHIPPED | OUTPATIENT
Start: 2023-08-29 | End: 2023-11-16 | Stop reason: SDUPTHER

## 2023-08-29 RX ORDER — AMLODIPINE BESYLATE 5 MG/1
5 TABLET ORAL DAILY
Qty: 90 TABLET | Refills: 3 | Status: SHIPPED | OUTPATIENT
Start: 2023-08-29 | End: 2023-10-20 | Stop reason: SDUPTHER

## 2023-08-29 RX ORDER — ALBUTEROL SULFATE 0.83 MG/ML
2.5 SOLUTION RESPIRATORY (INHALATION) EVERY 6 HOURS PRN
Qty: 150 ML | Refills: 3 | Status: SHIPPED | OUTPATIENT
Start: 2023-08-29 | End: 2023-11-16 | Stop reason: SDUPTHER

## 2023-08-29 RX ORDER — ESCITALOPRAM OXALATE 10 MG/1
10 TABLET, FILM COATED ORAL
COMMUNITY
Start: 2023-08-09

## 2023-08-29 NOTE — ASSESSMENT & PLAN NOTE
This is a chronic problem that appears compensated/controlled and stable on albuterol inhaler < 4 times/week, in spite of her non-use of Advair.

## 2023-08-29 NOTE — PROGRESS NOTES
OFFICE VISIT 8/29/23  9:00 AM CDT    CHIEF COMPLAINT: Follow-up    1. Essential hypertension  This is a chronic problem that appears compensated/controlled and stable.   -     Comprehensive Metabolic Panel; Future; Expected date: 08/29/2023  -     Lipid Panel; Future; Expected date: 08/29/2023  -     Hypertension Digital Medicine (HDMP) Enrollment Order  -     Hypertension Digital Medicine ([facility]): Assign Onboarding Questionnaires  -     amLODIPine (NORVASC) 5 MG tablet; Take 1 tablet (5 mg total) by mouth once daily.  Dispense: 90 tablet; Refill: 3    2. Prediabetes  Labs due.  Assessment & Plan:  Lab Results   Component Value Date    HGBA1C 5.6 09/28/2022    HGBA1C 5.9 (H) 12/10/2021    HGBA1C 5.9 (H) 01/06/2021    HGBA1C 5.5 04/23/2019    HGBA1C 5.6 03/08/2018     08/23/2022    GLU 99 05/19/2022     (H) 10/06/2021    GLU 89 01/06/2021    GLU 99 07/13/2020       Orders:  -     Hemoglobin A1C; Future; Expected date: 08/29/2023  -     Comprehensive Metabolic Panel; Future; Expected date: 08/29/2023    3. Pure hypercholesterolemia  She appears to be tolerating statin for dyslipidemia without apparent symptoms of myositis.   -     Comprehensive Metabolic Panel; Future; Expected date: 08/29/2023  -     Lipid Panel; Future; Expected date: 08/29/2023  -     Lipids Digital Medicine (LDMP) Enrollment Order  -     atorvastatin (LIPITOR) 80 MG tablet; Take 1 tablet (80 mg total) by mouth every evening.  Dispense: 90 tablet; Refill: 3    4. Simple chronic bronchitis  Assessment & Plan:  This is a chronic problem that appears compensated/controlled and stable on albuterol inhaler < 4 times/week, in spite of her non-use of Advair.    Orders:  -     albuterol (PROVENTIL) 2.5 mg /3 mL (0.083 %) nebulizer solution; Take 3 mLs (2.5 mg total) by nebulization every 6 (six) hours as needed for Wheezing. Rescue  Dispense: 150 mL; Refill: 3  -     nebulizer accessories Kit; NEBULIZER ACCESSORIES - Use as directed for  nebulized medications prescribed by me.  Dispense: 10 kit; Refill: 11    5. Schizoaffective disorder, bipolar type  Appears stable.  Overview:  Psychiatrist = Marilin Hernández MD      6. Bipolar 1 disorder  Appears stable.  Overview:  Psychiatrist = Marilin Hernández MD      7. Primary localized osteoarthrosis of multiple sites  She reports that her current treatment regimen is providing sufficient pain relief, permitting her increased level of functioning and mobility. She reports that she is tolerating her current medications well and perceives no significant sedation or other adverse side-effects. She is demonstrating no behaviors to suggest inappropriate use of prescribed medications. [place] Board of Pharmacy Controlled Prescription Drug Monitoring database was queried and showed no activity to suggest abuse, diversion, or other inappropriate use of prescription medications.   -     traMADoL (ULTRAM) 50 mg tablet; Take 1 tablet (50 mg total) by mouth every 8 (eight) hours as needed for Pain.  Dispense: 90 tablet; Refill: 5    8. Gastroesophageal reflux disease, unspecified whether esophagitis present  This is a chronic problem that appears compensated/controlled and stable.   -     omeprazole (PRILOSEC) 40 MG capsule; Take 1 capsule (40 mg total) by mouth once daily.  Dispense: 90 capsule; Refill: 3    9. Obesity, Class I, BMI 30-34.9  Worse. She no longer needs periactin.    10. Cigarette nicotine dependence without complication  Assessment & Plan:  Dangers of cigarette smoking were reviewed with patient in detail. Patient was Counseled for 3-10 minutes. Nicotine replacement options were discussed and offered.    Orders:  -     CT Chest Lung Screening Low Dose; Future; Expected date: 08/29/2023    11. Atherosclerosis of aorta  ASSESSMENT: Asymptomatic and clinically stable on atorvastatin  TREATMENT PLAN: Continue present treatment plan.    Overview:  CT ABDOMEN PELVIS WITH CONTRAST 01/03/2021  FINDINGS:  "Mild atherosclerosis within the abdominal aorta which is nonaneurysmal.    Orders:  -     atorvastatin (LIPITOR) 80 MG tablet; Take 1 tablet (80 mg total) by mouth every evening.  Dispense: 90 tablet; Refill: 3    12. Asymptomatic menopausal state  -     DXA Bone Density Axial Skeleton 1 or more sites; Future; Expected date: 08/29/2023    13. Encounter for screening for lung cancer  We discussed risks and benefits of lung cancer screening options.   -     CT Chest Lung Screening Low Dose; Future; Expected date: 08/29/2023    Unless noted herein, any chronic conditions are represented as and appear stable, and no other significant complaints or concerns were reported.    Follow up in about 24 weeks (around 2/13/2024) for office visit, re-evaluation.   No future appointments.    Review of Systems   Respiratory:  Negative for chest tightness and shortness of breath.    Cardiovascular:  Negative for chest pain.       Vitals:    08/29/23 0900 08/29/23 1005   BP: 130/86    BP Location: Right arm    Patient Position: Sitting    BP Method: Large (Manual)    Pulse: (!) 117 100   Resp: 19    Temp: 98 °F (36.7 °C)    SpO2: 98%    Weight: 76.7 kg (169 lb 1.5 oz)    Height: 5' 2" (1.575 m)    Physical Exam  Vitals reviewed.   Constitutional:       General: She is not in acute distress.     Appearance: Normal appearance. She is not ill-appearing or diaphoretic.   Cardiovascular:      Rate and Rhythm: Normal rate and regular rhythm.   Pulmonary:      Effort: Pulmonary effort is normal.      Breath sounds: Normal breath sounds.   Skin:     General: Skin is warm and dry.   Neurological:      Mental Status: She is alert and oriented to person, place, and time. Mental status is at baseline.   Psychiatric:         Mood and Affect: Mood normal.         Thought Content: Thought content normal.       Documentation entered by me for this encounter may have been done in part using speech-recognition technology. Although I have made an " "effort to ensure accuracy, "sound like" errors may exist and should be interpreted in context.  "

## 2023-08-29 NOTE — ASSESSMENT & PLAN NOTE
Lab Results   Component Value Date    HGBA1C 5.6 09/28/2022    HGBA1C 5.9 (H) 12/10/2021    HGBA1C 5.9 (H) 01/06/2021    HGBA1C 5.5 04/23/2019    HGBA1C 5.6 03/08/2018     08/23/2022    GLU 99 05/19/2022     (H) 10/06/2021    GLU 89 01/06/2021    GLU 99 07/13/2020

## 2023-08-29 NOTE — ASSESSMENT & PLAN NOTE
Dangers of cigarette smoking were reviewed with patient in detail. Patient was Counseled for 3-10 minutes. Nicotine replacement options were discussed and offered.

## 2023-09-01 ENCOUNTER — HOSPITAL ENCOUNTER (OUTPATIENT)
Dept: RADIOLOGY | Facility: HOSPITAL | Age: 63
Discharge: HOME | End: 2023-09-01
Attending: FAMILY MEDICINE
Payer: MEDICARE

## 2023-09-01 ENCOUNTER — PATIENT MESSAGE (OUTPATIENT)
Dept: INTERNAL MEDICINE | Facility: CLINIC | Age: 63
End: 2023-09-01
Payer: MEDICAID

## 2023-09-01 ENCOUNTER — PATIENT MESSAGE (OUTPATIENT)
Dept: ADMINISTRATIVE | Facility: OTHER | Age: 63
End: 2023-09-01
Payer: MEDICAID

## 2023-09-01 DIAGNOSIS — Z12.2 ENCOUNTER FOR SCREENING FOR LUNG CANCER: ICD-10-CM

## 2023-09-01 DIAGNOSIS — F17.210 CIGARETTE NICOTINE DEPENDENCE WITHOUT COMPLICATION: ICD-10-CM

## 2023-09-01 DIAGNOSIS — M85.89 OSTEOPENIA OF MULTIPLE SITES: ICD-10-CM

## 2023-09-01 PROCEDURE — 71271 CT THORAX LUNG CANCER SCR C-: CPT | Mod: TC

## 2023-09-01 PROCEDURE — 71271 CT THORAX LUNG CANCER SCR C-: CPT | Mod: 26,,, | Performed by: RADIOLOGY

## 2023-09-01 PROCEDURE — 71271 CT CHEST LUNG SCREENING LOW DOSE: ICD-10-PCS | Mod: 26,,, | Performed by: RADIOLOGY

## 2023-11-03 ENCOUNTER — HOSPITAL ENCOUNTER (EMERGENCY)
Facility: HOSPITAL | Age: 63
Discharge: HOME | End: 2023-11-03
Attending: EMERGENCY MEDICINE
Payer: MEDICAID

## 2023-11-03 VITALS
DIASTOLIC BLOOD PRESSURE: 91 MMHG | SYSTOLIC BLOOD PRESSURE: 137 MMHG | OXYGEN SATURATION: 99 % | BODY MASS INDEX: 30.93 KG/M2 | HEIGHT: 62 IN | TEMPERATURE: 98 F | RESPIRATION RATE: 18 BRPM | HEART RATE: 88 BPM

## 2023-11-03 DIAGNOSIS — L84 CORN OR CALLUS: Primary | ICD-10-CM

## 2023-11-03 PROCEDURE — 99283 EMERGENCY DEPT VISIT LOW MDM: CPT

## 2023-11-03 RX ORDER — BUTALBITAL, ACETAMINOPHEN AND CAFFEINE 50; 325; 40 MG/1; MG/1; MG/1
1 TABLET ORAL EVERY 6 HOURS PRN
Qty: 15 TABLET | Refills: 0 | Status: SHIPPED | OUTPATIENT
Start: 2023-11-03

## 2023-11-03 NOTE — ED PROVIDER NOTES
Encounter Date: 11/3/2023       History     Chief Complaint   Patient presents with    Foot Pain     Left foot pain - chronic. Pt. States recently saw PCP for the same and was referred to neurologist. Pt. Has a corn just beneath left 4th and 5th toes.     63-year-old female with complaint of pain in the bottom of left foot for the past year.  No recent fall or trauma.        Review of patient's allergies indicates:   Allergen Reactions    Aspirin     Ibuprofen Anxiety     Past Medical History:   Diagnosis Date    Anemia     Aneurysm     Anorexia 4/26/2017    Anxiety     Asthma     Atherosclerosis of aorta 1/5/2022    CT ABDOMEN PELVIS WITH CONTRAST 01/03/2021 FINDINGS: Mild atherosclerosis within the abdominal aorta which is nonaneurysmal.    Bipolar disorder     Carpal tunnel syndrome, bilateral     Cerebral aneurysm     Chronic nausea 11/15/2018    Chronic pain syndrome     Cigarette nicotine dependence     Depression     Essential hypertension 8/29/2014    GERD (gastroesophageal reflux disease)     Hyperlipidemia     Hypertension     Insomnia     Osteoporosis     Pure hypercholesterolemia 9/8/2014    Recurrent tension-type headache, not intractable 7/24/2019    Schizophrenia     Stroke     Subarachnoid hemorrhage      Past Surgical History:   Procedure Laterality Date    BUNIONECTOMY Right     COLONOSCOPY N/A 2/7/2022    Procedure: COLONOSCOPY;  Surgeon: Kamila Lund MD;  Location: [facility];  Service: Endoscopy;  Laterality: N/A;    COLONOSCOPY W/ BIOPSIES AND POLYPECTOMY      ESOPHAGOGASTRODUODENOSCOPY N/A 2/7/2022    Procedure: EGD (ESOPHAGOGASTRODUODENOSCOPY);  Surgeon: Kamila Lund MD;  Location: [facility];  Service: Endoscopy;  Laterality: N/A;    HYSTERECTOMY      PARTIAL HYSTERECTOMY      Bilateral Ovaries Remain    TRANSCRANIAL DOPPLER STUDY - COMPLETE  8/28/2014         TRANSCRANIAL DOPPLER STUDY - COMPLETE  8/29/2014         TRANSCRANIAL DOPPLER STUDY - COMPLETE  8/30/2014          TRANSCRANIAL DOPPLER STUDY - COMPLETE  8/31/2014         TRANSCRANIAL DOPPLER STUDY - COMPLETE  9/1/2014         TRANSCRANIAL DOPPLER STUDY - COMPLETE  9/2/2014         TRANSCRANIAL DOPPLER STUDY - COMPLETE  9/3/2014         TRANSCRANIAL DOPPLER STUDY - COMPLETE  9/4/2014         TRANSCRANIAL DOPPLER STUDY - COMPLETE  9/5/2014         TRANSCRANIAL DOPPLER STUDY - COMPLETE  9/6/2014         VAGINAL DELIVERY      X 3     Family History   Problem Relation Age of Onset    Cancer Mother     Glaucoma Mother     Stroke Mother     Diabetes type II Mother     Heart disease Mother     Hypertension Mother     Brain cancer Sister     Diabetes type II Sister     Cancer Brother     Hypertension Brother     Bone cancer Sister      Social History     Tobacco Use    Smoking status: Every Day     Current packs/day: 1.00     Average packs/day: 1 pack/day for 51.1 years (51.1 ttl pk-yrs)     Types: Cigarettes, Cigars, Vaping with nicotine     Start date: 1976    Smokeless tobacco: Never    Tobacco comments:     in smoking program 5/13/19   Substance Use Topics    Alcohol use: Yes     Comment: 1-3 times per week, 1-2 drink at a time.    Drug use: No     Review of Systems   Constitutional:  Negative for fever.   HENT:  Negative for sore throat.    Respiratory:  Negative for shortness of breath.    Cardiovascular:  Negative for chest pain.   Gastrointestinal:  Negative for nausea.   Genitourinary:  Negative for dysuria.   Musculoskeletal:  Negative for back pain.        Left foot pain    Skin:  Negative for rash.   Neurological:  Negative for weakness.   Hematological:  Does not bruise/bleed easily.       Physical Exam     Initial Vitals [11/03/23 0907]   BP Pulse Resp Temp SpO2   (!) 137/91 88 18 97.9 °F (36.6 °C) 99 %      MAP       --         Physical Exam    Nursing note and vitals reviewed.  Constitutional: She appears well-developed and well-nourished.   HENT:   Head: Normocephalic and atraumatic.   Eyes: Conjunctivae and EOM are  normal. Pupils are equal, round, and reactive to light.   Neck: Neck supple.   Normal range of motion.  Cardiovascular:  Normal rate, regular rhythm, normal heart sounds and intact distal pulses.           Pulmonary/Chest: Breath sounds normal.   Abdominal: Abdomen is soft. There is no abdominal tenderness. There is no rebound and no guarding.   Musculoskeletal:         General: Normal range of motion.      Cervical back: Normal range of motion and neck supple.      Comments: 0.25 cm corn left plantar lateral aspect of left foot, no erythema, no fluctuance,     Neurological: She is alert and oriented to person, place, and time. She has normal strength and normal reflexes.   Skin: Skin is warm and dry.   Psychiatric: She has a normal mood and affect. Her behavior is normal. Thought content normal.         ED Course   Procedures  Labs Reviewed - No data to display       Imaging Results    None          Medications - No data to display  Medical Decision Making  Risk  Prescription drug management.                               Clinical Impression:   Final diagnoses:  [L84] Corn or callus (Primary)        ED Disposition Condition    Discharge Stable          ED Prescriptions       Medication Sig Dispense Start Date End Date Auth. Provider    butalbital-acetaminophen-caffeine -40 mg (FIORICET, ESGIC) -40 mg per tablet Take 1 tablet by mouth every 6 (six) hours as needed for Pain. 15 tablet 11/3/2023 -- Souleymane Masterson NP          Follow-up Information       Follow up With Specialties Details Why Contact Info    [facility]  Schedule an appointment as soon as possible for a visit in 2 days  [phone]             Souleymane Masterson NP  11/03/23 0946

## 2023-11-09 ENCOUNTER — TELEPHONE (OUTPATIENT)
Dept: INTERNAL MEDICINE | Facility: CLINIC | Age: 63
End: 2023-11-09
Payer: MEDICAID

## 2023-11-09 DIAGNOSIS — L84 CORN OR CALLUS: Primary | ICD-10-CM

## 2023-11-09 NOTE — TELEPHONE ENCOUNTER
Pt is requesting a referral to [facility] in [place] due to corns and bunion to left foot. Please advise.//ddw

## 2023-11-09 NOTE — LETTER
FAX    DATE: 2023  TO:  [facility]  FAX: [phone]  FROM: DIMA Boothe MD  RE: Karyna Cazares,  1960; MRN [id number]      See accompanying order          CONFIDENTIALITY NOTICE: The accompanying facsimile is intended solely for the use of the recipient designated above. Document(s) transmitted herewith may contain information that is confidential and privileged. Delivery, distribution or dissemination of this communication other than to the intended recipient is strictly prohibited. If you have received this facsimile in error, please notify [facility]'s Compliance and Privacy Department immediately by telephone at [phone]. Thank you.                 PHYSICIAN REFERRAL ORDER    PATIENT IDENTIFYING INFORMATION:  KARYNA CAZARES  [address] [place]  [facility] [address] YOB: 1960  OUR MRN: [id number]   Home Phone [phone]   Work Phone [phone]   Mobile [phone]        REFERRAL ORDER DATE: 2023    REFERRAL ORDERS  Ambulatory Referral/Consult to Podiatry  [facility]  [address] [place] [address].  Via Fax: [phone]    SUPPORTING DIAGNOSES    ICD-10-CM ICD-9-CM   1. Corn or callus  L84 700          CECILIA Boothe MD, NPI: [id number]  ------------------------------------------------------------------------------------------------  NOTHING BELOW THIS LINE

## 2023-11-09 NOTE — TELEPHONE ENCOUNTER
----- Message from Coco Scott sent at 11/9/2023 12:58 PM CST -----  Contact: self [phone]  Patient called in this afternoon stating she needs a referral sent to To [facility] [address] [place] fax [phone] so she can be seen. Please call back [phone]

## 2023-11-12 NOTE — TELEPHONE ENCOUNTER
"I entered the requested order and routed it to you separately as a "Letter." (Copy included below for reference only. Do not print this note.)    TO MY TEAM: Print the separate document (routed to you as a "Letter") and then fax.  Thanks.             PHYSICIAN REFERRAL ORDER    PATIENT IDENTIFYING INFORMATION:  IMER CAZARES  [address] [address] YOB: 1960  OUR MRN: [id number]   Home Phone [phone]   Work Phone [phone]   Mobile [phone]        REFERRAL ORDER DATE: 11/11/2023    REFERRAL ORDERS  Ambulatory Referral/Consult to Podiatry  [facility]  [address] [place] [address].  Via Fax: [phone]    SUPPORTING DIAGNOSES    ICD-10-CM ICD-9-CM   1. Corn or callus  L84 700          LMaida Boothe MD, NPI: [id number]  ------------------------------------------------------------------------------------------------  NOTHING BELOW THIS LINE   "

## 2023-11-12 NOTE — TELEPHONE ENCOUNTER
"I entered the requested order and routed it to you separately as a "Letter."     TO MY TEAM: Print the separate document (routed to you as a "Letter") and then fax.  Thanks.  "

## 2023-11-14 ENCOUNTER — TELEPHONE (OUTPATIENT)
Dept: INTERNAL MEDICINE | Facility: CLINIC | Age: 63
End: 2023-11-14
Payer: MEDICAID

## 2023-11-14 NOTE — TELEPHONE ENCOUNTER
----- Message from Socorro Dukes sent at 11/14/2023  3:56 PM CST -----  Contact: [phone]  Patient is returning a phone call.  Who left a message for the patient: Ivania Scott, LPN  Does patient know what this is regarding:    Would you like a call back, or a response through your [facility] portal?:   call   Comments:

## 2023-11-15 ENCOUNTER — TELEPHONE (OUTPATIENT)
Dept: INTERNAL MEDICINE | Facility: CLINIC | Age: 63
End: 2023-11-15
Payer: MEDICAID

## 2023-11-15 NOTE — TELEPHONE ENCOUNTER
----- Message from Maynor Bob sent at 11/15/2023  2:10 PM CST -----  Type: Patient Call Back    Who called:self     What is the request in detail: Asking for a call regarding virtual appt     Can the clinic reply by [facility]? No     Would the patient rather a call back or a response via [facility]? CALL     Best call back number: [phone] (home)

## 2023-11-16 ENCOUNTER — OFFICE VISIT (OUTPATIENT)
Dept: INTERNAL MEDICINE | Facility: CLINIC | Age: 63
End: 2023-11-16
Payer: MEDICAID

## 2023-11-16 DIAGNOSIS — M19.91 PRIMARY LOCALIZED OSTEOARTHROSIS OF MULTIPLE SITES: Chronic | ICD-10-CM

## 2023-11-16 DIAGNOSIS — E78.00 PURE HYPERCHOLESTEROLEMIA: Chronic | ICD-10-CM

## 2023-11-16 DIAGNOSIS — I10 ESSENTIAL HYPERTENSION: Chronic | ICD-10-CM

## 2023-11-16 DIAGNOSIS — K59.09 CHRONIC CONSTIPATION: ICD-10-CM

## 2023-11-16 DIAGNOSIS — F25.0 SCHIZOAFFECTIVE DISORDER, BIPOLAR TYPE: Chronic | ICD-10-CM

## 2023-11-16 DIAGNOSIS — I70.0 ATHEROSCLEROSIS OF AORTA: Chronic | ICD-10-CM

## 2023-11-16 DIAGNOSIS — J41.0 SIMPLE CHRONIC BRONCHITIS: ICD-10-CM

## 2023-11-16 DIAGNOSIS — K21.9 GASTROESOPHAGEAL REFLUX DISEASE WITHOUT ESOPHAGITIS: ICD-10-CM

## 2023-11-16 DIAGNOSIS — L29.9 PRURITUS: Primary | ICD-10-CM

## 2023-11-16 PROCEDURE — 3044F PR MOST RECENT HEMOGLOBIN A1C LEVEL <7.0%: ICD-10-PCS | Mod: CPTII,95,, | Performed by: FAMILY MEDICINE

## 2023-11-16 PROCEDURE — 1160F PR REVIEW ALL MEDS BY PRESCRIBER/CLIN PHARMACIST DOCUMENTED: ICD-10-PCS | Mod: CPTII,95,, | Performed by: FAMILY MEDICINE

## 2023-11-16 PROCEDURE — 3044F HG A1C LEVEL LT 7.0%: CPT | Mod: CPTII,95,, | Performed by: FAMILY MEDICINE

## 2023-11-16 PROCEDURE — 1159F MED LIST DOCD IN RCRD: CPT | Mod: CPTII,95,, | Performed by: FAMILY MEDICINE

## 2023-11-16 PROCEDURE — 99214 PR OFFICE/OUTPT VISIT, EST, LEVL IV, 30-39 MIN: ICD-10-PCS | Mod: 95,,, | Performed by: FAMILY MEDICINE

## 2023-11-16 PROCEDURE — 1160F RVW MEDS BY RX/DR IN RCRD: CPT | Mod: CPTII,95,, | Performed by: FAMILY MEDICINE

## 2023-11-16 PROCEDURE — 1159F PR MEDICATION LIST DOCUMENTED IN MEDICAL RECORD: ICD-10-PCS | Mod: CPTII,95,, | Performed by: FAMILY MEDICINE

## 2023-11-16 PROCEDURE — 99214 OFFICE O/P EST MOD 30 MIN: CPT | Mod: 95,,, | Performed by: FAMILY MEDICINE

## 2023-11-16 RX ORDER — ATORVASTATIN CALCIUM 80 MG/1
80 TABLET, FILM COATED ORAL NIGHTLY
Qty: 90 TABLET | Refills: 3 | Status: SHIPPED | OUTPATIENT
Start: 2023-11-16 | End: 2023-11-21 | Stop reason: SDUPTHER

## 2023-11-16 RX ORDER — HYDROXYZINE HYDROCHLORIDE 25 MG/1
25 TABLET, FILM COATED ORAL 3 TIMES DAILY PRN
COMMUNITY
End: 2023-11-16 | Stop reason: SDUPTHER

## 2023-11-16 RX ORDER — POLYETHYLENE GLYCOL 3350 17 G/17G
17 POWDER, FOR SOLUTION ORAL DAILY PRN
Qty: 510 G | Refills: 11 | Status: SHIPPED | OUTPATIENT
Start: 2023-11-16

## 2023-11-16 RX ORDER — OMEPRAZOLE 40 MG/1
40 CAPSULE, DELAYED RELEASE ORAL EVERY MORNING
Qty: 90 CAPSULE | Refills: 3 | Status: SHIPPED | OUTPATIENT
Start: 2023-11-16 | End: 2023-11-21 | Stop reason: SDUPTHER

## 2023-11-16 RX ORDER — ALBUTEROL SULFATE 0.83 MG/ML
2.5 SOLUTION RESPIRATORY (INHALATION) EVERY 6 HOURS PRN
Qty: 150 ML | Refills: 3 | Status: SHIPPED | OUTPATIENT
Start: 2023-11-16 | End: 2023-11-21 | Stop reason: SDUPTHER

## 2023-11-16 RX ORDER — AMLODIPINE BESYLATE 5 MG/1
5 TABLET ORAL DAILY
Qty: 90 TABLET | Refills: 3 | Status: SHIPPED | OUTPATIENT
Start: 2023-11-16 | End: 2023-11-21 | Stop reason: SDUPTHER

## 2023-11-16 RX ORDER — HYDROXYZINE HYDROCHLORIDE 25 MG/1
25 TABLET, FILM COATED ORAL 3 TIMES DAILY PRN
Qty: 60 TABLET | Refills: 2 | Status: SHIPPED | OUTPATIENT
Start: 2023-11-16 | End: 2023-11-21 | Stop reason: SDUPTHER

## 2023-11-16 RX ORDER — TRAMADOL HYDROCHLORIDE 50 MG/1
50 TABLET ORAL EVERY 8 HOURS PRN
Qty: 90 TABLET | Refills: 3 | Status: SHIPPED | OUTPATIENT
Start: 2023-11-16

## 2023-11-16 NOTE — PROGRESS NOTES
TELEMEDICINE VIRTUAL VIDEO VISIT  11/16/23  1:00 PM CST    Visit Type: Audiovisual    Patient's Location: Karyna represents that they are located within the state [facility].    CHIEF COMPLAINT: Itching, Refills    Generalized pruritus is chronic problem, undetermined etiology. No rash. Other chronic conditions are represented as and appear to be compensated/controlled and stable. No new complaints or concerns reported.        1. Pruritus  -     CBC Auto Differential; Future; Expected date: 11/16/2023  -     TSH; Future; Expected date: 11/16/2023  -     PROTEIN, TOTAL; Future; Expected date: 11/16/2023  -     HEPATITIS C ANTIBODY; Future; Expected date: 11/16/2023  -     Comprehensive Metabolic Panel; Future; Expected date: 11/16/2023  -     hydrOXYzine HCL (ATARAX) 25 MG tablet; Take 1 tablet (25 mg total) by mouth 3 (three) times daily as needed for Itching.  Dispense: 60 tablet; Refill: 2    2. Simple chronic bronchitis  -     albuterol (PROVENTIL) 2.5 mg /3 mL (0.083 %) nebulizer solution; Take 3 mLs (2.5 mg total) by nebulization every 6 (six) hours as needed for Wheezing. Rescue  Dispense: 150 mL; Refill: 3    3. Essential hypertension  -     amLODIPine (NORVASC) 5 MG tablet; Take 1 tablet (5 mg total) by mouth once daily.  Dispense: 90 tablet; Refill: 3    4. Pure hypercholesterolemia  -     atorvastatin (LIPITOR) 80 MG tablet; Take 1 tablet (80 mg total) by mouth every evening.  Dispense: 90 tablet; Refill: 3    5. Atherosclerosis of aorta  Overview:  CT ABDOMEN PELVIS WITH CONTRAST 01/03/2021  FINDINGS: Mild atherosclerosis within the abdominal aorta which is nonaneurysmal.    Orders:  -     atorvastatin (LIPITOR) 80 MG tablet; Take 1 tablet (80 mg total) by mouth every evening.  Dispense: 90 tablet; Refill: 3    6. Gastroesophageal reflux disease without esophagitis  -     omeprazole (PRILOSEC) 40 MG capsule; Take 1 capsule (40 mg total) by mouth every morning.  Dispense: 90 capsule; Refill: 3    7.  "Chronic constipation  -     polyethylene glycol (GLYCOLAX) 17 gram/dose powder; Take 17 g by mouth daily as needed (for constipation).  Dispense: 510 g; Refill: 11    8. Primary localized osteoarthrosis of multiple sites  -     traMADoL (ULTRAM) 50 mg tablet; Take 1 tablet (50 mg total) by mouth every 8 (eight) hours as needed for Pain.  Dispense: 90 tablet; Refill: 3    9. Schizoaffective disorder, bipolar type  Overview:  Psychiatrist = Marilin Hernádnez MD      Unless noted herein, any chronic conditions are represented as and appear stable, and no other significant complaints or concerns were reported.    Review of Systems   Respiratory:  Negative for chest tightness and shortness of breath.    Cardiovascular:  Negative for chest pain.   Endocrine: Negative for polydipsia and polyuria.       Physical Exam  Constitutional:       General: She is not in acute distress.     Appearance: Normal appearance. She is not ill-appearing.   Pulmonary:      Effort: Pulmonary effort is normal. No respiratory distress.   Skin:     Coloration: Skin is not jaundiced.   Neurological:      Mental Status: She is alert. Mental status is at baseline.   Psychiatric:         Mood and Affect: Mood normal.         Behavior: Behavior normal.         Thought Content: Thought content normal.         Follow up in about 15 weeks (around 2/29/2024).       Documentation entered by me for this encounter may have been done in part using speech-recognition technology. Although I have made an effort to ensure accuracy, "sound like" errors may exist and should be interpreted in context.   TOTAL TIME evaluating and managing this patient for this encounter was greater than or equal to 30 minutes.  This time was exclusive of any separately billable procedures for this patient and exclusive of time spent treating any other patient.    Documentation entered by me for this encounter may have been done in part using speech-recognition technology. Although " "I have made an effort to ensure accuracy, "sound like" errors may exist and should be interpreted in context.    Each patient to whom medical services are provided by telemedicine is: (1) informed of the relationship between the physician and patient and the respective role of any other health care provider with respect to management of the patient; and (2) notified that he or she may decline to receive medical services by telemedicine and may withdraw from such care at any time.  "

## 2023-11-16 NOTE — TELEPHONE ENCOUNTER
----- Message from DIMA Boothe MD sent at 11/16/2023  1:22 PM CST -----  Schedule labs soon.  Keep 2/29/24 appointment with me already scheduled.

## 2023-11-17 ENCOUNTER — LAB VISIT (OUTPATIENT)
Dept: LAB | Facility: HOSPITAL | Age: 63
End: 2023-11-17
Attending: FAMILY MEDICINE
Payer: MEDICAID

## 2023-11-17 DIAGNOSIS — L29.9 PRURITUS: ICD-10-CM

## 2023-11-17 LAB
ALBUMIN SERPL BCP-MCNC: 3.8 G/DL (ref 3.5–5.2)
ALP SERPL-CCNC: 79 U/L (ref 55–135)
ALT SERPL W/O P-5'-P-CCNC: 15 U/L (ref 10–44)
ANION GAP SERPL CALC-SCNC: 13 MMOL/L (ref 8–16)
AST SERPL-CCNC: 17 U/L (ref 10–40)
BASOPHILS # BLD AUTO: 0.02 K/UL (ref 0–0.2)
BASOPHILS NFR BLD: 0.2 % (ref 0–1.9)
BILIRUB SERPL-MCNC: 0.2 MG/DL (ref 0.1–1)
BUN SERPL-MCNC: 10 MG/DL (ref 8–23)
CALCIUM SERPL-MCNC: 9.4 MG/DL (ref 8.7–10.5)
CHLORIDE SERPL-SCNC: 109 MMOL/L (ref 95–110)
CO2 SERPL-SCNC: 22 MMOL/L (ref 23–29)
CREAT SERPL-MCNC: 0.8 MG/DL (ref 0.5–1.4)
DIFFERENTIAL METHOD: NORMAL
EOSINOPHIL # BLD AUTO: 0.2 K/UL (ref 0–0.5)
EOSINOPHIL NFR BLD: 2.1 % (ref 0–8)
ERYTHROCYTE [DISTWIDTH] IN BLOOD BY AUTOMATED COUNT: 13.5 % (ref 11.5–14.5)
EST. GFR  (NO RACE VARIABLE): >60 ML/MIN/1.73 M^2
GLUCOSE SERPL-MCNC: 96 MG/DL (ref 70–110)
HCT VFR BLD AUTO: 39 % (ref 37–48.5)
HGB BLD-MCNC: 12.8 G/DL (ref 12–16)
IMM GRANULOCYTES # BLD AUTO: 0.01 K/UL (ref 0–0.04)
IMM GRANULOCYTES NFR BLD AUTO: 0.1 % (ref 0–0.5)
LYMPHOCYTES # BLD AUTO: 3.7 K/UL (ref 1–4.8)
LYMPHOCYTES NFR BLD: 41.3 % (ref 18–48)
MCH RBC QN AUTO: 29.8 PG (ref 27–31)
MCHC RBC AUTO-ENTMCNC: 32.8 G/DL (ref 32–36)
MCV RBC AUTO: 91 FL (ref 82–98)
MONOCYTES # BLD AUTO: 0.6 K/UL (ref 0.3–1)
MONOCYTES NFR BLD: 6.8 % (ref 4–15)
NEUTROPHILS # BLD AUTO: 4.4 K/UL (ref 1.8–7.7)
NEUTROPHILS NFR BLD: 49.5 % (ref 38–73)
NRBC BLD-RTO: 0 /100 WBC
PLATELET # BLD AUTO: 356 K/UL (ref 150–450)
PMV BLD AUTO: 9.9 FL (ref 9.2–12.9)
POTASSIUM SERPL-SCNC: 3.9 MMOL/L (ref 3.5–5.1)
PROT SERPL-MCNC: 6.5 G/DL (ref 6–8.4)
PROT SERPL-MCNC: 6.5 G/DL (ref 6–8.4)
RBC # BLD AUTO: 4.3 M/UL (ref 4–5.4)
SODIUM SERPL-SCNC: 144 MMOL/L (ref 136–145)
TSH SERPL DL<=0.005 MIU/L-ACNC: 3.26 UIU/ML (ref 0.4–4)
WBC # BLD AUTO: 8.92 K/UL (ref 3.9–12.7)

## 2023-11-17 PROCEDURE — 36415 COLL VENOUS BLD VENIPUNCTURE: CPT | Performed by: FAMILY MEDICINE

## 2023-11-17 PROCEDURE — 85025 COMPLETE CBC W/AUTO DIFF WBC: CPT | Performed by: FAMILY MEDICINE

## 2023-11-17 PROCEDURE — 84443 ASSAY THYROID STIM HORMONE: CPT | Performed by: FAMILY MEDICINE

## 2023-11-17 PROCEDURE — 80053 COMPREHEN METABOLIC PANEL: CPT | Performed by: FAMILY MEDICINE

## 2023-11-17 PROCEDURE — 86803 HEPATITIS C AB TEST: CPT | Performed by: FAMILY MEDICINE

## 2023-11-18 LAB — HCV AB SERPL QL IA: NORMAL

## 2023-11-21 DIAGNOSIS — E78.00 PURE HYPERCHOLESTEROLEMIA: Chronic | ICD-10-CM

## 2023-11-21 DIAGNOSIS — K21.9 GASTROESOPHAGEAL REFLUX DISEASE WITHOUT ESOPHAGITIS: ICD-10-CM

## 2023-11-21 DIAGNOSIS — I10 ESSENTIAL HYPERTENSION: Chronic | ICD-10-CM

## 2023-11-21 DIAGNOSIS — M19.91 PRIMARY LOCALIZED OSTEOARTHROSIS OF MULTIPLE SITES: Chronic | ICD-10-CM

## 2023-11-21 DIAGNOSIS — L29.9 PRURITUS: ICD-10-CM

## 2023-11-21 DIAGNOSIS — J41.0 SIMPLE CHRONIC BRONCHITIS: ICD-10-CM

## 2023-11-21 DIAGNOSIS — I70.0 ATHEROSCLEROSIS OF AORTA: Chronic | ICD-10-CM

## 2023-11-21 NOTE — TELEPHONE ENCOUNTER
No care due was identified.  [facility] Embedded Care Due Messages. Reference number: [id number].   11/21/2023 10:39:13 AM CST

## 2023-11-21 NOTE — TELEPHONE ENCOUNTER
----- Message from Jessica Espinosa sent at 11/21/2023 10:13 AM CST -----  Regarding: refills    Name Of Caller:  Karyna    Contact Preference:  [phone]      Nature of call:  Patient called to say that all of her medication refills that were sent to [facility] can not be filled.  She would like for her medications to be sent to the mail order pharmacy.      [facility] [place] [facility] - [facility] AT [facility]  [place] [address]  Phone: [phone] Fax: [phone]

## 2023-11-22 RX ORDER — TRAMADOL HYDROCHLORIDE 50 MG/1
50 TABLET ORAL EVERY 8 HOURS PRN
Qty: 90 TABLET | Refills: 3 | OUTPATIENT
Start: 2023-11-22

## 2023-11-22 RX ORDER — HYDROXYZINE HYDROCHLORIDE 25 MG/1
25 TABLET, FILM COATED ORAL 3 TIMES DAILY PRN
Qty: 60 TABLET | Refills: 2 | Status: SHIPPED | OUTPATIENT
Start: 2023-11-22

## 2023-11-22 RX ORDER — ATORVASTATIN CALCIUM 80 MG/1
80 TABLET, FILM COATED ORAL NIGHTLY
Qty: 90 TABLET | Refills: 3 | Status: SHIPPED | OUTPATIENT
Start: 2023-11-22 | End: 2024-11-21

## 2023-11-22 RX ORDER — ALBUTEROL SULFATE 0.83 MG/ML
2.5 SOLUTION RESPIRATORY (INHALATION) EVERY 6 HOURS PRN
Qty: 150 ML | Refills: 3 | Status: SHIPPED | OUTPATIENT
Start: 2023-11-22 | End: 2024-11-21

## 2023-11-22 RX ORDER — OMEPRAZOLE 40 MG/1
40 CAPSULE, DELAYED RELEASE ORAL EVERY MORNING
Qty: 90 CAPSULE | Refills: 3 | Status: SHIPPED | OUTPATIENT
Start: 2023-11-22 | End: 2024-11-21

## 2023-11-22 RX ORDER — AMLODIPINE BESYLATE 5 MG/1
5 TABLET ORAL DAILY
Qty: 90 TABLET | Refills: 3 | Status: SHIPPED | OUTPATIENT
Start: 2023-11-22 | End: 2024-11-21

## 2024-02-22 ENCOUNTER — HOSPITAL ENCOUNTER (EMERGENCY)
Facility: HOSPITAL | Age: 64
Discharge: HOME | End: 2024-02-22
Attending: EMERGENCY MEDICINE
Payer: MEDICARE

## 2024-02-22 VITALS
BODY MASS INDEX: 28.81 KG/M2 | WEIGHT: 157.5 LBS | OXYGEN SATURATION: 99 % | HEART RATE: 99 BPM | TEMPERATURE: 98 F | DIASTOLIC BLOOD PRESSURE: 77 MMHG | RESPIRATION RATE: 16 BRPM | SYSTOLIC BLOOD PRESSURE: 148 MMHG

## 2024-02-22 DIAGNOSIS — I73.00 RAYNAUD'S PHENOMENON WITHOUT GANGRENE: Primary | ICD-10-CM

## 2024-02-22 LAB
ALBUMIN SERPL BCP-MCNC: 4 G/DL (ref 3.5–5.2)
ALP SERPL-CCNC: 90 U/L (ref 55–135)
ALT SERPL W/O P-5'-P-CCNC: 11 U/L (ref 10–44)
ANION GAP SERPL CALC-SCNC: 7 MMOL/L (ref 8–16)
AST SERPL-CCNC: 15 U/L (ref 10–40)
BASOPHILS # BLD AUTO: 0.04 K/UL (ref 0–0.2)
BASOPHILS NFR BLD: 0.6 % (ref 0–1.9)
BILIRUB SERPL-MCNC: 0.3 MG/DL (ref 0.1–1)
BUN SERPL-MCNC: 7 MG/DL (ref 8–23)
CALCIUM SERPL-MCNC: 9.4 MG/DL (ref 8.7–10.5)
CHLORIDE SERPL-SCNC: 107 MMOL/L (ref 95–110)
CO2 SERPL-SCNC: 27 MMOL/L (ref 23–29)
CREAT SERPL-MCNC: 0.9 MG/DL (ref 0.5–1.4)
DIFFERENTIAL METHOD BLD: ABNORMAL
EOSINOPHIL # BLD AUTO: 0.2 K/UL (ref 0–0.5)
EOSINOPHIL NFR BLD: 2.7 % (ref 0–8)
ERYTHROCYTE [DISTWIDTH] IN BLOOD BY AUTOMATED COUNT: 13.7 % (ref 11.5–14.5)
EST. GFR  (NO RACE VARIABLE): >60 ML/MIN/1.73 M^2
GLUCOSE SERPL-MCNC: 111 MG/DL (ref 70–110)
HCT VFR BLD AUTO: 39.7 % (ref 37–48.5)
HGB BLD-MCNC: 13.3 G/DL (ref 12–16)
IMM GRANULOCYTES # BLD AUTO: 0.01 K/UL (ref 0–0.04)
IMM GRANULOCYTES NFR BLD AUTO: 0.1 % (ref 0–0.5)
LYMPHOCYTES # BLD AUTO: 3.6 K/UL (ref 1–4.8)
LYMPHOCYTES NFR BLD: 50.6 % (ref 18–48)
MCH RBC QN AUTO: 29.6 PG (ref 27–31)
MCHC RBC AUTO-ENTMCNC: 33.5 G/DL (ref 32–36)
MCV RBC AUTO: 88 FL (ref 82–98)
MONOCYTES # BLD AUTO: 0.6 K/UL (ref 0.3–1)
MONOCYTES NFR BLD: 8 % (ref 4–15)
NEUTROPHILS # BLD AUTO: 2.7 K/UL (ref 1.8–7.7)
NEUTROPHILS NFR BLD: 38 % (ref 38–73)
NRBC BLD-RTO: 0 /100 WBC
PLATELET # BLD AUTO: 328 K/UL (ref 150–450)
PMV BLD AUTO: 8.7 FL (ref 9.2–12.9)
POTASSIUM SERPL-SCNC: 3.7 MMOL/L (ref 3.5–5.1)
PROT SERPL-MCNC: 7.2 G/DL (ref 6–8.4)
RBC # BLD AUTO: 4.5 M/UL (ref 4–5.4)
SODIUM SERPL-SCNC: 141 MMOL/L (ref 136–145)
TSH SERPL DL<=0.005 MIU/L-ACNC: 2.55 UIU/ML (ref 0.4–4)
WBC # BLD AUTO: 7.15 K/UL (ref 3.9–12.7)

## 2024-02-22 PROCEDURE — 80053 COMPREHEN METABOLIC PANEL: CPT | Performed by: REGISTERED NURSE

## 2024-02-22 PROCEDURE — 84443 ASSAY THYROID STIM HORMONE: CPT | Performed by: REGISTERED NURSE

## 2024-02-22 PROCEDURE — 85025 COMPLETE CBC W/AUTO DIFF WBC: CPT | Performed by: REGISTERED NURSE

## 2024-02-22 PROCEDURE — 99283 EMERGENCY DEPT VISIT LOW MDM: CPT

## 2024-02-22 NOTE — ED PROVIDER NOTES
Encounter Date: 2/22/2024       History     Chief Complaint   Patient presents with    Hand Pain     States her fingers and toes are freezing. Been going on for a week.      64-year-old female presents emergency department complaints of cold hands and feet.  Patient has a smoker and does drink coffee.  Patient states symptoms began approximately 1 week ago.  Patient denies any chest pain, shortness of breath, weakness, dizziness or any other symptoms at this time.    The history is provided by the patient.     Review of patient's allergies indicates:   Allergen Reactions    Aspirin     Ibuprofen Anxiety     Past Medical History:   Diagnosis Date    Anemia     Aneurysm     Anorexia 4/26/2017    Anxiety     Asthma     Atherosclerosis of aorta 1/5/2022    CT ABDOMEN PELVIS WITH CONTRAST 01/03/2021 FINDINGS: Mild atherosclerosis within the abdominal aorta which is nonaneurysmal.    Bipolar disorder     Carpal tunnel syndrome, bilateral     Cerebral aneurysm     Chronic nausea 11/15/2018    Chronic pain syndrome     Cigarette nicotine dependence     Depression     Essential hypertension 8/29/2014    GERD (gastroesophageal reflux disease)     Hyperlipidemia     Hypertension     Insomnia     Osteoporosis     Pure hypercholesterolemia 9/8/2014    Recurrent tension-type headache, not intractable 7/24/2019    Schizophrenia     Stroke     Subarachnoid hemorrhage      Past Surgical History:   Procedure Laterality Date    BUNIONECTOMY Right     COLONOSCOPY N/A 2/7/2022    Procedure: COLONOSCOPY;  Surgeon: Kamila Lund MD;  Location: [facility];  Service: Endoscopy;  Laterality: N/A;    COLONOSCOPY W/ BIOPSIES AND POLYPECTOMY      ESOPHAGOGASTRODUODENOSCOPY N/A 2/7/2022    Procedure: EGD (ESOPHAGOGASTRODUODENOSCOPY);  Surgeon: Kamila Lund MD;  Location: [facility];  Service: Endoscopy;  Laterality: N/A;    HYSTERECTOMY      PARTIAL HYSTERECTOMY      Bilateral Ovaries Remain    TRANSCRANIAL DOPPLER STUDY - COMPLETE   8/28/2014         TRANSCRANIAL DOPPLER STUDY - COMPLETE  8/29/2014         TRANSCRANIAL DOPPLER STUDY - COMPLETE  8/30/2014         TRANSCRANIAL DOPPLER STUDY - COMPLETE  8/31/2014         TRANSCRANIAL DOPPLER STUDY - COMPLETE  9/1/2014         TRANSCRANIAL DOPPLER STUDY - COMPLETE  9/2/2014         TRANSCRANIAL DOPPLER STUDY - COMPLETE  9/3/2014         TRANSCRANIAL DOPPLER STUDY - COMPLETE  9/4/2014         TRANSCRANIAL DOPPLER STUDY - COMPLETE  9/5/2014         TRANSCRANIAL DOPPLER STUDY - COMPLETE  9/6/2014         VAGINAL DELIVERY      X 3     Family History   Problem Relation Age of Onset    Cancer Mother     Glaucoma Mother     Stroke Mother     Diabetes type II Mother     Heart disease Mother     Hypertension Mother     Brain cancer Sister     Diabetes type II Sister     Cancer Brother     Hypertension Brother     Bone cancer Sister      Social History     Tobacco Use    Smoking status: Every Day     Current packs/day: 1.00     Average packs/day: 1 pack/day for 51.4 years (51.4 ttl pk-yrs)     Types: Cigarettes, Cigars, Vaping with nicotine     Start date: 1976    Smokeless tobacco: Never    Tobacco comments:     in smoking program 5/13/19   Substance Use Topics    Alcohol use: Yes     Comment: 1-3 times per week, 1-2 drink at a time.    Drug use: No     Review of Systems   Constitutional:  Negative for fever.   HENT:  Negative for sore throat.    Respiratory:  Negative for shortness of breath.    Cardiovascular:  Negative for chest pain.   Gastrointestinal:  Negative for nausea.   Genitourinary:  Negative for dysuria.   Musculoskeletal:  Negative for back pain.        +cold hands and feet   Skin:  Negative for rash.   Neurological:  Negative for weakness.   Hematological:  Does not bruise/bleed easily.   All other systems reviewed and are negative.      Physical Exam     Initial Vitals [02/22/24 1013]   BP Pulse Resp Temp SpO2   (!) 148/77 99 16 98.1 °F (36.7 °C) 99 %      MAP       --         Physical  Exam    Constitutional: She appears well-developed and well-nourished. She is not diaphoretic. No distress.   HENT:   Head: Normocephalic and atraumatic.   Eyes: Conjunctivae and EOM are normal. Pupils are equal, round, and reactive to light.   Neck: Neck supple.   Normal range of motion.  Cardiovascular:  Normal rate, regular rhythm and normal heart sounds.           No murmur heard.  Pulmonary/Chest: Breath sounds normal. No respiratory distress. She has no wheezes. She has no rales.   Abdominal: Abdomen is soft. Bowel sounds are normal. There is no abdominal tenderness. There is no rebound and no guarding.   Musculoskeletal:         General: No tenderness or edema. Normal range of motion.      Cervical back: Normal range of motion and neck supple.      Comments: Bilateral hands and feet are warm to touch, capillary refill less than 3 seconds, distal pulses +2, full range of motion, no pallor     Neurological: She is alert and oriented to person, place, and time. No cranial nerve deficit. GCS score is 15. GCS eye subscore is 4. GCS verbal subscore is 5. GCS motor subscore is 6.   Skin: Skin is warm and dry. Capillary refill takes less than 2 seconds.   Psychiatric: She has a normal mood and affect. Thought content normal.         ED Course   Procedures  Labs Reviewed   CBC W/ AUTO DIFFERENTIAL - Abnormal; Notable for the following components:       Result Value    MPV 8.7 (*)     Lymph % 50.6 (*)     All other components within normal limits   COMPREHENSIVE METABOLIC PANEL - Abnormal; Notable for the following components:    Glucose 111 (*)     BUN 7 (*)     Anion Gap 7 (*)     All other components within normal limits   TSH          Imaging Results    None          Medications - No data to display  Medical Decision Making  Amount and/or Complexity of Data Reviewed  Labs: ordered.     Details: Unremarkable    Risk  Risk Details: Advised patient to reduce smoking due to it exacerbating Raynaud's phenomenon.   Advised patient to follow up with the primary care.  Return emergency department as needed.                                      Clinical Impression:  Final diagnoses:  [I73.00] Raynaud's phenomenon without gangrene (Primary)          ED Disposition Condition    Discharge Stable          ED Prescriptions    None       Follow-up Information       Follow up With Specialties Details Why Contact Info    [facility] - Emergency Dept. Emergency Medicine  If symptoms worsen [id number] [address] [facility] [id number] [phone]             Anthony Weber Jr., FNP  02/22/24 5831

## 2024-02-22 NOTE — FIRST PROVIDER EVALUATION
Medical screening examination initiated.  I have conducted a focused provider triage encounter, findings are as follows:    Brief history of present illness:  Hand and feet pain.  Patient reports that her hands and feet feel like ice.  Symptoms began 1 week ago.    Vitals:    02/22/24 1013 02/22/24 1014   BP: (!) 148/77    BP Location: Left arm    Patient Position: Sitting    Pulse: 99    Resp: 16    Temp: 98.1 °F (36.7 °C)    TempSrc: Oral    SpO2: 99%    Weight:  71.5 kg (157 lb 8.3 oz)       Pertinent physical exam:  Vital signs stable, no acute distress    Brief workup plan:  Labs and further evaluation    Preliminary workup initiated; this workup will be continued and followed by the physician or advanced practice provider that is assigned to the patient when roomed.

## 2024-05-01 ENCOUNTER — HOSPITAL ENCOUNTER (OUTPATIENT)
Facility: HOSPITAL | Age: 64
Discharge: HOME | End: 2024-05-02
Attending: EMERGENCY MEDICINE | Admitting: INTERNAL MEDICINE
Payer: MEDICARE

## 2024-05-01 DIAGNOSIS — R29.818 ACUTE FOCAL NEUROLOGICAL DEFICIT: ICD-10-CM

## 2024-05-01 DIAGNOSIS — G45.9 TIA (TRANSIENT ISCHEMIC ATTACK): ICD-10-CM

## 2024-05-01 PROBLEM — R42 DIZZINESS: Status: ACTIVE | Noted: 2024-05-01

## 2024-05-01 LAB
ALBUMIN SERPL BCP-MCNC: 3.6 G/DL (ref 3.5–5.2)
ALP SERPL-CCNC: 81 U/L (ref 55–135)
ALT SERPL W/O P-5'-P-CCNC: 13 U/L (ref 10–44)
ANION GAP SERPL CALC-SCNC: 6 MMOL/L (ref 8–16)
ASCENDING AORTA: 2.62 CM
AST SERPL-CCNC: 13 U/L (ref 10–40)
AV INDEX (PROSTH): 0.79
AV MEAN GRADIENT: 3 MMHG
AV PEAK GRADIENT: 7 MMHG
AV VALVE AREA BY VELOCITY RATIO: 2.06 CM²
AV VALVE AREA: 2.13 CM²
AV VELOCITY RATIO: 0.76
BACTERIA #/AREA URNS HPF: NORMAL /HPF
BASOPHILS # BLD AUTO: 0.02 K/UL (ref 0–0.2)
BASOPHILS NFR BLD: 0.3 % (ref 0–1.9)
BILIRUB SERPL-MCNC: 0.3 MG/DL (ref 0.1–1)
BILIRUB UR QL STRIP: NEGATIVE
BUN SERPL-MCNC: 7 MG/DL (ref 8–23)
CALCIUM SERPL-MCNC: 9.2 MG/DL (ref 8.7–10.5)
CHLORIDE SERPL-SCNC: 106 MMOL/L (ref 95–110)
CHOLEST SERPL-MCNC: 145 MG/DL (ref 120–199)
CHOLEST/HDLC SERPL: 3.4 {RATIO} (ref 2–5)
CLARITY UR: CLEAR
CO2 SERPL-SCNC: 25 MMOL/L (ref 23–29)
COLOR UR: YELLOW
CREAT SERPL-MCNC: 0.9 MG/DL (ref 0.5–1.4)
CV ECHO LV RWT: 0.51 CM
DIFFERENTIAL METHOD BLD: ABNORMAL
DOP CALC AO PEAK VEL: 1.28 M/S
DOP CALC AO VTI: 24.7 CM
DOP CALC LVOT AREA: 2.7 CM2
DOP CALC LVOT DIAMETER: 1.86 CM
DOP CALC LVOT PEAK VEL: 0.97 M/S
DOP CALC LVOT STROKE VOLUME: 52.69 CM3
DOP CALC RVOT PEAK VEL: 0.69 M/S
DOP CALC RVOT VTI: 16 CM
DOP CALCLVOT PEAK VEL VTI: 19.4 CM
E WAVE DECELERATION TIME: 211.52 MSEC
E/A RATIO: 0.78
E/E' RATIO: 8.14 M/S
ECHO LV POSTERIOR WALL: 0.97 CM (ref 0.6–1.1)
EJECTION FRACTION: 60 %
EOSINOPHIL # BLD AUTO: 0.2 K/UL (ref 0–0.5)
EOSINOPHIL NFR BLD: 2.4 % (ref 0–8)
ERYTHROCYTE [DISTWIDTH] IN BLOOD BY AUTOMATED COUNT: 13.6 % (ref 11.5–14.5)
EST. GFR  (NO RACE VARIABLE): >60 ML/MIN/1.73 M^2
ESTIMATED AVG GLUCOSE: 134 MG/DL (ref 68–131)
FRACTIONAL SHORTENING: 32 % (ref 28–44)
GLUCOSE SERPL-MCNC: 101 MG/DL (ref 70–110)
GLUCOSE SERPL-MCNC: 93 MG/DL (ref 70–110)
GLUCOSE UR QL STRIP: NEGATIVE
HBA1C MFR BLD: 6.3 % (ref 4–5.6)
HCT VFR BLD AUTO: 38.1 % (ref 37–48.5)
HDLC SERPL-MCNC: 43 MG/DL (ref 40–75)
HDLC SERPL: 29.7 % (ref 20–50)
HGB BLD-MCNC: 12.8 G/DL (ref 12–16)
HGB UR QL STRIP: ABNORMAL
HYALINE CASTS #/AREA URNS LPF: 0 /LPF
IMM GRANULOCYTES # BLD AUTO: 0.02 K/UL (ref 0–0.04)
IMM GRANULOCYTES NFR BLD AUTO: 0.3 % (ref 0–0.5)
INR PPP: 1 (ref 0.8–1.2)
INTERVENTRICULAR SEPTUM: 1.19 CM (ref 0.6–1.1)
IVC DIAMETER: 0.86 CM
IVRT: 74.22 MSEC
KETONES UR QL STRIP: NEGATIVE
LA MAJOR: 5.24 CM
LA MINOR: 4.41 CM
LA WIDTH: 2.7 CM
LDLC SERPL CALC-MCNC: 91.2 MG/DL (ref 63–159)
LEFT ATRIUM SIZE: 2.93 CM
LEFT ATRIUM VOLUME: 32.2 CM3
LEFT INTERNAL DIMENSION IN SYSTOLE: 2.6 CM (ref 2.1–4)
LEFT VENTRICLE DIASTOLIC VOLUME: 62.47 ML
LEFT VENTRICLE SYSTOLIC VOLUME: 24.55 ML
LEFT VENTRICULAR INTERNAL DIMENSION IN DIASTOLE: 3.81 CM (ref 3.5–6)
LEFT VENTRICULAR MASS: 131.61 G
LEUKOCYTE ESTERASE UR QL STRIP: NEGATIVE
LV LATERAL E/E' RATIO: 8.14 M/S
LV SEPTAL E/E' RATIO: 8.14 M/S
LVOT MG: 1.54 MMHG
LVOT MV: 0.59 CM/S
LYMPHOCYTES # BLD AUTO: 2.4 K/UL (ref 1–4.8)
LYMPHOCYTES NFR BLD: 38.6 % (ref 18–48)
MCH RBC QN AUTO: 29.8 PG (ref 27–31)
MCHC RBC AUTO-ENTMCNC: 33.6 G/DL (ref 32–36)
MCV RBC AUTO: 89 FL (ref 82–98)
MICROSCOPIC COMMENT: NORMAL
MONOCYTES # BLD AUTO: 0.5 K/UL (ref 0.3–1)
MONOCYTES NFR BLD: 8.7 % (ref 4–15)
MV PEAK A VEL: 0.73 M/S
MV PEAK E VEL: 0.57 M/S
NEUTROPHILS # BLD AUTO: 3.1 K/UL (ref 1.8–7.7)
NEUTROPHILS NFR BLD: 49.7 % (ref 38–73)
NITRITE UR QL STRIP: NEGATIVE
NONHDLC SERPL-MCNC: 102 MG/DL
NRBC BLD-RTO: 0 /100 WBC
OHS QRS DURATION: 78 MS
OHS QTC CALCULATION: 457 MS
PH UR STRIP: 7 [PH] (ref 5–8)
PISA TR MAX VEL: 2.66 M/S
PLATELET # BLD AUTO: 313 K/UL (ref 150–450)
PMV BLD AUTO: 8.9 FL (ref 9.2–12.9)
POCT GLUCOSE: 93 MG/DL (ref 70–110)
POTASSIUM SERPL-SCNC: 3.8 MMOL/L (ref 3.5–5.1)
PROT SERPL-MCNC: 6.2 G/DL (ref 6–8.4)
PROT UR QL STRIP: ABNORMAL
PROTHROMBIN TIME: 11.5 SEC (ref 9–12.5)
PV MEAN GRADIENT: 1 MMHG
RA MAJOR: 3.82 CM
RA PRESSURE ESTIMATED: 3 MMHG
RA WIDTH: 2.9 CM
RBC # BLD AUTO: 4.29 M/UL (ref 4–5.4)
RBC #/AREA URNS HPF: 4 /HPF (ref 0–4)
RV MID DIAMA: 1.87 CM
RV TB RVSP: 6 MMHG
SODIUM SERPL-SCNC: 137 MMOL/L (ref 136–145)
SP GR UR STRIP: >1.03 (ref 1–1.03)
SQUAMOUS #/AREA URNS HPF: 3 /HPF
STJ: 2.63 CM
TDI LATERAL: 0.07 M/S
TDI SEPTAL: 0.07 M/S
TDI: 0.07 M/S
TR MAX PG: 28 MMHG
TRICUSPID ANNULAR PLANE SYSTOLIC EXCURSION: 1.81 CM
TRIGL SERPL-MCNC: 54 MG/DL (ref 30–150)
TSH SERPL DL<=0.005 MIU/L-ACNC: 1.92 UIU/ML (ref 0.4–4)
TV REST PULMONARY ARTERY PRESSURE: 31 MMHG
URN SPEC COLLECT METH UR: ABNORMAL
UROBILINOGEN UR STRIP-ACNC: NEGATIVE EU/DL
WBC # BLD AUTO: 6.19 K/UL (ref 3.9–12.7)
WBC #/AREA URNS HPF: 2 /HPF (ref 0–5)

## 2024-05-01 PROCEDURE — 25500020 PHARM REV CODE 255: Performed by: EMERGENCY MEDICINE

## 2024-05-01 PROCEDURE — 85610 PROTHROMBIN TIME: CPT | Performed by: EMERGENCY MEDICINE

## 2024-05-01 PROCEDURE — 80053 COMPREHEN METABOLIC PANEL: CPT | Performed by: EMERGENCY MEDICINE

## 2024-05-01 PROCEDURE — 84443 ASSAY THYROID STIM HORMONE: CPT | Performed by: EMERGENCY MEDICINE

## 2024-05-01 PROCEDURE — 83036 HEMOGLOBIN GLYCOSYLATED A1C: CPT | Performed by: INTERNAL MEDICINE

## 2024-05-01 PROCEDURE — 85025 COMPLETE CBC W/AUTO DIFF WBC: CPT | Performed by: EMERGENCY MEDICINE

## 2024-05-01 PROCEDURE — 99285 EMERGENCY DEPT VISIT HI MDM: CPT | Mod: 25

## 2024-05-01 PROCEDURE — G0378 HOSPITAL OBSERVATION PER HR: HCPCS

## 2024-05-01 PROCEDURE — 25000003 PHARM REV CODE 250: Performed by: EMERGENCY MEDICINE

## 2024-05-01 PROCEDURE — 93005 ELECTROCARDIOGRAM TRACING: CPT

## 2024-05-01 PROCEDURE — 81000 URINALYSIS NONAUTO W/SCOPE: CPT | Performed by: EMERGENCY MEDICINE

## 2024-05-01 PROCEDURE — 82962 GLUCOSE BLOOD TEST: CPT

## 2024-05-01 PROCEDURE — 94761 N-INVAS EAR/PLS OXIMETRY MLT: CPT

## 2024-05-01 PROCEDURE — 25000003 PHARM REV CODE 250: Performed by: INTERNAL MEDICINE

## 2024-05-01 PROCEDURE — 93010 ELECTROCARDIOGRAM REPORT: CPT | Mod: ,,, | Performed by: INTERNAL MEDICINE

## 2024-05-01 PROCEDURE — 80061 LIPID PANEL: CPT | Performed by: EMERGENCY MEDICINE

## 2024-05-01 RX ORDER — QUETIAPINE FUMARATE 100 MG/1
100 TABLET, FILM COATED ORAL DAILY
Status: DISCONTINUED | OUTPATIENT
Start: 2024-05-02 | End: 2024-05-02 | Stop reason: HOSPADM

## 2024-05-01 RX ORDER — IPRATROPIUM BROMIDE AND ALBUTEROL 20; 100 UG/1; UG/1
1 SPRAY, METERED RESPIRATORY (INHALATION) EVERY 6 HOURS PRN
COMMUNITY
Start: 2024-02-20

## 2024-05-01 RX ORDER — OXCARBAZEPINE 150 MG/1
150 TABLET, FILM COATED ORAL 2 TIMES DAILY
Status: DISCONTINUED | OUTPATIENT
Start: 2024-05-01 | End: 2024-05-02 | Stop reason: HOSPADM

## 2024-05-01 RX ORDER — ALBUTEROL SULFATE 90 UG/1
2 AEROSOL, METERED RESPIRATORY (INHALATION) EVERY 6 HOURS PRN
COMMUNITY
Start: 2024-04-07 | End: 2024-05-28 | Stop reason: SDUPTHER

## 2024-05-01 RX ORDER — BUTALBITAL, ACETAMINOPHEN AND CAFFEINE 50; 325; 40 MG/1; MG/1; MG/1
1 TABLET ORAL EVERY 6 HOURS PRN
Status: DISCONTINUED | OUTPATIENT
Start: 2024-05-01 | End: 2024-05-02 | Stop reason: HOSPADM

## 2024-05-01 RX ORDER — AMITRIPTYLINE HYDROCHLORIDE 50 MG/1
50 TABLET, FILM COATED ORAL NIGHTLY
COMMUNITY
Start: 2024-02-20

## 2024-05-01 RX ORDER — ALBUTEROL SULFATE 0.83 MG/ML
2.5 SOLUTION RESPIRATORY (INHALATION) EVERY 4 HOURS PRN
Status: DISCONTINUED | OUTPATIENT
Start: 2024-05-01 | End: 2024-05-02 | Stop reason: HOSPADM

## 2024-05-01 RX ORDER — MECLIZINE HYDROCHLORIDE 25 MG/1
25 TABLET ORAL
Status: COMPLETED | OUTPATIENT
Start: 2024-05-01 | End: 2024-05-01

## 2024-05-01 RX ORDER — ALPRAZOLAM 0.5 MG/1
0.5 TABLET ORAL DAILY PRN
Status: DISCONTINUED | OUTPATIENT
Start: 2024-05-01 | End: 2024-05-02 | Stop reason: HOSPADM

## 2024-05-01 RX ORDER — ACETAMINOPHEN 325 MG/1
650 TABLET ORAL EVERY 6 HOURS PRN
Status: DISCONTINUED | OUTPATIENT
Start: 2024-05-01 | End: 2024-05-02 | Stop reason: HOSPADM

## 2024-05-01 RX ORDER — BUPROPION HYDROCHLORIDE 100 MG/1
200 TABLET, EXTENDED RELEASE ORAL EVERY MORNING
Status: DISCONTINUED | OUTPATIENT
Start: 2024-05-02 | End: 2024-05-02 | Stop reason: HOSPADM

## 2024-05-01 RX ORDER — PANTOPRAZOLE SODIUM 40 MG/1
40 TABLET, DELAYED RELEASE ORAL DAILY
Status: DISCONTINUED | OUTPATIENT
Start: 2024-05-02 | End: 2024-05-02 | Stop reason: HOSPADM

## 2024-05-01 RX ORDER — BISACODYL 10 MG/1
10 SUPPOSITORY RECTAL DAILY PRN
Status: DISCONTINUED | OUTPATIENT
Start: 2024-05-01 | End: 2024-05-02 | Stop reason: HOSPADM

## 2024-05-01 RX ORDER — SODIUM CHLORIDE 0.9 % (FLUSH) 0.9 %
10 SYRINGE (ML) INJECTION
Status: DISCONTINUED | OUTPATIENT
Start: 2024-05-01 | End: 2024-05-02 | Stop reason: HOSPADM

## 2024-05-01 RX ORDER — POLYETHYLENE GLYCOL 3350 17 G/17G
17 POWDER, FOR SOLUTION ORAL DAILY PRN
Status: DISCONTINUED | OUTPATIENT
Start: 2024-05-01 | End: 2024-05-02 | Stop reason: HOSPADM

## 2024-05-01 RX ORDER — ATORVASTATIN CALCIUM 40 MG/1
80 TABLET, FILM COATED ORAL NIGHTLY
Status: DISCONTINUED | OUTPATIENT
Start: 2024-05-01 | End: 2024-05-02 | Stop reason: HOSPADM

## 2024-05-01 RX ORDER — ESCITALOPRAM OXALATE 10 MG/1
10 TABLET ORAL DAILY
Status: DISCONTINUED | OUTPATIENT
Start: 2024-05-02 | End: 2024-05-02 | Stop reason: HOSPADM

## 2024-05-01 RX ADMIN — ACETAMINOPHEN 650 MG: 325 TABLET ORAL at 05:05

## 2024-05-01 RX ADMIN — ATORVASTATIN CALCIUM 80 MG: 40 TABLET, FILM COATED ORAL at 09:05

## 2024-05-01 RX ADMIN — OXCARBAZEPINE 150 MG: 150 TABLET, FILM COATED ORAL at 09:05

## 2024-05-01 RX ADMIN — IOHEXOL 100 ML: 350 INJECTION, SOLUTION INTRAVENOUS at 08:05

## 2024-05-01 RX ADMIN — MECLIZINE HYDROCHLORIDE 25 MG: 25 TABLET ORAL at 09:05

## 2024-05-01 NOTE — ASSESSMENT & PLAN NOTE
Vertigo vs posterior infarct  -unclear what type of coil patient has and no previous MRIs noted  -repeat CT head tomorrow  -no further meclizine, ask PT to evaluate tomorrow

## 2024-05-01 NOTE — ASSESSMENT & PLAN NOTE
Antithrombotics for secondary stroke prevention: Antiplatelets: None: patient with previous aneurysmal hemorrhage and coiling, noted to not be candidate in previous neurology notes    Statins for secondary stroke prevention and hyperlipidemia, if present:   Statins: Atorvastatin- 80 mg daily    Aggressive risk factor modification: HTN, Smoking, CAD     Rehab efforts: The patient has been evaluated by a stroke team provider and the therapy needs have been fully considered based off the presenting complaints and exam findings. The following therapy evaluations are needed: PT evaluate and treat, OT evaluate and treat, SLP evaluate and treat    Diagnostics ordered/pending: CT scan of head without contrast to asses brain parenchyma, CTA Head to assess vasculature , CTA Neck/Arch to assess vasculature, TTE to assess cardiac function/status     VTE prophylaxis: Mechanical prophylaxis: Place SCDs    BP parameters: Infarct: No intervention, SBP <220

## 2024-05-01 NOTE — Clinical Note
Kalyn Durbin accompanied their mother to the emergency department on 5/1/2024. They may return to work on 05/01/2024.      If you have any questions or concerns, please don't hesitate to call.      SIDRA Garcia RN

## 2024-05-01 NOTE — HPI
The patient is a 65 yo female with past medical history of cerebral aneurysm s/p coiling, anxiety, anemia, hypertension, schizoaffective disorder, SAH and depression who presented to the ED with dizziness and difficulty standing his 0400 morning of presentation. She reports her whole body feels tremulous with standing. She feels off balance when she stands. She denies dizziness with laying down. She had similar symptoms when she had her brain aneurysm. She ambulates with cane at baseline. Workup in the ED unrevealing. Some relief with meclizine but still not near baseline. Hospital medicine consulted.  Patient placed in observation.

## 2024-05-01 NOTE — Clinical Note
Mynor Parry accompanied their grandmother to the emergency department on 5/1/2024. They may return to school on 05/01/2024.      If you have any questions or concerns, please don't hesitate to call.      SIDRA Garcia RN

## 2024-05-01 NOTE — Clinical Note
Kalyn Washington accompanied their child to the emergency department on 5/1/2024. They may return to work on 05/01/2024.      If you have any questions or concerns, please don't hesitate to call.      SIDRA Garcia RN

## 2024-05-01 NOTE — ED PROVIDER NOTES
SCRIBE #1 NOTE: I, Mariam Tineo, am scribing for, and in the presence of, Kishore Warren MD. I have scribed the entire note.       History     Chief Complaint   Patient presents with    Dizziness     Dizziness and difficulty standing starting around 4 am. Experiencing full body muscle tremors. Per daughter the last liana this happened, the pt experienced a brain aneurysm. Pt communicating at baseline per daughter. Pt denies any weakness       Review of patient's allergies indicates:   Allergen Reactions    Aspirin     Nsaids (non-steroidal anti-inflammatory drug)     Ibuprofen Anxiety         History of Present Illness     HPI    5/1/2024, 8:20 AM  History obtained from the daughter and patient      History of Present Illness: Karyna Sanders is a 64 y.o. female patient with a PMHx of HTN, HLD, GERD, CVA, cerebral aneurysm, schizophrenia who presents to the Emergency Department for evaluation of dizziness which onset at 4 AM today. Pt's daughter states that last time the pt became dizzy like this, she had a brain aneurysm. Pt also experiencing mild LUE weakness and nurse states that her  felt weaker when he grabbed her hand. Symptoms are constant and moderate in severity. No mitigating or exacerbating factors reported. Patient denies any fever, chills, abdominal pain, n/v/d, headaches, syncope, light-headedness, ear pain, hearing loss, and all other sxs at this time. Pt states that she has not taken any medications. No further complaints or concerns at this time.       Arrival mode: Personal vehicle    PCP: DIMA Boothe MD        Past Medical History:  Past Medical History:   Diagnosis Date    Anemia     Aneurysm     Anorexia 4/26/2017    Anxiety     Asthma     Atherosclerosis of aorta 1/5/2022    CT ABDOMEN PELVIS WITH CONTRAST 01/03/2021 FINDINGS: Mild atherosclerosis within the abdominal aorta which is nonaneurysmal.    Bipolar disorder     Carpal tunnel syndrome, bilateral     Cerebral aneurysm      Chronic nausea 11/15/2018    Chronic pain syndrome     Cigarette nicotine dependence     Depression     Essential hypertension 8/29/2014    GERD (gastroesophageal reflux disease)     Hyperlipidemia     Hypertension     Insomnia     Osteoporosis     Pure hypercholesterolemia 9/8/2014    Recurrent tension-type headache, not intractable 7/24/2019    Schizophrenia     Stroke     Subarachnoid hemorrhage        Past Surgical History:  Past Surgical History:   Procedure Laterality Date    BUNIONECTOMY Right     COLONOSCOPY N/A 2/7/2022    Procedure: COLONOSCOPY;  Surgeon: Kamila Lund MD;  Location: [facility] ENDO;  Service: Endoscopy;  Laterality: N/A;    COLONOSCOPY W/ BIOPSIES AND POLYPECTOMY      ESOPHAGOGASTRODUODENOSCOPY N/A 2/7/2022    Procedure: EGD (ESOPHAGOGASTRODUODENOSCOPY);  Surgeon: Kamila Lund MD;  Location: [facility] ENDO;  Service: Endoscopy;  Laterality: N/A;    HYSTERECTOMY      PARTIAL HYSTERECTOMY      Bilateral Ovaries Remain    TRANSCRANIAL DOPPLER STUDY - COMPLETE  8/28/2014         TRANSCRANIAL DOPPLER STUDY - COMPLETE  8/29/2014         TRANSCRANIAL DOPPLER STUDY - COMPLETE  8/30/2014         TRANSCRANIAL DOPPLER STUDY - COMPLETE  8/31/2014         TRANSCRANIAL DOPPLER STUDY - COMPLETE  9/1/2014         TRANSCRANIAL DOPPLER STUDY - COMPLETE  9/2/2014         TRANSCRANIAL DOPPLER STUDY - COMPLETE  9/3/2014         TRANSCRANIAL DOPPLER STUDY - COMPLETE  9/4/2014         TRANSCRANIAL DOPPLER STUDY - COMPLETE  9/5/2014         TRANSCRANIAL DOPPLER STUDY - COMPLETE  9/6/2014         VAGINAL DELIVERY      X 3         Family History:  Family History   Problem Relation Name Age of Onset    Cancer Mother      Glaucoma Mother      Stroke Mother      Diabetes type II Mother      Heart disease Mother      Hypertension Mother      Brain cancer Sister      Diabetes type II Sister      Cancer Brother      Hypertension Brother      Bone cancer Sister         Social History:  Social History     Tobacco Use     Smoking status: Every Day     Current packs/day: 1.00     Average packs/day: 1 pack/day for 51.6 years (51.6 ttl pk-yrs)     Types: Cigarettes, Cigars, Vaping with nicotine     Start date: 1976    Smokeless tobacco: Never    Tobacco comments:     in smoking program 5/13/19   Substance and Sexual Activity    Alcohol use: Yes     Comment: 1-3 times per week, 1-2 drink at a time.    Drug use: No    Sexual activity: Never     Partners: Male        Review of Systems     Review of Systems   Constitutional:  Negative for chills and fever.   HENT:  Negative for ear pain, hearing loss and sore throat.    Respiratory:  Negative for shortness of breath.    Cardiovascular:  Negative for chest pain.   Gastrointestinal:  Negative for abdominal pain, diarrhea, nausea and vomiting.   Genitourinary:  Negative for dysuria.   Musculoskeletal:  Negative for back pain.   Skin:  Negative for rash.   Neurological:  Positive for dizziness and weakness (LUE). Negative for syncope, light-headedness and headaches.   Hematological:  Does not bruise/bleed easily.   All other systems reviewed and are negative.     Physical Exam     Initial Vitals [05/01/24 0806]   BP Pulse Resp Temp SpO2   134/76 91 (!) 22 98.7 °F (37.1 °C) 96 %      MAP       --          Physical Exam  Nursing Notes and Vital Signs Reviewed.  Difficult cooperation on exam.  Constitutional: Patient is in no acute distress. Well-developed and well-nourished.  Head: Atraumatic. Normocephalic.  Eyes: PERRL. EOM intact. Conjunctivae are not pale. No scleral icterus. No nystagmus.   ENT: Mucous membranes are moist. Oropharynx is clear and symmetric.    Neck: Supple. Full ROM. No lymphadenopathy.  Cardiovascular: Regular rate. Regular rhythm. No murmurs, rubs, or gallops. Distal pulses are 2+ and symmetric. There are no carotid bruits.  Pulmonary/Chest: No respiratory distress. Clear to auscultation bilaterally. No wheezing or rales.  Abdominal: Soft and non-distended.  There is no  tenderness.  No rebound, guarding, or rigidity. Good bowel sounds.  Genitourinary: No CVA tenderness  Musculoskeletal: Moves all extremities. No obvious deformities. No edema. No calf tenderness.  Skin: Warm and dry.  Neurological:  Alert, awake, and appropriate. Normal speech. Strength is intact. No acute focal neurological deficits are appreciated.  Psychiatric: Normal affect. Good eye contact. Appropriate in content.     ED Course   Procedures  ED Vital Signs:  Vitals:    05/01/24 0806 05/01/24 0855 05/01/24 1000 05/01/24 1030   BP: 134/76 118/83 131/87 137/72   Pulse: 91 75 76 71   Resp: (!) 22 20 12 14   Temp: 98.7 °F (37.1 °C)      TempSrc: Oral      SpO2: 96% 96% 99% 97%       Abnormal Lab Results:  Labs Reviewed   CBC W/ AUTO DIFFERENTIAL - Abnormal; Notable for the following components:       Result Value    MPV 8.9 (*)     All other components within normal limits   COMPREHENSIVE METABOLIC PANEL - Abnormal; Notable for the following components:    BUN 7 (*)     Anion Gap 6 (*)     All other components within normal limits   PROTIME-INR   TSH   LIPID PANEL   URINALYSIS, REFLEX TO URINE CULTURE   URINALYSIS, REFLEX TO URINE CULTURE   HEMOGLOBIN A1C   POCT GLUCOSE, HAND-HELD DEVICE   POCT GLUCOSE        All Lab Results:  Results for orders placed or performed during the hospital encounter of 05/01/24   CBC W/ AUTO DIFFERENTIAL   Result Value Ref Range    WBC 6.19 3.90 - 12.70 K/uL    RBC 4.29 4.00 - 5.40 M/uL    Hemoglobin 12.8 12.0 - 16.0 g/dL    Hematocrit 38.1 37.0 - 48.5 %    MCV 89 82 - 98 fL    MCH 29.8 27.0 - 31.0 pg    MCHC 33.6 32.0 - 36.0 g/dL    RDW 13.6 11.5 - 14.5 %    Platelets 313 150 - 450 K/uL    MPV 8.9 (L) 9.2 - 12.9 fL    Immature Granulocytes 0.3 0.0 - 0.5 %    Gran # (ANC) 3.1 1.8 - 7.7 K/uL    Immature Grans (Abs) 0.02 0.00 - 0.04 K/uL    Lymph # 2.4 1.0 - 4.8 K/uL    Mono # 0.5 0.3 - 1.0 K/uL    Eos # 0.2 0.0 - 0.5 K/uL    Baso # 0.02 0.00 - 0.20 K/uL    nRBC 0 0 /100 WBC    Gran %  49.7 38.0 - 73.0 %    Lymph % 38.6 18.0 - 48.0 %    Mono % 8.7 4.0 - 15.0 %    Eosinophil % 2.4 0.0 - 8.0 %    Basophil % 0.3 0.0 - 1.9 %    Differential Method Automated    Comprehensive metabolic panel   Result Value Ref Range    Sodium 137 136 - 145 mmol/L    Potassium 3.8 3.5 - 5.1 mmol/L    Chloride 106 95 - 110 mmol/L    CO2 25 23 - 29 mmol/L    Glucose 101 70 - 110 mg/dL    BUN 7 (L) 8 - 23 mg/dL    Creatinine 0.9 0.5 - 1.4 mg/dL    Calcium 9.2 8.7 - 10.5 mg/dL    Total Protein 6.2 6.0 - 8.4 g/dL    Albumin 3.6 3.5 - 5.2 g/dL    Total Bilirubin 0.3 0.1 - 1.0 mg/dL    Alkaline Phosphatase 81 55 - 135 U/L    AST 13 10 - 40 U/L    ALT 13 10 - 44 U/L    eGFR >60 >60 mL/min/1.73 m^2    Anion Gap 6 (L) 8 - 16 mmol/L   Protime-INR   Result Value Ref Range    Prothrombin Time 11.5 9.0 - 12.5 sec    INR 1.0 0.8 - 1.2   TSH   Result Value Ref Range    TSH 1.915 0.400 - 4.000 uIU/mL   ECG 12 lead   Result Value Ref Range    QRS Duration 78 ms    OHS QTC Calculation 457 ms   POCT glucose   Result Value Ref Range    POCT Glucose 93 70 - 110 mg/dL        Imaging Results:  Imaging Results              CTA Head and Neck (xpd) (Final result)  Result time 05/01/24 08:51:07      Final result by Duy King MD (05/01/24 08:51:07)                   Impression:      No acute intracranial abnormality.  Mild nonspecific white matter changes likely related to chronic microvascular ischemia.    Endovascular coils in the region of the right posterior communicating artery.  No evidence of residual or recurrent aneurysm filling.  No new intracranial aneurysm identified.    No significant stenosis, occlusion, dissection, or vascular malformation identified.      Electronically signed by: Duy King  Date:    05/01/2024  Time:    08:51               Narrative:    EXAMINATION:  CTA HEAD AND NECK (XPD)    CLINICAL HISTORY:  Neuro deficit, acute, stroke suspected;Stroke/TIA, determine embolic source;    TECHNIQUE:  Non  contrast low dose axial images were obtained through the head. CT angiogram was performed from the level of the beth to the top of the head following the IV administration of 100mL of Omnipaque 350.   Sagittal and coronal reconstructions and maximum intensity projection reconstructions were performed. Arterial stenosis percentages are based on NASCET measurement criteria.    All CT scans at this location are performed using dose optimization techniques including the following: Automated exposure control; adjustment of the mA and/or kv; use of iterative reconstruction technique.    COMPARISON:  CT dated 08/23/2022.  CTA dated 10/24/2019    FINDINGS:  Intracranial Compartment:    Ventricles and sulci are normal in size for age without evidence of hydrocephalus. No extra-axial blood or fluid collections.    Mild hypoattenuation noted in the cerebral white matter.  Endovascular coils noted in the region of the right posterior communicating artery.  No parenchymal mass, hemorrhage, edema, or major vascular distribution infarct.    Skull/Extracranial Contents (limited evaluation): No fracture. Mastoid air cells and paranasal sinuses are essentially clear.    Non-Vascular Structures of the Neck/Thoracic Inlet (limited evaluation): Nonvascular soft tissues of the neck are unremarkable.  No acute or aggressive bony abnormality.  Degenerative changes noted in the spine.    Aorta: Common origin of the brachiocephalic left common carotid arteries which is a normal variant.  Mild aortic arch atherosclerosis without flow-limiting stenosis.    Extracranial carotid circulation: No hemodynamically significant stenosis, aneurysmal dilatation, or dissection.  Hypoplastic left internal carotid artery.    Extracranial vertebral circulation: No hemodynamically significant stenosis, aneurysmal dilatation, or dissection.    Intracranial Arteries: No focal high-grade stenosis, occlusion, or aneurysm.  Hypoplastic left internal carotid  artery and left anterior cerebral artery.  Right internal carotid artery provides dominant blood supply to the bilateral CHAVA territory via the anterior communicating artery.  Endovascular coils noted in the region of the right posterior communicating artery.  No residual or recurrent filling aneurysm identified.    Venous structures (limited evaluation): Normal.                                     The EKG was ordered, reviewed, and independently interpreted by the ED provider.  Interpretation time: 8:45  Rate: 74 BPM  Rhythm: normal sinus rhythm  Interpretation: Nonspecific ST and T wave abnormalities. No STEMI.           The Emergency Provider reviewed the vital signs and test results, which are outlined above.     ED Discussion     12:35 PM: Discussed case with Dr. Bran ([facility]). Dr. Bran agrees with current care and management of pt and accepts admission.   Admitting Service: [facility]  Admitting Physician: Dr. Bran  Admit to: Obs     12:38 PM: Re-evaluated pt. I have discussed test results, shared treatment plan, and the need for admission with patient and family at bedside. Pt and family express understanding at this time and agree with all information. All questions answered. Pt and family have no further questions or concerns at this time. Pt is ready for admit.     ED Course as of 05/01/24 1240   Wed May 01, 2024   1019 Sodium: 137 [JOSEF]   1019 BUN(!): 7 [JOSEF]   1019 Creatinine: 0.9 [JOSEF]   1019 WBC: 6.19 [JOSEF]   1019 TSH: 1.915 [JOSEF]      ED Course User Index  [JOSEF] Kishore Warren MD     Medical Decision Making  Problems Addressed:  Acute focal neurological deficit: acute illness or injury that poses a threat to life or bodily functions    Amount and/or Complexity of Data Reviewed  Independent Historian: caregiver     Details: Daughter states the patient had similar episodes in the past and that was involved and leading to the diagnoses that identified her aneurysm  Labs: ordered.  Decision-making details documented in ED Course.  Radiology: ordered and independent interpretation performed. Decision-making details documented in ED Course.     Details: No intracranial hemorrhage noted  ECG/medicine tests: ordered and independent interpretation performed. Decision-making details documented in ED Course.  Discussion of management or test interpretation with external provider(s): Discussed pt's case with hospital medicine.  They agree with the current management and will evaluate the patient in the emergency department/handle all further orders    Risk  Prescription drug management.  Diagnosis or treatment significantly limited by social determinants of health.  Risk Details: Patient is a 64-year-old with a extensive history that includes cerebral aneurysm with clip and a stroke in the past-she has on no antiplatelet agents and states she was told not to take any. She presents emergency department with sudden onset of dizziness, disequilibrium, and inability to walk. We initiated a code stroke when she arrived here however her symptoms seem to be vertigo only-no other focal lateralizing neurologic deficit-and she fell out side of a window so not a candidate for thrombolytics. CTA of the head and neck shows no significant abnormality. Labs are unremarkable (urinalysis is pending) and she is improved with Antivert but still uncomfortable/unable to ambulate on her own. I feel she would benefit from observation, serial neurologic exams, and symptomatic treatment (plus/minus any further stroke eval).    Differential diagnosis includes CVA, peripheral or central vertigo, encephalitis, psychiatric, sepsis, trauma, ACS, arrhythmia, etc.                ED Medication(s):  Medications   sodium chloride 0.9% flush 10 mL (has no administration in time range)   bisacodyL suppository 10 mg (has no administration in time range)   iohexoL (OMNIPAQUE 350) injection 100 mL (100 mLs Intravenous Given 5/1/24 0821)    meclizine tablet 25 mg (25 mg Oral Given 5/1/24 0958)       New Prescriptions    No medications on file               Scribe Attestation:   Scribe #1: I performed the above scribed service and the documentation accurately describes the services I performed. I attest to the accuracy of the note.     Attending:   Physician Attestation Statement for Scribe #1: I, Kishore Warren MD, personally performed the services described in this documentation, as scribed by Mariam Tineo, in my presence, and it is both accurate and complete.           Clinical Impression       ICD-10-CM ICD-9-CM   1. Acute focal neurological deficit  R29.818 781.99   2. TIA (transient ischemic attack)  G45.9 435.9       Disposition:   Disposition: Placed in Observation  Condition: Stable        Kishore Warren MD  05/02/24 1120

## 2024-05-01 NOTE — PLAN OF CARE
Updated patient on plan of care. [facility], [place] 5/42. Bed alarm on. Instructed patient to use call light for assistance, call light in reach. Hourly rounding performed. Vitals q4 hours. Education provided, questions answered/encouraged. Chart check complete. NSR    Problem: Adult Inpatient Plan of Care  Goal: Plan of Care Review  Outcome: Progressing

## 2024-05-01 NOTE — PHARMACY MED REC
"  Admission Medication History     The home medication history was taken by Shelbie Mcgovern.    You may go to "Admission" then "Reconcile Home Medications" tabs to review and/or act upon these items.     The home medication list has been updated by the Pharmacy department.   Please read ALL comments highlighted in yellow.   Please address this information as you see fit.    Feel free to contact us if you have any questions or require assistance.      The medications listed below were removed from the home medication list. Please reorder if appropriate:  Patient reports no longer taking the following medication(s):  Tramadol 50 mg  Wellbutrin  mg SR 12      Medications listed below were obtained from: Patient/family and Analytic software- [employer]      [facility] REC COMPLETED:     Shelbie Mcgovern  [id number]    Current Outpatient Medications on File Prior to Encounter   Medication Sig Dispense Refill Last Dose    albuterol (PROVENTIL) 2.5 mg /3 mL (0.083 %) nebulizer solution Take 3 mLs (2.5 mg total) by nebulization every 6 (six) hours as needed for Wheezing. Rescue 150 mL 3 4/30/2024    albuterol (PROVENTIL/VENTOLIN HFA) 90 mcg/actuation inhaler 2 puffs every 6 (six) hours as needed for Shortness of Breath or Wheezing.   4/30/2024    alprazolam (XANAX) 0.5 MG tablet Take 0.5 mg by mouth daily as needed.    Past Week    amLODIPine (NORVASC) 5 MG tablet Take 1 tablet (5 mg total) by mouth once daily. 90 tablet 3 4/30/2024    atorvastatin (LIPITOR) 80 MG tablet Take 1 tablet (80 mg total) by mouth every evening. 90 tablet 3 4/30/2024    butalbital-acetaminophen-caffeine -40 mg (FIORICET, ESGIC) -40 mg per tablet Take 1 tablet by mouth every 6 (six) hours as needed for Pain. 15 tablet 0 4/30/2024    CALCIUM CARBONATE/VITAMIN D3 (CALCIUM 600 + D,3, ORAL) Take 2 tablets by mouth once daily.   4/30/2024    COMBIVENT RESPIMAT  mcg/actuation inhaler Inhale 1 puff into the lungs every 6 (six) hours " as needed.   4/30/2024    hydrOXYzine HCL (ATARAX) 25 MG tablet Take 1 tablet (25 mg total) by mouth 3 (three) times daily as needed for Itching. 60 tablet 2 Past Month    LEXAPRO 10 mg tablet Take 10 mg by mouth.   4/30/2024    omeprazole (PRILOSEC) 40 MG capsule Take 1 capsule (40 mg total) by mouth every morning. 90 capsule 3 4/30/2024    OXcarbazepine (TRILEPTAL) 600 MG Tab Take 150 mg by mouth 2 (two) times daily.   4/30/2024    polyethylene glycol (GLYCOLAX) 17 gram/dose powder Take 17 g by mouth daily as needed (for constipation). 510 g 11 4/30/2024    QUEtiapine (SEROQUEL) 100 MG Tab Take 100 mg by mouth once daily.   4/30/2024    amitriptyline (ELAVIL) 50 MG tablet Take 50 mg by mouth every evening.   Unknown    BLOOD PRESSURE CUFF Misc Use to check blood pressure once daily 1 each 0     nebulizer accessories Kit NEBULIZER ACCESSORIES - Use as directed for nebulized medications prescribed by me. 10 kit 11     nebulizer and compressor [employer] NEBULIZER DEVICE - Use as directed 1 each 0                          .

## 2024-05-01 NOTE — SUBJECTIVE & OBJECTIVE
Past Medical History:   Diagnosis Date    Anemia     Aneurysm     Anorexia 4/26/2017    Anxiety     Asthma     Atherosclerosis of aorta 1/5/2022    CT ABDOMEN PELVIS WITH CONTRAST 01/03/2021 FINDINGS: Mild atherosclerosis within the abdominal aorta which is nonaneurysmal.    Bipolar disorder     Carpal tunnel syndrome, bilateral     Cerebral aneurysm     Chronic nausea 11/15/2018    Chronic pain syndrome     Cigarette nicotine dependence     Depression     Essential hypertension 8/29/2014    GERD (gastroesophageal reflux disease)     Hyperlipidemia     Hypertension     Insomnia     Osteoporosis     Pure hypercholesterolemia 9/8/2014    Recurrent tension-type headache, not intractable 7/24/2019    Schizophrenia     Stroke     Subarachnoid hemorrhage        Past Surgical History:   Procedure Laterality Date    BUNIONECTOMY Right     COLONOSCOPY N/A 2/7/2022    Procedure: COLONOSCOPY;  Surgeon: Kamila Lund MD;  Location: [facility] ENDO;  Service: Endoscopy;  Laterality: N/A;    COLONOSCOPY W/ BIOPSIES AND POLYPECTOMY      ESOPHAGOGASTRODUODENOSCOPY N/A 2/7/2022    Procedure: EGD (ESOPHAGOGASTRODUODENOSCOPY);  Surgeon: Kamila Lund MD;  Location: [facility] ENDO;  Service: Endoscopy;  Laterality: N/A;    HYSTERECTOMY      PARTIAL HYSTERECTOMY      Bilateral Ovaries Remain    TRANSCRANIAL DOPPLER STUDY - COMPLETE  8/28/2014         TRANSCRANIAL DOPPLER STUDY - COMPLETE  8/29/2014         TRANSCRANIAL DOPPLER STUDY - COMPLETE  8/30/2014         TRANSCRANIAL DOPPLER STUDY - COMPLETE  8/31/2014         TRANSCRANIAL DOPPLER STUDY - COMPLETE  9/1/2014         TRANSCRANIAL DOPPLER STUDY - COMPLETE  9/2/2014         TRANSCRANIAL DOPPLER STUDY - COMPLETE  9/3/2014         TRANSCRANIAL DOPPLER STUDY - COMPLETE  9/4/2014         TRANSCRANIAL DOPPLER STUDY - COMPLETE  9/5/2014         TRANSCRANIAL DOPPLER STUDY - COMPLETE  9/6/2014         VAGINAL DELIVERY      X 3       Review of patient's allergies indicates:   Allergen  Reactions    Aspirin     Nsaids (non-steroidal anti-inflammatory drug)     Ibuprofen Anxiety       Current Facility-Administered Medications   Medication Dose Route Frequency Provider Last Rate Last Admin    bisacodyL suppository 10 mg  10 mg Rectal Daily PRN Kelly Bran MD        sodium chloride 0.9% flush 10 mL  10 mL Intravenous PRN Kelly Bran MD         Current Outpatient Medications   Medication Sig Dispense Refill    albuterol (PROVENTIL) 2.5 mg /3 mL (0.083 %) nebulizer solution Take 3 mLs (2.5 mg total) by nebulization every 6 (six) hours as needed for Wheezing. Rescue 150 mL 3    alprazolam (XANAX) 0.5 MG tablet Take 0.5 mg by mouth daily as needed.       amLODIPine (NORVASC) 5 MG tablet Take 1 tablet (5 mg total) by mouth once daily. 90 tablet 3    atorvastatin (LIPITOR) 80 MG tablet Take 1 tablet (80 mg total) by mouth every evening. 90 tablet 3    BLOOD PRESSURE CUFF Misc Use to check blood pressure once daily 1 each 0    butalbital-acetaminophen-caffeine -40 mg (FIORICET, ESGIC) -40 mg per tablet Take 1 tablet by mouth every 6 (six) hours as needed for Pain. 15 tablet 0    CALCIUM CARBONATE/VITAMIN D3 (CALCIUM 600 + D,3, ORAL) Take 2 tablets by mouth once daily.      hydrOXYzine HCL (ATARAX) 25 MG tablet Take 1 tablet (25 mg total) by mouth 3 (three) times daily as needed for Itching. 60 tablet 2    LEXAPRO 10 mg tablet Take 10 mg by mouth.      nebulizer accessories Kit NEBULIZER ACCESSORIES - Use as directed for nebulized medications prescribed by me. 10 kit 11    nebulizer and compressor Heaven NEBULIZER DEVICE - Use as directed 1 each 0    omeprazole (PRILOSEC) 40 MG capsule Take 1 capsule (40 mg total) by mouth every morning. 90 capsule 3    OXcarbazepine (TRILEPTAL) 600 MG Tab Take 150 mg by mouth 2 (two) times daily.      polyethylene glycol (GLYCOLAX) 17 gram/dose powder Take 17 g by mouth daily as needed (for constipation). 510 g 11    QUEtiapine (SEROQUEL) 100 MG Tab  Take 100 mg by mouth once daily.      traMADoL (ULTRAM) 50 mg tablet Take 1 tablet (50 mg total) by mouth every 8 (eight) hours as needed for Pain. 90 tablet 3    WELLBUTRIN  mg SR12 Take 200 mg by mouth every morning.       Family History       Problem Relation (Age of Onset)    Bone cancer Sister    Brain cancer Sister    Cancer Mother, Brother    Diabetes type II Mother, Sister    Glaucoma Mother    Heart disease Mother    Hypertension Mother, Brother    Stroke Mother          Tobacco Use    Smoking status: Every Day     Current packs/day: 1.00     Average packs/day: 1 pack/day for 51.6 years (51.6 ttl pk-yrs)     Types: Cigarettes, Cigars, Vaping with nicotine     Start date: 1976    Smokeless tobacco: Never    Tobacco comments:     in smoking program 5/13/19   Substance and Sexual Activity    Alcohol use: Yes     Comment: 1-3 times per week, 1-2 drink at a time.    Drug use: No    Sexual activity: Never     Partners: Male     Review of Systems   Constitutional:  Positive for activity change.   Gastrointestinal:  Negative for abdominal pain, constipation and diarrhea.   Musculoskeletal:  Negative for back pain.   Neurological:  Positive for dizziness, tremors and weakness.   All other systems reviewed and are negative.    Objective:     Vital Signs (Most Recent):  Temp: 98.7 °F (37.1 °C) (05/01/24 0806)  Pulse: 71 (05/01/24 1030)  Resp: 14 (05/01/24 1030)  BP: 137/72 (05/01/24 1030)  SpO2: 97 % (05/01/24 1030) Vital Signs (24h Range):  Temp:  [98.7 °F (37.1 °C)] 98.7 °F (37.1 °C)  Pulse:  [71-91] 71  Resp:  [12-22] 14  SpO2:  [96 %-99 %] 97 %  BP: (118-137)/(72-87) 137/72        There is no height or weight on file to calculate BMI.     Physical Exam  HENT:      Head: Normocephalic and atraumatic.   Cardiovascular:      Rate and Rhythm: Normal rate and regular rhythm.      Heart sounds: No murmur heard.  Pulmonary:      Effort: Pulmonary effort is normal. No respiratory distress.      Breath sounds: Normal  breath sounds. No wheezing.   Abdominal:      General: Bowel sounds are normal. There is no distension.      Palpations: Abdomen is soft.      Tenderness: There is no abdominal tenderness.   Musculoskeletal:         General: No swelling.   Skin:     General: Skin is warm and dry.   Neurological:      Mental Status: She is alert and oriented to person, place, and time. Mental status is at baseline.      Comments: Patient able to move all extremities. Inconsistent with following commands, picked legs up off of the bed, did not dorsiflex when asked. Gait not assessed due to patient inconsistency with leg movements                Significant Labs: All pertinent labs within the past 24 hours have been reviewed.  Recent Lab Results         05/01/24  0852   05/01/24  0843        Albumin   3.6       ALP   81       ALT   13       Anion Gap   6       AST   13       Baso #   0.02       Basophil %   0.3       BILIRUBIN TOTAL   0.3  Comment: For infants and newborns, interpretation of results should be based  on gestational age, weight and in agreement with clinical  observations.    Premature Infant recommended reference ranges:  Up to 24 hours.............<8.0 mg/dL  Up to 48 hours............<12.0 mg/dL  3-5 days..................<15.0 mg/dL  6-29 days.................<15.0 mg/dL         BUN   7       Calcium   9.2       Chloride   106       CO2   25       Creatinine   0.9       Differential Method   Automated       eGFR   >60       Eos #   0.2       Eos %   2.4       Glucose   101       Gran # (ANC)   3.1       Gran %   49.7       Hematocrit   38.1       Hemoglobin   12.8       Immature Grans (Abs)   0.02  Comment: Mild elevation in immature granulocytes is non specific and   can be seen in a variety of conditions including stress response,   acute inflammation, trauma and pregnancy. Correlation with other   laboratory and clinical findings is essential.         Immature Granulocytes   0.3       INR   1.0  Comment: Coumadin  Therapy:  2.0 - 3.0 for INR for all indicators except mechanical heart valves  and antiphospholipid syndromes which should use 2.5 - 3.5.         Lymph #   2.4       Lymph %   38.6       MCH   29.8       MCHC   33.6       MCV   89       Mono #   0.5       Mono %   8.7       MPV   8.9       nRBC   0       Platelet Count   313       POCT Glucose 93         Potassium   3.8       PROTEIN TOTAL   6.2       PT   11.5       RBC   4.29       RDW   13.6       Sodium   137       TSH   1.915       WBC   6.19               Significant Imaging: I have reviewed all pertinent imaging results/findings within the past 24 hours.

## 2024-05-01 NOTE — H&P
[facility] - Emergency Dept.  [facility] Medicine  History & Physical    Patient Name: Karyna Sanders  MRN: [id number]  Patient Class: OP- Observation  Admission Date: 5/1/2024  Attending Physician: Kelly Bran MD   Primary Care Provider: DIMA Boothe MD         Patient information was obtained from patient, past medical records, and ER records.     Subjective:     Principal Problem:TIA (transient ischemic attack)    Chief Complaint:   Chief Complaint   Patient presents with    Dizziness     Dizziness and difficulty standing starting around 4 am. Experiencing full body muscle tremors. Per daughter the last liana this happened, the pt experienced a brain aneurysm. Pt communicating at baseline per daughter. Pt denies any weakness          HPI: The patient is a 65 yo female with past medical history of cerebral aneurysm s/p coiling, anxiety, anemia, hypertension, schizoaffective disorder, SAH and depression who presented to the ED with dizziness and difficulty standing his 0400 morning of presentation. She reports her whole body feels tremulous with standing. She feels off balance when she stands. She denies dizziness with laying down. She had similar symptoms when she had her brain aneurysm. She ambulates with cane at baseline. Workup in the ED unrevealing. Some relief with meclizine but still not near baseline. Hospital medicine consulted.  Patient placed in observation.    Past Medical History:   Diagnosis Date    Anemia     Aneurysm     Anorexia 4/26/2017    Anxiety     Asthma     Atherosclerosis of aorta 1/5/2022    CT ABDOMEN PELVIS WITH CONTRAST 01/03/2021 FINDINGS: Mild atherosclerosis within the abdominal aorta which is nonaneurysmal.    Bipolar disorder     Carpal tunnel syndrome, bilateral     Cerebral aneurysm     Chronic nausea 11/15/2018    Chronic pain syndrome     Cigarette nicotine dependence     Depression     Essential hypertension 8/29/2014    GERD (gastroesophageal reflux disease)      Hyperlipidemia     Hypertension     Insomnia     Osteoporosis     Pure hypercholesterolemia 9/8/2014    Recurrent tension-type headache, not intractable 7/24/2019    Schizophrenia     Stroke     Subarachnoid hemorrhage        Past Surgical History:   Procedure Laterality Date    BUNIONECTOMY Right     COLONOSCOPY N/A 2/7/2022    Procedure: COLONOSCOPY;  Surgeon: Kamila Lund MD;  Location: [facility];  Service: Endoscopy;  Laterality: N/A;    COLONOSCOPY W/ BIOPSIES AND POLYPECTOMY      ESOPHAGOGASTRODUODENOSCOPY N/A 2/7/2022    Procedure: EGD (ESOPHAGOGASTRODUODENOSCOPY);  Surgeon: Kamila Lund MD;  Location: [facility] ENDO;  Service: Endoscopy;  Laterality: N/A;    HYSTERECTOMY      PARTIAL HYSTERECTOMY      Bilateral Ovaries Remain    TRANSCRANIAL DOPPLER STUDY - COMPLETE  8/28/2014         TRANSCRANIAL DOPPLER STUDY - COMPLETE  8/29/2014         TRANSCRANIAL DOPPLER STUDY - COMPLETE  8/30/2014         TRANSCRANIAL DOPPLER STUDY - COMPLETE  8/31/2014         TRANSCRANIAL DOPPLER STUDY - COMPLETE  9/1/2014         TRANSCRANIAL DOPPLER STUDY - COMPLETE  9/2/2014         TRANSCRANIAL DOPPLER STUDY - COMPLETE  9/3/2014         TRANSCRANIAL DOPPLER STUDY - COMPLETE  9/4/2014         TRANSCRANIAL DOPPLER STUDY - COMPLETE  9/5/2014         TRANSCRANIAL DOPPLER STUDY - COMPLETE  9/6/2014         VAGINAL DELIVERY      X 3       Review of patient's allergies indicates:   Allergen Reactions    Aspirin     Nsaids (non-steroidal anti-inflammatory drug)     Ibuprofen Anxiety       Current Facility-Administered Medications   Medication Dose Route Frequency Provider Last Rate Last Admin    bisacodyL suppository 10 mg  10 mg Rectal Daily PRN Kelly Bran MD        sodium chloride 0.9% flush 10 mL  10 mL Intravenous PRN Kelly Bran MD         Current Outpatient Medications   Medication Sig Dispense Refill    albuterol (PROVENTIL) 2.5 mg /3 mL (0.083 %) nebulizer solution Take 3 mLs (2.5 mg total) by  nebulization every 6 (six) hours as needed for Wheezing. Rescue 150 mL 3    alprazolam (XANAX) 0.5 MG tablet Take 0.5 mg by mouth daily as needed.       amLODIPine (NORVASC) 5 MG tablet Take 1 tablet (5 mg total) by mouth once daily. 90 tablet 3    atorvastatin (LIPITOR) 80 MG tablet Take 1 tablet (80 mg total) by mouth every evening. 90 tablet 3    BLOOD PRESSURE CUFF Misc Use to check blood pressure once daily 1 each 0    butalbital-acetaminophen-caffeine -40 mg (FIORICET, ESGIC) -40 mg per tablet Take 1 tablet by mouth every 6 (six) hours as needed for Pain. 15 tablet 0    CALCIUM CARBONATE/VITAMIN D3 (CALCIUM 600 + D,3, ORAL) Take 2 tablets by mouth once daily.      hydrOXYzine HCL (ATARAX) 25 MG tablet Take 1 tablet (25 mg total) by mouth 3 (three) times daily as needed for Itching. 60 tablet 2    LEXAPRO 10 mg tablet Take 10 mg by mouth.      nebulizer accessories Kit NEBULIZER ACCESSORIES - Use as directed for nebulized medications prescribed by me. 10 kit 11    nebulizer and compressor Heaven NEBULIZER DEVICE - Use as directed 1 each 0    omeprazole (PRILOSEC) 40 MG capsule Take 1 capsule (40 mg total) by mouth every morning. 90 capsule 3    OXcarbazepine (TRILEPTAL) 600 MG Tab Take 150 mg by mouth 2 (two) times daily.      polyethylene glycol (GLYCOLAX) 17 gram/dose powder Take 17 g by mouth daily as needed (for constipation). 510 g 11    QUEtiapine (SEROQUEL) 100 MG Tab Take 100 mg by mouth once daily.      traMADoL (ULTRAM) 50 mg tablet Take 1 tablet (50 mg total) by mouth every 8 (eight) hours as needed for Pain. 90 tablet 3    WELLBUTRIN  mg SR12 Take 200 mg by mouth every morning.       Family History       Problem Relation (Age of Onset)    Bone cancer Sister    Brain cancer Sister    Cancer Mother, Brother    Diabetes type II Mother, Sister    Glaucoma Mother    Heart disease Mother    Hypertension Mother, Brother    Stroke Mother          Tobacco Use    Smoking status: Every Day      Current packs/day: 1.00     Average packs/day: 1 pack/day for 51.6 years (51.6 ttl pk-yrs)     Types: Cigarettes, Cigars, Vaping with nicotine     Start date: 1976    Smokeless tobacco: Never    Tobacco comments:     in smoking program 5/13/19   Substance and Sexual Activity    Alcohol use: Yes     Comment: 1-3 times per week, 1-2 drink at a time.    Drug use: No    Sexual activity: Never     Partners: Male     Review of Systems   Constitutional:  Positive for activity change.   Gastrointestinal:  Negative for abdominal pain, constipation and diarrhea.   Musculoskeletal:  Negative for back pain.   Neurological:  Positive for dizziness, tremors and weakness.   All other systems reviewed and are negative.    Objective:     Vital Signs (Most Recent):  Temp: 98.7 °F (37.1 °C) (05/01/24 0806)  Pulse: 71 (05/01/24 1030)  Resp: 14 (05/01/24 1030)  BP: 137/72 (05/01/24 1030)  SpO2: 97 % (05/01/24 1030) Vital Signs (24h Range):  Temp:  [98.7 °F (37.1 °C)] 98.7 °F (37.1 °C)  Pulse:  [71-91] 71  Resp:  [12-22] 14  SpO2:  [96 %-99 %] 97 %  BP: (118-137)/(72-87) 137/72        There is no height or weight on file to calculate BMI.     Physical Exam  HENT:      Head: Normocephalic and atraumatic.   Cardiovascular:      Rate and Rhythm: Normal rate and regular rhythm.      Heart sounds: No murmur heard.  Pulmonary:      Effort: Pulmonary effort is normal. No respiratory distress.      Breath sounds: Normal breath sounds. No wheezing.   Abdominal:      General: Bowel sounds are normal. There is no distension.      Palpations: Abdomen is soft.      Tenderness: There is no abdominal tenderness.   Musculoskeletal:         General: No swelling.   Skin:     General: Skin is warm and dry.   Neurological:      Mental Status: She is alert and oriented to person, place, and time. Mental status is at baseline.      Comments: Patient able to move all extremities. Inconsistent with following commands, picked legs up off of the bed, did not  dorsiflex when asked. Gait not assessed due to patient inconsistency with leg movements                Significant Labs: All pertinent labs within the past 24 hours have been reviewed.  Recent Lab Results         05/01/24  0852   05/01/24  0843        Albumin   3.6       ALP   81       ALT   13       Anion Gap   6       AST   13       Baso #   0.02       Basophil %   0.3       BILIRUBIN TOTAL   0.3  Comment: For infants and newborns, interpretation of results should be based  on gestational age, weight and in agreement with clinical  observations.    Premature Infant recommended reference ranges:  Up to 24 hours.............<8.0 mg/dL  Up to 48 hours............<12.0 mg/dL  3-5 days..................<15.0 mg/dL  6-29 days.................<15.0 mg/dL         BUN   7       Calcium   9.2       Chloride   106       CO2   25       Creatinine   0.9       Differential Method   Automated       eGFR   >60       Eos #   0.2       Eos %   2.4       Glucose   101       Gran # (ANC)   3.1       Gran %   49.7       Hematocrit   38.1       Hemoglobin   12.8       Immature Grans (Abs)   0.02  Comment: Mild elevation in immature granulocytes is non specific and   can be seen in a variety of conditions including stress response,   acute inflammation, trauma and pregnancy. Correlation with other   laboratory and clinical findings is essential.         Immature Granulocytes   0.3       INR   1.0  Comment: Coumadin Therapy:  2.0 - 3.0 for INR for all indicators except mechanical heart valves  and antiphospholipid syndromes which should use 2.5 - 3.5.         Lymph #   2.4       Lymph %   38.6       MCH   29.8       MCHC   33.6       MCV   89       Mono #   0.5       Mono %   8.7       MPV   8.9       nRBC   0       Platelet Count   313       POCT Glucose 93         Potassium   3.8       PROTEIN TOTAL   6.2       PT   11.5       RBC   4.29       RDW   13.6       Sodium   137       TSH   1.915       WBC   6.19               Significant  Imaging: I have reviewed all pertinent imaging results/findings within the past 24 hours.  Assessment/Plan:     * TIA (transient ischemic attack)    Antithrombotics for secondary stroke prevention: Antiplatelets: None: patient with previous aneurysmal hemorrhage and coiling, noted to not be candidate in previous neurology notes    Statins for secondary stroke prevention and hyperlipidemia, if present:   Statins: Atorvastatin- 80 mg daily    Aggressive risk factor modification: HTN, Smoking, CAD     Rehab efforts: The patient has been evaluated by a stroke team provider and the therapy needs have been fully considered based off the presenting complaints and exam findings. The following therapy evaluations are needed: PT evaluate and treat, OT evaluate and treat, SLP evaluate and treat    Diagnostics ordered/pending: CT scan of head without contrast to asses brain parenchyma, CTA Head to assess vasculature , CTA Neck/Arch to assess vasculature, TTE to assess cardiac function/status     VTE prophylaxis: Mechanical prophylaxis: Place SCDs    BP parameters: Infarct: No intervention, SBP <220        Dizziness  Vertigo vs posterior infarct  -unclear what type of coil patient has and no previous MRIs noted  -repeat CT head tomorrow  -no further meclizine, ask PT to evaluate tomorrow      History of subarachnoid hemorrhage  -followed by neurology        Essential hypertension  -allow permissive hypertension   -monitor blood pressure    Anxiety  -prn home medication        VTE Risk Mitigation (From admission, onward)           Ordered     IP VTE LOW RISK PATIENT  Once         05/01/24 1235     Place sequential compression device  Until discontinued         05/01/24 1235                       On 05/01/2024, patient should be placed in hospital observation services under my care.             Kelly Bran MD  Department of Hospital Medicine  '[place] - Emergency Dept.

## 2024-05-02 VITALS
BODY MASS INDEX: 27.87 KG/M2 | WEIGHT: 151.44 LBS | HEART RATE: 73 BPM | RESPIRATION RATE: 18 BRPM | SYSTOLIC BLOOD PRESSURE: 109 MMHG | TEMPERATURE: 98 F | HEIGHT: 62 IN | OXYGEN SATURATION: 98 % | DIASTOLIC BLOOD PRESSURE: 55 MMHG

## 2024-05-02 LAB
ALBUMIN SERPL BCP-MCNC: 3.7 G/DL (ref 3.5–5.2)
ALP SERPL-CCNC: 83 U/L (ref 55–135)
ALT SERPL W/O P-5'-P-CCNC: 10 U/L (ref 10–44)
ANION GAP SERPL CALC-SCNC: 9 MMOL/L (ref 8–16)
APTT PPP: 31 SEC (ref 21–32)
AST SERPL-CCNC: 14 U/L (ref 10–40)
BASOPHILS # BLD AUTO: 0.03 K/UL (ref 0–0.2)
BASOPHILS NFR BLD: 0.5 % (ref 0–1.9)
BILIRUB SERPL-MCNC: 0.2 MG/DL (ref 0.1–1)
BUN SERPL-MCNC: 9 MG/DL (ref 8–23)
CALCIUM SERPL-MCNC: 9.6 MG/DL (ref 8.7–10.5)
CHLORIDE SERPL-SCNC: 106 MMOL/L (ref 95–110)
CO2 SERPL-SCNC: 24 MMOL/L (ref 23–29)
CREAT SERPL-MCNC: 0.9 MG/DL (ref 0.5–1.4)
DIFFERENTIAL METHOD BLD: ABNORMAL
EOSINOPHIL # BLD AUTO: 0.2 K/UL (ref 0–0.5)
EOSINOPHIL NFR BLD: 2.7 % (ref 0–8)
ERYTHROCYTE [DISTWIDTH] IN BLOOD BY AUTOMATED COUNT: 13.7 % (ref 11.5–14.5)
EST. GFR  (NO RACE VARIABLE): >60 ML/MIN/1.73 M^2
GLUCOSE SERPL-MCNC: 99 MG/DL (ref 70–110)
HCT VFR BLD AUTO: 40.5 % (ref 37–48.5)
HGB BLD-MCNC: 13.3 G/DL (ref 12–16)
IMM GRANULOCYTES # BLD AUTO: 0.01 K/UL (ref 0–0.04)
IMM GRANULOCYTES NFR BLD AUTO: 0.2 % (ref 0–0.5)
INR PPP: 1.1 (ref 0.8–1.2)
LYMPHOCYTES # BLD AUTO: 3.1 K/UL (ref 1–4.8)
LYMPHOCYTES NFR BLD: 46.8 % (ref 18–48)
MAGNESIUM SERPL-MCNC: 2 MG/DL (ref 1.6–2.6)
MCH RBC QN AUTO: 29.2 PG (ref 27–31)
MCHC RBC AUTO-ENTMCNC: 32.8 G/DL (ref 32–36)
MCV RBC AUTO: 89 FL (ref 82–98)
MONOCYTES # BLD AUTO: 0.6 K/UL (ref 0.3–1)
MONOCYTES NFR BLD: 8.5 % (ref 4–15)
NEUTROPHILS # BLD AUTO: 2.7 K/UL (ref 1.8–7.7)
NEUTROPHILS NFR BLD: 41.3 % (ref 38–73)
NRBC BLD-RTO: 0 /100 WBC
PHOSPHATE SERPL-MCNC: 3.7 MG/DL (ref 2.7–4.5)
PLATELET # BLD AUTO: 337 K/UL (ref 150–450)
PMV BLD AUTO: 8.7 FL (ref 9.2–12.9)
POTASSIUM SERPL-SCNC: 3.9 MMOL/L (ref 3.5–5.1)
PROT SERPL-MCNC: 6.6 G/DL (ref 6–8.4)
PROTHROMBIN TIME: 11.6 SEC (ref 9–12.5)
RBC # BLD AUTO: 4.56 M/UL (ref 4–5.4)
SODIUM SERPL-SCNC: 139 MMOL/L (ref 136–145)
TROPONIN I SERPL DL<=0.01 NG/ML-MCNC: <0.006 NG/ML (ref 0–0.03)
WBC # BLD AUTO: 6.6 K/UL (ref 3.9–12.7)

## 2024-05-02 PROCEDURE — 92523 SPEECH SOUND LANG COMPREHEN: CPT

## 2024-05-02 PROCEDURE — 25000003 PHARM REV CODE 250: Performed by: INTERNAL MEDICINE

## 2024-05-02 PROCEDURE — 97162 PT EVAL MOD COMPLEX 30 MIN: CPT

## 2024-05-02 PROCEDURE — 97530 THERAPEUTIC ACTIVITIES: CPT

## 2024-05-02 PROCEDURE — 84100 ASSAY OF PHOSPHORUS: CPT | Performed by: INTERNAL MEDICINE

## 2024-05-02 PROCEDURE — 84484 ASSAY OF TROPONIN QUANT: CPT | Performed by: INTERNAL MEDICINE

## 2024-05-02 PROCEDURE — G0378 HOSPITAL OBSERVATION PER HR: HCPCS

## 2024-05-02 PROCEDURE — 97166 OT EVAL MOD COMPLEX 45 MIN: CPT

## 2024-05-02 PROCEDURE — 85610 PROTHROMBIN TIME: CPT | Performed by: INTERNAL MEDICINE

## 2024-05-02 PROCEDURE — 85730 THROMBOPLASTIN TIME PARTIAL: CPT | Performed by: INTERNAL MEDICINE

## 2024-05-02 PROCEDURE — 36415 COLL VENOUS BLD VENIPUNCTURE: CPT | Performed by: INTERNAL MEDICINE

## 2024-05-02 PROCEDURE — 92610 EVALUATE SWALLOWING FUNCTION: CPT

## 2024-05-02 PROCEDURE — 85025 COMPLETE CBC W/AUTO DIFF WBC: CPT | Performed by: INTERNAL MEDICINE

## 2024-05-02 PROCEDURE — 83735 ASSAY OF MAGNESIUM: CPT | Performed by: INTERNAL MEDICINE

## 2024-05-02 PROCEDURE — 80053 COMPREHEN METABOLIC PANEL: CPT | Performed by: INTERNAL MEDICINE

## 2024-05-02 RX ADMIN — OXCARBAZEPINE 150 MG: 150 TABLET, FILM COATED ORAL at 08:05

## 2024-05-02 RX ADMIN — BUPROPION HYDROCHLORIDE 200 MG: 100 TABLET, FILM COATED, EXTENDED RELEASE ORAL at 06:05

## 2024-05-02 RX ADMIN — PANTOPRAZOLE SODIUM 40 MG: 40 TABLET, DELAYED RELEASE ORAL at 09:05

## 2024-05-02 RX ADMIN — QUETIAPINE FUMARATE 100 MG: 100 TABLET ORAL at 09:05

## 2024-05-02 RX ADMIN — ESCITALOPRAM OXALATE 10 MG: 10 TABLET ORAL at 09:05

## 2024-05-02 RX ADMIN — ACETAMINOPHEN 650 MG: 325 TABLET ORAL at 02:05

## 2024-05-02 NOTE — PT/OT/SLP EVAL
"Occupational Therapy Evaluation and Treatment    Name: Karyna Sanders  MRN: [id number]  Admitting Diagnosis: TIA (transient ischemic attack)  Recent Surgery: * No surgery found *      Recommendations:     Discharge Recommendations: Low Intensity Therapy (24/7 SPV and A)  Level of Assistance Recommended: 24 hours light assistance  Discharge Equipment Recommendations: shower chair, walker, rolling  Barriers to discharge: Inaccessible home environment, Decreased caregiver support    Assessment:     Karyna Sanders is a 64 y.o. female with a medical diagnosis of TIA (transient ischemic attack). She presents with performance deficits affecting function including weakness, impaired endurance, impaired sensation, impaired self care skills, impaired functional mobility, impaired balance, impaired cardiopulmonary response to activity, decreased safety awareness, impaired cognition, impaired fine motor, decreased upper extremity function, decreased lower extremity function, decreased coordination, impaired coordination.    Rehab Prognosis: Fair; patient would benefit from acute OT services to address these deficits and reach maximum level of function.      The mobility limitation cannot be sufficiently resolved by the use of a cane.   Patient's functional mobility deficit can be sufficiently resolved with the use of a rolling walker. Patient's mobility limitation significantly impairs their ability to participate in one of more activities of daily living. The use of a rolling walker will significantly improve the patient's ability to participate in MRADLS and the patient will use it on regular basis in the home.     Plan:     Patient to be seen 2 x/week to address the above listed problems via self-care/home management, therapeutic exercises, therapeutic activities  Plan of Care Expires: 05/16/24  Plan of Care Reviewed with: patient    Subjective     Chief Complaint: Reported "These tremors are new."  Patient " "Comments/Goals: increase independence  Pain/Comfort:  Pain Rating 1: 0/10    Social History:  Living Environment: [facility] with 2nd floor bedroom/bathroom . Reports she has been sleeping on the 1st floor recently.  Prior Level of Function: Prior to admission, patient was modified independent with household distance ambulation via SPC. Completing ADLs mod I.   Roles and Routines: Patient reports that she "sometimes" drives. Does not work.   Equipment Used at Home: cane, straight, grab bar  DME owned (not currently used): rollator  Assistance Upon Discharge:  daughter checks in    Objective:     Communicated with nurse, Maciej, prior to session. Patient found supine with peripheral IV, telemetry upon OT entry to room.    General Precautions: Standard, fall   Orthopedic Precautions: N/A   Braces: N/A    Respiratory Status: Room air    Occupational Performance    Gait belt applied - Yes    Bed Mobility:   Supine to sit from left side of bed with stand by assistance  Scooted to EOB with SBA using B UE to assist. Hands in closed fist position    Functional Mobility/Transfers:  Sit <> Stand Transfer with contact guard assistance with rolling walker  Bed <> Chair Transfer using Step Transfer technique with contact guard assistance with rolling walker  Functional Mobility: Patient completed x30ft x2 trials functional mobility with RW and CGA to increase dynamic standing balance and activity tolerance needed for ADL completion.  Provided with v/c for technique with transfers to increase safety and independence with completion  Patient with global ataxia throughout body- but noted to control it well    Activities of Daily Living:  Grooming: modified independence (demonstrated ability to open containers, pour, and manipulate silverware with B hands. Though tremors present, patient able to complete tasks with at least fair quality)  Upper Body Dressing: set up assistance    Cognitive/Visual " Perceptual:  Cognitive/Psychosocial Skills:    -     Oriented to: Person, Place, and Situation  -     Follows Commands/attention: Easily distracted and Follows one-step commands    Physical Exam:  Balance: -     Sitting: stand by assistance  -     Standing: contact guard assistance  Dominant hand: Right  Upper Extremity Range of Motion:     -       Right Upper Extremity: WFL  -       Left Upper Extremity: WFL  Upper Extremity Strength:    -       Right Upper Extremity: Deficits: grossly 4/5  -       Left Upper Extremity: Deficits: grossly 4/5   Strength:    -       Right Upper Extremity: Deficits: fair  -       Left Upper Extremity: Deficits: fair  Fine Motor Coordination:    -       Impaired  Left hand thumb/finger opposition skills fair, Right hand thumb/finger opposition skills poor, Left hand, manipulation of objects fair, and Right hand, manipulation of objects poor  Gross motor coordination:   ataxia  Reports numbness to L hand    AMPAC 6 Click ADL:  AMPAC Total Score: 20    Treatment & Education:  Therapist provided facilitation and instruction of proper body mechanics, energy conservation, and fall prevention strategies during tasks listed above  Patient educated on role of OT, POC, and goals for therapy  Patient educated on importance of OOB activities with staff member assistance and sitting OOB majority of the day  Encouraged completion of B UE AROM therex throughout the day to increase functional strength and activity tolerance needed for ADL completion.    Patient left up in chair with all lines intact, call button in reach, and chair alarm on.    GOALS:   Multidisciplinary Problems       Occupational Therapy Goals          Problem: Occupational Therapy    Goal Priority Disciplines Outcome Interventions   Occupational Therapy Goal     OT, PT/OT     Description: Goals to be met by: 5/16/24     Patient will increase functional independence with ADLs by performing:    Toileting from toilet with  Stand-by Assistance for hygiene and clothing management.   Toilet transfer to toilet with Stand-by Assistance.  Increased functional strength in B UE grossly by 1/2 MM grade.                         History:     Past Medical History:   Diagnosis Date    Anemia     Aneurysm     Anorexia 4/26/2017    Anxiety     Asthma     Atherosclerosis of aorta 1/5/2022    CT ABDOMEN PELVIS WITH CONTRAST 01/03/2021 FINDINGS: Mild atherosclerosis within the abdominal aorta which is nonaneurysmal.    Bipolar disorder     Carpal tunnel syndrome, bilateral     Cerebral aneurysm     Chronic nausea 11/15/2018    Chronic pain syndrome     Cigarette nicotine dependence     Depression     Essential hypertension 8/29/2014    GERD (gastroesophageal reflux disease)     Hyperlipidemia     Hypertension     Insomnia     Osteoporosis     Pure hypercholesterolemia 9/8/2014    Recurrent tension-type headache, not intractable 7/24/2019    Schizophrenia     Stroke     Subarachnoid hemorrhage          Past Surgical History:   Procedure Laterality Date    BUNIONECTOMY Right     COLONOSCOPY N/A 2/7/2022    Procedure: COLONOSCOPY;  Surgeon: Kamila Lund MD;  Location: [facility];  Service: Endoscopy;  Laterality: N/A;    COLONOSCOPY W/ BIOPSIES AND POLYPECTOMY      ESOPHAGOGASTRODUODENOSCOPY N/A 2/7/2022    Procedure: EGD (ESOPHAGOGASTRODUODENOSCOPY);  Surgeon: Kamila Lund MD;  Location: [facility];  Service: Endoscopy;  Laterality: N/A;    HYSTERECTOMY      PARTIAL HYSTERECTOMY      Bilateral Ovaries Remain    TRANSCRANIAL DOPPLER STUDY - COMPLETE  8/28/2014         TRANSCRANIAL DOPPLER STUDY - COMPLETE  8/29/2014         TRANSCRANIAL DOPPLER STUDY - COMPLETE  8/30/2014         TRANSCRANIAL DOPPLER STUDY - COMPLETE  8/31/2014         TRANSCRANIAL DOPPLER STUDY - COMPLETE  9/1/2014         TRANSCRANIAL DOPPLER STUDY - COMPLETE  9/2/2014         TRANSCRANIAL DOPPLER STUDY - COMPLETE  9/3/2014         TRANSCRANIAL DOPPLER STUDY - COMPLETE   9/4/2014         TRANSCRANIAL DOPPLER STUDY - COMPLETE  9/5/2014         TRANSCRANIAL DOPPLER STUDY - COMPLETE  9/6/2014         VAGINAL DELIVERY      X 3       Time Tracking:     OT Date of Treatment: 05/02/24  OT Start Time: 0720  OT Stop Time: 0745  OT Total Time (min): 25 min    Billable Minutes: Evaluation 15 and Therapeutic Activity 10    April Leone, OT  5/2/2024

## 2024-05-02 NOTE — PT/OT/SLP EVAL
"Physical Therapy Evaluation and Treatment    Patient Name:  Karyna Sanders   MRN:  [id number]    Recommendations:     Discharge Recommendations: Low Intensity Therapy (WITH 24 HOUR CARE FOR SAFETY)   Discharge Equipment Recommendations: walker, rolling, shower chair   Barriers to discharge: None    Assessment:     Karyna Sanders is a 64 y.o. female admitted with a medical diagnosis of TIA (transient ischemic attack).  She presents with the following impairments/functional limitations: weakness, impaired endurance, impaired functional mobility, gait instability, impaired balance, decreased safety awareness, decreased lower extremity function, decreased upper extremity function, decreased coordination.    Rehab Prognosis: Good; patient would benefit from acute skilled PT services to address these deficits and reach maximum level of function.    Recent Surgery: * No surgery found *     Plan:     During this hospitalization, patient to be seen 3 x/week to address the identified rehab impairments via gait training, therapeutic activities, therapeutic exercises and progress toward the following goals:    Plan of Care Expires:  05/16/24    Subjective     Chief Complaint: "THE TREMORS ARE NEW"  PT INCONSISTENT WHEN REPORTING ABOUT NEW SYMPTOMS VS BASELINE SYMPTOMS  Patient/Family Comments/goals: AGREEABLE TO GAIT  Pain/Comfort:  Pain Rating 1: 0/10    Patients cultural, spiritual, Anglican conflicts given the current situation:      Living Environment:  PT LIVES ALONE BUT HAS DAUGHTER CHECK IN OFTEN, LIVES IN [address] [facility] HOUSE, PT LIVES ON 1ST FLOOR EXCEPT FOR WHEN HE HAS TO TAKE A BATH, AMB WITH SPC HOUSEHOLD DISTANCES, DOES NOT DRIVE OR WORK, INDEP WITH ADL'S  Prior to admission, patients level of function was DU WITH SPC.  Equipment used at home: cane, straight, rollator, grab bar.  DME owned (not currently used): none.  Upon discharge, patient will have assistance from DAUGHTER.    Objective:     Communicated " with NURSE SEGOVIA prior to session.  Patient found supine with telemetry, peripheral IV  upon PT entry to room.    General Precautions: Standard, fall  Orthopedic Precautions:N/A   Braces: N/A  Respiratory Status: Room air    Exams:  Cognitive Exam:  Patient is oriented to Person, Place, Time, and Situation  Postural Exam:  Patient presented with the following abnormalities:    -       Rounded shoulders  -       Global ataxia  PT DENIES DIZZINESS THROUGHOUT EVAL AND TREAT  PT DENIES VISION CHANGES  Sensation:    -       Impaired  PT C/O NUMBNESS TO L HAND  RLE ROM: WFL BUT ATAXIC THROUGHOUT ACTIVE ROM, INVERSION AT ANKLE WITH ACTIVE PF  RLE Strength: GROSSLY 3+/5  LLE ROM: WFL BUT ATAXIC THROUGHOUT ACTIVE ROM, INVERSION AT ANKLE WITH ACTIVE PF  LLE Strength: GROSSLY 3+/5    Functional Mobility:  Bed Mobility:     Rolling Left:  stand by assistance  Scooting: stand by assistance  Supine to Sit: stand by assistance  Transfers:     Sit to Stand:  contact guard assistance with rolling walker  Bed to Chair: contact guard assistance with  rolling walker  using  Step Transfer  Gait: PT AMB 30' X 2 TRIALS WITH RW AND CGA, SLOW PACE, MILDLY UNSTEADY DUE TO TREMORS BUT NO LOB, MILD BUCKLING AT KNEE'S BUT ABLE TO SELF CORRECT, QUICK TO FATIGUE BUT GOOD EFFORT  Balance: FAIR SITTING AND STANDING BALANCE DURING GAIT WITH RW  PT EDUCATED IN BLE THEREX TO PERFORM WHILE SEATED IN CHAIR: HIP FLEX/EXT, LAQ, AP'S  PT SET UP FOR BREAKFAST WHILE SEATED IN CHAIR, PT QUICK/IMPULSIVE WITH OPENING PACKETS, FAIR CONTROL IN ORDER TO COMPLETE TASK WITHOUT ASSISTANCE    AM-PAC 6 CLICK MOBILITY  Total Score:18     Treatment & Education:  PT EDUCATION:  - ROLE OF P.T. AND POC IN ACUTE CARE HOSPITAL SETTING  - RW USE AND SAFETY DURING TF'S AND GAIT  - ENCOURAGED TO INCREASE TIME OOB IN CHAIR TO TOLERANCE   - TO CONTINUE THERAPUETIC EXERCISES THROUGHOUT THE DAY TO INCREASE ACTIVITY TOLERANCE AND DECREASE RISK FOR PNEUMONIA AND BLOOD CLOTS: HIP  "FLEX/EXT, HIP ABD/ADD, QUAD SET, HEEL SLIDE, AP  - RISK FOR FALLS DUE TO GENERALIZED WEAKNESS, EDUCATED ON "CALL DON'T FALL", ENCOURAGED TO CALL FOR ASSISTANCE WITH ALL NEEDS SUCH AS BED<>CHAIR TRANSFERS OR TRIPS TO BATHROOM, PT AGREEABLE TO SAFETY PRECAUTIONS    Patient left up in chair with all lines intact, call button in reach, chair alarm on, and NURSE notified.    GOALS:   Multidisciplinary Problems       Physical Therapy Goals          Problem: Physical Therapy    Goal Priority Disciplines Outcome Goal Variances Interventions   Physical Therapy Goal     PT, PT/OT      Description: LTG'S TO BE MET IN 14 DAYS (5-16-24)  PT WILL BE DU FOR BED MOBILITY  PT WILL REQUIRE SPV FOR BED<>CHAIR TF'S  PT WILL  FEET WITH RW AND SBA  PT WILL INC AMPAC SCORE BY 2 POINTS TO PROGRESS GROSS FUNC MOBILITY                         History:     Past Medical History:   Diagnosis Date    Anemia     Aneurysm     Anorexia 4/26/2017    Anxiety     Asthma     Atherosclerosis of aorta 1/5/2022    CT ABDOMEN PELVIS WITH CONTRAST 01/03/2021 FINDINGS: Mild atherosclerosis within the abdominal aorta which is nonaneurysmal.    Bipolar disorder     Carpal tunnel syndrome, bilateral     Cerebral aneurysm     Chronic nausea 11/15/2018    Chronic pain syndrome     Cigarette nicotine dependence     Depression     Essential hypertension 8/29/2014    GERD (gastroesophageal reflux disease)     Hyperlipidemia     Hypertension     Insomnia     Osteoporosis     Pure hypercholesterolemia 9/8/2014    Recurrent tension-type headache, not intractable 7/24/2019    Schizophrenia     Stroke     Subarachnoid hemorrhage        Past Surgical History:   Procedure Laterality Date    BUNIONECTOMY Right     COLONOSCOPY N/A 2/7/2022    Procedure: COLONOSCOPY;  Surgeon: Kamila Lund MD;  Location: [facility];  Service: Endoscopy;  Laterality: N/A;    COLONOSCOPY W/ BIOPSIES AND POLYPECTOMY      ESOPHAGOGASTRODUODENOSCOPY N/A 2/7/2022    Procedure: EGD " (ESOPHAGOGASTRODUODENOSCOPY);  Surgeon: Kamila Lund MD;  Location: [facility];  Service: Endoscopy;  Laterality: N/A;    HYSTERECTOMY      PARTIAL HYSTERECTOMY      Bilateral Ovaries Remain    TRANSCRANIAL DOPPLER STUDY - COMPLETE  8/28/2014         TRANSCRANIAL DOPPLER STUDY - COMPLETE  8/29/2014         TRANSCRANIAL DOPPLER STUDY - COMPLETE  8/30/2014         TRANSCRANIAL DOPPLER STUDY - COMPLETE  8/31/2014         TRANSCRANIAL DOPPLER STUDY - COMPLETE  9/1/2014         TRANSCRANIAL DOPPLER STUDY - COMPLETE  9/2/2014         TRANSCRANIAL DOPPLER STUDY - COMPLETE  9/3/2014         TRANSCRANIAL DOPPLER STUDY - COMPLETE  9/4/2014         TRANSCRANIAL DOPPLER STUDY - COMPLETE  9/5/2014         TRANSCRANIAL DOPPLER STUDY - COMPLETE  9/6/2014         VAGINAL DELIVERY      X 3       Time Tracking:     PT Received On: 05/02/24  PT Start Time: 0720     PT Stop Time: 0745  PT Total Time (min): 25 min     Billable Minutes: Evaluation 15 and Therapeutic Activity 10    05/02/2024

## 2024-05-02 NOTE — PLAN OF CARE
P.T. EVAL COMPLETE.  PT CURRENTLY REQUIRES SBA FOR BED MOBILITY, CGA FOR TF'S AND GAIT WITH RW.  P.T. RECOMMENDS LOW INTENSITY THERAPY UPON DISCHARGE WITH 24 HOUR CARE FOR SAFETY

## 2024-05-02 NOTE — PLAN OF CARE
OT tammy completed. Sup>sit SBA, sit>stand CGA with RW, functional mobility x30ft x2 trials with RW and CGA, step>pivot to bedside chair with CGA and RW. Recommending low intensity intervention and RW at d/c.

## 2024-05-02 NOTE — PLAN OF CARE
Problem: Adult Inpatient Plan of Care  Goal: Plan of Care Review  Outcome: Progressing  Goal: Patient-Specific Goal (Individualized)  Outcome: Progressing  Goal: Absence of Hospital-Acquired Illness or Injury  Outcome: Progressing  Goal: Optimal Comfort and Wellbeing  Outcome: Progressing  Goal: Readiness for Transition of Care  Outcome: Progressing     Problem: Infection  Goal: Absence of Infection Signs and Symptoms  Outcome: Progressing     Problem: Stroke, Ischemic (Includes Transient Ischemic Attack)  Goal: Optimal Coping  Outcome: Progressing  Goal: Effective Bowel Elimination  Outcome: Progressing  Goal: Optimal Cerebral Tissue Perfusion  Outcome: Progressing  Goal: Optimal Cognitive Function  Outcome: Progressing  Goal: Improved Communication Skills  Outcome: Progressing  Goal: Optimal Functional Ability  Outcome: Progressing  Goal: Optimal Nutrition Intake  Outcome: Progressing  Goal: Effective Oxygenation and Ventilation  Outcome: Progressing  Goal: Improved Sensorimotor Function  Outcome: Progressing  Goal: Safe and Effective Swallow  Outcome: Progressing  Goal: Effective Urinary Elimination  Outcome: Progressing

## 2024-05-02 NOTE — PLAN OF CARE
[facility] - Telemetry (Hospital)  Discharge Final Note    Primary Care Provider: DIMA Boothe MD    Expected Discharge Date: 5/2/2024    Final Discharge Note (most recent)       Final Note - 05/02/24 1635          Final Note    Assessment Type Final Discharge Note     Anticipated Discharge Disposition Home-Health Care [facility]     Hospital Resources/Appts/Education Provided Post-Acute resouces added to AVS                   Pt to discharge home today. Pt agreeable to home health set up via [facility]. AVS updated with contact information. Pt reports daughter will be DC transport and help at home.   Message sent to [facility] PCP group to arrange hospital follow up within a week.     No additional needs for discharge.     Important Message from Medicare              Follow-up providers       DIMA Boothe MD   Specialty: Family Medicine   Relationship: PCP - General  [facility] Responsible Provider    [address] [facility] [place] [address]   Phone: [phone]       Next Steps: Follow up    Judson Sawant MD   Specialty: Neurology    [address] [place] [address] [address] [place] [address]   Phone: [phone]       Next Steps: Follow up              After-discharge care                Home Medical Care       [facility]   Service: Home Rehabilitation    [address] [facility]  [place] [address]   Phone: [phone]

## 2024-05-02 NOTE — PLAN OF CARE
Telemetry monitor removed and returned to monitor tech. PIV removed. Discharge instructions reviewed with patient including discharge medications, follow-up appointments, diet, and activity restrictions. Patient verbalizes understanding and voices no concerns. All personal belongings to leave with patient. Will be discharged home via wheelchair.  Pt in no acute distress. Awaiting ride home.

## 2024-05-02 NOTE — PLAN OF CARE
[facility] - Telemetry (Hospital)  Initial Discharge Assessment       Primary Care Provider: DIMA Boothe MD    Admission Diagnosis: TIA (transient ischemic attack) [G45.9]  Acute focal neurological deficit [R29.818]    Admission Date: 5/1/2024  Expected Discharge Date:     Transition of Care Barriers: (P) None    Payor: [employer] MANAGED MEDICARE / Plan: [employer] MEDICARE HMO / Product Type: Capitation /     Extended Emergency Contact Information  Primary Emergency Contact: Denise Durbin  Mobile Phone: [phone]  Relation: Daughter   needed? No  Secondary Emergency Contact: Penelope Lu  Mobile Phone: [phone]  Relation: Sister  Preferred language: English   needed? No    Discharge Plan A: (P) Home         [id number] - [place] - [address] [employer]  [address]  [place] [address]  Phone: [phone] Fax: [phone]    [id number] - [place] - [address] AT [facility]  [address]  [place] [address]  Phone: [phone] Fax: [phone]    [facility] - [place] - Located within [facility] AT [facility]  [place] [address]  Phone: [phone] Fax: [phone]      Initial Assessment (most recent)       Adult Discharge Assessment - 05/02/24 1321          Discharge Assessment    Assessment Type Discharge Planning Assessment (P)      Confirmed/corrected address, phone number and insurance Yes (P)      Confirmed Demographics Correct on Facesheet (P)      Source of Information patient (P)      When was your last doctors appointment? 11/16/23 (P)      Communicated MERLE with patient/caregiver Yes (P)      Reason For Admission TIA (P)      People in Home alone (P)      Facility Arrived From: home (P)      Do you expect to return to your current living situation? Yes (P)      Do you have help at home or someone to help you manage your  care at home? No (P)      Prior to hospitilization cognitive status: Alert/Oriented (P)      Current cognitive status: Alert/Oriented (P)      Walking or Climbing Stairs Difficulty yes (P)      Walking or Climbing Stairs ambulation difficulty, requires equipment (P)      Mobility Management uses cane (P)      Dressing/Bathing Difficulty no (P)      Equipment Currently Used at Home cane, straight;nebulizer (P)      Readmission within 30 days? No (P)      Patient currently being followed by outpatient case management? No (P)      Do you currently have service(s) that help you manage your care at home? No (P)      Do you take prescription medications? Yes (P)      Do you have prescription coverage? Yes (P)      Coverage [employer] (P)      Do you have any problems affording any of your prescribed medications? No (P)      Who is going to help you get home at discharge? daughter or grandaughter (P)      How do you get to doctors appointments? car, drives self (P)      Are you on dialysis? No (P)      Do you take coumadin? No (P)      Discharge Plan A Home (P)      DME Needed Upon Discharge  none (P)      Discharge Plan discussed with: Patient (P)      Transition of Care Barriers None (P)         Housing Stability    In the last 12 months, was there a time when you were not able to pay the mortgage or rent on time? No (P)      At any time in the past 12 months, were you homeless or living in a shelter (including now)? No (P)         Transportation Needs    In the past 12 months, has lack of transportation kept you from medical appointments or from getting medications? No (P)      In the past 12 months, has lack of transportation kept you from meetings, work, or from getting things needed for daily living? No (P)         Food Insecurity    Within the past 12 months, you worried that your food would run out before you got the money to buy more. Never true (P)      Within the past 12 months, the food you bought just didn't last  and you didn't have money to get more. Never true (P)                    Patient lives alone. Her daughter can assist her if needed.  She is independent with ADL's with assistive equipment.  Currently no needs.

## 2024-05-03 ENCOUNTER — PATIENT OUTREACH (OUTPATIENT)
Dept: ADMINISTRATIVE | Facility: CLINIC | Age: 64
End: 2024-05-03
Payer: MEDICARE

## 2024-05-03 NOTE — PLAN OF CARE
SIDRA Farrar went over discharge instructions with patient.   Stressed importance of making and keeping all follow ups as well as any specified medication changes.  No new prescriptions ordered.   Patient verbalized understanding and has had all questions in regards to discharge answered to satisfaction.  PIV x2 removed without complications by SIDRA Farrar.  Telemetry box removed and returned to monitor tech by SIDRA Farrar.  See note by SIDRA Farrar.  Patient transported via wheelchair to personal transportation at main entrance.

## 2024-05-03 NOTE — HOSPITAL COURSE
The patient presented with dizziness and weakness. CTA was obtained to rule out LVO and hemorrhage. CTA head neck no acute findings. She was given meclizine with some improvement in her dizziness. Permissive hypertension was allowed. She was evaluated by PT/OT. Home health recommended and arranged. She was noted to have low normal blood pressure readings. She was instructed to hold amlodipine until SBP > 130. Patient verbalized understanding. Patient seen and examined, stable for discharge. Outpatient neurology follow-up recommended.

## 2024-05-03 NOTE — PLAN OF CARE
SW received voicemail from [facility] stating that pt refused service d/t risk of having to pay remaining deductible ($240). Pt and family informed [facility] that they are unable to afford this cost and do not want to be liable for a bill. Pt has PCP scheduled on 5/9 @ 1040 am.

## 2024-05-03 NOTE — PT/OT/SLP EVAL
Speech Language Pathology Evaluation  Cognitive/Bedside Swallow    Patient Name:  Karyna Sanders   MRN:  [id number]  Admitting Diagnosis: TIA (transient ischemic attack)    Recommendations:                  General Recommendations:  Follow-up not indicated  Diet recommendations:  Regular Diet - IDDSI Level 7, Thin liquids - IDDSI Level 0   Aspiration Precautions: Frequent oral care, HOB to 90 degrees, and Standard aspiration precautions   General Precautions: Standard, aspiration  Communication strategies:  none    Assessment:     Karyna Sanders is a 64 y.o. female admitted to [facility] acute with c/o dizziness and difficulty standing/ambulating.  CT negative for acute events; however, chronic microvascular ischemia and remote aneurysm coiling noted.  She presents with adequate RAULITO and communication to meet acute needs; however, intermittent speech dysfluency noted during conversation.  No overt s/s of dysphagia present during bedside CSE and recommended for IDDSI 7-regular solids with IDDSI 0-thin liquids, following standard aspiration precautions. No further ST intervention indicated at this time.  MD to reconsult if needed.    History:     Past Medical History:   Diagnosis Date    Anemia     Aneurysm     Anorexia 4/26/2017    Anxiety     Asthma     Atherosclerosis of aorta 1/5/2022    CT ABDOMEN PELVIS WITH CONTRAST 01/03/2021 FINDINGS: Mild atherosclerosis within the abdominal aorta which is nonaneurysmal.    Bipolar disorder     Carpal tunnel syndrome, bilateral     Cerebral aneurysm     Chronic nausea 11/15/2018    Chronic pain syndrome     Cigarette nicotine dependence     Depression     Essential hypertension 8/29/2014    GERD (gastroesophageal reflux disease)     Hyperlipidemia     Hypertension     Insomnia     Osteoporosis     Pure hypercholesterolemia 9/8/2014    Recurrent tension-type headache, not intractable 7/24/2019    Schizophrenia     Stroke     Subarachnoid hemorrhage        Past Surgical  History:   Procedure Laterality Date    BUNIONECTOMY Right     COLONOSCOPY N/A 2/7/2022    Procedure: COLONOSCOPY;  Surgeon: Kamila Lund MD;  Location: [facility];  Service: Endoscopy;  Laterality: N/A;    COLONOSCOPY W/ BIOPSIES AND POLYPECTOMY      ESOPHAGOGASTRODUODENOSCOPY N/A 2/7/2022    Procedure: EGD (ESOPHAGOGASTRODUODENOSCOPY);  Surgeon: Kamila Lund MD;  Location: [facility] ENDO;  Service: Endoscopy;  Laterality: N/A;    HYSTERECTOMY      PARTIAL HYSTERECTOMY      Bilateral Ovaries Remain    TRANSCRANIAL DOPPLER STUDY - COMPLETE  8/28/2014         TRANSCRANIAL DOPPLER STUDY - COMPLETE  8/29/2014         TRANSCRANIAL DOPPLER STUDY - COMPLETE  8/30/2014         TRANSCRANIAL DOPPLER STUDY - COMPLETE  8/31/2014         TRANSCRANIAL DOPPLER STUDY - COMPLETE  9/1/2014         TRANSCRANIAL DOPPLER STUDY - COMPLETE  9/2/2014         TRANSCRANIAL DOPPLER STUDY - COMPLETE  9/3/2014         TRANSCRANIAL DOPPLER STUDY - COMPLETE  9/4/2014         TRANSCRANIAL DOPPLER STUDY - COMPLETE  9/5/2014         TRANSCRANIAL DOPPLER STUDY - COMPLETE  9/6/2014         VAGINAL DELIVERY      X 3       Social History: Patient lives alone in [place].  Reported independent with ADL's.  Ambulates with cane.    Prior Intubation HX:  N/A this admission.  See chart.    Modified Barium Swallow: N/A    CT HEAD WITHOUT CONTRAST     CLINICAL HISTORY:  Dizziness, persistent/recurrent, cardiac or vascular cause suspected;     TECHNIQUE:  Low dose axial CT images obtained throughout the head without intravenous contrast. Sagittal and coronal reconstructions were performed.  All CT scans at this facility use dose modulation, iterative reconstruction and/or weight based dosing when appropriate to reduce radiation dose to as low as reasonably achievable.     COMPARISON:  08/23/2022     FINDINGS:  Intracranial compartment: Coils are clips seen in the right suprasellar region which likely reflect prior aneurysm coiling or repair.     The  brain parenchyma demonstrates areas of decreased attenuation with mildperiventricular white matter consistent with chronic microvascular ischemic changes..  No parenchymal mass, hemorrhage, edema or major vascular distribution infarct.  Vascular calcifications are noted.     Mild prominence of the sulci and ventricles are consistent with age-related involutional changes.     No extra-axial blood or fluid collections.     Skull/extracranial contents (limited evaluation): No fracture. Mastoid air cells and paranasal sinuses are essentially clear.     Impression:     Chronic microvascular ischemic changes.        Electronically signed by:Amaury Centeno MD  Date:                                            05/02/2024  Time:                                           11:38    CTA HEAD AND NECK (XPD)     CLINICAL HISTORY:  Neuro deficit, acute, stroke suspected;Stroke/TIA, determine embolic source;     TECHNIQUE:  Non contrast low dose axial images were obtained through the head. CT angiogram was performed from the level of the beth to the top of the head following the IV administration of 100mL of Omnipaque 350.   Sagittal and coronal reconstructions and maximum intensity projection reconstructions were performed. Arterial stenosis percentages are based on NASCET measurement criteria.     All CT scans at this location are performed using dose optimization techniques including the following: Automated exposure control; adjustment of the mA and/or kv; use of iterative reconstruction technique.     COMPARISON:  CT dated 08/23/2022.  CTA dated 10/24/2019     FINDINGS:  Intracranial Compartment:     Ventricles and sulci are normal in size for age without evidence of hydrocephalus. No extra-axial blood or fluid collections.     Mild hypoattenuation noted in the cerebral white matter.  Endovascular coils noted in the region of the right posterior communicating artery.  No parenchymal mass, hemorrhage, edema, or major vascular  distribution infarct.     Skull/Extracranial Contents (limited evaluation): No fracture. Mastoid air cells and paranasal sinuses are essentially clear.     Non-Vascular Structures of the Neck/Thoracic Inlet (limited evaluation): Nonvascular soft tissues of the neck are unremarkable.  No acute or aggressive bony abnormality.  Degenerative changes noted in the spine.     Aorta: Common origin of the brachiocephalic left common carotid arteries which is a normal variant.  Mild aortic arch atherosclerosis without flow-limiting stenosis.     Extracranial carotid circulation: No hemodynamically significant stenosis, aneurysmal dilatation, or dissection.  Hypoplastic left internal carotid artery.     Extracranial vertebral circulation: No hemodynamically significant stenosis, aneurysmal dilatation, or dissection.     Intracranial Arteries: No focal high-grade stenosis, occlusion, or aneurysm.  Hypoplastic left internal carotid artery and left anterior cerebral artery.  Right internal carotid artery provides dominant blood supply to the bilateral CHAVA territory via the anterior communicating artery.  Endovascular coils noted in the region of the right posterior communicating artery.  No residual or recurrent filling aneurysm identified.     Venous structures (limited evaluation): Normal.     Impression:     No acute intracranial abnormality.  Mild nonspecific white matter changes likely related to chronic microvascular ischemia.     Endovascular coils in the region of the right posterior communicating artery.  No evidence of residual or recurrent aneurysm filling.  No new intracranial aneurysm identified.     No significant stenosis, occlusion, dissection, or vascular malformation identified.        Electronically signed by:Duy King  Date:                                            05/01/2024  Time:                                           08:51    Prior diet: Pt consumes a regular diet.    Subjective     Pt seen  bedside for ST evaluation.  Sitting EOB upon arrival.  No c/o pain.  No family present.  Denied changes in communication.  Patient goals: To improve LE weakness, ambulation     Pain/Comfort:  Pain Rating 1: 0/10  Pain Rating Post-Intervention 1: 0/10  Pain Rating 2: 0/10  Pain Rating Post-Intervention 2: 0/10    Respiratory Status: Room air    Objective:     Cognitive Status:    Oriented with functional recall and problem solving to communicate acute needs.  Hx SAH, brain aneurysm s/p coiling 2014.  Lives alone and reportedly independent with ADL's.       Receptive Language:   Comprehension:   WFL in conversation    Pragmatics:    WFL    Expressive Language:  Verbal:    WFL in conversation      Motor Speech:  Intermittent speech dysfluencies during conversation.  Also noted tremulous/involuntary extremity movements as well.    Voice:   WFL    Oral Musculature Evaluation  Oral Musculature: WFL  Dentition: present and adequate  Mucosal Quality: good  Mandibular Strength and Mobility: WFL  Oral Labial Strength and Mobility: WFL  Lingual Strength and Mobility: WFL  Velar Elevation: WFL  Buccal Strength and Mobility: WFL  Volitional Cough: present  Volitional Swallow: present  Voice Prior to PO Intake: WF    Bedside Clinical Swallow Evaluation  [place] Swallow Protocol:  [place] Swallow Protocol dictates patient remain NPO if failed screener (Raizar et al. 2014).  The [place] Swallow Protocol was administered. The patient was alert and provided the instructions prior to the beginning of the protocol. The patient consumed 3 oz before putting the cup down. Patient drank with consecutive swallows. Patient without overt s/s of aspiration.    Clinical Swallow Examination:   Of note, patient self-fed throughout evaluation. Patient presented with:     CONSISTENCY  NOTES   THIN (IDDSI 0) 3 oz water challenge   Cup/straw   No overt s/s of dysphagia/aspiration   PUREE (IDDSI 4/Extremely Thick)   TSP/TBSP bites of pudding/applesauce  No  overt s/s of dysphagia/aspiration   SOLID (IDDSI 7/Regular) Bite of aJda cid    No overt s/s of dysphagia/aspiration     Thickened liquids were not used in this assessment. Josey (2018) reported that thickened liquids have no sound evidence at reducing the risk of pneumonia in patients with dysphagia and can cause harm by increasing their risk of dehydration. It also presents an increased risk of UTI, electrolyte imbalance, constipation, fecal impaction, cognitive impairment, functional decline and even death (Langmore, 2002; Pat, 2016).  Thickened liquids are associated with risks including dehydration, increased pharyngeal residue, potential interference with medication absorption, and decreased quality of life (Roly, 2013). Thickened liquids are also more likely to be silently aspirated than thin liquids (Griffin et al., 2018). This supports the assertion that we should confirm a patient requires thickened liquids with an instrumental swallow study prior to recommending them.    References:   Roly STYLES (2013). Thickening agents used for dysphagia management: Effect on bioavailability of water, medication and feelings of satiety. Nutrition Journal, 12, 54. https://doi.org/10.1186/4398-4532-72-54    MARTÍN Moya., SEVEN Edge, CHRISTIANO Higgins, & MARTÍN Ortega. (2018). Cough response to aspiration in thin and thick fluids during FEES in hospitalized inpatients. International journal of language & communication disorders, 53(5), 909-918. https://doi.org/10.1111/2912-5770.47131    INTERPRETATION AND RISK ASSESSMENT:  Clinical swallow evaluation (CSE) revealed oral phase characterized by lingual, labial, buccal strength and range of motion functional for lip closure, bolus preparation and propulsion. The patient had no anterior loss of the bolus with complete closure of the lips around the utensils. No residue remained in the oral cavity following the swallow. Patient without overt clinical signs/symptoms of  aspiration on any PO trials given. Contributing risk factors for dysphagia include Neurological hx.     Goals:   Multidisciplinary Problems       SLP Goals       Not on file                    Plan:     Plan of Care reviewed with:  patient   SLP Follow-Up:  No       Discharge recommendations:  Therapy Intensity Recommendations at Discharge: No Therapy Indicated   Barriers to Discharge:  None    Time Tracking:     SLP Treatment Date:   05/02/24  Speech Start Time:  1030  Speech Stop Time:  1100     Speech Total Time (min):  30 min    Billable Minutes: Eval 15 minutes  and Eval Swallow and Oral Function 15 minutes    05/02/2024

## 2024-05-03 NOTE — DISCHARGE SUMMARY
[facility] - Telemetry ([facility])  [facility] Medicine  Discharge Summary      Patient Name: Karyna Sanders  MRN: [id number]  [facility]: [id number]  Patient Class: OP- Observation  Admission Date: 5/1/2024  Hospital Length of Stay: 0 days  Discharge Date and Time: 5/2/2024  6:23 PM  Attending Physician: No att. providers found   Discharging Provider: Kelly Bran MD  Primary Care Provider: DIMA Boothe MD    Primary Care Team: Networked reference to record PCT     HPI:   The patient is a 65 yo female with past medical history of cerebral aneurysm s/p coiling, anxiety, anemia, hypertension, schizoaffective disorder, SAH and depression who presented to the ED with dizziness and difficulty standing his 0400 morning of presentation. She reports her whole body feels tremulous with standing. She feels off balance when she stands. She denies dizziness with laying down. She had similar symptoms when she had her brain aneurysm. She ambulates with cane at baseline. Workup in the ED unrevealing. Some relief with meclizine but still not near baseline. Hospital medicine consulted.  Patient placed in observation.    * No surgery found *      Hospital Course:   The patient presented with dizziness and weakness. CTA was obtained to rule out LVO and hemorrhage. CTA head neck no acute findings. She was given meclizine with some improvement in her dizziness. Permissive hypertension was allowed. She was evaluated by PT/OT. Home health recommended and arranged. She was noted to have low normal blood pressure readings. She was instructed to hold amlodipine until SBP > 130. Patient verbalized understanding. Patient seen and examined, stable for discharge. Outpatient neurology follow-up recommended.      Goals of Care Treatment Preferences:  Code Status: Full Code      Consults:   Consults (From admission, onward)          Status Ordering Provider     IP consult to case management/social work  Once        Provider:  (Not yet assigned)     Completed JEWEL ESCUDERO     Consult to Telemedicine - Acute Stroke  Once        Provider:  Jessica Flanagan NP    Acknowledged BERRY RICE                Final Active Diagnoses:    Diagnosis Date Noted POA    PRINCIPAL PROBLEM:  TIA (transient ischemic attack) [G45.9] 05/01/2024 Yes    Dizziness [R42] 05/01/2024 Yes    History of subarachnoid hemorrhage [Z86.79] 07/24/2019 Not Applicable    Essential hypertension [I10] 08/29/2014 Yes     Chronic    Anxiety [F41.9] 08/27/2014 Yes     Chronic      Problems Resolved During this Admission:       Discharged Condition: stable    Disposition: Home or Self Care    Follow Up:   Contact information for follow-up providers       DIMA Boothe MD Follow up.    Specialty: Family Medicine  Contact information:  [address]  [place] [address]  [phone]               Judson Sawant MD Follow up.    Specialty: Neurology  Contact information:  [address] [place] [address]  [phone]                       Contact information for after-discharge care       Home Medical Care       [facility] .    Service: Home Rehabilitation  Contact information:  [address] [address]  [phone]                                 Patient Instructions:      Ambulatory referral/consult to Home Health   Standing Status: Future   Referral Priority: Routine Referral Type: Home Health   Referral Reason: Specialty Services Required   Requested Specialty: Home Health Services   Number of Visits Requested: 1     Diet Cardiac     Activity as tolerated       Significant Diagnostic Studies: Radiology:   Imaging Results              CTA Head and Neck (xpd) (Final result)  Result time 05/01/24 08:51:07      Final result by Duy King MD (05/01/24 08:51:07)                   Impression:      No acute intracranial abnormality.  Mild nonspecific white matter changes likely related to chronic microvascular  ischemia.    Endovascular coils in the region of the right posterior communicating artery.  No evidence of residual or recurrent aneurysm filling.  No new intracranial aneurysm identified.    No significant stenosis, occlusion, dissection, or vascular malformation identified.      Electronically signed by: Duy King  Date:    05/01/2024  Time:    08:51               Narrative:    EXAMINATION:  CTA HEAD AND NECK (XPD)    CLINICAL HISTORY:  Neuro deficit, acute, stroke suspected;Stroke/TIA, determine embolic source;    TECHNIQUE:  Non contrast low dose axial images were obtained through the head. CT angiogram was performed from the level of the beth to the top of the head following the IV administration of 100mL of Omnipaque 350.   Sagittal and coronal reconstructions and maximum intensity projection reconstructions were performed. Arterial stenosis percentages are based on NASCET measurement criteria.    All CT scans at this location are performed using dose optimization techniques including the following: Automated exposure control; adjustment of the mA and/or kv; use of iterative reconstruction technique.    COMPARISON:  CT dated 08/23/2022.  CTA dated 10/24/2019    FINDINGS:  Intracranial Compartment:    Ventricles and sulci are normal in size for age without evidence of hydrocephalus. No extra-axial blood or fluid collections.    Mild hypoattenuation noted in the cerebral white matter.  Endovascular coils noted in the region of the right posterior communicating artery.  No parenchymal mass, hemorrhage, edema, or major vascular distribution infarct.    Skull/Extracranial Contents (limited evaluation): No fracture. Mastoid air cells and paranasal sinuses are essentially clear.    Non-Vascular Structures of the Neck/Thoracic Inlet (limited evaluation): Nonvascular soft tissues of the neck are unremarkable.  No acute or aggressive bony abnormality.  Degenerative changes noted in the spine.    Aorta: Common  origin of the brachiocephalic left common carotid arteries which is a normal variant.  Mild aortic arch atherosclerosis without flow-limiting stenosis.    Extracranial carotid circulation: No hemodynamically significant stenosis, aneurysmal dilatation, or dissection.  Hypoplastic left internal carotid artery.    Extracranial vertebral circulation: No hemodynamically significant stenosis, aneurysmal dilatation, or dissection.    Intracranial Arteries: No focal high-grade stenosis, occlusion, or aneurysm.  Hypoplastic left internal carotid artery and left anterior cerebral artery.  Right internal carotid artery provides dominant blood supply to the bilateral CHAVA territory via the anterior communicating artery.  Endovascular coils noted in the region of the right posterior communicating artery.  No residual or recurrent filling aneurysm identified.    Venous structures (limited evaluation): Normal.                                        Pending Diagnostic Studies:       None           Medications:  Reconciled Home Medications:      Medication List        CONTINUE taking these medications      * albuterol 2.5 mg /3 mL (0.083 %) nebulizer solution  Commonly known as: PROVENTIL  Take 3 mLs (2.5 mg total) by nebulization every 6 (six) hours as needed for Wheezing. Rescue     * albuterol 90 mcg/actuation inhaler  Commonly known as: PROVENTIL/VENTOLIN HFA  2 puffs every 6 (six) hours as needed for Shortness of Breath or Wheezing.     ALPRAZolam 0.5 MG tablet  Commonly known as: XANAX  Take 0.5 mg by mouth daily as needed.     amitriptyline 50 MG tablet  Commonly known as: ELAVIL  Take 50 mg by mouth every evening.     amLODIPine 5 MG tablet  Commonly known as: NORVASC  Take 1 tablet (5 mg total) by mouth once daily.     atorvastatin 80 MG tablet  Commonly known as: LIPITOR  Take 1 tablet (80 mg total) by mouth every evening.     BLOOD PRESSURE CUFF Misc  Generic drug: miscellaneous medical supply  Use to check blood pressure  once daily     butalbital-acetaminophen-caffeine -40 mg -40 mg per tablet  Commonly known as: FIORICET, ESGIC  Take 1 tablet by mouth every 6 (six) hours as needed for Pain.     CALCIUM 600 + D(3) ORAL  Take 2 tablets by mouth once daily.     CombiVENT RESPIMAT  mcg/actuation inhaler  Generic drug: ipratropium-albuteroL  Inhale 1 puff into the lungs every 6 (six) hours as needed.     hydrOXYzine HCL 25 MG tablet  Commonly known as: ATARAX  Take 1 tablet (25 mg total) by mouth 3 (three) times daily as needed for Itching.     LEXAPRO 10 mg tablet  Generic drug: EScitalopram oxalate  Take 10 mg by mouth.     nebulizer accessories Kit  NEBULIZER ACCESSORIES - Use as directed for nebulized medications prescribed by me.     nebulizer and compressor Heaven  NEBULIZER DEVICE - Use as directed     omeprazole 40 MG capsule  Commonly known as: PRILOSEC  Take 1 capsule (40 mg total) by mouth every morning.     OXcarbazepine 600 MG Tab  Commonly known as: TRILEPTAL  Take 150 mg by mouth 2 (two) times daily.     polyethylene glycol 17 gram/dose powder  Commonly known as: GLYCOLAX  Take 17 g by mouth daily as needed (for constipation).     QUEtiapine 100 MG Tab  Commonly known as: SEROQUEL  Take 100 mg by mouth once daily.           * This list has 2 medication(s) that are the same as other medications prescribed for you. Read the directions carefully, and ask your doctor or other care provider to review them with you.                  Indwelling Lines/Drains at time of discharge:   Lines/Drains/Airways       None                   Time spent on the discharge of patient: 31 minutes         Kelly Bran MD  Department of Hospital Medicine  [facility] - Telemetry ([facility])

## 2024-05-22 ENCOUNTER — TELEPHONE (OUTPATIENT)
Dept: INTERNAL MEDICINE | Facility: CLINIC | Age: 64
End: 2024-05-22
Payer: MEDICARE

## 2024-05-22 NOTE — TELEPHONE ENCOUNTER
----- Message from Jany Aguiar sent at 5/22/2024 10:03 AM CDT -----  Contact: Kalyn/daughter  Pt daughter is calling in regards to getting a referral for Home Rehab Therapy.please call back at [phone]       Thanks  TREY

## 2024-05-28 ENCOUNTER — OFFICE VISIT (OUTPATIENT)
Dept: INTERNAL MEDICINE | Facility: CLINIC | Age: 64
End: 2024-05-28
Payer: MEDICARE

## 2024-05-28 ENCOUNTER — TELEPHONE (OUTPATIENT)
Dept: INTERNAL MEDICINE | Facility: CLINIC | Age: 64
End: 2024-05-28

## 2024-05-28 DIAGNOSIS — I70.0 ATHEROSCLEROSIS OF AORTA: Chronic | ICD-10-CM

## 2024-05-28 DIAGNOSIS — E78.00 PURE HYPERCHOLESTEROLEMIA: Primary | Chronic | ICD-10-CM

## 2024-05-28 DIAGNOSIS — J41.0 SIMPLE CHRONIC BRONCHITIS: Chronic | ICD-10-CM

## 2024-05-28 DIAGNOSIS — F25.0 SCHIZOAFFECTIVE DISORDER, BIPOLAR TYPE: Chronic | ICD-10-CM

## 2024-05-28 DIAGNOSIS — Z12.31 BREAST CANCER SCREENING BY MAMMOGRAM: ICD-10-CM

## 2024-05-28 DIAGNOSIS — Z09 NEED FOR CASE MANAGEMENT FOLLOW-UP: ICD-10-CM

## 2024-05-28 DIAGNOSIS — Z91.199 NONADHERENCE TO MEDICAL TREATMENT: Chronic | ICD-10-CM

## 2024-05-28 PROCEDURE — 1159F MED LIST DOCD IN RCRD: CPT | Mod: CPTII,95,, | Performed by: FAMILY MEDICINE

## 2024-05-28 PROCEDURE — 1160F RVW MEDS BY RX/DR IN RCRD: CPT | Mod: CPTII,95,, | Performed by: FAMILY MEDICINE

## 2024-05-28 PROCEDURE — 3044F HG A1C LEVEL LT 7.0%: CPT | Mod: CPTII,95,, | Performed by: FAMILY MEDICINE

## 2024-05-28 PROCEDURE — G2211 COMPLEX E/M VISIT ADD ON: HCPCS | Mod: 95,,, | Performed by: FAMILY MEDICINE

## 2024-05-28 PROCEDURE — 99214 OFFICE O/P EST MOD 30 MIN: CPT | Mod: 95,,, | Performed by: FAMILY MEDICINE

## 2024-05-28 RX ORDER — ATORVASTATIN CALCIUM 80 MG/1
80 TABLET, FILM COATED ORAL NIGHTLY
Qty: 90 TABLET | Refills: 3 | Status: SHIPPED | OUTPATIENT
Start: 2024-05-28 | End: 2024-06-11 | Stop reason: SDUPTHER

## 2024-05-28 RX ORDER — ALBUTEROL SULFATE 90 UG/1
2 AEROSOL, METERED RESPIRATORY (INHALATION) EVERY 6 HOURS PRN
Qty: 18 G | Refills: 2 | Status: SHIPPED | OUTPATIENT
Start: 2024-05-28 | End: 2024-06-11 | Stop reason: SDUPTHER

## 2024-05-28 NOTE — ASSESSMENT & PLAN NOTE
Pharmacy records suggest poor medication adherence. She has limited understanding and recollection of her current medications. We discussed strategies to help her better keep track of and adhere to her current treatment regimen.

## 2024-05-28 NOTE — PROGRESS NOTES
TELEMEDICINE VIRTUAL VIDEO VISIT  5/28/24  7:00 AM CDT    Visit Type: Audiovisual    Patient's Location: Karyna represents that they are located within the state of [place].    History of Present Illness    Karyna presents today for follow-up after recent hospitalization for a Transient Ischemic Attack (TIA).    Karyna was hospitalized earlier this month for a Transient Ischemic Attack (TIA), also known as a mini stroke. Her symptoms have since resolved.    Karyna reports that her blood pressure has been generally well-controlled. However, she experienced difficulty with her blood pressure monitor this morning, preventing her from obtaining a reading.    Karyna is currently on atorvastatin, which was last filled last month. She reports an inability to take ibuprofen or aspirin, and can only take Tylenol. It is noted that the patient is to bring all current prescription bottles to her next appointment for a comprehensive medication reconciliation.    Karyna requires a mammogram. The physician has initiated the order for the procedure.    Karyna expressed a need for home rehabilitation services, which she was previously unable to afford. She requested that arrangements be made for [facility] to begin visiting her at home.    Karyna believes she has an appointment scheduled for June 11th, however, according to the doctor's records, this appointment was cancelled. It was noted that the patient has a history of cancelling several previous appointments. The doctor's staff will be contacting the patient to schedule a new appointment and assist with the scheduling of a mammogram.       Review of Systems   Constitutional:  Negative for activity change and unexpected weight change.   HENT:  Negative for rhinorrhea and trouble swallowing.    Eyes:  Negative for discharge.   Respiratory:  Negative for chest tightness.    Cardiovascular:  Negative for chest pain and palpitations.   Gastrointestinal:  Negative for blood in  "stool, constipation, diarrhea and vomiting.   Endocrine: Negative for polydipsia.   Genitourinary:  Negative for difficulty urinating, dysuria, hematuria and menstrual problem.   Musculoskeletal:  Negative for arthralgias, joint swelling and neck pain.   Neurological:  Negative for weakness.   Psychiatric/Behavioral:  Negative for confusion and dysphoric mood.        Assessment & Plan     Continued the patient's atorvastatin prescription for dyslipidemia.   Instructed the patient to bring all current medications to the next appointment for comprehensive medication reconciliation.   Emphasized the importance of medication adherence and discussed strategies for improvement.   Planned for case management involvement to improve patient care.   Ordered a mammogram and arranged for [facility] visits.   Scheduled an in-office appointment.   The patient mentioned a potential appointment on June 11th, staff will confirm this.   Educated the patient on the process of virtual visits, specifically highlighting the importance of clicking the "Join Visit" button.       1. Pure hypercholesterolemia  Assessment & Plan:  Lab Results   Component Value Date    CHOL 145 05/01/2024    CHOL 144 08/29/2023    TRIG 54 05/01/2024    TRIG 58 08/29/2023    HDL 43 05/01/2024    HDL 49 08/29/2023    LDLCALC 91.2 05/01/2024    LDLCALC 83.4 08/29/2023    NONHDLCHOL 102 05/01/2024    NONHDLCHOL 95 08/29/2023    AST 14 05/02/2024    ALT 10 05/02/2024     The ASCVD Risk score (Chio DK, et al., 2019) failed to calculate for the following reasons:    The patient has a prior MI or stroke diagnosis     Orders:  -     atorvastatin (LIPITOR) 80 MG tablet; Take 1 tablet (80 mg total) by mouth every evening.  Dispense: 90 tablet; Refill: 3    2. Atherosclerosis of aorta  Overview:  CT ABDOMEN PELVIS WITH CONTRAST 01/03/2021  FINDINGS: Mild atherosclerosis within the abdominal aorta which is nonaneurysmal.    Orders:  -     atorvastatin (LIPITOR) " 80 MG tablet; Take 1 tablet (80 mg total) by mouth every evening.  Dispense: 90 tablet; Refill: 3    3. Schizoaffective disorder, bipolar type  Overview:  Psychiatrist = Marilin Hernández MD      4. Simple chronic bronchitis  -     albuterol (PROVENTIL/VENTOLIN HFA) 90 mcg/actuation inhaler; Inhale 2 puffs into the lungs every 6 (six) hours as needed for Shortness of Breath or Wheezing.  Dispense: 18 g; Refill: 2    5. Breast cancer screening by mammogram  -     Mammo Digital Screening Bilat; Future; Expected date: 05/28/2024    6. Nonadherence to medical treatment  Assessment & Plan:  Pharmacy records suggest poor medication adherence. She has limited understanding and recollection of her current medications. We discussed strategies to help her better keep track of and adhere to her current treatment regimen.     Orders:  -     Ambulatory referral/consult to Outpatient Case Management    7. Need for case management follow-up  -     Ambulatory referral/consult to Outpatient Case Management    No other significant complaints or concerns were reported.  Today's visit involved the intricate management of episodic problem(s) and the ongoing care for the patient's serious or complex condition(s) listed above, reflecting the inherent complexity of providing longitudinal, comprehensive evaluation and management as the central hub for the patient's primary care services.  There were no vitals filed for this visit.  Physical Exam    Constitutional: No acute distress. Normal appearance. Not ill-appearing.  Pulmonary: Pulmonary effort is normal. No respiratory distress.  Skin: Skin is not jaundiced.  Neurological: Alert. Mental status is at baseline.  Psychiatric: Mood normal. Behavior normal. Thought content normal.         This note was generated with the assistance of ambient listening technology. Verbal consent was obtained by the patient and accompanying visitor(s) for the recording of patient appointment to facilitate  "this note. I attest to having reviewed and edited the generated note for accuracy, though some syntax or spelling errors may persist. Please contact the author of this note for any clarification.      TOTAL TIME evaluating and managing this patient for this encounter was greater than or equal to 16 minutes.  This time was exclusive of any separately billable procedures for this patient and exclusive of time spent treating any other patient.    Documentation entered by me for this encounter may have been done in part using speech-recognition technology. Although I have made an effort to ensure accuracy, "sound like" errors may exist and should be interpreted in context.    Each patient to whom medical services are provided by telemedicine is: (1) informed of the relationship between the physician and patient and the respective role of any other health care provider with respect to management of the patient; and (2) notified that he or she may decline to receive medical services by telemedicine and may withdraw from such care at any time.   "

## 2024-05-28 NOTE — TELEPHONE ENCOUNTER
----- Message from DIMA Boothe MD sent at 5/28/2024  8:02 AM CDT -----  She think she has an appointment scheduled with me on June 11, but she canceled this appointment. I wasn't able to make her understand this.  She needs to schedule an in-office appointment soon where she brings all of her current prescription bottles with her so that we can perform a medication reconciliation.  >>>Please schedule her for this. If I don't have a timely appointment available, schedule her with rBie Jhaveri PA-C and schedule her for a VV F/U with me a couple weeks later.

## 2024-05-28 NOTE — ASSESSMENT & PLAN NOTE
Lab Results   Component Value Date    CHOL 145 05/01/2024    CHOL 144 08/29/2023    TRIG 54 05/01/2024    TRIG 58 08/29/2023    HDL 43 05/01/2024    HDL 49 08/29/2023    LDLCALC 91.2 05/01/2024    LDLCALC 83.4 08/29/2023    NONHDLCHOL 102 05/01/2024    NONHDLCHOL 95 08/29/2023    AST 14 05/02/2024    ALT 10 05/02/2024     The ASCVD Risk score (Chio FROST, et al., 2019) failed to calculate for the following reasons:    The patient has a prior MI or stroke diagnosis

## 2024-05-28 NOTE — Clinical Note
She think she has an appointment scheduled with me on June 11, but she canceled this appointment. I wasn't able to make her understand this. She needs to schedule an in-office appointment soon where she brings all of her current prescription bottles with her so that we can perform a medication reconciliation. >>>Please schedule her for this. If I don't have a timely appointment available, schedule her with Brie Jhaveri PA-C and schedule her for a VV F/U with me a couple weeks later.

## 2024-05-29 ENCOUNTER — HOSPITAL ENCOUNTER (OUTPATIENT)
Dept: RADIOLOGY | Facility: HOSPITAL | Age: 64
Discharge: HOME | End: 2024-05-29
Attending: FAMILY MEDICINE
Payer: MEDICARE

## 2024-05-29 DIAGNOSIS — Z12.31 BREAST CANCER SCREENING BY MAMMOGRAM: ICD-10-CM

## 2024-05-29 PROCEDURE — 77063 BREAST TOMOSYNTHESIS BI: CPT | Mod: 26,,, | Performed by: RADIOLOGY

## 2024-05-29 PROCEDURE — 77067 SCR MAMMO BI INCL CAD: CPT | Mod: 26,,, | Performed by: RADIOLOGY

## 2024-05-29 PROCEDURE — 77063 BREAST TOMOSYNTHESIS BI: CPT | Mod: TC

## 2024-05-29 PROCEDURE — 77067 SCR MAMMO BI INCL CAD: CPT | Mod: TC

## 2024-05-30 ENCOUNTER — TELEPHONE (OUTPATIENT)
Dept: INTERNAL MEDICINE | Facility: CLINIC | Age: 64
End: 2024-05-30
Payer: MEDICARE

## 2024-05-30 NOTE — TELEPHONE ENCOUNTER
----- Message from Lillian No sent at 5/30/2024 10:10 AM CDT -----  Contact: pt  Type:  Patient Returning Call    Who Called: pt  Who Left Message for Patient: NICKOLAS  Does the patient know what this is regarding?:  Would the patient rather a call back or a response via [facility]? phone  Best Call Back Number:[phone]  Additional Information:

## 2024-05-30 NOTE — TELEPHONE ENCOUNTER
----- Message from Jessica Espinosa sent at 5/30/2024  8:50 AM CDT -----  Regarding: Consult/Advisory      Name Of Caller:  Karyna      Contact Preference:  [phone]      Nature of call:  Patient states that she is supposed to have a referral placed in the system for her for Home Health.  Please call patient to discuss.

## 2024-05-31 ENCOUNTER — PATIENT OUTREACH (OUTPATIENT)
Dept: ADMINISTRATIVE | Facility: OTHER | Age: 64
End: 2024-05-31
Payer: MEDICARE

## 2024-05-31 NOTE — PROGRESS NOTES
CHW - Outreach Attempt    Community Health Worker / LPN left a voicemail message for 1st attempt to contact patient regarding: Referral for transportation  Community Health Worker / LPN will attempt to contact patient on: 06/03/24

## 2024-06-03 NOTE — TELEPHONE ENCOUNTER
She had [facility] referral ordered 5/2/2024 2:50 PM upon hospital discharge by Kelly Bran MD.  SEE REFERRAL ORDER: Ambulatory referral/consult to Home Health [[id number]] (Order [id number])    Did her insurance refuse to approve the order?    We can re-address this at her next appointment with me on 6/11/2024 if this hasn't been resolved by then.

## 2024-06-10 NOTE — PROGRESS NOTES
CHW - Outreach Attempt    Community Health Worker / LPN  left a voicemail message for 2nd attempt to contact patient regarding: Referral to address transportation issue.  Community Health Worker  / LPN to attempt to contact patient on: 06/17/24

## 2024-06-11 ENCOUNTER — LAB VISIT (OUTPATIENT)
Dept: LAB | Facility: HOSPITAL | Age: 64
End: 2024-06-11
Attending: FAMILY MEDICINE
Payer: MEDICARE

## 2024-06-11 ENCOUNTER — TELEPHONE (OUTPATIENT)
Dept: PHYSICAL MEDICINE AND REHAB | Facility: CLINIC | Age: 64
End: 2024-06-11
Payer: MEDICARE

## 2024-06-11 ENCOUNTER — OFFICE VISIT (OUTPATIENT)
Dept: INTERNAL MEDICINE | Facility: CLINIC | Age: 64
End: 2024-06-11
Payer: MEDICARE

## 2024-06-11 VITALS
TEMPERATURE: 98 F | HEART RATE: 106 BPM | WEIGHT: 163.81 LBS | SYSTOLIC BLOOD PRESSURE: 118 MMHG | RESPIRATION RATE: 18 BRPM | BODY MASS INDEX: 30.14 KG/M2 | OXYGEN SATURATION: 96 % | HEIGHT: 62 IN | DIASTOLIC BLOOD PRESSURE: 80 MMHG

## 2024-06-11 DIAGNOSIS — J41.0 SIMPLE CHRONIC BRONCHITIS: Chronic | ICD-10-CM

## 2024-06-11 DIAGNOSIS — R20.2 NUMBNESS AND TINGLING IN LEFT ARM: Chronic | ICD-10-CM

## 2024-06-11 DIAGNOSIS — F25.0 SCHIZOAFFECTIVE DISORDER, BIPOLAR TYPE: Chronic | ICD-10-CM

## 2024-06-11 DIAGNOSIS — I10 ESSENTIAL HYPERTENSION: Chronic | ICD-10-CM

## 2024-06-11 DIAGNOSIS — K59.09 CHRONIC CONSTIPATION: Chronic | ICD-10-CM

## 2024-06-11 DIAGNOSIS — I70.0 ATHEROSCLEROSIS OF AORTA: Chronic | ICD-10-CM

## 2024-06-11 DIAGNOSIS — K21.9 GASTROESOPHAGEAL REFLUX DISEASE WITHOUT ESOPHAGITIS: Chronic | ICD-10-CM

## 2024-06-11 DIAGNOSIS — R73.03 PREDIABETES: Chronic | ICD-10-CM

## 2024-06-11 DIAGNOSIS — L29.9 PRURITUS: Chronic | ICD-10-CM

## 2024-06-11 DIAGNOSIS — E78.00 PURE HYPERCHOLESTEROLEMIA: Chronic | ICD-10-CM

## 2024-06-11 DIAGNOSIS — Z74.09 IMPAIRED FUNCTIONAL MOBILITY, BALANCE, GAIT, AND ENDURANCE: Primary | ICD-10-CM

## 2024-06-11 DIAGNOSIS — R20.0 NUMBNESS AND TINGLING IN LEFT ARM: Chronic | ICD-10-CM

## 2024-06-11 PROBLEM — G45.9 TIA (TRANSIENT ISCHEMIC ATTACK): Status: RESOLVED | Noted: 2024-05-01 | Resolved: 2024-06-11

## 2024-06-11 LAB
GLUCOSE SERPL-MCNC: 135 MG/DL
GLUCOSE SERPL-MCNC: 154 MG/DL
GLUCOSE SERPL-MCNC: 83 MG/DL (ref 70–110)

## 2024-06-11 PROCEDURE — 3079F DIAST BP 80-89 MM HG: CPT | Mod: CPTII,S$GLB,, | Performed by: FAMILY MEDICINE

## 2024-06-11 PROCEDURE — 1159F MED LIST DOCD IN RCRD: CPT | Mod: CPTII,S$GLB,, | Performed by: FAMILY MEDICINE

## 2024-06-11 PROCEDURE — 3074F SYST BP LT 130 MM HG: CPT | Mod: CPTII,S$GLB,, | Performed by: FAMILY MEDICINE

## 2024-06-11 PROCEDURE — 3044F HG A1C LEVEL LT 7.0%: CPT | Mod: CPTII,S$GLB,, | Performed by: FAMILY MEDICINE

## 2024-06-11 PROCEDURE — 1126F AMNT PAIN NOTED NONE PRSNT: CPT | Mod: CPTII,S$GLB,, | Performed by: FAMILY MEDICINE

## 2024-06-11 PROCEDURE — 3008F BODY MASS INDEX DOCD: CPT | Mod: CPTII,S$GLB,, | Performed by: FAMILY MEDICINE

## 2024-06-11 PROCEDURE — 1100F PTFALLS ASSESS-DOCD GE2>/YR: CPT | Mod: CPTII,S$GLB,, | Performed by: FAMILY MEDICINE

## 2024-06-11 PROCEDURE — 3288F FALL RISK ASSESSMENT DOCD: CPT | Mod: CPTII,S$GLB,, | Performed by: FAMILY MEDICINE

## 2024-06-11 PROCEDURE — G2211 COMPLEX E/M VISIT ADD ON: HCPCS | Mod: S$GLB,,, | Performed by: FAMILY MEDICINE

## 2024-06-11 PROCEDURE — 99214 OFFICE O/P EST MOD 30 MIN: CPT | Mod: S$GLB,,, | Performed by: FAMILY MEDICINE

## 2024-06-11 PROCEDURE — 1160F RVW MEDS BY RX/DR IN RCRD: CPT | Mod: CPTII,S$GLB,, | Performed by: FAMILY MEDICINE

## 2024-06-11 PROCEDURE — 82951 GLUCOSE TOLERANCE TEST (GTT): CPT | Performed by: FAMILY MEDICINE

## 2024-06-11 PROCEDURE — 99999 PR PBB SHADOW E&M-EST. PATIENT-LVL IV: CPT | Mod: PBBFAC,,, | Performed by: FAMILY MEDICINE

## 2024-06-11 PROCEDURE — 36415 COLL VENOUS BLD VENIPUNCTURE: CPT | Performed by: FAMILY MEDICINE

## 2024-06-11 RX ORDER — AMLODIPINE BESYLATE 5 MG/1
5 TABLET ORAL DAILY
Qty: 90 TABLET | Refills: 3 | Status: SHIPPED | OUTPATIENT
Start: 2024-06-11 | End: 2025-06-11

## 2024-06-11 RX ORDER — OMEPRAZOLE 40 MG/1
40 CAPSULE, DELAYED RELEASE ORAL EVERY MORNING
Qty: 90 CAPSULE | Refills: 3 | Status: SHIPPED | OUTPATIENT
Start: 2024-06-11 | End: 2025-06-11

## 2024-06-11 RX ORDER — ALBUTEROL SULFATE 90 UG/1
2 AEROSOL, METERED RESPIRATORY (INHALATION) EVERY 6 HOURS PRN
Qty: 18 G | Refills: 2 | Status: SHIPPED | OUTPATIENT
Start: 2024-06-11

## 2024-06-11 RX ORDER — HYDROXYZINE HYDROCHLORIDE 25 MG/1
25 TABLET, FILM COATED ORAL 3 TIMES DAILY PRN
Qty: 60 TABLET | Refills: 2 | Status: SHIPPED | OUTPATIENT
Start: 2024-06-11

## 2024-06-11 RX ORDER — ATORVASTATIN CALCIUM 80 MG/1
80 TABLET, FILM COATED ORAL NIGHTLY
Qty: 90 TABLET | Refills: 3 | Status: SHIPPED | OUTPATIENT
Start: 2024-06-11 | End: 2025-06-11

## 2024-06-11 RX ORDER — POLYETHYLENE GLYCOL 3350 17 G/17G
17 POWDER, FOR SOLUTION ORAL DAILY PRN
Qty: 510 G | Refills: 11 | Status: SHIPPED | OUTPATIENT
Start: 2024-06-11

## 2024-06-11 NOTE — PROGRESS NOTES
OFFICE VISIT 6/11/24 10:40 AM CDT    History of Present Illness    Karyna presents with tingling and pain in right arm since May 1st.    She reports constant tingling sensation from right shoulder down to fingers. The whole right arm and fingers constantly tingle and hurt. She tries to shake off the sensation but it does not go away and is unable to stop the tingling and pain when present.    On May 1st, she went to the hospital for dizziness and feeling off balance. CT head showed no stroke or other abnormalities. CTA head and neck showed no blockage, residual aneurysm, or new aneurysm. She denies any other concerning symptoms related to this episode.    Psychiatrist prescribes oxcarbazepine, Xanax, Elavil, and Lexapro. Takes omeprazole for acid reflux and needs refill. Uses Miralax 2-3 times per week as needed for chronic constipation when bowels do not move and needs refill. Uses albuterol inhaler as needed. Takes amlodipine and atorvastatin for cholesterol.    Over the past 1.5 months, she has gained 12 lbs. Overall weight gain is 6 lbs compared to 4 months ago. Denies any other symptoms related to weight changes.    Recent diabetes screening test suggested borderline diabetes. Denies symptoms of diabetes such as excessive thirst, frequent urination, or unexplained weight loss.    Lives alone.       Review of Systems: Except as noted herein, ROS is otherwise negative.     Assessment & Plan    M79.601 PAIN IN RIGHT ARM:   Referred the patient to neurology for evaluation of intermittent unilateral arm pain of unclear etiology.   Ordered nerve conduction test of right arm.   The patient is experiencing constant tingling and hurting in right arm from shoulder to fingers.   Examined the patient and found sensation and strength intact in right arm.  R63.5 ABNORMAL WEIGHT GAIN:   The patient has gained 12 lbs over the last 1.5 months and 6 lbs over the last 4 months.   Current weight is 164 lbs.  R63.0 ANOREXIA:   The  patient is experiencing loss of appetite.  K21.9 GASTRO-ESOPHAGEAL REFLUX DISEASE WITHOUT ESOPHAGITIS:   Continued omeprazole for acid reflux and provided refills as the patient needed a refill.  R20.2 PARESTHESIA OF SKIN:   Referred the patient to neurology for evaluation of intermittent unilateral arm paresthesias of unclear etiology.   Ordered nerve conduction studies to evaluate for peripheral neuropathy vs other nerve pathology.   Will try to schedule nerve conduction study with neurology.   The patient is experiencing constant tingling sensation in right arm from shoulder to fingers.   Examined the patient and found sensation intact in right arm.  R42 DIZZINESS AND GIDDINESS:   Reviewed the patient's recent hospital visit on May 1st for dizziness and feeling off balance.   CT head was negative for stroke.   CTA head/neck was negative for aneurysm or blockage.  E78.5 HYPERLIPIDEMIA, UNSPECIFIED:   Continued atorvastatin for cholesterol management.  J45.909 UNSPECIFIED ASTHMA, UNCOMPLICATED:   The patient uses albuterol inhaler as needed.  K59.00 CONSTIPATION, UNSPECIFIED:   The patient has chronic constipation and sometimes the bowels don't move.   Continued MiraLAX as needed for chronic constipation, up to 3 times per week.   Provided prescription refills for MiraLAX as the patient needed a refill.   The patient takes MiraLAX 2-3 times per week.  R29.5 TRANSIENT PARALYSIS:   Examined the patient and found that the patient was unable to voluntarily move toes, but was able to walk.  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   CT head was negative for stroke.   CTA head/neck showed the coiled aneurysm, no new aneurysm, and no blockage.   Performed a neurological exam which was unremarkable.  R73.03 PREDIABETES:   The patient's recent diabetes screening tests were borderline, suggesting borderline diabetes.    Ordered 2-hour glucose tolerance test to definitively diagnose diabetes, as this impacts treatment plan and home health referral options.   Instructed the patient to come in fasting for the 2-hour glucose tolerance test, which takes 2 hours to complete.   Will try to schedule the glucose tolerance test today.   Will refer the patient to home health pending results of the glucose tolerance test, as a new diagnosis of diabetes would allow for skilled nursing visits for diabetes education.       1. Impaired functional mobility, balance, gait, and endurance  -     Ambulatory referral/consult to Home Health; Future; Expected date: 06/12/2024    2. Schizoaffective disorder, bipolar type  Overview:  Psychiatrist = Marilin Hernández MD    Orders:  -     Ambulatory referral/consult to Home Health; Future; Expected date: 06/12/2024    3. Prediabetes  -     Glucose Tolerance 2 Hour; Future; Expected date: 06/11/2024    4. Chronic constipation  -     polyethylene glycol (GLYCOLAX) 17 gram/dose powder; Take 17 g by mouth daily as needed for Constipation.  Dispense: 510 g; Refill: 11    5. Simple chronic bronchitis  -     albuterol (PROVENTIL/VENTOLIN HFA) 90 mcg/actuation inhaler; Inhale 2 puffs into the lungs every 6 (six) hours as needed for Shortness of Breath or Wheezing.  Dispense: 18 g; Refill: 2    6. Essential hypertension  -     amLODIPine (NORVASC) 5 MG tablet; Take 1 tablet (5 mg total) by mouth once daily.  Dispense: 90 tablet; Refill: 3    7. Pure hypercholesterolemia  -     atorvastatin (LIPITOR) 80 MG tablet; Take 1 tablet (80 mg total) by mouth every evening.  Dispense: 90 tablet; Refill: 3  -     Lipids Digital Medicine (LDMP) Enrollment Order  -     Lipids Digital Medicine (LDMP): Assign Onboarding Questionnaires    8. Atherosclerosis of aorta  Overview:  CT ABDOMEN PELVIS WITH CONTRAST 01/03/2021  FINDINGS: Mild atherosclerosis within the abdominal aorta which is nonaneurysmal.    Orders:  -     atorvastatin  "(LIPITOR) 80 MG tablet; Take 1 tablet (80 mg total) by mouth every evening.  Dispense: 90 tablet; Refill: 3  -     Lipids Digital Medicine (LDMP) Enrollment Order  -     Lipids Digital Medicine (LDMP): Assign Onboarding Questionnaires    9. Pruritus  -     hydrOXYzine HCL (ATARAX) 25 MG tablet; Take 1 tablet (25 mg total) by mouth 3 (three) times daily as needed for Itching.  Dispense: 60 tablet; Refill: 2    10. Gastroesophageal reflux disease without esophagitis  -     omeprazole (PRILOSEC) 40 MG capsule; Take 1 capsule (40 mg total) by mouth every morning.  Dispense: 90 capsule; Refill: 3    11. Numbness and tingling in left arm  -     EMG W/ ULTRASOUND AND NERVE CONDUCTION TEST 1 Extremity; Future    No other significant complaints or concerns were reported.    Today's visit involved the intricate management of episodic problem(s) and the ongoing care for the patient's serious or complex condition(s) listed above, reflecting the inherent complexity of providing longitudinal, comprehensive evaluation and management as the central hub for the patient's primary care services.  Vitals:    06/11/24 1011   BP: 118/80   BP Location: Left arm   Patient Position: Sitting   BP Method: Large (Manual)   Pulse: 106   Resp: 18   Temp: 98.1 °F (36.7 °C)   SpO2: 96%   Weight: 74.3 kg (163 lb 12.8 oz)   Height: 5' 2" (1.575 m)   Physical Exam  Vitals reviewed.   Constitutional:       General: She is not in acute distress.     Appearance: Normal appearance. She is not ill-appearing or diaphoretic.   Cardiovascular:      Rate and Rhythm: Normal rate and regular rhythm.   Pulmonary:      Effort: Pulmonary effort is normal.      Breath sounds: Normal breath sounds.   Skin:     General: Skin is warm and dry.   Neurological:      General: No focal deficit present.      Mental Status: She is alert and oriented to person, place, and time. Mental status is at baseline.      Sensory: No sensory deficit.      Motor: No weakness.      " "Comments: Left hand intermittently shaking as if she is shaking a thermometer. This movement is voluntary, and the movement ceases when she is distracted and resumes when attention is brought back to her LUE.   Psychiatric:         Mood and Affect: Mood normal.         Behavior: Behavior normal.         Judgment: Judgment normal.       This note was generated with the assistance of ambient listening technology. Verbal consent was obtained by the patient and accompanying visitor(s) for the recording of patient appointment to facilitate this note. I attest to having reviewed and edited the generated note for accuracy, though some syntax or spelling errors may persist. Please contact the author of this note for any clarification.    Documentation entered by me for this encounter may have been done in part using speech-recognition technology. Although I have made an effort to ensure accuracy, "sound like" errors may exist and should be interpreted in context.   "

## 2024-06-12 NOTE — PROGRESS NOTES
Karyna Vogel.    Your test results do not show diabetes. Your diagnosis remains prediabetes.    Best,    Dr. AMEZCUA

## 2024-06-18 NOTE — PROGRESS NOTES
CHW - Outreach Attempt    Community Health Worker / LPN left a voicemail message for 3rd attempt to contact patient regarding: Referral for transportation assistance  Community Health Worker / LPN to attempt one more time to contact patient on: 06/19/24

## 2024-06-19 ENCOUNTER — TELEPHONE (OUTPATIENT)
Dept: INTERNAL MEDICINE | Facility: CLINIC | Age: 64
End: 2024-06-19
Payer: MEDICARE

## 2024-06-19 PROCEDURE — G0180 MD CERTIFICATION HHA PATIENT: HCPCS | Mod: ,,, | Performed by: FAMILY MEDICINE

## 2024-06-19 NOTE — TELEPHONE ENCOUNTER
----- Message from Ibeth Hernandez sent at 6/19/2024  8:36 AM CDT -----  Regarding: referral  Name of who is calling:   Karyna      What is the request in detail: Pt is requesting a call back in ref to referral  for Neurologist with [facility] / also pt having feeling something in her throat again / please call      Can the clinic reply by [facility]:no      What number to call back if not [facility]:[phone]

## 2024-06-20 ENCOUNTER — NURSE TRIAGE (OUTPATIENT)
Dept: ADMINISTRATIVE | Facility: CLINIC | Age: 64
End: 2024-06-20
Payer: MEDICARE

## 2024-06-20 ENCOUNTER — TELEPHONE (OUTPATIENT)
Dept: INTERNAL MEDICINE | Facility: CLINIC | Age: 64
End: 2024-06-20
Payer: MEDICARE

## 2024-06-20 NOTE — TELEPHONE ENCOUNTER
Pt reports headaches to the left side of her head for the last week. Pt did have a fall last Friday.  Pain is currently 10/10. Care advice is go to ER/UC now. Pt verbalized understanding.   Reason for Disposition   SEVERE headache, states 'worst headache' of life    Additional Information   Negative: Difficult to awaken or acting confused (e.g., disoriented, slurred speech)   Negative: Weakness of the face, arm or leg on one side of the body and new-onset   Negative: Numbness of the face, arm or leg on one side of the body and new-onset   Negative: Loss of speech or garbled speech and new-onset   Negative: Passed out (i.e., fainted, collapsed and was not responding)   Negative: Sounds like a life-threatening emergency to the triager   Negative: Unable to walk without falling   Negative: Stiff neck (can't touch chin to chest)   Negative: Possibility of carbon monoxide exposure    Protocols used: Headache-A-OH

## 2024-06-20 NOTE — TELEPHONE ENCOUNTER
June 20, 2024  Me   to Karyna Sanders   JIMMIE      6/20/24 10:32 AM  Pt confirmed VV with NP Milagros Dejesus 06/212/2024 10:00 am  Tiffany SAMUEL    6/20/24 10:32 AM  Note  ----- Message from Diamone Speed sent at 6/20/2024 10:04 AM CDT -----  Regarding: self  Type: Patient Call Back        Who called: self         What is the request in detail: pt stated she is having some very bad headaches and needs order place as soon as possible         Can the clinic reply by [facility]? Yes         Would the patient rather a call back or a response via [facility]? All back         Best call back number:[phone]

## 2024-06-20 NOTE — TELEPHONE ENCOUNTER
----- Message from Diamone Speed sent at 6/20/2024 10:04 AM CDT -----  Regarding: self  Type: Patient Call Back       Who called: self        What is the request in detail: pt stated she is having some very bad headaches and needs order place as soon as possible        Can the clinic reply by [facility]? Yes       Would the patient rather a call back or a response via [facility]? All back       Best call back number:[phone]

## 2024-06-21 ENCOUNTER — TELEPHONE (OUTPATIENT)
Dept: INTERNAL MEDICINE | Facility: CLINIC | Age: 64
End: 2024-06-21
Payer: MEDICARE

## 2024-06-21 ENCOUNTER — HOSPITAL ENCOUNTER (EMERGENCY)
Facility: HOSPITAL | Age: 64
Discharge: HOME | End: 2024-06-21
Attending: EMERGENCY MEDICINE
Payer: MEDICARE

## 2024-06-21 VITALS
OXYGEN SATURATION: 99 % | WEIGHT: 164.25 LBS | RESPIRATION RATE: 16 BRPM | HEART RATE: 105 BPM | SYSTOLIC BLOOD PRESSURE: 115 MMHG | BODY MASS INDEX: 30.04 KG/M2 | DIASTOLIC BLOOD PRESSURE: 70 MMHG | TEMPERATURE: 99 F
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
## 2024-06-21 DIAGNOSIS — S09.90XA INJURY OF HEAD, INITIAL ENCOUNTER: Primary | ICD-10-CM

## 2024-06-21 PROCEDURE — 99284 EMERGENCY DEPT VISIT MOD MDM: CPT | Mod: 25

## 2024-06-21 RX ORDER — HYDROCODONE BITARTRATE AND ACETAMINOPHEN 5; 325 MG/1; MG/1
1 TABLET ORAL EVERY 4 HOURS PRN
Qty: 7 TABLET | Refills: 0 | Status: SHIPPED | OUTPATIENT
Start: 2024-06-21

## 2024-06-21 NOTE — ED PROVIDER NOTES
History      Chief Complaint   Patient presents with    Headache     Pt states she fell last Friday and hit her head on a wall. Pt states since then she has had a headache and her home meds are not working.        Review of patient's allergies indicates:   Allergen Reactions    Aspirin     Nsaids (non-steroidal anti-inflammatory drug)     Ibuprofen Anxiety        HPI   HPI    6/21/2024, 4:45 PM   History obtained from the patient      History of Present Illness: Karyna Sanders is a 64 y.o. female patient who presents to the Emergency Department for left sided headache since head injury last Friday, a week ago.  She says she was climbing stairs when she stumbled and struck her head on the wall.  Denies LOC, vomiting.  She has tried Fioricet at home with no relief        PCP: DIMA Boothe MD       Past Medical History:  Past Medical History:   Diagnosis Date    Anemia     Aneurysm     Anorexia 4/26/2017    Anxiety     Asthma     Atherosclerosis of aorta 1/5/2022    CT ABDOMEN PELVIS WITH CONTRAST 01/03/2021 FINDINGS: Mild atherosclerosis within the abdominal aorta which is nonaneurysmal.    Bipolar disorder     Carpal tunnel syndrome, bilateral     Cerebral aneurysm     Chronic nausea 11/15/2018    Chronic pain syndrome     Cigarette nicotine dependence     Depression     Essential hypertension 8/29/2014    GERD (gastroesophageal reflux disease)     Hyperlipidemia     Hypertension     Insomnia     Osteoporosis     Pure hypercholesterolemia 9/8/2014    Recurrent tension-type headache, not intractable 7/24/2019    Schizophrenia     Stroke     Subarachnoid hemorrhage          Past Surgical History:  Past Surgical History:   Procedure Laterality Date    BUNIONECTOMY Right     COLONOSCOPY N/A 2/7/2022    Procedure: COLONOSCOPY;  Surgeon: Kamila Lund MD;  Location: [facility];  Service: Endoscopy;  Laterality: N/A;    COLONOSCOPY W/ BIOPSIES AND POLYPECTOMY      ESOPHAGOGASTRODUODENOSCOPY N/A 2/7/2022     Procedure: EGD (ESOPHAGOGASTRODUODENOSCOPY);  Surgeon: Kamila Lund MD;  Location: [facility];  Service: Endoscopy;  Laterality: N/A;    HYSTERECTOMY      PARTIAL HYSTERECTOMY      Bilateral Ovaries Remain    TRANSCRANIAL DOPPLER STUDY - COMPLETE  8/28/2014         TRANSCRANIAL DOPPLER STUDY - COMPLETE  8/29/2014         TRANSCRANIAL DOPPLER STUDY - COMPLETE  8/30/2014         TRANSCRANIAL DOPPLER STUDY - COMPLETE  8/31/2014         TRANSCRANIAL DOPPLER STUDY - COMPLETE  9/1/2014         TRANSCRANIAL DOPPLER STUDY - COMPLETE  9/2/2014         TRANSCRANIAL DOPPLER STUDY - COMPLETE  9/3/2014         TRANSCRANIAL DOPPLER STUDY - COMPLETE  9/4/2014         TRANSCRANIAL DOPPLER STUDY - COMPLETE  9/5/2014         TRANSCRANIAL DOPPLER STUDY - COMPLETE  9/6/2014         VAGINAL DELIVERY      X 3           Family History:  Family History   Problem Relation Name Age of Onset    Cancer Mother      Glaucoma Mother      Stroke Mother      Diabetes type II Mother      Heart disease Mother      Hypertension Mother      Brain cancer Sister      Diabetes type II Sister      Cancer Brother      Hypertension Brother      Bone cancer Sister             Social History:  Social History     Tobacco Use    Smoking status: Every Day     Current packs/day: 1.00     Average packs/day: 1 pack/day for 51.8 years (51.8 ttl pk-yrs)     Types: Cigarettes, Cigars, Vaping with nicotine     Start date: 1976    Smokeless tobacco: Never    Tobacco comments:     in smoking program 5/13/19   Substance and Sexual Activity    Alcohol use: Yes     Comment: 1-3 times per week, 1-2 drink at a time.    Drug use: No    Sexual activity: Never     Partners: Male       ROS   Review of Systems     Review of Systems   Gastrointestinal:  Negative for vomiting.   Neurological:  Positive for headaches. Negative for weakness.       Physical Exam      Initial Vitals [06/21/24 1252]   BP Pulse Resp Temp SpO2   115/70 105 16 98.5 °F (36.9 °C) 99 %      MAP       --          Physical Exam  Vital signs and nursing notes reviewed.  Constitutional: Patient is in NAD. Awake and alert. Well-developed and well-nourished.  Head:  Normocephalic.  No soares sign  Eyes: PERRL. EOM intact. Conjunctivae nl. No scleral icterus.  No hyphema  ENT: Mucous membranes are moist.  No hematotympanum  Neck: Supple.   No meningismus.  No midline ttp, +FROM  Cardiovascular: Regular rate and rhythm. No murmurs, rubs, or gallops. Distal pulses are 2+ and symmetric.  Pulmonary/Chest: No respiratory distress. Clear to auscultation bilaterally. No wheezing, rales, or rhonchi.  Abdominal: Soft. Non-distended. No TTP. No rebound, guarding, or rigidity. Good bowel sounds.  Genitourinary: No CVA tenderness  Musculoskeletal: Moves all extremities. No edema.   Skin: Warm and dry.  Neurological: Awake and alert. GCS 15.  No acute focal neurological deficits are appreciated. No facial droop.  Tongue is midline.  No pronator drift.  Finger to nose normal.  Hand  equal and strong, 5/5 motor strength x 4.   equal and strong bilaterally  Psychiatric: Normal affect. Good eye contact. Appropriate in content.      ED Course          Procedures  ED Vital Signs:  Vitals:    06/21/24 1252   BP: 115/70   Pulse: 105   Resp: 16   Temp: 98.5 °F (36.9 °C)   TempSrc: Oral   SpO2: 99%   Weight: 74.5 kg (164 lb 3.9 oz)                 Imaging Results:  Imaging Results              CT Cervical Spine Without Contrast (Final result)  Result time 06/21/24 16:00:09      Final result by Feliz Roberts MD (06/21/24 16:00:09)                   Impression:      Negative for acute fracture or dislocation.    All CT scans at this facility are performed  using dose modulation techniques as appropriate to performed exam including the following:  automated exposure control; adjustment of mA and/or kV according to the patients size (this includes techniques or standardized protocols for targeted exams where dose is matched to  indication/reason for exam: i.e. extremities or head);  iterative reconstruction technique.      Electronically signed by: Feliz Roberts MD  Date:    06/21/2024  Time:    16:00               Narrative:    EXAMINATION:  CT CERVICAL SPINE WITHOUT CONTRAST    CLINICAL HISTORY:  Neck trauma (Age >= 65y);    TECHNIQUE:  Axial CT through the cervical spine with coronal and sagittal reformations.    COMPARISON:  None    FINDINGS:  Negative for acute fracture or dislocation.    Moderate multilevel degenerative disc disease throughout the spine C3 through 7.  No advanced canal stenosis.  Multilevel variable degrees of foraminal narrowing and encroachment..                                       CT Head Without Contrast (Final result)  Result time 06/21/24 15:49:40      Final result by Feliz Roberts MD (06/21/24 15:49:40)                   Impression:      Negative for acute intracranial abnormality.    All CT scans at this facility are performed  using dose modulation techniques as appropriate to performed exam including the following:  automated exposure control; adjustment of mA and/or kV according to the patients size (this includes techniques or standardized protocols for targeted exams where dose is matched to indication/reason for exam: i.e. extremities or head);  iterative reconstruction technique.      Electronically signed by: Feliz Roberts MD  Date:    06/21/2024  Time:    15:49               Narrative:    EXAMINATION:  CT HEAD WITHOUT CONTRAST    CLINICAL HISTORY:  Head trauma, minor (Age >= 65y);    TECHNIQUE:  Axial CT images obtained throughout the head without intravenous contrast.    COMPARISON:  May 2, 2024    FINDINGS:  Negative for acute hemorrhage, mass effect, extraaxial collection, hydrocephalus.    There is good gray white matter differentiation.    Aneurysm clips or coils along the right aspect of the suprasellar cistern.  Stable positioning.    Paranasal sinuses and mastoids are clear..                                          The Emergency Provider reviewed the vital signs and test results, which are outlined above.    ED Discussion             Medication(s) given in the ER:  Medications - No data to display         Follow-up Information       DIMA Boothe MD In 2 days.    Specialty: Family Medicine  Contact information:  [address]  [place] [address]  [phone]                                    Medication List        START taking these medications      HYDROcodone-acetaminophen 5-325 mg per tablet  Commonly known as: NORCO  Take 1 tablet by mouth every 4 (four) hours as needed for Pain.            ASK your doctor about these medications      * albuterol 2.5 mg /3 mL (0.083 %) nebulizer solution  Commonly known as: PROVENTIL  Take 3 mLs (2.5 mg total) by nebulization every 6 (six) hours as needed for Wheezing. Rescue     * albuterol 90 mcg/actuation inhaler  Commonly known as: PROVENTIL/VENTOLIN HFA  Inhale 2 puffs into the lungs every 6 (six) hours as needed for Shortness of Breath or Wheezing.     ALPRAZolam 0.5 MG tablet  Commonly known as: XANAX     amitriptyline 50 MG tablet  Commonly known as: ELAVIL     amLODIPine 5 MG tablet  Commonly known as: NORVASC  Take 1 tablet (5 mg total) by mouth once daily.     atorvastatin 80 MG tablet  Commonly known as: LIPITOR  Take 1 tablet (80 mg total) by mouth every evening.     BLOOD PRESSURE CUFF Misc  Generic drug: miscellaneous medical supply  Use to check blood pressure once daily     butalbital-acetaminophen-caffeine -40 mg -40 mg per tablet  Commonly known as: FIORICET, ESGIC  Take 1 tablet by mouth every 6 (six) hours as needed for Pain.     CALCIUM 600 + D(3) ORAL     CombiVENT RESPIMAT  mcg/actuation inhaler  Generic drug: ipratropium-albuteroL     hydrOXYzine HCL 25 MG tablet  Commonly known as: ATARAX  Take 1 tablet (25 mg total) by mouth 3 (three) times daily as needed for Itching.     LEXAPRO 10 mg  tablet  Generic drug: EScitalopram oxalate     nebulizer accessories Kit  NEBULIZER ACCESSORIES - Use as directed for nebulized medications prescribed by me.     nebulizer and compressor Heaven  NEBULIZER DEVICE - Use as directed     omeprazole 40 MG capsule  Commonly known as: PRILOSEC  Take 1 capsule (40 mg total) by mouth every morning.     OXcarbazepine 600 MG Tab  Commonly known as: TRILEPTAL     polyethylene glycol 17 gram/dose powder  Commonly known as: GLYCOLAX  Take 17 g by mouth daily as needed for Constipation.     QUEtiapine 100 MG Tab  Commonly known as: SEROQUEL           * This list has 2 medication(s) that are the same as other medications prescribed for you. Read the directions carefully, and ask your doctor or other care provider to review them with you.                   Where to Get Your Medications        These medications were sent to [place] [id number] - [address] [facility] [place] [address]      Phone: [phone]   HYDROcodone-acetaminophen 5-325 mg per tablet             Medical Decision Making        All findings were reviewed with the patient/family in detail.  Findings seem to be most consistent with a diagnosis of head injury. Closed Head Injury precautions were discussed with patient and/or family/caretaker  Second impact syndrome was also discussed.    All remaining questions and concerns were addressed at that time.  Patient/family has been counseled regarding the need for follow-up as well as the indication to return to the emergency room should new or worrisome developments occur.        MDM                 Clinical Impression:        ICD-10-CM ICD-9-CM   1. Injury of head, initial encounter  S09.90XA 959.01   2. Fall, initial encounter  W19.XXXA E888.9              Genevieve Ruiz, JES  06/21/24 2023

## 2024-06-24 NOTE — PROGRESS NOTES
CHW - Outreach Attempt    Community Health Worker left a voicemail message for 4th attempt to contact patient regarding: Referral for transportation issue    4 Failed attempts to reach patient for outreach. Case closed.

## 2024-06-24 NOTE — PROGRESS NOTES
CHW - Case Closure    This Community Health Worker unable to speak to patient via telephone today. 06/24/24  No answer      Provided patient with Community Health Worker's / LPN contact information thru voicemail and encouraged him/her to contact this Community Health Worker if needs arise.

## 2024-06-25 ENCOUNTER — OFFICE VISIT (OUTPATIENT)
Dept: INTERNAL MEDICINE | Facility: CLINIC | Age: 64
End: 2024-06-25
Payer: MEDICARE

## 2024-06-25 DIAGNOSIS — Z09 FOLLOW-UP EXAM: Primary | ICD-10-CM

## 2024-06-25 DIAGNOSIS — G44.219 EPISODIC TENSION-TYPE HEADACHE, NOT INTRACTABLE: Chronic | ICD-10-CM

## 2024-06-25 DIAGNOSIS — G62.9 PERIPHERAL POLYNEUROPATHY: Chronic | ICD-10-CM

## 2024-06-25 DIAGNOSIS — M54.2 NECK PAIN: ICD-10-CM

## 2024-06-25 PROCEDURE — 99213 OFFICE O/P EST LOW 20 MIN: CPT | Mod: 95,,, | Performed by: NURSE PRACTITIONER

## 2024-06-25 PROCEDURE — 3044F HG A1C LEVEL LT 7.0%: CPT | Mod: CPTII,95,, | Performed by: NURSE PRACTITIONER

## 2024-06-25 RX ORDER — DICLOFENAC SODIUM 10 MG/G
2 GEL TOPICAL DAILY
Qty: 100 G | Refills: 0 | Status: SHIPPED | OUTPATIENT
Start: 2024-06-25

## 2024-06-25 NOTE — PROGRESS NOTES
TELEMEDICINE VIRTUAL VISIT      Visit Details: This visit was a telemedicine virtual visit with synchronous audio and video. Karyna reported that her location at the time of this visit was in the state [facility]. Karyna had the choice to come into office to receive these medical services. Karyna chose and consented to receive these medical services by telemedicine.   Subjective:       Patient ID: Karyna Sanders is a 64 y.o. female.    Chief Complaint: follow up  HPI    Pt seen in ED for injuring head on wall in stairway. Ct neck and head neg. Given norco. Currently taking tylenol. Having mild HA still but mostly neck tenderness/stiffness. Requesting cream for neck pain    Also requesting referral for neuro for chronic HA    Past Medical History:   Diagnosis Date    Anemia     Aneurysm     Anorexia 4/26/2017    Anxiety     Asthma     Atherosclerosis of aorta 1/5/2022    CT ABDOMEN PELVIS WITH CONTRAST 01/03/2021 FINDINGS: Mild atherosclerosis within the abdominal aorta which is nonaneurysmal.    Bipolar disorder     Carpal tunnel syndrome, bilateral     Cerebral aneurysm     Chronic nausea 11/15/2018    Chronic pain syndrome     Cigarette nicotine dependence     Depression     Essential hypertension 8/29/2014    GERD (gastroesophageal reflux disease)     Hyperlipidemia     Hypertension     Insomnia     Osteoporosis     Pure hypercholesterolemia 9/8/2014    Recurrent tension-type headache, not intractable 7/24/2019    Schizophrenia     Stroke     Subarachnoid hemorrhage      Past Surgical History:   Procedure Laterality Date    BUNIONECTOMY Right     COLONOSCOPY N/A 2/7/2022    Procedure: COLONOSCOPY;  Surgeon: Kamila Lund MD;  Location: [facility];  Service: Endoscopy;  Laterality: N/A;    COLONOSCOPY W/ BIOPSIES AND POLYPECTOMY      ESOPHAGOGASTRODUODENOSCOPY N/A 2/7/2022    Procedure: EGD (ESOPHAGOGASTRODUODENOSCOPY);  Surgeon: Kamila Lund MD;  Location: [facility];  Service: Endoscopy;   Laterality: N/A;    HYSTERECTOMY      PARTIAL HYSTERECTOMY      Bilateral Ovaries Remain    TRANSCRANIAL DOPPLER STUDY - COMPLETE  8/28/2014         TRANSCRANIAL DOPPLER STUDY - COMPLETE  8/29/2014         TRANSCRANIAL DOPPLER STUDY - COMPLETE  8/30/2014         TRANSCRANIAL DOPPLER STUDY - COMPLETE  8/31/2014         TRANSCRANIAL DOPPLER STUDY - COMPLETE  9/1/2014         TRANSCRANIAL DOPPLER STUDY - COMPLETE  9/2/2014         TRANSCRANIAL DOPPLER STUDY - COMPLETE  9/3/2014         TRANSCRANIAL DOPPLER STUDY - COMPLETE  9/4/2014         TRANSCRANIAL DOPPLER STUDY - COMPLETE  9/5/2014         TRANSCRANIAL DOPPLER STUDY - COMPLETE  9/6/2014         VAGINAL DELIVERY      X 3     Social History     Socioeconomic History    Marital status:     Number of children: 3    Years of education: 11th Grade   Tobacco Use    Smoking status: Every Day     Current packs/day: 1.00     Average packs/day: 1 pack/day for 51.8 years (51.8 ttl pk-yrs)     Types: Cigarettes, Cigars, Vaping with nicotine     Start date: 1976    Smokeless tobacco: Never    Tobacco comments:     in smoking program 5/13/19   Substance and Sexual Activity    Alcohol use: Yes     Comment: 1-3 times per week, 1-2 drink at a time.    Drug use: No    Sexual activity: Never     Partners: Male   Social History Narrative    Patient describes herself as disabled.     Social Determinants of Health     Financial Resource Strain: Medium Risk (11/15/2023)    Overall Financial Resource Strain (CARDIA)     Difficulty of Paying Living Expenses: Somewhat hard   Food Insecurity: No Food Insecurity (5/2/2024)    Hunger Vital Sign     Worried About Running Out of Food in the Last Year: Never true     Ran Out of Food in the Last Year: Never true   Transportation Needs: No Transportation Needs (5/2/2024)    PRAPARE - Transportation     Lack of Transportation (Medical): No     Lack of Transportation (Non-Medical): No   Physical Activity: Insufficiently Active (11/15/2023)     Exercise Vital Sign     Days of Exercise per Week: 2 days     Minutes of Exercise per Session: 40 min   Stress: Stress Concern Present (11/15/2023)    [employer] [place] [employer] - Occupational Stress Questionnaire     Feeling of Stress : To some extent   Housing Stability: Low Risk  (5/2/2024)    Housing Stability Vital Sign     Unable to Pay for Housing in the Last Year: No     Homeless in the Last Year: No     Review of patient's allergies indicates:   Allergen Reactions    Aspirin     Nsaids (non-steroidal anti-inflammatory drug)     Ibuprofen Anxiety     Current Outpatient Medications   Medication Sig    albuterol (PROVENTIL) 2.5 mg /3 mL (0.083 %) nebulizer solution Take 3 mLs (2.5 mg total) by nebulization every 6 (six) hours as needed for Wheezing. Rescue    albuterol (PROVENTIL/VENTOLIN HFA) 90 mcg/actuation inhaler Inhale 2 puffs into the lungs every 6 (six) hours as needed for Shortness of Breath or Wheezing.    alprazolam (XANAX) 0.5 MG tablet Take 0.5 mg by mouth daily as needed.     amitriptyline (ELAVIL) 50 MG tablet Take 50 mg by mouth every evening.    amLODIPine (NORVASC) 5 MG tablet Take 1 tablet (5 mg total) by mouth once daily.    atorvastatin (LIPITOR) 80 MG tablet Take 1 tablet (80 mg total) by mouth every evening.    BLOOD PRESSURE CUFF Misc Use to check blood pressure once daily    butalbital-acetaminophen-caffeine -40 mg (FIORICET, ESGIC) -40 mg per tablet Take 1 tablet by mouth every 6 (six) hours as needed for Pain.    CALCIUM CARBONATE/VITAMIN D3 (CALCIUM 600 + D,3, ORAL) Take 2 tablets by mouth once daily.    COMBIVENT RESPIMAT  mcg/actuation inhaler Inhale 1 puff into the lungs every 6 (six) hours as needed.    diclofenac sodium (VOLTAREN) 1 % Gel Apply 2 g topically once daily. AAA    HYDROcodone-acetaminophen (NORCO) 5-325 mg per tablet Take 1 tablet by mouth every 4 (four) hours as needed for Pain.    hydrOXYzine HCL (ATARAX) 25 MG tablet Take 1  tablet (25 mg total) by mouth 3 (three) times daily as needed for Itching.    LEXAPRO 10 mg tablet Take 10 mg by mouth.    nebulizer accessories Kit NEBULIZER ACCESSORIES - Use as directed for nebulized medications prescribed by me.    nebulizer and compressor Heaven NEBULIZER DEVICE - Use as directed    omeprazole (PRILOSEC) 40 MG capsule Take 1 capsule (40 mg total) by mouth every morning.    OXcarbazepine (TRILEPTAL) 600 MG Tab Take 150 mg by mouth 2 (two) times daily.    polyethylene glycol (GLYCOLAX) 17 gram/dose powder Take 17 g by mouth daily as needed for Constipation.    QUEtiapine (SEROQUEL) 100 MG Tab Take 100 mg by mouth once daily.     No current facility-administered medications for this visit.           Review of Systems   Constitutional:  Negative for unexpected weight change.   HENT:  Negative for hearing loss, rhinorrhea and trouble swallowing.    Eyes:  Negative for discharge and visual disturbance.   Respiratory:  Negative for chest tightness and wheezing.    Cardiovascular:  Negative for chest pain and palpitations.   Gastrointestinal:  Negative for blood in stool, constipation, diarrhea and vomiting.   Endocrine: Negative for polydipsia and polyuria.   Genitourinary:  Negative for difficulty urinating, dysuria, hematuria and menstrual problem.   Musculoskeletal:  Positive for neck pain. Negative for arthralgias and joint swelling.   Neurological:  Positive for headaches. Negative for weakness.   Psychiatric/Behavioral:  Negative for confusion and dysphoric mood.        Objective:      Physical Exam      CONSTITUTIONAL: No apparent distress. Does not appear acutely ill or septic. Appears adequately hydrated.  PULM: Breathing unlabored.  PSYCHIATRIC: Alert and conversant and grossly oriented. Mood is grossly neutral. Affect appropriate. Judgment and insight grossly intact.    Assessment:   There were no vitals filed for this visit.      1. Follow-up exam    2. Recurrent tension-type headache, not  intractable    3. Peripheral polyneuropathy    4. Neck pain        Plan:   Follow-up exam    Recurrent tension-type headache, not intractable  -     Ambulatory referral/consult to Neurology; Future; Expected date: 07/02/2024    Peripheral polyneuropathy  -     Ambulatory referral/consult to Neurology; Future; Expected date: 07/02/2024    Neck pain  -     diclofenac sodium (VOLTAREN) 1 % Gel; Apply 2 g topically once daily. AAA  Dispense: 100 g; Refill: 0

## 2024-06-28 ENCOUNTER — OFFICE VISIT (OUTPATIENT)
Dept: PHYSICAL MEDICINE AND REHAB | Facility: CLINIC | Age: 64
End: 2024-06-28
Payer: MEDICARE

## 2024-06-28 VITALS — HEIGHT: 62 IN | RESPIRATION RATE: 13 BRPM | BODY MASS INDEX: 30.22 KG/M2 | WEIGHT: 164.25 LBS

## 2024-06-28 DIAGNOSIS — M54.12 CERVICAL RADICULOPATHY: ICD-10-CM

## 2024-06-28 DIAGNOSIS — G56.03 BILATERAL CARPAL TUNNEL SYNDROME: ICD-10-CM

## 2024-06-28 DIAGNOSIS — G56.21 CUBITAL TUNNEL SYNDROME, RIGHT: Primary | ICD-10-CM

## 2024-06-28 PROCEDURE — 99999 PR PBB SHADOW E&M-EST. PATIENT-LVL III: CPT | Mod: PBBFAC,,, | Performed by: PHYSICAL MEDICINE & REHABILITATION

## 2024-06-28 NOTE — PROGRESS NOTES
[facility]   [address] [facility]  [place] [address]  Phone: [phone]        Full Name: dlebert philip YOB: 1960  Patient ID: [id number]      Visit Date: 6/28/2024 07:51  Age: 64 Years 5 Months Old  Examining Physician: Windy Stephens M.D.  Referring Physician: Dr Boothe  Reason for Referral: upper ext    Chief Complaint   Patient presents with    Hand Pain       HPI: This is a 64 y.o.  female being seen in clinic today for evaluation of chronic numbness/tingling in her left arm with limited  strength.  She feels achy pain, tightness, and limited ROM at her arm.  Her symptoms worsened after a recent fall.  Rest provides minimal relief.   History obtained from patient    Past family, medical, social, and surgical history reviewed in chart    Review of Systems:     General- denies lethargy, weight change, fever, chills  Head/neck- denies swallowing difficulties  ENT- denies hearing changes  Cardiovascular-denies chest pain  Pulmonary- denies shortness of breath  GI- denies constipation or bowel incontinence  - denies bladder incontinence  Skin- denies wounds or rashes  Musculoskeletal- denies weakness, +pain  Neurologic- +numbness and tingling  Psychiatric-+bipolar, +schizophrenia, +anxiety and depression  Lymphatic-denies swelling  Endocrine- denies hypoglycemic symptoms/DM history  All other pertinent systems negative     Physical Examination:  General: Well developed, well nourished female, NAD  HEENT:NCAT EOMI bilaterally   Pulmonary:Normal respirations    Spinal Examination: CERVICAL  Active ROM is within normal limits.  Inspection: No deformity of spinal alignment.    Musculoskeletal Tests:  Phalen: neg  Elbow compression (ulnar): neg  Tinels at wrist: neg    Bilateral Upper and Lower Extremities:  Pulses are 2+ at radial bilaterally.  Shoulder/Elbow/Wrist/Hand ROM wnl  Hip/Knee/Ankle ROM wnl  Bilateral Extremities show normal capillary refill.  No signs of cyanosis,  rubor, edema, skin changes, or dysvascular changes of appendages.  Nails appear intact.    Neurological Exam:  Cranial Nerves:  II-XII grossly intact    Manual Muscle Testing: (Motor 5=normal)  4/5 strength bilateral upper ext    No focal atrophy is noted of either upper extremity.    Bilateral Reflexes:  Stacy's response is absent bilaterally.    Sensation: tested to light touch  - intact in arms    Gait: Narrow base and good arm swing.      Entire procedure explained to patient prior to proceeding.  Verbal consent obtained    SNC      Nerve / Sites Rec. Site Onset Lat Peak Lat Amp Segments Distance Velocity     ms ms µV  mm m/s   L Median - Digit II (Antidromic)      Wrist Dig II 3.2 4.0 12.5 Wrist - Dig  44   R Median - Digit II (Antidromic)      Wrist Dig II 3.3 4.2 13.0 Wrist - Dig  43   L Ulnar - Digit V (Antidromic)      Wrist Dig V 2.8 3.5 10.0 Wrist - Dig V 140 50   R Ulnar - Digit V (Antidromic)      Wrist Dig V 2.8 3.6 9.1 Wrist - Dig V 140 50   L Radial - Anatomical snuff box (Forearm)      Forearm Wrist 1.8 2.7 20.5 Forearm - Wrist 100 55   R Radial - Anatomical snuff box (Forearm)      Forearm Wrist 1.9 2.4 21.2 Forearm - Wrist 100 53       MNC      Nerve / Sites Muscle Latency Amplitude Duration Rel Amp Segments Distance Lat Diff Velocity     ms mV ms %  mm ms m/s   L Median - APB      Wrist APB 4.1 7.0 7.4 100 Wrist - APB 80        Elbow APB 8.5 6.2 7.9 87.9 Elbow - Wrist 230 4.4 52   R Median - APB      Wrist APB 3.6 7.0 6.3 100 Wrist - APB 80        Elbow APB 8.2 6.4 7.0 91.4 Elbow - Wrist 230 4.5 51   L Ulnar - ADM      Wrist ADM 2.8 5.3 7.0 100 Wrist -         B. Elbow ADM 7.2 5.0 6.6 95.5 B. Elbow - Wrist 230 4.4 53      A. Elbow ADM 9.7 4.7 6.9 93.5 A. Elbow - B. Elbow 130 2.6 51   R Ulnar - ADM      Wrist ADM 2.4 5.6 5.7 100 Wrist -         B. Elbow ADM 6.8 5.0 6.9 89.6 B. Elbow - Wrist 240 4.4 54      A. Elbow ADM 9.4 5.3 7.2 106 A. Elbow - B. Elbow 110 2.6 42        EMG         EMG Summary Table     Spontaneous MUAP Recruitment   Muscle IA Fib PSW Fasc Other Dur. Dur Amp Dur Polys Pattern Effort   L. Deltoid N None None None . _NFT_ _NFT_ N N N N Max   L. Biceps brachii N None None None . _NFT_ _NFT_ N N N sl red Max   L. Triceps brachii N None None None . _NFT_ _NFT_ N Sl Incr 1+ sl red Max   L. Pronator teres N None None None . _NFT_ _NFT_ N N 1+ sl red Max   L. First dorsal interosseous N None None None . _NFT_ _NFT_ Sl Incr Sl Incr 1+ sl red Max           INTERPRETATION  -Bilateral median motor nerve conduction study showed normal latency, normal amplitude, and conduction velocity  -Bilateral median sensory nerve conduction study showed prolonged peak latency and normal amplitude  -Bilateral ulnar motor nerve conduction study showed normal latency, amplitude, and dec conduction velocity across the right elbow  -Bilateral ulnar sensory nerve conduction study showed normal peak latency and amplitude  -Bilateral radial sensory nerve conduction study showed normal peak latency and amplitude  -Needle EMG examination performed to above mentioned muscles       IMPRESSION  1. ABNORMAL study  2. There is electrodiagnostic evidence of a MILD demyelinating median neuropathy (Carpal tunnel syndrome) across BILATERAL wrists and a MILD demyelinating ulnar neuropathy (Cubital tunnel syndrome) across the RIGHT elbow.  There is a chronic radiculopathy of the left C6-T1 nerve roots    PLAN  1. Follow up with referring provider: Dr. DIMA Boothe  2.  Handouts on CTS, cubital tunnel, radic, neck exercise, etc provided.   3. This study is good for one year. If symptoms worsen or do not improve, please re-consult.    Windy Stephens M.D.  Physical Medicine and Rehab

## 2024-07-08 ENCOUNTER — TELEPHONE (OUTPATIENT)
Dept: OPHTHALMOLOGY | Facility: CLINIC | Age: 64
End: 2024-07-08
Payer: MEDICARE

## 2024-07-08 ENCOUNTER — PATIENT MESSAGE (OUTPATIENT)
Dept: PHYSICAL MEDICINE AND REHAB | Facility: CLINIC | Age: 64
End: 2024-07-08
Payer: MEDICARE

## 2024-07-08 NOTE — TELEPHONE ENCOUNTER
----- Message from Anna Lyle sent at 7/8/2024  9:01 AM CDT -----  Contact: IMER CAZARES [[id number]]  ..Type:  Patient Requesting Call    Who Called: SHAYLA CAZARESAH EMI [[id number]]  Does the patient know what this is regarding?: pt wants to know why she is scheduled 7/30 and 7/31  Would the patient rather a call back or a response via [facility]?  call  Best Call Back Number: .[phone]   Additional Information:

## 2024-07-11 ENCOUNTER — TELEPHONE (OUTPATIENT)
Dept: OPHTHALMOLOGY | Facility: CLINIC | Age: 64
End: 2024-07-11
Payer: MEDICARE

## 2024-07-11 NOTE — TELEPHONE ENCOUNTER
Called at 2:29pm pt cancel one of hert appt     ----- Message from Ena Austin sent at 7/11/2024  2:04 PM CDT -----  Contact: Karyna  Type:  Patient Returning Call    Who Called: Karyna   Who Left Message for Patient: Elizabeth Scott MA  Does the patient know what this is regarding?: Two schedule appointment   Would the patient rather a call back or a response [facility]? Call back   Best Call Back Number: [phone]   Additional Information: If pt is not able to answer please put a message in her mychart

## 2024-07-12 ENCOUNTER — EXTERNAL HOME HEALTH (OUTPATIENT)
Dept: HOME HEALTH SERVICES | Facility: HOSPITAL | Age: 64
End: 2024-07-12
Payer: MEDICARE

## 2024-07-23 DIAGNOSIS — M54.2 NECK PAIN: ICD-10-CM

## 2024-07-23 RX ORDER — DICLOFENAC SODIUM 10 MG/G
GEL TOPICAL
Qty: 100 G | Refills: 0 | Status: SHIPPED | OUTPATIENT
Start: 2024-07-23

## 2024-07-23 NOTE — TELEPHONE ENCOUNTER
REFILL APPROVED. Will address further refills at upcoming appointment with me listed below.  #LMRX   --------------------------------  Future Appointments   Date Time Provider Department Center   7/26/2024  7:20 AM DIMA Boothe MD [facility]   7/30/2024 10:00 AM Lewis Alejandre OD [facility]   1/28/2025  1:00 PM Judson Sawant MD [facility]

## 2024-07-26 ENCOUNTER — TELEPHONE (OUTPATIENT)
Dept: INTERNAL MEDICINE | Facility: CLINIC | Age: 64
End: 2024-07-26

## 2024-07-26 ENCOUNTER — OFFICE VISIT (OUTPATIENT)
Dept: INTERNAL MEDICINE | Facility: CLINIC | Age: 64
End: 2024-07-26
Payer: MEDICARE

## 2024-07-26 ENCOUNTER — PATIENT OUTREACH (OUTPATIENT)
Dept: ADMINISTRATIVE | Facility: OTHER | Age: 64
End: 2024-07-26
Payer: MEDICARE

## 2024-07-26 DIAGNOSIS — K21.9 GASTROESOPHAGEAL REFLUX DISEASE WITHOUT ESOPHAGITIS: Chronic | ICD-10-CM

## 2024-07-26 DIAGNOSIS — L29.9 PRURITUS: Chronic | ICD-10-CM

## 2024-07-26 DIAGNOSIS — G56.03 BILATERAL CARPAL TUNNEL SYNDROME: Chronic | ICD-10-CM

## 2024-07-26 DIAGNOSIS — E78.00 PURE HYPERCHOLESTEROLEMIA: Chronic | ICD-10-CM

## 2024-07-26 DIAGNOSIS — I70.0 ATHEROSCLEROSIS OF AORTA: Chronic | ICD-10-CM

## 2024-07-26 DIAGNOSIS — M54.12 CERVICAL RADICULOPATHY: Chronic | ICD-10-CM

## 2024-07-26 DIAGNOSIS — G44.219 EPISODIC TENSION-TYPE HEADACHE, NOT INTRACTABLE: Primary | Chronic | ICD-10-CM

## 2024-07-26 DIAGNOSIS — Z74.09 IMPAIRED FUNCTIONAL MOBILITY, BALANCE, GAIT, AND ENDURANCE: ICD-10-CM

## 2024-07-26 DIAGNOSIS — I10 ESSENTIAL HYPERTENSION: Chronic | ICD-10-CM

## 2024-07-26 DIAGNOSIS — G56.21 CUBITAL TUNNEL SYNDROME, RIGHT: Chronic | ICD-10-CM

## 2024-07-26 RX ORDER — AMITRIPTYLINE HYDROCHLORIDE 50 MG/1
50 TABLET, FILM COATED ORAL NIGHTLY
Qty: 90 TABLET | Refills: 3 | Status: SHIPPED | OUTPATIENT
Start: 2024-07-26

## 2024-07-26 RX ORDER — AMLODIPINE BESYLATE 5 MG/1
5 TABLET ORAL DAILY
Qty: 90 TABLET | Refills: 3 | Status: SHIPPED | OUTPATIENT
Start: 2024-07-26 | End: 2025-07-26

## 2024-07-26 RX ORDER — BUTALBITAL, ACETAMINOPHEN AND CAFFEINE 50; 325; 40 MG/1; MG/1; MG/1
1 TABLET ORAL EVERY 8 HOURS
Qty: 15 TABLET | Refills: 2 | Status: SHIPPED | OUTPATIENT
Start: 2024-07-26

## 2024-07-26 RX ORDER — OMEPRAZOLE 40 MG/1
40 CAPSULE, DELAYED RELEASE ORAL EVERY MORNING
Qty: 90 CAPSULE | Refills: 3 | Status: SHIPPED | OUTPATIENT
Start: 2024-07-26 | End: 2025-07-26

## 2024-07-26 RX ORDER — ATORVASTATIN CALCIUM 80 MG/1
80 TABLET, FILM COATED ORAL NIGHTLY
Qty: 90 TABLET | Refills: 3 | Status: SHIPPED | OUTPATIENT
Start: 2024-07-26 | End: 2025-07-26

## 2024-07-26 RX ORDER — HYDROXYZINE HYDROCHLORIDE 25 MG/1
25 TABLET, FILM COATED ORAL 3 TIMES DAILY PRN
Qty: 60 TABLET | Refills: 5 | Status: SHIPPED | OUTPATIENT
Start: 2024-07-26

## 2024-07-26 NOTE — TELEPHONE ENCOUNTER
----- Message from DIMA Boothe MD sent at 7/26/2024  7:58 AM CDT -----  OV with me in 4-5 months.

## 2024-07-26 NOTE — PROGRESS NOTES
TELEMEDICINE VIRTUAL VIDEO VISIT  7/26/24  7:20 AM CDT    Visit Type: Audiovisual    Patient's Location: Karyna represents that they are located within the state of [place].    History of Present Illness    Karyna presents today for medication refills and to discuss multiple health concerns.    Karyna reports chronic neck pain radiating from shoulders to hands, particularly severe in the right arm. She describes it as constant nerve pain persisting all day and sometimes waking her at night. She also experiences daily tension headaches, often triggered by neck pain. Current treatments, including physical therapy and Norco, have been ineffective. She expresses interest in pain injections, as suggested by her physical therapist and the doctor who performed her nerve test. She denies knowledge of dry needling but is open to trying injections recommended by a pain management specialist.    Karyna experiences daily headaches, describing pain starting in her neck and progressing to a headache that interferes with sleep. She reports needing more than the previously recommended three Fioricet pills per week. She takes amitriptyline at bedtime to help prevent headaches and improve sleep.    Kayrna requests refills for Hydroxyzine (Atarax) for itching, Amlodipine for blood pressure, Atorvastatin for high cholesterol, Omeprazole (Prilosec) for stomach issues, Fioricet for headaches, and Amitriptyline for nighttime headaches.    Karyna reports multiple falls down stairs, with approximately six incidents since moving to her current residence, including one yesterday. She has visited the emergency room due to these falls and is requesting an order for a stair lift to be installed in her apartment. Her insurance company ([employer]) is willing to cover the cost with a physician's order.    Karyna reports unintentional weight loss and loss of appetite. She weighs herself daily, noting her current weight as 154 lbs. She is only eating to  "prevent stomach discomfort.    Karyna recently attended physical therapy but expresses concerns about its effectiveness, stating it is "not doing any good." She often cannot participate due to severe neck pain and associated headaches.    Karyna reports nerve test results indicating bilateral carpal tunnel syndrome, cubital tunnel syndrome in the right elbow, and chronic radiculopathy of left C6-T1 nerve roots.    Karyna has a history of aneurysm and concussion. She also reports chronic recurrent tension headaches.       Review of Systems   HENT:  Negative for hearing loss, rhinorrhea and trouble swallowing.    Eyes:  Negative for discharge and visual disturbance.   Respiratory:  Negative for chest tightness and wheezing.    Cardiovascular:  Negative for chest pain and palpitations.   Gastrointestinal:  Negative for blood in stool, constipation, diarrhea and vomiting.   Endocrine: Negative for polydipsia and polyuria.   Genitourinary:  Negative for difficulty urinating, dysuria, hematuria and menstrual problem.   Musculoskeletal:  Negative for joint swelling.   Neurological:  Negative for weakness.   Psychiatric/Behavioral:  Negative for confusion and dysphoric mood.        Assessment & Plan     Patient has chronic recurrent tension headaches occurring daily and interfering with sleep. Continued current medication regimen and provided refills.   Patient has mild bilateral carpal tunnel syndrome, mild cubital tunnel syndrome of the right elbow, and chronic left C6-T1 radiculopathy based on prior EMG. Referring to pain management to evaluate and consider interventional options such as injections.   Patient reports falling down the stairs at home multiple times, most recently resulting in an ER visit. Ordering chair lift to assist with stairs.  M79.601 PAIN IN RIGHT ARM:   Referred the patient to pain management to evaluate arm pain and consider interventional options such as injections.   The patient reports constant " pain running from shoulders down to fingers in the right arm, occurring every day and all day.   The pain sometimes wakes the patient up during sleep.   EMG showed mild bilateral carpal tunnel syndrome and mild cubital tunnel syndrome across the right elbow and chronic radiculopathy of left C6-T1 nerve roots.   Acknowledged the patient's pain and considered referral to pain management specialist for potential injections.   Planned to have nurse schedule a follow-up visit to address the arm pain and consider referral to pain management specialist.  R63.0 ANOREXIA:   The patient reports loss of appetite and eating just to avoid stomach sickness.   Noted that patient's weight has been stable at 154 lbs despite reported loss of appetite.   Planned to schedule a follow-up visit to reevaluate the patient's condition, including the reported loss of appetite.  G56.21 LESION OF ULNAR NERVE, RIGHT UPPER LIMB:   Referred the patient to pain management to evaluate arm pain and consider interventional options such as injections.   EMG showed mild cubital tunnel syndrome across the right elbow, indicating a lesion of the ulnar nerve in the right upper limb.   Acknowledged the diagnosis of cubital tunnel syndrome in the right elbow.  M54.2 CERVICALGIA:   Referred the patient to pain management to evaluate neck pain and consider interventional options such as injections.   The patient reports neck pain that leads to headaches and interferes with sleep and physical therapy.   EMG showed chronic radiculopathy of left C6-T1 nerve roots, which may be related to the cervicalgia.   Acknowledged the neck pain and its impact on the patient's daily life and treatment.   Considered referral to pain management specialist for potential spinal injections to address neck pain.  G56.01 CARPAL TUNNEL SYNDROME, RIGHT UPPER LIMB:   Referred the patient to pain management to evaluate hand pain and consider interventional options such as injections.    EMG showed mild bilateral carpal tunnel syndrome, including the right upper limb.   Acknowledged the diagnosis of carpal tunnel syndrome in the right upper limb.  R63.4 ABNORMAL WEIGHT LOSS:   The patient reports weight loss due to loss of appetite.   Noted patient's current stable weight of 154 lbs.   Planned to schedule a follow-up visit to reevaluate the patient's condition, including the reported weight loss.  E78.5 HYPERLIPIDEMIA, UNSPECIFIED:   Continued atorvastatin for high cholesterol and provided refill.   The patient is taking atorvastatin for high cholesterol.  K21.9 GASTRO-ESOPHAGEAL REFLUX DISEASE WITHOUT ESOPHAGITIS:   Continued omeprazole (Prilosec) for stomach issues and provided refill.  G44.221 CHRONIC TENSION-TYPE HEADACHE, INTRACTABLE:   Continued amitriptyline at bedtime to help prevent headaches.   Refilled Fio  ricet 325/40 mg, instructed to take no more than 3 pills per week for headaches.   The patient reports daily headaches, pain starting from neck and causing headaches, and difficulty sleeping due to headaches.   Confirmed chronic recurrent tension headaches.   The patient is taking Fioricet for headaches, limited to 3 times per week, and amitriptyline at bedtime for headache prevention.  G56.02 CARPAL TUNNEL SYNDROME, LEFT UPPER LIMB:   The patient reports constant pain running from shoulders to fingers, affecting sleep.   EMG showed mild bilateral carpal tunnel syndrome and mild cubital tunnel syndrome across the right elbow.   Acknowledged carpal tunnel syndrome and cubital tunnel syndrome diagnosis.   Suggested referral to pain management specialist for possible injections.  I10 ESSENTIAL (PRIMARY) HYPERTENSION:   Continued amlodipine for blood pressure and provided refill.   The patient is taking amlodipine for blood pressure.  R29.6 REPEATED FALLS:   Ordered chair lift to assist patient with going up and down stairs at home.   The patient reports falling down the stairs about six  times since moving in, with recent falls resulting in hospital visits.   Acknowledged need for lift chair to prevent falls.   Agreed to put in an order for a lift chair to be installed in patient's apartment.  M54.12 RADICULOPATHY, CERVICAL REGION:   Referred the patient to pain management to evaluate neck pain and consider interventional options such as injections.   The patient reports constant pain from neck to hands, affecting sleep and daily activities.   EMG showed chronic radiculopathy of left C6-T1 nerve roots.   Acknowledged pinched nerve in neck.   Considered referral to pain management specialist for possible injections.   Physical therapy has been ineffective; patient requested order for injection.   Planned to schedule follow-up visit to reevaluate and consider pain management options.       1. Recurrent tension-type headache, not intractable  -     butalbital-acetaminophen-caffeine -40 mg (FIORICET, ESGIC) -40 mg per tablet; Take 1 tablet by mouth every 8 (eight) hours. DO NOT USE MORE THAN 3 DAYS PER WEEK  Dispense: 15 tablet; Refill: 2  -     amitriptyline (ELAVIL) 50 MG tablet; Take 1 tablet (50 mg total) by mouth every evening.  Dispense: 90 tablet; Refill: 3    2. Essential hypertension  -     amLODIPine (NORVASC) 5 MG tablet; Take 1 tablet (5 mg total) by mouth once daily.  Dispense: 90 tablet; Refill: 3    3. Pure hypercholesterolemia  -     atorvastatin (LIPITOR) 80 MG tablet; Take 1 tablet (80 mg total) by mouth every evening.  Dispense: 90 tablet; Refill: 3    4. Atherosclerosis of aorta  Overview:  CT ABDOMEN PELVIS WITH CONTRAST 01/03/2021  FINDINGS: Mild atherosclerosis within the abdominal aorta which is nonaneurysmal.    Orders:  -     atorvastatin (LIPITOR) 80 MG tablet; Take 1 tablet (80 mg total) by mouth every evening.  Dispense: 90 tablet; Refill: 3    5. Pruritus  -     hydrOXYzine HCL (ATARAX) 25 MG tablet; Take 1 tablet (25 mg total) by mouth 3 (three) times daily as  needed for Itching.  Dispense: 60 tablet; Refill: 5    6. Gastroesophageal reflux disease without esophagitis  -     omeprazole (PRILOSEC) 40 MG capsule; Take 1 capsule (40 mg total) by mouth every morning.  Dispense: 90 capsule; Refill: 3    7. Bilateral carpal tunnel syndrome  Overview:  6/28/24 EMG: There is electrodiagnostic evidence of a MILD demyelinating median neuropathy (Carpal tunnel syndrome) across BILATERAL 2. There is electrodiagnostic evidence of a MILD demyelinating median neuropathy (Carpal tunnel syndrome) across BILATERAL wrists and a MILD demyelinating ulnar neuropathy (Cubital tunnel syndrome) across the RIGHT elbow. There is a chronic radiculopathy of the left C6-1 nerve roots  10/23/18 EMG: There is electrodiagnostic evidence of a MODERATE demyelinating median neuropathy (Carpal tunnel syndrome) across the LEFT wrist and a MILD demyelinating CTS across the RIGHT wrist. 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  There is also evidence of a CHRONIC lumbar radiculopathy of S1      8. Cubital tunnel syndrome, right  Overview:  6/28/24 EMG: There is electrodiagnostic evidence of a MILD demyelinating median neuropathy (Carpal tunnel syndrome) across BILATERAL 2. There is electrodiagnostic evidence of a MILD demyelinating median neuropathy (Carpal tunnel syndrome) across BILATERAL wrists and a MILD demyelinating ulnar neuropathy (Cubital tunnel syndrome) across the RIGHT elbow. There is a chronic radiculopathy of the left C6-1 nerve roots  10/23/24 EMG: There is electrodiagnostic evidence of a MODERATE demyelinating median neuropathy (Carpal tunnel syndrome) across the LEFT wrist and a MILD demyelinating CTS across the RIGHT wrist. 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  There is also  "evidence of a CHRONIC lumbar radiculopathy of S1  Dr. Stephens, "mild demyelinating bilaterally-slightly worse on the left."      9. Cervical radiculopathy  Overview:  6/28/24 EMG: There is a chronic radiculopathy of the left C6-1 nerve roots.      10. Impaired functional mobility, balance, gait, and endurance  -     Ambulatory referral/consult to Outpatient Case Management    No other significant complaints or concerns were reported.  Today's visit involved the intricate management of episodic problem(s) and the ongoing care for the patient's serious or complex condition(s) listed above, reflecting the inherent complexity of providing longitudinal, comprehensive evaluation and management as the central hub for the patient's primary care services.  There were no vitals filed for this visit.  Physical Exam    Vital Signs: WEIGHT: 154 LBS.  Constitutional: No acute distress. Normal appearance. Not ill-appearing.  Pulmonary: Pulmonary effort is normal. No respiratory distress.  Skin: Skin is not jaundiced.  Neurological: Alert. Mental status is at baseline.  Psychiatric: Mood normal. Behavior normal. Thought content normal.         This note was generated with the assistance of ambient listening technology. Verbal consent was obtained by the patient and accompanying visitor(s) for the recording of patient appointment to facilitate this note. I attest to having reviewed and edited the generated note for accuracy, though some syntax or spelling errors may persist. Please contact the author of this note for any clarification.      I spent a total of 33 minutes today evaluating and managing this patient for this encounter.  This includes face to face time and non-face to face time preparing to see the patient (eg, review of tests), obtaining and/or reviewing separately obtained history, documenting clinical information in the electronic or other health record, independently interpreting results and communicating results to " "the patient/family/caregiver, or care coordinator. This time was exclusive of any separately billable procedures for this patient and exclusive of time spent treating any other patient.    Documentation entered by me for this encounter may have been done in part using speech-recognition technology. Although I have made an effort to ensure accuracy, "sound like" errors may exist and should be interpreted in context.    Each patient to whom medical services are provided by telemedicine is: (1) informed of the relationship between the physician and patient and the respective role of any other health care provider with respect to management of the patient; and (2) notified that he or she may decline to receive medical services by telemedicine and may withdraw from such care at any time.   "

## 2024-07-30 ENCOUNTER — OFFICE VISIT (OUTPATIENT)
Dept: OPHTHALMOLOGY | Facility: CLINIC | Age: 64
End: 2024-07-30
Payer: MEDICARE

## 2024-07-30 ENCOUNTER — PATIENT MESSAGE (OUTPATIENT)
Dept: OPHTHALMOLOGY | Facility: CLINIC | Age: 64
End: 2024-07-30

## 2024-07-30 DIAGNOSIS — H52.7 REFRACTIVE ERRORS: ICD-10-CM

## 2024-07-30 DIAGNOSIS — R73.03 PREDIABETES: ICD-10-CM

## 2024-07-30 DIAGNOSIS — H25.13 CATARACT, NUCLEAR SCLEROTIC SENILE, BILATERAL: Primary | ICD-10-CM

## 2024-07-30 DIAGNOSIS — I10 ESSENTIAL HYPERTENSION: ICD-10-CM

## 2024-07-30 PROCEDURE — 2023F DILAT RTA XM W/O RTNOPTHY: CPT | Mod: CPTII,S$GLB,, | Performed by: OPTOMETRIST

## 2024-07-30 PROCEDURE — 92014 COMPRE OPH EXAM EST PT 1/>: CPT | Mod: S$GLB,,, | Performed by: OPTOMETRIST

## 2024-07-30 PROCEDURE — 1159F MED LIST DOCD IN RCRD: CPT | Mod: CPTII,S$GLB,, | Performed by: OPTOMETRIST

## 2024-07-30 PROCEDURE — 99999 PR PBB SHADOW E&M-EST. PATIENT-LVL III: CPT | Mod: PBBFAC,,, | Performed by: OPTOMETRIST

## 2024-07-30 PROCEDURE — 3044F HG A1C LEVEL LT 7.0%: CPT | Mod: CPTII,S$GLB,, | Performed by: OPTOMETRIST

## 2024-07-30 PROCEDURE — 92015 DETERMINE REFRACTIVE STATE: CPT | Mod: S$GLB,,, | Performed by: OPTOMETRIST

## 2024-07-30 NOTE — PROGRESS NOTES
SUBJECTIVE  Karyna Sanders is 64 y.o. female  Uncorrected distance visual acuity was 20/100 in the right eye and 20/100 -1 in the left eye. Uncorrected near visual acuity was 0 in the right eye and 0 in the left eye.   Chief Complaint   Patient presents with    Hypertensive Eye Exam    Eye Exam          HPI    decrease distance visual acuity without glasses.  patient states glasses keep falling off face, so she doesn't wear the   glasses .  Last eye exam 05/25/2023 TRF.  Update glasses RX.    Last edited by Elizabeth Scott MA on 7/30/2024  9:49 AM.         Assessment /Plan :  1. Cataract, nuclear sclerotic senile, bilateral   Cataracts are not visually significant and not affecting activities of daily living. Annual observation is recommended at this time. Patient to call or return to clinic with any significant change in vision prior to next visit.     2. Prediabetes   No Background Diabetic Retinopathy  Strict BG control, f/u w/ PCP, and annual DFE  Stressed importance of DM control to preserve vision      3. Essential hypertension   No HTN Retinopathy, monitor annually.      4. Refractive errors   Dispense Final Rx for glasses.  RTC 1 year  Discussed above and answered questions.

## 2024-08-06 ENCOUNTER — PATIENT OUTREACH (OUTPATIENT)
Dept: ADMINISTRATIVE | Facility: HOSPITAL | Age: 64
End: 2024-08-06
Payer: MEDICARE

## 2024-08-15 ENCOUNTER — TELEPHONE (OUTPATIENT)
Dept: INTERNAL MEDICINE | Facility: CLINIC | Age: 64
End: 2024-08-15
Payer: MEDICARE

## 2024-08-15 NOTE — TELEPHONE ENCOUNTER
----- Message from Ana Reynoso sent at 8/15/2024  1:51 PM CDT -----  Type : Patient Call      Who Called : patient      Does the patient know what this is regarding?: Patient is requesting a callback; pt is wanting to know if she can get a referral to get injections; pt says she's having pain running from her finger to her neck; pt said her therapy sessions didn't help her; please advise        Would the patient rather a call back or a response via [facility]? Call          Best Call Back Number: [phone]      Additional Information:

## 2024-08-22 ENCOUNTER — PATIENT OUTREACH (OUTPATIENT)
Dept: ADMINISTRATIVE | Facility: HOSPITAL | Age: 64
End: 2024-08-22
Payer: MEDICARE

## 2024-09-03 ENCOUNTER — DOCUMENT SCAN (OUTPATIENT)
Dept: HOME HEALTH SERVICES | Facility: HOSPITAL | Age: 64
End: 2024-09-03
Payer: MEDICARE

## 2024-09-10 ENCOUNTER — HOSPITAL ENCOUNTER (OUTPATIENT)
Dept: RADIOLOGY | Facility: HOSPITAL | Age: 64
Discharge: HOME | End: 2024-09-10
Attending: FAMILY MEDICINE
Payer: MEDICARE

## 2024-09-10 DIAGNOSIS — F17.210 CIGARETTE NICOTINE DEPENDENCE WITHOUT COMPLICATION: Chronic | ICD-10-CM

## 2024-09-10 PROCEDURE — 71271 CT THORAX LUNG CANCER SCR C-: CPT | Mod: TC

## 2024-09-10 PROCEDURE — 71271 CT THORAX LUNG CANCER SCR C-: CPT | Mod: 26,,, | Performed by: STUDENT IN AN ORGANIZED HEALTH CARE EDUCATION/TRAINING PROGRAM

## 2024-09-20 ENCOUNTER — OFFICE VISIT (OUTPATIENT)
Dept: OPHTHALMOLOGY | Facility: CLINIC | Age: 64
End: 2024-09-20
Payer: MEDICARE

## 2024-09-20 DIAGNOSIS — H40.013 AT LOW RISK FOR OPEN-ANGLE GLAUCOMA IN BOTH EYES: Primary | ICD-10-CM

## 2024-09-20 PROCEDURE — 99999 PR PBB SHADOW E&M-EST. PATIENT-LVL III: CPT | Mod: PBBFAC,HCNC,, | Performed by: OPTOMETRIST

## 2024-09-20 NOTE — PROGRESS NOTES
SUBJECTIVE  Karyna Sanders is 64 y.o. female  Corrected distance visual acuity was 20/40 in the right eye and 20/40 in the left eye. Corrected near visual acuity was J3 in the right eye and J3 in the left eye.   Chief Complaint   Patient presents with    Follow-up     2 months HVF 24-2, IOP check, and GOCT          HPI     Follow-up     Additional comments: 2 months HVF 24-2, IOP check, and GOCT           Comments    Patient notice a change with overall vision but no ocular pain/discomfort.       SLT -OS 05/27/22 [facility]   SLT -OD 05/02/22 [facility]     No drops currently          Last edited by Lewis Alejandre, OD on 9/20/2024  9:38 AM.         Assessment /Plan :  1. At low risk for open-angle glaucoma in both eyes  - Mccartney Visual Field - OU - Extended - Both Eyes  - Posterior Segment OCT Optic Nerve- Both eyes  Stable IOP OU without drops  Asymmetric C/D. Normal gOCT OU  GCL shows symmetrical thinning OU  Educated Karyna Sanders about maintaining IOP control, consistent uses of medications and frequent visits to assure the IOP control and best visual outcome.    RTC 1 year, DFE gOCT

## 2024-09-25 DIAGNOSIS — Z00.00 ENCOUNTER FOR MEDICARE ANNUAL WELLNESS EXAM: ICD-10-CM

## 2024-10-01 ENCOUNTER — PATIENT MESSAGE (OUTPATIENT)
Dept: ADMINISTRATIVE | Facility: OTHER | Age: 64
End: 2024-10-01
Payer: MEDICARE

## 2024-10-10 ENCOUNTER — OFFICE VISIT (OUTPATIENT)
Dept: INTERNAL MEDICINE | Facility: CLINIC | Age: 64
End: 2024-10-10
Payer: MEDICARE

## 2024-10-10 VITALS
WEIGHT: 155.44 LBS | SYSTOLIC BLOOD PRESSURE: 130 MMHG | DIASTOLIC BLOOD PRESSURE: 84 MMHG | BODY MASS INDEX: 28.61 KG/M2 | HEART RATE: 78 BPM | HEIGHT: 62 IN | OXYGEN SATURATION: 96 %

## 2024-10-10 DIAGNOSIS — M85.89 OSTEOPENIA OF MULTIPLE SITES: ICD-10-CM

## 2024-10-10 DIAGNOSIS — Z72.0 TOBACCO USE: ICD-10-CM

## 2024-10-10 DIAGNOSIS — Z59.41 FOOD INSECURITY: ICD-10-CM

## 2024-10-10 DIAGNOSIS — I10 ESSENTIAL HYPERTENSION: Chronic | ICD-10-CM

## 2024-10-10 DIAGNOSIS — M79.606 PAIN OF LOWER EXTREMITY, UNSPECIFIED LATERALITY: ICD-10-CM

## 2024-10-10 DIAGNOSIS — M54.12 CERVICAL RADICULOPATHY: ICD-10-CM

## 2024-10-10 DIAGNOSIS — Z00.00 ENCOUNTER FOR MEDICARE ANNUAL WELLNESS EXAM: Primary | ICD-10-CM

## 2024-10-10 DIAGNOSIS — R41.3 MEMORY DIFFICULTIES: ICD-10-CM

## 2024-10-10 DIAGNOSIS — K21.9 GASTROESOPHAGEAL REFLUX DISEASE WITHOUT ESOPHAGITIS: ICD-10-CM

## 2024-10-10 DIAGNOSIS — J41.0 SIMPLE CHRONIC BRONCHITIS: ICD-10-CM

## 2024-10-10 DIAGNOSIS — Z74.09 OTHER REDUCED MOBILITY: ICD-10-CM

## 2024-10-10 DIAGNOSIS — R63.4 WEIGHT LOSS, ABNORMAL: ICD-10-CM

## 2024-10-10 DIAGNOSIS — E78.00 PURE HYPERCHOLESTEROLEMIA: Chronic | ICD-10-CM

## 2024-10-10 DIAGNOSIS — F25.0 SCHIZOAFFECTIVE DISORDER, BIPOLAR TYPE: Chronic | ICD-10-CM

## 2024-10-10 DIAGNOSIS — F41.9 ANXIETY: Chronic | ICD-10-CM

## 2024-10-10 DIAGNOSIS — Z59.9 FINANCIAL DIFFICULTIES: ICD-10-CM

## 2024-10-10 DIAGNOSIS — R73.03 PREDIABETES: Chronic | ICD-10-CM

## 2024-10-10 DIAGNOSIS — F31.9 BIPOLAR 1 DISORDER: Chronic | ICD-10-CM

## 2024-10-10 DIAGNOSIS — R26.9 ABNORMALITY OF GAIT AND MOBILITY: ICD-10-CM

## 2024-10-10 DIAGNOSIS — G62.9 PERIPHERAL POLYNEUROPATHY: Chronic | ICD-10-CM

## 2024-10-10 DIAGNOSIS — G24.01 TARDIVE DYSKINESIA: Chronic | ICD-10-CM

## 2024-10-10 DIAGNOSIS — R63.0 LOSS OF APPETITE: ICD-10-CM

## 2024-10-10 DIAGNOSIS — I70.0 CALCIFICATION OF AORTA: ICD-10-CM

## 2024-10-10 PROCEDURE — 99999 PR PBB SHADOW E&M-EST. PATIENT-LVL V: CPT | Mod: PBBFAC,HCNC,, | Performed by: NURSE PRACTITIONER

## 2024-10-10 RX ORDER — TIZANIDINE HYDROCHLORIDE 4 MG/1
CAPSULE, GELATIN COATED ORAL
COMMUNITY

## 2024-10-10 RX ORDER — ONDANSETRON 8 MG/1
8 TABLET, ORALLY DISINTEGRATING ORAL ONCE
COMMUNITY

## 2024-10-10 RX ORDER — DICYCLOMINE HYDROCHLORIDE 20 MG/1
20 TABLET ORAL
COMMUNITY

## 2024-10-10 SDOH — SOCIAL DETERMINANTS OF HEALTH (SDOH): PROBLEM RELATED TO HOUSING AND ECONOMIC CIRCUMSTANCES, UNSPECIFIED: Z59.9

## 2024-10-10 SDOH — SOCIAL DETERMINANTS OF HEALTH (SDOH): FOOD INSECURITY: Z59.41

## 2024-10-10 NOTE — PROGRESS NOTES
Karyna Sanders presented for a  Medicare AWV and comprehensive Health Risk Assessment today. The following components were reviewed and updated:    Medical history  Family History  Social history  Allergies and Current Medications  Health Risk Assessment  Health Maintenance  Care Team     Patient screened moderate and/or high risk for one or more social determinants of health (SDOH). Patient connected to community resources through the ED Navigator.      ** See Completed Assessments for Annual Wellness Visit within the encounter summary.**         The following assessments were completed:  Living Situation  CAGE  Depression Screening  Timed Get Up and Go  Whisper Test-na  Cognitive Function Screening  Nutrition Screening  ADL Screening  PAQ Screening      Opioid documentation:      Patient does have a current opioid prescription.      Patient accepted further discussion regarding opioid medication use.      Patient is currently taking tramadol narcotic for hands pain.        Pain level today is 9/10.       In addition to narcotic pain medications, patient is also using Amitriptyline and Tizanidine for pain control.       Patient is not followed by a specialist currently for their pain and will not be referred today.       Patient's opioid risk potential based on ORT-OUD tool:       Arturo each box that applies   No   Yes     Family history of substance abuse   Alcohol [x] []   Illegal drugs [] [x]   Rx drugs [x] []     Personal history of substance abuse   Alcohol [x] []   Illegal drugs [x] []   Rx drugs [x] []     Age between 16-45 years   [x]   []     Patient with ADD, OCD, Bipolar disorder, schizoprenia   []   [x]     Patient with depression   []   [x]                         Scoring total                               3                                  Non-opioid treatment options have been discussed today and will fu with PCP .        Vitals:    10/10/24 0938   BP: 130/84   Pulse: 78   SpO2: 96%   Weight: 70.5  "kg (155 lb 6.8 oz)   Height: 5' 2" (1.575 m)     Body mass index is 28.43 kg/m².  Physical Exam  Vitals and nursing note reviewed.   Constitutional:       Appearance: She is well-developed.   HENT:      Head: Normocephalic.   Cardiovascular:      Rate and Rhythm: Normal rate and regular rhythm.      Pulses:           Dorsalis pedis pulses are 2+ on the right side and 2+ on the left side.      Heart sounds: Normal heart sounds.   Pulmonary:      Effort: Pulmonary effort is normal. No respiratory distress.      Breath sounds: Normal breath sounds.   Abdominal:      Palpations: Abdomen is soft. There is no mass.      Tenderness: There is no abdominal tenderness.   Musculoskeletal:         General: Normal range of motion.   Skin:     General: Skin is warm and dry.   Neurological:      Mental Status: She is alert and oriented to person, place, and time.      Motor: No abnormal muscle tone.   Psychiatric:         Speech: Speech normal.         Behavior: Behavior normal.               Diagnoses and health risks identified today and associated recommendations/orders:    1. Encounter for Medicare annual wellness exam  - Ambulatory Referral/Consult to Enhanced Annual Wellness Visit (eAWV)  Scheduled  PCP  Encouraged healthy diet and exercise as tolerated   Discussed receiving pneumonia vaccine at pharmacy.       Has urine leakage ever interrupted your daily activites or sleep? No  Do you think you could use some help to better manage urine leakage?No   2. Simple chronic bronchitis  Reports she is at her respiratory baseline  Continue current treatment plan as previously prescribed with your  pcp     3. Calcification of aorta  Ct 9/24  Continue current treatment plan as previously prescribed with your  pcp     4. Bipolar 1 disorder  PHQ 2-0  Reports stress and anxiety   Xanax, Seroquel, Lexapro  Advised to follow up with psychiatrist/ for further evaluation and recommendations. Patient expressed understanding.  "     5. Tardive dyskinesia  Continue current treatment plan as previously prescribed with your  pcp     6. Schizoaffective disorder, bipolar type  See #4  Advised to follow up with psychiatrist/ for further evaluation and recommendations. Patient expressed understanding.      7. Anxiety  See #4  Advised to follow up with psychiatrist/ for further evaluation and recommendations. Patient expressed understanding.      8. Peripheral polyneuropathy  Reports chronic n/t BUE and BLE  Chronic burning sensation to feet  Encouraged daily foot inspections.   Advised to follow up with PCP for further evaluation and recommendations. Patient expressed understanding.      9. Cervical radiculopathy  Continue current treatment plan as previously prescribed with your  pcp     10. Essential hypertension  Stable. Continue current treatment plan as previously prescribed with your  pcp     11. Pure hypercholesterolemia  Stable. Continue current treatment plan as previously prescribed with your  pcp     12. Prediabetes  A1c 6.3  Continue current treatment plan as previously prescribed with your   pcp     13. Gastroesophageal reflux disease without esophagitis  Prilosec  Continue current treatment plan as previously prescribed with your  pcp     14. Osteopenia of multiple sites  Dexa 9/23  Continue current treatment plan as previously prescribed with your  pcp     15. Tobacco use  Discussed the importance of smoking cessation and advised to quit smoking. Patient expressed understanding.   - Ambulatory referral/consult to Smoking Cessation Program; Future    16. Financial difficulties  [facility] financial resources information page given to patient.    - Ambulatory referral/consult to Outpatient Case Management    17. Food insecurity  - Ambulatory referral/consult to Outpatient Case Management    18. Loss of appetite  Causing a decrease in food intake and weight loss  Advised to follow up with PCP for further  evaluation and recommendations. Patient expressed understanding.      19. Memory difficulties  Abnormal cognitive function screening.  Advised to follow up with PCP for further evaluation and recommendations. Patient expressed understanding.      20. Pain of lower extremity, unspecified laterality  Chronic; on exertion  Advised to follow up with PCP for further evaluation and recommendations. Patient expressed understanding.    - Ankle Brachial Indices (LILY); Future (scheduled)    21. Abnormality of gait and mobility  Abnormal timed get up and go test. Reports 2+ falls in the last 12 months.   Fall precautions reviewed with patient. Advised to follow up with PCP for further recommendations. Patient expressed understanding.       22. Other reduced mobility  Abnormal timed get up and go test. Reports 2+ falls in the last 12 months.   Fall precautions reviewed with patient. Advised to follow up with PCP for further recommendations. Patient expressed understanding.       23. Weight loss, abnormal  See #18  Advised to follow up with PCP for further evaluation and recommendations. Patient expressed understanding.        Provided Karyna with a 5-10 year written screening schedule and personal prevention plan. Recommendations were developed using the USPSTF age appropriate recommendations. Education, counseling, and referrals were provided as needed. After Visit Summary printed and given to patient which includes a list of additional screenings\tests needed.    Follow up in about 1 year (around 10/10/2025) for awv.    Marialuisa Jaramillo NP      I offered to discuss advanced care planning, including how to pick a person who would make decisions for you if you were unable to make them for yourself, called a health care power of , and what kind of decisions you might make such as use of life sustaining treatments such as ventilators and tube feeding when faced with a life limiting illness recorded on a living will that  they will need to know. (How you want to be cared for as you near the end of your natural life)     X Patient is interested in learning more about how to make advanced directives.  I provided them paperwork and offered to discuss this with them.

## 2024-10-10 NOTE — PATIENT INSTRUCTIONS
Counseling and Referral of Other Preventative  (Italic type indicates deductible and co-insurance are waived)    Patient Name: Karyna Sanders  Today's Date: 10/10/2024    Health Maintenance       Date Due Completion Date    Pneumococcal Vaccines (Age 65+) (2 of 2 - PCV) 05/09/2018 5/9/2017    Hemoglobin A1c (Prediabetes) 05/01/2025 5/1/2024    Mammogram 05/29/2025 5/29/2024    Override on 1/16/2015: Done    LDCT Lung Screen 09/10/2025 9/10/2024    High Dose Statin 10/10/2025 10/10/2024    DEXA Scan 09/01/2026 9/1/2023    TETANUS VACCINE 03/02/2028 3/2/2018    Colorectal Cancer Screening 02/07/2029 2/7/2022    Lipid Panel 05/01/2029 5/1/2024    Override on 3/11/2016: Done        Orders Placed This Encounter   Procedures    Ambulatory referral/consult to Smoking Cessation Program    Ambulatory referral/consult to Outpatient Case Management    Ankle Brachial Indices (LILY)     The following information is provided to all patients.  This information is to help you find resources for any of the problems found today that may be affecting your health:                  Living healthy guide: www.[facility].[place].gov      Understanding Diabetes: www.diabetes.org      Eating healthy: www.cdc.gov/healthyweight      CDC home safety checklist: www.cdc.gov/steadi/patient.html      Agency on Aging: www.goea.[place].[facility]      Alcoholics anonymous (AA): www.aa.org      Physical Activity: www.adam.nih.gov/mu2sdei      Tobacco use: www.quitwithusla.org

## 2024-10-11 ENCOUNTER — HOSPITAL ENCOUNTER (OUTPATIENT)
Dept: CARDIOLOGY | Facility: HOSPITAL | Age: 64
Discharge: HOME | End: 2024-10-11
Attending: NURSE PRACTITIONER
Payer: MEDICARE

## 2024-10-11 VITALS
BODY MASS INDEX: 28.52 KG/M2 | SYSTOLIC BLOOD PRESSURE: 143 MMHG | HEIGHT: 62 IN | WEIGHT: 155 LBS | DIASTOLIC BLOOD PRESSURE: 77 MMHG

## 2024-10-11 DIAGNOSIS — M79.606 PAIN OF LOWER EXTREMITY, UNSPECIFIED LATERALITY: ICD-10-CM

## 2024-10-11 LAB
LEFT ABI: 1.23
LEFT ARM BP: 135 MMHG
LEFT DORSALIS PEDIS: 167 MMHG
LEFT POSTERIOR TIBIAL: 176 MMHG
LEFT TBI: 1.09
LEFT TOE PRESSURE: 156 MMHG
RIGHT ABI: 1.24
RIGHT ARM BP: 143 MMHG
RIGHT DORSALIS PEDIS: 168 MMHG
RIGHT POSTERIOR TIBIAL: 177 MMHG
RIGHT TBI: 0.97
RIGHT TOE PRESSURE: 139 MMHG

## 2024-10-11 PROCEDURE — 93922 UPR/L XTREMITY ART 2 LEVELS: CPT | Mod: HCNC

## 2024-10-11 PROCEDURE — 93922 UPR/L XTREMITY ART 2 LEVELS: CPT | Mod: 26,HCNC,, | Performed by: INTERNAL MEDICINE

## 2024-10-15 ENCOUNTER — OFFICE VISIT (OUTPATIENT)
Dept: INTERNAL MEDICINE | Facility: CLINIC | Age: 64
End: 2024-10-15
Payer: MEDICARE

## 2024-10-15 ENCOUNTER — OFFICE VISIT (OUTPATIENT)
Dept: PODIATRY | Facility: CLINIC | Age: 64
End: 2024-10-15
Payer: MEDICARE

## 2024-10-15 ENCOUNTER — HOSPITAL ENCOUNTER (OUTPATIENT)
Dept: RADIOLOGY | Facility: HOSPITAL | Age: 64
Discharge: HOME | End: 2024-10-15
Attending: PODIATRIST
Payer: MEDICARE

## 2024-10-15 VITALS
WEIGHT: 154.13 LBS | HEIGHT: 62 IN | SYSTOLIC BLOOD PRESSURE: 134 MMHG | RESPIRATION RATE: 18 BRPM | DIASTOLIC BLOOD PRESSURE: 82 MMHG | OXYGEN SATURATION: 99 % | BODY MASS INDEX: 28.36 KG/M2 | TEMPERATURE: 97 F | HEART RATE: 73 BPM

## 2024-10-15 DIAGNOSIS — Z74.09 IMPAIRED FUNCTIONAL MOBILITY, BALANCE, GAIT, AND ENDURANCE: Chronic | ICD-10-CM

## 2024-10-15 DIAGNOSIS — M79.672 PAIN IN LEFT FOOT: ICD-10-CM

## 2024-10-15 DIAGNOSIS — J41.0 SIMPLE CHRONIC BRONCHITIS: Chronic | ICD-10-CM

## 2024-10-15 DIAGNOSIS — B07.0 PLANTAR WART OF LEFT FOOT: Chronic | ICD-10-CM

## 2024-10-15 DIAGNOSIS — L29.9 PRURITUS: Chronic | ICD-10-CM

## 2024-10-15 DIAGNOSIS — M20.12 HAV (HALLUX ABDUCTO VALGUS), LEFT: ICD-10-CM

## 2024-10-15 DIAGNOSIS — G62.9 PERIPHERAL POLYNEUROPATHY: Chronic | ICD-10-CM

## 2024-10-15 DIAGNOSIS — K59.09 CHRONIC CONSTIPATION: Chronic | ICD-10-CM

## 2024-10-15 DIAGNOSIS — E78.00 PURE HYPERCHOLESTEROLEMIA: Chronic | ICD-10-CM

## 2024-10-15 DIAGNOSIS — M24.572 EQUINUS CONTRACTURE OF LEFT ANKLE: ICD-10-CM

## 2024-10-15 DIAGNOSIS — F31.9 BIPOLAR 1 DISORDER: Chronic | ICD-10-CM

## 2024-10-15 DIAGNOSIS — Z23 NEED FOR VACCINATION: ICD-10-CM

## 2024-10-15 DIAGNOSIS — R73.03 PREDIABETES: Chronic | ICD-10-CM

## 2024-10-15 DIAGNOSIS — I10 ESSENTIAL HYPERTENSION: Chronic | ICD-10-CM

## 2024-10-15 DIAGNOSIS — K21.9 GASTROESOPHAGEAL REFLUX DISEASE WITHOUT ESOPHAGITIS: Primary | Chronic | ICD-10-CM

## 2024-10-15 DIAGNOSIS — M19.91 PRIMARY LOCALIZED OSTEOARTHROSIS OF MULTIPLE SITES: Chronic | ICD-10-CM

## 2024-10-15 DIAGNOSIS — M77.42 METATARSALGIA, LEFT FOOT: Primary | ICD-10-CM

## 2024-10-15 DIAGNOSIS — G44.219 EPISODIC TENSION-TYPE HEADACHE, NOT INTRACTABLE: Chronic | ICD-10-CM

## 2024-10-15 DIAGNOSIS — Z72.0 TOBACCO ABUSE: ICD-10-CM

## 2024-10-15 DIAGNOSIS — M77.42 METATARSALGIA, LEFT FOOT: ICD-10-CM

## 2024-10-15 DIAGNOSIS — Z99.89 USE OF CANE AS AMBULATORY AID: ICD-10-CM

## 2024-10-15 DIAGNOSIS — I70.0 ATHEROSCLEROSIS OF AORTA: Chronic | ICD-10-CM

## 2024-10-15 PROCEDURE — 1125F AMNT PAIN NOTED PAIN PRSNT: CPT | Mod: HCNC,CPTII,S$GLB, | Performed by: FAMILY MEDICINE

## 2024-10-15 PROCEDURE — 1160F RVW MEDS BY RX/DR IN RCRD: CPT | Mod: HCNC,CPTII,S$GLB, | Performed by: PODIATRIST

## 2024-10-15 PROCEDURE — 1159F MED LIST DOCD IN RCRD: CPT | Mod: HCNC,CPTII,S$GLB, | Performed by: FAMILY MEDICINE

## 2024-10-15 PROCEDURE — 99204 OFFICE O/P NEW MOD 45 MIN: CPT | Mod: HCNC,S$GLB,, | Performed by: PODIATRIST

## 2024-10-15 PROCEDURE — G2211 COMPLEX E/M VISIT ADD ON: HCPCS | Mod: HCNC,S$GLB,, | Performed by: FAMILY MEDICINE

## 2024-10-15 PROCEDURE — 3288F FALL RISK ASSESSMENT DOCD: CPT | Mod: HCNC,CPTII,S$GLB, | Performed by: FAMILY MEDICINE

## 2024-10-15 PROCEDURE — 73630 X-RAY EXAM OF FOOT: CPT | Mod: TC,HCNC,LT

## 2024-10-15 PROCEDURE — 3044F HG A1C LEVEL LT 7.0%: CPT | Mod: HCNC,CPTII,S$GLB, | Performed by: FAMILY MEDICINE

## 2024-10-15 PROCEDURE — 90677 PCV20 VACCINE IM: CPT | Mod: HCNC,S$GLB,, | Performed by: FAMILY MEDICINE

## 2024-10-15 PROCEDURE — 1100F PTFALLS ASSESS-DOCD GE2>/YR: CPT | Mod: HCNC,CPTII,S$GLB, | Performed by: PODIATRIST

## 2024-10-15 PROCEDURE — 3079F DIAST BP 80-89 MM HG: CPT | Mod: HCNC,CPTII,S$GLB, | Performed by: FAMILY MEDICINE

## 2024-10-15 PROCEDURE — 1100F PTFALLS ASSESS-DOCD GE2>/YR: CPT | Mod: HCNC,CPTII,S$GLB, | Performed by: FAMILY MEDICINE

## 2024-10-15 PROCEDURE — 3075F SYST BP GE 130 - 139MM HG: CPT | Mod: HCNC,CPTII,S$GLB, | Performed by: FAMILY MEDICINE

## 2024-10-15 PROCEDURE — 99215 OFFICE O/P EST HI 40 MIN: CPT | Mod: HCNC,S$GLB,, | Performed by: FAMILY MEDICINE

## 2024-10-15 PROCEDURE — 3044F HG A1C LEVEL LT 7.0%: CPT | Mod: HCNC,CPTII,S$GLB, | Performed by: PODIATRIST

## 2024-10-15 PROCEDURE — 1159F MED LIST DOCD IN RCRD: CPT | Mod: HCNC,CPTII,S$GLB, | Performed by: PODIATRIST

## 2024-10-15 PROCEDURE — G0009 ADMIN PNEUMOCOCCAL VACCINE: HCPCS | Mod: HCNC,S$GLB,, | Performed by: FAMILY MEDICINE

## 2024-10-15 PROCEDURE — 3008F BODY MASS INDEX DOCD: CPT | Mod: HCNC,CPTII,S$GLB, | Performed by: FAMILY MEDICINE

## 2024-10-15 PROCEDURE — 3288F FALL RISK ASSESSMENT DOCD: CPT | Mod: HCNC,CPTII,S$GLB, | Performed by: PODIATRIST

## 2024-10-15 PROCEDURE — 73630 X-RAY EXAM OF FOOT: CPT | Mod: 26,HCNC,LT, | Performed by: STUDENT IN AN ORGANIZED HEALTH CARE EDUCATION/TRAINING PROGRAM

## 2024-10-15 PROCEDURE — 99999 PR PBB SHADOW E&M-EST. PATIENT-LVL V: CPT | Mod: PBBFAC,HCNC,, | Performed by: FAMILY MEDICINE

## 2024-10-15 PROCEDURE — 99999 PR PBB SHADOW E&M-EST. PATIENT-LVL IV: CPT | Mod: PBBFAC,HCNC,, | Performed by: PODIATRIST

## 2024-10-15 RX ORDER — ATORVASTATIN CALCIUM 80 MG/1
80 TABLET, FILM COATED ORAL NIGHTLY
Qty: 90 TABLET | Refills: 3 | Status: SHIPPED | OUTPATIENT
Start: 2024-10-15 | End: 2025-10-15

## 2024-10-15 RX ORDER — ALBUTEROL SULFATE 0.83 MG/ML
2.5 SOLUTION RESPIRATORY (INHALATION) EVERY 6 HOURS PRN
Qty: 150 ML | Refills: 3 | Status: SHIPPED | OUTPATIENT
Start: 2024-10-15 | End: 2025-10-15

## 2024-10-15 RX ORDER — ALBUTEROL SULFATE 90 UG/1
2 INHALANT RESPIRATORY (INHALATION) EVERY 6 HOURS PRN
Qty: 18 G | Refills: 2 | Status: SHIPPED | OUTPATIENT
Start: 2024-10-15

## 2024-10-15 RX ORDER — POLYETHYLENE GLYCOL 3350 17 G/17G
17 POWDER, FOR SOLUTION ORAL DAILY PRN
Qty: 510 G | Refills: 11 | Status: SHIPPED | OUTPATIENT
Start: 2024-10-15

## 2024-10-15 RX ORDER — HYDROXYZINE HYDROCHLORIDE 25 MG/1
25 TABLET, FILM COATED ORAL 3 TIMES DAILY PRN
Qty: 60 TABLET | Refills: 5 | Status: SHIPPED | OUTPATIENT
Start: 2024-10-15

## 2024-10-15 RX ORDER — OMEPRAZOLE 40 MG/1
40 CAPSULE, DELAYED RELEASE ORAL EVERY MORNING
Qty: 90 CAPSULE | Refills: 3 | Status: SHIPPED | OUTPATIENT
Start: 2024-10-15 | End: 2025-10-15

## 2024-10-15 RX ORDER — BUTALBITAL, ACETAMINOPHEN AND CAFFEINE 50; 325; 40 MG/1; MG/1; MG/1
1 TABLET ORAL EVERY 8 HOURS
Qty: 15 TABLET | Refills: 2 | Status: SHIPPED | OUTPATIENT
Start: 2024-10-15

## 2024-10-15 RX ORDER — GABAPENTIN 100 MG/1
100 CAPSULE ORAL 2 TIMES DAILY
Qty: 60 CAPSULE | Refills: 0 | Status: SHIPPED | OUTPATIENT
Start: 2024-10-15 | End: 2024-11-14

## 2024-10-15 RX ORDER — AMLODIPINE BESYLATE 5 MG/1
5 TABLET ORAL DAILY
Qty: 90 TABLET | Refills: 3 | Status: SHIPPED | OUTPATIENT
Start: 2024-10-15 | End: 2025-10-15

## 2024-10-15 RX ORDER — AMITRIPTYLINE HYDROCHLORIDE 50 MG/1
50 TABLET, FILM COATED ORAL NIGHTLY
Qty: 90 TABLET | Refills: 3 | Status: SHIPPED | OUTPATIENT
Start: 2024-10-15

## 2024-10-15 NOTE — PROGRESS NOTES
Subjective:       Patient ID: Karyna Sanders is a 64 y.o. female.    Chief Complaint: Plantar Warts (Pt c/o left pain in foot, pt reports pain level of 8/10, pt is not diabetic, pt not on blood thinners. )      HPI: Karyna Sanders presents to the office today with complaints of moderate to severe pains to the left foot at the great toe due to bunion deformity. Also states pains to the LLE 4th MTPJ due to painful callus. Patient states pains are recalcitrant to Tylenol therapy and wider width shoe gear. Patient has no had injection therapy to the 1st MTPJ on the affected limb prior. Patient has had discomfort to the great toe for the past several months. States plantar forefoot pains are moderate to severe. Patient is indeed interested in surgical intervention and/or cure for alleviation of symptoms. Patient's Primary Care Provider is DIMA Boothe MD. Has a history of Aneurysms with CVA and dose smoke 1/2 PPD of cigarettes. States burning and tingling of the LE.     Review of patient's allergies indicates:   Allergen Reactions    Aspirin     Nsaids (non-steroidal anti-inflammatory drug)     Ibuprofen Anxiety       Past Medical History:   Diagnosis Date    Anemia     Aneurysm     Anorexia 4/26/2017    Anxiety     Asthma     Atherosclerosis of aorta 1/5/2022    CT ABDOMEN PELVIS WITH CONTRAST 01/03/2021 FINDINGS: Mild atherosclerosis within the abdominal aorta which is nonaneurysmal.    Bipolar disorder     Carpal tunnel syndrome, bilateral     Cerebral aneurysm     Chronic nausea 11/15/2018    Chronic pain syndrome     Cigarette nicotine dependence     Depression     Essential hypertension 8/29/2014    GERD (gastroesophageal reflux disease)     Hyperlipidemia     Hypertension     Insomnia     Osteoporosis     Pure hypercholesterolemia 9/8/2014    Recurrent tension-type headache, not intractable 7/24/2019    Schizophrenia     Stroke     Subarachnoid hemorrhage        Family History   Problem Relation Name  Age of Onset    Cancer Mother      Glaucoma Mother      Stroke Mother      Diabetes type II Mother      Heart disease Mother      Hypertension Mother      Brain cancer Sister      Diabetes type II Sister      Cancer Brother      Hypertension Brother      Bone cancer Sister         Social History     Socioeconomic History    Marital status:     Number of children: 3    Years of education: 11th Grade   Tobacco Use    Smoking status: Every Day     Current packs/day: 1.00     Average packs/day: 1 pack/day for 52.1 years (52.1 ttl pk-yrs)     Types: Cigarettes, Cigars, Vaping with nicotine     Start date: 1976    Smokeless tobacco: Never    Tobacco comments:     in smoking program 5/13/19   Substance and Sexual Activity    Alcohol use: Yes     Comment: occasional    Drug use: No    Sexual activity: Never     Partners: Male   Social History Narrative    Patient describes herself as disabled.     Social Drivers of Health     Financial Resource Strain: Medium Risk (10/10/2024)    Overall Financial Resource Strain (CARDIA)     Difficulty of Paying Living Expenses: Somewhat hard   Food Insecurity: Food Insecurity Present (10/10/2024)    Hunger Vital Sign     Worried About Running Out of Food in the Last Year: Sometimes true     Ran Out of Food in the Last Year: Never true   Transportation Needs: No Transportation Needs (10/10/2024)    PRAPARE - Transportation     Lack of Transportation (Medical): No     Lack of Transportation (Non-Medical): No   Physical Activity: Inactive (10/10/2024)    Exercise Vital Sign     Days of Exercise per Week: 0 days     Minutes of Exercise per Session: 0 min   Stress: Stress Concern Present (10/10/2024)    [employer] - Occupational Stress Questionnaire     Feeling of Stress : Very much   Housing Stability: Low Risk  (10/10/2024)    Housing Stability Vital Sign     Unable to Pay for Housing in the Last Year: No     Homeless in the Last Year: No       Past  Surgical History:   Procedure Laterality Date    BUNIONECTOMY Right     COLONOSCOPY N/A 2/7/2022    Procedure: COLONOSCOPY;  Surgeon: Kamila Lund MD;  Location: [facility] [place];  Service: Endoscopy;  Laterality: N/A;    COLONOSCOPY W/ BIOPSIES AND POLYPECTOMY      ESOPHAGOGASTRODUODENOSCOPY N/A 2/7/2022    Procedure: EGD (ESOPHAGOGASTRODUODENOSCOPY);  Surgeon: Kamila Lund MD;  Location: [facility] [place];  Service: Endoscopy;  Laterality: N/A;    HYSTERECTOMY      PARTIAL HYSTERECTOMY      Bilateral Ovaries Remain    TRANSCRANIAL DOPPLER STUDY - COMPLETE  8/28/2014         TRANSCRANIAL DOPPLER STUDY - COMPLETE  8/29/2014         TRANSCRANIAL DOPPLER STUDY - COMPLETE  8/30/2014         TRANSCRANIAL DOPPLER STUDY - COMPLETE  8/31/2014         TRANSCRANIAL DOPPLER STUDY - COMPLETE  9/1/2014         TRANSCRANIAL DOPPLER STUDY - COMPLETE  9/2/2014         TRANSCRANIAL DOPPLER STUDY - COMPLETE  9/3/2014         TRANSCRANIAL DOPPLER STUDY - COMPLETE  9/4/2014         TRANSCRANIAL DOPPLER STUDY - COMPLETE  9/5/2014         TRANSCRANIAL DOPPLER STUDY - COMPLETE  9/6/2014         VAGINAL DELIVERY      X 3       Review of Systems   Constitutional:  Negative for chills, fatigue and fever.   HENT:  Negative for hearing loss.    Eyes:  Negative for photophobia and visual disturbance.   Respiratory:  Negative for cough, chest tightness, shortness of breath and wheezing.    Cardiovascular:  Negative for chest pain and palpitations.   Gastrointestinal:  Negative for constipation, diarrhea, nausea and vomiting.   Endocrine: Negative for cold intolerance and heat intolerance.   Genitourinary:  Negative for flank pain.   Musculoskeletal:  Negative for neck pain and neck stiffness.   Neurological:  Positive for numbness. Negative for light-headedness and headaches.   Psychiatric/Behavioral:  Negative for sleep disturbance.        Objective:   There were no vitals taken for this visit.        Physical Exam  LOWER EXTREMITY PHYSICAL  EXAMINATION    VASCULAR: On the left foot, the dorsalis pedis pulse is 2/4 and the posterior tibial pulse is 1/4. Capillary refill time is less than 3 seconds. Hair growth is present on the dorsum of the foot and at the digits. Proximal to distal temperature is warm to warm    ORTHOPEDIC: Mild to moderate bunion deformity is noted to the left foot. Upon ROM in the sagittal plan, there is mild pain related at the end ROM, dorsally; no plantar pains at the 1st MTPJ are stated. No pains to palpation of the tibial or the fibular sesamoid. There is a moderately sized medial eminence appreciated. There is dorsal spurring noted. Palpation of the dorsal-lateral aspect of the 1st MTPJ is painful. Hallux abductus is noted.  Hallux interphalangeus is not noted. There is tracking. The hallux is trackbound. There is mild hypermobility noted at the 1st TMTJ. Upon reduction of the IM angle at the 1st metatarsal head, the hallux is not rectus. 4th metatarsalgia is noted. Equinus is noted.     Assessment:     1. Metatarsalgia, left foot    2. Equinus contracture of left ankle    3. Pain in left foot    4. Hav (hallux abducto valgus), left    5. Tobacco abuse    6. Peripheral polyneuropathy        Plan:     Metatarsalgia, left foot  -     X-Ray Foot Complete Left; Future; Expected date: 10/15/2024    Equinus contracture of left ankle  -     Ambulatory referral/consult to Podiatry    Pain in left foot  -     X-Ray Foot Complete Left; Future; Expected date: 10/15/2024    Hav (hallux abducto valgus), left  -     X-Ray Foot Complete Left; Future; Expected date: 10/15/2024    Tobacco abuse    Peripheral polyneuropathy      Thorough discussion is had with the patient today, concerning the diagnosis, its etiology, and the treatment algorithm at present.     CTA/LILY/PVR/VASCULAR SONO is reviewed in detail with the patient. All questions and concerns regarding findings and its/their implications are outlined and discussed.    Did discuss in  detail the harmful effects of nicotine/tobacco/cigarette/ marijuana smoking, especially in relation to the lower extremity. I do rec. consultation with primary care provider for further discussed of smoking cessation methods. Smoking & Tobacco use cessation couseling was rendered at today's visit; intermediate, bewteen 3 and 10 minutes.    Patient should ambulate with proper and supportive shoe gear.  These are shoes with firm and robust arch support; medial counter.  Shoes which only bend at the metatarsophalangeal joint and which are rigid in the midfoot and hindfoot. Running type or cross training type shoes gear which are designed for pronation control is best.     The procedure of (LLE 1st MTPJ fusion with 4th metatarsal osteotomy with Gastroc Recession) was thoroughly explained to the patient. Its necessity and/or purpose and the implications therein were outlined, including any pertinent advantages and/or disadvantages, and possible complications, if any. Possible complications include recurrence of pathology and/or deformity, infection (cellulitis, drainage, purulence, malodor, etc...), pain, numbness, neuritis, edema, burning, loss of function, need for further surgery, possible need for removal of any implanted hardware, soft tissue contracture and/or scarring, etc... No guarantees were given and/or implied. Post-operative expectations and weightbearing protocol is thoroughly explained to the patient, who acknowledges understanding. Rx. is given for pre-operative labs and for medical clearance by patient's PMD and/or PAT Dept. here within [facility].     Would recommend to D/C smoking, or at the very least, decrease to around 1/4 - 1/6 PPD.        Future Appointments   Date Time Provider Department Center   1/2/2025  7:35 AM LABORATORY, [facility] [place]   1/9/2025 10:00 AM Humera Viramontes NP [facility]   1/28/2025  1:00 PM Judosn Sawant MD [facility] NEURO  Medical C

## 2024-10-16 ENCOUNTER — PATIENT OUTREACH (OUTPATIENT)
Dept: ADMINISTRATIVE | Facility: OTHER | Age: 64
End: 2024-10-16
Payer: MEDICARE

## 2024-10-16 NOTE — PROGRESS NOTES
LPN spoke to patient/caregiver as per OPCM referral for: Financial difficulties,Food insecurity    Does the patient consent to completing the assessment/enrollment: Yes  Does the patient consent for LPN to speak to a caregiver? No    Health Insurance Coverage:     Does the patient have adequate health insurance coverage? Yes  Education provided: No    PCP Follow-Up Appointments:    Does the patient have a primary care provider? yes - Dr Yonas Boothe  Date of last PCP appointment? 10/15/24  Next PCP appointment:  01/09/25  Was patient provided with education surrounding PCP services/creating a medical home? yes - Benefits of an established PCP      Specialist Appointments:     Does the patient have a pending specialist referral? no  Does the patient have an upcoming specialist appointment? yes - 01/25/25 Neurology  Is the patient pregnant? No  Does the patient have a mental health provider? Dr Marilin Hernández     Home Medications:     Reviewed medication list with patient? No  Is the patient able to afford their medications? Yes    Care Gaps:     Does the patient have any care gaps that are due? NO    Recent lab results:  Blood Sugar:    Provided education: No  Blood Pressure:   Provided education: Yes        Social Determinants of Health (SDOH)    Patient's identified areas of need:    Paying for utilities and getting a [employer] issued phone.  Education/Resources provided:    Information on applying for utility assistance and [employer] phone resource,Food Bank resources      Home Health/DME:    Current Home Health: No  Patient has all healthcare equipment/supplies indicated: yes  Current DME: single point cane and Nebulizer    Additional Documentation:   Called and open another episode on this patient regarding a new referral. She stated her financial difficulties is needing assistance with paying her utility bill. Denies any food insecurities at this time. Uses transportation provided by [employer]. She  mentioned needing information on how to get a [employer] issued phone. Will mail information to her verified home address on applying for help with her utility bill and getting a [employer] phone. Denies any other issues or concerns.      Follow up:   Patient agrees to scheduled follow up call. Yes  Next 10/29/24

## 2024-10-18 ENCOUNTER — CLINICAL SUPPORT (OUTPATIENT)
Dept: REHABILITATION | Facility: HOSPITAL | Age: 64
End: 2024-10-18
Attending: FAMILY MEDICINE
Payer: MEDICARE

## 2024-10-18 DIAGNOSIS — Z74.09 IMPAIRED FUNCTIONAL MOBILITY, BALANCE, GAIT, AND ENDURANCE: Chronic | ICD-10-CM

## 2024-10-18 PROCEDURE — 97530 THERAPEUTIC ACTIVITIES: CPT | Mod: HCNC,PN

## 2024-10-18 PROCEDURE — 97161 PT EVAL LOW COMPLEX 20 MIN: CPT | Mod: HCNC,PN

## 2024-10-18 NOTE — PLAN OF CARE
"[facility]   Physical Therapy Initial Evaluation        Date: 10/18/2024   Name: Karyna Sanders  Clinic Number: [id number]    Therapy Diagnosis:   Encounter Diagnosis   Name Primary?    Impaired functional mobility, balance, gait, and endurance       Physician: DIMA Boothe MD     Physician Orders: PT Eval and Treat  Medical Diagnosis from Referral: impaired functional mobility, gait, balance and endurance  Evaluation Date: 10/18/2024  Authorization Period Expiration: 10/15/2025  Plan of Care Expiration: 12/27/2024  Visit # / Visits authorized: 1/1  FOTO: 1/3    Precautions: Standard    Time In: 0815 am  Time Out: 0900 am  Total Billable Time (timed & untimed codes): 23 minutes     SUBJECTIVE   Date of onset: 1976  History of current condition - Karyna is a 64 y.o. female whom reports being referred to therapy for general strengthening of arms/legs. She reports that legs "give out on her" without any warning. Has fallen 2x in the past month. Last fall, she hit her head on the wall and had a concussion. She is waiting on an appointment with a neurologist.    PMH: hx of CVA in 2014    Pain:  Current 0/10, worst 0/10, best 0/10       Prior Therapy: N/A  Social History: Pt lives alone  Occupation: Pt is disabled  Prior Level of Function: Independent with all ADL, IADL, community mobility and functional activities.   Current Level of Function: Independent with all ADL, IADL, community mobility and functional activities with need for increased time and frequent breaks.        Pts goals: Pt reported goals are to decrease falls in order to return to prior functional level.       Medical History:   Past Medical History:   Diagnosis Date    Anemia     Aneurysm     Anorexia 4/26/2017    Anxiety     Asthma     Atherosclerosis of aorta 1/5/2022    CT ABDOMEN PELVIS WITH CONTRAST 01/03/2021 FINDINGS: Mild atherosclerosis within the abdominal aorta which is nonaneurysmal.    Bipolar disorder     " Carpal tunnel syndrome, bilateral     Cerebral aneurysm     Chronic nausea 11/15/2018    Chronic pain syndrome     Cigarette nicotine dependence     Depression     Essential hypertension 8/29/2014    GERD (gastroesophageal reflux disease)     Hyperlipidemia     Hypertension     Insomnia     Osteoporosis     Pure hypercholesterolemia 9/8/2014    Recurrent tension-type headache, not intractable 7/24/2019    Schizophrenia     Stroke     Subarachnoid hemorrhage        Surgical History:   Karyna Sanders  has a past surgical history that includes Partial hysterectomy; Bunionectomy (Right); Transcranial Doppler Study - Art (8/28/2014); Transcranial Doppler Study - Art (8/29/2014); Transcranial Doppler Study - Art (8/30/2014); Transcranial Doppler Study - Art (8/31/2014); Transcranial Doppler Study - Art (9/1/2014); Transcranial Doppler Study - Art (9/2/2014); Transcranial Doppler Study - Art (9/3/2014); Transcranial Doppler Study - Art (9/4/2014); Transcranial Doppler Study - Art (9/5/2014); Transcranial Doppler Study - Art (9/6/2014); Colonoscopy w/ biopsies and polypectomy; Vaginal delivery; Hysterectomy; Esophagogastroduodenoscopy (N/A, 2/7/2022); and Colonoscopy (N/A, 2/7/2022).    Medications:   Karyna has a current medication list which includes the following prescription(s): acetaminophen, albuterol, albuterol, alprazolam, amitriptyline, amlodipine, atorvastatin, blood pressure cuff, butalbital-acetaminophen-caffeine -40 mg, calcium carbonate/vitamin d3, combivent respimat, cyproheptadine hcl, dicyclomine, gabapentin, hydroxyzine hcl, lexapro, nebulizer accessories, nebulizer and compressor, omeprazole, ondansetron, oxcarbazepine, polyethylene glycol, and quetiapine.    Allergies:   Review of patient's allergies indicates:   Allergen Reactions    Aspirin     Nsaids (non-steroidal anti-inflammatory drug)     Ibuprofen Anxiety        OBJECTIVE         STRENGTH: upper extremity and lower extremity     MMT  RIGHT   LEFT GOAL   Hip Flexion  []1  []2  [x]3  []4  []5      []+ [x]- []1  [x]2  []3  []4  []5      [x]+ []- []1  []2  []3  [x]4  []5      []+ [x]-  R/L    Hip Extension  []1  [x]2  []3  []4  []5      []+ []- []1  [x]2  []3  []4  []5      []+ []- []1  []2  [x]3  []4  []5      [x]+ []-  R/L    Hip Abduction  []1  [x]2  []3  []4  []5      [x]+ []- []1  [x]2  []3  []4  []5      []+ [x]- []1  []2  [x]3  []4  []5      [x]+ []-  R/L    Hip Internal rotation  []1  []2  [x]3  []4  []5      []+ [x]- []1  [x]2  []3  []4  []5      [x]+ []- []1  []2  [x]3  []4  []5      [x]+ []-  R/L    Hip External rotation  []1  []2  [x]3  []4  []5      []+ [x]- []1  [x]2  []3  []4  []5      [x]+ []- []1  []2  [x]3  []4  []5      [x]+ []-  R/L    Knee Flexion []1  []2  [x]3  []4  []5      [x]+ []- []1  []2  [x]3  []4  []5      []+ [x]- []1  []2  []3  [x]4  []5      []+ []-  R/L    Knee Extension []1  []2  []3  [x]4  []5      []+ []- []1  []2  []3  [x]4  []5      []+ []- []1  []2  []3  [x]4  []5      [x]+ []-  R/L    Shoulder flexion []1  []2  [x]3  []4  []5      []+ [x]- []1  []2  [x]3  []4  []5      []+ [x]- []1  []2  []3  [x]4  []5      []+ [x]-  R/L    Shoulder abduction []1  []2  [x]3  []4  []5      []+ [x]- []1  []2  [x]3  []4  []5      []+ [x]- []1  []2  []3  [x]4  []5      []+ [x]-  R/L    Elbow flexion []1  []2  []3  [x]4  []5      [x]+ []- []1  []2  []3  []4  []5      []+ []- []1  []2  []3  []4  []5      []+ []-    Elbow extension []1  []2  []3  [x]4  []5      [x]+ []- []1  []2  []3  []4  []5      []+ []- []1  []2  []3  []4  []5      []+ []-        Gait Analysis: The patient ambulated with the following assistive device: none; the pt presents with the following gait abnormalities: increased AIDAN, decreased step length bilateral, and decreased stance time bilateral      BALANCE:  Stance (30 seconds) Right (spine) Left Notes GOAL   Narrow Base of Support  (NBOS) 6.21 sec                 30 sec   Foot forward Right leading     10.39  "Left leading    19.89                 30 sec R/L   Tandem stance 0 0 Needs assist to keep from falling                5 sec R/L     FUNCTIONAL TESTS  Tests  Outcome Norms GOAL   Timed Up and Go 14.55 sec <13.5 sec 12 sec   Five Time Sit to Stand 43.56 sec  From a 21" seat 60s: <11.4 sec  70s: <12.6 sec  80s: 14.8 sec 20 sec       Movement Analysis Observations noted/status at eval                 GOAL   Push to quadruped Max assist independent   Quadruped to tall kneel Max assist independent   Maintaining tall kneel Mod assist independent                           FUNCTION:     CMS Impairment/Limitation/Restriction for FOTO balance Survey    Therapist reviewed FOTO scores for Karyna on 10/18/2024.   FOTO documents entered into [employer] - see Media section.    Functional Score: 42%         TREATMENT   Total Treatment time separate from Evaluation: (23) minutes       CPT Intervention  Core, Upper extremity/lower extremity weakness & balance Duration / Intensity  10/18/2024   TA Wall slides Flexion B - to improve functional overhead reach   TA Quadruped SA presses To improve body weight tolerance   TA Modified downward facing dog At 20 inch surface                                                                               PLAN General upper extremity, lower extremity, and core strengthening; recovery from floor techniques; improving prone to quadruped transition; balance activities           CPT Codes available for Billing:   (--) minutes of Manual therapy (MT) to improve pain and ROM.  (--) minutes of Therapeutic Exercise (TE) to develop   strength, endurance, range of motion, and flexibility.  (--) minutes of Neuromuscular Re-Education (NR)  to improve: Balance, Coordination, Kinesthetic, Sense, Proprioception, and Posture.  (23) minutes of Therapeutic Activities (TA) to improve functional performance.  Unattended Electrical Stimulation (ES) for muscle performance or pain modulation.  Vasopneumatic Device Therapy () " for management of swelling/edema. (88047)  BFR: Blood flow restriction applied during exercise  NP or (-): Not Performed       Home Exercises and Patient Education Provided    Education provided:   PURPOSE: Patient educated on the impairments noted above and the effects of physical therapy intervention to improve overall condition and QOL.   EXERCISE: Patient was educated on all the above exercise prior/during/after for proper posture, positioning, and execution for safe performance with home exercise program.   STRENGTH: Patient educated on the importance of improved core and extremity strength in order to improve alignment of the spine and extremities with static positions and dynamic movement.   GAIT & BALANCE: Patient educated on the importance of strong core and lower extremity musculature in order to improve both static and dynamic balance, improve gait mechanics, reduce fall risk and improve household and community mobility.       Written Home Exercises Provided: yes.  Exercises were reviewed and Karyna was able to demonstrate them prior to the end of the session.  Karyna demonstrated good understanding of the education provided.     See EMR under Patient Instructions for exercises provided 10/18/2024.    ASSESSMENT   Karyna is a 64 y.o. female referred to outpatient Physical Therapy with a medical diagnosis of functional mobility, balance, gait and endurance. Pt presents with impairments including: impairments list: strength, balance, gait mechanics, and core strength and stability.    Pt prognosis is Good.   Pt will benefit from skilled outpatient Physical Therapy to address the deficits stated above and in the chart below, provide pt/family education, and to maximize pt's level of independence.     Plan of care discussed with patient: Yes  Pt's spiritual, cultural and educational needs considered and patient is agreeable to the plan of care and goals as stated below:     Anticipated Barriers for therapy:  co-morbidities    Medical Necessity is demonstrated by the following  History  Co-morbidities and personal factors that may impact the plan of care [] LOW: no personal factors / co-morbidities  [] MODERATE: 1-2 personal factors / co-morbidities  [] HIGH: 3+ personal factors / co-morbidities    Moderate / High Support Documentation:   Co-morbidities affecting plan of care: -    Personal Factors:   no deficits     Examination  Body Structures and Functions, activity limitations and participation restrictions that may impact the plan of care [x] LOW: addressing 1-2 elements  [] MODERATE: 3+ elements  [] HIGH: 4+ elements (please support below)    Moderate / High Support Documentation: -     Clinical Presentation [x] LOW: stable  [] MODERATE: Evolving  [] HIGH: Unstable     Decision Making/ Complexity Score: low         GOALS:      Short Term Goals:  5 weeks Status  Date Met   FUNCTION: Patient will demonstrate improved function as indicated by a functional score that is greater than or equal to 45 out of 100 on FOTO. [x] Progressing  [] Met  [] Not Met    MOBILITY/BALANCE: Patient will improve TUG, 5x sit to stand, NBOS, foot forward, tandem to 50% of stated goals, listed in objective measures above, in order to progress towards independence with functional activities.  [x] Progressing  [] Met  [] Not Met    STRENGTH: Patient will improve strength to 50% of stated goals, listed in objective measures above, in order to progress towards independence with functional activities. [x] Progressing  [] Met  [] Not Met    GAIT: Patient will demonstrate improved gait mechanics including stance time, AIDAN stride in order to improve functional mobility, improve quality of life, and decrease risk of further injury or fall.  [x] Progressing  [] Met  [] Not Met    HEP: Patient will demonstrate independence with HEP in order to progress toward functional independence. [x] Progressing  [] Met  [] Not Met    - [] Progressing  [] Met  []  Not Met      Long Term Goals:  10 weeks Status Date Met   FUNCTION: Patient will demonstrate improved function as indicated by a functional score that is greater than or equal to 50 out of 100 on FOTO. [x] Progressing  [] Met  [] Not Met    MOBILITY/BALANCE: Patient will improve TUG, 5x sit to stand, NBOS, foot forward, tandem stance to stated goals, listed in objective measures above, in order to return to maximal functional potential and improve quality of life. [x] Progressing  [] Met  [] Not Met    STRENGTH: Patient will improve strength to stated goals, listed in objective measures above, in order to improve functional independence and quality of life.  [x] Progressing  [] Met  [] Not Met    GAIT: Patient will demonstrate normalized gait mechanics with minimal compensation in order to return to PLOF. [x] Progressing  [] Met  [] Not Met    7.  Patient will return to normal ADL's, IADL's, community involvement, recreational activities, recovery from floor at maximal function.  [x] Progressing  [] Met  [] Not Met    8.  - [] Progressing  [] Met  [] Not Met        PLAN   Plan of care Certification: 10/18/2024 to 12/27/2024     Outpatient Physical Therapy 2 times weekly for 10 weeks to include any combination of the following interventions: virtual visits, dry needling, modalities, electrical stimulation (IFC, Pre-Mod, Attended with Functional Dry Needling), Cervical/Lumbar Traction, Gait Training, Manual Therapy, Neuromuscular Re-ed, Patient Education, Self Care, Therapeutic Activites, and Therapeutic Exercise     Thank you for this referral.    These services are reasonable and necessary for the conditions set forth above while under my care.    Lala Garcia, PT,[facility]

## 2024-11-01 ENCOUNTER — PATIENT OUTREACH (OUTPATIENT)
Dept: ADMINISTRATIVE | Facility: OTHER | Age: 64
End: 2024-11-01
Payer: MEDICARE

## 2024-11-11 ENCOUNTER — CLINICAL SUPPORT (OUTPATIENT)
Dept: REHABILITATION | Facility: HOSPITAL | Age: 64
End: 2024-11-11
Payer: MEDICARE

## 2024-11-11 DIAGNOSIS — Z74.09 IMPAIRED FUNCTIONAL MOBILITY, BALANCE, GAIT, AND ENDURANCE: Primary | ICD-10-CM

## 2024-11-11 PROCEDURE — 97110 THERAPEUTIC EXERCISES: CPT | Mod: HCNC,PN

## 2024-11-11 PROCEDURE — 97112 NEUROMUSCULAR REEDUCATION: CPT | Mod: HCNC,PN

## 2024-11-11 PROCEDURE — 97530 THERAPEUTIC ACTIVITIES: CPT | Mod: HCNC,PN

## 2024-11-11 NOTE — PROGRESS NOTES
[facility]   Physical Therapy Treatment Note        Name: Karyna Sanders  Clinic Number: [id number]    Therapy Diagnosis:   Encounter Diagnosis   Name Primary?    Impaired functional mobility, balance, gait, and endurance Yes     Physician: DIMA Boothe MD    Visit Date: 11/11/2024     Physician Orders: PT Eval and Treat  Medical Diagnosis from Referral: impaired functional mobility, gait, balance and endurance  Evaluation Date: 10/18/2024  Authorization Period Expiration: 12/10/2024  Plan of Care Expiration: 12/27/2024  Progress Note due: 11/18/2024  Visit # / Visits authorized: 1  FOTO: 1/3    Time In: 0900 am  Time Out: 1000 am  Total Billable Time: 60 minutes    SUBJECTIVE     Today, pt reports: no complaints.    Karyna was compliant with home exercise program.  Response to previous treatment: good  Functional change: n/a     Pain: 0/10     Location: n/a      OBJECTIVE / TREATMENT     Objective measures to be updated at Updated POC unless specified otherwise.    FUNCTION:  CMS Impairment/Limitation/Restriction for FOTO balance Survey    Therapist reviewed FOTO scores for Karyna Sanders on 10/18/2024  FOTO documents entered into [employer] - see Media section.    Limitation Score: 42%    FOTO #: 1       TREATMENT:       CPT Intervention  Core, Upper extremity/lower extremity weakness & balance (L leg weaker than R) Duration / Intensity  11/11/2024    FUNCTION    TA Wall slides X 10 simultaneously; Flexion B - to improve functional overhead reach   TA Prone prop on forearms: SA presses    TA Quadruped SA presses/rock backs To improve body weight tolerance   TA Modified downward facing dog to plank At 20 inch surface   TA Sit to stand - no hands 20 inch box          LOWER EXTREMITY/CORE STRENGTH     TE Clayanely and windshield wipers     TE DKTC stretch     TE Bridge 2x10   NR Straight leg raise   with assist to lift leg and contract quad muscle    TE Knee to chest lift X 10 B -  hip flexor strength   NR Hamstring curls  3x10 B   NR Seated Long Arc Quads  X 5 B             UPPER EXTREMITY STRENGTH                      BALANCE     NR Heel raises With minimal upper extremity support                                           PLAN General upper extremity, lower extremity, and core strengthening; recovery from floor techniques; improving prone to quadruped transition; balance activities           CPT Codes available for Billing:   (--) minutes of Manual therapy (MT) to improve pain and ROM.  (20) minutes of Therapeutic Exercise (TE) to develop   strength, endurance, range of motion, and flexibility.  (15) minutes of Neuromuscular Re-Education (NR)  to improve: Balance, Coordination, Kinesthetic, Sense, Proprioception, and Posture.  (25) minutes of Therapeutic Activities (TA) to improve functional performance.  Unattended Electrical Stimulation (ES) for muscle performance or pain modulation.  Vasopneumatic Device Therapy () for management of swelling/edema. (66675)  BFR: Blood flow restriction applied during exercise  NP or (-): Not Performed         Home Exercises Provided and Patient Education Provided     Education provided:   PURPOSE: Patient educated on the impairments noted above and the effects of physical therapy intervention to improve overall condition and QOL.   EXERCISE: Patient was educated on all the above exercise prior/during/after for proper posture, positioning, and execution for safe performance with home exercise program.   STRENGTH: Patient educated on the importance of improved core and extremity strength in order to improve alignment of the spine and extremities with static positions and dynamic movement.   GAIT & BALANCE: Patient educated on the importance of strong core and lower extremity musculature in order to improve both static and dynamic balance, improve gait mechanics, reduce fall risk and improve household and community mobility.   TRANSFERS & TRANSITIONS:  Patient educated on proper technique for bed mobility, transitions and transfers to improve body mechanics and decrease risk of injury.     Written Home Exercises Provided: yes.  Exercises were reviewed and Karyna was able to demonstrate them prior to the end of the session.  Karyna demonstrated good  understanding of the education provided.     See EMR under Patient Instructions for exercises provided 11/11/2024 and prior visits    ASSESSMENT   Karyna did well with full body strengthening along with working on functional transitions to improve ability to recover from the floor.    Karyna is progressing well towards her goals.   Pt prognosis is Excellent.     Pt will continue to benefit from skilled outpatient physical therapy to address the deficits listed in the problem list box on initial evaluation, provide pt/family education and to maximize pt's level of independence in the home and community environment.     Pt's spiritual, cultural and educational needs considered and pt agreeable to plan of care and goals.     Anticipated barriers:       GOALS:        Short Term Goals:  5 weeks Status  Date Met   FUNCTION: Patient will demonstrate improved function as indicated by a functional score that is greater than or equal to 45 out of 100 on FOTO. [x] Progressing  [] Met  [] Not Met     MOBILITY/BALANCE: Patient will improve TUG, 5x sit to stand, NBOS, foot forward, tandem to 50% of stated goals, listed in objective measures above, in order to progress towards independence with functional activities.  [x] Progressing  [] Met  [] Not Met     STRENGTH: Patient will improve strength to 50% of stated goals, listed in objective measures above, in order to progress towards independence with functional activities. [x] Progressing  [] Met  [] Not Met     GAIT: Patient will demonstrate improved gait mechanics including stance time, AIDAN stride in order to improve functional mobility, improve quality of life, and decrease risk of  further injury or fall.  [x] Progressing  [] Met  [] Not Met     HEP: Patient will demonstrate independence with HEP in order to progress toward functional independence. [x] Progressing  [] Met  [] Not Met     - [] Progressing  [] Met  [] Not Met        Long Term Goals:  10 weeks Status Date Met   FUNCTION: Patient will demonstrate improved function as indicated by a functional score that is greater than or equal to 50 out of 100 on FOTO. [x] Progressing  [] Met  [] Not Met     MOBILITY/BALANCE: Patient will improve TUG, 5x sit to stand, NBOS, foot forward, tandem stance to stated goals, listed in objective measures above, in order to return to maximal functional potential and improve quality of life. [x] Progressing  [] Met  [] Not Met     STRENGTH: Patient will improve strength to stated goals, listed in objective measures above, in order to improve functional independence and quality of life.  [x] Progressing  [] Met  [] Not Met     GAIT: Patient will demonstrate normalized gait mechanics with minimal compensation in order to return to PLOF. [x] Progressing  [] Met  [] Not Met     7.  Patient will return to normal ADL's, IADL's, community involvement, recreational activities, recovery from floor at maximal function.  [x] Progressing  [] Met  [] Not Met     8.  - [] Progressing  [] Met  [] Not Met           PLAN   Plan of care Certification: 10/18/2024 to 12/27/2024      Outpatient Physical Therapy 2 times weekly for 10 weeks to include any combination of the following interventions: virtual visits, dry needling, modalities, electrical stimulation (IFC, Pre-Mod, Attended with Functional Dry Needling), Cervical/Lumbar Traction, Gait Training, Manual Therapy, Neuromuscular Re-ed, Patient Education, Self Care, Therapeutic Activites, and Therapeutic Exercise        Lala Garcia, PT, -CLT

## 2024-11-14 DIAGNOSIS — M24.572 EQUINUS CONTRACTURE OF LEFT ANKLE: Primary | ICD-10-CM

## 2024-11-14 DIAGNOSIS — M20.12 HAV (HALLUX ABDUCTO VALGUS), LEFT: ICD-10-CM

## 2024-11-14 DIAGNOSIS — M77.42 METATARSALGIA, LEFT FOOT: ICD-10-CM

## 2024-11-14 DIAGNOSIS — Z01.818 PREOP EXAMINATION: ICD-10-CM

## 2024-11-18 ENCOUNTER — OFFICE VISIT (OUTPATIENT)
Dept: INTERNAL MEDICINE | Facility: CLINIC | Age: 64
End: 2024-11-18
Payer: MEDICARE

## 2024-11-18 ENCOUNTER — CLINICAL SUPPORT (OUTPATIENT)
Dept: REHABILITATION | Facility: HOSPITAL | Age: 64
End: 2024-11-18
Payer: MEDICARE

## 2024-11-18 ENCOUNTER — HOSPITAL ENCOUNTER (OUTPATIENT)
Dept: CARDIOLOGY | Facility: HOSPITAL | Age: 64
Discharge: HOME | End: 2024-11-18
Payer: MEDICARE

## 2024-11-18 VITALS — OXYGEN SATURATION: 99 % | HEART RATE: 80 BPM | RESPIRATION RATE: 18 BRPM | TEMPERATURE: 98 F

## 2024-11-18 DIAGNOSIS — F31.9 BIPOLAR 1 DISORDER: Chronic | ICD-10-CM

## 2024-11-18 DIAGNOSIS — E78.00 PURE HYPERCHOLESTEROLEMIA: Chronic | ICD-10-CM

## 2024-11-18 DIAGNOSIS — Z74.09 IMPAIRED FUNCTIONAL MOBILITY, BALANCE, GAIT, AND ENDURANCE: Primary | ICD-10-CM

## 2024-11-18 DIAGNOSIS — F17.210 CIGARETTE NICOTINE DEPENDENCE WITHOUT COMPLICATION: Chronic | ICD-10-CM

## 2024-11-18 DIAGNOSIS — R63.0 ANOREXIA: Chronic | ICD-10-CM

## 2024-11-18 DIAGNOSIS — M77.42 METATARSALGIA OF LEFT FOOT: Primary | ICD-10-CM

## 2024-11-18 DIAGNOSIS — F25.0 SCHIZOAFFECTIVE DISORDER, BIPOLAR TYPE: Chronic | ICD-10-CM

## 2024-11-18 DIAGNOSIS — G44.219 EPISODIC TENSION-TYPE HEADACHE, NOT INTRACTABLE: Chronic | ICD-10-CM

## 2024-11-18 DIAGNOSIS — J41.0 SIMPLE CHRONIC BRONCHITIS: ICD-10-CM

## 2024-11-18 DIAGNOSIS — Z01.818 PREOP EXAMINATION: ICD-10-CM

## 2024-11-18 DIAGNOSIS — R11.0 CHRONIC NAUSEA: Chronic | ICD-10-CM

## 2024-11-18 DIAGNOSIS — J45.909 ASTHMA, UNSPECIFIED ASTHMA SEVERITY, UNSPECIFIED WHETHER COMPLICATED, UNSPECIFIED WHETHER PERSISTENT: ICD-10-CM

## 2024-11-18 DIAGNOSIS — K21.9 GASTROESOPHAGEAL REFLUX DISEASE WITHOUT ESOPHAGITIS: ICD-10-CM

## 2024-11-18 DIAGNOSIS — G62.9 PERIPHERAL POLYNEUROPATHY: Chronic | ICD-10-CM

## 2024-11-18 DIAGNOSIS — I10 ESSENTIAL HYPERTENSION: Chronic | ICD-10-CM

## 2024-11-18 DIAGNOSIS — G24.01 TARDIVE DYSKINESIA: Chronic | ICD-10-CM

## 2024-11-18 PROBLEM — R42 DIZZINESS: Status: RESOLVED | Noted: 2024-05-01 | Resolved: 2024-11-18

## 2024-11-18 LAB
OHS QRS DURATION: 80 MS
OHS QTC CALCULATION: 428 MS

## 2024-11-18 PROCEDURE — 97530 THERAPEUTIC ACTIVITIES: CPT | Mod: HCNC,PN

## 2024-11-18 PROCEDURE — 93010 ELECTROCARDIOGRAM REPORT: CPT | Mod: HCNC,,, | Performed by: INTERNAL MEDICINE

## 2024-11-18 PROCEDURE — 97112 NEUROMUSCULAR REEDUCATION: CPT | Mod: HCNC,PN

## 2024-11-18 PROCEDURE — 99999 PR PBB SHADOW E&M-EST. PATIENT-LVL II: CPT | Mod: PBBFAC,HCNC,, | Performed by: NURSE PRACTITIONER

## 2024-11-18 PROCEDURE — 93005 ELECTROCARDIOGRAM TRACING: CPT | Mod: HCNC

## 2024-11-18 RX ORDER — NITROGLYCERIN 0.4 MG/1
0.4 TABLET SUBLINGUAL EVERY 5 MIN PRN
COMMUNITY

## 2024-11-18 RX ORDER — TRAMADOL HYDROCHLORIDE 50 MG/1
50 TABLET ORAL EVERY 6 HOURS PRN
COMMUNITY

## 2024-11-18 NOTE — PRE-PROCEDURE INSTRUCTIONS
Pre op instructions reviewed with patient face to face during Clinic Visit with Provider, verbalized understanding.    Procedure Date: 11/22/24  Arrival Time: We will call you after 2pm the day before your procedure with your arrival time.    Address:   [facility] (Off [facility], 2nd Building on the left)  [address] [place] [address] [place]. [address]  >>>Please enter through front entrance Lobby of 1st floor to Registration desk<<<        !!!INSTRUCTIONS IMPORTANT!!!  NO FOOD or tobacco products after midnight the night before surgery! You may have clear liquids up to 3 hrs before your arrival to the Hospital  Clear liquids include Gatorade, water, soda, black coffee or tea (no milk or creamer), and clear juices.  Clear liquids do NOT include anything with pulp or food particles (Chicken broth, ice cream, yogurt, Jello, etc.)      MORNING OF SURGERY, drink small sip of water with the following medications instructed by Surgery Pre-Admit Provider:  Amlodipine  Gabapentin  Lexapro  Prilosec  Trileptal   Inhaler  nebulizer      Diabetic/Prediabetic Patients: If you take diabetic or weight loss medication, Do NOT take morning of surgery unless instructed by Doctor. Metformin to be stopped 24 hrs prior to surgery. Ozempic/ Mounjaro/ Wegovy/ Trulicity/ Semaglutide or any weight loss injections to be stopped 7 days prior to surgery. DO NOT take long-acting insulin the evening before surgery. Blood sugars will be checked in pre-op by Nurse.    !!!STOP ALL Aspirins, NSAIDS, WEIGHT LOSS INJECTIONS/PILLS, Herbal supplements, & Vitamins 7 DAYS BEFORE SURGERY!!! Ok to take Tylenol if needed    ____  Avoid Alcoholic beverages 3 days prior to surgery, as it can thin the blood.  ____  NO Acrylic/fake nails or nail polish worn day of surgery (specifically hand/arm & foot surgeries).  ____  NO powder, lotions, deodorants, oils or cream on body.  ____  Remove all jewelry, piercings, & foreign objects prior to arrival and  leave at home.  ____  Remove Dentures, Hearing Aids & Contact Lens prior to surgery.  ____  Bring photo ID and insurance information to hospital (Leave Valuables at Home).  ____  If going home the same day, arrange for a ride home. You will not be able to drive for 24 hrs if Anesthesia was used.   ____  Females (ages 11-60): may need to give a urine sample the morning of surgery; please see Pre op Nurse prior to using the restroom.  ____  Males: Stop ED medications (Viagra, Cialis) 24 hrs prior to surgery.  ____  Wear clean, loose fitting clothing to allow for dressings/ bandages.      Bathing Instructions:    -Shower with anti-bacterial Soap (Hibiclens or Dial) the night before surgery & the morning of surgery!   -Do not use Hibiclens soap on your face or genitals.   -Apply clean clothes after shower.  -Do not shave your face or body 2 days prior to surgery unless instructed otherwise by your Surgeon.  -Do not shave pubic hair 7 days prior to surgery (gyn pt's).    [facility]/Ride Policy:  Only 2 adults allowed in pre op/recovery area during your procedure. You MUST HAVE A RIDE HOME from a responsible adult that you know and trust. Medical Transport, Uber or Lyft can ONLY be used if patient has a responsible adult to accompany them during ride home.       *Signs and symptoms of Infection Before or After Surgery:               !!!If you experience any fever, chills, nausea/ vomiting, foul odor/ excessive drainage from surgical site, flu-like symptoms, new wounds or cuts, PLEASE CALL THE SURGEON OFFICE at [phone] or SEND MESSAGE THROUGH [employer] PORTAL!!!       *If you are running late day of surgery, please call the Surgery Dept @ [phone].    *Billing question, please call:  [phone] or [phone]       Thank you,  -[facility] Pre [facility]mit Dept.  [phone] or [phone]  M-F 7:30 am-4:00 pm (Closed Major Holidays)    Additional Tests Scheduled Today:  EKG (4th Floor) Check in at the  !

## 2024-11-18 NOTE — DISCHARGE INSTRUCTIONS
Pre op instructions reviewed with patient face to face during Clinic Visit with Provider, verbalized understanding.    Procedure Date: 11/22/24  Arrival Time: We will call you after 2pm the day before your procedure with your arrival time.    Address:   [facility] (Off [facility], 2nd Building on the left)  [id number] [place] [address] [place]. [address]  >>>Please enter through front entrance Lobby of 1st floor to Registration desk<<<        !!!INSTRUCTIONS IMPORTANT!!!  NO FOOD or tobacco products after midnight the night before surgery! You may have clear liquids up to 3 hrs before your arrival to the Hospital  Clear liquids include Gatorade, water, soda, black coffee or tea (no milk or creamer), and clear juices.  Clear liquids do NOT include anything with pulp or food particles (Chicken broth, ice cream, yogurt, Jello, etc.)      MORNING OF SURGERY, drink small sip of water with the following medications instructed by Surgery Pre-Admit Provider:  Amlodipine  Gabapentin  Lexapro  Prilosec  Trileptal   Inhaler  nebulizer      Diabetic/Prediabetic Patients: If you take diabetic or weight loss medication, Do NOT take morning of surgery unless instructed by Doctor. Metformin to be stopped 24 hrs prior to surgery. Ozempic/ Mounjaro/ Wegovy/ Trulicity/ Semaglutide or any weight loss injections to be stopped 7 days prior to surgery. DO NOT take long-acting insulin the evening before surgery. Blood sugars will be checked in pre-op by Nurse.    !!!STOP ALL Aspirins, NSAIDS, WEIGHT LOSS INJECTIONS/PILLS, Herbal supplements, & Vitamins 7 DAYS BEFORE SURGERY!!! Ok to take Tylenol if needed    ____  Avoid Alcoholic beverages 3 days prior to surgery, as it can thin the blood.  ____  NO Acrylic/fake nails or nail polish worn day of surgery (specifically hand/arm & foot surgeries).  ____  NO powder, lotions, deodorants, oils or cream on body.  ____  Remove all jewelry, piercings, & foreign objects prior to arrival and  leave at home.  ____  Remove Dentures, Hearing Aids & Contact Lens prior to surgery.  ____  Bring photo ID and insurance information to hospital (Leave Valuables at Home).  ____  If going home the same day, arrange for a ride home. You will not be able to drive for 24 hrs if Anesthesia was used.   ____  Females (ages 11-60): may need to give a urine sample the morning of surgery; please see Pre op Nurse prior to using the restroom.  ____  Males: Stop ED medications (Viagra, Cialis) 24 hrs prior to surgery.  ____  Wear clean, loose fitting clothing to allow for dressings/ bandages.      Bathing Instructions:    -Shower with anti-bacterial Soap (Hibiclens or Dial) the night before surgery & the morning of surgery!   -Do not use Hibiclens soap on your face or genitals.   -Apply clean clothes after shower.  -Do not shave your face or body 2 days prior to surgery unless instructed otherwise by your Surgeon.  -Do not shave pubic hair 7 days prior to surgery (gyn pt's).    [facility]/Ride Policy:  Only 2 adults allowed in pre op/recovery area during your procedure. You MUST HAVE A RIDE HOME from a responsible adult that you know and trust. Medical Transport, Uber or Lyft can ONLY be used if patient has a responsible adult to accompany them during ride home.       *Signs and symptoms of Infection Before or After Surgery:               !!!If you experience any fever, chills, nausea/ vomiting, foul odor/ excessive drainage from surgical site, flu-like symptoms, new wounds or cuts, PLEASE CALL THE SURGEON OFFICE at [phone] or SEND MESSAGE THROUGH [employer] PORTAL!!!       *If you are running late day of surgery, please call the Surgery Dept @ [phone].    *Billing question, please call:  [phone] or [phone]       Thank you,  -[facility] Pre Admit Dept.  [phone] or [phone]  M-F 7:30 am-4:00 pm (Closed Major Holidays)    Additional Tests Scheduled Today:  EKG (4th Floor) Check in at the  !

## 2024-11-18 NOTE — PROGRESS NOTES
[facility]   Physical Therapy Treatment Note / PROGRESS NOTE        Name: Karyna Sanders  Clinic Number: [id number]    Therapy Diagnosis:   Encounter Diagnosis   Name Primary?    Impaired functional mobility, balance, gait, and endurance Yes       Physician: DIMA Boothe MD    Visit Date: 11/18/2024     Physician Orders: PT Eval and Treat  Medical Diagnosis from Referral: impaired functional mobility, gait, balance and endurance  Evaluation Date: 10/18/2024  Authorization Period Expiration: 12/10/2024  Plan of Care Expiration: 12/27/2024  Progress Note due: 12/18/2024  Visit # / Visits authorized: 2  FOTO: 1/3    Time In: 0900 am  Time Out: 1000 am  Total Billable Time: 60 minutes    SUBJECTIVE     Today, pt reports: no complaints.    Karyna was compliant with home exercise program.  Response to previous treatment: good  Functional change: n/a     Pain: 0/10     Location: n/a      OBJECTIVE / TREATMENT     Objective measures to be updated at Updated POC unless specified otherwise.  Last progress note: 11/18/2024    STRENGTH: upper extremity and lower extremity      MMT RIGHT    LEFT GOAL   Hip Flexion  []1  []2  [x]3  []4  []5      []+ [x]- []1  [x]2  []3  []4  []5      [x]+ []- []1  []2  []3  [x]4  []5      []+ [x]-  R/L    Hip Extension  []1  [x]2  []3  []4  []5      []+ []- []1  [x]2  []3  []4  []5      []+ []- []1  []2  [x]3  []4  []5      [x]+ []-  R/L    Hip Abduction  []1  [x]2  []3  []4  []5      [x]+ []- []1  [x]2  []3  []4  []5      []+ [x]- []1  []2  [x]3  []4  []5      [x]+ []-  R/L    Hip Internal rotation  []1  []2  [x]3  []4  []5      []+ [x]- []1  [x]2  []3  []4  []5      [x]+ []- []1  []2  [x]3  []4  []5      [x]+ []-  R/L    Hip External rotation  []1  []2  [x]3  []4  []5      []+ [x]- []1  [x]2  []3  []4  []5      [x]+ []- []1  []2  [x]3  []4  []5      [x]+ []-  R/L    Knee Flexion []1  []2  [x]3  []4  []5      [x]+ []- []1  []2  [x]3  []4  []5      []+ [x]- []1   "[]2  []3  [x]4  []5      []+ []-  R/L    Knee Extension []1  []2  []3  [x]4  []5      []+ []- []1  []2  []3  [x]4  []5      []+ []- []1  []2  []3  [x]4  []5      [x]+ []-  R/L    Shoulder flexion []1  []2  [x]3  []4  []5      []+ [x]- []1  []2  [x]3  []4  []5      []+ [x]- []1  []2  []3  [x]4  []5      []+ [x]-  R/L    Shoulder abduction []1  []2  [x]3  []4  []5      []+ [x]- []1  []2  [x]3  []4  []5      []+ [x]- []1  []2  []3  [x]4  []5      []+ [x]-  R/L    Elbow flexion []1  []2  []3  [x]4  []5      [x]+ []- []1  []2  []3  []4  []5      []+ []- []1  []2  []3  []4  []5      []+ []-    Elbow extension []1  []2  []3  [x]4  []5      [x]+ []- []1  []2  []3  []4  []5      []+ []- []1  []2  []3  []4  []5      []+ []-          Gait Analysis: The patient ambulated with the following assistive device: none; the pt presents with the following gait abnormalities: increased AIDAN, decreased step length bilateral, and decreased stance time bilateral        BALANCE:  Stance (30 seconds) Right (spine) Left Notes GOAL   Narrow Base of Support  (NBOS) 30 sec                   30 sec   Foot forward Right leading     20 sec Left leading     30 sec                  30 sec R/L   Tandem stance 0 0 Needs assist to keep from falling                5 sec R/L      FUNCTIONAL TESTS-not tested 11/18/2024  Tests  Outcome Norms GOAL   Timed Up and Go 14.55 sec <13.5 sec 12 sec   Five Time Sit to Stand 43.56 sec  From a 21" seat 60s: <11.4 sec  70s: <12.6 sec  80s: 14.8 sec 20 sec         Movement Analysis Observations noted/status at eval                 GOAL   Push to quadruped Min assist independent   Quadruped to tall kneel Mod assist independent   Maintaining tall kneel Mod assist independent                                      FUNCTION:  CMS Impairment/Limitation/Restriction for FOTO balance Survey    Therapist reviewed FOTO scores for Karyna Sanders on 10/18/2024  FOTO documents entered into [employer] - see Media section.    Limitation " Score: 42%    FOTO #: 1       TREATMENT:       CPT Intervention  Core, Upper extremity/lower extremity weakness & balance (L leg weaker than R) Duration / Intensity  11/18/2024    FUNCTION    TA Wall slides X 10 simultaneously; Flexion B - to improve functional overhead reach   TA Prone prop on forearms: SA presses *with elbows forward from shoulders   TA Quadruped SA presses/rock backs To improve body weight tolerance   TA Modified downward facing dog to plank At 20 inch surface   TA Sit to stand  20 inch box; x 10         ---- LOWER EXTREMITY/CORE STRENGTH     TE Clamshells and windshield wipers     TE DKTC stretch     NR Posterior pelvic tilt  X 10   NR Bridge with posterior pelvic tilt  2x10   NR Straight leg raise with posterior pelvic tilt  No assist for left leg; x 10 L, x 5 R (due to pain)   NR Knee to chest lift with posterior pelvic tilt  X 10 unilateral and x 10 simultaneously   NR Seated Long Arc Quads  X 5 B   TE Prone hamstring curls Alternating x 10   NR Prone TKEs X 10 B           ---- UPPER EXTREMITY STRENGTH                      BALANCE     NR Heel raises With minimal upper extremity support   NR Foot forward Bilaterally (x contact gaurd assist when right foot is forward)                                   PLAN General upper extremity, lower extremity, and core strengthening; recovery from floor techniques; improving prone to quadruped transition; balance activities           CPT Codes available for Billing:   (--) minutes of Manual therapy (MT) to improve pain and ROM.  (10) minutes of Therapeutic Exercise (TE) to develop   strength, endurance, range of motion, and flexibility.  (25) minutes of Neuromuscular Re-Education (NR)  to improve: Balance, Coordination, Kinesthetic, Sense, Proprioception, and Posture.  (25) minutes of Therapeutic Activities (TA) to improve functional performance.  Unattended Electrical Stimulation (ES) for muscle performance or pain modulation.  Vasopneumatic Device Therapy  () for management of swelling/edema. (48443)  BFR: Blood flow restriction applied during exercise  NP or (-): Not Performed         Home Exercises Provided and Patient Education Provided     Education provided:   PURPOSE: Patient educated on the impairments noted above and the effects of physical therapy intervention to improve overall condition and QOL.   EXERCISE: Patient was educated on all the above exercise prior/during/after for proper posture, positioning, and execution for safe performance with home exercise program.   STRENGTH: Patient educated on the importance of improved core and extremity strength in order to improve alignment of the spine and extremities with static positions and dynamic movement.   GAIT & BALANCE: Patient educated on the importance of strong core and lower extremity musculature in order to improve both static and dynamic balance, improve gait mechanics, reduce fall risk and improve household and community mobility.   TRANSFERS & TRANSITIONS: Patient educated on proper technique for bed mobility, transitions and transfers to improve body mechanics and decrease risk of injury.     Written Home Exercises Provided: yes.  Exercises were reviewed and Karyna was able to demonstrate them prior to the end of the session.  Karyna demonstrated good  understanding of the education provided.     See EMR under Patient Instructions for exercises provided  11/18/2024  and prior visits    ASSESSMENT   Karyna did well with full body strengthening along with working on functional transitions to improve ability to recover from the floor. She will be able to attend one more session prior to foot surgery and she is uncertain when she will be able to return to therapy after surgery. She plans to use her HEP independently to continue the exercises that she has been instructed.   She improved with left lower extremity straight leg raise and was able to lift her leg from the surface without assist.    Karyna is  progressing well towards her goals.   Pt prognosis is Excellent.     Pt will continue to benefit from skilled outpatient physical therapy to address the deficits listed in the problem list box on initial evaluation, provide pt/family education and to maximize pt's level of independence in the home and community environment.     Pt's spiritual, cultural and educational needs considered and pt agreeable to plan of care and goals.     Anticipated barriers:       GOALS:        Short Term Goals:  5 weeks Status  Date Met   FUNCTION: Patient will demonstrate improved function as indicated by a functional score that is greater than or equal to 45 out of 100 on FOTO. [x] Progressing  [] Met  [] Not Met     MOBILITY/BALANCE: Patient will improve TUG, 5x sit to stand, NBOS, foot forward, tandem to 50% of stated goals, listed in objective measures above, in order to progress towards independence with functional activities.  [x] Progressing  [] Met  [] Not Met     STRENGTH: Patient will improve strength to 50% of stated goals, listed in objective measures above, in order to progress towards independence with functional activities. [x] Progressing  [] Met  [] Not Met     GAIT: Patient will demonstrate improved gait mechanics including stance time, AIDAN stride in order to improve functional mobility, improve quality of life, and decrease risk of further injury or fall.  [x] Progressing  [] Met  [] Not Met     HEP: Patient will demonstrate independence with HEP in order to progress toward functional independence. [x] Progressing  [] Met  [] Not Met     - [] Progressing  [] Met  [] Not Met        Long Term Goals:  10 weeks Status Date Met   FUNCTION: Patient will demonstrate improved function as indicated by a functional score that is greater than or equal to 50 out of 100 on FOTO. [x] Progressing  [] Met  [] Not Met     MOBILITY/BALANCE: Patient will improve TUG, 5x sit to stand, NBOS, foot forward, tandem stance to stated goals,  listed in objective measures above, in order to return to maximal functional potential and improve quality of life. [x] Progressing  [] Met  [] Not Met     STRENGTH: Patient will improve strength to stated goals, listed in objective measures above, in order to improve functional independence and quality of life.  [x] Progressing  [] Met  [] Not Met     GAIT: Patient will demonstrate normalized gait mechanics with minimal compensation in order to return to PLOF. [x] Progressing  [] Met  [] Not Met     7.  Patient will return to normal ADL's, IADL's, community involvement, recreational activities, recovery from floor at maximal function.  [x] Progressing  [] Met  [] Not Met     8.  - [] Progressing  [] Met  [] Not Met           PLAN   Plan of care Certification: 10/18/2024 to 12/27/2024      Outpatient Physical Therapy 2 times weekly for 10 weeks to include any combination of the following interventions: virtual visits, dry needling, modalities, electrical stimulation (IFC, Pre-Mod, Attended with Functional Dry Needling), Cervical/Lumbar Traction, Gait Training, Manual Therapy, Neuromuscular Re-ed, Patient Education, Self Care, Therapeutic Activites, and Therapeutic Exercise        Lala Garcia, PT, -CLT

## 2024-11-18 NOTE — ASSESSMENT & PLAN NOTE
Stable, no wheezing on exam. Denies SOB/cough   Uses inhaler 1-2 times weekly   Use neb tx am of surgery

## 2024-11-18 NOTE — H&P (VIEW-ONLY)
Preoperative History and Physical                                                              Hospital Medicine                                                                      Chief Complaint: Preoperative evaluation     History of Present Illness:      Karyna Sanders is a 64 y.o. female who presents to the office today for a preoperative consultation at the request of Dr. Rodas who plans on performing Left MTP joint fusion on 11/22/24    Functional Status:      The patient is able to climb a flight of stairs. The patient is able to ambulate several blocks without difficulty. The patient's functional status is affected by the surgical problem. The patient's functional status is not affected by shortness of breath, chest pain, dyspnea on exertion and fatigue.    MET score greater than 4    Past Medical History:      Past Medical History:   Diagnosis Date    Anemia     Aneurysm     Anorexia 4/26/2017    Anxiety     Asthma     Atherosclerosis of aorta 1/5/2022    CT ABDOMEN PELVIS WITH CONTRAST 01/03/2021 FINDINGS: Mild atherosclerosis within the abdominal aorta which is nonaneurysmal.    Bipolar disorder     Carpal tunnel syndrome, bilateral     Cerebral aneurysm     Chronic nausea 11/15/2018    Chronic pain syndrome     Cigarette nicotine dependence     Depression     Essential hypertension 8/29/2014    GERD (gastroesophageal reflux disease)     Hyperlipidemia     Hypertension     Insomnia     Osteoporosis     Pure hypercholesterolemia 9/8/2014    Recurrent tension-type headache, not intractable 7/24/2019    Schizophrenia     Stroke     Subarachnoid hemorrhage         Past Surgical History:      Past Surgical History:   Procedure Laterality Date    BUNIONECTOMY Right     COLONOSCOPY N/A 02/07/2022    Procedure: COLONOSCOPY;  Surgeon: Kamila Lund MD;  Location: [facility] [place];  Service: Endoscopy;  Laterality: N/A;    COLONOSCOPY W/ BIOPSIES AND POLYPECTOMY       ESOPHAGOGASTRODUODENOSCOPY N/A 02/07/2022    Procedure: EGD (ESOPHAGOGASTRODUODENOSCOPY);  Surgeon: Kamila Lund MD;  Location: [facility];  Service: Endoscopy;  Laterality: N/A;    HYSTERECTOMY      PARTIAL HYSTERECTOMY      Bilateral Ovaries Remain    REPAIR OF ANEURYSM      clip and coil    TRANSCRANIAL DOPPLER STUDY - COMPLETE  08/28/2014         TRANSCRANIAL DOPPLER STUDY - COMPLETE  08/29/2014         TRANSCRANIAL DOPPLER STUDY - COMPLETE  08/30/2014         TRANSCRANIAL DOPPLER STUDY - COMPLETE  08/31/2014         TRANSCRANIAL DOPPLER STUDY - COMPLETE  09/01/2014         TRANSCRANIAL DOPPLER STUDY - COMPLETE  09/02/2014         TRANSCRANIAL DOPPLER STUDY - COMPLETE  09/03/2014         TRANSCRANIAL DOPPLER STUDY - COMPLETE  09/04/2014         TRANSCRANIAL DOPPLER STUDY - COMPLETE  09/05/2014         TRANSCRANIAL DOPPLER STUDY - COMPLETE  09/06/2014         VAGINAL DELIVERY      X 3        Social History:      Social History     Socioeconomic History    Marital status:     Number of children: 3    Years of education: 11th Grade   Tobacco Use    Smoking status: Every Day     Current packs/day: 1.00     Average packs/day: 1 pack/day for 52.2 years (52.2 ttl pk-yrs)     Types: Cigarettes, Cigars, Vaping with nicotine     Start date: 1976    Smokeless tobacco: Never    Tobacco comments:     in smoking program 5/13/19   Substance and Sexual Activity    Alcohol use: Yes     Comment: occasional    Drug use: No    Sexual activity: Never     Partners: Male   Social History Narrative    Patient describes herself as disabled.     Social Drivers of Health     Financial Resource Strain: Medium Risk (11/16/2024)    Overall Financial Resource Strain (CARDIA)     Difficulty of Paying Living Expenses: Somewhat hard   Food Insecurity: No Food Insecurity (11/16/2024)    Hunger Vital Sign     Worried About Running Out of Food in the Last Year: Never true     Ran Out of Food in the Last Year: Never true   Recent  Concern: Food Insecurity - Food Insecurity Present (10/16/2024)    Hunger Vital Sign     Worried About Running Out of Food in the Last Year: Sometimes true     Ran Out of Food in the Last Year: Never true   Transportation Needs: No Transportation Needs (10/16/2024)    PRAPARE - Transportation     Lack of Transportation (Medical): No     Lack of Transportation (Non-Medical): No   Physical Activity: Insufficiently Active (11/16/2024)    Exercise Vital Sign     Days of Exercise per Week: 2 days     Minutes of Exercise per Session: 30 min   Stress: No Stress Concern Present (11/16/2024)    [employer] - Occupational Stress Questionnaire     Feeling of Stress : Not at all   Recent Concern: Stress - Stress Concern Present (10/16/2024)    [employer] - Occupational Stress Questionnaire     Feeling of Stress : Very much   Housing Stability: Low Risk  (11/16/2024)    Housing Stability Vital Sign     Unable to Pay for Housing in the Last Year: No     Homeless in the Last Year: No        Family History:      Family History   Problem Relation Name Age of Onset    Cancer Mother      Glaucoma Mother      Stroke Mother      Diabetes type II Mother      Heart disease Mother      Hypertension Mother      Birth defects Sister      Brain cancer Sister      Diabetes type II Sister      Birth defects Sister      Bone cancer Sister      Cancer Brother      Hypertension Brother         Allergies:      Review of patient's allergies indicates:   Allergen Reactions    Aspirin      Told to avoid due to aneurysm repair     Nsaids (non-steroidal anti-inflammatory drug)      Due to aneurysm    Ibuprofen Anxiety     Told to avoid due to aneurysm repair        Medications:      Current Outpatient Medications   Medication Sig    acetaminophen (TYLENOL ORAL) Take 650 mg by mouth as needed.    albuterol (PROVENTIL) 2.5 mg /3 mL (0.083 %) nebulizer solution Take 3 mLs (2.5 mg total) by  nebulization every 6 (six) hours as needed for Wheezing. Rescue    albuterol (PROVENTIL/VENTOLIN HFA) 90 mcg/actuation inhaler Inhale 2 puffs into the lungs every 6 (six) hours as needed for Shortness of Breath or Wheezing.    alprazolam (XANAX) 0.5 MG tablet Take 0.5 mg by mouth daily as needed.     amitriptyline (ELAVIL) 50 MG tablet Take 1 tablet (50 mg total) by mouth every evening. (Patient taking differently: Take 50 mg by mouth nightly as needed.)    amLODIPine (NORVASC) 5 MG tablet Take 1 tablet (5 mg total) by mouth once daily.    atorvastatin (LIPITOR) 80 MG tablet Take 1 tablet (80 mg total) by mouth every evening.    butalbital-acetaminophen-caffeine -40 mg (FIORICET, ESGIC) -40 mg per tablet Take 1 tablet by mouth every 8 (eight) hours. DO NOT USE MORE THAN 3 DAYS PER WEEK (Patient taking differently: Take 1 tablet by mouth every 8 (eight) hours as needed. DO NOT USE MORE THAN 3 DAYS PER WEEK)    CALCIUM CARBONATE/VITAMIN D3 (CALCIUM 600 + D,3, ORAL) Take 2 tablets by mouth once daily.    COMBIVENT RESPIMAT  mcg/actuation inhaler Inhale 1 puff into the lungs every 6 (six) hours as needed.    cyproheptadine HCl (CYPROHEPTADINE ORAL) Take by mouth 3 (three) times daily before meals.    gabapentin (NEURONTIN) 100 MG capsule Take 1 capsule (100 mg total) by mouth 2 (two) times daily.    hydrOXYzine HCL (ATARAX) 25 MG tablet Take 1 tablet (25 mg total) by mouth 3 (three) times daily as needed for Itching.    LEXAPRO 10 mg tablet Take 10 mg by mouth once daily.    nitroGLYCERIN (NITROSTAT) 0.4 MG SL tablet Place 0.4 mg under the tongue every 5 (five) minutes as needed for Chest pain.    omeprazole (PRILOSEC) 40 MG capsule Take 1 capsule (40 mg total) by mouth every morning.    ondansetron (ZOFRAN-ODT) 8 MG TbDL Take 8 mg by mouth every 6 (six) hours as needed.    OXcarbazepine (TRILEPTAL) 600 MG Tab Take 150 mg by mouth 2 (two) times daily.    polyethylene glycol (GLYCOLAX) 17 gram/dose  powder Take 17 g by mouth daily as needed for Constipation.    traMADoL (ULTRAM) 50 mg tablet Take 50 mg by mouth every 6 (six) hours as needed for Pain.    BLOOD PRESSURE CUFF Misc Use to check blood pressure once daily    nebulizer accessories Kit NEBULIZER ACCESSORIES - Use as directed for nebulized medications prescribed by me.    nebulizer and compressor Heaven NEBULIZER DEVICE - Use as directed    QUEtiapine (SEROQUEL) 100 MG Tab Take 100 mg by mouth every evening.     No current facility-administered medications for this visit.       Vitals:      Vitals:    11/18/24 0802   Pulse: 80   Resp: 18   Temp: 97.9 °F (36.6 °C)       Review of Systems:        Constitutional: Negative for fever, chills, weight loss, malaise/fatigue and diaphoresis.   HENT: Negative for hearing loss, ear pain, nosebleeds, congestion, sore throat, neck pain, tinnitus and ear discharge.    Eyes: Negative for blurred vision, double vision, photophobia, pain, discharge and redness.   Respiratory: Negative for cough, hemoptysis, sputum production, shortness of breath, wheezing and stridor.    Cardiovascular: Negative for chest pain, palpitations, orthopnea, claudication, leg swelling and PND.   Gastrointestinal: Negative for heartburn, nausea, vomiting, abdominal pain, diarrhea, constipation, blood in stool and melena.   Genitourinary: Negative for dysuria, urgency, frequency, hematuria and flank pain.   Musculoskeletal: +foot ankle pain Negative for myalgias, back pain, joint pain and falls.   Skin: Negative for itching and rash.   Neurological: Negative for dizziness, tingling, tremors, sensory change, speech change, focal weakness, seizures, loss of consciousness, weakness and headaches.   Endo/Heme/Allergies: Negative for environmental allergies and polydipsia. Does not bruise/bleed easily.   Psychiatric/Behavioral: Negative for depression, suicidal ideas, hallucinations, memory loss and substance abuse. The patient is not nervous/anxious  and does not have insomnia.    All 14 systems reviewed and negative except as noted above.    Physical Exam:      Constitutional: Appears well-developed, well-nourished and in no acute distress.  Patient is oriented to person, place, and time.   Head: Normocephalic and atraumatic. Mucous membranes moist.  Neck: Neck supple no mass.   Cardiovascular: Normal rate and regular rhythm.  S1 S2 appreciated by ascultation.  Pulmonary/Chest: Effort normal and clear to auscultation bilaterally. No respiratory distress.   Abdomen: Soft. Non-tender and non-distended. Bowel sounds are normal.   Neurological: Patient is alert and oriented to person, place and time. Moves all extremities.  Skin: Warm and dry. No lesions.  Extremities: No clubbing, cyanosis or edema.    Laboratory data:      Reviewed and noted in plan where applicable. Please see chart for full laboratory data.    @USNFOEETT80(cpk,cpkmb,troponini,mb)@ @OCTJPRLMG50(poctglucose)@     Lab Results   Component Value Date    INR 1.1 05/02/2024    INR 1.0 05/01/2024    INR 1.0 04/03/2017       Lab Results   Component Value Date    WBC 8.46 11/18/2024    HGB 12.5 11/18/2024    HCT 38.0 11/18/2024    MCV 89 11/18/2024     11/18/2024       @NJHANKQTC97(GLU,NA,K,Cl,CO2,BUN,Creatinine,Calcium,MG)@    Predictors of intubation difficulty:       Morbid obesity? no   Anatomically abnormal facies? no   Prominent incisors? no   Receding mandible? no   Short, thick neck? no   Neck range of motion: normal   Dentition: Chipped teeth present (wears partial)  Based on the Modified Mallampati, patient is a mallampati score: II (hard and soft palate, upper portion of tonsils anduvula visible)    Cardiographics:      ECG: pending    Imaging:      Chest x-ray: pending     Assessment and Plan:      Metatarsalgia of left foot  The patient is scheduled for Left MTP joint fusion on 11/22/24 by Dr. Rodas    Known risk factors for perioperative complications:     Tardive  dyskinesia  Schizophrenia/Bipolar d/o  Asthma- likely COPD   Smoker  Chronic nausea    Difficulty with intubation is not anticipated.    Cardiac Risk Estimation: Based on the Revised Cardiac Risk index, patient is a Class 2 risk with a 6.0% risk of a major cardiac event in a low risk procedure.    1.) Preoperative workup as follows: ECG, hemoglobin, hematocrit, electrolytes, creatinine.  2.) Change in medication regimen before surgery: none  3.) Prophylaxis for cardiac events with perioperative beta-blockers: not indicated.  4.) Invasive hemodynamic monitoring perioperatively: not indicated.  5.) Deep vein thrombosis prophylaxis postoperatively: regimen to be chosen by surgical team.  6.) Surveillance for postoperative MI with ECG immediately postoperatively and on postoperati ve days 1 and 2 AND troponin levels 24 hours postoperatively and on day 4 or hospital discharge (whichever comes first): not indicated.  7.) Current medications which may produce withdrawal symptoms if withheld perioperatively: none  8.) Other measures: Preoperative tobacco abstention x 60 days.  Use Albuterol Neb tx am of surgery     Essential hypertension  BP stable   Cont Amlodipine     Peripheral polyneuropathy  Stable   Cont Gabapentin and Elavil     Recurrent tension-type headache, not intractable  Stable   Cont Fioricet prn     Tardive dyskinesia  Some intermittent lip and head involuntary movements   Stable     Bipolar 1 disorder  Psychiatrist: Marilin Hernández MD  Stable   Cont Lexapro,Trileptal, and Seroquel   Cont Vistaril and Xanax prn     Schizoaffective disorder, bipolar type  Compensated/controlled  Cont home meds Lexapro, Seroquel, and Trileptal     Gastroesophageal reflux disease without esophagitis  Stable   Cont Prilosec    Cigarette nicotine dependence without complication  Dangers of cigarette smoking were reviewed with patient in detail. Patient was Counseled for 3-10 minutes. Nicotine replacement options were  discussed. Nicotine replacement was discussed- not prescribed per patient's request    Anorexia  Stable   Cont Cyproheptadine     Chronic nausea  Stable   Takes Zofran prn     Pure hypercholesterolemia  Stable   Cont Statin     Asthma  Stable, no wheezing on exam. Denies SOB/cough   Uses inhaler 1-2 times weekly   Use neb tx am of surgery         Electronically signed by Elizabeth Lowery NP on 11/18/2024 at 10:00 AM.

## 2024-11-18 NOTE — ASSESSMENT & PLAN NOTE
Psychiatrist: Marilin Hernández MD  Stable   Cont Lexapro,Trileptal, and Seroquel   Cont Vistaril and Xanax prn

## 2024-11-18 NOTE — ASSESSMENT & PLAN NOTE
The patient is scheduled for Left MTP joint fusion on 11/22/24 by Dr. Rodas    Known risk factors for perioperative complications:     Tardive dyskinesia  Schizophrenia  Asthma- likely COPD   Smoker    Difficulty with intubation is not anticipated.    Cardiac Risk Estimation: Based on the Revised Cardiac Risk index, patient is a Class 2 risk with a 6.0% risk of a major cardiac event in a low risk procedure.    1.) Preoperative workup as follows: ECG, hemoglobin, hematocrit, electrolytes, creatinine.  2.) Change in medication regimen before surgery: none  3.) Prophylaxis for cardiac events with perioperative beta-blockers: not indicated.  4.) Invasive hemodynamic monitoring perioperatively: not indicated.  5.) Deep vein thrombosis prophylaxis postoperatively: regimen to be chosen by surgical team.  6.) Surveillance for postoperative MI with ECG immediately postoperatively and on postoperati ve days 1 and 2 AND troponin levels 24 hours postoperatively and on day 4 or hospital discharge (whichever comes first): not indicated.  7.) Current medications which may produce withdrawal symptoms if withheld perioperatively: none  8.) Other measures: Preoperative tobacco abstention x 60 days.  Use neb tx am of surgery

## 2024-11-18 NOTE — PROGRESS NOTES
Preoperative History and Physical                                                              Hospital Medicine                                                                      Chief Complaint: Preoperative evaluation     History of Present Illness:      Karyna Sanders is a 64 y.o. female who presents to the office today for a preoperative consultation at the request of Dr. Rodas who plans on performing Left MTP joint fusion on 11/22/24    Functional Status:      The patient is able to climb a flight of stairs. The patient is able to ambulate several blocks without difficulty. The patient's functional status is affected by the surgical problem. The patient's functional status is not affected by shortness of breath, chest pain, dyspnea on exertion and fatigue.    MET score greater than 4    Past Medical History:      Past Medical History:   Diagnosis Date    Anemia     Aneurysm     Anorexia 4/26/2017    Anxiety     Asthma     Atherosclerosis of aorta 1/5/2022    CT ABDOMEN PELVIS WITH CONTRAST 01/03/2021 FINDINGS: Mild atherosclerosis within the abdominal aorta which is nonaneurysmal.    Bipolar disorder     Carpal tunnel syndrome, bilateral     Cerebral aneurysm     Chronic nausea 11/15/2018    Chronic pain syndrome     Cigarette nicotine dependence     Depression     Essential hypertension 8/29/2014    GERD (gastroesophageal reflux disease)     Hyperlipidemia     Hypertension     Insomnia     Osteoporosis     Pure hypercholesterolemia 9/8/2014    Recurrent tension-type headache, not intractable 7/24/2019    Schizophrenia     Stroke     Subarachnoid hemorrhage         Past Surgical History:      Past Surgical History:   Procedure Laterality Date    BUNIONECTOMY Right     COLONOSCOPY N/A 02/07/2022    Procedure: COLONOSCOPY;  Surgeon: Kamila Lund MD;  Location: [facility];  Service: Endoscopy;  Laterality: N/A;    COLONOSCOPY W/ BIOPSIES AND POLYPECTOMY       ESOPHAGOGASTRODUODENOSCOPY N/A 02/07/2022    Procedure: EGD (ESOPHAGOGASTRODUODENOSCOPY);  Surgeon: Kamila Lund MD;  Location: [facility];  Service: Endoscopy;  Laterality: N/A;    HYSTERECTOMY      PARTIAL HYSTERECTOMY      Bilateral Ovaries Remain    REPAIR OF ANEURYSM      clip and coil    TRANSCRANIAL DOPPLER STUDY - COMPLETE  08/28/2014         TRANSCRANIAL DOPPLER STUDY - COMPLETE  08/29/2014         TRANSCRANIAL DOPPLER STUDY - COMPLETE  08/30/2014         TRANSCRANIAL DOPPLER STUDY - COMPLETE  08/31/2014         TRANSCRANIAL DOPPLER STUDY - COMPLETE  09/01/2014         TRANSCRANIAL DOPPLER STUDY - COMPLETE  09/02/2014         TRANSCRANIAL DOPPLER STUDY - COMPLETE  09/03/2014         TRANSCRANIAL DOPPLER STUDY - COMPLETE  09/04/2014         TRANSCRANIAL DOPPLER STUDY - COMPLETE  09/05/2014         TRANSCRANIAL DOPPLER STUDY - COMPLETE  09/06/2014         VAGINAL DELIVERY      X 3        Social History:      Social History     Socioeconomic History    Marital status:     Number of children: 3    Years of education: 11th Grade   Tobacco Use    Smoking status: Every Day     Current packs/day: 1.00     Average packs/day: 1 pack/day for 52.2 years (52.2 ttl pk-yrs)     Types: Cigarettes, Cigars, Vaping with nicotine     Start date: 1976    Smokeless tobacco: Never    Tobacco comments:     in smoking program 5/13/19   Substance and Sexual Activity    Alcohol use: Yes     Comment: occasional    Drug use: No    Sexual activity: Never     Partners: Male   Social History Narrative    Patient describes herself as disabled.     Social Drivers of Health     Financial Resource Strain: Medium Risk (11/16/2024)    Overall Financial Resource Strain (CARDIA)     Difficulty of Paying Living Expenses: Somewhat hard   Food Insecurity: No Food Insecurity (11/16/2024)    Hunger Vital Sign     Worried About Running Out of Food in the Last Year: Never true     Ran Out of Food in the Last Year: Never true   Recent  Concern: Food Insecurity - Food Insecurity Present (10/16/2024)    Hunger Vital Sign     Worried About Running Out of Food in the Last Year: Sometimes true     Ran Out of Food in the Last Year: Never true   Transportation Needs: No Transportation Needs (10/16/2024)    PRAPARE - Transportation     Lack of Transportation (Medical): No     Lack of Transportation (Non-Medical): No   Physical Activity: Insufficiently Active (11/16/2024)    Exercise Vital Sign     Days of Exercise per Week: 2 days     Minutes of Exercise per Session: 30 min   Stress: No Stress Concern Present (11/16/2024)    [employer] - Occupational Stress Questionnaire     Feeling of Stress : Not at all   Recent Concern: Stress - Stress Concern Present (10/16/2024)    [employer] [employer] [place] [employer] - Occupational Stress Questionnaire     Feeling of Stress : Very much   Housing Stability: Low Risk  (11/16/2024)    Housing Stability Vital Sign     Unable to Pay for Housing in the Last Year: No     Homeless in the Last Year: No        Family History:      Family History   Problem Relation Name Age of Onset    Cancer Mother      Glaucoma Mother      Stroke Mother      Diabetes type II Mother      Heart disease Mother      Hypertension Mother      Birth defects Sister      Brain cancer Sister      Diabetes type II Sister      Birth defects Sister      Bone cancer Sister      Cancer Brother      Hypertension Brother         Allergies:      Review of patient's allergies indicates:   Allergen Reactions    Aspirin      Told to avoid due to aneurysm repair     Nsaids (non-steroidal anti-inflammatory drug)      Due to aneurysm    Ibuprofen Anxiety     Told to avoid due to aneurysm repair        Medications:      Current Outpatient Medications   Medication Sig    acetaminophen (TYLENOL ORAL) Take 650 mg by mouth as needed.    albuterol (PROVENTIL) 2.5 mg /3 mL (0.083 %) nebulizer solution Take 3 mLs (2.5 mg total) by  nebulization every 6 (six) hours as needed for Wheezing. Rescue    albuterol (PROVENTIL/VENTOLIN HFA) 90 mcg/actuation inhaler Inhale 2 puffs into the lungs every 6 (six) hours as needed for Shortness of Breath or Wheezing.    alprazolam (XANAX) 0.5 MG tablet Take 0.5 mg by mouth daily as needed.     amitriptyline (ELAVIL) 50 MG tablet Take 1 tablet (50 mg total) by mouth every evening. (Patient taking differently: Take 50 mg by mouth nightly as needed.)    amLODIPine (NORVASC) 5 MG tablet Take 1 tablet (5 mg total) by mouth once daily.    atorvastatin (LIPITOR) 80 MG tablet Take 1 tablet (80 mg total) by mouth every evening.    butalbital-acetaminophen-caffeine -40 mg (FIORICET, ESGIC) -40 mg per tablet Take 1 tablet by mouth every 8 (eight) hours. DO NOT USE MORE THAN 3 DAYS PER WEEK (Patient taking differently: Take 1 tablet by mouth every 8 (eight) hours as needed. DO NOT USE MORE THAN 3 DAYS PER WEEK)    CALCIUM CARBONATE/VITAMIN D3 (CALCIUM 600 + D,3, ORAL) Take 2 tablets by mouth once daily.    COMBIVENT RESPIMAT  mcg/actuation inhaler Inhale 1 puff into the lungs every 6 (six) hours as needed.    cyproheptadine HCl (CYPROHEPTADINE ORAL) Take by mouth 3 (three) times daily before meals.    gabapentin (NEURONTIN) 100 MG capsule Take 1 capsule (100 mg total) by mouth 2 (two) times daily.    hydrOXYzine HCL (ATARAX) 25 MG tablet Take 1 tablet (25 mg total) by mouth 3 (three) times daily as needed for Itching.    LEXAPRO 10 mg tablet Take 10 mg by mouth once daily.    nitroGLYCERIN (NITROSTAT) 0.4 MG SL tablet Place 0.4 mg under the tongue every 5 (five) minutes as needed for Chest pain.    omeprazole (PRILOSEC) 40 MG capsule Take 1 capsule (40 mg total) by mouth every morning.    ondansetron (ZOFRAN-ODT) 8 MG TbDL Take 8 mg by mouth every 6 (six) hours as needed.    OXcarbazepine (TRILEPTAL) 600 MG Tab Take 150 mg by mouth 2 (two) times daily.    polyethylene glycol (GLYCOLAX) 17 gram/dose  powder Take 17 g by mouth daily as needed for Constipation.    traMADoL (ULTRAM) 50 mg tablet Take 50 mg by mouth every 6 (six) hours as needed for Pain.    BLOOD PRESSURE CUFF Misc Use to check blood pressure once daily    nebulizer accessories Kit NEBULIZER ACCESSORIES - Use as directed for nebulized medications prescribed by me.    nebulizer and compressor Heaven NEBULIZER DEVICE - Use as directed    QUEtiapine (SEROQUEL) 100 MG Tab Take 100 mg by mouth every evening.     No current facility-administered medications for this visit.       Vitals:      Vitals:    11/18/24 0802   Pulse: 80   Resp: 18   Temp: 97.9 °F (36.6 °C)       Review of Systems:        Constitutional: Negative for fever, chills, weight loss, malaise/fatigue and diaphoresis.   HENT: Negative for hearing loss, ear pain, nosebleeds, congestion, sore throat, neck pain, tinnitus and ear discharge.    Eyes: Negative for blurred vision, double vision, photophobia, pain, discharge and redness.   Respiratory: Negative for cough, hemoptysis, sputum production, shortness of breath, wheezing and stridor.    Cardiovascular: Negative for chest pain, palpitations, orthopnea, claudication, leg swelling and PND.   Gastrointestinal: Negative for heartburn, nausea, vomiting, abdominal pain, diarrhea, constipation, blood in stool and melena.   Genitourinary: Negative for dysuria, urgency, frequency, hematuria and flank pain.   Musculoskeletal: +foot ankle pain Negative for myalgias, back pain, joint pain and falls.   Skin: Negative for itching and rash.   Neurological: Negative for dizziness, tingling, tremors, sensory change, speech change, focal weakness, seizures, loss of consciousness, weakness and headaches.   Endo/Heme/Allergies: Negative for environmental allergies and polydipsia. Does not bruise/bleed easily.   Psychiatric/Behavioral: Negative for depression, suicidal ideas, hallucinations, memory loss and substance abuse. The patient is not nervous/anxious  and does not have insomnia.    All 14 systems reviewed and negative except as noted above.    Physical Exam:      Constitutional: Appears well-developed, well-nourished and in no acute distress.  Patient is oriented to person, place, and time.   Head: Normocephalic and atraumatic. Mucous membranes moist.  Neck: Neck supple no mass.   Cardiovascular: Normal rate and regular rhythm.  S1 S2 appreciated by ascultation.  Pulmonary/Chest: Effort normal and clear to auscultation bilaterally. No respiratory distress.   Abdomen: Soft. Non-tender and non-distended. Bowel sounds are normal.   Neurological: Patient is alert and oriented to person, place and time. Moves all extremities.  Skin: Warm and dry. No lesions.  Extremities: No clubbing, cyanosis or edema.    Laboratory data:      Reviewed and noted in plan where applicable. Please see chart for full laboratory data.    @JIBNTOQIJ13(cpk,cpkmb,troponini,mb)@ @EYZOTCHRI83(poctglucose)@     Lab Results   Component Value Date    INR 1.1 05/02/2024    INR 1.0 05/01/2024    INR 1.0 04/03/2017       Lab Results   Component Value Date    WBC 8.46 11/18/2024    HGB 12.5 11/18/2024    HCT 38.0 11/18/2024    MCV 89 11/18/2024     11/18/2024       @PYWCRKVDY40(GLU,NA,K,Cl,CO2,BUN,Creatinine,Calcium,MG)@    Predictors of intubation difficulty:       Morbid obesity? no   Anatomically abnormal facies? no   Prominent incisors? no   Receding mandible? no   Short, thick neck? no   Neck range of motion: normal   Dentition: Chipped teeth present (wears partial)  Based on the Modified Mallampati, patient is a mallampati score: II (hard and soft palate, upper portion of tonsils anduvula visible)    Cardiographics:      ECG: pending    Imaging:      Chest x-ray: pending     Assessment and Plan:      Metatarsalgia of left foot  The patient is scheduled for Left MTP joint fusion on 11/22/24 by Dr. Rodas    Known risk factors for perioperative complications:     Tardive  dyskinesia  Schizophrenia/Bipolar d/o  Asthma- likely COPD   Smoker  Chronic nausea    Difficulty with intubation is not anticipated.    Cardiac Risk Estimation: Based on the Revised Cardiac Risk index, patient is a Class 2 risk with a 6.0% risk of a major cardiac event in a low risk procedure.    1.) Preoperative workup as follows: ECG, hemoglobin, hematocrit, electrolytes, creatinine.  2.) Change in medication regimen before surgery: none  3.) Prophylaxis for cardiac events with perioperative beta-blockers: not indicated.  4.) Invasive hemodynamic monitoring perioperatively: not indicated.  5.) Deep vein thrombosis prophylaxis postoperatively: regimen to be chosen by surgical team.  6.) Surveillance for postoperative MI with ECG immediately postoperatively and on postoperati ve days 1 and 2 AND troponin levels 24 hours postoperatively and on day 4 or hospital discharge (whichever comes first): not indicated.  7.) Current medications which may produce withdrawal symptoms if withheld perioperatively: none  8.) Other measures: Preoperative tobacco abstention x 60 days.  Use Albuterol Neb tx am of surgery     Essential hypertension  BP stable   Cont Amlodipine     Peripheral polyneuropathy  Stable   Cont Gabapentin and Elavil     Recurrent tension-type headache, not intractable  Stable   Cont Fioricet prn     Tardive dyskinesia  Some intermittent lip and head involuntary movements   Stable     Bipolar 1 disorder  Psychiatrist: Marilin Hernández MD  Stable   Cont Lexapro,Trileptal, and Seroquel   Cont Vistaril and Xanax prn     Schizoaffective disorder, bipolar type  Compensated/controlled  Cont home meds Lexapro, Seroquel, and Trileptal     Gastroesophageal reflux disease without esophagitis  Stable   Cont Prilosec    Cigarette nicotine dependence without complication  Dangers of cigarette smoking were reviewed with patient in detail. Patient was Counseled for 3-10 minutes. Nicotine replacement options were  discussed. Nicotine replacement was discussed- not prescribed per patient's request    Anorexia  Stable   Cont Cyproheptadine     Chronic nausea  Stable   Takes Zofran prn     Pure hypercholesterolemia  Stable   Cont Statin     Asthma  Stable, no wheezing on exam. Denies SOB/cough   Uses inhaler 1-2 times weekly   Use neb tx am of surgery         Electronically signed by Elizabeth Lowery NP on 11/18/2024 at 10:00 AM.

## 2024-11-20 ENCOUNTER — CLINICAL SUPPORT (OUTPATIENT)
Dept: REHABILITATION | Facility: HOSPITAL | Age: 64
End: 2024-11-20
Payer: MEDICARE

## 2024-11-20 DIAGNOSIS — Z74.09 IMPAIRED FUNCTIONAL MOBILITY, BALANCE, GAIT, AND ENDURANCE: Primary | ICD-10-CM

## 2024-11-20 PROCEDURE — 97112 NEUROMUSCULAR REEDUCATION: CPT | Mod: HCNC,PN

## 2024-11-20 PROCEDURE — 97530 THERAPEUTIC ACTIVITIES: CPT | Mod: HCNC,PN

## 2024-11-20 NOTE — PROGRESS NOTES
[facility]   Physical Therapy Treatment Note        Name: Karyna Sanders  Clinic Number: [id number]    Therapy Diagnosis:   Encounter Diagnosis   Name Primary?    Impaired functional mobility, balance, gait, and endurance Yes     Physician: DIMA Boothe MD    Visit Date: 11/20/2024     Physician Orders: PT Eval and Treat  Medical Diagnosis from Referral: impaired functional mobility, gait, balance and endurance  Evaluation Date: 10/18/2024  Authorization Period Expiration: 12/10/2024  Plan of Care Expiration: 12/27/2024  Progress Note due: 12/18/2024  Visit # / Visits authorized: 3  FOTO: 1/3    Time In: 0855 am  Time Out: 1000 am  Total Billable Time: 65 minutes    SUBJECTIVE     Today, pt reports: no complaints.    Karyna was compliant with home exercise program.  Response to previous treatment: good  Functional change: n/a     Pain: 0/10     Location: n/a      OBJECTIVE / TREATMENT     Objective measures to be updated at Updated POC unless specified otherwise.  Last progress note: 11/18/2024    SFUNCTION:  CMS Impairment/Limitation/Restriction for FOTO balance Survey    Therapist reviewed FOTO scores for Karyna Sanders on 10/18/2024  FOTO documents entered into [employer] - see Media section.    Limitation Score: 42%    FOTO #: 1       TREATMENT:       CPT Intervention  Core, Upper extremity/lower extremity weakness & balance (L leg weaker than R) Duration / Intensity  11/20/2024    FUNCTION    TA Wall slides X 10 simultaneously; Flexion B - to improve functional overhead reach   TA Prone prop on forearms: SA presses  *with elbows forward from shoulders   TA     TA Quadruped SA presses/rock backs To improve body weight tolerance   TA Modified downward facing dog to plank At 20 inch surface   TA Alternating forearms to hands in modified DFD position X 7   -- Sit to stand  20 inch box; x 10   TA Standing TKEs To decrease knee hyperextension in standing              ---- LOWER  EXTREMITY/CORE STRENGTH     -- Clayanely and windanupamield wipers     -- DKTC stretch     NR Posterior pelvic tilt + march X 10   NR Bridge with posterior pelvic tilt  2x10   NR Straight leg raise with posterior pelvic tilt  No assist for left leg; x 10 L, x 5 R (due to pain)   NR Knee to chest lift with posterior pelvic tilt  X 10 unilateral and x 10 simultaneously   NR Seated Long Arc Quads  X 5 B   TE Prone hamstring curls Alternating x 10   NR Prone TKEs X 10 B           ---- UPPER EXTREMITY STRENGTH     TE Banded bench press - supine and seated Red band; x 10 shoulder level, x 10 overhead            BALANCE     NR Heel raises With minimal upper extremity support   NR Foot forward Bilaterally (x contact gaurd assist when right foot is forward)   NR Rhomberg stance Mimimal to no upper extremity support                           PLAN General upper extremity, lower extremity, and core strengthening; recovery from floor techniques; improving prone to quadruped transition; balance activities           CPT Codes available for Billing:   (--) minutes of Manual therapy (MT) to improve pain and ROM.  (10) minutes of Therapeutic Exercise (TE) to develop   strength, endurance, range of motion, and flexibility.  (30) minutes of Neuromuscular Re-Education (NR)  to improve: Balance, Coordination, Kinesthetic, Sense, Proprioception, and Posture.  (25) minutes of Therapeutic Activities (TA) to improve functional performance.  Unattended Electrical Stimulation (ES) for muscle performance or pain modulation.  Vasopneumatic Device Therapy () for management of swelling/edema. (48299)  BFR: Blood flow restriction applied during exercise  NP or (-): Not Performed         Home Exercises Provided and Patient Education Provided     Education provided:   PURPOSE: Patient educated on the impairments noted above and the effects of physical therapy intervention to improve overall condition and QOL.   EXERCISE: Patient was educated on all  the above exercise prior/during/after for proper posture, positioning, and execution for safe performance with home exercise program.   STRENGTH: Patient educated on the importance of improved core and extremity strength in order to improve alignment of the spine and extremities with static positions and dynamic movement.   GAIT & BALANCE: Patient educated on the importance of strong core and lower extremity musculature in order to improve both static and dynamic balance, improve gait mechanics, reduce fall risk and improve household and community mobility.   TRANSFERS & TRANSITIONS: Patient educated on proper technique for bed mobility, transitions and transfers to improve body mechanics and decrease risk of injury.     Written Home Exercises Provided: yes.  Exercises were reviewed and Karyna was able to demonstrate them prior to the end of the session.  Karyna demonstrated good  understanding of the education provided.     See EMR under Patient Instructions for exercises provided  11/18/2024  and prior visits    ASSESSMENT   Karyna did well with full body strengthening along with working on functional transitions to improve ability to recover from the floor. She was able to push from prone to quadruped with maximal assist.     Karyna is progressing well towards her goals.   Pt prognosis is Excellent.     Pt will continue to benefit from skilled outpatient physical therapy to address the deficits listed in the problem list box on initial evaluation, provide pt/family education and to maximize pt's level of independence in the home and community environment.     Pt's spiritual, cultural and educational needs considered and pt agreeable to plan of care and goals.     Anticipated barriers:       GOALS:        Short Term Goals:  5 weeks Status  Date Met   FUNCTION: Patient will demonstrate improved function as indicated by a functional score that is greater than or equal to 45 out of 100 on FOTO. [x] Progressing  []  Met  [] Not Met     MOBILITY/BALANCE: Patient will improve TUG, 5x sit to stand, NBOS, foot forward, tandem to 50% of stated goals, listed in objective measures above, in order to progress towards independence with functional activities.  [x] Progressing  [] Met  [] Not Met     STRENGTH: Patient will improve strength to 50% of stated goals, listed in objective measures above, in order to progress towards independence with functional activities. [x] Progressing  [] Met  [] Not Met     GAIT: Patient will demonstrate improved gait mechanics including stance time, AIDAN stride in order to improve functional mobility, improve quality of life, and decrease risk of further injury or fall.  [x] Progressing  [] Met  [] Not Met     HEP: Patient will demonstrate independence with HEP in order to progress toward functional independence. [x] Progressing  [] Met  [] Not Met     - [] Progressing  [] Met  [] Not Met        Long Term Goals:  10 weeks Status Date Met   FUNCTION: Patient will demonstrate improved function as indicated by a functional score that is greater than or equal to 50 out of 100 on FOTO. [x] Progressing  [] Met  [] Not Met     MOBILITY/BALANCE: Patient will improve TUG, 5x sit to stand, NBOS, foot forward, tandem stance to stated goals, listed in objective measures above, in order to return to maximal functional potential and improve quality of life. [x] Progressing  [] Met  [] Not Met     STRENGTH: Patient will improve strength to stated goals, listed in objective measures above, in order to improve functional independence and quality of life.  [x] Progressing  [] Met  [] Not Met     GAIT: Patient will demonstrate normalized gait mechanics with minimal compensation in order to return to PLOF. [x] Progressing  [] Met  [] Not Met     7.  Patient will return to normal ADL's, IADL's, community involvement, recreational activities, recovery from floor at maximal function.  [x] Progressing  [] Met  [] Not Met     8.   - [] Progressing  [] Met  [] Not Met           PLAN   Plan of care Certification: 10/18/2024 to 12/27/2024      Outpatient Physical Therapy 2 times weekly for 10 weeks to include any combination of the following interventions: virtual visits, dry needling, modalities, electrical stimulation (IFC, Pre-Mod, Attended with Functional Dry Needling), Cervical/Lumbar Traction, Gait Training, Manual Therapy, Neuromuscular Re-ed, Patient Education, Self Care, Therapeutic Activites, and Therapeutic Exercise        Lala Garcia, PT, -CLT

## 2024-11-21 ENCOUNTER — TELEPHONE (OUTPATIENT)
Dept: PREADMISSION TESTING | Facility: HOSPITAL | Age: 64
End: 2024-11-21
Payer: MEDICARE

## 2024-11-21 NOTE — TELEPHONE ENCOUNTER
Called and spoke with Pt about the following:     Please arrive to [facility] at 6:45 am on 11/22/2024 for your scheduled procedure.  Address: [address] [place] [address] [place]. [address] ([address] Building on left, 1st Floor Lobby)    !!!NO FOOD after midnight! You may have clear liquids up to 3 hrs before your arrival to the Hospital!!!  Clear liquids include Gatorade, water, soda, black coffee or tea (no milk or creamer), and clear juices.  Clear liquids do NOT include anything with pulp or food particles (Chicken broth, ice cream, yogurt, Jello, etc.)

## 2024-11-27 ENCOUNTER — TELEPHONE (OUTPATIENT)
Dept: INTERNAL MEDICINE | Facility: CLINIC | Age: 64
End: 2024-11-27
Payer: MEDICARE

## 2024-11-27 NOTE — TELEPHONE ENCOUNTER
----- Message from Abigail sent at 11/27/2024  9:30 AM CST -----  Type:  Needs Medical Advice    Who Called: pt  Symptoms (please be specific): na   How long has patient had these symptoms:  na  Pharmacy name and phone #:  na  Would the patient rather a call back or a response via [facility]? call  Best Call Back Number: [phone]    Additional Information: patient requesting a call regarding a lift chair asap as she has called several times without a response

## 2024-12-05 ENCOUNTER — TELEPHONE (OUTPATIENT)
Dept: PREADMISSION TESTING | Facility: HOSPITAL | Age: 64
End: 2024-12-05
Payer: MEDICARE

## 2024-12-05 NOTE — TELEPHONE ENCOUNTER
Called and spoke with patient about the following:     PLEASE NOTE NEW ARRIVAL TIME:    Please arrive to [facility] ([facility]) at 5:30AM on 12/06/2024 for your scheduled procedure.  Address: [address] [place] [address] [place] [address] (2nd Building on left, 1st Floor Lobby)    !!!NO FOOD after midnight! You may have clear liquids up to 3 hrs before your arrival to the Hospital!!!  Clear liquids include Gatorade, water, soda, black coffee or tea (no milk or creamer), and clear juices.  Clear liquids do NOT include anything with pulp or food particles (Chicken broth, ice cream, yogurt, Jello, etc.)    Thank you,  -[facility] Pre [facility]mit [facility].  Mon-Fri 7:30 am - 4 pm [phone]

## 2024-12-06 ENCOUNTER — ANESTHESIA (OUTPATIENT)
Dept: SURGERY | Facility: HOSPITAL | Age: 64
End: 2024-12-06
Payer: MEDICARE

## 2024-12-06 ENCOUNTER — ANESTHESIA EVENT (OUTPATIENT)
Dept: SURGERY | Facility: HOSPITAL | Age: 64
End: 2024-12-06
Payer: MEDICARE

## 2024-12-06 ENCOUNTER — HOSPITAL ENCOUNTER (OUTPATIENT)
Facility: HOSPITAL | Age: 64
Discharge: HOME | End: 2024-12-06
Attending: PODIATRIST | Admitting: PODIATRIST
Payer: MEDICARE

## 2024-12-06 DIAGNOSIS — M77.42 METATARSALGIA OF LEFT FOOT: ICD-10-CM

## 2024-12-06 DIAGNOSIS — M77.42 METATARSALGIA, LEFT FOOT: ICD-10-CM

## 2024-12-06 DIAGNOSIS — Z98.890 POSTOPERATIVE STATE: ICD-10-CM

## 2024-12-06 DIAGNOSIS — G62.9 PERIPHERAL POLYNEUROPATHY: Chronic | ICD-10-CM

## 2024-12-06 DIAGNOSIS — Z01.818 PREOP TESTING: Primary | ICD-10-CM

## 2024-12-06 DIAGNOSIS — M20.5X2 HALLUX LIMITUS, LEFT: ICD-10-CM

## 2024-12-06 DIAGNOSIS — M79.672 PAIN IN LEFT FOOT: Primary | ICD-10-CM

## 2024-12-06 DIAGNOSIS — M20.12 HAV (HALLUX ABDUCTO VALGUS), LEFT: ICD-10-CM

## 2024-12-06 PROCEDURE — 27687 REVISION OF CALF TENDON: CPT | Mod: 51,HCNC,LT, | Performed by: PODIATRIST

## 2024-12-06 PROCEDURE — 63600175 PHARM REV CODE 636 W HCPCS: Mod: HCNC

## 2024-12-06 PROCEDURE — 36000709 HC OR TIME LEV III EA ADD 15 MIN: Mod: HCNC | Performed by: PODIATRIST

## 2024-12-06 PROCEDURE — 63600175 PHARM REV CODE 636 W HCPCS: Mod: HCNC | Performed by: PODIATRIST

## 2024-12-06 PROCEDURE — C1713 ANCHOR/SCREW BN/BN,TIS/BN: HCPCS | Mod: HCNC | Performed by: PODIATRIST

## 2024-12-06 PROCEDURE — 71000033 HC RECOVERY, INTIAL HOUR: Mod: HCNC | Performed by: PODIATRIST

## 2024-12-06 PROCEDURE — 37000008 HC ANESTHESIA 1ST 15 MINUTES: Mod: HCNC | Performed by: PODIATRIST

## 2024-12-06 PROCEDURE — C1769 GUIDE WIRE: HCPCS | Mod: HCNC | Performed by: PODIATRIST

## 2024-12-06 PROCEDURE — 28308 INCISION OF METATARSAL: CPT | Mod: 51,HCNC,LT, | Performed by: PODIATRIST

## 2024-12-06 PROCEDURE — 71000015 HC POSTOP RECOV 1ST HR: Mod: HCNC | Performed by: PODIATRIST

## 2024-12-06 PROCEDURE — 27201423 OPTIME MED/SURG SUP & DEVICES STERILE SUPPLY: Mod: HCNC | Performed by: PODIATRIST

## 2024-12-06 PROCEDURE — 25000003 PHARM REV CODE 250: Mod: HCNC

## 2024-12-06 PROCEDURE — 71000039 HC RECOVERY, EACH ADD'L HOUR: Mod: HCNC | Performed by: PODIATRIST

## 2024-12-06 PROCEDURE — 36000708 HC OR TIME LEV III 1ST 15 MIN: Mod: HCNC | Performed by: PODIATRIST

## 2024-12-06 PROCEDURE — 37000009 HC ANESTHESIA EA ADD 15 MINS: Mod: HCNC | Performed by: PODIATRIST

## 2024-12-06 PROCEDURE — 27800903 OPTIME MED/SURG SUP & DEVICES OTHER IMPLANTS: Mod: HCNC | Performed by: PODIATRIST

## 2024-12-06 PROCEDURE — 28750 FUSION OF BIG TOE JOINT: CPT | Mod: HCNC,LT,, | Performed by: PODIATRIST

## 2024-12-06 DEVICE — SCREW R3CON LOK PLATE 2.7X10MM: Type: IMPLANTABLE DEVICE | Site: FOOT | Status: FUNCTIONAL

## 2024-12-06 DEVICE — SCREW R3CON NLOK PLT 3.5X18MM: Type: IMPLANTABLE DEVICE | Site: FOOT | Status: FUNCTIONAL

## 2024-12-06 DEVICE — SCREW R3CON LOK PLATE 3.5X12MM: Type: IMPLANTABLE DEVICE | Site: FOOT | Status: FUNCTIONAL

## 2024-12-06 DEVICE — SCREW R3CON LOK PLATE 3.5X16MM: Type: IMPLANTABLE DEVICE | Site: FOOT | Status: FUNCTIONAL

## 2024-12-06 DEVICE — SCREW R3CON LOK PLATE 2.7X12MM: Type: IMPLANTABLE DEVICE | Site: FOOT | Status: FUNCTIONAL

## 2024-12-06 RX ORDER — LIDOCAINE HYDROCHLORIDE 20 MG/ML
INJECTION INTRAVENOUS
Status: DISCONTINUED | OUTPATIENT
Start: 2024-12-06 | End: 2024-12-06

## 2024-12-06 RX ORDER — DEXAMETHASONE SODIUM PHOSPHATE 4 MG/ML
INJECTION, SOLUTION INTRA-ARTICULAR; INTRALESIONAL; INTRAMUSCULAR; INTRAVENOUS; SOFT TISSUE
Status: DISCONTINUED | OUTPATIENT
Start: 2024-12-06 | End: 2024-12-06

## 2024-12-06 RX ORDER — OXYCODONE AND ACETAMINOPHEN 5; 325 MG/1; MG/1
1 TABLET ORAL
Status: DISCONTINUED | OUTPATIENT
Start: 2024-12-06 | End: 2024-12-06 | Stop reason: HOSPADM

## 2024-12-06 RX ORDER — BUPIVACAINE HYDROCHLORIDE 5 MG/ML
INJECTION, SOLUTION EPIDURAL; INTRACAUDAL
Status: DISCONTINUED | OUTPATIENT
Start: 2024-12-06 | End: 2024-12-06 | Stop reason: HOSPADM

## 2024-12-06 RX ORDER — LIDOCAINE HYDROCHLORIDE 10 MG/ML
INJECTION, SOLUTION EPIDURAL; INFILTRATION; INTRACAUDAL; PERINEURAL
Status: DISCONTINUED | OUTPATIENT
Start: 2024-12-06 | End: 2024-12-06 | Stop reason: HOSPADM

## 2024-12-06 RX ORDER — GABAPENTIN 300 MG/1
300 CAPSULE ORAL 2 TIMES DAILY
Qty: 60 CAPSULE | Refills: 0 | Status: SHIPPED | OUTPATIENT
Start: 2024-12-06 | End: 2025-01-05

## 2024-12-06 RX ORDER — ROCURONIUM BROMIDE 10 MG/ML
INJECTION, SOLUTION INTRAVENOUS
Status: DISCONTINUED | OUTPATIENT
Start: 2024-12-06 | End: 2024-12-06

## 2024-12-06 RX ORDER — ONDANSETRON HYDROCHLORIDE 2 MG/ML
INJECTION, SOLUTION INTRAVENOUS
Status: DISCONTINUED | OUTPATIENT
Start: 2024-12-06 | End: 2024-12-06

## 2024-12-06 RX ORDER — CEFAZOLIN 2 G/1
2 INJECTION, POWDER, FOR SOLUTION INTRAMUSCULAR; INTRAVENOUS
Status: COMPLETED | OUTPATIENT
Start: 2024-12-06 | End: 2024-12-06

## 2024-12-06 RX ORDER — HYDROMORPHONE HYDROCHLORIDE 1 MG/ML
0.2 INJECTION, SOLUTION INTRAMUSCULAR; INTRAVENOUS; SUBCUTANEOUS EVERY 5 MIN PRN
Status: DISCONTINUED | OUTPATIENT
Start: 2024-12-06 | End: 2024-12-06 | Stop reason: HOSPADM

## 2024-12-06 RX ORDER — FENTANYL CITRATE 50 UG/ML
INJECTION, SOLUTION INTRAMUSCULAR; INTRAVENOUS
Status: DISCONTINUED | OUTPATIENT
Start: 2024-12-06 | End: 2024-12-06

## 2024-12-06 RX ORDER — OXYCODONE HYDROCHLORIDE 10 MG/1
10 TABLET ORAL EVERY 4 HOURS PRN
Qty: 42 TABLET | Refills: 0 | Status: SHIPPED | OUTPATIENT
Start: 2024-12-06 | End: 2024-12-13

## 2024-12-06 RX ORDER — PROMETHAZINE HYDROCHLORIDE 25 MG/1
25 TABLET ORAL EVERY 8 HOURS
Qty: 21 TABLET | Refills: 0 | Status: SHIPPED | OUTPATIENT
Start: 2024-12-06 | End: 2024-12-13

## 2024-12-06 RX ORDER — METHOCARBAMOL 750 MG/1
1500 TABLET, FILM COATED ORAL 3 TIMES DAILY
Qty: 84 TABLET | Refills: 0 | Status: SHIPPED | OUTPATIENT
Start: 2024-12-06 | End: 2024-12-20

## 2024-12-06 RX ORDER — ONDANSETRON HYDROCHLORIDE 2 MG/ML
4 INJECTION, SOLUTION INTRAVENOUS DAILY PRN
Status: DISCONTINUED | OUTPATIENT
Start: 2024-12-06 | End: 2024-12-06 | Stop reason: HOSPADM

## 2024-12-06 RX ORDER — CEFADROXIL 500 MG/1
500 CAPSULE ORAL EVERY 12 HOURS
Qty: 10 CAPSULE | Refills: 0 | Status: SHIPPED | OUTPATIENT
Start: 2024-12-06 | End: 2024-12-11

## 2024-12-06 RX ORDER — DEXMEDETOMIDINE HYDROCHLORIDE 100 UG/ML
INJECTION, SOLUTION INTRAVENOUS
Status: DISCONTINUED | OUTPATIENT
Start: 2024-12-06 | End: 2024-12-06

## 2024-12-06 RX ORDER — MIDAZOLAM HYDROCHLORIDE 1 MG/ML
INJECTION INTRAMUSCULAR; INTRAVENOUS
Status: DISCONTINUED | OUTPATIENT
Start: 2024-12-06 | End: 2024-12-06

## 2024-12-06 RX ORDER — PROPOFOL 10 MG/ML
VIAL (ML) INTRAVENOUS
Status: DISCONTINUED | OUTPATIENT
Start: 2024-12-06 | End: 2024-12-06

## 2024-12-06 RX ORDER — ERGOCALCIFEROL 1.25 MG/1
50000 CAPSULE ORAL
Qty: 4 CAPSULE | Refills: 0 | Status: SHIPPED | OUTPATIENT
Start: 2024-12-06 | End: 2025-01-03

## 2024-12-06 RX ORDER — GLUCAGON 1 MG
1 KIT INJECTION
Status: DISCONTINUED | OUTPATIENT
Start: 2024-12-06 | End: 2024-12-06 | Stop reason: HOSPADM

## 2024-12-06 RX ADMIN — MIDAZOLAM HYDROCHLORIDE 2 MG: 1 INJECTION, SOLUTION INTRAMUSCULAR; INTRAVENOUS at 07:12

## 2024-12-06 RX ADMIN — DEXMEDETOMIDINE 6 MCG: 200 INJECTION, SOLUTION INTRAVENOUS at 08:12

## 2024-12-06 RX ADMIN — DEXMEDETOMIDINE 4 MCG: 200 INJECTION, SOLUTION INTRAVENOUS at 08:12

## 2024-12-06 RX ADMIN — SODIUM CHLORIDE, SODIUM LACTATE, POTASSIUM CHLORIDE, AND CALCIUM CHLORIDE: .6; .31; .03; .02 INJECTION, SOLUTION INTRAVENOUS at 07:12

## 2024-12-06 RX ADMIN — PROPOFOL 50 MG: 10 INJECTION, EMULSION INTRAVENOUS at 07:12

## 2024-12-06 RX ADMIN — ROCURONIUM BROMIDE 50 MG: 10 SOLUTION INTRAVENOUS at 07:12

## 2024-12-06 RX ADMIN — DEXAMETHASONE SODIUM PHOSPHATE 4 MG: 4 INJECTION, SOLUTION INTRA-ARTICULAR; INTRALESIONAL; INTRAMUSCULAR; INTRAVENOUS; SOFT TISSUE at 07:12

## 2024-12-06 RX ADMIN — CEFAZOLIN 2 G: 2 INJECTION, POWDER, FOR SOLUTION INTRAMUSCULAR; INTRAVENOUS at 07:12

## 2024-12-06 RX ADMIN — FENTANYL CITRATE 50 MCG: 50 INJECTION, SOLUTION INTRAMUSCULAR; INTRAVENOUS at 07:12

## 2024-12-06 RX ADMIN — LIDOCAINE HYDROCHLORIDE 100 MG: 20 INJECTION INTRAVENOUS at 07:12

## 2024-12-06 RX ADMIN — PROPOFOL 40 MG: 10 INJECTION, EMULSION INTRAVENOUS at 08:12

## 2024-12-06 RX ADMIN — DEXMEDETOMIDINE 6 MCG: 200 INJECTION, SOLUTION INTRAVENOUS at 07:12

## 2024-12-06 RX ADMIN — PROPOFOL 130 MG: 10 INJECTION, EMULSION INTRAVENOUS at 07:12

## 2024-12-06 RX ADMIN — SUGAMMADEX 200 MG: 100 INJECTION, SOLUTION INTRAVENOUS at 08:12

## 2024-12-06 RX ADMIN — ONDANSETRON 4 MG: 2 INJECTION INTRAMUSCULAR; INTRAVENOUS at 08:12

## 2024-12-06 NOTE — DISCHARGE SUMMARY
[facility])  Discharge Note  Short Stay    Procedure(s) (LRB):  FUSION, MTP JOINT (Left)  OSTEOTOMY, METATARSAL BONE (Left)  RESECTION, MUSCLE, GASTROCNEMIUS (Left)      OUTCOME: Patient tolerated treatment/procedure well without complication and is now ready for discharge.    DISPOSITION: Home or Self Care    FINAL DIAGNOSIS:      * Equinus contracture of left ankle [M24.572]     * Metatarsalgia, left foot [M77.42]     * Hav (hallux abducto valgus), left [M20.12]    FOLLOWUP: In clinic    DISCHARGE INSTRUCTIONS:    Weightbearing Status and Wound Care:  1. When walking, wear the surgical shoe or walking boot at all times.    2. During daytime/awake hours, if a CAST or POSTERIOR SPLINT is in place, ice the posterior aspect of the leg, behind the knee, 20 minutes on (w/ ice) and followed by 20 minutes off (w/o ice) until follow up; repeat accordingly until follow up. IF no CAST or POSTERIOR SPLINT is in place, ice the anterior aspect of the ankle, in a similar manner. During night time/sleep hours, simply elevating the limb above the level of the heart is sufficient.     3. Elevate the extremity above the level of the heart at all times when sitting.    4. Take the pain mediations as prescribed for the initial 3 or so days, then only as needed.    5. If no overt medical contra-indication, take Motrin or Advil or Ibuprofen or Aleve in between the pain medication doses.    6. Do not change the dressings.    7. Do not get the dressings wet.     8. Please be reminded of the harmful effects of nicotine/tobacco/cigarette/cigar/marijuana smoking, especially in relation to the lower extremity, particularly in reference to post operative healing. I do rec. complete or near complete cessation of any or all of the aforementioned until you are well out of the post-operative period.    9. If you were prescribed more than one short acting opioid, e.g., (Morphine or Dilaudid), with Percocet or Norco or Tramadol, the  Morphine (MSIR) or Dilaudid (Hydromorphone) is to be taken during the immediate initial days s/p, at an interval of every 6 hours or 5 hours or 4 hours, as needed for pain control. Once you are complete with the Morphine (MSIR) or Dilaudid (Hydromorphone), you may/should convert to the secondary medication. DO NOT take both medications together at any time. It is not advisable to start with the less potent medication, Percocet or Norco or Tramadol, and then convert to the stronger medication.     10. Stool Softeners to avoid constipation should start today/tomorrow AM.    11. Laxatives or Enemas as necessary.     12. Start Aspirin 81mg by mouth, once or twice daily for blood clot prevention.            Discharge Procedure Orders   CBC Auto Differential   Standing Status: Future Number of Occurrences: 1 Standing Exp. Date: 01/17/26     Comprehensive Metabolic Panel   Standing Status: Future Number of Occurrences: 1 Standing Exp. Date: 02/16/26        TIME SPENT ON DISCHARGE: 10 minutes

## 2024-12-06 NOTE — BRIEF OP NOTE
[facility])  Brief Operative Note    Surgery Date: 12/6/2024     Surgeons and Role:     * Chadwick Rodas, DPM - Primary    Assisting Surgeon: None    Pre-op Diagnosis:  Equinus contracture of left ankle [M24.572]  Metatarsalgia, left foot [M77.42]  Hav (hallux abducto valgus), left [M20.12]    Post-op Diagnosis:  Post-Op Diagnosis Codes:     * Equinus contracture of left ankle [M24.572]     * Metatarsalgia, left foot [M77.42]     * Hav (hallux abducto valgus), left [M20.12]    Procedure(s) (LRB):  FUSION, MTP JOINT (Left)  OSTEOTOMY, METATARSAL BONE (Left)  RESECTION, MUSCLE, GASTROCNEMIUS (Left)    Anesthesia: General    Operative Findings: As per Dx.     Estimated Blood Loss: * No values recorded between 12/6/2024  7:31 AM and 12/6/2024  8:56 AM *         Specimens:   Specimen (24h ago, onward)      None              Discharge Note    OUTCOME: Patient tolerated treatment/procedure well without complication and is now ready for discharge.    DISPOSITION: Home or Self Care    FINAL DIAGNOSIS:      * Equinus contracture of left ankle [M24.572]     * Metatarsalgia, left foot [M77.42]     * Hav (hallux abducto valgus), left [M20.12]    FOLLOWUP: In clinic    DISCHARGE INSTRUCTIONS:    Weightbearing Status and Wound Care:  1. When walking, wear the surgical shoe or walking boot at all times.    2. During daytime/awake hours, if a CAST or POSTERIOR SPLINT is in place, ice the posterior aspect of the leg, behind the knee, 20 minutes on (w/ ice) and followed by 20 minutes off (w/o ice) until follow up; repeat accordingly until follow up. IF no CAST or POSTERIOR SPLINT is in place, ice the anterior aspect of the ankle, in a similar manner. During night time/sleep hours, simply elevating the limb above the level of the heart is sufficient.     3. Elevate the extremity above the level of the heart at all times when sitting.    4. Take the pain mediations as prescribed for the initial 3 or so days, then only  as needed.    5. If no overt medical contra-indication, take Motrin or Advil or Ibuprofen or Aleve in between the pain medication doses.    6. Do not change the dressings.    7. Do not get the dressings wet.     8. Please be reminded of the harmful effects of nicotine/tobacco/cigarette/cigar/marijuana smoking, especially in relation to the lower extremity, particularly in reference to post operative healing. I do rec. complete or near complete cessation of any or all of the aforementioned until you are well out of the post-operative period.    9. If you were prescribed more than one short acting opioid, e.g., (Morphine or Dilaudid), with Percocet or Norco or Tramadol, the Morphine (MSIR) or Dilaudid (Hydromorphone) is to be taken during the immediate initial days s/p, at an interval of every 6 hours or 5 hours or 4 hours, as needed for pain control. Once you are complete with the Morphine (MSIR) or Dilaudid (Hydromorphone), you may/should convert to the secondary medication. DO NOT take both medications together at any time. It is not advisable to start with the less potent medication, Percocet or Norco or Tramadol, and then convert to the stronger medication.     10. Stool Softeners to avoid constipation should start today/tomorrow AM.    11. Laxatives or Enemas as necessary.     12. Start Aspirin 81mg by mouth, once or twice daily for blood clot prevention.            Discharge Procedure Orders   CBC Auto Differential   Standing Status: Future Number of Occurrences: 1 Standing Exp. Date: 01/17/26     Comprehensive Metabolic Panel   Standing Status: Future Number of Occurrences: 1 Standing Exp. Date: 02/16/26

## 2024-12-06 NOTE — ANESTHESIA PREPROCEDURE EVALUATION
12/06/2024  Karyna Sanders is a 64 y.o., female    Patient Active Problem List   Diagnosis    Schizoaffective disorder, bipolar type    Bipolar 1 disorder    Anxiety    Cigarette nicotine dependence without complication    Subarachnoid hemorrhage from aneurysm of right anterior communicating artery    Essential hypertension    Pure hypercholesterolemia    Prediabetes    Anorexia    Long-term current use of high risk medication other than anticoagulant    Gastroesophageal reflux disease without esophagitis    Nonadherence to medical treatment    History of hysterectomy for benign disease    Insufficient social insurance or welfare support    Primary localized osteoarthrosis of multiple sites    Peripheral polyneuropathy    Tension headache    Bilateral carpal tunnel syndrome    Chronic seasonal allergic rhinitis    Cubital tunnel syndrome, right    Lumbosacral radiculopathy at S1    Chronic nausea    History of subarachnoid hemorrhage    Recurrent tension-type headache, not intractable    Medication overuse headache    Chronic use of tramadol for therapeutic purpose    Tardive dyskinesia    Early satiety    Atherosclerosis of aorta    Tubular adenoma of transverse colon (Next colonoscopy due 12/2026)    Chronic bronchitis    Obesity, Class I, BMI 30-34.9    Osteopenia of multiple sites    Pruritus    Chronic constipation    Numbness and tingling in left arm    Impaired functional mobility, balance, gait, and endurance    Cervical radiculopathy    Plantar wart of left foot    Metatarsalgia of left foot    Asthma     Past Medical History:   Diagnosis Date    Anemia     Aneurysm     Anorexia 4/26/2017    Anxiety     Asthma     Atherosclerosis of aorta 1/5/2022    CT ABDOMEN PELVIS WITH CONTRAST 01/03/2021 FINDINGS: Mild atherosclerosis within the abdominal aorta which is nonaneurysmal.    Bipolar disorder      Carpal tunnel syndrome, bilateral     Cerebral aneurysm     Chronic nausea 11/15/2018    Chronic pain syndrome     Cigarette nicotine dependence     Depression     Essential hypertension 8/29/2014    GERD (gastroesophageal reflux disease)     Hyperlipidemia     Hypertension     Insomnia     Osteoporosis     Pure hypercholesterolemia 9/8/2014    Recurrent tension-type headache, not intractable 7/24/2019    Schizophrenia     Stroke     Subarachnoid hemorrhage      Past Surgical History:   Procedure Laterality Date    BUNIONECTOMY Right     COLONOSCOPY N/A 02/07/2022    Procedure: COLONOSCOPY;  Surgeon: Kamila Lund MD;  Location: [facility] ENDO;  Service: Endoscopy;  Laterality: N/A;    COLONOSCOPY W/ BIOPSIES AND POLYPECTOMY      ESOPHAGOGASTRODUODENOSCOPY N/A 02/07/2022    Procedure: EGD (ESOPHAGOGASTRODUODENOSCOPY);  Surgeon: Kamila Lund MD;  Location: [facility] ENDO;  Service: Endoscopy;  Laterality: N/A;    HYSTERECTOMY      PARTIAL HYSTERECTOMY      Bilateral Ovaries Remain    REPAIR OF ANEURYSM      clip and coil    TRANSCRANIAL DOPPLER STUDY - COMPLETE  08/28/2014         TRANSCRANIAL DOPPLER STUDY - COMPLETE  08/29/2014         TRANSCRANIAL DOPPLER STUDY - COMPLETE  08/30/2014         TRANSCRANIAL DOPPLER STUDY - COMPLETE  08/31/2014         TRANSCRANIAL DOPPLER STUDY - COMPLETE  09/01/2014         TRANSCRANIAL DOPPLER STUDY - COMPLETE  09/02/2014         TRANSCRANIAL DOPPLER STUDY - COMPLETE  09/03/2014         TRANSCRANIAL DOPPLER STUDY - COMPLETE  09/04/2014         TRANSCRANIAL DOPPLER STUDY - COMPLETE  09/05/2014         TRANSCRANIAL DOPPLER STUDY - COMPLETE  09/06/2014         VAGINAL DELIVERY      X 3     TTE  (5/1/24):    Summary         Left Ventricle: The left ventricle is normal in size. Normal wall thickness. There is normal systolic function with a visually estimated ejection fraction of 55 - 70%. Ejection fraction by visual approximation is 60%. There is normal diastolic function.    Right  Ventricle: Normal right ventricular cavity size. Wall thickness is normal. Systolic function is normal.    IVC/SVC: Normal venous pressure at 3 mmHg.    Chemistry        Component Value Date/Time     11/18/2024 0847    K 3.9 11/18/2024 0847     11/18/2024 0847    CO2 27 11/18/2024 0847    BUN 11 11/18/2024 0847    CREATININE 0.8 11/18/2024 0847    GLU 92 11/18/2024 0847        Component Value Date/Time    CALCIUM 9.2 11/18/2024 0847    ALKPHOS 82 11/18/2024 0847    AST 13 11/18/2024 0847    ALT 13 11/18/2024 0847    BILITOT 0.2 11/18/2024 0847    ESTGFRAFRICA >60 05/19/2022 1215    EGFRNONAA >60 05/19/2022 1215        Lab Results   Component Value Date    WBC 8.46 11/18/2024    HGB 12.5 11/18/2024    HCT 38.0 11/18/2024    MCV 89 11/18/2024     11/18/2024         Pre-op Assessment    I have reviewed the Patient Summary Reports.    I have reviewed the NPO Status.   I have reviewed the Medications.     Review of Systems  Anesthesia Hx:  No problems with previous Anesthesia   History of prior surgery of interest to airway management or planning:  Previous anesthesia: General, MAC          Denies Personal Hx of Anesthesia complications.                    Social:  Smoker Some day smoker       Hematology/Oncology:  Hematology Normal   Oncology Normal                                   Cardiovascular:     Hypertension              ECG has been reviewed. Normal sinus rhythm   Normal ECG   When compared with ECG of 01-May-2024 08:45,   No significant change was found   Confirmed by Adithya Smith ([id number]) on 11/18/2024 12:22:58 PM                              Pulmonary:    Asthma                    Renal/:  Renal/ Normal                 Hepatic/GI:     GERD                Musculoskeletal:  Arthritis               Neurological:   CVA Neuromuscular Disease,  Headaches     Hx of aneurism    Hx of cervical radiculopathy                             Endocrine:  Endocrine Normal            Psych:  Psychiatric  History   BiPolar-1    schizophrenia               Physical Exam  General: Well nourished, Cooperative, Alert and Oriented    Airway:  Mallampati: IV   Mouth Opening: Small, but > 3cm  TM Distance: Normal  Tongue: Normal  Neck ROM: Normal ROM    Dental:  Dentures  Dentures removed; denies any loose teeth       Anesthesia Plan  Type of Anesthesia, risks & benefits discussed:    Anesthesia Type: Gen ETT, Gen Supraglottic Airway  Intra-op Monitoring Plan: Standard ASA Monitors  Post Op Pain Control Plan: multimodal analgesia and IV/PO Opioids PRN  Induction:  IV  Informed Consent: Informed consent signed with the Patient and all parties understand the risks and agree with anesthesia plan.  All questions answered.   ASA Score: 3  Day of Surgery Review of History & Physical: H&P Update referred to the surgeon/provider.    Ready For Surgery From Anesthesia Perspective.     .

## 2024-12-06 NOTE — ANESTHESIA PROCEDURE NOTES
Intubation    Date/Time: 12/6/2024 7:14 AM    Performed by: Cary Lyle CRNA  Authorized by: Tim Salcedo MD    Intubation:     Induction:  Intravenous    Intubated:  Postinduction    Mask Ventilation:  Easy mask    Attempts:  1    Attempted By:  CRNA    Method of Intubation:  Direct    Blade:  Duffy 2    Laryngeal View Grade: Grade I - full view of cords      Difficult Airway Encountered?: No      Complications:  None    Airway Device:  Oral endotracheal tube    Airway Device Size:  7.0    Style/Cuff Inflation:  Cuffed    Tube secured:  22    Secured at:  The lips    Placement Verified By:  Capnometry    Complicating Factors:  None    Findings Post-Intubation:  BS equal bilateral and atraumatic/condition of teeth unchanged

## 2024-12-06 NOTE — DISCHARGE INSTRUCTIONS
Weightbearing Status and Wound Care:  1. When walking, wear the surgical shoe or walking boot at all times.    2. During daytime/awake hours, if a CAST or POSTERIOR SPLINT is in place, ice the posterior aspect of the leg, behind the knee, 20 minutes on (w/ ice) and followed by 20 minutes off (w/o ice) until follow up; repeat accordingly until follow up. IF no CAST or POSTERIOR SPLINT is in place, ice the anterior aspect of the ankle, in a similar manner. During night time/sleep hours, simply elevating the limb above the level of the heart is sufficient.     3. Elevate the extremity above the level of the heart at all times when sitting.    4. Take the pain mediations as prescribed for the initial 3 or so days, then only as needed.    5. If no overt medical contra-indication, take Motrin or Advil or Ibuprofen or Aleve in between the pain medication doses.    6. Do not change the dressings.    7. Do not get the dressings wet.     8. Please be reminded of the harmful effects of nicotine/tobacco/cigarette/cigar/marijuana smoking, especially in relation to the lower extremity, particularly in reference to post operative healing. I do rec. complete or near complete cessation of any or all of the aforementioned until you are well out of the post-operative period.    9. If you were prescribed more than one short acting opioid, e.g., (Morphine or Dilaudid), with Percocet or Norco or Tramadol, the Morphine (MSIR) or Dilaudid (Hydromorphone) is to be taken during the immediate initial days s/p, at an interval of every 6 hours or 5 hours or 4 hours, as needed for pain control. Once you are complete with the Morphine (MSIR) or Dilaudid (Hydromorphone), you may/should convert to the secondary medication. DO NOT take both medications together at any time. It is not advisable to start with the less potent medication, Percocet or Norco or Tramadol, and then convert to the stronger medication.     10. Stool Softeners to avoid  constipation should start today/tomorrow AM.    11. Laxatives or Enemas as necessary.     12. Start Aspirin 81mg by mouth, once or twice daily for blood clot prevention.

## 2024-12-06 NOTE — OP NOTE
[facility]  Podiatric Medicine & Surgery  Operative Report    SUMMARY     Date of Procedure: 12/6/2024    Procedure: Procedure(s):  FUSION, MTP JOINT  OSTEOTOMY, METATARSAL BONE  RESECTION, MUSCLE, GASTROCNEMIUS    Surgeon(s) and Role: Surgeons and Role:     * Chadwick Rodas DPM - Primary    Pre-Operative Diagnosis: Pre-Op Diagnosis Codes:      * Equinus contracture of left ankle [M24.572]     * Metatarsalgia, left foot [M77.42]     * Hav (hallux abducto valgus), left [M20.12]    Post-Operative Diagnosis: Post-Op Diagnosis Codes:     * Equinus contracture of left ankle [M24.572]     * Metatarsalgia, left foot [M77.42]     * Hav (hallux abducto valgus), left [M20.12]    Anesthesia: General    Technical Procedures Used:   1. 1st MTPJ fusion, LLE.   2. Metatarsal osteotomy, LLE, 4th.   3. Gastroc Recession, LLE.    Description of the Findings of the Procedure: The patient was seen in the Holding Room. The risks, benefits, complications, treatment options, and expected outcomes were discussed with the patient. The risks and potential complications of their problem and purposed treatment include but are not limited to infection, nerve injury, vascular injury, nonunion/malunion/delayed union of the surgical site, persistent pain, potential skin necrosis, deep vein thrombosis, possible pulmonary embolus, complications of the anesthetics and failure of the implant.  The patient concurred with the proposed plan, giving informed consent. The patient is aware that the procedure may be a part of a staged collection of procedures for definitive cure and/or alleviation of symptoms. The site of surgery properly noted/marked. Preoperative intravenous antibiotics are hanging at bedside, and are currently being administered via the heparin lock. The patient was taken to Operating Suite.    Once in the operative suite, the patient is transferred onto the operative table in the supine position. A TIME-OUT  is taken as per protocol to identify the proper patient, procedure to be performed, and laterality.  The patient is properly positioned on the operating room table for ease of dissection and for any ancillary imaging.  A well-padded tourniquet was applied to the left thigh.  Nursing and ancillary OR staff prepared the patient for the procedure. The patient is adequately sedated by the Attending Anesthesiologist and/or covering CRNA.  Next, the operative limb was rendered sterile using chlorhexidine paint and scrub.  Sterile sheets and drapes were applied thereafter. Another TIME-OUT is taken as per protocol to identify the proper patient, procedure to be performed, and laterality.      The tourniquet is elevated to 340mmHg after the limb is exsanguinated for approximately 2 min with an Esmarch bandage.  Following this, sharp skin incision at the dorsal aspect of the 1st metatarsophalangeal joint with a #15 blade. Deep dissection with large curved Metzenbaum scissor.  The extensor hallucis longus tendon is encountered and was retracted laterally.  The capsule of the 1st metatarsophalangeal joint is entered with a scalpel.  The capsular tissues are resected from the metatarsal head and base of proximal phalanx in standard fashion with a scalpel.  Lateral lease performed with a large curved Metzenbaum scissor.  Lateral capsulotomy was performed.  The sesamoids were freed with a large McGlamry elevator.    The medial and lateral collateral ligaments of the 1st metatarsophalangeal joint are severed with a #15 blade. The dorsal osteophytes at the phalanx and metatarsal head are resected with a large bone rongeur.  The medial eminence is resected with a sagittal saw.  Following this, the metatarsal head was reamed in standard fashion, after 0.062 K-wire insertion at the midpoint of the head, with the final Reamer size being 17mm.  The 0.062 K-wire is removed.  A 0.062 K-wire is  inserted into the base of the proximal  phalanx.  The base of proximal phalanx is reamed as well in standard fashion, with the final Reamer size being 17mm.  The 0.062 K-wire is removed from the base of proximal phalanx.     Following this, the apposition is checked.  Fine tuning of the cup and cone articulation with a bone rongeur.  The joint is temporally fixated in proper alignment with a 0.062 K-wire from the medial diaphysis of the proximal phalanx to the proximal lateral distal metaphysis of the metatarsal head. Bone graft products are used to augment the fusion site.  Following this, application of a dorsal plate in standard fashion with the assistance of fluoroscopy, being mindful to keep the proper postsurgical anatomic alignment.  The arthrodesis site is fixated using a P28 1st MTPJ plate with an independent compression screw. The temporary fixator 0.062 K-wire is removed.    Following this, sharp skin incision at the dorsal aspect of the 4th digit/ray/MTPJ with a scalpel blade. Deep dissection with a large tenotomy scissor. The extensor tendon is removed from the dorsal surface of the head of the proximal phalanx.  The extensor hurd/sling apparatus is also severed from its medial and lateral attachments to the base of proximal phalanx, and is retracted proximally.  Copious irrigation with sterile saline solution.      Metatarsal osteotomy is performed. The medial and lateral collateral ligaments of the  metatarsophalangeal joint are also severed with a scalpel blade. Following standard technique, a parallel osteotomy of the metatarsal is made with a sagittal saw.  The osteotomy is translated approximately 3 mm proximally.  The osteotomy was fixated using P28 10mm scrrew.  The remaining overhanging bone of the metatarsal was removed with a bone rongeur. Fluoroscopy confirmed anatomic alignment.  Copious irrigation with sterile saline solution.    Following this, a 2cm incision is made approximately one thumbs breath posterior to the medial crest  of the tibia, at approximately 2in proximal to the terminal portion of the medial head of the gastrocnemius muscle.  Subcuticular dissection using a sharp curved Metzenbaum scissor.  Finger dissection at this level removing further soft tissues and copious adipose tissue.  Following this, retraction with Army-Rifton retractors for deep exposure.  The deep fascia of the leg is encountered and is incised in line with the skin incision.  Finger dissection to find the gastrocnemius fascia.  The fascia is identify and a large, wide, vaginal speculum is inserted into this interval, from medial to lateral.  A Bellevue elevator is used to inspect for and identify the sural nerve, if possible.  The neurovascular structures were retracted out of the way with the assistance of the vaginal speculum.  A long handle sharp #15 blade was used to incise the gastroc aponeurosis/fascia from lateral to medial, with simultaneous dorsiflexion of the foot the metatarsophalangeal joint. After adequate resection of this interval, the dorsiflexion of the foot is improved as compared to preoperatively.  Copious irrigation of this wound using sterile saline solution.  Closure of the deep fascia of the leg using Vicryl suture. Fat and subcutaneous closure using Vicryl suture. Skin closure using Nylon.    Copious irrigation with sterile saline solution.  Electrocautery as necessitated.  Deep closure using 0 Vicryl suture. Skin closure using 3-0 Nylon.     Cuff is deflated and CFT is WNL. Postop block is given. All incisions are dressed with Xeroform/Adaptic nonadherent dressings followed by sterile 4 x 4 gauze, abdominal pad, Lorena/Kerlix and light ACE. Girard Compression is applied.    The anesthesia is weaned. There were no complications to this procedure. Any final necessary imaging to be performed in the PACU if it was not performed here in the OR suite.  The patient is transferred to the [facility] bed[facility]stretcher, [facility]. The patient is  transferred to the PACU.    Significant Surgical Tasks Conducted by the Assistant(s), if Applicable: N/A    Complications: * No complications entered in OR log *    Estimated Blood Loss (EBL): Minimal    Drains: N/A    Implants:   Implant Name Type Inv. Item Serial No.  Lot No. LRB No. Used Action   NonoBone SBX Putty 1.0ml   N/A  [id number] Left 1 Implanted   2.7 x 10 monster bite screw      Left 1 Implanted   SCREW R3CON JUANITA PLATE 2.7X12MM - [id number]  SCREW R3CON JUANITA PLATE 2.7X12MM  [employer]  Left 1 Implanted   SCREW R3CON JUANITA PLATE 2.7X10MM - [id number]  SCREW R3CON JUANITA PLATE 2.7X10MM  [employer]  Left 1 Implanted   Left large 0 degree MTP plate      Left 1 Implanted   3.0 x 36 fully threaded screw      Left 1 Implanted   SCREW R3CON JUANITA PLATE 3.5X16MM - [id number]  SCREW R3CON JUANITA PLATE 3.5X16MM  [employer]  Left 2 Implanted   SCREW R3CON NLOK PLT 3.5X18MM - [id number]  SCREW R3CON NLOK PLT 3.5X18MM  [employer]  Left 1 Implanted   SCREW R3CON JUANITA PLATE 3.5X12MM - [id number]  SCREW R3CON JUANITA PLATE 3.5X12MM  [employer]  Left 1 Implanted   2.4 drill bit      Left 1 Implanted and Explanted   2.1 drill bit      Left 1 Implanted and Explanted   KWIRE SMTH TRCR TIP 1.2P378PH - [id number]  KWIRE SMTH TRCR TIP [id number]  [employer]  Left 1 Implanted and Explanted   2.0 drill bit      Left 1 Implanted and Explanted   19 female reamer      Left 1 Implanted and Explanted   17 male reamer      Left 1 Implanted and Explanted   KWIRE SMTH TRCR TIP 1.2G352DL - [id number]  KWIRE SMTH TRCR TIP [id number]  [employer]  Left 1 Implanted and Explanted   17 female reamer      Left 1 Implanted and Explanted       Specimens: * No specimens in log *    Condition: stable    Disposition: PACU - hemodynamically stable.    Attestation: I performed the procedure.

## 2024-12-09 ENCOUNTER — TELEPHONE (OUTPATIENT)
Dept: PODIATRY | Facility: CLINIC | Age: 64
End: 2024-12-09
Payer: MEDICARE

## 2024-12-09 VITALS
OXYGEN SATURATION: 98 % | TEMPERATURE: 98 F | HEIGHT: 62 IN | BODY MASS INDEX: 29.25 KG/M2 | SYSTOLIC BLOOD PRESSURE: 114 MMHG | RESPIRATION RATE: 18 BRPM | DIASTOLIC BLOOD PRESSURE: 65 MMHG | WEIGHT: 158.94 LBS | HEART RATE: 89 BPM

## 2024-12-09 NOTE — TELEPHONE ENCOUNTER
Spoke with pt and told her that her referral was sent to [facility] and they have it pending waiting on a start day. Pt acknowledge         ----- Message from Aly sent at 12/9/2024  8:52 AM CST -----  Name of Who is Calling:SAGEIMER [[id number]]        What is the request in detail:Pt would like a callback from the office in regards to discussing someone coming out to clean and change wound care. Pt stated she has not heard from anyone in regards to the matter.Please advise thank you       Can the clinic reply by [facility]:NO        What Number to Call Back if not in [facility]:Telephone Information:  Mobile          [phone]

## 2024-12-16 NOTE — ANESTHESIA POSTPROCEDURE EVALUATION
Anesthesia Post Evaluation    Patient: Karyna Sanders    Procedure(s) Performed: Procedure(s) (LRB):  FUSION, MTP JOINT (Left)  OSTEOTOMY, METATARSAL BONE (Left)  RESECTION, MUSCLE, GASTROCNEMIUS (Left)    Final Anesthesia Type: general      Patient location during evaluation: PACU  Patient participation: Yes- Able to Participate  Level of consciousness: awake and alert and oriented  Post-procedure vital signs: reviewed and stable  Pain management: adequate  Airway patency: patent  LUCIE mitigation strategies: Verification of full reversal of neuromuscular block  PONV status at discharge: No PONV  Anesthetic complications: no      Cardiovascular status: blood pressure returned to baseline and hemodynamically stable  Respiratory status: unassisted  Hydration status: euvolemic  Follow-up not needed.              Vitals Value Taken Time   /65 12/06/24 1000   Temp 36.7 °C (98 °F) 12/06/24 1000   Pulse 89 12/06/24 1000   Resp 18 12/06/24 1000   SpO2 98 % 12/06/24 1000         Event Time   Out of Recovery 09:59:34         Pain/Asa Score: No data recorded

## 2024-12-18 ENCOUNTER — OFFICE VISIT (OUTPATIENT)
Dept: PODIATRY | Facility: CLINIC | Age: 64
End: 2024-12-18
Payer: MEDICARE

## 2024-12-18 VITALS — WEIGHT: 158.94 LBS | HEIGHT: 62 IN | BODY MASS INDEX: 29.25 KG/M2

## 2024-12-18 DIAGNOSIS — M79.672 PAIN IN LEFT FOOT: ICD-10-CM

## 2024-12-18 DIAGNOSIS — Z09 POSTOP CHECK: Primary | ICD-10-CM

## 2024-12-18 DIAGNOSIS — M24.572 CONTRACTURE, LEFT ANKLE: ICD-10-CM

## 2024-12-18 DIAGNOSIS — M77.42 METATARSALGIA, LEFT FOOT: ICD-10-CM

## 2024-12-18 DIAGNOSIS — M20.12 HAV (HALLUX ABDUCTO VALGUS), LEFT: ICD-10-CM

## 2024-12-18 PROCEDURE — 1159F MED LIST DOCD IN RCRD: CPT | Mod: HCNC,CPTII,S$GLB, | Performed by: PODIATRIST

## 2024-12-18 PROCEDURE — 99999 PR PBB SHADOW E&M-EST. PATIENT-LVL IV: CPT | Mod: PBBFAC,HCNC,, | Performed by: PODIATRIST

## 2024-12-18 PROCEDURE — 3044F HG A1C LEVEL LT 7.0%: CPT | Mod: HCNC,CPTII,S$GLB, | Performed by: PODIATRIST

## 2024-12-18 PROCEDURE — 99024 POSTOP FOLLOW-UP VISIT: CPT | Mod: HCNC,S$GLB,, | Performed by: PODIATRIST

## 2024-12-18 PROCEDURE — 1160F RVW MEDS BY RX/DR IN RCRD: CPT | Mod: HCNC,CPTII,S$GLB, | Performed by: PODIATRIST

## 2024-12-18 PROCEDURE — 1101F PT FALLS ASSESS-DOCD LE1/YR: CPT | Mod: HCNC,CPTII,S$GLB, | Performed by: PODIATRIST

## 2024-12-18 PROCEDURE — 3288F FALL RISK ASSESSMENT DOCD: CPT | Mod: HCNC,CPTII,S$GLB, | Performed by: PODIATRIST

## 2024-12-18 PROCEDURE — 1125F AMNT PAIN NOTED PAIN PRSNT: CPT | Mod: HCNC,CPTII,S$GLB, | Performed by: PODIATRIST

## 2024-12-18 RX ORDER — OXYCODONE AND ACETAMINOPHEN 10; 325 MG/1; MG/1
1 TABLET ORAL EVERY 6 HOURS PRN
Qty: 28 TABLET | Refills: 0 | Status: SHIPPED | OUTPATIENT
Start: 2024-12-18 | End: 2024-12-25

## 2024-12-18 RX ORDER — MUPIROCIN 20 MG/G
OINTMENT TOPICAL 2 TIMES DAILY
Qty: 22 G | Refills: 1 | Status: SHIPPED | OUTPATIENT
Start: 2024-12-18 | End: 2025-01-17

## 2024-12-18 NOTE — PROGRESS NOTES
Subjective:       Patient ID: Karyna Sanders is a 64 y.o. female.    Chief Complaint: Post-op Evaluation (Post op, nondiabetic, pt is wearing cam boot on left foot and tennis on right, rate pain 7/10)      HPI:  Karyna Sanders presents to the office today, s/p 12/6/2024 LLE 1st MTPJ fusion, gatroc recession with metatarsal osteotomy, LLE, 4th. Does have [employer] [facility]. States RICE therapy. States smoking less. Does WB with CAM Walker. States Vit. D. No DVT Ppx. (due to prior PMHx.).    Hemoglobin A1C   Date Value Ref Range Status   05/01/2024 6.3 (H) 4.0 - 5.6 % Final     Comment:     ADA Screening Guidelines:  5.7-6.4%  Consistent with prediabetes  >or=6.5%  Consistent with diabetes    High levels of fetal hemoglobin interfere with the HbA1C  assay. Heterozygous hemoglobin variants (HbS, HgC, etc)do  not significantly interfere with this assay.   However, presence of multiple variants may affect accuracy.     08/29/2023 6.1 (H) 4.0 - 5.6 % Final     Comment:     ADA Screening Guidelines:  5.7-6.4%  Consistent with prediabetes  >or=6.5%  Consistent with diabetes    High levels of fetal hemoglobin interfere with the HbA1C  assay. Heterozygous hemoglobin variants (HbS, HgC, etc)do  not significantly interfere with this assay.   However, presence of multiple variants may affect accuracy.     09/28/2022 5.6 4.0 - 5.6 % Final     Comment:     ADA Screening Guidelines:  5.7-6.4%  Consistent with prediabetes  >or=6.5%  Consistent with diabetes    High levels of fetal hemoglobin interfere with the HbA1C  assay. Heterozygous hemoglobin variants (HbS, HgC, etc)do  not significantly interfere with this assay.   However, presence of multiple variants may affect accuracy.         Review of patient's allergies indicates:   Allergen Reactions    Aspirin      Told to avoid due to aneurysm repair     Nsaids (non-steroidal anti-inflammatory drug)      Due to aneurysm    Ibuprofen Anxiety     Told to avoid due to aneurysm  repair        Past Medical History:   Diagnosis Date    Anemia     Aneurysm     Anorexia 4/26/2017    Anxiety     Asthma     Atherosclerosis of aorta 1/5/2022    CT ABDOMEN PELVIS WITH CONTRAST 01/03/2021 FINDINGS: Mild atherosclerosis within the abdominal aorta which is nonaneurysmal.    Bipolar disorder     Carpal tunnel syndrome, bilateral     Cerebral aneurysm     Chronic nausea 11/15/2018    Chronic pain syndrome     Cigarette nicotine dependence     Depression     Essential hypertension 8/29/2014    GERD (gastroesophageal reflux disease)     Hyperlipidemia     Hypertension     Insomnia     Osteoporosis     Pure hypercholesterolemia 9/8/2014    Recurrent tension-type headache, not intractable 7/24/2019    Schizophrenia     Stroke     Subarachnoid hemorrhage        Family History   Problem Relation Name Age of Onset    Cancer Mother      Glaucoma Mother      Stroke Mother      Diabetes type II Mother      Heart disease Mother      Hypertension Mother      Birth defects Sister      Brain cancer Sister      Diabetes type II Sister      Birth defects Sister      Bone cancer Sister      Cancer Brother      Hypertension Brother         Social History     Socioeconomic History    Marital status:     Number of children: 3    Years of education: 11th Grade   Tobacco Use    Smoking status: Every Day     Current packs/day: 1.00     Average packs/day: 1 pack/day for 52.3 years (52.3 ttl pk-yrs)     Types: Cigarettes, Cigars, Vaping with nicotine     Start date: 1976    Smokeless tobacco: Never    Tobacco comments:     in smoking program 5/13/19   Substance and Sexual Activity    Alcohol use: Yes     Comment: occasional    Drug use: No    Sexual activity: Never     Partners: Male   Social History Narrative    Patient describes herself as disabled.     Social Drivers of Health     Financial Resource Strain: Medium Risk (11/16/2024)    Overall Financial Resource Strain (CARDIA)     Difficulty of Paying Living  Expenses: Somewhat hard   Food Insecurity: No Food Insecurity (11/16/2024)    Hunger Vital Sign     Worried About Running Out of Food in the Last Year: Never true     Ran Out of Food in the Last Year: Never true   Recent Concern: Food Insecurity - Food Insecurity Present (10/16/2024)    Hunger Vital Sign     Worried About Running Out of Food in the Last Year: Sometimes true     Ran Out of Food in the Last Year: Never true   Transportation Needs: No Transportation Needs (10/16/2024)    PRAPARE - Transportation     Lack of Transportation (Medical): No     Lack of Transportation (Non-Medical): No   Physical Activity: Insufficiently Active (11/16/2024)    Exercise Vital Sign     Days of Exercise per Week: 2 days     Minutes of Exercise per Session: 30 min   Stress: No Stress Concern Present (11/16/2024)    [facility] [place] [employer] - Occupational Stress Questionnaire     Feeling of Stress : Not at all   Recent Concern: Stress - Stress Concern Present (10/16/2024)    [employer] [place] [employer] - Occupational Stress Questionnaire     Feeling of Stress : Very much   Housing Stability: Low Risk  (11/16/2024)    Housing Stability Vital Sign     Unable to Pay for Housing in the Last Year: No     Homeless in the Last Year: No       Past Surgical History:   Procedure Laterality Date    BUNIONECTOMY Right     COLONOSCOPY N/A 02/07/2022    Procedure: COLONOSCOPY;  Surgeon: Kamila Ludn MD;  Location: [facility];  Service: Endoscopy;  Laterality: N/A;    COLONOSCOPY W/ BIOPSIES AND POLYPECTOMY      ESOPHAGOGASTRODUODENOSCOPY N/A 02/07/2022    Procedure: EGD (ESOPHAGOGASTRODUODENOSCOPY);  Surgeon: Kamila Lund MD;  Location: [facility];  Service: Endoscopy;  Laterality: N/A;    FUSION OF METATARSOPHALANGEAL JOINT Left 12/6/2024    Procedure: FUSION, MTP JOINT;  Surgeon: Chadwick Rodas DPM;  Location: [facility];  Service: Podiatry;  Laterality: Left;    HYSTERECTOMY      OSTEOTOMY OF  "METATARSAL BONE Left 12/6/2024    Procedure: OSTEOTOMY, METATARSAL BONE;  Surgeon: Chadwick Rodas DPM;  Location: [facility] OR;  Service: Podiatry;  Laterality: Left;    PARTIAL HYSTERECTOMY      Bilateral Ovaries Remain    REPAIR OF ANEURYSM      clip and coil    RESECTION OF GASTROCNEMIUS MUSCLE Left 12/6/2024    Procedure: RESECTION, MUSCLE, GASTROCNEMIUS;  Surgeon: Chadwick Rodas DPM;  Location: [facility] OR;  Service: Podiatry;  Laterality: Left;    TRANSCRANIAL DOPPLER STUDY - COMPLETE  08/28/2014         TRANSCRANIAL DOPPLER STUDY - COMPLETE  08/29/2014         TRANSCRANIAL DOPPLER STUDY - COMPLETE  08/30/2014         TRANSCRANIAL DOPPLER STUDY - COMPLETE  08/31/2014         TRANSCRANIAL DOPPLER STUDY - COMPLETE  09/01/2014         TRANSCRANIAL DOPPLER STUDY - COMPLETE  09/02/2014         TRANSCRANIAL DOPPLER STUDY - COMPLETE  09/03/2014         TRANSCRANIAL DOPPLER STUDY - COMPLETE  09/04/2014         TRANSCRANIAL DOPPLER STUDY - COMPLETE  09/05/2014         TRANSCRANIAL DOPPLER STUDY - COMPLETE  09/06/2014         VAGINAL DELIVERY      X 3       Review of Systems   Constitutional:  Negative for chills, fatigue and fever.   HENT:  Negative for hearing loss.    Eyes:  Negative for photophobia and visual disturbance.   Respiratory:  Negative for cough, chest tightness, shortness of breath and wheezing.    Cardiovascular:  Negative for chest pain and palpitations.   Gastrointestinal:  Negative for constipation, diarrhea, nausea and vomiting.   Endocrine: Negative for cold intolerance and heat intolerance.   Genitourinary:  Negative for flank pain.   Musculoskeletal:  Positive for gait problem. Negative for neck pain and neck stiffness.   Neurological:  Negative for light-headedness and headaches.   Psychiatric/Behavioral:  Negative for sleep disturbance.           Objective:   Ht 5' 2" (1.575 m)   Wt 72.1 kg (158 lb 15.2 oz)   BMI 29.07 kg/m²     X-Ray Foot 2 View Left  Narrative: EXAMINATION:  XR FOOT 2 VIEW " LEFT    CLINICAL HISTORY:  s/p;    TECHNIQUE:  Two views of the left foot.    COMPARISON:  Intraoperative image December 6, 2024.    FINDINGS:  Postsurgical changes of 1st MTP joint effusion are redemonstrated without evidence of complication.  Bandage material overlies the medial left foot.  Surgical anchors present in the 4th distal metatarsal head.  Impression: Postsurgical left foot without evidence of complication.    Electronically signed by: Holger Kay  Date:    12/06/2024  Time:    09:26  X-Ray Foot Complete Left  Narrative: EXAMINATION:  XR FOOT COMPLETE 3 VIEW LEFT    CLINICAL HISTORY:  intraop;    COMPARISON:  None  Impression: FINDINGS/  Intraoperative fluoroscopic images were obtained.    Total fluoro time was 0.5 mGy, 11 sec and 2 fluoroscopic images were obtained.  See operative report.    Electronically signed by: Amaury Centeno MD  Date:    12/06/2024  Time:    08:55  SURG FL Surgery Fluoro Usage  See OP Notes for results.     IMPRESSION: See OP Notes for results.     This procedure was auto-finalized by: Virtual Radiologist       Physical Exam  LOWER EXTREMITY PHYSICAL EXAMINATION  VASCULAR: No ipsilateral calf pain or tenderness is noted with palpation and compression. No palpable cords noted.    DERMATOLOGY: Skin is supple, dry and intact. No evidence of wound healing complications are noted.     ORTHOPEDIC: Rectus 1sta and 4th ray are noted. Minimal edema is noted.     Assessment:     1. Postop check    2. Hav (hallux abducto valgus), left    3. Pain in left foot    4. Metatarsalgia, left foot    5. Contracture, left ankle          Plan:     Postop check  -     SUBSEQUENT HOME HEALTH ORDERS  -     oxyCODONE-acetaminophen (PERCOCET)  mg per tablet; Take 1 tablet by mouth every 6 (six) hours as needed for Pain.  Dispense: 28 tablet; Refill: 0  -     X-Ray Foot Complete Left; Future; Expected date: 12/18/2024    Hav (hallux abducto valgus), left  -     mupirocin (BACTROBAN) 2 % ointment;  Apply topically 2 (two) times daily.  Dispense: 22 g; Refill: 1  -     X-Ray Foot Complete Left; Future; Expected date: 12/18/2024    Pain in left foot  -     mupirocin (BACTROBAN) 2 % ointment; Apply topically 2 (two) times daily.  Dispense: 22 g; Refill: 1  -     oxyCODONE-acetaminophen (PERCOCET)  mg per tablet; Take 1 tablet by mouth every 6 (six) hours as needed for Pain.  Dispense: 28 tablet; Refill: 0    Metatarsalgia, left foot  -     mupirocin (BACTROBAN) 2 % ointment; Apply topically 2 (two) times daily.  Dispense: 22 g; Refill: 1    Contracture, left ankle      Thorough discussion is had with the patient today, concerning the diagnosis, its etiology, and the treatment algorithm at present.     Continue CAM Walker.    Vit. D.    Continue Motility Count.    RICE therapy.    Take note, the harmful effects of nicotine/tobacco/cigarette/ marijuana smoking, especially in relation to the lower extremity. Strongly consider consultation with primary care provider or Smoking [facility]essation [facility] for further discussion of smoking cessation methods. Smoking & Tobacco use cessation couseling is recommended.            Future Appointments   Date Time Provider Department Center   1/2/2025  7:35 AM LABORATORY, [facility]   1/9/2025 10:00 AM Humera Viramontes NP [facility]   1/14/2025  9:15 AM [facility]- ON XRAY [place]   1/14/2025  9:45 AM Chadwick Rodas DPM [facility]   1/28/2025  1:00 PM Judson Sawant MD [facility]UR[facility]

## 2024-12-23 DIAGNOSIS — R11.2 POSTOPERATIVE NAUSEA AND VOMITING: Primary | ICD-10-CM

## 2024-12-23 DIAGNOSIS — Z98.890 POSTOPERATIVE NAUSEA AND VOMITING: Primary | ICD-10-CM

## 2024-12-23 RX ORDER — ONDANSETRON 8 MG/1
8 TABLET, ORALLY DISINTEGRATING ORAL EVERY 6 HOURS PRN
Qty: 21 TABLET | Refills: 0 | Status: SHIPPED | OUTPATIENT
Start: 2024-12-23 | End: 2024-12-30

## 2024-12-30 DIAGNOSIS — J41.0 SIMPLE CHRONIC BRONCHITIS: Chronic | ICD-10-CM

## 2024-12-30 RX ORDER — ALBUTEROL SULFATE 90 UG/1
INHALANT RESPIRATORY (INHALATION)
Qty: 54 G | Refills: 3 | Status: SHIPPED | OUTPATIENT
Start: 2024-12-30

## 2024-12-30 NOTE — TELEPHONE ENCOUNTER
No care due was identified.  [facility] Embedded Care Due Messages. Reference number: [id number].   12/30/2024 12:41:08 AM CST

## 2024-12-31 NOTE — TELEPHONE ENCOUNTER
Refill Decision Note   Karyna Sanders  is requesting a refill authorization.  Brief Assessment and Rationale for Refill:  Approve     Medication Therapy Plan:         Pharmacist review requested: Yes   Extended chart review required: Yes   Comments:     Note composed:6:36 PM 12/30/2024

## 2024-12-31 NOTE — TELEPHONE ENCOUNTER
Refill Routing Note   Medication(s) are not appropriate for processing by [facility] for the following reason(s):        Drug-drug interaction    [facility] action(s):  Defer        Medication Therapy Plan: Drug drug: albuterol and CombiVENT RESPIMAT    Pharmacist review requested: Yes     Appointments  past 12m or future 3m with PCP    Date Provider   Last Visit   10/15/2024 DIMA Boothe MD   Next Visit   Visit date not found DIMA Boothe MD   ED visits in past 90 days: 0        Note composed:6:33 PM 12/30/2024

## 2025-01-02 ENCOUNTER — LAB VISIT (OUTPATIENT)
Dept: LAB | Facility: HOSPITAL | Age: 65
End: 2025-01-02
Attending: FAMILY MEDICINE
Payer: MEDICARE

## 2025-01-02 DIAGNOSIS — I10 ESSENTIAL HYPERTENSION: Chronic | ICD-10-CM

## 2025-01-02 DIAGNOSIS — E78.00 PURE HYPERCHOLESTEROLEMIA: Chronic | ICD-10-CM

## 2025-01-02 DIAGNOSIS — R73.03 PREDIABETES: Chronic | ICD-10-CM

## 2025-01-02 LAB
ALBUMIN SERPL BCP-MCNC: 4 G/DL (ref 3.5–5.2)
ALP SERPL-CCNC: 92 U/L (ref 40–150)
ALT SERPL W/O P-5'-P-CCNC: 12 U/L (ref 10–44)
ANION GAP SERPL CALC-SCNC: 12 MMOL/L (ref 8–16)
AST SERPL-CCNC: 15 U/L (ref 10–40)
BILIRUB SERPL-MCNC: 0.4 MG/DL (ref 0.1–1)
BUN SERPL-MCNC: 7 MG/DL (ref 8–23)
CALCIUM SERPL-MCNC: 10.1 MG/DL (ref 8.7–10.5)
CHLORIDE SERPL-SCNC: 105 MMOL/L (ref 95–110)
CHOLEST SERPL-MCNC: 164 MG/DL (ref 120–199)
CHOLEST/HDLC SERPL: 3 {RATIO} (ref 2–5)
CO2 SERPL-SCNC: 27 MMOL/L (ref 23–29)
CREAT SERPL-MCNC: 0.8 MG/DL (ref 0.5–1.4)
EST. GFR  (NO RACE VARIABLE): >60 ML/MIN/1.73 M^2
ESTIMATED AVG GLUCOSE: 126 MG/DL (ref 68–131)
GLUCOSE SERPL-MCNC: 96 MG/DL (ref 70–110)
HBA1C MFR BLD: 6 % (ref 4–5.6)
HDLC SERPL-MCNC: 55 MG/DL (ref 40–75)
HDLC SERPL: 33.5 % (ref 20–50)
LDLC SERPL CALC-MCNC: 94.4 MG/DL (ref 63–159)
NONHDLC SERPL-MCNC: 109 MG/DL
POTASSIUM SERPL-SCNC: 4.1 MMOL/L (ref 3.5–5.1)
PROT SERPL-MCNC: 7.9 G/DL (ref 6–8.4)
SODIUM SERPL-SCNC: 144 MMOL/L (ref 136–145)
TRIGL SERPL-MCNC: 73 MG/DL (ref 30–150)

## 2025-01-02 PROCEDURE — 80061 LIPID PANEL: CPT | Mod: HCNC | Performed by: FAMILY MEDICINE

## 2025-01-02 PROCEDURE — 80053 COMPREHEN METABOLIC PANEL: CPT | Mod: HCNC | Performed by: FAMILY MEDICINE

## 2025-01-02 PROCEDURE — 83036 HEMOGLOBIN GLYCOSYLATED A1C: CPT | Mod: HCNC | Performed by: FAMILY MEDICINE

## 2025-01-06 ENCOUNTER — TELEPHONE (OUTPATIENT)
Dept: INTERNAL MEDICINE | Facility: CLINIC | Age: 65
End: 2025-01-06
Payer: MEDICARE

## 2025-01-06 NOTE — TELEPHONE ENCOUNTER
----- Message from Guera sent at 1/6/2025  2:04 PM CST -----  Contact: Ethan( [facility])  Mrs. Long needs a call back at [phone] in regards to check a medication request and her next appointments and referrals and explain her insurance plan & other information she stated she needs to speak about.    Thanks

## 2025-01-06 NOTE — TELEPHONE ENCOUNTER
Pt experiencing more neuropathy pain since surgery, nurse from [facility] wants to see about getting gabapentin increased. Pt had 2 falls within a week apart no injuries, one today patient denied pain, requesting physical therapy approval, [facility] says they will send their PT out if approved. Since patient has been part of home health she has not had a blood thinner or any anticoagulant therapy. Nurse wants to know if she was ever on one. Nurse is concerned about that. If she was ever on one can she get one ordered. Pt is scheduled to come in for a follow up with you to assess mobility and functioning on Thursday 1/9/2025.   This was a shared visit with the LILIYA. I reviewed and verified the documentation and independently performed the documented:

## 2025-01-07 NOTE — TELEPHONE ENCOUNTER
----- Message from De sent at 1/6/2025  3:28 PM CST -----  Contact: Vidya (home health nurse )  ..Type:  Patient Requesting Call    Who Called:Vidya (home health nurse )  Does the patient know what this is regarding?:f/u questions   Would the patient rather a call back or a response via [facility]? call  Best Call Back Number:[phone]  Additional Information:

## 2025-01-07 NOTE — TELEPHONE ENCOUNTER
Spoke with [facility] nurse and filled her in on why patient has an upcoming appt and she had questions about PT and medication.

## 2025-01-09 ENCOUNTER — OFFICE VISIT (OUTPATIENT)
Dept: INTERNAL MEDICINE | Facility: CLINIC | Age: 65
End: 2025-01-09
Payer: MEDICARE

## 2025-01-09 VITALS
WEIGHT: 163.81 LBS | OXYGEN SATURATION: 98 % | RESPIRATION RATE: 18 BRPM | TEMPERATURE: 99 F | DIASTOLIC BLOOD PRESSURE: 74 MMHG | SYSTOLIC BLOOD PRESSURE: 100 MMHG | HEIGHT: 62 IN | HEART RATE: 81 BPM | BODY MASS INDEX: 30.14 KG/M2

## 2025-01-09 DIAGNOSIS — R29.898 WEAKNESS OF RIGHT LEG: ICD-10-CM

## 2025-01-09 DIAGNOSIS — I70.0 ATHEROSCLEROSIS OF AORTA: Chronic | ICD-10-CM

## 2025-01-09 DIAGNOSIS — I10 ESSENTIAL HYPERTENSION: Chronic | ICD-10-CM

## 2025-01-09 DIAGNOSIS — E78.00 PURE HYPERCHOLESTEROLEMIA: Chronic | ICD-10-CM

## 2025-01-09 DIAGNOSIS — R29.898 WEAKNESS OF BOTH HANDS: Primary | ICD-10-CM

## 2025-01-09 DIAGNOSIS — R29.6 FALLS FREQUENTLY: ICD-10-CM

## 2025-01-09 DIAGNOSIS — G44.219 EPISODIC TENSION-TYPE HEADACHE, NOT INTRACTABLE: Chronic | ICD-10-CM

## 2025-01-09 PROCEDURE — 3078F DIAST BP <80 MM HG: CPT | Mod: HCNC,CPTII,S$GLB, | Performed by: NURSE PRACTITIONER

## 2025-01-09 PROCEDURE — 1160F RVW MEDS BY RX/DR IN RCRD: CPT | Mod: HCNC,CPTII,S$GLB, | Performed by: NURSE PRACTITIONER

## 2025-01-09 PROCEDURE — 3288F FALL RISK ASSESSMENT DOCD: CPT | Mod: HCNC,CPTII,S$GLB, | Performed by: NURSE PRACTITIONER

## 2025-01-09 PROCEDURE — 99214 OFFICE O/P EST MOD 30 MIN: CPT | Mod: HCNC,S$GLB,, | Performed by: NURSE PRACTITIONER

## 2025-01-09 PROCEDURE — 3044F HG A1C LEVEL LT 7.0%: CPT | Mod: HCNC,CPTII,S$GLB, | Performed by: NURSE PRACTITIONER

## 2025-01-09 PROCEDURE — G2211 COMPLEX E/M VISIT ADD ON: HCPCS | Mod: HCNC,S$GLB,, | Performed by: NURSE PRACTITIONER

## 2025-01-09 PROCEDURE — 1159F MED LIST DOCD IN RCRD: CPT | Mod: HCNC,CPTII,S$GLB, | Performed by: NURSE PRACTITIONER

## 2025-01-09 PROCEDURE — 3008F BODY MASS INDEX DOCD: CPT | Mod: HCNC,CPTII,S$GLB, | Performed by: NURSE PRACTITIONER

## 2025-01-09 PROCEDURE — 99999 PR PBB SHADOW E&M-EST. PATIENT-LVL V: CPT | Mod: PBBFAC,HCNC,, | Performed by: NURSE PRACTITIONER

## 2025-01-09 PROCEDURE — 3074F SYST BP LT 130 MM HG: CPT | Mod: HCNC,CPTII,S$GLB, | Performed by: NURSE PRACTITIONER

## 2025-01-09 PROCEDURE — 1100F PTFALLS ASSESS-DOCD GE2>/YR: CPT | Mod: HCNC,CPTII,S$GLB, | Performed by: NURSE PRACTITIONER

## 2025-01-09 PROCEDURE — 1126F AMNT PAIN NOTED NONE PRSNT: CPT | Mod: HCNC,CPTII,S$GLB, | Performed by: NURSE PRACTITIONER

## 2025-01-09 RX ORDER — CITALOPRAM 40 MG/1
1 TABLET, FILM COATED ORAL DAILY
COMMUNITY

## 2025-01-09 RX ORDER — AMLODIPINE BESYLATE 5 MG/1
5 TABLET ORAL DAILY
Qty: 90 TABLET | Refills: 3 | Status: SHIPPED | OUTPATIENT
Start: 2025-01-09 | End: 2026-01-09

## 2025-01-09 RX ORDER — ALENDRONATE SODIUM 70 MG/1
TABLET ORAL
COMMUNITY

## 2025-01-09 RX ORDER — ATORVASTATIN CALCIUM 80 MG/1
80 TABLET, FILM COATED ORAL NIGHTLY
Qty: 90 TABLET | Refills: 3 | Status: SHIPPED | OUTPATIENT
Start: 2025-01-09 | End: 2026-01-09

## 2025-01-09 NOTE — PROGRESS NOTES
Subjective:      Patient ID: Karyna Sanders is a 64 y.o. female.    Chief Complaint: Follow-up (Check mobility since surgery)    History of Present Illness    CHIEF COMPLAINT:  Karyna presents today for follow-up regarding multiple health concerns.    NEUROLOGICAL HISTORY:  She has a history of brain hemorrhage with two aneurysms - one coiled and one clamped. Due to this history, she cannot be on anticoagulants. She experienced a stroke in the past.    CARDIOVASCULAR HISTORY:  She had a myocardial infarction in the 1980s or 1990s.    NEUROLOGICAL SYMPTOMS:  She reports weakness in the right leg with frequent giving out. Her right hand  strength is rated as 1/5, left hand  strength as 2/5, and she has an unsteady gait. She also experiences foot pain.    MEDICATIONS:  She takes Gabapentin for pain management and Amitriptyline nightly for headaches.    DIET AND WEIGHT:  She reports weight gain despite having no appetite. Her diet consists primarily of junk food, candy, and chips.    ENDOCRINE:  She has a history of pre-diabetes.      ROS:  General: -fever, -chills, -fatigue, +weight gain, -weight loss  Eyes: -vision changes, -redness, -discharge  ENT: -ear pain, -nasal congestion, -sore throat  Cardiovascular: -chest pain, -palpitations, -lower extremity edema  Respiratory: -cough, -shortness of breath  Gastrointestinal: -abdominal pain, -nausea, -vomiting, -diarrhea, -constipation, -blood in stool  Genitourinary: -dysuria, -hematuria, -frequency  Musculoskeletal: -joint pain, -muscle pain, +muscle weakness  Skin: -rash, -lesion  Neurological: -headache, -dizziness, -numbness, -tingling  Psychiatric: -anxiety, -depression, -sleep difficulty          Patient Active Problem List   Diagnosis    Schizoaffective disorder, bipolar type    Bipolar 1 disorder    Anxiety    Cigarette nicotine dependence without complication    Subarachnoid hemorrhage from aneurysm of right anterior communicating artery    Essential  hypertension    Pure hypercholesterolemia    Prediabetes    Anorexia    Long-term current use of high risk medication other than anticoagulant    Gastroesophageal reflux disease without esophagitis    Nonadherence to medical treatment    History of hysterectomy for benign disease    Insufficient social insurance or welfare support    Primary localized osteoarthrosis of multiple sites    Peripheral polyneuropathy    Tension headache    Bilateral carpal tunnel syndrome    Chronic seasonal allergic rhinitis    Cubital tunnel syndrome, right    Lumbosacral radiculopathy at S1    Chronic nausea    History of subarachnoid hemorrhage    Recurrent tension-type headache, not intractable    Medication overuse headache    Chronic use of tramadol for therapeutic purpose    Tardive dyskinesia    Early satiety    Atherosclerosis of aorta    Tubular adenoma of transverse colon (Next colonoscopy due 12/2026)    Chronic bronchitis    Obesity, Class I, BMI 30-34.9    Osteopenia of multiple sites    Pruritus    Chronic constipation    Numbness and tingling in left arm    Impaired functional mobility, balance, gait, and endurance    Cervical radiculopathy    Plantar wart of left foot    Metatarsalgia of left foot    Asthma         Current Outpatient Medications:     acetaminophen (TYLENOL ORAL), Take 650 mg by mouth as needed., Disp: , Rfl:     albuterol (PROVENTIL) 2.5 mg /3 mL (0.083 %) nebulizer solution, Take 3 mLs (2.5 mg total) by nebulization every 6 (six) hours as needed for Wheezing. Rescue, Disp: 150 mL, Rfl: 3    albuterol (PROVENTIL/VENTOLIN HFA) 90 mcg/actuation inhaler, INHALE 2 PUFFS INTO THE LUNGS EVERY 6 HOURS AS NEEDED FOR SHORTNESS OF BREATH OR WHEEZING, Disp: 54 g, Rfl: 3    alendronate (FOSAMAX) 70 MG tablet, Take 1 tablet every week by oral route., Disp: , Rfl:     alprazolam (XANAX) 0.5 MG tablet, Take 0.5 mg by mouth daily as needed. , Disp: , Rfl:     amitriptyline (ELAVIL) 50 MG tablet, Take 1 tablet (50 mg  total) by mouth every evening. (Patient taking differently: Take 50 mg by mouth nightly as needed.), Disp: 90 tablet, Rfl: 3    BLOOD PRESSURE CUFF Misc, Use to check blood pressure once daily, Disp: 1 each, Rfl: 0    butalbital-acetaminophen-caffeine -40 mg (FIORICET, ESGIC) -40 mg per tablet, Take 1 tablet by mouth every 8 (eight) hours. DO NOT USE MORE THAN 3 DAYS PER WEEK (Patient taking differently: Take 1 tablet by mouth every 8 (eight) hours as needed. DO NOT USE MORE THAN 3 DAYS PER WEEK), Disp: 15 tablet, Rfl: 2    CALCIUM CARBONATE/VITAMIN D3 (CALCIUM 600 + D,3, ORAL), Take 2 tablets by mouth once daily., Disp: , Rfl:     citalopram (CELEXA) 40 MG tablet, Take 1 tablet by mouth once daily., Disp: , Rfl:     COMBIVENT RESPIMAT  mcg/actuation inhaler, Inhale 1 puff into the lungs every 6 (six) hours as needed., Disp: , Rfl:     cyproheptadine HCl (CYPROHEPTADINE ORAL), Take by mouth 3 (three) times daily before meals., Disp: , Rfl:     hydrOXYzine HCL (ATARAX) 25 MG tablet, Take 1 tablet (25 mg total) by mouth 3 (three) times daily as needed for Itching., Disp: 60 tablet, Rfl: 5    LEXAPRO 10 mg tablet, Take 10 mg by mouth once daily., Disp: , Rfl:     mupirocin (BACTROBAN) 2 % ointment, Apply topically 2 (two) times daily., Disp: 22 g, Rfl: 1    nebulizer accessories Kit, NEBULIZER ACCESSORIES - Use as directed for nebulized medications prescribed by me., Disp: 10 kit, Rfl: 11    nebulizer and compressor [place], NEBULIZER DEVICE - Use as directed, Disp: 1 each, Rfl: 0    nitroGLYCERIN (NITROSTAT) 0.4 MG SL tablet, Place 0.4 mg under the tongue every 5 (five) minutes as needed for Chest pain., Disp: , Rfl:     omeprazole (PRILOSEC) 40 MG capsule, Take 1 capsule (40 mg total) by mouth every morning., Disp: 90 capsule, Rfl: 3    OXcarbazepine (TRILEPTAL) 600 MG Tab, Take 150 mg by mouth 2 (two) times daily., Disp: , Rfl:     polyethylene glycol (GLYCOLAX) 17 gram/dose powder, Take 17 g by mouth  "daily as needed for Constipation., Disp: 510 g, Rfl: 11    QUEtiapine (SEROQUEL) 100 MG Tab, Take 100 mg by mouth every evening., Disp: , Rfl:     traMADoL (ULTRAM) 50 mg tablet, Take 50 mg by mouth every 6 (six) hours as needed for Pain., Disp: , Rfl:     amLODIPine (NORVASC) 5 MG tablet, Take 1 tablet (5 mg total) by mouth once daily., Disp: 90 tablet, Rfl: 3    atorvastatin (LIPITOR) 80 MG tablet, Take 1 tablet (80 mg total) by mouth every evening., Disp: 90 tablet, Rfl: 3    gabapentin (NEURONTIN) 300 MG capsule, Take 1 capsule (300 mg total) by mouth 2 (two) times a day., Disp: 60 capsule, Rfl: 0      Objective:   /74 (BP Location: Right arm, Patient Position: Sitting)   Pulse 81   Temp 98.5 °F (36.9 °C) (Oral)   Resp 18   Ht 5' 2" (1.575 m)   Wt 74.3 kg (163 lb 12.8 oz)   SpO2 98%   BMI 29.96 kg/m²     Physical Exam             Physical Exam  Vitals and nursing note reviewed.   Constitutional:       General: She is awake. She is not in acute distress.     Appearance: Normal appearance. She is well-developed and well-groomed. She is not ill-appearing, toxic-appearing or diaphoretic.   HENT:      Head: Normocephalic and atraumatic.      Right Ear: External ear normal.      Left Ear: External ear normal.   Eyes:      Conjunctiva/sclera: Conjunctivae normal.      Pupils: Pupils are equal, round, and reactive to light.   Cardiovascular:      Rate and Rhythm: Normal rate and regular rhythm.      Heart sounds: Normal heart sounds. No murmur heard.  Pulmonary:      Effort: Pulmonary effort is normal. No tachypnea, bradypnea, accessory muscle usage, prolonged expiration, respiratory distress or retractions.      Breath sounds: Normal breath sounds. No stridor, decreased air movement or transmitted upper airway sounds. No decreased breath sounds, wheezing, rhonchi or rales.   Abdominal:      General: Abdomen is flat. Bowel sounds are normal.      Palpations: Abdomen is soft.   Musculoskeletal:         " General: No swelling, tenderness, deformity or signs of injury.      Right hand: Decreased strength.      Left hand: Decreased strength.      Cervical back: Normal range of motion and neck supple.      Right lower leg: No edema.      Left lower leg: No edema.      Comments: Right hand  noted being weaker than left hand : 1:2 respectively   Skin:     General: Skin is warm and dry.      Capillary Refill: Capillary refill takes less than 2 seconds.   Neurological:      General: No focal deficit present.      Mental Status: She is alert and oriented to person, place, and time.      GCS: GCS eye subscore is 4. GCS verbal subscore is 5. GCS motor subscore is 6.      Gait: Gait abnormal (noted walking wth cane and Ortho boot on left foot).   Psychiatric:         Attention and Perception: Attention and perception normal.         Mood and Affect: Mood and affect normal.         Speech: Speech normal.         Behavior: Behavior normal. Behavior is cooperative.          Assessment/Plan :        Assessment & Plan    Assessed patient's history of brain bleed, noting no documented stroke or heart attack  Evaluated blood pressure, found to be within normal limits at 100/74  Considered patient's pre-diabetic status, noting improvement  Examined patient's mobility, noting right leg weakness and unsteady gait    CEREBROVASCULAR DISEASE:  - Noted patient's reports of bilateral weakness with right-sided weakness being greater than left-sided weakness, difficulty walking, and ongoing issues with mobility and balance.  - Physical exam revealed right leg weakness, reduced  strength in right hand, and unsteady gait.  - Acknowledged patient's history of stroke, aneurysm, and cerebral hemorrhage.  - Explained inability to prescribe anticoagulants due to history of cerebral hemorrhage.  - Discussed importance of maintaining normal blood pressure for cerebrovascular health.  - Recommend blood pressure management and referred for  physical therapy.    MYOCARDIAL INFARCTION:  - Acknowledged patient's reported history of myocardial infarction in the 1980s or 1990s.    MOBILITY ISSUES:  - Evaluated patient's gait and mobility, noting reports of legs giving out and difficulty walking.  - Physical exam confirmed unsteady gait and ambulation issues.  - Assessed patient's walking ability for potential chair lift.  - Referred to outpatient home physical therapy.  - Noted patient's use of a cane for mobility assistance.  - Instructed staff to fax order for chair lift to appropriate parties and send referrals to home health.  - Clarified difference between inpatient and outpatient therapy.    CHRONIC PAIN:  - Noted patient's reports of ongoing pain issues.  - Acknowledged inability to treat chronic pain due to license limitations.    WEIGHT MANAGEMENT:  - Recommend reducing consumption of junk food, candy, and chips, while increasing intake of nutritious foods.          No follow-ups on file.    This note was generated with the assistance of ambient listening technology. Verbal consent was obtained by the patient and accompanying visitor(s) for the recording of patient appointment to facilitate this note. I attest to having reviewed and edited the generated note for accuracy, though some syntax or spelling errors may persist. Please contact the author of this note for any clarification.

## 2025-01-13 DIAGNOSIS — Z00.00 ENCOUNTER FOR MEDICARE ANNUAL WELLNESS EXAM: ICD-10-CM

## 2025-01-14 ENCOUNTER — HOSPITAL ENCOUNTER (OUTPATIENT)
Dept: RADIOLOGY | Facility: HOSPITAL | Age: 65
Discharge: HOME | End: 2025-01-14
Attending: PODIATRIST
Payer: MEDICARE

## 2025-01-14 ENCOUNTER — PATIENT OUTREACH (OUTPATIENT)
Dept: ADMINISTRATIVE | Facility: OTHER | Age: 65
End: 2025-01-14
Payer: MEDICARE

## 2025-01-14 ENCOUNTER — OFFICE VISIT (OUTPATIENT)
Dept: PODIATRY | Facility: CLINIC | Age: 65
End: 2025-01-14
Payer: MEDICARE

## 2025-01-14 VITALS — BODY MASS INDEX: 30.14 KG/M2 | HEIGHT: 62 IN | WEIGHT: 163.81 LBS

## 2025-01-14 DIAGNOSIS — Z09 POSTOP CHECK: ICD-10-CM

## 2025-01-14 DIAGNOSIS — M77.42 METATARSALGIA, LEFT FOOT: ICD-10-CM

## 2025-01-14 DIAGNOSIS — M20.12 HAV (HALLUX ABDUCTO VALGUS), LEFT: ICD-10-CM

## 2025-01-14 DIAGNOSIS — Z09 POSTOP CHECK: Primary | ICD-10-CM

## 2025-01-14 DIAGNOSIS — M79.672 PAIN IN LEFT FOOT: ICD-10-CM

## 2025-01-14 PROCEDURE — 99024 POSTOP FOLLOW-UP VISIT: CPT | Mod: HCNC,S$GLB,, | Performed by: PODIATRIST

## 2025-01-14 PROCEDURE — 99999 PR PBB SHADOW E&M-EST. PATIENT-LVL IV: CPT | Mod: PBBFAC,HCNC,, | Performed by: PODIATRIST

## 2025-01-14 PROCEDURE — 3288F FALL RISK ASSESSMENT DOCD: CPT | Mod: HCNC,CPTII,S$GLB, | Performed by: PODIATRIST

## 2025-01-14 PROCEDURE — 73630 X-RAY EXAM OF FOOT: CPT | Mod: TC,HCNC,LT

## 2025-01-14 PROCEDURE — 3044F HG A1C LEVEL LT 7.0%: CPT | Mod: HCNC,CPTII,S$GLB, | Performed by: PODIATRIST

## 2025-01-14 PROCEDURE — 1125F AMNT PAIN NOTED PAIN PRSNT: CPT | Mod: HCNC,CPTII,S$GLB, | Performed by: PODIATRIST

## 2025-01-14 PROCEDURE — 1160F RVW MEDS BY RX/DR IN RCRD: CPT | Mod: HCNC,CPTII,S$GLB, | Performed by: PODIATRIST

## 2025-01-14 PROCEDURE — 1159F MED LIST DOCD IN RCRD: CPT | Mod: HCNC,CPTII,S$GLB, | Performed by: PODIATRIST

## 2025-01-14 PROCEDURE — 1100F PTFALLS ASSESS-DOCD GE2>/YR: CPT | Mod: HCNC,CPTII,S$GLB, | Performed by: PODIATRIST

## 2025-01-14 PROCEDURE — 73630 X-RAY EXAM OF FOOT: CPT | Mod: 26,HCNC,LT, | Performed by: RADIOLOGY

## 2025-01-14 NOTE — PROGRESS NOTES
Subjective:       Patient ID: Karyna Sanders is a 64 y.o. female.    Chief Complaint: Post-op Evaluation (Post op, rate pain 7/10, pt is wearing cam boot on left foot and tennis on right, nondiabetic )      HPI:  Karyna Sanders presents to the office today, s/p 12/6/2024 LLE 1st MTPJ fusion, gatroc recession with metatarsal osteotomy, LLE, 4th. Does have [facility]. States RICE therapy. States smoking less. Does WB with CAM Walker. States Vit. D. No DVT Ppx. (due to prior PMHx.). Did have XR on today.     Hemoglobin A1C   Date Value Ref Range Status   01/02/2025 6.0 (H) 4.0 - 5.6 % Final     Comment:     ADA Screening Guidelines:  5.7-6.4%  Consistent with prediabetes  >or=6.5%  Consistent with diabetes    High levels of fetal hemoglobin interfere with the HbA1C  assay. Heterozygous hemoglobin variants (HbS, HgC, etc)do  not significantly interfere with this assay.   However, presence of multiple variants may affect accuracy.     05/01/2024 6.3 (H) 4.0 - 5.6 % Final     Comment:     ADA Screening Guidelines:  5.7-6.4%  Consistent with prediabetes  >or=6.5%  Consistent with diabetes    High levels of fetal hemoglobin interfere with the HbA1C  assay. Heterozygous hemoglobin variants (HbS, HgC, etc)do  not significantly interfere with this assay.   However, presence of multiple variants may affect accuracy.     08/29/2023 6.1 (H) 4.0 - 5.6 % Final     Comment:     ADA Screening Guidelines:  5.7-6.4%  Consistent with prediabetes  >or=6.5%  Consistent with diabetes    High levels of fetal hemoglobin interfere with the HbA1C  assay. Heterozygous hemoglobin variants (HbS, HgC, etc)do  not significantly interfere with this assay.   However, presence of multiple variants may affect accuracy.         Review of patient's allergies indicates:   Allergen Reactions    Aspirin      Told to avoid due to aneurysm repair     Nsaids (non-steroidal anti-inflammatory drug)      Due to aneurysm    Ibuprofen Anxiety      Told to avoid due to aneurysm repair        Past Medical History:   Diagnosis Date    Anemia     Aneurysm     Anorexia 4/26/2017    Anxiety     Asthma     Atherosclerosis of aorta 1/5/2022    CT ABDOMEN PELVIS WITH CONTRAST 01/03/2021 FINDINGS: Mild atherosclerosis within the abdominal aorta which is nonaneurysmal.    Bipolar disorder     Carpal tunnel syndrome, bilateral     Cerebral aneurysm     Chronic nausea 11/15/2018    Chronic pain syndrome     Cigarette nicotine dependence     Depression     Essential hypertension 8/29/2014    GERD (gastroesophageal reflux disease)     Hyperlipidemia     Hypertension     Insomnia     Osteoporosis     Pure hypercholesterolemia 9/8/2014    Recurrent tension-type headache, not intractable 7/24/2019    Schizophrenia     Stroke     Subarachnoid hemorrhage        Family History   Problem Relation Name Age of Onset    Cancer Mother      Glaucoma Mother      Stroke Mother      Diabetes type II Mother      Heart disease Mother      Hypertension Mother      Birth defects Sister      Brain cancer Sister      Diabetes type II Sister      Birth defects Sister      Bone cancer Sister      Cancer Brother      Hypertension Brother         Social History     Socioeconomic History    Marital status:     Number of children: 3    Years of education: 11th Grade   Tobacco Use    Smoking status: Every Day     Current packs/day: 1.00     Average packs/day: 1 pack/day for 52.3 years (52.3 ttl pk-yrs)     Types: Cigarettes, Cigars, Vaping with nicotine     Start date: 1976    Smokeless tobacco: Never    Tobacco comments:     in smoking program 5/13/19   Substance and Sexual Activity    Alcohol use: Yes     Comment: occasional    Drug use: No    Sexual activity: Never     Partners: Male   Social History Narrative    Patient describes herself as disabled.     Social Drivers of Health     Financial Resource Strain: Medium Risk (11/16/2024)    Overall Financial Resource Strain (CARDIA)      Difficulty of Paying Living Expenses: Somewhat hard   Food Insecurity: No Food Insecurity (11/16/2024)    Hunger Vital Sign     Worried About Running Out of Food in the Last Year: Never true     Ran Out of Food in the Last Year: Never true   Recent Concern: Food Insecurity - Food Insecurity Present (10/16/2024)    Hunger Vital Sign     Worried About Running Out of Food in the Last Year: Sometimes true     Ran Out of Food in the Last Year: Never true   Transportation Needs: No Transportation Needs (10/16/2024)    PRAPARE - Transportation     Lack of Transportation (Medical): No     Lack of Transportation (Non-Medical): No   Physical Activity: Insufficiently Active (11/16/2024)    Exercise Vital Sign     Days of Exercise per Week: 2 days     Minutes of Exercise per Session: 30 min   Stress: No Stress Concern Present (11/16/2024)    [employer] - Occupational Stress Questionnaire     Feeling of Stress : Not at all   Recent Concern: Stress - Stress Concern Present (10/16/2024)    [employer] - Occupational Stress Questionnaire     Feeling of Stress : Very much   Housing Stability: Low Risk  (11/16/2024)    Housing Stability Vital Sign     Unable to Pay for Housing in the Last Year: No     Homeless in the Last Year: No       Past Surgical History:   Procedure Laterality Date    BUNIONECTOMY Right     COLONOSCOPY N/A 02/07/2022    Procedure: COLONOSCOPY;  Surgeon: Kamila Lund MD;  Location: [facility];  Service: Endoscopy;  Laterality: N/A;    COLONOSCOPY W/ BIOPSIES AND POLYPECTOMY      ESOPHAGOGASTRODUODENOSCOPY N/A 02/07/2022    Procedure: EGD (ESOPHAGOGASTRODUODENOSCOPY);  Surgeon: Kamila Lund MD;  Location: [facility];  Service: Endoscopy;  Laterality: N/A;    FUSION OF METATARSOPHALANGEAL JOINT Left 12/6/2024    Procedure: FUSION, MTP JOINT;  Surgeon: Chadwick Rodas DPM;  Location: [facility];  Service: Podiatry;  Laterality: Left;     "HYSTERECTOMY      OSTEOTOMY OF METATARSAL BONE Left 12/6/2024    Procedure: OSTEOTOMY, METATARSAL BONE;  Surgeon: Chadwick Rodas DPM;  Location: [facility] OR;  Service: Podiatry;  Laterality: Left;    PARTIAL HYSTERECTOMY      Bilateral Ovaries Remain    REPAIR OF ANEURYSM      clip and coil    RESECTION OF GASTROCNEMIUS MUSCLE Left 12/6/2024    Procedure: RESECTION, MUSCLE, GASTROCNEMIUS;  Surgeon: Chadwick Rodas DPM;  Location: [facility] OR;  Service: Podiatry;  Laterality: Left;    TRANSCRANIAL DOPPLER STUDY - COMPLETE  08/28/2014         TRANSCRANIAL DOPPLER STUDY - COMPLETE  08/29/2014         TRANSCRANIAL DOPPLER STUDY - COMPLETE  08/30/2014         TRANSCRANIAL DOPPLER STUDY - COMPLETE  08/31/2014         TRANSCRANIAL DOPPLER STUDY - COMPLETE  09/01/2014         TRANSCRANIAL DOPPLER STUDY - COMPLETE  09/02/2014         TRANSCRANIAL DOPPLER STUDY - COMPLETE  09/03/2014         TRANSCRANIAL DOPPLER STUDY - COMPLETE  09/04/2014         TRANSCRANIAL DOPPLER STUDY - COMPLETE  09/05/2014         TRANSCRANIAL DOPPLER STUDY - COMPLETE  09/06/2014         VAGINAL DELIVERY      X 3       Review of Systems   Constitutional:  Negative for chills, fatigue and fever.   HENT:  Negative for hearing loss.    Eyes:  Negative for photophobia and visual disturbance.   Respiratory:  Negative for cough, chest tightness, shortness of breath and wheezing.    Cardiovascular:  Negative for chest pain and palpitations.   Gastrointestinal:  Negative for constipation, diarrhea, nausea and vomiting.   Endocrine: Negative for cold intolerance and heat intolerance.   Genitourinary:  Negative for flank pain.   Musculoskeletal:  Positive for gait problem. Negative for neck pain and neck stiffness.   Neurological:  Negative for light-headedness and headaches.   Psychiatric/Behavioral:  Negative for sleep disturbance.           Objective:   Ht 5' 2" (1.575 m)   Wt 74.3 kg (163 lb 12.8 oz)   BMI 29.96 kg/m²     X-Ray Foot Complete Left  Narrative: " EXAMINATION:  XR FOOT COMPLETE 3 VIEW LEFT    CLINICAL HISTORY:  . Encounter for follow-up examination after completed treatment for conditions other than malignant neoplasm    TECHNIQUE:  AP, lateral, and oblique views of the foot were performed.    COMPARISON:  Intraoperative films from 12/06/2024    FINDINGS:  Plate and screw fixation and long cannulated screw seen across the 1st MTP joint.  There also postsurgical changes seen within the 4th metatarsal head.  There is also a fracture of the neck of the 4th metacarpal which appears to be acute to subacute.  Bandaging material is in place.  Impression: As above    Electronically signed by: Dar Loving DO  Date:    01/14/2025  Time:    08:51       Physical Exam  LOWER EXTREMITY PHYSICAL EXAMINATION  DERMATOLOGY: Skin is supple, dry and intact.     ORTHOPEDIC: Rectus 1st and 4th ray are noted. Minimal edema is noted.     Assessment:     1. Postop check    2. Hav (hallux abducto valgus), left    3. Pain in left foot    4. Metatarsalgia, left foot          Plan:     Postop check  -     X-Ray Foot Complete Left; Future; Expected date: 01/14/2025  -     SUBSEQUENT HOME HEALTH ORDERS    Hav (hallux abducto valgus), left  -     X-Ray Foot Complete Left; Future; Expected date: 01/14/2025  -     SUBSEQUENT HOME HEALTH ORDERS    Pain in left foot  -     X-Ray Foot Complete Left; Future; Expected date: 01/14/2025  -     SUBSEQUENT HOME HEALTH ORDERS    Metatarsalgia, left foot  -     X-Ray Foot Complete Left; Future; Expected date: 01/14/2025  -     SUBSEQUENT HOME HEALTH ORDERS      Thorough discussion is had with the patient today, concerning the diagnosis, its etiology, and the treatment algorithm at present.     XRAYS are reviewed in detail with the patient. All questions and concerns regarding findings and its/their implications are outlined and discussed.    Discontinue CAM Walker.    Start Sx. Shoe.     Vit. D.    Continue [facility] once weekly for the  next 2 weeks, then D/C.    Compression therapy.    Take note, the harmful effects of nicotine/tobacco/cigarette/ marijuana smoking, especially in relation to the lower extremity. Strongly consider consultation with primary care provider or [facility] for further discussion of smoking cessation methods. Smoking & Tobacco use cessation couseling is recommended.    Repeat the XR in 3 or so weeks.           Future Appointments   Date Time Provider Department Center   1/28/2025  1:00 PM Judson Sawant MD [facility]

## 2025-01-14 NOTE — PROGRESS NOTES
CHW - Case Closure    This LPN spoke to patient via telephone today. 01/14/25  Pt/Caregiver reported: Received cell phone,government issued.  Pt/Caregiver denied any additional needs at this time and agrees with episode closure at this time.  Provided patient with Community Health Worker's / LPN contact information and encouraged him/her to contact this  LPN if additional needs arise.

## 2025-01-14 NOTE — PROGRESS NOTES
CHW - Follow Up    This LPN completed a follow up visit with patient via telephone today. 01/14/25  Pt/Caregiver reported: She was able to get a [employer] issued cell phone.  Community Health Worker provided: No new resources given.  Follow up required: No

## 2025-01-23 DIAGNOSIS — Z09 POSTOP CHECK: ICD-10-CM

## 2025-01-23 DIAGNOSIS — M79.672 PAIN IN LEFT FOOT: ICD-10-CM

## 2025-01-23 DIAGNOSIS — Z09 POSTOP CHECK: Primary | ICD-10-CM

## 2025-01-23 RX ORDER — GABAPENTIN 300 MG/1
300 CAPSULE ORAL 2 TIMES DAILY
Qty: 60 CAPSULE | Refills: 1 | Status: SHIPPED | OUTPATIENT
Start: 2025-01-23 | End: 2025-02-22

## 2025-01-28 ENCOUNTER — OFFICE VISIT (OUTPATIENT)
Dept: NEUROLOGY | Facility: CLINIC | Age: 65
End: 2025-01-28
Payer: MEDICARE

## 2025-01-28 VITALS
DIASTOLIC BLOOD PRESSURE: 75 MMHG | SYSTOLIC BLOOD PRESSURE: 111 MMHG | RESPIRATION RATE: 18 BRPM | WEIGHT: 160 LBS | HEIGHT: 62 IN | BODY MASS INDEX: 29.44 KG/M2 | HEART RATE: 76 BPM

## 2025-01-28 DIAGNOSIS — M54.17 LUMBOSACRAL RADICULOPATHY AT S1: ICD-10-CM

## 2025-01-28 DIAGNOSIS — J45.909 ASTHMA, UNSPECIFIED ASTHMA SEVERITY, UNSPECIFIED WHETHER COMPLICATED, UNSPECIFIED WHETHER PERSISTENT: ICD-10-CM

## 2025-01-28 DIAGNOSIS — M85.89 OSTEOPENIA OF MULTIPLE SITES: ICD-10-CM

## 2025-01-28 DIAGNOSIS — Z91.199 NONADHERENCE TO MEDICAL TREATMENT: Chronic | ICD-10-CM

## 2025-01-28 DIAGNOSIS — R63.0 ANOREXIA: Chronic | ICD-10-CM

## 2025-01-28 DIAGNOSIS — Z86.79 HISTORY OF SPONTANEOUS SUBARACHNOID INTRACRANIAL HEMORRHAGE DUE TO CEREBRAL ANEURYSM: ICD-10-CM

## 2025-01-28 DIAGNOSIS — R68.81 EARLY SATIETY: ICD-10-CM

## 2025-01-28 DIAGNOSIS — G24.01 TARDIVE DYSKINESIA: Chronic | ICD-10-CM

## 2025-01-28 DIAGNOSIS — G56.21 CUBITAL TUNNEL SYNDROME, RIGHT: ICD-10-CM

## 2025-01-28 DIAGNOSIS — R73.03 PREDIABETES: Chronic | ICD-10-CM

## 2025-01-28 DIAGNOSIS — K21.9 GASTROESOPHAGEAL REFLUX DISEASE WITHOUT ESOPHAGITIS: ICD-10-CM

## 2025-01-28 DIAGNOSIS — R20.0 BILATERAL HAND NUMBNESS: ICD-10-CM

## 2025-01-28 DIAGNOSIS — Z90.710 HISTORY OF HYSTERECTOMY FOR BENIGN DISEASE: Chronic | ICD-10-CM

## 2025-01-28 DIAGNOSIS — R20.0 NUMBNESS AND TINGLING IN LEFT ARM: Chronic | ICD-10-CM

## 2025-01-28 DIAGNOSIS — F31.9 BIPOLAR 1 DISORDER: Chronic | ICD-10-CM

## 2025-01-28 DIAGNOSIS — G44.219 EPISODIC TENSION-TYPE HEADACHE, NOT INTRACTABLE: Chronic | ICD-10-CM

## 2025-01-28 DIAGNOSIS — L29.9 PRURITUS: ICD-10-CM

## 2025-01-28 DIAGNOSIS — D12.6 TUBULAR ADENOMA OF COLON: ICD-10-CM

## 2025-01-28 DIAGNOSIS — R11.0 CHRONIC NAUSEA: Chronic | ICD-10-CM

## 2025-01-28 DIAGNOSIS — M77.42 METATARSALGIA OF LEFT FOOT: ICD-10-CM

## 2025-01-28 DIAGNOSIS — Z79.899 LONG-TERM CURRENT USE OF HIGH RISK MEDICATION OTHER THAN ANTICOAGULANT: Chronic | ICD-10-CM

## 2025-01-28 DIAGNOSIS — M19.91 PRIMARY LOCALIZED OSTEOARTHROSIS OF MULTIPLE SITES: Chronic | ICD-10-CM

## 2025-01-28 DIAGNOSIS — F41.9 ANXIETY: Chronic | ICD-10-CM

## 2025-01-28 DIAGNOSIS — R29.90 MULTIPLE NEUROLOGICAL SYMPTOMS: Primary | ICD-10-CM

## 2025-01-28 DIAGNOSIS — G56.03 BILATERAL CARPAL TUNNEL SYNDROME: Chronic | ICD-10-CM

## 2025-01-28 DIAGNOSIS — F17.210 CIGARETTE NICOTINE DEPENDENCE WITHOUT COMPLICATION: Chronic | ICD-10-CM

## 2025-01-28 DIAGNOSIS — M54.12 CERVICAL RADICULOPATHY: ICD-10-CM

## 2025-01-28 DIAGNOSIS — I10 ESSENTIAL HYPERTENSION: Chronic | ICD-10-CM

## 2025-01-28 DIAGNOSIS — G62.9 PERIPHERAL POLYNEUROPATHY: Chronic | ICD-10-CM

## 2025-01-28 DIAGNOSIS — Z79.891 CHRONIC USE OF OPIATE DRUG FOR THERAPEUTIC PURPOSE: ICD-10-CM

## 2025-01-28 DIAGNOSIS — J30.2 CHRONIC SEASONAL ALLERGIC RHINITIS: Chronic | ICD-10-CM

## 2025-01-28 DIAGNOSIS — E78.00 PURE HYPERCHOLESTEROLEMIA: Chronic | ICD-10-CM

## 2025-01-28 DIAGNOSIS — B07.0 PLANTAR WART OF LEFT FOOT: Chronic | ICD-10-CM

## 2025-01-28 DIAGNOSIS — J41.0 SIMPLE CHRONIC BRONCHITIS: ICD-10-CM

## 2025-01-28 DIAGNOSIS — I70.0 ATHEROSCLEROSIS OF AORTA: Chronic | ICD-10-CM

## 2025-01-28 DIAGNOSIS — G44.40 MEDICATION OVERUSE HEADACHE: ICD-10-CM

## 2025-01-28 DIAGNOSIS — G44.209 TENSION HEADACHE: Chronic | ICD-10-CM

## 2025-01-28 DIAGNOSIS — F25.0 SCHIZOAFFECTIVE DISORDER, BIPOLAR TYPE: Chronic | ICD-10-CM

## 2025-01-28 DIAGNOSIS — K59.09 CHRONIC CONSTIPATION: ICD-10-CM

## 2025-01-28 DIAGNOSIS — R20.2 NUMBNESS AND TINGLING IN LEFT ARM: Chronic | ICD-10-CM

## 2025-01-28 PROBLEM — Z74.09 IMPAIRED FUNCTIONAL MOBILITY, BALANCE, GAIT, AND ENDURANCE: Status: RESOLVED | Noted: 2024-06-11 | Resolved: 2025-01-28

## 2025-01-28 PROCEDURE — 3008F BODY MASS INDEX DOCD: CPT | Mod: HCNC,CPTII,S$GLB, | Performed by: PSYCHIATRY & NEUROLOGY

## 2025-01-28 PROCEDURE — 3078F DIAST BP <80 MM HG: CPT | Mod: HCNC,CPTII,S$GLB, | Performed by: PSYCHIATRY & NEUROLOGY

## 2025-01-28 PROCEDURE — 99205 OFFICE O/P NEW HI 60 MIN: CPT | Mod: HCNC,S$GLB,, | Performed by: PSYCHIATRY & NEUROLOGY

## 2025-01-28 PROCEDURE — 1125F AMNT PAIN NOTED PAIN PRSNT: CPT | Mod: HCNC,CPTII,S$GLB, | Performed by: PSYCHIATRY & NEUROLOGY

## 2025-01-28 PROCEDURE — 3074F SYST BP LT 130 MM HG: CPT | Mod: HCNC,CPTII,S$GLB, | Performed by: PSYCHIATRY & NEUROLOGY

## 2025-01-28 PROCEDURE — 3044F HG A1C LEVEL LT 7.0%: CPT | Mod: HCNC,CPTII,S$GLB, | Performed by: PSYCHIATRY & NEUROLOGY

## 2025-01-28 PROCEDURE — 3288F FALL RISK ASSESSMENT DOCD: CPT | Mod: HCNC,CPTII,S$GLB, | Performed by: PSYCHIATRY & NEUROLOGY

## 2025-01-28 PROCEDURE — 99999 PR PBB SHADOW E&M-EST. PATIENT-LVL III: CPT | Mod: PBBFAC,HCNC,, | Performed by: PSYCHIATRY & NEUROLOGY

## 2025-01-28 PROCEDURE — G2211 COMPLEX E/M VISIT ADD ON: HCPCS | Mod: HCNC,S$GLB,, | Performed by: PSYCHIATRY & NEUROLOGY

## 2025-01-28 PROCEDURE — 1100F PTFALLS ASSESS-DOCD GE2>/YR: CPT | Mod: HCNC,CPTII,S$GLB, | Performed by: PSYCHIATRY & NEUROLOGY

## 2025-01-28 RX ORDER — AMITRIPTYLINE HYDROCHLORIDE 50 MG/1
50 TABLET, FILM COATED ORAL NIGHTLY
Qty: 90 TABLET | Refills: 3 | Status: SHIPPED | OUTPATIENT
Start: 2025-01-28

## 2025-01-28 NOTE — PROGRESS NOTES
"Subjective:       Patient ID: Karyna Sanders is a 65 y.o. female.    Chief Complaint: Headache    Headache   Pertinent negatives include no back pain, dizziness, hearing loss, nausea, neck pain, numbness, photophobia, seizures, tinnitus, vomiting or weakness.                BACKGROUND HISTORY       The patient is here for headache evaluation. The patient sustained Aneurysmal SAH in 2014 S/P RT PCOM coiling. Since then she has been having headaches. On 06- CTA head showed no new change or additional aneurysms. The headaches progress from different areas and involves different sides with a throbbing nature. It can involve the right side, the left side or both sides. The patient does report visual aura. The headache is associated with nausea and light, sound, temperature and smell sensitivity.  The patient was recently started on barbiturate-narcotic-caffeine-based medication and unfortunately she has been taking it daily. The headaches last for several hours. The patient feels "down" during the headache with no racing.  The headache builds up slowly towards the middle of the day or the end of the day.   The headache is associated with scalp sensitivity. Lack of sleep is a significant trigger of the headache and she does not sleep well at night.  No neck pain with radiation. No TMJ problems.  The patient denies blurry vision and ear ringing. The headache is not positional or postural but worsens with movement and valsalva. Sleep help lessen the headache. No history of seizures. No vertigo. No blacking out.  No fever, chills or rigors. No history of significant memory loss..  The patient cannot think of food that aggravates the headache. Tried Amitriptyline/Elavil which helped tremendously.       INTERVAL HISTORY        Amitriptyline/Elavil helped tremendously but she has been taking it PRN and the headaches recurred. Serial Brain CTA 9545-0088 showed no new changes and no new aneurysms.    No red flags for " SAH.    The patient describes paroxysmal bilateral hand numbness, tingling and pain. The numbness and tingling involve all the fingers. The symptoms do wake the patient up from sleep. The patient reported  weakness. No muscle wasting. No neck pain with radiation to the hands.  No similar symptoms in the feet.               Review of Systems   Constitutional:  Negative for appetite change and fatigue.   HENT:  Negative for hearing loss and tinnitus.    Eyes:  Negative for photophobia and visual disturbance.   Respiratory:  Negative for apnea and shortness of breath.    Cardiovascular:  Negative for chest pain and palpitations.   Gastrointestinal:  Negative for nausea and vomiting.   Endocrine: Negative for cold intolerance and heat intolerance.   Genitourinary:  Negative for difficulty urinating and urgency.   Musculoskeletal:  Negative for arthralgias, back pain, gait problem, joint swelling, myalgias, neck pain and neck stiffness.   Skin:  Negative for color change and rash.   Allergic/Immunologic: Negative for environmental allergies and immunocompromised state.   Neurological:  Positive for headaches. Negative for dizziness, tremors, seizures, syncope, facial asymmetry, speech difficulty, weakness, light-headedness and numbness.   Hematological:  Negative for adenopathy. Does not bruise/bleed easily.   Psychiatric/Behavioral:  Positive for dysphoric mood. Negative for agitation, behavioral problems, confusion, decreased concentration, hallucinations, self-injury, sleep disturbance and suicidal ideas. The patient is nervous/anxious. The patient is not hyperactive.          Current Outpatient Medications:     acetaminophen (TYLENOL ORAL), Take 650 mg by mouth as needed., Disp: , Rfl:     albuterol (PROVENTIL) 2.5 mg /3 mL (0.083 %) nebulizer solution, Take 3 mLs (2.5 mg total) by nebulization every 6 (six) hours as needed for Wheezing. Rescue, Disp: 150 mL, Rfl: 3    albuterol (PROVENTIL/VENTOLIN HFA) 90  mcg/actuation inhaler, INHALE 2 PUFFS INTO THE LUNGS EVERY 6 HOURS AS NEEDED FOR SHORTNESS OF BREATH OR WHEEZING, Disp: 54 g, Rfl: 3    alendronate (FOSAMAX) 70 MG tablet, Take 1 tablet every week by oral route., Disp: , Rfl:     alprazolam (XANAX) 0.5 MG tablet, Take 0.5 mg by mouth daily as needed. , Disp: , Rfl:     amitriptyline (ELAVIL) 50 MG tablet, Take 1 tablet (50 mg total) by mouth every evening. (Patient taking differently: Take 50 mg by mouth nightly as needed.), Disp: 90 tablet, Rfl: 3    amLODIPine (NORVASC) 5 MG tablet, Take 1 tablet (5 mg total) by mouth once daily., Disp: 90 tablet, Rfl: 3    atorvastatin (LIPITOR) 80 MG tablet, Take 1 tablet (80 mg total) by mouth every evening., Disp: 90 tablet, Rfl: 3    BLOOD PRESSURE CUFF Misc, Use to check blood pressure once daily, Disp: 1 each, Rfl: 0    butalbital-acetaminophen-caffeine -40 mg (FIORICET, ESGIC) -40 mg per tablet, Take 1 tablet by mouth every 8 (eight) hours. DO NOT USE MORE THAN 3 DAYS PER WEEK (Patient taking differently: Take 1 tablet by mouth every 8 (eight) hours as needed. DO NOT USE MORE THAN 3 DAYS PER WEEK), Disp: 15 tablet, Rfl: 2    CALCIUM CARBONATE/VITAMIN D3 (CALCIUM 600 + D,3, ORAL), Take 2 tablets by mouth once daily., Disp: , Rfl:     citalopram (CELEXA) 40 MG tablet, Take 1 tablet by mouth once daily., Disp: , Rfl:     COMBIVENT RESPIMAT  mcg/actuation inhaler, Inhale 1 puff into the lungs every 6 (six) hours as needed., Disp: , Rfl:     cyproheptadine HCl (CYPROHEPTADINE ORAL), Take by mouth 3 (three) times daily before meals., Disp: , Rfl:     gabapentin (NEURONTIN) 300 MG capsule, Take 1 capsule (300 mg total) by mouth 2 (two) times a day., Disp: 60 capsule, Rfl: 1    hydrOXYzine HCL (ATARAX) 25 MG tablet, Take 1 tablet (25 mg total) by mouth 3 (three) times daily as needed for Itching., Disp: 60 tablet, Rfl: 5    LEXAPRO 10 mg tablet, Take 10 mg by mouth once daily., Disp: , Rfl:     nebulizer  accessories Kit, NEBULIZER ACCESSORIES - Use as directed for nebulized medications prescribed by me., Disp: 10 kit, Rfl: 11    nebulizer and compressor [place], NEBULIZER DEVICE - Use as directed, Disp: 1 each, Rfl: 0    nitroGLYCERIN (NITROSTAT) 0.4 MG SL tablet, Place 0.4 mg under the tongue every 5 (five) minutes as needed for Chest pain., Disp: , Rfl:     omeprazole (PRILOSEC) 40 MG capsule, Take 1 capsule (40 mg total) by mouth every morning., Disp: 90 capsule, Rfl: 3    OXcarbazepine (TRILEPTAL) 600 MG Tab, Take 150 mg by mouth 2 (two) times daily., Disp: , Rfl:     polyethylene glycol (GLYCOLAX) 17 gram/dose powder, Take 17 g by mouth daily as needed for Constipation., Disp: 510 g, Rfl: 11    QUEtiapine (SEROQUEL) 100 MG Tab, Take 100 mg by mouth every evening., Disp: , Rfl:     traMADoL (ULTRAM) 50 mg tablet, Take 50 mg by mouth every 6 (six) hours as needed for Pain., Disp: , Rfl:   Past Medical History:   Diagnosis Date    Anemia     Aneurysm     Anorexia 4/26/2017    Anxiety     Asthma     Atherosclerosis of aorta 1/5/2022    CT ABDOMEN PELVIS WITH CONTRAST 01/03/2021 FINDINGS: Mild atherosclerosis within the abdominal aorta which is nonaneurysmal.    Bipolar disorder     Carpal tunnel syndrome, bilateral     Cerebral aneurysm     Chronic nausea 11/15/2018    Chronic pain syndrome     Cigarette nicotine dependence     Depression     Essential hypertension 8/29/2014    GERD (gastroesophageal reflux disease)     Hyperlipidemia     Hypertension     Insomnia     Osteoporosis     Pure hypercholesterolemia 9/8/2014    Recurrent tension-type headache, not intractable 7/24/2019    Schizophrenia     Stroke     Subarachnoid hemorrhage      Past Surgical History:   Procedure Laterality Date    BUNIONECTOMY Right     COLONOSCOPY N/A 02/07/2022    Procedure: COLONOSCOPY;  Surgeon: Kamila Lund MD;  Location: [facility];  Service: Endoscopy;  Laterality: N/A;    COLONOSCOPY W/ BIOPSIES AND POLYPECTOMY       ESOPHAGOGASTRODUODENOSCOPY N/A 02/07/2022    Procedure: EGD (ESOPHAGOGASTRODUODENOSCOPY);  Surgeon: Kamila Lund MD;  Location: [facility];  Service: Endoscopy;  Laterality: N/A;    FUSION OF METATARSOPHALANGEAL JOINT Left 12/6/2024    Procedure: FUSION, MTP JOINT;  Surgeon: Chadwick Rdoas DPM;  Location: [facility] OR;  Service: Podiatry;  Laterality: Left;    HYSTERECTOMY      OSTEOTOMY OF METATARSAL BONE Left 12/6/2024    Procedure: OSTEOTOMY, METATARSAL BONE;  Surgeon: Chadwick Rodas DPM;  Location: [facility] OR;  Service: Podiatry;  Laterality: Left;    PARTIAL HYSTERECTOMY      Bilateral Ovaries Remain    REPAIR OF ANEURYSM      clip and coil    RESECTION OF GASTROCNEMIUS MUSCLE Left 12/6/2024    Procedure: RESECTION, MUSCLE, GASTROCNEMIUS;  Surgeon: Chadwick Rodas DPM;  Location: [facility] OR;  Service: Podiatry;  Laterality: Left;    TRANSCRANIAL DOPPLER STUDY - COMPLETE  08/28/2014         TRANSCRANIAL DOPPLER STUDY - COMPLETE  08/29/2014         TRANSCRANIAL DOPPLER STUDY - COMPLETE  08/30/2014         TRANSCRANIAL DOPPLER STUDY - COMPLETE  08/31/2014         TRANSCRANIAL DOPPLER STUDY - COMPLETE  09/01/2014         TRANSCRANIAL DOPPLER STUDY - COMPLETE  09/02/2014         TRANSCRANIAL DOPPLER STUDY - COMPLETE  09/03/2014         TRANSCRANIAL DOPPLER STUDY - COMPLETE  09/04/2014         TRANSCRANIAL DOPPLER STUDY - COMPLETE  09/05/2014         TRANSCRANIAL DOPPLER STUDY - COMPLETE  09/06/2014         VAGINAL DELIVERY      X 3     Social History     Socioeconomic History    Marital status:     Number of children: 3    Years of education: 11th Grade   Tobacco Use    Smoking status: Every Day     Current packs/day: 1.00     Average packs/day: 1 pack/day for 52.4 years (52.4 ttl pk-yrs)     Types: Cigarettes, Cigars, Vaping with nicotine     Start date: 1976    Smokeless tobacco: Never    Tobacco comments:     in smoking program 5/13/19   Substance and Sexual Activity    Alcohol use: Yes     Comment:  occasional    Drug use: No    Sexual activity: Not Currently     Partners: Male   Social History Narrative    Patient describes herself as disabled.     Social Drivers of Health     Financial Resource Strain: Medium Risk (11/16/2024)    Overall Financial Resource Strain (CARDIA)     Difficulty of Paying Living Expenses: Somewhat hard   Food Insecurity: No Food Insecurity (11/16/2024)    Hunger Vital Sign     Worried About Running Out of Food in the Last Year: Never true     Ran Out of Food in the Last Year: Never true   Recent Concern: Food Insecurity - Food Insecurity Present (10/16/2024)    Hunger Vital Sign     Worried About Running Out of Food in the Last Year: Sometimes true     Ran Out of Food in the Last Year: Never true   Transportation Needs: No Transportation Needs (10/16/2024)    PRAPARE - Transportation     Lack of Transportation (Medical): No     Lack of Transportation (Non-Medical): No   Physical Activity: Insufficiently Active (11/16/2024)    Exercise Vital Sign     Days of Exercise per Week: 2 days     Minutes of Exercise per Session: 30 min   Stress: No Stress Concern Present (11/16/2024)    [employer] - Occupational Stress Questionnaire     Feeling of Stress : Not at all   Recent Concern: Stress - Stress Concern Present (10/16/2024)    [employer] - Occupational Stress Questionnaire     Feeling of Stress : Very much   Housing Stability: Low Risk  (11/16/2024)    Housing Stability Vital Sign     Unable to Pay for Housing in the Last Year: No     Homeless in the Last Year: No       Objective:         VITAL SIGNS REVIEWED     GENERAL APPEARANCE:     The patient looks comfortable.    BMI 29.26    No signs of medical or psychiatric distress.    Normal breathing pattern.    No dysmorphic features    Normal eye contact.     GENERAL MEDICAL EXAM:    HEENT:  Head is atraumatic normocephalic.     Funduscopic exam: normal.     Neck and Axillae: No  JVD.     Cardiopulmonary: No cyanosis. No tachypnea. Normal respiratory effort.    Gastrointestinal:  No stomas or lesions. No hernias.    Skin, Hair and Nails: No pathognonomic skin rash. No neurofibromatosis. No stigmata of autoimmune disease.     Limbs: No varicose veins. No edema.     Muskoskeletal: No deformities.No signs of longstanding neuropathy. No dislocations or fractures.        Neurologic Exam     Mental Status   Oriented to person, place, and time.   Follows 3 step commands.   Attention: normal. Concentration: normal.   Speech: speech is normal   Level of consciousness: alert  Able to name object. Able to repeat. Normal comprehension.     Cranial Nerves   Cranial nerves II through XII intact.     CN II   Visual fields full to confrontation.   Visual acuity: normal  Right visual field deficit: none  Left visual field deficit: none     CN III, IV, VI   Pupils are equal, round, and reactive to light.  Extraocular motions are normal.   Right pupil: Size: 2 mm. Shape: regular. Reactivity: brisk. Consensual response: intact. Accommodation: intact.   Left pupil: Size: 2 mm. Shape: regular. Reactivity: brisk. Consensual response: intact. Accommodation: intact.   CN III: no CN III palsy  CN VI: no CN VI palsy  Nystagmus: none   Diplopia: none  Ophthalmoparesis: none  Upgaze: normal  Downgaze: normal  Conjugate gaze: present  Vestibulo-ocular reflex: present    CN V   Facial sensation intact.   Right facial sensation deficit: none  Left facial sensation deficit: none    CN VII   Facial expression full, symmetric.   Right facial weakness: none  Left facial weakness: none    CN VIII   CN VIII normal.   Hearing: intact    CN IX, X   CN IX normal.   CN X normal.   Palate: symmetric  Right gag reflex: normal  Left gag reflex: normal    CN XI   CN XI normal.   Right sternocleidomastoid strength: normal  Left sternocleidomastoid strength: normal  Right trapezius strength: normal  Left trapezius strength: normal    CN  XII   CN XII normal.   Tongue: not atrophic  Fasciculations: absent  Tongue deviation: none    Motor Exam   Muscle bulk: normal  Overall muscle tone: normal  Right arm tone: normal  Left arm tone: normal  Right arm pronator drift: absent  Left arm pronator drift: absent  Right leg tone: normal  Left leg tone: normal    Strength   Right neck flexion: 5/5  Left neck flexion: 5/5  Right neck extension: 5/5  Left neck extension: 5/5  Right deltoid: 5/5  Left deltoid: 5/5  Right biceps: 5/5  Left biceps: 5/5  Right triceps: 5/5  Left triceps: 5/5  Right wrist flexion: 5/5  Left wrist flexion: 5/5  Right wrist extension: 5/5  Left wrist extension: 5/5  Right interossei: 5/5  Left interossei: 5/5  Right iliopsoas: 5/5  Left iliopsoas: 5/5  Right quadriceps: 5/5  Left quadriceps: 5/5  Right hamstrin/5  Left hamstrin/5  Right glutei: 5/5  Left glutei: 5/5  Right anterior tibial: 5/5  Left anterior tibial: 5/5  Right posterior tibial: 5/5  Left posterior tibial: 5/5  Right peroneal: 5/5  Left peroneal: 5/5  Right gastroc: 5/5  Left gastroc: 5/5    Sensory Exam   Light touch normal.   Right arm light touch: normal  Left arm light touch: normal  Right leg light touch: normal  Left leg light touch: normal  Vibration normal.   Right arm vibration: normal  Left arm vibration: normal  Right leg vibration: normal  Left leg vibration: normal  Proprioception normal.   Right arm proprioception: normal  Left arm proprioception: normal  Right leg proprioception: normal  Left leg proprioception: normal  Pinprick normal.   Right arm pinprick: normal  Left arm pinprick: normal  Right leg pinprick: normal  Left leg pinprick: normal  Graphesthesia: normal  Stereognosis: normal    Gait, Coordination, and Reflexes     Gait  Gait: normal    Coordination   Romberg: negative  Finger to nose coordination: normal  Heel to shin coordination: normal    Tremor   Resting tremor: absent  Intention tremor: absent  Action tremor: absent    Reflexes    Right brachioradialis: 2+  Left brachioradialis: 2+  Right biceps: 2+  Left biceps: 2+  Right triceps: 2+  Left triceps: 2+  Right patellar: 2+  Left patellar: 2+  Right achilles: 2+  Left achilles: 2+  Right plantar: normal  Left plantar: normal  Right Stacy: absent  Left Stacy: absent  Right ankle clonus: absent  Left ankle clonus: absent  Right pendular knee jerk: absent  Left pendular knee jerk: absent      Lab Results   Component Value Date    WBC 8.46 11/18/2024    HGB 12.5 11/18/2024    HCT 38.0 11/18/2024    MCV 89 11/18/2024     11/18/2024     Sodium   Date Value Ref Range Status   01/02/2025 144 136 - 145 mmol/L Final     Potassium   Date Value Ref Range Status   01/02/2025 4.1 3.5 - 5.1 mmol/L Final     Chloride   Date Value Ref Range Status   01/02/2025 105 95 - 110 mmol/L Final     CO2   Date Value Ref Range Status   01/02/2025 27 23 - 29 mmol/L Final     Glucose   Date Value Ref Range Status   01/02/2025 96 70 - 110 mg/dL Final     BUN   Date Value Ref Range Status   01/02/2025 7 (L) 8 - 23 mg/dL Final     Creatinine   Date Value Ref Range Status   01/02/2025 0.8 0.5 - 1.4 mg/dL Final     Calcium   Date Value Ref Range Status   01/02/2025 10.1 8.7 - 10.5 mg/dL Final     Total Protein   Date Value Ref Range Status   01/02/2025 7.9 6.0 - 8.4 g/dL Final     Albumin   Date Value Ref Range Status   01/02/2025 4.0 3.5 - 5.2 g/dL Final     Total Bilirubin   Date Value Ref Range Status   01/02/2025 0.4 0.1 - 1.0 mg/dL Final     Comment:     For infants and newborns, interpretation of results should be based  on gestational age, weight and in agreement with clinical  observations.    Premature Infant recommended reference ranges:  Up to 24 hours.............<8.0 mg/dL  Up to 48 hours............<12.0 mg/dL  3-5 days..................<15.0 mg/dL  6-29 days.................<15.0 mg/dL       Alkaline Phosphatase   Date Value Ref Range Status   01/02/2025 92 40 - 150 U/L Final     AST   Date Value Ref  Range Status   01/02/2025 15 10 - 40 U/L Final     ALT   Date Value Ref Range Status   01/02/2025 12 10 - 44 U/L Final     Anion Gap   Date Value Ref Range Status   01/02/2025 12 8 - 16 mmol/L Final     eGFR if    Date Value Ref Range Status   05/19/2022 >60 >60 mL/min/1.73 m^2 Final     eGFR if non    Date Value Ref Range Status   05/19/2022 >60 >60 mL/min/1.73 m^2 Final     Comment:     Calculation used to obtain the estimated glomerular filtration  rate (eGFR) is the CKD-EPI equation.          Lab Results   Component Value Date    TSH 1.915 05/01/2024       LABORATORY EVALUATION     [id number]      CBC, CMP, TFT, HCV, HIV Unremarkable.    HA1C 5.5-6.3 (6.0)    RADIOLOGY EVALUATION           06-, 10-, 05-       CTA head showed no new change or additional aneurysms.    No acute intracranial abnormality.  Mild nonspecific white matter changes likely related to chronic microvascular ischemia.     Endovascular coils in the region of the right posterior communicating artery.  No evidence of residual or recurrent aneurysm filling.  No new intracranial aneurysm identified.       Reviewed the neuroimaging independently       Assessment:       1. Multiple neurological symptoms    2. Recurrent tension-type headache, not intractable    3. Peripheral polyneuropathy    4. Anorexia    5. Anxiety    6. Asthma, unspecified asthma severity, unspecified whether complicated, unspecified whether persistent    7. Atherosclerosis of aorta    8. Bilateral carpal tunnel syndrome    9. Bipolar 1 disorder    10. Cervical radiculopathy    11. Simple chronic bronchitis    12. Chronic constipation    13. Chronic nausea    14. Chronic seasonal allergic rhinitis    15. Chronic use of tramadol for therapeutic purpose    16. Cigarette nicotine dependence without complication    17. Cubital tunnel syndrome, right    18. Early satiety    19. Essential hypertension    20. Gastroesophageal reflux  disease without esophagitis    21. Tubular adenoma of transverse colon (Next colonoscopy due 12/2026)    22. Tension headache    23. Tardive dyskinesia    24. Schizoaffective disorder, bipolar type    25. Pure hypercholesterolemia    26. Pruritus    27. Primary localized osteoarthrosis of multiple sites    28. Prediabetes    29. Plantar wart of left foot    30. Osteopenia of multiple sites    31. BMI 29.0-29.9,adult    32. History of hysterectomy for benign disease    33. History of spontaneous subarachnoid intracranial hemorrhage due to cerebral aneurysm    34. Long-term current use of high risk medication other than anticoagulant    35. Lumbosacral radiculopathy at S1    36. Medication overuse headache    37. Metatarsalgia of left foot    38. Nonadherence to medical treatment    39. Numbness and tingling in left arm    40. Bilateral hand numbness        Plan:             MIXED HEADACHES: TENSION TYPE (TTH), POST-COILING HEADACHE        LIFESTYLE CHANGES     Good sleep hygiene  Avoid triggers like certain foods, TV and computer work   Minimize physical and emotional stress  Smoking avoidance and cessation  Tapering off caffeine   Good hydration   Small frequent meals   Moderate 30-minute-long aerobic exercises 3 times/week         ABORTIVE MEDICATIONS       Triptans and NSAIDs C/I due SAH and Stroke.    Limit Fioricet to only 10-15 tabs a month.        PREVENTATIVE MEDICATIONS         Restart Amitriptyline/Elavil and take 50 mg QHS and not PRN         HISTORY OF ANEURYSMAL SUBARACHNOID HEMORRHAGE (SAH) in 2014 S/P RT POSTERIOR COMMUNICATING (PCOM) COILING      CTA Brain Q 2-3 years.    Avoid blood thinners.    Smoking cessation.     Alcohol cessation.     BP control    Avoid straining/valsalva or anything that could increase ICP        BILATERAL HAND PARESTHESIAS, RULE OUT CARPAL TUNNEL SYNDROME (CTS)      Wrist brace QHS.    CTS Exercises.      NCS/EMG BUE  CTS ORO.    Orthopedic evaluation after NCS/EMG for  injection. Does not want surgical intervention.           MEDICAL/SURGICAL COMORBIDITIES     All relevant medical comorbidities noted and managed by primary care physician and medical care team.                RTC ANNUALLY         Judson Sawant MD, FAAN    Attending Neurologist/Epileptologist         Diplomate, American Board-Psychiatry and Neurology (Neurology)    Diplomate, American Board-Clinical Neurophysiology (Epilpesy-Neuromuscular)     Fellow, American Academy of Neurology       I spent a total of 60 minutes on the day of the visit.  This includes face to face time and non-face to face time preparing to see the patient (eg, review of tests), obtaining and/or reviewing separately obtained history, documenting clinical information in the electronic or other health record, independently interpreting results and communicating results to the patient/family/caregiver, or care coordinator.

## 2025-02-07 ENCOUNTER — PROCEDURE VISIT (OUTPATIENT)
Dept: NEUROLOGY | Facility: CLINIC | Age: 65
End: 2025-02-07
Payer: MEDICARE

## 2025-02-07 ENCOUNTER — TELEPHONE (OUTPATIENT)
Dept: NEUROLOGY | Facility: CLINIC | Age: 65
End: 2025-02-07

## 2025-02-07 ENCOUNTER — PATIENT MESSAGE (OUTPATIENT)
Dept: NEUROLOGY | Facility: CLINIC | Age: 65
End: 2025-02-07

## 2025-02-07 ENCOUNTER — HOSPITAL ENCOUNTER (OUTPATIENT)
Dept: RADIOLOGY | Facility: HOSPITAL | Age: 65
Discharge: HOME | End: 2025-02-07
Attending: PODIATRIST
Payer: MEDICARE

## 2025-02-07 DIAGNOSIS — R20.2 NUMBNESS AND TINGLING IN LEFT ARM: Chronic | ICD-10-CM

## 2025-02-07 DIAGNOSIS — R20.0 BILATERAL HAND NUMBNESS: ICD-10-CM

## 2025-02-07 DIAGNOSIS — R20.0 NUMBNESS AND TINGLING IN LEFT ARM: Chronic | ICD-10-CM

## 2025-02-07 PROCEDURE — 95912 NRV CNDJ TEST 11-12 STUDIES: CPT | Mod: HCNC,S$GLB,, | Performed by: PSYCHIATRY & NEUROLOGY

## 2025-02-07 PROCEDURE — 73630 X-RAY EXAM OF FOOT: CPT | Mod: 26,HCNC,LT, | Performed by: RADIOLOGY

## 2025-02-07 PROCEDURE — 73630 X-RAY EXAM OF FOOT: CPT | Mod: TC,HCNC,LT

## 2025-02-07 NOTE — PROCEDURES
[facility]   Taya Gonzalez  Department of Neurology  [address] [place] [address] YARELIS Morel  [address]  Phone [phone]     Fax  [phone]        Full Name: Karyna Sanders Gender: Female  Patient ID: [id number] YOB: 1960      Visit Date: 2/7/2025 8:57 AM  Age: 65 Years  Examining Physician: Judson Sawant M.D.  Referring Physician: Judson Sawant M.D.  Technician: BRAXTON Killian  History: NCS/BUE/CTS workups.  C/O: Bilateral hand numbness.  Extensive PMHX including, but not limited to: Peripheral polyneuropathy, bilateral CTS, right CuTS, tardive dyskinesia, bipolar disorder, schizoaffective disorder, HTN, HLD, GERD, cervical radiculopathy.    SUMMARY       Nerve conduction studies were performed in the right and left upper extremities. The right median motor study recording the abductor pollicis brevis showed a normal amplitude, normal distal latency and normal conduction velocity. The right ulnar motor study recording the abductor digiti minimi showed a normal amplitude, normal distal latency and normal conduction velocity. No conduction block or focal slowing was present across the elbow.       The right median sensory response recording digit two showed a normal amplitude, latency and conduction velocity. The right ulnar sensory response recording digit five showed a normal amplitude, latency and conduction velocity. The right radial sensory response recording over the extensor snuff box showed a normal amplitude, latency and conduction velocity.     As routine median motor and sensory studies have a false negative rate of 25%, additional internal comparison studies were done to assess for possible median neuropathy at the wrist. These internal comparison studies (median vs. ulnar palmar mixed; median vs. ulnar sensory recording digit four; median vs. ulnar motor studies to the second lumbrical / interosseous; median vs. radial sensory studies recording digit one; median  segmental sensory studies comparing the wrist-palm and palm-digit velocities) increase the electrodiagnostic sensitivity rate to 95%. However, due to statistical issues with multiple tests, it is required that at least two studies are abnormal to reduce the false positive rate to acceptable levels. Right median-ulnar mixed palmar latencies showed a normal median latency compared to the ulnar.     The left median motor study recording the abductor pollicis brevis showed a normal amplitude, normal distal latency and normal conduction velocity. The left ulnar motor study recording the abductor digiti minimi showed a normal amplitude, normal distal latency and normal conduction velocity. No conduction block or focal slowing was present across the elbow.     The left median sensory response recording digit two showed a normal amplitude, latency and conduction velocity. The left ulnar sensory response recording digit five showed a normal amplitude, latency and conduction velocity. The left radial sensory response recording over the extensor snuff box showed a normal amplitude, latency and conduction velocity.     As routine median motor and sensory studies have a false negative rate of 25%, additional internal comparison studies were done to assess for possible median neuropathy at the wrist. These internal comparison studies (median vs. ulnar palmar mixed; median vs. ulnar sensory recording digit four; median vs. ulnar motor studies to the second lumbrical / interosseous; median vs. radial sensory studies recording digit one; median segmental sensory studies comparing the wrist-palm and palm-digit velocities) increase the electrodiagnostic sensitivity rate to 95%. However, due to statistical issues with multiple tests, it is required that at least two studies are abnormal to reduce the false positive rate to acceptable levels. Left median-ulnar mixed palmar latencies showed a normal median latency compared to the ulnar.          IMPRESSION     This is a normal study.     There is no electrophysiologic evidence of median mooneuropathy across the wrist (carpal tunnel syndrome) on either side.    ----------------------------------------             Judson Sawant M.D., F.A.A.N.      Diplomate, American Board of Psychiatry and Neurology  Diplomate, American Board of Clinical Neurophysiology  Fellow, American Academy of Neurology             SENSORY NCS      Nerve / Sites Rec. Site Peak PP Amp Dist Jaden     ms µV cm m/s   L Median - Dig II      Wrist II 3.58 15.9 13 44.3      Ref.  3.80 10.0  48.0   L Median - Ulnar - Palmar      Median Wrist 2.48 25.7 8 42.7      Ulnar Wrist 2.31 7.4 8 274.3   L Ulnar - Dig V      Wrist Dig V 2.79 6.8 11 50.3      Ref.  3.20 5.0  48.0   L Radial - Snuff box      Forearm Snuff box 2.50 23.3 10 56.5      Ref.  2.80 10.0     R Median - Dig II      Wrist II 3.42 13.0 13 46.6      Ref.  3.80 10.0  48.0   R Median - Ulnar - Palmar      Median Wrist 2.48 33.4 8 40.9      Ulnar Wrist 2.40 3.4 8    R Ulnar - Dig V      Wrist Dig V 2.85 5.6 11 50.8      Ref.  3.20 5.0  48.0   R Radial - Snuff box      Forearm Snuff box 2.38 24.4 10 55.8      Ref.  2.80 10.0         MOTOR NCS      Nerve / Sites Rec. Site Lat Amp Dist Jaden     ms mV cm m/s   L Median - APB      Wrist APB 3.50 7.6 8       Ref.  4.00 5.0        Elbow APB 7.63 6.8 20 48.5      Ref.     45.0   L Ulnar - ADM      Wrist ADM 2.90 6.9 8       Ref.  3.10 7.0        B.Elbow ADM 6.56 5.8 19 51.8      Ref.     50.0      A.Elbow ADM 8.44 5.3 10 53.3   R Median - APB      Wrist APB 3.50 7.7 8       Ref.  4.00 5.0        Elbow APB 7.85 7.5 21 48.2      Ref.     45.0   R Ulnar - ADM      Wrist ADM 2.56 7.3 8       Ref.  3.10 7.0        B.Elbow ADM 6.29 6.4 19 50.9      Ref.     50.0      A.Elbow ADM 8.23 6.3 10 51.6                     ----------------------------------------             Judson Sawant M.D., F.A.A.N.      Diplomate, American Board of Psychiatry and  Neurology  Diplomate, American Board of Clinical Neurophysiology  Fellow, American Academy of Neurology

## 2025-02-18 ENCOUNTER — OFFICE VISIT (OUTPATIENT)
Dept: PODIATRY | Facility: CLINIC | Age: 65
End: 2025-02-18
Payer: MEDICARE

## 2025-02-18 VITALS — HEIGHT: 62 IN | BODY MASS INDEX: 29.45 KG/M2 | WEIGHT: 160.06 LBS

## 2025-02-18 DIAGNOSIS — M79.672 PAIN IN LEFT FOOT: ICD-10-CM

## 2025-02-18 DIAGNOSIS — M20.12 HAV (HALLUX ABDUCTO VALGUS), LEFT: ICD-10-CM

## 2025-02-18 DIAGNOSIS — M79.674 PAIN OF RIGHT GREAT TOE: ICD-10-CM

## 2025-02-18 DIAGNOSIS — M20.11 HAV (HALLUX ABDUCTO VALGUS), RIGHT: Primary | ICD-10-CM

## 2025-02-18 DIAGNOSIS — Z01.818 PREOP EXAMINATION: ICD-10-CM

## 2025-02-18 DIAGNOSIS — Z09 POSTOP CHECK: ICD-10-CM

## 2025-02-18 NOTE — PROGRESS NOTES
Subjective:       Patient ID: Karyna Sanders is a 65 y.o. female.    Chief Complaint: Pre-op Exam (Pre op, rate pain 6/10, nondiabetic, pt is wearing tennis)    HPI:  Karyna Sanders presents to the office today, s/p 12/6/2024 LLE 1st MTPJ fusion, gatroc recession with metatarsal osteotomy, LLE, 4th.  WB with normal shoe gear on the LLE w/o ailment.  Most recent x-ray of the left foot was within normal limits. Patient wants to discuss RLE 1st MTPJ fusion as she is pleased with the surgical repair on the LLE. States persistent moderate pains about the RLE 1st MTPJ due to arthritis and bone spurring. States irritation with shoe gear. States frequent PO NSAIDs and Tylenol for minimal symptomatic relief. States wider shoe gear and shoe gear with stiffer MTPJ sole to prevent ROM. States these are minimally helpful. States smoking no more than 10 cigarettes per day. Pre-op LILY was WNL.     Hemoglobin A1C   Date Value Ref Range Status   01/02/2025 6.0 (H) 4.0 - 5.6 % Final     Comment:     ADA Screening Guidelines:  5.7-6.4%  Consistent with prediabetes  >or=6.5%  Consistent with diabetes    High levels of fetal hemoglobin interfere with the HbA1C  assay. Heterozygous hemoglobin variants (HbS, HgC, etc)do  not significantly interfere with this assay.   However, presence of multiple variants may affect accuracy.     05/01/2024 6.3 (H) 4.0 - 5.6 % Final     Comment:     ADA Screening Guidelines:  5.7-6.4%  Consistent with prediabetes  >or=6.5%  Consistent with diabetes    High levels of fetal hemoglobin interfere with the HbA1C  assay. Heterozygous hemoglobin variants (HbS, HgC, etc)do  not significantly interfere with this assay.   However, presence of multiple variants may affect accuracy.     08/29/2023 6.1 (H) 4.0 - 5.6 % Final     Comment:     ADA Screening Guidelines:  5.7-6.4%  Consistent with prediabetes  >or=6.5%  Consistent with diabetes    High levels of fetal hemoglobin interfere with the HbA1C  assay.  Heterozygous hemoglobin variants (HbS, HgC, etc)do  not significantly interfere with this assay.   However, presence of multiple variants may affect accuracy.         Review of patient's allergies indicates:   Allergen Reactions    Aspirin      Told to avoid due to aneurysm repair     Nsaids (non-steroidal anti-inflammatory drug)      Due to aneurysm    Ibuprofen Anxiety     Told to avoid due to aneurysm repair        Past Medical History:   Diagnosis Date    Anemia     Aneurysm     Anorexia 4/26/2017    Anxiety     Asthma     Atherosclerosis of aorta 1/5/2022    CT ABDOMEN PELVIS WITH CONTRAST 01/03/2021 FINDINGS: Mild atherosclerosis within the abdominal aorta which is nonaneurysmal.    Bipolar disorder     Carpal tunnel syndrome, bilateral     Cerebral aneurysm     Chronic nausea 11/15/2018    Chronic pain syndrome     Cigarette nicotine dependence     Depression     Essential hypertension 8/29/2014    GERD (gastroesophageal reflux disease)     Hyperlipidemia     Hypertension     Insomnia     Osteoporosis     Pure hypercholesterolemia 9/8/2014    Recurrent tension-type headache, not intractable 7/24/2019    Schizophrenia     Stroke     Subarachnoid hemorrhage        Family History   Problem Relation Name Age of Onset    Cancer Mother      Glaucoma Mother      Stroke Mother      Diabetes type II Mother      Heart disease Mother      Hypertension Mother      Birth defects Sister      Brain cancer Sister      Diabetes type II Sister      Birth defects Sister      Bone cancer Sister      Cancer Brother      Hypertension Brother         Social History     Socioeconomic History    Marital status:     Number of children: 3    Years of education: 11th Grade   Tobacco Use    Smoking status: Every Day     Current packs/day: 1.00     Average packs/day: 1 pack/day for 52.4 years (52.4 ttl pk-yrs)     Types: Cigarettes, Cigars, Vaping with nicotine     Start date: 1976     Smokeless tobacco: Never    Tobacco comments:     in smoking program 5/13/19   Substance and Sexual Activity    Alcohol use: Yes     Comment: occasional    Drug use: No    Sexual activity: Not Currently     Partners: Male   Social History Narrative    Patient describes herself as disabled.     Social Drivers of Health     Financial Resource Strain: Medium Risk (11/16/2024)    Overall Financial Resource Strain (CARDIA)     Difficulty of Paying Living Expenses: Somewhat hard   Food Insecurity: No Food Insecurity (11/16/2024)    Hunger Vital Sign     Worried About Running Out of Food in the Last Year: Never true     Ran Out of Food in the Last Year: Never true   Recent Concern: Food Insecurity - Food Insecurity Present (10/16/2024)    Hunger Vital Sign     Worried About Running Out of Food in the Last Year: Sometimes true     Ran Out of Food in the Last Year: Never true   Transportation Needs: No Transportation Needs (10/16/2024)    PRAPARE - Transportation     Lack of Transportation (Medical): No     Lack of Transportation (Non-Medical): No   Physical Activity: Insufficiently Active (11/16/2024)    Exercise Vital Sign     Days of Exercise per Week: 2 days     Minutes of Exercise per Session: 30 min   Stress: No Stress Concern Present (11/16/2024)    [place] [employer] - Occupational Stress Questionnaire     Feeling of Stress : Not at all   Recent Concern: Stress - Stress Concern Present (10/16/2024)    [employer] [place] [employer] - Occupational Stress Questionnaire     Feeling of Stress : Very much   Housing Stability: Unknown (11/16/2024)    Housing Stability Vital Sign     Unable to Pay for Housing in the Last Year: No     Homeless in the Last Year: No       Past Surgical History:   Procedure Laterality Date    BUNIONECTOMY Right     COLONOSCOPY N/A 02/07/2022    Procedure: COLONOSCOPY;  Surgeon: Kamila Lund MD;  [facility]: [facility];  Service: Endoscopy;   Laterality: N/A;    COLONOSCOPY W/ BIOPSIES AND POLYPECTOMY      ESOPHAGOGASTRODUODENOSCOPY N/A 02/07/2022    Procedure: EGD (ESOPHAGOGASTRODUODENOSCOPY);  Surgeon: Kamila Lund MD;  [facility];  Service: Endoscopy;  Laterality: N/A;    FUSION OF METATARSOPHALANGEAL JOINT Left 12/6/2024    Procedure: FUSION, MTP JOINT;  Surgeon: Chadwick Rodas DPM;  Location: [facility] OR;  Service: Podiatry;  Laterality: Left;    HYSTERECTOMY      OSTEOTOMY OF METATARSAL BONE Left 12/6/2024    Procedure: OSTEOTOMY, METATARSAL BONE;  Surgeon: Chadwick Rodas DPM;  Location: [facility] OR;  Service: Podiatry;  Laterality: Left;    PARTIAL HYSTERECTOMY      Bilateral Ovaries Remain    REPAIR OF ANEURYSM      clip and coil    RESECTION OF GASTROCNEMIUS MUSCLE Left 12/6/2024    Procedure: RESECTION, MUSCLE, GASTROCNEMIUS;  Surgeon: Chadwick Rodas DPM;  Location: [facility] OR;  Service: Podiatry;  Laterality: Left;    TRANSCRANIAL DOPPLER STUDY - COMPLETE  08/28/2014         TRANSCRANIAL DOPPLER STUDY - COMPLETE  08/29/2014         TRANSCRANIAL DOPPLER STUDY - COMPLETE  08/30/2014         TRANSCRANIAL DOPPLER STUDY - COMPLETE  08/31/2014         TRANSCRANIAL DOPPLER STUDY - COMPLETE  09/01/2014         TRANSCRANIAL DOPPLER STUDY - COMPLETE  09/02/2014         TRANSCRANIAL DOPPLER STUDY - COMPLETE  09/03/2014         TRANSCRANIAL DOPPLER STUDY - COMPLETE  09/04/2014         TRANSCRANIAL DOPPLER STUDY - COMPLETE  09/05/2014         TRANSCRANIAL DOPPLER STUDY - COMPLETE  09/06/2014         VAGINAL DELIVERY      X 3       Review of Systems   Constitutional:  Negative for chills, fatigue and fever.   HENT:  Negative for hearing loss.    Eyes:  Negative for photophobia and visual disturbance.   Respiratory:  Negative for cough, chest tightness, shortness of breath and wheezing.    Cardiovascular:  Negative for chest pain and palpitations.   Gastrointestinal:  Negative for constipation, diarrhea, nausea and vomiting.  "  Endocrine: Negative for cold intolerance and heat intolerance.   Genitourinary:  Negative for flank pain.   Musculoskeletal:  Positive for gait problem. Negative for neck pain and neck stiffness.   Neurological:  Negative for light-headedness and headaches.   Psychiatric/Behavioral:  Negative for sleep disturbance.      X-Ray Foot Complete Right  Order: [id number]   Status: Final result       Dx: Hav (hallux abducto valgus), right; P...    Test Result Released: Yes (not seen)    Details    Reading Physician Reading Date Result Priority   Jj Hylton MD  [phone]  2/19/2025      Narrative & Impression  EXAM: XR FOOT COMPLETE 3 VIEW RIGHT     CLINICAL HISTORY: Hallux valgus     COMPARISON: None     TECHNIQUE:  3 views of the right foot were performed.     FINDINGS: No fracture, dislocation or radiopaque foreign bodies are identified.  No significant soft tissue swelling is appreciated.  A mild hallux valgus deformity is visible at the first MTP joint.        Impression:     1.  No evidence of acute or recent injury.  2.  Mild hallux valgus deformity.     Finalized on: 2/19/2025 2:30 PM By:  Jj Hylton  [facility]# [id number]      2025-02-19 14:32:44.348     [facility]        Exam Ended: 02/19/25 11:14 CST        Objective:   Ht 5' 2" (1.575 m)   Wt 72.6 kg (160 lb 0.9 oz)   BMI 29.27 kg/m²       Physical Exam  LOWER EXTREMITY PHYSICAL EXAMINATION  DERMATOLOGY: Skin is supple, dry and intact.     ORTHOPEDIC (LLE): Rectus 1st and 4th ray are noted. Minimal edema is noted.     ORTHOPEDIC (RLE): Mild to moderate bunion deformity is noted to the left foot. Upon ROM in the sagittal plan (dorsiflexion), there is mild pain related at the end ROM, dorsally; no plantar pains at the 1st MTPJ are stated. No pains to palpation of the tibial or the fibular sesamoid. There is a moderately sized medial eminence appreciated. There is dorsal spurring noted. Palpation of the dorsal-lateral aspect of the 1st MTPJ is " painful. Hallux abductus is noted.  Hallux interphalangeus is not noted. There is tracking. The hallux is trackbound. There is mild hypermobility noted at the 1st TMTJ. Upon reduction of the IM angle at the 1st metatarsal head, the hallux is not rectus. Equinus is noted.     VASCULAR: On the right foot, the dorsalis pedis pulse is 2/4 and the posterior tibial pulse is 2/4. Capillary refill time is less than 3 seconds. Hair growth is present on the dorsum of the foot and at the digits. No rubor is present. Proximal to distal temperature is warm to warm.    Assessment:     1. Hav (hallux abducto valgus), right    2. Pain of right great toe    3. Postop check    4. Pain in left foot    5. Hav (hallux abducto valgus), left    6. Preop examination            Plan:     Hav (hallux abducto valgus), right  -     Case Request Operating Room: FUSION, MTP JOINT  -     X-Ray Foot Complete Right; Future; Expected date: 02/18/2025    Pain of right great toe  -     X-Ray Foot Complete Right; Future; Expected date: 02/18/2025    Postop check    Pain in left foot    Hav (hallux abducto valgus), left    Preop examination  -     Ambulatory referral/consult to Pre-Admit; Future; Expected date: 02/25/2025      Thorough discussion is had with the patient today, concerning the diagnosis, its etiology, and the treatment algorithm at present.     LLE XRAYS are reviewed in detail with the patient. All questions and concerns regarding findings and its/their implications are outlined and discussed.    RLE XRAYS are reviewed in detail with the patient. All questions and concerns regarding findings and its/their implications are outlined and discussed.    RLE XRAY show decreased 1st MTPJ joint space with dorsal spurring with eburnation. IM anlge is approximately 15 degrees.     The procedure of (RLE 1st MTPJ fusion) was thoroughly explained to the patient. Its necessity and/or purpose and the implications therein were outlined, including any  pertinent advantages and/or disadvantages, and possible complications, if any. Possible complications include recurrence of pathology and/or deformity, infection (cellulitis, drainage, purulence, malodor, etc...), pain, numbness, neuritis, edema, burning, loss of function, need for further surgery, possible need for removal of any implanted hardware, soft tissue contracture and/or scarring, etc... No guarantees were given and/or implied. Post-operative expectations and weightbearing protocol is thoroughly explained to the patient, who acknowledges understanding. Rx. is given for pre-operative labs and for medical clearance by patient's PMD and/or PAT Dept. here within [facility].     Take note, the harmful effects of nicotine/tobacco/cigarette/ marijuana smoking, especially in relation to the lower extremity. Strongly consider consultation with primary care provider or Smoking Cessation [facility] for further discussion of smoking cessation methods. Smoking & Tobacco use cessation couseling is recommended.        Future Appointments   Date Time Provider Department Center   2/24/2025  8:45 AM [facility]

## 2025-02-19 ENCOUNTER — HOSPITAL ENCOUNTER (OUTPATIENT)
Dept: RADIOLOGY | Facility: HOSPITAL | Age: 65
Discharge: HOME | End: 2025-02-19
Attending: PODIATRIST
Payer: MEDICARE

## 2025-02-19 DIAGNOSIS — M79.674 PAIN OF RIGHT GREAT TOE: ICD-10-CM

## 2025-02-19 DIAGNOSIS — M20.11 HAV (HALLUX ABDUCTO VALGUS), RIGHT: ICD-10-CM

## 2025-02-19 PROCEDURE — 73630 X-RAY EXAM OF FOOT: CPT | Mod: TC,HCNC,RT

## 2025-02-24 ENCOUNTER — LAB VISIT (OUTPATIENT)
Dept: LAB | Facility: HOSPITAL | Age: 65
End: 2025-02-24
Attending: FAMILY MEDICINE
Payer: MEDICARE

## 2025-02-24 ENCOUNTER — OFFICE VISIT (OUTPATIENT)
Dept: INTERNAL MEDICINE | Facility: CLINIC | Age: 65
End: 2025-02-24
Payer: MEDICARE

## 2025-02-24 VITALS
WEIGHT: 162.38 LBS | DIASTOLIC BLOOD PRESSURE: 74 MMHG | BODY MASS INDEX: 29.7 KG/M2 | OXYGEN SATURATION: 97 % | TEMPERATURE: 97 F | HEART RATE: 102 BPM | SYSTOLIC BLOOD PRESSURE: 120 MMHG

## 2025-02-24 DIAGNOSIS — G44.219 EPISODIC TENSION-TYPE HEADACHE, NOT INTRACTABLE: Chronic | ICD-10-CM

## 2025-02-24 DIAGNOSIS — M54.12 CERVICAL RADICULOPATHY: ICD-10-CM

## 2025-02-24 DIAGNOSIS — F17.210 CIGARETTE NICOTINE DEPENDENCE WITHOUT COMPLICATION: ICD-10-CM

## 2025-02-24 DIAGNOSIS — Z01.818 PREOP EXAMINATION: ICD-10-CM

## 2025-02-24 DIAGNOSIS — J45.909 ASTHMA, UNSPECIFIED ASTHMA SEVERITY, UNSPECIFIED WHETHER COMPLICATED, UNSPECIFIED WHETHER PERSISTENT: ICD-10-CM

## 2025-02-24 DIAGNOSIS — K21.9 GASTROESOPHAGEAL REFLUX DISEASE WITHOUT ESOPHAGITIS: ICD-10-CM

## 2025-02-24 DIAGNOSIS — I70.0 ATHEROSCLEROSIS OF AORTA: Chronic | ICD-10-CM

## 2025-02-24 DIAGNOSIS — Z01.818 PREOPERATIVE EXAMINATION: ICD-10-CM

## 2025-02-24 DIAGNOSIS — G24.01 TARDIVE DYSKINESIA: Chronic | ICD-10-CM

## 2025-02-24 DIAGNOSIS — G44.209 TENSION HEADACHE: Chronic | ICD-10-CM

## 2025-02-24 DIAGNOSIS — M85.89 OSTEOPENIA OF MULTIPLE SITES: ICD-10-CM

## 2025-02-24 DIAGNOSIS — F41.9 ANXIETY: Chronic | ICD-10-CM

## 2025-02-24 DIAGNOSIS — R07.9 CHEST PAIN, UNSPECIFIED TYPE: ICD-10-CM

## 2025-02-24 DIAGNOSIS — I10 ESSENTIAL HYPERTENSION: Chronic | ICD-10-CM

## 2025-02-24 DIAGNOSIS — F31.9 BIPOLAR 1 DISORDER: Chronic | ICD-10-CM

## 2025-02-24 DIAGNOSIS — F25.0 SCHIZOAFFECTIVE DISORDER, BIPOLAR TYPE: Chronic | ICD-10-CM

## 2025-02-24 DIAGNOSIS — M20.11 HAV (HALLUX ABDUCTO VALGUS), RIGHT: ICD-10-CM

## 2025-02-24 DIAGNOSIS — E78.00 PURE HYPERCHOLESTEROLEMIA: Chronic | ICD-10-CM

## 2025-02-24 DIAGNOSIS — Z86.79 HISTORY OF SPONTANEOUS SUBARACHNOID INTRACRANIAL HEMORRHAGE DUE TO CEREBRAL ANEURYSM: ICD-10-CM

## 2025-02-24 DIAGNOSIS — G62.9 PERIPHERAL POLYNEUROPATHY: Chronic | ICD-10-CM

## 2025-02-24 DIAGNOSIS — M20.11 HAV (HALLUX ABDUCTO VALGUS), RIGHT: Primary | ICD-10-CM

## 2025-02-24 DIAGNOSIS — M54.17 LUMBOSACRAL RADICULOPATHY AT S1: ICD-10-CM

## 2025-02-24 DIAGNOSIS — R73.03 PREDIABETES: Chronic | ICD-10-CM

## 2025-02-24 DIAGNOSIS — M77.42 METATARSALGIA OF LEFT FOOT: ICD-10-CM

## 2025-02-24 LAB
ALBUMIN SERPL BCP-MCNC: 4 G/DL (ref 3.5–5.2)
ALP SERPL-CCNC: 90 U/L (ref 40–150)
ALT SERPL W/O P-5'-P-CCNC: 14 U/L (ref 10–44)
ANION GAP SERPL CALC-SCNC: 8 MMOL/L (ref 8–16)
AST SERPL-CCNC: 14 U/L (ref 10–40)
BASOPHILS # BLD AUTO: 0.05 K/UL (ref 0–0.2)
BASOPHILS NFR BLD: 0.6 % (ref 0–1.9)
BILIRUB SERPL-MCNC: 0.2 MG/DL (ref 0.1–1)
BUN SERPL-MCNC: 7 MG/DL (ref 8–23)
CALCIUM SERPL-MCNC: 9.8 MG/DL (ref 8.7–10.5)
CHLORIDE SERPL-SCNC: 103 MMOL/L (ref 95–110)
CO2 SERPL-SCNC: 29 MMOL/L (ref 23–29)
CREAT SERPL-MCNC: 0.9 MG/DL (ref 0.5–1.4)
DIFFERENTIAL METHOD BLD: ABNORMAL
EOSINOPHIL # BLD AUTO: 0.3 K/UL (ref 0–0.5)
EOSINOPHIL NFR BLD: 3.3 % (ref 0–8)
ERYTHROCYTE [DISTWIDTH] IN BLOOD BY AUTOMATED COUNT: 14.2 % (ref 11.5–14.5)
EST. GFR  (NO RACE VARIABLE): >60 ML/MIN/1.73 M^2
GLUCOSE SERPL-MCNC: 87 MG/DL (ref 70–110)
HCT VFR BLD AUTO: 40.1 % (ref 37–48.5)
HGB BLD-MCNC: 13.1 G/DL (ref 12–16)
IMM GRANULOCYTES # BLD AUTO: 0.02 K/UL (ref 0–0.04)
IMM GRANULOCYTES NFR BLD AUTO: 0.2 % (ref 0–0.5)
LYMPHOCYTES # BLD AUTO: 4.3 K/UL (ref 1–4.8)
LYMPHOCYTES NFR BLD: 46.9 % (ref 18–48)
MCH RBC QN AUTO: 29.7 PG (ref 27–31)
MCHC RBC AUTO-ENTMCNC: 32.7 G/DL (ref 32–36)
MCV RBC AUTO: 91 FL (ref 82–98)
MONOCYTES # BLD AUTO: 0.7 K/UL (ref 0.3–1)
MONOCYTES NFR BLD: 7.5 % (ref 4–15)
NEUTROPHILS # BLD AUTO: 3.8 K/UL (ref 1.8–7.7)
NEUTROPHILS NFR BLD: 41.5 % (ref 38–73)
NRBC BLD-RTO: 0 /100 WBC
PLATELET # BLD AUTO: 345 K/UL (ref 150–450)
PMV BLD AUTO: 8.8 FL (ref 9.2–12.9)
POTASSIUM SERPL-SCNC: 4.1 MMOL/L (ref 3.5–5.1)
PROT SERPL-MCNC: 7.2 G/DL (ref 6–8.4)
RBC # BLD AUTO: 4.41 M/UL (ref 4–5.4)
SODIUM SERPL-SCNC: 140 MMOL/L (ref 136–145)
WBC # BLD AUTO: 9.08 K/UL (ref 3.9–12.7)

## 2025-02-24 PROCEDURE — 36415 COLL VENOUS BLD VENIPUNCTURE: CPT | Mod: HCNC | Performed by: NURSE PRACTITIONER

## 2025-02-24 PROCEDURE — 80053 COMPREHEN METABOLIC PANEL: CPT | Mod: HCNC | Performed by: NURSE PRACTITIONER

## 2025-02-24 PROCEDURE — 99999 PR PBB SHADOW E&M-EST. PATIENT-LVL III: CPT | Mod: PBBFAC,HCNC,,

## 2025-02-24 PROCEDURE — 85025 COMPLETE CBC W/AUTO DIFF WBC: CPT | Mod: HCNC | Performed by: NURSE PRACTITIONER

## 2025-02-24 NOTE — ASSESSMENT & PLAN NOTE
-normal ROM of cervical spine demonstrated upon exam  -Tylenol continued as needed  -allergy to NSAIDs confirmed  -tramadol continued as needed- hold morning of surgery  -careful attention to positioning during procedure

## 2025-02-24 NOTE — PRE-PROCEDURE INSTRUCTIONS
Procedure Date: 2/28/25  Arrival Time: We will call you after 2pm the day before your procedure with your arrival time.    Address:   [facility] (Off [facility]nd [facility] on the left)  [id number] [place] [address] Taya Shaikh [place]. [address]  >>>Please enter through front entrance Lobby of 1st floor to Registration desk<<<      !!!INSTRUCTIONS IMPORTANT!!!  NO FOOD or tobacco products after midnight the night before surgery! You may have clear liquids up to 3 hrs before your arrival to the Hospital  Clear liquids include Gatorade, water, soda, black coffee or tea (no milk or creamer), and clear juices.  Clear liquids do NOT include anything with pulp or food particles (Chicken broth, ice cream, yogurt, Jello, etc.)      MORNING OF SURGERY, drink small sip of water with the following medications instructed by Surgery Pre-Admit Provider:  TRILEPTAL  COMBIVENT  ALBUTEROL, AS NEEDED    *Additional Medication Instructions: BRING ALBUTEROL INHALER DAY OF SURGERY!    Diabetic/Prediabetic Patients: If you take diabetic or weight loss medication, Do NOT take morning of surgery unless instructed by Doctor. Metformin to be stopped 24 hrs prior to surgery. Ozempic/ Mounjaro/ Wegovy/ Trulicity/ Semaglutide or any weight loss injections to be stopped 7 days prior to surgery. DO NOT take long-acting insulin the evening before surgery. Blood sugars will be checked in pre-op by Nurse.    !!!STOP ALL Aspirins, NSAIDS, WEIGHT LOSS INJECTIONS/PILLS, Herbal supplements, & Vitamins 7 DAYS BEFORE SURGERY!!! Ok to take Tylenol if needed    ____  Avoid Alcoholic beverages 3 days prior to surgery, as it can thin the blood.  ____  NO Acrylic/fake nails or nail polish worn day of surgery (specifically hand/arm & foot surgeries).  ____  NO powder, lotions, deodorants, oils or cream on body.  ____  Remove all jewelry, piercings, & foreign objects prior to arrival and leave at home.  ____  Remove Dentures, Hearing Aids & Contact Lens prior to  surgery.  ____  Bring photo ID and insurance information to hospital (Leave Valuables at Home).  ____  If going home the same day, arrange for a ride home. You will not be able to drive for 24 hrs if Anesthesia was used.   ____  Females (ages 11-60): may need to give a urine sample the morning of surgery; please see Pre op Nurse prior to using the restroom.  ____  Males: Stop ED medications (Viagra, Cialis) 24 hrs prior to surgery.  ____  Wear clean, loose fitting clothing to allow for dressings/ bandages.      Bathing Instructions:    -Shower with anti-bacterial Soap (Hibiclens or Dial) the night before surgery & the morning of surgery!   -Do not use Hibiclens soap on your face or genitals.   -Apply clean clothes after shower.  -Do not shave your face or body 2 days prior to surgery unless instructed otherwise by your Surgeon.  -Do not shave pubic hair 7 days prior to surgery (gyn pt's).    [facility] Visitor/Ride Policy:  Only 2 adults allowed in pre op/recovery area during your procedure. You MUST HAVE A RIDE HOME from a responsible adult that you know and trust. Medical Transport, Uber or Lyft can ONLY be used if patient has a responsible adult to accompany them during ride home.       *Signs and symptoms of Infection Before or After Surgery:               !!!If you experience any fever, chills, nausea/ vomiting, foul odor/ excessive drainage from surgical site, flu-like symptoms, new wounds or cuts, PLEASE CALL THE SURGEON OFFICE at [phone] or SEND MESSAGE THROUGH [employer] PORTAL!!!       *If you are running late day of surgery, please call the Surgery Dept @ [phone].    *Billing question, please call:  [phone] or [phone]       Thank you,  -[facility] Pre Admit Dept.  [phone] or [phone]  M-F 7:30 am-4:00 pm (Closed Major Holidays)    Additional Tests Scheduled Today:  CHEST XRAY   Additional Tests Scheduled Today:  EKG  Additional Tests Scheduled Today:  LABS

## 2025-02-24 NOTE — ASSESSMENT & PLAN NOTE
-in the setting of previous aneurysm and stroke in 2014  -Fioricet continued as needed- hold morning of surgery  -Tylenol continued as needed  -amitriptyline continued as needed at night

## 2025-02-24 NOTE — ASSESSMENT & PLAN NOTE
-patient reports last use of inhaler for rescue 2 days prior to exam  -albuterol inhaler/nebulizer continued as needed-may take morning of surgery  -Combivent continued- take morning of surgery  -smoking cessation discussed

## 2025-02-24 NOTE — ASSESSMENT & PLAN NOTE
-stable  -Fosamax prescribed- patient is not taking at this time  -calcium and vitamin-D supplements prescribed  -patient advised to hold all vitamins and supplements for 7 days prior to procedure

## 2025-02-24 NOTE — PROGRESS NOTES
Preoperative History and Physical  Pre-Admit Testing                                                                    Chief Complaint: Preoperative evaluation     History of Present Illness:      Karyna Sanders is a 65 y.o. female who presents to the office today for a preoperative consultation at the request of Dr. Rodas who plans on performing a FUSION, MTP JOINT (RIGHT) on February 28.     Functional Status:      The patient is not able to climb a flight of stairs. The patient is able to ambulate with cane required for gait stability. The patient's functional status is affected by the surgical problem due to pain and decreased mobility.  Patient's functional status is affected by shortness of breath. The patient's functional status is not affected by chest pain, dyspnea on exertion and fatigue.    MET score greater than 4    Patient Anesthesia History:      History of Malignant Hyperthermia: no  History of Pseudocholinesterase Deficiency: no  History PONV: no  History of difficult intubation: no  History of delayed emergence: no  History of high fever after anesthesia: no    Family Anesthesia History:      History of Malignant Hyperthermia: no  History of Pseudocholinesterase Deficiency: no    Past Medical History:      Past Medical History:   Diagnosis Date    Anemia     Aneurysm     Anorexia 4/26/2017    Anxiety     Asthma     Atherosclerosis of aorta 1/5/2022    CT ABDOMEN PELVIS WITH CONTRAST 01/03/2021 FINDINGS: Mild atherosclerosis within the abdominal aorta which is nonaneurysmal.    Bipolar disorder     Carpal tunnel syndrome, bilateral     Cerebral aneurysm     Chronic nausea 11/15/2018    Chronic pain syndrome     Cigarette nicotine dependence     Depression     Essential hypertension 8/29/2014    GERD (gastroesophageal reflux disease)     Hyperlipidemia     Hypertension     Insomnia     Osteoporosis     Pure hypercholesterolemia 9/8/2014     Recurrent tension-type headache, not intractable 7/24/2019    Schizophrenia     Stroke     Subarachnoid hemorrhage         Past Surgical History:      Past Surgical History:   Procedure Laterality Date    BUNIONECTOMY Right     COLONOSCOPY N/A 02/07/2022    Procedure: COLONOSCOPY;  Surgeon: Kamila Lund MD;  Location: [facility] ENDO;  Service: Endoscopy;  Laterality: N/A;    COLONOSCOPY W/ BIOPSIES AND POLYPECTOMY      ESOPHAGOGASTRODUODENOSCOPY N/A 02/07/2022    Procedure: EGD (ESOPHAGOGASTRODUODENOSCOPY);  Surgeon: Kamila Lund MD;  Location: [facility] ENDO;  Service: Endoscopy;  Laterality: N/A;    FUSION OF METATARSOPHALANGEAL JOINT Left 12/6/2024    Procedure: FUSION, MTP JOINT;  Surgeon: Chadwick Rodas DPM;  Location: [facility] OR;  Service: Podiatry;  Laterality: Left;    HYSTERECTOMY      OSTEOTOMY OF METATARSAL BONE Left 12/6/2024    Procedure: OSTEOTOMY, METATARSAL BONE;  Surgeon: Chadwick Rodas DPM;  Location: [facility] OR;  Service: Podiatry;  Laterality: Left;    PARTIAL HYSTERECTOMY      Bilateral Ovaries Remain    REPAIR OF ANEURYSM      clip and coil    RESECTION OF GASTROCNEMIUS MUSCLE Left 12/6/2024    Procedure: RESECTION, MUSCLE, GASTROCNEMIUS;  Surgeon: Chadwick Rodas DPM;  Location: [facility] OR;  Service: Podiatry;  Laterality: Left;    TRANSCRANIAL DOPPLER STUDY - COMPLETE  08/28/2014         TRANSCRANIAL DOPPLER STUDY - COMPLETE  08/29/2014         TRANSCRANIAL DOPPLER STUDY - COMPLETE  08/30/2014         TRANSCRANIAL DOPPLER STUDY - COMPLETE  08/31/2014         TRANSCRANIAL DOPPLER STUDY - COMPLETE  09/01/2014         TRANSCRANIAL DOPPLER STUDY - COMPLETE  09/02/2014         TRANSCRANIAL DOPPLER STUDY - COMPLETE  09/03/2014         TRANSCRANIAL DOPPLER STUDY - COMPLETE  09/04/2014         TRANSCRANIAL DOPPLER STUDY - COMPLETE  09/05/2014         TRANSCRANIAL DOPPLER STUDY - COMPLETE  09/06/2014         VAGINAL DELIVERY      X 3        Social History:      Social History     Socioeconomic  History    Marital status:     Number of children: 3    Years of education: 11th Grade   Tobacco Use    Smoking status: Every Day     Current packs/day: 1.00     Average packs/day: 1 pack/day for 52.5 years (52.5 ttl pk-yrs)     Types: Cigarettes, Cigars, Vaping with nicotine     Start date: 1976    Smokeless tobacco: Never    Tobacco comments:     in smoking program 5/13/19   Substance and Sexual Activity    Alcohol use: Yes     Comment: occasional    Drug use: No    Sexual activity: Not Currently     Partners: Male   Social History Narrative    Patient describes herself as disabled.     Social Drivers of Health     Financial Resource Strain: Medium Risk (11/16/2024)    Overall Financial Resource Strain (CARDIA)     Difficulty of Paying Living Expenses: Somewhat hard   Food Insecurity: No Food Insecurity (11/16/2024)    Hunger Vital Sign     Worried About Running Out of Food in the Last Year: Never true     Ran Out of Food in the Last Year: Never true   Recent Concern: Food Insecurity - Food Insecurity Present (10/16/2024)    Hunger Vital Sign     Worried About Running Out of Food in the Last Year: Sometimes true     Ran Out of Food in the Last Year: Never true   Transportation Needs: No Transportation Needs (10/16/2024)    PRAPARE - Transportation     Lack of Transportation (Medical): No     Lack of Transportation (Non-Medical): No   Physical Activity: Insufficiently Active (11/16/2024)    Exercise Vital Sign     Days of Exercise per Week: 2 days     Minutes of Exercise per Session: 30 min   Stress: No Stress Concern Present (11/16/2024)    [employer] - Occupational Stress Questionnaire     Feeling of Stress : Not at all   Recent Concern: Stress - Stress Concern Present (10/16/2024)    [employer] - Occupational Stress Questionnaire     Feeling of Stress : Very much   Housing Stability: Unknown (11/16/2024)    Housing Stability Vital Sign      Unable to Pay for Housing in the Last Year: No     Homeless in the Last Year: No        Family History:      Family History   Problem Relation Name Age of Onset    Cancer Mother      Glaucoma Mother      Stroke Mother      Diabetes type II Mother      Heart disease Mother      Hypertension Mother      Birth defects Sister      Brain cancer Sister      Diabetes type II Sister      Birth defects Sister      Bone cancer Sister      Cancer Brother      Hypertension Brother         Allergies:      Review of patient's allergies indicates:   Allergen Reactions    Aspirin      Told to avoid due to aneurysm repair     Nsaids (non-steroidal anti-inflammatory drug)      Due to aneurysm    Ibuprofen Anxiety     Told to avoid due to aneurysm repair        Medications:      Current Outpatient Medications   Medication Sig    acetaminophen (TYLENOL ORAL) Take 650 mg by mouth as needed.    albuterol (PROVENTIL) 2.5 mg /3 mL (0.083 %) nebulizer solution Take 3 mLs (2.5 mg total) by nebulization every 6 (six) hours as needed for Wheezing. Rescue    albuterol (PROVENTIL/VENTOLIN HFA) 90 mcg/actuation inhaler INHALE 2 PUFFS INTO THE LUNGS EVERY 6 HOURS AS NEEDED FOR SHORTNESS OF BREATH OR WHEEZING    alendronate (FOSAMAX) 70 MG tablet Take 1 tablet every week by oral route.    alprazolam (XANAX) 0.5 MG tablet Take 0.5 mg by mouth daily as needed.     amitriptyline (ELAVIL) 50 MG tablet Take 1 tablet (50 mg total) by mouth every evening. Take every night and not as needed!    amLODIPine (NORVASC) 5 MG tablet Take 1 tablet (5 mg total) by mouth once daily.    atorvastatin (LIPITOR) 80 MG tablet Take 1 tablet (80 mg total) by mouth every evening.    BLOOD PRESSURE CUFF Misc Use to check blood pressure once daily    butalbital-acetaminophen-caffeine -40 mg (FIORICET, ESGIC) -40 mg per tablet Take 1 tablet by mouth every 8 (eight) hours. DO NOT USE MORE THAN 3 DAYS PER WEEK (Patient taking differently: Take 1 tablet by mouth  every 8 (eight) hours as needed. DO NOT USE MORE THAN 3 DAYS PER WEEK)    CALCIUM CARBONATE/VITAMIN D3 (CALCIUM 600 + D,3, ORAL) Take 2 tablets by mouth once daily.    citalopram (CELEXA) 40 MG tablet Take 1 tablet by mouth once daily.    COMBIVENT RESPIMAT  mcg/actuation inhaler Inhale 1 puff into the lungs every 6 (six) hours as needed.    cyproheptadine HCl (CYPROHEPTADINE ORAL) Take by mouth 3 (three) times daily before meals.    gabapentin (NEURONTIN) 300 MG capsule Take 1 capsule (300 mg total) by mouth 2 (two) times a day.    hydrOXYzine HCL (ATARAX) 25 MG tablet Take 1 tablet (25 mg total) by mouth 3 (three) times daily as needed for Itching.    LEXAPRO 10 mg tablet Take 10 mg by mouth once daily.    nebulizer accessories Kit NEBULIZER ACCESSORIES - Use as directed for nebulized medications prescribed by me.    nebulizer and compressor [employer] NEBULIZER DEVICE - Use as directed    nitroGLYCERIN (NITROSTAT) 0.4 MG SL tablet Place 0.4 mg under the tongue every 5 (five) minutes as needed for Chest pain.    omeprazole (PRILOSEC) 40 MG capsule Take 1 capsule (40 mg total) by mouth every morning.    OXcarbazepine (TRILEPTAL) 600 MG Tab Take 150 mg by mouth 2 (two) times daily.    polyethylene glycol (GLYCOLAX) 17 gram/dose powder Take 17 g by mouth daily as needed for Constipation.    QUEtiapine (SEROQUEL) 100 MG Tab Take 100 mg by mouth every evening.    traMADoL (ULTRAM) 50 mg tablet Take 50 mg by mouth every 6 (six) hours as needed for Pain.     No current facility-administered medications for this visit.       Vitals:      Vitals:    02/24/25 0848   BP: 120/74   Pulse: 102   Temp: 97 °F (36.1 °C)       Review of Systems:        Constitutional: Negative for fever, chills, weight loss, malaise/fatigue and diaphoresis.   HENT: Negative for hearing loss, ear pain, nosebleeds, congestion, sore throat, neck pain, tinnitus and ear discharge.    Eyes: Negative for blurred vision, double vision, photophobia, pain,  discharge and redness.   Respiratory: Negative for cough, hemoptysis, sputum production, shortness of breath, wheezing and stridor.    Cardiovascular: Negative for chest pain, palpitations, orthopnea, claudication, leg swelling and PND.   Gastrointestinal: Negative for heartburn, nausea, vomiting, abdominal pain, diarrhea, constipation, blood in stool and melena.   Genitourinary: Negative for dysuria, urgency, frequency, hematuria and flank pain.   Musculoskeletal: Negative for myalgias, back pain, joint pain and falls.   Skin: Negative for itching and rash.   Neurological: Negative for dizziness, tingling, tremors, sensory change, speech change, focal weakness, seizures, loss of consciousness, weakness and headaches.   Endo/Heme/Allergies: Negative for environmental allergies and polydipsia. Does not bruise/bleed easily.   Psychiatric/Behavioral: Negative for depression, suicidal ideas, hallucinations, memory loss and substance abuse. The patient is not nervous/anxious and does not have insomnia.    All 14 systems reviewed and negative except as noted above.    Physical Exam:      Constitutional: Appears well-developed, well-nourished and in no acute distress.  Patient is oriented to person, place, and time.  Antalgic gait noted.  Use of cane required for gait stability  Head: Normocephalic and atraumatic. Mucous membranes moist.  Tardive dyskinesia present  Neck: Neck supple no mass.   Cardiovascular: Normal rate and regular rhythm.  S1 S2 appreciated by ascultation.  Pulmonary/Chest: Effort normal and clear to auscultation bilaterally. No respiratory distress.   Abdomen: Soft. Non-tender and non-distended. Bowel sounds are normal.   Neurological: Patient is alert and oriented to person, place and time. Moves all extremities.  Skin: Warm and dry. No lesions.  Extremities: No clubbing, cyanosis or edema.    Laboratory data:      Reviewed and noted in plan where applicable. Please see chart for full laboratory  data.    Lab Results   Component Value Date    INR 1.1 05/02/2024    INR 1.0 05/01/2024    INR 1.0 04/03/2017       Lab Results   Component Value Date    WBC 9.08 02/24/2025    HGB 13.1 02/24/2025    HCT 40.1 02/24/2025    MCV 91 02/24/2025     02/24/2025     Comprehensive Metabolic Panel  Order: [id number]   Status: Final result  Dx: Hav (hallux abducto valgus), right               Component  Ref Range & Units (hover) 1 d ago  (2/24/25) 1 mo ago  (1/2/25) 3 mo ago  (11/18/24) 9 mo ago  (5/2/24) 10 mo ago  (5/1/24) 1 yr ago  (2/22/24) 1 yr ago  (11/17/23) 1 yr ago  (11/17/23)   Sodium 140 144 140 139 137 141 144    Potassium 4.1 4.1 3.9 3.9 3.8 3.7 3.9    Chloride 103 105 104 106 106 107 109    CO2 29 27 27 24 25 27 22 Low     Glucose 87 96 92 99 101 111 High  96    BUN 7 Low  7 Low  11 9 7 Low  7 Low  10    Creatinine 0.9 0.8 0.8 0.9 0.9 0.9 0.8    Calcium 9.8 10.1 9.2 9.6 9.2 9.4 9.4    Total Protein 7.2 7.9 6.9 6.6 6.2 7.2 6.5 6.5   Albumin 4.0 4.0 3.7 3.7 3.6 4.0 3.8    Total Bilirubin 0.2 0.4 CM 0.2 CM 0.2 CM 0.3 CM 0.3 CM 0.2 CM    Comment: For infants and newborns, interpretation of results should be based  on gestational age, weight and in agreement with clinical  observations.    Premature Infant recommended reference ranges:  Up to 24 hours.............<8.0 mg/dL  Up to 48 hours............<12.0 mg/dL  3-5 days..................<15.0 mg/dL  6-29 days.................<15.0 mg/dL   Alkaline Phosphatase 90 92 82 83 R 81 R 90 R 79 R    AST 14 15 13 14 13 15 17    ALT 14 12 13 10 13 11 15    eGFR >60 >60.0 >60 >60 >60 >60 >60.0    Anion Gap 8 12 9 9 6 Low  7 Low  13    Resulting Agency [facility]RL[facility]            Predictors of intubation difficulty:       Morbid obesity? no   Anatomically abnormal facies? no   Prominent incisors? no   Receding mandible? no   Short, thick neck? no   Neck range of motion: normal   Dentition:  Dentures full plate on top and partial plate on  bottom  Based on the Modified Mallampati, patient is a mallampati score: III (soft and hard palate and base of uvula visible)    Cardiographics:      ECG:  Sinus tachycardia- otherwise normal ECG on 02/25/2025    Echocardiogram on 5/1/224: Left Ventricle: The left ventricle is normal in size. Normal wall thickness. There is normal systolic function with a visually estimated ejection fraction of 55 - 70%. Ejection fraction by visual approximation is 60%. There is normal diastolic function.    Right Ventricle: Normal right ventricular cavity size. Wall thickness is normal. Systolic function is normal.    IVC/SVC: Normal venous pressure at 3 mmHg.    Imaging:      Chest x-ray: The cardiomediastinum is normal and the lungs are clear. No acute cardiopulmonary disease per imaging on 2/25/2025    Assessment and Plan:      1. Hav (hallux abducto valgus), right  Assessment & Plan:  FUSION, MTP JOINT (RIGHT) planned per Dr. Rodas on 2/28/2025   Known risk factors for perioperative complications: Stroke x 2 w/ hx of cerebral aneurysm     Difficulty with intubation is not anticipated.    Cardiac Risk Estimation: Based on the Revised Cardiac Risk index, patient is a Class II risk with a 6 % risk of a major cardiac event in a low risk procedure.    1.) Preoperative workup as follows: chest x-ray, ECG, hemoglobin, hematocrit, electrolytes, creatinine, glucose.  2.) Change in medication regimen before surgery: discontinue ASA 6 days before surgery, discontinue NSAIDs 5 days before surgery, hold Metformin 24 hours prior to surgery. Hold all vitamins/supplements for 7 days prior to surgery, with the exception of Potassium and Iron supplementation. Hold semaglutide/tirzepatide for 7 days prior to surgery.   3.) Prophylaxis for cardiac events with perioperative beta-blockers: not indicated.  4.) Invasive hemodynamic monitoring perioperatively: at the discretion of anesthesiologist.  5.) Deep vein thrombosis prophylaxis  postoperatively: regimen to be chosen by surgical team.  6.) Surveillance for postoperative MI with ECG immediately postoperatively and on postoperati ve days 1 and 2 AND troponin levels 24 hours postoperatively and on day 4 or hospital discharge (whichever comes first): at the discretion of anesthesiologist.  7.) Current medications which may produce withdrawal symptoms if withheld perioperatively: None  8.) Other measures: Postoperative hypertension management with IV hydralazine until able to take oral medications.  Postoperative incentive spirometry to prevent pneumonia.     Orders:  -     EKG 12-lead; Future; Expected date: 02/24/2025  -     X-Ray Chest PA And Lateral; Future; Expected date: 02/24/2025  -     Comprehensive Metabolic Panel; Future; Expected date: 02/24/2025    2. Preoperative examination  -     CBC Auto Differential; Future; Expected date: 02/24/2025    3. Schizoaffective disorder, bipolar type  Overview:  Psychiatrist = Marilin Hernández MD    Assessment & Plan:  -stable on prescribed medication regime  -Trileptal continued- take morning of surgery  -Seroquel continued at night      4. Recurrent tension-type headache, not intractable  Assessment & Plan:  -in the setting of previous aneurysm and stroke in 2014  -Fioricet continued as needed- hold morning of surgery  -Tylenol continued as needed  -amitriptyline continued as needed at night      5. Pure hypercholesterolemia  Assessment & Plan:  -stable  -atorvastatin continued at night      6. Prediabetes  Assessment & Plan:  -hemoglobin A1c 6 on 01/02/2025  -ADA diet      7. Peripheral polyneuropathy  Assessment & Plan:  -stable  -gabapentin continued- patient takes as needed- may take morning of surgery      8. Preop examination  -     Ambulatory referral/consult to Pre-Admit    9. Osteopenia of multiple sites  Assessment & Plan:  -stable  -Fosamax prescribed- patient is not taking at this time  -calcium and vitamin-D supplements  prescribed  -patient advised to hold all vitamins and supplements for 7 days prior to procedure      10. Anxiety  Assessment & Plan:  -stable on prescribed medication regime  -Xanax continued as needed- may take morning of surgery      11. Asthma, unspecified asthma severity, unspecified whether complicated, unspecified whether persistent  Assessment & Plan:  -patient reports last use of inhaler for rescue 2 days prior to exam  -albuterol inhaler/nebulizer continued as needed-may take morning of surgery  -Combivent continued- take morning of surgery  -smoking cessation discussed      12. Cervical radiculopathy  Overview:  6/28/24 EMG: There is a chronic radiculopathy of the left C6-1 nerve roots.    Assessment & Plan:  -normal ROM of cervical spine demonstrated upon exam  -Tylenol continued as needed  -allergy to NSAIDs confirmed  -tramadol continued as needed- hold morning of surgery  -careful attention to positioning during procedure      13. Atherosclerosis of aorta  Overview:  CT ABDOMEN PELVIS WITH CONTRAST 01/03/2021  FINDINGS: Mild atherosclerosis within the abdominal aorta which is nonaneurysmal.    Assessment & Plan:  -atorvastatin continued at night  -allergy to aspirin confirmed      14. Essential hypertension  Assessment & Plan:  -controlled  -Norvasc prescribed- patient not taking at this time      15. History of spontaneous subarachnoid intracranial hemorrhage due to cerebral aneurysm  Overview:  NEUROSURGEON: Brandon Geller MD    Assessment & Plan:  - patient reports history in 2014  -atorvastatin continued at night      16. BMI 29.0-29.9,adult  Assessment & Plan:  -BMI 29.70       17. Metatarsalgia of left foot  Assessment & Plan:  - improved on prescribed regime   -Gabapentin as needed      18. Gastroesophageal reflux disease without esophagitis  Assessment & Plan:  -stable   -Prilosec continued at night       19. Bipolar 1 disorder  Overview:  Psychiatrist = Marilin Hernández MD    Assessment &  Plan:  -improved on prescribed regim e  -Lexapro prescribed- patient reports she is no longer taking   -Trileptal continued- take morning of surgery  -Seroquel continued at night  -followed outpatient by Psychiatry      20. Tension headache  Assessment & Plan:  -chronic  -Fioricet continued as needed- hold morning of surgery      21. Lumbosacral radiculopathy at S1  Overview:  October 23, 2018 EMG: There is electrodiagnostic evidence of a MODERATE demyelinating median neuropathy (Carpal tunnel syndrome) across the LEFT wrist and a MILD demyelinating CTS across the RIGHT wrist. 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  There is also evidence of a CHRONIC lumbar radiculopathy of S1    Assessment & Plan:  -stable on prescribed medication regime  -see plan for cervical radiculopathy  -careful attention to positioning during procedure      22. Cigarette nicotine dependence without complication  Assessment & Plan:  Dangers of cigarette smoking were reviewed with patient in detail. Patient was Counseled for 3-10 minutes. Nicotine replacement options were discussed. Nicotine replacement was discussed- not prescribed per patient's request  -patient advised to refrain from smoking for 12-24 hours prior to procedure      23. Tardive dyskinesia  Overview:  PSYCHIATRIST: Marilin Oliva MD    Assessment & Plan:  -present upon exam  -in the setting of bipolar and schizoaffective disorder  -see planned for bipolar and schizoaffective disorder      24. Chest pain, unspecified type  Assessment & Plan:  -patient report episodic chest pain over last 6 weeks- with intermittent pedal edema  -patient asymptomatic upon exam  -history of chest pain reported with nitroglycerin SL prescribed-as needed last use reported 1 month prior to exam  -EKG showed sinus tachycardia- otherwise normal on 02/25/2025  -ambulatory referral placed for  cardiology for further evaluation with encounter scheduled on 02/25/2025               Electronically signed by Christina Palencia NP on 2/25/2025 at 9:02 AM.

## 2025-02-24 NOTE — DISCHARGE INSTRUCTIONS
Pre op instructions reviewed with patient face to face during Clinic Visit with Provider, verbalized understanding.    Procedure Date: 2/28/25  Arrival Time: We will call you after 2pm the day before your procedure with your arrival time.    Address:   [facility] (Off [facility], 2nd Building on the left)  [id number] [place] [address] [place]aya [place]. [address]  >>>Please enter through front entrance Lobby of 1st floor to Registration desk<<<      !!!INSTRUCTIONS IMPORTANT!!!  NO FOOD or tobacco products after midnight the night before surgery! You may have clear liquids up to 3 hrs before your arrival to the Hospital  Clear liquids include Gatorade, water, soda, black coffee or tea (no milk or creamer), and clear juices.  Clear liquids do NOT include anything with pulp or food particles (Chicken broth, ice cream, yogurt, Jello, etc.)      MORNING OF SURGERY, drink small sip of water with the following medications instructed by Surgery Pre-Admit Provider:  TRILEPTAL  COMBIVENT  ALBUTEROL, AS NEEDED    *Additional Medication Instructions: BRING ALBUTEROL INHALER DAY OF SURGERY!    Diabetic/Prediabetic Patients: If you take diabetic or weight loss medication, Do NOT take morning of surgery unless instructed by Doctor. Metformin to be stopped 24 hrs prior to surgery. Ozempic/ Mounjaro/ Wegovy/ Trulicity/ Semaglutide or any weight loss injections to be stopped 7 days prior to surgery. DO NOT take long-acting insulin the evening before surgery. Blood sugars will be checked in pre-op by Nurse.    !!!STOP ALL Aspirins, NSAIDS, WEIGHT LOSS INJECTIONS/PILLS, Herbal supplements, & Vitamins 7 DAYS BEFORE SURGERY!!! Ok to take Tylenol if needed    ____  Avoid Alcoholic beverages 3 days prior to surgery, as it can thin the blood.  ____  NO Acrylic/fake nails or nail polish worn day of surgery (specifically hand/arm & foot surgeries).  ____  NO powder, lotions, deodorants, oils or cream on body.  ____  Remove all jewelry,  piercings, & foreign objects prior to arrival and leave at home.  ____  Remove Dentures, Hearing Aids & Contact Lens prior to surgery.  ____  Bring photo ID and insurance information to hospital (Leave Valuables at Home).  ____  If going home the same day, arrange for a ride home. You will not be able to drive for 24 hrs if Anesthesia was used.   ____  Females (ages 11-60): may need to give a urine sample the morning of surgery; please see Pre op Nurse prior to using the restroom.  ____  Males: Stop ED medications (Viagra, Cialis) 24 hrs prior to surgery.  ____  Wear clean, loose fitting clothing to allow for dressings/ bandages.      Bathing Instructions:    -Shower with anti-bacterial Soap (Hibiclens or Dial) the night before surgery & the morning of surgery!   -Do not use Hibiclens soap on your face or genitals.   -Apply clean clothes after shower.  -Do not shave your face or body 2 days prior to surgery unless instructed otherwise by your Surgeon.  -Do not shave pubic hair 7 days prior to surgery (gyn pt's).    [facility] Vis[facility]/Ride Policy:  Only 2 adults allowed in pre op/recovery area during your procedure. You MUST HAVE A RIDE HOME from a responsible adult that you know and trust. Medical Transport, Uber or Lyft can ONLY be used if patient has a responsible adult to accompany them during ride home.       *Signs and symptoms of Infection Before or After Surgery:               !!!If you experience any fever, chills, nausea/ vomiting, foul odor/ excessive drainage from surgical site, flu-like symptoms, new wounds or cuts, PLEASE CALL THE SURGEON OFFICE at [phone] or SEND MESSAGE THROUGH [facility] PORTAL!!!       *If you are running late day of surgery, please call the Surgery Dept @ [phone].    *Billing question, please call:  [phone] or [phone]       Thank you,  -[facility] Pre Admit Dept.  [phone] or [phone]  M-F 7:30 am-4:00 pm (Closed Major Holidays)    Additional Tests  Scheduled Today:  CHEST XRAY   Additional Tests Scheduled Today:  EKG  Additional Tests Scheduled Today:  LABS

## 2025-02-24 NOTE — ASSESSMENT & PLAN NOTE
-stable on prescribed medication regime  -Trileptal continued- take morning of surgery  -Seroquel continued at night

## 2025-02-24 NOTE — H&P (VIEW-ONLY)
Preoperative History and Physical  Pre-Admit Testing                                                                    Chief Complaint: Preoperative evaluation     History of Present Illness:      Karyna Sanders is a 65 y.o. female who presents to the office today for a preoperative consultation at the request of Dr. Rodas who plans on performing a FUSION, MTP JOINT (RIGHT) on February 28.     Functional Status:      The patient is not able to climb a flight of stairs. The patient is able to ambulate with cane required for gait stability. The patient's functional status is affected by the surgical problem due to pain and decreased mobility.  Patient's functional status is affected by shortness of breath. The patient's functional status is not affected by chest pain, dyspnea on exertion and fatigue.    MET score greater than 4    Patient Anesthesia History:      History of Malignant Hyperthermia: no  History of Pseudocholinesterase Deficiency: no  History PONV: no  History of difficult intubation: no  History of delayed emergence: no  History of high fever after anesthesia: no    Family Anesthesia History:      History of Malignant Hyperthermia: no  History of Pseudocholinesterase Deficiency: no    Past Medical History:      Past Medical History:   Diagnosis Date    Anemia     Aneurysm     Anorexia 4/26/2017    Anxiety     Asthma     Atherosclerosis of aorta 1/5/2022    CT ABDOMEN PELVIS WITH CONTRAST 01/03/2021 FINDINGS: Mild atherosclerosis within the abdominal aorta which is nonaneurysmal.    Bipolar disorder     Carpal tunnel syndrome, bilateral     Cerebral aneurysm     Chronic nausea 11/15/2018    Chronic pain syndrome     Cigarette nicotine dependence     Depression     Essential hypertension 8/29/2014    GERD (gastroesophageal reflux disease)     Hyperlipidemia     Hypertension     Insomnia     Osteoporosis     Pure hypercholesterolemia 9/8/2014     Recurrent tension-type headache, not intractable 7/24/2019    Schizophrenia     Stroke     Subarachnoid hemorrhage         Past Surgical History:      Past Surgical History:   Procedure Laterality Date    BUNIONECTOMY Right     COLONOSCOPY N/A 02/07/2022    Procedure: COLONOSCOPY;  Surgeon: Kamila Lund MD;  Location: [facility];  Service: Endoscopy;  Laterality: N/A;    COLONOSCOPY W/ BIOPSIES AND POLYPECTOMY      ESOPHAGOGASTRODUODENOSCOPY N/A 02/07/2022    Procedure: EGD (ESOPHAGOGASTRODUODENOSCOPY);  Surgeon: Kamila Lund MD;  Location: [facility];  Service: Endoscopy;  Laterality: N/A;    FUSION OF METATARSOPHALANGEAL JOINT Left 12/6/2024    Procedure: FUSION, MTP JOINT;  Surgeon: Chadwick Rodas DPM;  Location: [facility] OR;  Service: Podiatry;  Laterality: Left;    HYSTERECTOMY      OSTEOTOMY OF METATARSAL BONE Left 12/6/2024    Procedure: OSTEOTOMY, METATARSAL BONE;  Surgeon: Chadwick Rodas DPM;  Location: [facility] OR;  Service: Podiatry;  Laterality: Left;    PARTIAL HYSTERECTOMY      Bilateral Ovaries Remain    REPAIR OF ANEURYSM      clip and coil    RESECTION OF GASTROCNEMIUS MUSCLE Left 12/6/2024    Procedure: RESECTION, MUSCLE, GASTROCNEMIUS;  Surgeon: Chadwick Rodas DPM;  Location: [facility] OR;  Service: Podiatry;  Laterality: Left;    TRANSCRANIAL DOPPLER STUDY - COMPLETE  08/28/2014         TRANSCRANIAL DOPPLER STUDY - COMPLETE  08/29/2014         TRANSCRANIAL DOPPLER STUDY - COMPLETE  08/30/2014         TRANSCRANIAL DOPPLER STUDY - COMPLETE  08/31/2014         TRANSCRANIAL DOPPLER STUDY - COMPLETE  09/01/2014         TRANSCRANIAL DOPPLER STUDY - COMPLETE  09/02/2014         TRANSCRANIAL DOPPLER STUDY - COMPLETE  09/03/2014         TRANSCRANIAL DOPPLER STUDY - COMPLETE  09/04/2014         TRANSCRANIAL DOPPLER STUDY - COMPLETE  09/05/2014         TRANSCRANIAL DOPPLER STUDY - COMPLETE  09/06/2014         VAGINAL DELIVERY      X 3        Social History:      Social History     Socioeconomic  History    Marital status:     Number of children: 3    Years of education: 11th Grade   Tobacco Use    Smoking status: Every Day     Current packs/day: 1.00     Average packs/day: 1 pack/day for 52.5 years (52.5 ttl pk-yrs)     Types: Cigarettes, Cigars, Vaping with nicotine     Start date: 1976    Smokeless tobacco: Never    Tobacco comments:     in smoking program 5/13/19   Substance and Sexual Activity    Alcohol use: Yes     Comment: occasional    Drug use: No    Sexual activity: Not Currently     Partners: Male   Social History Narrative    Patient describes herself as disabled.     Social Drivers of Health     Financial Resource Strain: Medium Risk (11/16/2024)    Overall Financial Resource Strain (CARDIA)     Difficulty of Paying Living Expenses: Somewhat hard   Food Insecurity: No Food Insecurity (11/16/2024)    Hunger Vital Sign     Worried About Running Out of Food in the Last Year: Never true     Ran Out of Food in the Last Year: Never true   Recent Concern: Food Insecurity - Food Insecurity Present (10/16/2024)    Hunger Vital Sign     Worried About Running Out of Food in the Last Year: Sometimes true     Ran Out of Food in the Last Year: Never true   Transportation Needs: No Transportation Needs (10/16/2024)    PRAPARE - Transportation     Lack of Transportation (Medical): No     Lack of Transportation (Non-Medical): No   Physical Activity: Insufficiently Active (11/16/2024)    Exercise Vital Sign     Days of Exercise per Week: 2 days     Minutes of Exercise per Session: 30 min   Stress: No Stress Concern Present (11/16/2024)    [employer] - Occupational Stress Questionnaire     Feeling of Stress : Not at all   Recent Concern: Stress - Stress Concern Present (10/16/2024)    [employer] - Occupational Stress Questionnaire     Feeling of Stress : Very much   Housing Stability: Unknown (11/16/2024)    Housing Stability Vital Sign      Unable to Pay for Housing in the Last Year: No     Homeless in the Last Year: No        Family History:      Family History   Problem Relation Name Age of Onset    Cancer Mother      Glaucoma Mother      Stroke Mother      Diabetes type II Mother      Heart disease Mother      Hypertension Mother      Birth defects Sister      Brain cancer Sister      Diabetes type II Sister      Birth defects Sister      Bone cancer Sister      Cancer Brother      Hypertension Brother         Allergies:      Review of patient's allergies indicates:   Allergen Reactions    Aspirin      Told to avoid due to aneurysm repair     Nsaids (non-steroidal anti-inflammatory drug)      Due to aneurysm    Ibuprofen Anxiety     Told to avoid due to aneurysm repair        Medications:      Current Outpatient Medications   Medication Sig    acetaminophen (TYLENOL ORAL) Take 650 mg by mouth as needed.    albuterol (PROVENTIL) 2.5 mg /3 mL (0.083 %) nebulizer solution Take 3 mLs (2.5 mg total) by nebulization every 6 (six) hours as needed for Wheezing. Rescue    albuterol (PROVENTIL/VENTOLIN HFA) 90 mcg/actuation inhaler INHALE 2 PUFFS INTO THE LUNGS EVERY 6 HOURS AS NEEDED FOR SHORTNESS OF BREATH OR WHEEZING    alendronate (FOSAMAX) 70 MG tablet Take 1 tablet every week by oral route.    alprazolam (XANAX) 0.5 MG tablet Take 0.5 mg by mouth daily as needed.     amitriptyline (ELAVIL) 50 MG tablet Take 1 tablet (50 mg total) by mouth every evening. Take every night and not as needed!    amLODIPine (NORVASC) 5 MG tablet Take 1 tablet (5 mg total) by mouth once daily.    atorvastatin (LIPITOR) 80 MG tablet Take 1 tablet (80 mg total) by mouth every evening.    BLOOD PRESSURE CUFF Misc Use to check blood pressure once daily    butalbital-acetaminophen-caffeine -40 mg (FIORICET, ESGIC) -40 mg per tablet Take 1 tablet by mouth every 8 (eight) hours. DO NOT USE MORE THAN 3 DAYS PER WEEK (Patient taking differently: Take 1 tablet by mouth  every 8 (eight) hours as needed. DO NOT USE MORE THAN 3 DAYS PER WEEK)    CALCIUM CARBONATE/VITAMIN D3 (CALCIUM 600 + D,3, ORAL) Take 2 tablets by mouth once daily.    citalopram (CELEXA) 40 MG tablet Take 1 tablet by mouth once daily.    COMBIVENT RESPIMAT  mcg/actuation inhaler Inhale 1 puff into the lungs every 6 (six) hours as needed.    cyproheptadine HCl (CYPROHEPTADINE ORAL) Take by mouth 3 (three) times daily before meals.    gabapentin (NEURONTIN) 300 MG capsule Take 1 capsule (300 mg total) by mouth 2 (two) times a day.    hydrOXYzine HCL (ATARAX) 25 MG tablet Take 1 tablet (25 mg total) by mouth 3 (three) times daily as needed for Itching.    LEXAPRO 10 mg tablet Take 10 mg by mouth once daily.    nebulizer accessories Kit NEBULIZER ACCESSORIES - Use as directed for nebulized medications prescribed by me.    nebulizer and compressor [employer] NEBULIZER DEVICE - Use as directed    nitroGLYCERIN (NITROSTAT) 0.4 MG SL tablet Place 0.4 mg under the tongue every 5 (five) minutes as needed for Chest pain.    omeprazole (PRILOSEC) 40 MG capsule Take 1 capsule (40 mg total) by mouth every morning.    OXcarbazepine (TRILEPTAL) 600 MG Tab Take 150 mg by mouth 2 (two) times daily.    polyethylene glycol (GLYCOLAX) 17 gram/dose powder Take 17 g by mouth daily as needed for Constipation.    QUEtiapine (SEROQUEL) 100 MG Tab Take 100 mg by mouth every evening.    traMADoL (ULTRAM) 50 mg tablet Take 50 mg by mouth every 6 (six) hours as needed for Pain.     No current facility-administered medications for this visit.       Vitals:      Vitals:    02/24/25 0848   BP: 120/74   Pulse: 102   Temp: 97 °F (36.1 °C)       Review of Systems:        Constitutional: Negative for fever, chills, weight loss, malaise/fatigue and diaphoresis.   HENT: Negative for hearing loss, ear pain, nosebleeds, congestion, sore throat, neck pain, tinnitus and ear discharge.    Eyes: Negative for blurred vision, double vision, photophobia, pain,  discharge and redness.   Respiratory: Negative for cough, hemoptysis, sputum production, shortness of breath, wheezing and stridor.    Cardiovascular: Negative for chest pain, palpitations, orthopnea, claudication, leg swelling and PND.   Gastrointestinal: Negative for heartburn, nausea, vomiting, abdominal pain, diarrhea, constipation, blood in stool and melena.   Genitourinary: Negative for dysuria, urgency, frequency, hematuria and flank pain.   Musculoskeletal: Negative for myalgias, back pain, joint pain and falls.   Skin: Negative for itching and rash.   Neurological: Negative for dizziness, tingling, tremors, sensory change, speech change, focal weakness, seizures, loss of consciousness, weakness and headaches.   Endo/Heme/Allergies: Negative for environmental allergies and polydipsia. Does not bruise/bleed easily.   Psychiatric/Behavioral: Negative for depression, suicidal ideas, hallucinations, memory loss and substance abuse. The patient is not nervous/anxious and does not have insomnia.    All 14 systems reviewed and negative except as noted above.    Physical Exam:      Constitutional: Appears well-developed, well-nourished and in no acute distress.  Patient is oriented to person, place, and time.  Antalgic gait noted.  Use of cane required for gait stability  Head: Normocephalic and atraumatic. Mucous membranes moist.  Tardive dyskinesia present  Neck: Neck supple no mass.   Cardiovascular: Normal rate and regular rhythm.  S1 S2 appreciated by ascultation.  Pulmonary/Chest: Effort normal and clear to auscultation bilaterally. No respiratory distress.   Abdomen: Soft. Non-tender and non-distended. Bowel sounds are normal.   Neurological: Patient is alert and oriented to person, place and time. Moves all extremities.  Skin: Warm and dry. No lesions.  Extremities: No clubbing, cyanosis or edema.    Laboratory data:      Reviewed and noted in plan where applicable. Please see chart for full laboratory  data.    Lab Results   Component Value Date    INR 1.1 05/02/2024    INR 1.0 05/01/2024    INR 1.0 04/03/2017       Lab Results   Component Value Date    WBC 9.08 02/24/2025    HGB 13.1 02/24/2025    HCT 40.1 02/24/2025    MCV 91 02/24/2025     02/24/2025     Comprehensive Metabolic Panel  Order: [id number]   Status: Final result  Dx: Hav (hallux abducto valgus), right               Component  Ref Range & Units (hover) 1 d ago  (2/24/25) 1 mo ago  (1/2/25) 3 mo ago  (11/18/24) 9 mo ago  (5/2/24) 10 mo ago  (5/1/24) 1 yr ago  (2/22/24) 1 yr ago  (11/17/23) 1 yr ago  (11/17/23)   Sodium 140 144 140 139 137 141 144    Potassium 4.1 4.1 3.9 3.9 3.8 3.7 3.9    Chloride 103 105 104 106 106 107 109    CO2 29 27 27 24 25 27 22 Low     Glucose 87 96 92 99 101 111 High  96    BUN 7 Low  7 Low  11 9 7 Low  7 Low  10    Creatinine 0.9 0.8 0.8 0.9 0.9 0.9 0.8    Calcium 9.8 10.1 9.2 9.6 9.2 9.4 9.4    Total Protein 7.2 7.9 6.9 6.6 6.2 7.2 6.5 6.5   Albumin 4.0 4.0 3.7 3.7 3.6 4.0 3.8    Total Bilirubin 0.2 0.4 CM 0.2 CM 0.2 CM 0.3 CM 0.3 CM 0.2 CM    Comment: For infants and newborns, interpretation of results should be based  on gestational age, weight and in agreement with clinical  observations.    Premature Infant recommended reference ranges:  Up to 24 hours.............<8.0 mg/dL  Up to 48 hours............<12.0 mg/dL  3-5 days..................<15.0 mg/dL  6-29 days.................<15.0 mg/dL   Alkaline Phosphatase 90 92 82 83 R 81 R 90 R 79 R    AST 14 15 13 14 13 15 17    ALT 14 12 13 10 13 11 15    eGFR >60 >60.0 >60 >60 >60 >60 >60.0    Anion Gap 8 12 9 9 6 Low  7 Low  13    Resulting Agency [facility]RL[facility] BRL[facility]RL[facility] BRL[facility] OCL[facility] OCL[facility]            Predictors of intubation difficulty:       Morbid obesity? no   Anatomically abnormal facies? no   Prominent incisors? no   Receding mandible? no   Short, thick neck? no   Neck range of motion: normal   Dentition:  Dentures full plate on top and partial plate on  bottom  Based on the Modified Mallampati, patient is a mallampati score: III (soft and hard palate and base of uvula visible)    Cardiographics:      ECG:  Sinus tachycardia- otherwise normal ECG on 02/25/2025    Echocardiogram on 5/1/224: Left Ventricle: The left ventricle is normal in size. Normal wall thickness. There is normal systolic function with a visually estimated ejection fraction of 55 - 70%. Ejection fraction by visual approximation is 60%. There is normal diastolic function.    Right Ventricle: Normal right ventricular cavity size. Wall thickness is normal. Systolic function is normal.    IVC/SVC: Normal venous pressure at 3 mmHg.    Imaging:      Chest x-ray: The cardiomediastinum is normal and the lungs are clear. No acute cardiopulmonary disease per imaging on 2/25/2025    Assessment and Plan:      1. Hav (hallux abducto valgus), right  Assessment & Plan:  FUSION, MTP JOINT (RIGHT) planned per Dr. Rodas on 2/28/2025   Known risk factors for perioperative complications: Stroke x 2 w/ hx of cerebral aneurysm     Difficulty with intubation is not anticipated.    Cardiac Risk Estimation: Based on the Revised Cardiac Risk index, patient is a Class II risk with a 6 % risk of a major cardiac event in a low risk procedure.    1.) Preoperative workup as follows: chest x-ray, ECG, hemoglobin, hematocrit, electrolytes, creatinine, glucose.  2.) Change in medication regimen before surgery: discontinue ASA 6 days before surgery, discontinue NSAIDs 5 days before surgery, hold Metformin 24 hours prior to surgery. Hold all vitamins/supplements for 7 days prior to surgery, with the exception of Potassium and Iron supplementation. Hold semaglutide/tirzepatide for 7 days prior to surgery.   3.) Prophylaxis for cardiac events with perioperative beta-blockers: not indicated.  4.) Invasive hemodynamic monitoring perioperatively: at the discretion of anesthesiologist.  5.) Deep vein thrombosis prophylaxis  postoperatively: regimen to be chosen by surgical team.  6.) Surveillance for postoperative MI with ECG immediately postoperatively and on postoperati ve days 1 and 2 AND troponin levels 24 hours postoperatively and on day 4 or hospital discharge (whichever comes first): at the discretion of anesthesiologist.  7.) Current medications which may produce withdrawal symptoms if withheld perioperatively: None  8.) Other measures: Postoperative hypertension management with IV hydralazine until able to take oral medications.  Postoperative incentive spirometry to prevent pneumonia.     Orders:  -     EKG 12-lead; Future; Expected date: 02/24/2025  -     X-Ray Chest PA And Lateral; Future; Expected date: 02/24/2025  -     Comprehensive Metabolic Panel; Future; Expected date: 02/24/2025    2. Preoperative examination  -     CBC Auto Differential; Future; Expected date: 02/24/2025    3. Schizoaffective disorder, bipolar type  Overview:  Psychiatrist = Marilin Hernández MD    Assessment & Plan:  -stable on prescribed medication regime  -Trileptal continued- take morning of surgery  -Seroquel continued at night      4. Recurrent tension-type headache, not intractable  Assessment & Plan:  -in the setting of previous aneurysm and stroke in 2014  -Fioricet continued as needed- hold morning of surgery  -Tylenol continued as needed  -amitriptyline continued as needed at night      5. Pure hypercholesterolemia  Assessment & Plan:  -stable  -atorvastatin continued at night      6. Prediabetes  Assessment & Plan:  -hemoglobin A1c 6 on 01/02/2025  -ADA diet      7. Peripheral polyneuropathy  Assessment & Plan:  -stable  -gabapentin continued- patient takes as needed- may take morning of surgery      8. Preop examination  -     Ambulatory referral/consult to Pre-Admit    9. Osteopenia of multiple sites  Assessment & Plan:  -stable  -Fosamax prescribed- patient is not taking at this time  -calcium and vitamin-D supplements  prescribed  -patient advised to hold all vitamins and supplements for 7 days prior to procedure      10. Anxiety  Assessment & Plan:  -stable on prescribed medication regime  -Xanax continued as needed- may take morning of surgery      11. Asthma, unspecified asthma severity, unspecified whether complicated, unspecified whether persistent  Assessment & Plan:  -patient reports last use of inhaler for rescue 2 days prior to exam  -albuterol inhaler/nebulizer continued as needed-may take morning of surgery  -Combivent continued- take morning of surgery  -smoking cessation discussed      12. Cervical radiculopathy  Overview:  6/28/24 EMG: There is a chronic radiculopathy of the left C6-1 nerve roots.    Assessment & Plan:  -normal ROM of cervical spine demonstrated upon exam  -Tylenol continued as needed  -allergy to NSAIDs confirmed  -tramadol continued as needed- hold morning of surgery  -careful attention to positioning during procedure      13. Atherosclerosis of aorta  Overview:  CT ABDOMEN PELVIS WITH CONTRAST 01/03/2021  FINDINGS: Mild atherosclerosis within the abdominal aorta which is nonaneurysmal.    Assessment & Plan:  -atorvastatin continued at night  -allergy to aspirin confirmed      14. Essential hypertension  Assessment & Plan:  -controlled  -Norvasc prescribed- patient not taking at this time      15. History of spontaneous subarachnoid intracranial hemorrhage due to cerebral aneurysm  Overview:  NEUROSURGEON: Brandon Geller MD    Assessment & Plan:  - patient reports history in 2014  -atorvastatin continued at night      16. BMI 29.0-29.9,adult  Assessment & Plan:  -BMI 29.70       17. Metatarsalgia of left foot  Assessment & Plan:  - improved on prescribed regime   -Gabapentin as needed      18. Gastroesophageal reflux disease without esophagitis  Assessment & Plan:  -stable   -Prilosec continued at night       19. Bipolar 1 disorder  Overview:  Psychiatrist = Marilin Hernández MD    Assessment &  Plan:  -improved on prescribed regim e  -Lexapro prescribed- patient reports she is no longer taking   -Trileptal continued- take morning of surgery  -Seroquel continued at night  -followed outpatient by Psychiatry      20. Tension headache  Assessment & Plan:  -chronic  -Fioricet continued as needed- hold morning of surgery      21. Lumbosacral radiculopathy at S1  Overview:  October 23, 2018 EMG: There is electrodiagnostic evidence of a MODERATE demyelinating median neuropathy (Carpal tunnel syndrome) across the LEFT wrist and a MILD demyelinating CTS across the RIGHT wrist. 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  There is also evidence of a CHRONIC lumbar radiculopathy of S1    Assessment & Plan:  -stable on prescribed medication regime  -see plan for cervical radiculopathy  -careful attention to positioning during procedure      22. Cigarette nicotine dependence without complication  Assessment & Plan:  Dangers of cigarette smoking were reviewed with patient in detail. Patient was Counseled for 3-10 minutes. Nicotine replacement options were discussed. Nicotine replacement was discussed- not prescribed per patient's request  -patient advised to refrain from smoking for 12-24 hours prior to procedure      23. Tardive dyskinesia  Overview:  PSYCHIATRIST: Marilin Oliva MD    Assessment & Plan:  -present upon exam  -in the setting of bipolar and schizoaffective disorder  -see planned for bipolar and schizoaffective disorder      24. Chest pain, unspecified type  Assessment & Plan:  -patient report episodic chest pain over last 6 weeks- with intermittent pedal edema  -patient asymptomatic upon exam  -history of chest pain reported with nitroglycerin SL prescribed-as needed last use reported 1 month prior to exam  -EKG showed sinus tachycardia- otherwise normal on 02/25/2025  -ambulatory referral placed for  cardiology for further evaluation with encounter scheduled on 02/25/2025               Electronically signed by Christina Palencia NP on 2/25/2025 at 9:02 AM.

## 2025-02-25 ENCOUNTER — TELEPHONE (OUTPATIENT)
Dept: CARDIOLOGY | Facility: CLINIC | Age: 65
End: 2025-02-25
Payer: MEDICARE

## 2025-02-25 ENCOUNTER — HOSPITAL ENCOUNTER (OUTPATIENT)
Dept: CARDIOLOGY | Facility: HOSPITAL | Age: 65
Discharge: HOME | End: 2025-02-25
Attending: FAMILY MEDICINE
Payer: MEDICARE

## 2025-02-25 ENCOUNTER — HOSPITAL ENCOUNTER (OUTPATIENT)
Dept: RADIOLOGY | Facility: HOSPITAL | Age: 65
Discharge: HOME | End: 2025-02-25
Attending: NURSE PRACTITIONER
Payer: MEDICARE

## 2025-02-25 DIAGNOSIS — M20.11 HAV (HALLUX ABDUCTO VALGUS), RIGHT: ICD-10-CM

## 2025-02-25 PROBLEM — F17.200 NICOTINE DEPENDENCE: Status: ACTIVE | Noted: 2025-02-25

## 2025-02-25 LAB
OHS QRS DURATION: 82 MS
OHS QTC CALCULATION: 450 MS

## 2025-02-25 PROCEDURE — 71046 X-RAY EXAM CHEST 2 VIEWS: CPT | Mod: 26,HCNC,, | Performed by: RADIOLOGY

## 2025-02-25 PROCEDURE — 93010 ELECTROCARDIOGRAM REPORT: CPT | Mod: HCNC,,, | Performed by: INTERNAL MEDICINE

## 2025-02-25 PROCEDURE — 93005 ELECTROCARDIOGRAM TRACING: CPT | Mod: HCNC

## 2025-02-25 PROCEDURE — 71046 X-RAY EXAM CHEST 2 VIEWS: CPT | Mod: TC,HCNC

## 2025-02-25 NOTE — ASSESSMENT & PLAN NOTE
-stable on prescribed medication regime  -see plan for cervical radiculopathy  -careful attention to positioning during procedure

## 2025-02-25 NOTE — ASSESSMENT & PLAN NOTE
-present upon exam  -in the setting of bipolar and schizoaffective disorder  -see planned for bipolar and schizoaffective disorder

## 2025-02-25 NOTE — ASSESSMENT & PLAN NOTE
FUSION, MTP JOINT (RIGHT) planned per Dr. Rodas on 2/28/2025   Known risk factors for perioperative complications: Stroke x 2 w/ hx of cerebral aneurysm     Difficulty with intubation is not anticipated.    Cardiac Risk Estimation: Based on the Revised Cardiac Risk index, patient is a Class II risk with a 6 % risk of a major cardiac event in a low risk procedure.    1.) Preoperative workup as follows: chest x-ray, ECG, hemoglobin, hematocrit, electrolytes, creatinine, glucose.  2.) Change in medication regimen before surgery: discontinue ASA 6 days before surgery, discontinue NSAIDs 5 days before surgery, hold Metformin 24 hours prior to surgery. Hold all vitamins/supplements for 7 days prior to surgery, with the exception of Potassium and Iron supplementation. Hold semaglutide/tirzepatide for 7 days prior to surgery.  Please refrain from use of alcohol for 72 hours prior to procedure  3.) Prophylaxis for cardiac events with perioperative beta-blockers: not indicated.  4.) Invasive hemodynamic monitoring perioperatively: at the discretion of anesthesiologist.  5.) Deep vein thrombosis prophylaxis postoperatively: regimen to be chosen by surgical team.  6.) Surveillance for postoperative MI with ECG immediately postoperatively and on postoperati ve days 1 and 2 AND troponin levels 24 hours postoperatively and on day 4 or hospital discharge (whichever comes first): at the discretion of anesthesiologist.  7.) Current medications which may produce withdrawal symptoms if withheld perioperatively: None  8.) Other measures: Postoperative hypertension management with IV hydralazine until able to take oral medications.  Postoperative incentive spirometry to prevent pneumonia.

## 2025-02-25 NOTE — ASSESSMENT & PLAN NOTE
-improved on prescribed regim e  -Lexapro prescribed- patient reports she is no longer taking   -Trileptal continued- take morning of surgery  -Seroquel continued at night  -followed outpatient by Psychiatry   LOV 10/12/23  RF 10/09/23, 03/20/23  FU 02/19/24

## 2025-02-25 NOTE — ASSESSMENT & PLAN NOTE
-patient report episodic chest pain over last 6 weeks- with intermittent pedal edema  -patient asymptomatic upon exam  -history of chest pain reported with nitroglycerin SL prescribed-as needed last use reported 1 month prior to exam  -EKG showed sinus tachycardia- otherwise normal on 02/25/2025  -ambulatory referral placed for cardiology for further evaluation with encounter scheduled on 02/25/2025

## 2025-02-25 NOTE — ASSESSMENT & PLAN NOTE
Dangers of cigarette smoking were reviewed with patient in detail. Patient was Counseled for 3-10 minutes. Nicotine replacement options were discussed. Nicotine replacement was discussed- not prescribed per patient's request  -patient advised to refrain from smoking for 12-24 hours prior to procedure

## 2025-02-26 ENCOUNTER — OFFICE VISIT (OUTPATIENT)
Dept: CARDIOLOGY | Facility: CLINIC | Age: 65
End: 2025-02-26
Payer: MEDICARE

## 2025-02-26 VITALS
HEIGHT: 62 IN | DIASTOLIC BLOOD PRESSURE: 70 MMHG | SYSTOLIC BLOOD PRESSURE: 114 MMHG | HEART RATE: 106 BPM | OXYGEN SATURATION: 98 % | WEIGHT: 158.94 LBS | BODY MASS INDEX: 29.25 KG/M2

## 2025-02-26 DIAGNOSIS — I70.0 ATHEROSCLEROSIS OF AORTA: Chronic | ICD-10-CM

## 2025-02-26 DIAGNOSIS — E78.49 OTHER HYPERLIPIDEMIA: ICD-10-CM

## 2025-02-26 DIAGNOSIS — Z01.810 PREOP CARDIOVASCULAR EXAM: Primary | ICD-10-CM

## 2025-02-26 DIAGNOSIS — I10 ESSENTIAL HYPERTENSION: Chronic | ICD-10-CM

## 2025-02-26 DIAGNOSIS — I10 PRIMARY HYPERTENSION: ICD-10-CM

## 2025-02-26 DIAGNOSIS — R73.03 PREDIABETES: Chronic | ICD-10-CM

## 2025-02-26 PROCEDURE — 3288F FALL RISK ASSESSMENT DOCD: CPT | Mod: HCNC,CPTII,S$GLB, | Performed by: INTERNAL MEDICINE

## 2025-02-26 PROCEDURE — 3078F DIAST BP <80 MM HG: CPT | Mod: HCNC,CPTII,S$GLB, | Performed by: INTERNAL MEDICINE

## 2025-02-26 PROCEDURE — 1159F MED LIST DOCD IN RCRD: CPT | Mod: HCNC,CPTII,S$GLB, | Performed by: INTERNAL MEDICINE

## 2025-02-26 PROCEDURE — 3044F HG A1C LEVEL LT 7.0%: CPT | Mod: HCNC,CPTII,S$GLB, | Performed by: INTERNAL MEDICINE

## 2025-02-26 PROCEDURE — 1126F AMNT PAIN NOTED NONE PRSNT: CPT | Mod: HCNC,CPTII,S$GLB, | Performed by: INTERNAL MEDICINE

## 2025-02-26 PROCEDURE — 1100F PTFALLS ASSESS-DOCD GE2>/YR: CPT | Mod: HCNC,CPTII,S$GLB, | Performed by: INTERNAL MEDICINE

## 2025-02-26 PROCEDURE — 3074F SYST BP LT 130 MM HG: CPT | Mod: HCNC,CPTII,S$GLB, | Performed by: INTERNAL MEDICINE

## 2025-02-26 PROCEDURE — 3008F BODY MASS INDEX DOCD: CPT | Mod: HCNC,CPTII,S$GLB, | Performed by: INTERNAL MEDICINE

## 2025-02-26 PROCEDURE — 1160F RVW MEDS BY RX/DR IN RCRD: CPT | Mod: HCNC,CPTII,S$GLB, | Performed by: INTERNAL MEDICINE

## 2025-02-26 PROCEDURE — 99999 PR PBB SHADOW E&M-EST. PATIENT-LVL IV: CPT | Mod: PBBFAC,HCNC,, | Performed by: INTERNAL MEDICINE

## 2025-02-26 PROCEDURE — 99204 OFFICE O/P NEW MOD 45 MIN: CPT | Mod: HCNC,S$GLB,, | Performed by: INTERNAL MEDICINE

## 2025-02-26 NOTE — H&P (VIEW-ONLY)
Subjective:   Patient ID:  Karyna Sanders is a 65 y.o. female who presents for cardiac consult of No chief complaint on file.      Referral by: Self, Aaareferral  No address on file     Reason for consult:       HPI  The patient came in today for cardiac consult of No chief complaint on file.      Karyna Sanders is a 65 y.o. female pt with remote MI, HTN, HLD, asthma, GERD, h/o subarachnoid haemorrhagia and aneurysm in , bipolar is here for preop CV eval.       5/2019  58 yo female, referred for chest pain.   PMH remote slight MI, HTN, HLD, asthma, GERD, h/o subarachnoid haemorrhagia and aneurysm in , bipolar.  C/o chest sharp pain, 2 or 3 times a week, for 15 minutes. Could be triggered by nothing. Radiating to the shoulders and back. Pt states that NTG SL helped  C/o dyspnea. Inhalor prn  Smoking 1/2 ppd for 30 yrs. Occasional drinking.  Walking and biking weekly  ekg today NSR    2/26/25  Pt seen in 2019 by Dr. Acharya.   ECHO 5/2024 with normal bi V function and valves.   Recent ECG with sinus tach, nonsp ST changes -stable.     She has upcoming MTP joint fusion surgery with Dr. Eller  Has rare atypical CP with asthma attacks.   No prior complications with surgery.     Sinus tachycardia   Nonspecific ST abnormality   Abnormal ECG   Confirmed by Skyler Girard ([id number]) on 2/25/2025 4:30:23 PM     Results for orders placed during the hospital encounter of 05/01/24    Echo    Interpretation Summary    Left Ventricle: The left ventricle is normal in size. Normal wall thickness. There is normal systolic function with a visually estimated ejection fraction of 55 - 70%. Ejection fraction by visual approximation is 60%. There is normal diastolic function.    Right Ventricle: Normal right ventricular cavity size. Wall thickness is normal. Systolic function is normal.    IVC/SVC: Normal venous pressure at 3 mmHg.      No results found for this or any previous visit.      No results found for this or any  previous visit.      No cardiac monitor results found for the past 12 months         Past Medical History:   Diagnosis Date    Anemia     Aneurysm     Anorexia 4/26/2017    Anxiety     Asthma     Atherosclerosis of aorta 1/5/2022    CT ABDOMEN PELVIS WITH CONTRAST 01/03/2021 FINDINGS: Mild atherosclerosis within the abdominal aorta which is nonaneurysmal.    Bipolar disorder     Carpal tunnel syndrome, bilateral     Cerebral aneurysm     Chronic nausea 11/15/2018    Chronic pain syndrome     Cigarette nicotine dependence     Depression     Essential hypertension 8/29/2014    GERD (gastroesophageal reflux disease)     Hyperlipidemia     Hypertension     Insomnia     Osteoporosis     Pure hypercholesterolemia 9/8/2014    Recurrent tension-type headache, not intractable 7/24/2019    Schizophrenia     Stroke     Subarachnoid hemorrhage        Past Surgical History:   Procedure Laterality Date    BUNIONECTOMY Right     COLONOSCOPY N/A 02/07/2022    Procedure: COLONOSCOPY;  Surgeon: Kamila Lund MD;  Location: [facility];  Service: Endoscopy;  Laterality: N/A;    COLONOSCOPY W/ BIOPSIES AND POLYPECTOMY      ESOPHAGOGASTRODUODENOSCOPY N/A 02/07/2022    Procedure: EGD (ESOPHAGOGASTRODUODENOSCOPY);  Surgeon: Kamila Lund MD;  Location: [facility];  Service: Endoscopy;  Laterality: N/A;    FUSION OF METATARSOPHALANGEAL JOINT Left 12/6/2024    Procedure: FUSION, MTP JOINT;  Surgeon: Chadwick Rodas DPM;  Location: [facility];  Service: Podiatry;  Laterality: Left;    HYSTERECTOMY      OSTEOTOMY OF METATARSAL BONE Left 12/6/2024    Procedure: OSTEOTOMY, METATARSAL BONE;  Surgeon: Chadwick Rodas DPM;  Location: [facility];  Service: Podiatry;  Laterality: Left;    PARTIAL HYSTERECTOMY      Bilateral Ovaries Remain    REPAIR OF ANEURYSM      clip and coil    RESECTION OF GASTROCNEMIUS MUSCLE Left 12/6/2024    Procedure: RESECTION, MUSCLE, GASTROCNEMIUS;  Surgeon: Chadwick Rodas DPM;  Location: [facility];  Service:  Podiatry;  Laterality: Left;    TRANSCRANIAL DOPPLER STUDY - COMPLETE  08/28/2014         TRANSCRANIAL DOPPLER STUDY - COMPLETE  08/29/2014         TRANSCRANIAL DOPPLER STUDY - COMPLETE  08/30/2014         TRANSCRANIAL DOPPLER STUDY - COMPLETE  08/31/2014         TRANSCRANIAL DOPPLER STUDY - COMPLETE  09/01/2014         TRANSCRANIAL DOPPLER STUDY - COMPLETE  09/02/2014         TRANSCRANIAL DOPPLER STUDY - COMPLETE  09/03/2014         TRANSCRANIAL DOPPLER STUDY - COMPLETE  09/04/2014         TRANSCRANIAL DOPPLER STUDY - COMPLETE  09/05/2014         TRANSCRANIAL DOPPLER STUDY - COMPLETE  09/06/2014         VAGINAL DELIVERY      X 3       Social History[1]    Family History   Problem Relation Name Age of Onset    Cancer Mother      Glaucoma Mother      Stroke Mother      Diabetes type II Mother      Heart disease Mother      Hypertension Mother      Birth defects Sister      Brain cancer Sister      Diabetes type II Sister      Birth defects Sister      Bone cancer Sister      Cancer Brother      Hypertension Brother         Patient's Medications   New Prescriptions    No medications on file   Previous Medications    ACETAMINOPHEN (TYLENOL ORAL)    Take 650 mg by mouth as needed.    ALBUTEROL (PROVENTIL) 2.5 MG /3 ML (0.083 %) NEBULIZER SOLUTION    Take 3 mLs (2.5 mg total) by nebulization every 6 (six) hours as needed for Wheezing. Rescue    ALBUTEROL (PROVENTIL/VENTOLIN HFA) 90 MCG/ACTUATION INHALER    INHALE 2 PUFFS INTO THE LUNGS EVERY 6 HOURS AS NEEDED FOR SHORTNESS OF BREATH OR WHEEZING    ALENDRONATE (FOSAMAX) 70 MG TABLET    Take 1 tablet every week by oral route.    ALPRAZOLAM (XANAX) 0.5 MG TABLET    Take 0.5 mg by mouth daily as needed.     AMITRIPTYLINE (ELAVIL) 50 MG TABLET    Take 1 tablet (50 mg total) by mouth every evening. Take every night and not as needed!    AMLODIPINE (NORVASC) 5 MG TABLET    Take 1 tablet (5 mg total) by mouth once daily.    ATORVASTATIN (LIPITOR) 80 MG TABLET    Take 1 tablet  (80 mg total) by mouth every evening.    BLOOD PRESSURE CUFF MISC    Use to check blood pressure once daily    BUTALBITAL-ACETAMINOPHEN-CAFFEINE -40 MG (FIORICET, ESGIC) -40 MG PER TABLET    Take 1 tablet by mouth every 8 (eight) hours. DO NOT USE MORE THAN 3 DAYS PER WEEK    CALCIUM CARBONATE/VITAMIN D3 (CALCIUM 600 + D,3, ORAL)    Take 2 tablets by mouth once daily.    CITALOPRAM (CELEXA) 40 MG TABLET    Take 1 tablet by mouth once daily.    COMBIVENT RESPIMAT  MCG/ACTUATION INHALER    Inhale 1 puff into the lungs every 6 (six) hours as needed.    CYPROHEPTADINE HCL (CYPROHEPTADINE ORAL)    Take by mouth 3 (three) times daily before meals.    GABAPENTIN (NEURONTIN) 300 MG CAPSULE    Take 1 capsule (300 mg total) by mouth 2 (two) times a day.    HYDROXYZINE HCL (ATARAX) 25 MG TABLET    Take 1 tablet (25 mg total) by mouth 3 (three) times daily as needed for Itching.    LEXAPRO 10 MG TABLET    Take 10 mg by mouth once daily.    NEBULIZER ACCESSORIES KIT    NEBULIZER ACCESSORIES - Use as directed for nebulized medications prescribed by me.    NEBULIZER AND COMPRESSOR JAMAR    NEBULIZER DEVICE - Use as directed    NITROGLYCERIN (NITROSTAT) 0.4 MG SL TABLET    Place 0.4 mg under the tongue every 5 (five) minutes as needed for Chest pain.    OMEPRAZOLE (PRILOSEC) 40 MG CAPSULE    Take 1 capsule (40 mg total) by mouth every morning.    OXCARBAZEPINE (TRILEPTAL) 600 MG TAB    Take 150 mg by mouth 2 (two) times daily.    POLYETHYLENE GLYCOL (GLYCOLAX) 17 GRAM/DOSE POWDER    Take 17 g by mouth daily as needed for Constipation.    QUETIAPINE (SEROQUEL) 100 MG TAB    Take 100 mg by mouth every evening.    TRAMADOL (ULTRAM) 50 MG TABLET    Take 50 mg by mouth every 6 (six) hours as needed for Pain.   Modified Medications    No medications on file   Discontinued Medications    No medications on file       Review of Systems   Constitutional: Negative.    HENT: Negative.     Eyes: Negative.    Respiratory:  "Negative.     Cardiovascular: Negative.    Gastrointestinal: Negative.    Genitourinary: Negative.    Musculoskeletal:  Positive for joint pain.   Skin: Negative.    Neurological: Negative.    Endo/Heme/Allergies: Negative.    Psychiatric/Behavioral: Negative.     All 12 systems otherwise negative.      Wt Readings from Last 3 Encounters:   02/26/25 72.1 kg (158 lb 15.2 oz)   02/24/25 73.6 kg (162 lb 5.9 oz)   02/18/25 72.6 kg (160 lb 0.9 oz)     Temp Readings from Last 3 Encounters:   02/24/25 97 °F (36.1 °C) (Temporal)   01/09/25 98.5 °F (36.9 °C) (Oral)   12/06/24 98 °F (36.7 °C) (Temporal)     BP Readings from Last 3 Encounters:   02/26/25 114/70   02/24/25 120/74   01/28/25 111/75     Pulse Readings from Last 3 Encounters:   02/26/25 106   02/24/25 102   01/28/25 76       /70 (BP Location: Left arm, Patient Position: Sitting)   Pulse 106   Ht 5' 2" (1.575 m)   Wt 72.1 kg (158 lb 15.2 oz)   SpO2 98%   BMI 29.07 kg/m²     Objective:   Physical Exam  Vitals and nursing note reviewed.   Constitutional:       General: She is not in acute distress.     Appearance: She is well-developed. She is not diaphoretic.   HENT:      Head: Normocephalic and atraumatic.      Nose: Nose normal.   Eyes:      General: No scleral icterus.     Conjunctiva/sclera: Conjunctivae normal.   Neck:      Thyroid: No thyromegaly.      Vascular: No JVD.   Cardiovascular:      Rate and Rhythm: Normal rate and regular rhythm.      Heart sounds: S1 normal and S2 normal. Murmur heard.      No friction rub. No gallop. No S3 or S4 sounds.   Pulmonary:      Effort: Pulmonary effort is normal. No respiratory distress.      Breath sounds: Normal breath sounds. No stridor. No wheezing or rales.   Chest:      Chest wall: No tenderness.   Abdominal:      General: Bowel sounds are normal. There is no distension.      Palpations: Abdomen is soft. There is no mass.      Tenderness: There is no abdominal tenderness. There is no rebound. "   Genitourinary:     Comments: Deferred  Musculoskeletal:         General: No tenderness or deformity. Normal range of motion.      Cervical back: Normal range of motion and neck supple.   Lymphadenopathy:      Cervical: No cervical adenopathy.   Skin:     General: Skin is warm and dry.      Coloration: Skin is not pale.      Findings: No erythema or rash.   Neurological:      Mental Status: She is alert and oriented to person, place, and time.      Motor: No abnormal muscle tone.      Coordination: Coordination normal.   Psychiatric:         Behavior: Behavior normal.         Thought Content: Thought content normal.         Judgment: Judgment normal.         Lab Results   Component Value Date     02/24/2025    K 4.1 02/24/2025     02/24/2025    CO2 29 02/24/2025    BUN 7 (L) 02/24/2025    CREATININE 0.9 02/24/2025    GLU 87 02/24/2025    HGBA1C 6.0 (H) 01/02/2025    MG 2.0 05/02/2024    AST 14 02/24/2025    ALT 14 02/24/2025    ALBUMIN 4.0 02/24/2025    PROT 7.2 02/24/2025    BILITOT 0.2 02/24/2025    WBC 9.08 02/24/2025    HGB 13.1 02/24/2025    HCT 40.1 02/24/2025    MCV 91 02/24/2025     02/24/2025    INR 1.1 05/02/2024    TSH 1.915 05/01/2024    CHOL 164 01/02/2025    HDL 55 01/02/2025    LDLCALC 94.4 01/02/2025    TRIG 73 01/02/2025    BNP <10 08/23/2022         BNP (pg/mL)   Date Value   08/23/2022 <10   04/03/2017 <10   02/07/2014 10     INR (no units)   Date Value   05/02/2024 1.1   05/01/2024 1.0   04/03/2017 1.0   08/29/2014 1.0          Assessment:      1. Preop cardiovascular exam    2. Primary hypertension    3. Atherosclerosis of aorta    4. Other hyperlipidemia    5. Prediabetes    6. Essential hypertension        Plan:       Pre-OP CV evaluation prior to upcoming MTP joint fusion surgery with Dr. Eller  Low periop risk of CV events for moderate risk procedure.  Good functional and exercise capacity.  No active arrhythmia and CHF symptoms.  Ok to proceed to the scheduled surgery  without further cardiac study. Stable ECG,   Continue Statin periop.    2. HTN, h/o MI - atypical CP  - titrate meds  - will discuss further workup as needed if recurrent     3. HLD aortic atherosc  - cont statin    4. Overweight/ PreDM  - cont weight loss    Thank you for allowing me to participate in this patient's care. Please do not hesitate to contact me with any questions or concerns. Consult note has been forwarded to the referral physician.          [1]   Social History  Tobacco Use    Smoking status: Every Day     Current packs/day: 1.00     Average packs/day: 1 pack/day for 52.5 years (52.5 ttl pk-yrs)     Types: Cigarettes, Cigars, Vaping with nicotine     Start date: 1976    Smokeless tobacco: Never    Tobacco comments:     in smoking program 5/13/19   Substance Use Topics    Alcohol use: Yes     Comment: occasional    Drug use: No

## 2025-02-26 NOTE — Clinical Note
1. Pre-OP CV evaluation prior to upcoming MTP joint fusion surgery with Dr. Eller Low periop risk of CV events for moderate risk procedure. Good functional and exercise capacity. No active arrhythmia and CHF symptoms. Ok to proceed to the scheduled surgery without further cardiac study. Stable ECG,  Continue Statin periop.

## 2025-02-27 ENCOUNTER — TELEPHONE (OUTPATIENT)
Dept: PREADMISSION TESTING | Facility: HOSPITAL | Age: 65
End: 2025-02-27
Payer: MEDICARE

## 2025-02-27 NOTE — PRE-PROCEDURE INSTRUCTIONS
Called and spoke with patient about the following:     Please arrive to [facility] ([facility] at 11:30am on 2/28/25 for your scheduled procedure.  Address: [address] [place] [address] [place]. [address] (2nd Building on left, 1st Floor Lobby)    !!!NO FOOD after midnight! You may have clear liquids up to 3 hrs before your arrival to the Hospital!!!  Clear liquids include Gatorade, water, soda, black coffee or tea (no milk or creamer), and clear juices.  Clear liquids do NOT include anything with pulp or food particles (Chicken broth, ice cream, yogurt, Jello, etc.)    Thank you,  -[facility] Pre Admit [facility].  Mon-Fri 7:30 am - 4 pm [phone]

## 2025-02-27 NOTE — PRE-PROCEDURE INSTRUCTIONS
Called and spoke with patient about the following:     Please arrive to [facility] ([facility] at 11:30am on 2/28/25 for your scheduled procedure.  Address: [address] [place] [address] [place]. [address] (2nd Building on left, 1st Floor Lobby)    !!!NO FOOD after midnight! You may have clear liquids up to 3 hrs before your arrival to the Hospital!!!  Clear liquids include Gatorade, water, soda, black coffee or tea (no milk or creamer), and clear juices.  Clear liquids do NOT include anything with pulp or food particles (Chicken broth, ice cream, yogurt, Jello, etc.)    Thank you,  -[facility]  Mon-Fri 7:30 am - 4 pm [phone]

## 2025-02-28 ENCOUNTER — ANESTHESIA EVENT (OUTPATIENT)
Dept: SURGERY | Facility: HOSPITAL | Age: 65
End: 2025-02-28
Payer: MEDICARE

## 2025-02-28 ENCOUNTER — ANESTHESIA (OUTPATIENT)
Dept: SURGERY | Facility: HOSPITAL | Age: 65
End: 2025-02-28
Payer: MEDICARE

## 2025-02-28 ENCOUNTER — HOSPITAL ENCOUNTER (OUTPATIENT)
Facility: HOSPITAL | Age: 65
Discharge: HOME | End: 2025-02-28
Attending: PODIATRIST | Admitting: PODIATRIST
Payer: MEDICARE

## 2025-02-28 VITALS
BODY MASS INDEX: 29.08 KG/M2 | OXYGEN SATURATION: 98 % | HEIGHT: 62 IN | RESPIRATION RATE: 14 BRPM | HEART RATE: 93 BPM | SYSTOLIC BLOOD PRESSURE: 131 MMHG | WEIGHT: 158.06 LBS | TEMPERATURE: 98 F | DIASTOLIC BLOOD PRESSURE: 78 MMHG

## 2025-02-28 DIAGNOSIS — M20.5X1 HALLUX LIMITUS, RIGHT: ICD-10-CM

## 2025-02-28 DIAGNOSIS — M20.11 HAV (HALLUX ABDUCTO VALGUS), RIGHT: Primary | ICD-10-CM

## 2025-02-28 DIAGNOSIS — M79.674 PAIN OF RIGHT GREAT TOE: ICD-10-CM

## 2025-02-28 DIAGNOSIS — Z09 POSTOP CHECK: ICD-10-CM

## 2025-02-28 PROCEDURE — 63600175 PHARM REV CODE 636 W HCPCS: Mod: HCNC | Performed by: CLINIC/CENTER

## 2025-02-28 PROCEDURE — 25000003 PHARM REV CODE 250: Mod: HCNC | Performed by: CLINIC/CENTER

## 2025-02-28 PROCEDURE — 36000709 HC OR TIME LEV III EA ADD 15 MIN: Performed by: PODIATRIST

## 2025-02-28 PROCEDURE — 71000033 HC RECOVERY, INTIAL HOUR: Performed by: PODIATRIST

## 2025-02-28 PROCEDURE — 71000015 HC POSTOP RECOV 1ST HR: Performed by: PODIATRIST

## 2025-02-28 PROCEDURE — 63600175 PHARM REV CODE 636 W HCPCS: Mod: JZ,TB,HCNC | Performed by: ANESTHESIOLOGY

## 2025-02-28 PROCEDURE — 28750 FUSION OF BIG TOE JOINT: CPT | Mod: 79,RT,, | Performed by: PODIATRIST

## 2025-02-28 PROCEDURE — 27800903 OPTIME MED/SURG SUP & DEVICES OTHER IMPLANTS: Performed by: PODIATRIST

## 2025-02-28 PROCEDURE — 71000039 HC RECOVERY, EACH ADD'L HOUR: Performed by: PODIATRIST

## 2025-02-28 PROCEDURE — 36000708 HC OR TIME LEV III 1ST 15 MIN: Performed by: PODIATRIST

## 2025-02-28 PROCEDURE — 37000009 HC ANESTHESIA EA ADD 15 MINS: Performed by: PODIATRIST

## 2025-02-28 PROCEDURE — 27201423 OPTIME MED/SURG SUP & DEVICES STERILE SUPPLY: Performed by: PODIATRIST

## 2025-02-28 PROCEDURE — C1713 ANCHOR/SCREW BN/BN,TIS/BN: HCPCS | Performed by: PODIATRIST

## 2025-02-28 PROCEDURE — 25000003 PHARM REV CODE 250: Mod: HCNC | Performed by: ANESTHESIOLOGY

## 2025-02-28 PROCEDURE — 63600175 PHARM REV CODE 636 W HCPCS: Mod: HCNC | Performed by: PODIATRIST

## 2025-02-28 PROCEDURE — 37000008 HC ANESTHESIA 1ST 15 MINUTES: Performed by: PODIATRIST

## 2025-02-28 DEVICE — PUTTY DBX 1CC: Type: IMPLANTABLE DEVICE | Site: TOE | Status: FUNCTIONAL

## 2025-02-28 DEVICE — LOCKING SCREWS
Type: IMPLANTABLE DEVICE | Site: TOE | Status: FUNCTIONAL
Brand: TMC SPEEDMTP

## 2025-02-28 DEVICE — HEADLESS COMPRESSION SCREWS
Type: IMPLANTABLE DEVICE | Site: TOE | Status: FUNCTIONAL
Brand: SPEEDMTP COMPRESSION SCREWS

## 2025-02-28 RX ORDER — FENTANYL CITRATE 50 UG/ML
INJECTION, SOLUTION INTRAMUSCULAR; INTRAVENOUS
Status: DISCONTINUED | OUTPATIENT
Start: 2025-02-28 | End: 2025-02-28

## 2025-02-28 RX ORDER — MIDAZOLAM HYDROCHLORIDE 1 MG/ML
INJECTION INTRAMUSCULAR; INTRAVENOUS
Status: DISCONTINUED | OUTPATIENT
Start: 2025-02-28 | End: 2025-02-28

## 2025-02-28 RX ORDER — OXYCODONE AND ACETAMINOPHEN 5; 325 MG/1; MG/1
1 TABLET ORAL
Status: DISCONTINUED | OUTPATIENT
Start: 2025-02-28 | End: 2025-02-28 | Stop reason: HOSPADM

## 2025-02-28 RX ORDER — PROPOFOL 10 MG/ML
VIAL (ML) INTRAVENOUS CONTINUOUS PRN
Status: DISCONTINUED | OUTPATIENT
Start: 2025-02-28 | End: 2025-02-28

## 2025-02-28 RX ORDER — LIDOCAINE HYDROCHLORIDE 20 MG/ML
JELLY TOPICAL
Status: DISCONTINUED | OUTPATIENT
Start: 2025-02-28 | End: 2025-02-28

## 2025-02-28 RX ORDER — DEXAMETHASONE SODIUM PHOSPHATE 4 MG/ML
INJECTION, SOLUTION INTRA-ARTICULAR; INTRALESIONAL; INTRAMUSCULAR; INTRAVENOUS; SOFT TISSUE
Status: DISCONTINUED | OUTPATIENT
Start: 2025-02-28 | End: 2025-02-28

## 2025-02-28 RX ORDER — LIDOCAINE HYDROCHLORIDE 20 MG/ML
INJECTION INTRAVENOUS
Status: DISCONTINUED | OUTPATIENT
Start: 2025-02-28 | End: 2025-02-28

## 2025-02-28 RX ORDER — ERGOCALCIFEROL 1.25 MG/1
50000 CAPSULE ORAL
Qty: 4 CAPSULE | Refills: 0 | Status: SHIPPED | OUTPATIENT
Start: 2025-02-28 | End: 2025-03-28

## 2025-02-28 RX ORDER — BUPIVACAINE HYDROCHLORIDE 2.5 MG/ML
INJECTION, SOLUTION EPIDURAL; INFILTRATION; INTRACAUDAL
Status: DISCONTINUED | OUTPATIENT
Start: 2025-02-28 | End: 2025-02-28 | Stop reason: HOSPADM

## 2025-02-28 RX ORDER — CEFAZOLIN 2 G/1
2 INJECTION, POWDER, FOR SOLUTION INTRAMUSCULAR; INTRAVENOUS
Status: COMPLETED | OUTPATIENT
Start: 2025-02-28 | End: 2025-02-28

## 2025-02-28 RX ORDER — ONDANSETRON HYDROCHLORIDE 2 MG/ML
4 INJECTION, SOLUTION INTRAVENOUS DAILY PRN
Status: DISCONTINUED | OUTPATIENT
Start: 2025-02-28 | End: 2025-02-28 | Stop reason: HOSPADM

## 2025-02-28 RX ORDER — CEFADROXIL 500 MG/1
500 CAPSULE ORAL EVERY 12 HOURS
Qty: 10 CAPSULE | Refills: 0 | Status: SHIPPED | OUTPATIENT
Start: 2025-02-28 | End: 2025-03-05

## 2025-02-28 RX ORDER — HYDROMORPHONE HYDROCHLORIDE 2 MG/ML
0.2 INJECTION, SOLUTION INTRAMUSCULAR; INTRAVENOUS; SUBCUTANEOUS EVERY 5 MIN PRN
Status: DISCONTINUED | OUTPATIENT
Start: 2025-02-28 | End: 2025-02-28 | Stop reason: HOSPADM

## 2025-02-28 RX ORDER — ONDANSETRON HYDROCHLORIDE 2 MG/ML
INJECTION, SOLUTION INTRAVENOUS
Status: DISCONTINUED | OUTPATIENT
Start: 2025-02-28 | End: 2025-02-28

## 2025-02-28 RX ORDER — OXYCODONE HYDROCHLORIDE 10 MG/1
10 TABLET ORAL EVERY 4 HOURS PRN
Qty: 42 TABLET | Refills: 0 | Status: SHIPPED | OUTPATIENT
Start: 2025-02-28 | End: 2025-03-07

## 2025-02-28 RX ADMIN — HYDROMORPHONE HYDROCHLORIDE 0.2 MG: 2 INJECTION, SOLUTION INTRAMUSCULAR; INTRAVENOUS; SUBCUTANEOUS at 04:02

## 2025-02-28 RX ADMIN — SODIUM CHLORIDE, SODIUM LACTATE, POTASSIUM CHLORIDE, AND CALCIUM CHLORIDE: .6; .31; .03; .02 INJECTION, SOLUTION INTRAVENOUS at 01:02

## 2025-02-28 RX ADMIN — OXYCODONE HYDROCHLORIDE AND ACETAMINOPHEN 1 TABLET: 5; 325 TABLET ORAL at 03:02

## 2025-02-28 RX ADMIN — LIDOCAINE HYDROCHLORIDE 3 ML: 20 JELLY TOPICAL at 02:02

## 2025-02-28 RX ADMIN — FENTANYL CITRATE 25 MCG: 50 INJECTION, SOLUTION INTRAMUSCULAR; INTRAVENOUS at 01:02

## 2025-02-28 RX ADMIN — FENTANYL CITRATE 25 MCG: 50 INJECTION, SOLUTION INTRAMUSCULAR; INTRAVENOUS at 02:02

## 2025-02-28 RX ADMIN — LIDOCAINE HYDROCHLORIDE 60 MG: 20 INJECTION INTRAVENOUS at 01:02

## 2025-02-28 RX ADMIN — CEFAZOLIN 2 G: 2 INJECTION, POWDER, FOR SOLUTION INTRAMUSCULAR; INTRAVENOUS at 01:02

## 2025-02-28 RX ADMIN — ONDANSETRON 4 MG: 2 INJECTION INTRAMUSCULAR; INTRAVENOUS at 02:02

## 2025-02-28 RX ADMIN — HYDROMORPHONE HYDROCHLORIDE 0.2 MG: 2 INJECTION, SOLUTION INTRAMUSCULAR; INTRAVENOUS; SUBCUTANEOUS at 03:02

## 2025-02-28 RX ADMIN — MIDAZOLAM HYDROCHLORIDE 2 MG: 1 INJECTION, SOLUTION INTRAMUSCULAR; INTRAVENOUS at 01:02

## 2025-02-28 RX ADMIN — DEXAMETHASONE SODIUM PHOSPHATE 4 MG: 4 INJECTION, SOLUTION INTRA-ARTICULAR; INTRALESIONAL; INTRAMUSCULAR; INTRAVENOUS; SOFT TISSUE at 02:02

## 2025-02-28 RX ADMIN — PROPOFOL 75 MCG/KG/MIN: 10 INJECTION, EMULSION INTRAVENOUS at 01:02

## 2025-02-28 NOTE — TRANSFER OF CARE
"Anesthesia Transfer of Care Note    Patient: Karyna Sanders    Procedure(s) Performed: Procedure(s) (LRB):  FUSION, MTP JOINT (Right)    Patient location: PACU    Anesthesia Type: MAC    Transport from OR: Transported from OR on room air with adequate spontaneous ventilation    Post pain: adequate analgesia    Post assessment: no apparent anesthetic complications and tolerated procedure well    Post vital signs: stable    Level of consciousness: responds to stimulation    Nausea/Vomiting: no nausea/vomiting    Complications: none    Transfer of care protocol was followed      Last vitals: Visit Vitals  /83 (BP Location: Right arm, Patient Position: Sitting)   Pulse 90   Temp 36.5 °C (97.7 °F) (Temporal)   Resp 18   Ht 5' 2" (1.575 m)   Wt 71.7 kg (158 lb 1.1 oz)   SpO2 97%   Breastfeeding No   BMI 28.91 kg/m²     "

## 2025-02-28 NOTE — DISCHARGE SUMMARY
[facility] ([facility])  Discharge Note  Short Stay    Procedure(s) (LRB):  FUSION, MTP JOINT (Right)      OUTCOME: Patient tolerated treatment/procedure well without complication and is now ready for discharge.    DISPOSITION: Home or Self Care    FINAL DIAGNOSIS:  Hav (hallux abducto valgus), right [M20.11]    FOLLOWUP: In clinic    DISCHARGE INSTRUCTIONS:    Weightbearing Status and Wound Care:  1. When walking, wear the surgical shoe or walking boot at all times.    2. During daytime/awake hours, if a CAST or POSTERIOR SPLINT is in place, ice the posterior aspect of the leg, behind the knee, 20 minutes on (w/ ice) and followed by 20 minutes off (w/o ice) until follow up; repeat accordingly until follow up. IF no CAST or POSTERIOR SPLINT is in place, ice the anterior aspect of the ankle, in a similar manner. During night time/sleep hours, simply elevating the limb above the level of the heart is sufficient.     3. Elevate the extremity above the level of the heart at all times when sitting.    4. Take the pain mediations as prescribed for the initial 3 or so days, then only as needed.    5. If no overt medical contra-indication, take Motrin or Advil or Ibuprofen or Aleve in between the pain medication doses.    6. Do not change the dressings.    7. Do not get the dressings wet.     8. Please be reminded of the harmful effects of nicotine/tobacco/cigarette/cigar/marijuana smoking, especially in relation to the lower extremity, particularly in reference to post operative healing. I do rec. complete or near complete cessation of any or all of the aforementioned until you are well out of the post-operative period.    9. If you were prescribed more than one short acting opioid, e.g., (Morphine or Dilaudid), with Percocet or Norco or Tramadol, the Morphine (MSIR) or Dilaudid (Hydromorphone) is to be taken during the immediate initial days s/p, at an interval of every 6 hours or 5 hours or 4 hours, as needed for  pain control. Once you are complete with the Morphine (MSIR) or Dilaudid (Hydromorphone), you may/should convert to the secondary medication. DO NOT take both medications together at any time. It is not advisable to start with the less potent medication, Percocet or Norco or Tramadol, and then convert to the stronger medication.     10. Stool Softeners to avoid constipation should start today/tomorrow AM.    11. Laxatives or Enemas as necessary.     12. Start Aspirin 81mg by mouth, once or twice daily for blood clot prevention.              TIME SPENT ON DISCHARGE: 10 minutes

## 2025-02-28 NOTE — ANESTHESIA POSTPROCEDURE EVALUATION
Anesthesia Post Evaluation    Patient: Karyna Sanders    Procedure(s) Performed: Procedure(s) (LRB):  FUSION, MTP JOINT (Right)    Final Anesthesia Type: MAC      Patient location during evaluation: PACU  Patient participation: Yes- Able to Participate  Level of consciousness: awake and alert  Post-procedure vital signs: reviewed and stable  Airway patency: patent      Anesthetic complications: no      Cardiovascular status: blood pressure returned to baseline  Respiratory status: unassisted and spontaneous ventilation  Hydration status: euvolemic  Follow-up not needed.              Vitals Value Taken Time   /78 02/28/25 16:20   Temp 36.7 °C (98 °F) 02/28/25 16:20   Pulse 93 02/28/25 16:20   Resp 14 02/28/25 16:20   SpO2 94 % 02/28/25 16:20   Vitals shown include unfiled device data.      Event Time   Out of Recovery 16:24:12         Pain/Asa Score: Pain Rating Prior to Med Admin: 6 (2/28/2025  4:00 PM)  Asa Score: 10 (2/28/2025  4:24 PM)

## 2025-02-28 NOTE — ANESTHESIA PREPROCEDURE EVALUATION
02/28/2025  Karyna Sanders is a 65 y.o., female.    Problem List[1]  Past Surgical History:   Procedure Laterality Date    BUNIONECTOMY Right     COLONOSCOPY N/A 02/07/2022    Procedure: COLONOSCOPY;  Surgeon: Kamila Lund MD;  Location: [facility];  Service: Endoscopy;  Laterality: N/A;    COLONOSCOPY W/ BIOPSIES AND POLYPECTOMY      ESOPHAGOGASTRODUODENOSCOPY N/A 02/07/2022    Procedure: EGD (ESOPHAGOGASTRODUODENOSCOPY);  Surgeon: Kamila Lund MD;  Location: [facility];  Service: Endoscopy;  Laterality: N/A;    FUSION OF METATARSOPHALANGEAL JOINT Left 12/6/2024    Procedure: FUSION, MTP JOINT;  Surgeon: Chadwick Rodas DPM;  Location: [facility] OR;  Service: Podiatry;  Laterality: Left;    HYSTERECTOMY      OSTEOTOMY OF METATARSAL BONE Left 12/6/2024    Procedure: OSTEOTOMY, METATARSAL BONE;  Surgeon: Chadwick Rodas DPM;  Location: [facility] OR;  Service: Podiatry;  Laterality: Left;    PARTIAL HYSTERECTOMY      Bilateral Ovaries Remain    REPAIR OF ANEURYSM      clip and coil    RESECTION OF GASTROCNEMIUS MUSCLE Left 12/6/2024    Procedure: RESECTION, MUSCLE, GASTROCNEMIUS;  Surgeon: Chadwick Rodas DPM;  Location: [facility] OR;  Service: Podiatry;  Laterality: Left;    TRANSCRANIAL DOPPLER STUDY - COMPLETE  08/28/2014         TRANSCRANIAL DOPPLER STUDY - COMPLETE  08/29/2014         TRANSCRANIAL DOPPLER STUDY - COMPLETE  08/30/2014         TRANSCRANIAL DOPPLER STUDY - COMPLETE  08/31/2014         TRANSCRANIAL DOPPLER STUDY - COMPLETE  09/01/2014         TRANSCRANIAL DOPPLER STUDY - COMPLETE  09/02/2014         TRANSCRANIAL DOPPLER STUDY - COMPLETE  09/03/2014         TRANSCRANIAL DOPPLER STUDY - COMPLETE  09/04/2014         TRANSCRANIAL DOPPLER STUDY - COMPLETE  09/05/2014         TRANSCRANIAL DOPPLER STUDY - COMPLETE  09/06/2014         VAGINAL DELIVERY      X 3       Pre-op Assessment    I have  reviewed the Patient Summary Reports.    I have reviewed the NPO Status.   I have reviewed the Medications.     Review of Systems  Anesthesia Hx:  No problems with previous Anesthesia                Social:  Smoker       Hematology/Oncology:  Hematology Normal                                     Cardiovascular:     Hypertension           hyperlipidemia   ECG has been reviewed.                            Pulmonary:    Asthma                    Renal/:  Renal/ Normal                 Hepatic/GI:     GERD                Musculoskeletal:     Chronic pain syndrome            Neurological:   CVA   Headaches                                 Endocrine:  Endocrine Normal            Psych:   anxiety  Schizophrenia               Physical Exam  General: Well nourished    Airway:  Mallampati: II   Mouth Opening: Normal  TM Distance: Normal  Neck ROM: Normal ROM    Dental:  Intact, Dentures        Anesthesia Plan  Type of Anesthesia, risks & benefits discussed:    Anesthesia Type: Gen ETT, Gen Supraglottic Airway, MAC  Intra-op Monitoring Plan: Standard ASA Monitors  Post Op Pain Control Plan: multimodal analgesia  Induction:  IV  Airway Plan: , Post-Induction  Informed Consent: Informed consent signed with the Patient and all parties understand the risks and agree with anesthesia plan.  All questions answered.   ASA Score: 3    Ready For Surgery From Anesthesia Perspective.     .      Chemistry        Component Value Date/Time     02/24/2025 0951    K 4.1 02/24/2025 0951     02/24/2025 0951    CO2 29 02/24/2025 0951    BUN 7 (L) 02/24/2025 0951    CREATININE 0.9 02/24/2025 0951    GLU 87 02/24/2025 0951        Component Value Date/Time    CALCIUM 9.8 02/24/2025 0951    ALKPHOS 90 02/24/2025 0951    AST 14 02/24/2025 0951    ALT 14 02/24/2025 0951    BILITOT 0.2 02/24/2025 0951    ESTGFRAFRICA >60 05/19/2022 1215    EGFRNONAA >60 05/19/2022 1215        Lab Results   Component Value Date    WBC 9.08 02/24/2025    HGB  13.1 02/24/2025    HCT 40.1 02/24/2025    MCV 91 02/24/2025     02/24/2025       Sinus tachycardia   Nonspecific ST abnormality   Abnormal ECG   Confirmed by Skyler Girard ([id number]) on 2/25/2025 4:30:23 PM     Echo 5/1/24:    Left Ventricle: The left ventricle is normal in size. Normal wall thickness. There is normal systolic function with a visually estimated ejection fraction of 55 - 70%. Ejection fraction by visual approximation is 60%. There is normal diastolic function.    Right Ventricle: Normal right ventricular cavity size. Wall thickness is normal. Systolic function is normal.    IVC/SVC: Normal venous pressure at 3 mmHg.            [1]   Patient Active Problem List  Diagnosis    Schizoaffective disorder, bipolar type    Bipolar 1 disorder    Anxiety    Cigarette nicotine dependence without complication    History of spontaneous subarachnoid intracranial hemorrhage due to cerebral aneurysm    Essential hypertension    Pure hypercholesterolemia    Prediabetes    Anorexia    Long-term current use of high risk medication other than anticoagulant    Gastroesophageal reflux disease without esophagitis    Nonadherence to medical treatment    History of hysterectomy for benign disease    Primary localized osteoarthrosis of multiple sites    Peripheral polyneuropathy    Tension headache    Bilateral carpal tunnel syndrome    Chronic seasonal allergic rhinitis    Cubital tunnel syndrome, right    Lumbosacral radiculopathy at S1    Chronic nausea    Chest pain    Recurrent tension-type headache, not intractable    Medication overuse headache    Chronic use of tramadol for therapeutic purpose    Tardive dyskinesia    Early satiety    Atherosclerosis of aorta    Tubular adenoma of transverse colon (Next colonoscopy due 12/2026)    Chronic bronchitis    BMI 29.0-29.9,adult    Osteopenia of multiple sites    Pruritus    Chronic constipation    Numbness and tingling in left arm    Cervical radiculopathy    Plantar wart  of left foot    Metatarsalgia of left foot    Asthma    Bilateral hand numbness    Hav (hallux abducto valgus), right    Nicotine dependence

## 2025-02-28 NOTE — BRIEF OP NOTE
[facility] (Hospital)  Brief Operative Note    Surgery Date: 2/28/2025     Surgeons and Role:     * Chadwick Rodas DPM - Primary    Assisting Surgeon: None    Pre-op Diagnosis:  Hav (hallux abducto valgus), right [M20.11]    Post-op Diagnosis:  Post-Op Diagnosis Codes:     * Hav (hallux abducto valgus), right [M20.11]    Procedure(s) (LRB):  FUSION, MTP JOINT (Right)    Anesthesia: Local MAC    Operative Findings: As  per Dx.     Estimated Blood Loss: * No values recorded between 2/28/2025  1:46 PM and 2/28/2025  3:19 PM *         Specimens:   Specimen (24h ago, onward)      None              Discharge Note    OUTCOME: Patient tolerated treatment/procedure well without complication and is now ready for discharge.    DISPOSITION: Home or Self Care    FINAL DIAGNOSIS: Hav (hallux abducto valgus), right [M20.11]      FOLLOWUP: In clinic    DISCHARGE INSTRUCTIONS:    Weightbearing Status and Wound Care:  1. When walking, wear the surgical shoe or walking boot at all times.    2. During daytime/awake hours, if a CAST or POSTERIOR SPLINT is in place, ice the posterior aspect of the leg, behind the knee, 20 minutes on (w/ ice) and followed by 20 minutes off (w/o ice) until follow up; repeat accordingly until follow up. IF no CAST or POSTERIOR SPLINT is in place, ice the anterior aspect of the ankle, in a similar manner. During night time/sleep hours, simply elevating the limb above the level of the heart is sufficient.     3. Elevate the extremity above the level of the heart at all times when sitting.    4. Take the pain mediations as prescribed for the initial 3 or so days, then only as needed.    5. If no overt medical contra-indication, take Motrin or Advil or Ibuprofen or Aleve in between the pain medication doses.    6. Do not change the dressings.    7. Do not get the dressings wet.     8. Please be reminded of the harmful effects of nicotine/tobacco/cigarette/cigar/marijuana smoking, especially in relation  to the lower extremity, particularly in reference to post operative healing. I do rec. complete or near complete cessation of any or all of the aforementioned until you are well out of the post-operative period.    9. If you were prescribed more than one short acting opioid, e.g., (Morphine or Dilaudid), with Percocet or Norco or Tramadol, the Morphine (MSIR) or Dilaudid (Hydromorphone) is to be taken during the immediate initial days s/p, at an interval of every 6 hours or 5 hours or 4 hours, as needed for pain control. Once you are complete with the Morphine (MSIR) or Dilaudid (Hydromorphone), you may/should convert to the secondary medication. DO NOT take both medications together at any time. It is not advisable to start with the less potent medication, Percocet or Norco or Tramadol, and then convert to the stronger medication.     10. Stool Softeners to avoid constipation should start today/tomorrow AM.    11. Laxatives or Enemas as necessary.     12. Start Aspirin 81mg by mouth, once or twice daily for blood clot prevention.

## 2025-02-28 NOTE — OP NOTE
[facility]  Podiatric Medicine & Surgery  Operative Report    SUMMARY     Date of Procedure: 2/28/2025    Procedure: Procedure(s):  FUSION, MTP JOINT    Surgeon(s) and Role: Surgeons and Role:     * Chadwick Rodas DPM - Primary    Pre-Operative Diagnosis: Pre-Op Diagnosis Codes:      * Hav (hallux abducto valgus), right [M20.11]    Post-Operative Diagnosis: Post-Op Diagnosis Codes:     * Hav (hallux abducto valgus), right [M20.11]    Anesthesia: Local MAC    Technical Procedures Used:   1. Fusion, RLE, 1st MTPJ.    Description of the Findings of the Procedure: The patient was seen in the Holding Room. The risks, benefits, complications, treatment options, and expected outcomes were discussed with the patient. The risks and potential complications of their problem and purposed treatment include but are not limited to infection, nerve injury, vascular injury, nonunion/malunion/delayed union of the surgical site, persistent pain, potential skin necrosis, deep vein thrombosis, possible pulmonary embolus, complications of the anesthetics and failure of the implant.  The patient concurred with the proposed plan, giving informed consent. The patient is aware that the procedure may be a part of a staged collection of procedures for definitive cure and/or alleviation of symptoms. The site of surgery properly noted/marked. Preoperative intravenous antibiotics are hanging at bedside, and are currently being administered via the heparin lock. The patient was taken to Operating Suite.    Once in the operative suite, the patient is transferred onto the operative table in the supine position. A TIME-OUT is taken as per protocol to identify the proper patient, procedure to be performed, and laterality.  The patient is properly positioned on the operating room table for ease of dissection and for any ancillary imaging.  A well-padded tourniquet was applied to the right calf.  Nursing and ancillary OR  staff prepared the patient for the procedure. The patient is adequately sedated by the Attending Anesthesiologist and/or covering CRNA.  Following this, a preoperative regional/local block was given to the 1st ray.  Next, the operative limb was rendered sterile using chlorhexidine paint and scrub.  Sterile sheets and drapes were applied thereafter. Another TIME-OUT is taken as per protocol to identify the proper patient, procedure to be performed, and laterality.      The tourniquet is elevated to 250mmHg after the limb is exsanguinated for approximately 2 min with an Esmarch bandage.  Following this, sharp skin incision at the dorsal aspect of the 1st metatarsophalangeal joint with a #15 blade. Deep dissection with large curved Metzenbaum scissor.  The extensor hallucis longus tendon is encountered and was retracted laterally.  The capsule of the 1st metatarsophalangeal joint is entered with a scalpel.  The capsular tissues are resected from the metatarsal head and base of proximal phalanx in standard fashion with a scalpel.  Lateral lease performed with a large curved Metzenbaum scissor.  Lateral capsulotomy was performed.  The sesamoids were freed with a large McGlamry elevator.    The medial and lateral collateral ligaments of the 1st metatarsophalangeal joint are severed with a #15 blade. The dorsal osteophytes at the phalanx and metatarsal head are resected with a large bone rongeur.  The medial eminence is resected with a sagittal saw.  Following this, the metatarsal head was reamed in standard fashion, after 0.062 K-wire insertion at the midpoint of the head, with the final Reamer size being 16mm.  The 0.062 K-wire is removed.  A 0.062 K-wire is  inserted into the base of the proximal phalanx.  The base of proximal phalanx is reamed as well in standard fashion, with the final Reamer size being 16mm.  The 0.062 K-wire is removed from the base of proximal phalanx.     Following this, the apposition is checked.   Fine tuning of the cup and cone articulation with a bone rongeur.  The joint is temporally fixated in proper alignment with a 0.062 K-wire from the medial diaphysis of the proximal phalanx to the proximal lateral distal metaphysis of the metatarsal head. Bone graft products are used to augment the fusion site.  Following this, application of a dorsal plate in standard fashion with the assistance of fluoroscopy, being mindful to keep the proper postsurgical anatomic alignment.  The arthrodesis site is fixated using a Treace 1st MTPJ fusion plate with an independent frag screw. The temporary fixator 0.062 K-wire is removed.    Copious irrigation with sterile saline solution.  Electrocautery as necessitated.  Deep closure of the wound using 2-0 Vicryl suture. Skin closure using 3-0 Nylon. Cuff is deflated and CFT is WNL.    All incisions are dressed with Xeroform/Adaptic nonadherent dressings followed by sterile 4 x 4 gauze, abdominal pad, Lorena/Kerlix and light ACE. Girard Compression is applied.    The anesthesia is weaned. There were no complications to this procedure. Any final necessary imaging to be performed in the PACU if it was not performed here in the OR suite.  The patient is transferred to the [facility] bed/stretcher, [facility]. The patient is transferred to the PACU.    Significant Surgical Tasks Conducted by the Assistant(s), if Applicable: N/A    Complications: * No complications entered in OR log *    Estimated Blood Loss (EBL): Minimal    Drains: N/A    Implants:   Implant Name Type Inv. Item Serial No.  Lot No. LRB No. Used Action   SCREW SPEEDMTP HIGH PITCH 3MM - [id number]  SCREW SPEEDMTP HIGH PITCH 3MM  [place] [id number] Right 1 Implanted   speedTP rapid compression implant-large     [id number] Right 1 Implanted   muscloskeletal transplant foundation DBX puttty   [id number]   Right 1 Implanted   [id number]  PUTTY DBX 1CC [id number] MUSCULOSKELETAL  TRANSPLANT FND   1 Implanted   SCREW SPEEDMTP HEADLESS 3.5MM - [id number]  SCREW SPEEDMTP HEADLESS 3.5MM  [facility] [id number] Right 1 Implanted       Specimens: * No specimens in log *    Condition: stable    Disposition: PACU - hemodynamically stable.    Attestation: I performed the procedure.

## 2025-03-13 ENCOUNTER — HOSPITAL ENCOUNTER (OUTPATIENT)
Dept: RADIOLOGY | Facility: HOSPITAL | Age: 65
Discharge: HOME | End: 2025-03-13
Attending: PODIATRIST
Payer: MEDICARE

## 2025-03-13 ENCOUNTER — PATIENT MESSAGE (OUTPATIENT)
Dept: PODIATRY | Facility: CLINIC | Age: 65
End: 2025-03-13

## 2025-03-13 ENCOUNTER — OFFICE VISIT (OUTPATIENT)
Dept: PODIATRY | Facility: CLINIC | Age: 65
End: 2025-03-13
Payer: MEDICARE

## 2025-03-13 DIAGNOSIS — Z74.09 IMPAIRED MOBILITY: ICD-10-CM

## 2025-03-13 DIAGNOSIS — R26.89 IMPAIRED WEIGHT BEARING: ICD-10-CM

## 2025-03-13 DIAGNOSIS — M20.11 HAV (HALLUX ABDUCTO VALGUS), RIGHT: ICD-10-CM

## 2025-03-13 DIAGNOSIS — Z09 POSTOP CHECK: Primary | ICD-10-CM

## 2025-03-13 DIAGNOSIS — M79.674 PAIN OF RIGHT GREAT TOE: ICD-10-CM

## 2025-03-13 DIAGNOSIS — Z72.0 TOBACCO ABUSE: ICD-10-CM

## 2025-03-13 DIAGNOSIS — Z09 POSTOP CHECK: ICD-10-CM

## 2025-03-13 DIAGNOSIS — E55.9 VITAMIN D DEFICIENCY: ICD-10-CM

## 2025-03-13 PROCEDURE — 73630 X-RAY EXAM OF FOOT: CPT | Mod: 26,RT,, | Performed by: RADIOLOGY

## 2025-03-13 PROCEDURE — 73630 X-RAY EXAM OF FOOT: CPT | Mod: TC,PO,RT

## 2025-03-13 PROCEDURE — 99999 PR PBB SHADOW E&M-EST. PATIENT-LVL III: CPT | Mod: PBBFAC,,, | Performed by: PODIATRIST

## 2025-03-13 RX ORDER — OXYCODONE HYDROCHLORIDE 10 MG/1
10 TABLET ORAL EVERY 4 HOURS PRN
Qty: 36 TABLET | Refills: 0 | Status: SHIPPED | OUTPATIENT
Start: 2025-03-13 | End: 2025-03-19

## 2025-03-13 RX ORDER — ERGOCALCIFEROL 1.25 MG/1
50000 CAPSULE ORAL
Qty: 4 CAPSULE | Refills: 0 | Status: SHIPPED | OUTPATIENT
Start: 2025-03-13 | End: 2025-04-04

## 2025-03-13 NOTE — PROGRESS NOTES
Subjective:       Patient ID: Karyna Sanders is a 65 y.o. female.    Chief Complaint: Post-op Evaluation (Post op, rates pain 8, nondiabetic )    HPI:  Karyna Sanders presents to the office today, s/p 2/28/2025 RLE 1st MTPJ (she is also s/p 12/6/2024 LLE 1st MTPJ fusion, gatroc recession with metatarsal osteotomy, LLE, 4th).  WB with Sx. Shoe. States moderate pains. Does have Home Health with [facility]. States smoking less. States RICE therapy. States Vit. D. States DM control.      Hemoglobin A1C   Date Value Ref Range Status   01/02/2025 6.0 (H) 4.0 - 5.6 % Final     Comment:     ADA Screening Guidelines:  5.7-6.4%  Consistent with prediabetes  >or=6.5%  Consistent with diabetes    High levels of fetal hemoglobin interfere with the HbA1C  assay. Heterozygous hemoglobin variants (HbS, HgC, etc)do  not significantly interfere with this assay.   However, presence of multiple variants may affect accuracy.     05/01/2024 6.3 (H) 4.0 - 5.6 % Final     Comment:     ADA Screening Guidelines:  5.7-6.4%  Consistent with prediabetes  >or=6.5%  Consistent with diabetes    High levels of fetal hemoglobin interfere with the HbA1C  assay. Heterozygous hemoglobin variants (HbS, HgC, etc)do  not significantly interfere with this assay.   However, presence of multiple variants may affect accuracy.     08/29/2023 6.1 (H) 4.0 - 5.6 % Final     Comment:     ADA Screening Guidelines:  5.7-6.4%  Consistent with prediabetes  >or=6.5%  Consistent with diabetes    High levels of fetal hemoglobin interfere with the HbA1C  assay. Heterozygous hemoglobin variants (HbS, HgC, etc)do  not significantly interfere with this assay.   However, presence of multiple variants may affect accuracy.         Review of patient's allergies indicates:   Allergen Reactions    Aspirin      Told to avoid due to aneurysm repair     Nsaids (non-steroidal anti-inflammatory drug)      Due to aneurysm    Ibuprofen Anxiety     Told to avoid due to aneurysm  repair        Past Medical History:   Diagnosis Date    Anemia     Aneurysm     Anorexia 4/26/2017    Anxiety     Asthma     Atherosclerosis of aorta 1/5/2022    CT ABDOMEN PELVIS WITH CONTRAST 01/03/2021 FINDINGS: Mild atherosclerosis within the abdominal aorta which is nonaneurysmal.    Bipolar disorder     Carpal tunnel syndrome, bilateral     Cerebral aneurysm     Chronic nausea 11/15/2018    Chronic pain syndrome     Cigarette nicotine dependence     Depression     Essential hypertension 8/29/2014    GERD (gastroesophageal reflux disease)     Hyperlipidemia     Hypertension     Insomnia     Osteoporosis     Pure hypercholesterolemia 9/8/2014    Recurrent tension-type headache, not intractable 7/24/2019    Schizophrenia     Stroke     Subarachnoid hemorrhage        Family History   Problem Relation Name Age of Onset    Cancer Mother      Glaucoma Mother      Stroke Mother      Diabetes type II Mother      Heart disease Mother      Hypertension Mother      Birth defects Sister      Brain cancer Sister      Diabetes type II Sister      Birth defects Sister      Bone cancer Sister      Cancer Brother      Hypertension Brother         Social History     Socioeconomic History    Marital status:     Number of children: 3    Years of education: 11th Grade   Tobacco Use    Smoking status: Every Day     Current packs/day: 1.00     Average packs/day: 1 pack/day for 52.5 years (52.5 ttl pk-yrs)     Types: Cigarettes, Cigars, Vaping with nicotine     Start date: 1976    Smokeless tobacco: Never    Tobacco comments:     in smoking program 5/13/19   Substance and Sexual Activity    Alcohol use: Yes     Comment: occasional    Drug use: No    Sexual activity: Not Currently     Partners: Male   Social History Narrative    Patient describes herself as disabled.     Social Drivers of Health     Financial Resource Strain: Medium Risk (11/16/2024)    Overall Financial Resource Strain (CARDIA)     Difficulty of Paying Living  Expenses: Somewhat hard   Food Insecurity: No Food Insecurity (11/16/2024)    Hunger Vital Sign     Worried About Running Out of Food in the Last Year: Never true     Ran Out of Food in the Last Year: Never true   Recent Concern: Food Insecurity - Food Insecurity Present (10/16/2024)    Hunger Vital Sign     Worried About Running Out of Food in the Last Year: Sometimes true     Ran Out of Food in the Last Year: Never true   Transportation Needs: No Transportation Needs (10/16/2024)    PRAPARE - Transportation     Lack of Transportation (Medical): No     Lack of Transportation (Non-Medical): No   Physical Activity: Insufficiently Active (11/16/2024)    Exercise Vital Sign     Days of Exercise per Week: 2 days     Minutes of Exercise per Session: 30 min   Stress: No Stress Concern Present (11/16/2024)    [employer] [place] [employer] - Occupational Stress Questionnaire     Feeling of Stress : Not at all   Recent Concern: Stress - Stress Concern Present (10/16/2024)    [employer] [place] [employer] - Occupational Stress Questionnaire     Feeling of Stress : Very much   Housing Stability: Unknown (11/16/2024)    Housing Stability Vital Sign     Unable to Pay for Housing in the Last Year: No     Homeless in the Last Year: No       Past Surgical History:   Procedure Laterality Date    BUNIONECTOMY Right     COLONOSCOPY N/A 02/07/2022    Procedure: COLONOSCOPY;  Surgeon: Kamila Lund MD;  Location: [facility];  Service: Endoscopy;  Laterality: N/A;    COLONOSCOPY W/ BIOPSIES AND POLYPECTOMY      ESOPHAGOGASTRODUODENOSCOPY N/A 02/07/2022    Procedure: EGD (ESOPHAGOGASTRODUODENOSCOPY);  Surgeon: Kamila Lund MD;  Location: [facility];  Service: Endoscopy;  Laterality: N/A;    FUSION OF METATARSOPHALANGEAL JOINT Left 12/6/2024    Procedure: FUSION, MTP JOINT;  Surgeon: Chadwick Rodas DPM;  Location: [facility];  Service: Podiatry;  Laterality: Left;    FUSION OF METATARSOPHALANGEAL JOINT  Right 2/28/2025    Procedure: FUSION, MTP JOINT;  Surgeon: Chadwick Rodas DPM;  Location: [facility] OR;  Service: Podiatry;  Laterality: Right;    HYSTERECTOMY      OSTEOTOMY OF METATARSAL BONE Left 12/6/2024    Procedure: OSTEOTOMY, METATARSAL BONE;  Surgeon: Chadwick Rodas DPM;  Location: [facility] OR;  Service: Podiatry;  Laterality: Left;    PARTIAL HYSTERECTOMY      Bilateral Ovaries Remain    REPAIR OF ANEURYSM      clip and coil    RESECTION OF GASTROCNEMIUS MUSCLE Left 12/6/2024    Procedure: RESECTION, MUSCLE, GASTROCNEMIUS;  Surgeon: Chadwick Rodas DPM;  Location: [facility] OR;  Service: Podiatry;  Laterality: Left;    TRANSCRANIAL DOPPLER STUDY - COMPLETE  08/28/2014         TRANSCRANIAL DOPPLER STUDY - COMPLETE  08/29/2014         TRANSCRANIAL DOPPLER STUDY - COMPLETE  08/30/2014         TRANSCRANIAL DOPPLER STUDY - COMPLETE  08/31/2014         TRANSCRANIAL DOPPLER STUDY - COMPLETE  09/01/2014         TRANSCRANIAL DOPPLER STUDY - COMPLETE  09/02/2014         TRANSCRANIAL DOPPLER STUDY - COMPLETE  09/03/2014         TRANSCRANIAL DOPPLER STUDY - COMPLETE  09/04/2014         TRANSCRANIAL DOPPLER STUDY - COMPLETE  09/05/2014         TRANSCRANIAL DOPPLER STUDY - COMPLETE  09/06/2014         VAGINAL DELIVERY      X 3       Review of Systems   Constitutional:  Negative for chills, fatigue and fever.   HENT:  Negative for hearing loss.    Eyes:  Negative for photophobia and visual disturbance.   Respiratory:  Negative for cough, chest tightness, shortness of breath and wheezing.    Cardiovascular:  Negative for chest pain and palpitations.   Gastrointestinal:  Negative for constipation, diarrhea, nausea and vomiting.   Endocrine: Negative for cold intolerance and heat intolerance.   Genitourinary:  Negative for flank pain.   Musculoskeletal:  Positive for gait problem. Negative for neck pain and neck stiffness.   Neurological:  Negative for light-headedness and headaches.   Psychiatric/Behavioral:  Negative for sleep  disturbance.                     Objective:   There were no vitals taken for this visit.      Physical Exam  LOWER EXTREMITY PHYSICAL EXAMINATION  DERMATOLOGY: Skin is supple, dry and intact. No overt wound healing complications are noted.    ORTHOPEDIC: Rectus alignment is noted, RLE 1st MTPJ. Moderate edema is noted.     VASCULAR: No ipsilateral calf tenderness is noted. No palpable cords are noted.    Assessment:     1. Postop check    2. Hav (hallux abducto valgus), right    3. Pain of right great toe    4. Tobacco abuse    5. Impaired mobility    6. Impaired weight bearing    7. Vitamin D deficiency            Plan:     Postop check  -     SUBSEQUENT HOME HEALTH ORDERS  -     oxyCODONE (ROXICODONE) 10 mg Tab immediate release tablet; Take 1 tablet (10 mg total) by mouth every 4 (four) hours as needed for Pain.  Dispense: 36 tablet; Refill: 0    Hav (hallux abducto valgus), right  -     SUBSEQUENT HOME HEALTH ORDERS  -     oxyCODONE (ROXICODONE) 10 mg Tab immediate release tablet; Take 1 tablet (10 mg total) by mouth every 4 (four) hours as needed for Pain.  Dispense: 36 tablet; Refill: 0  -     Cancel: WALKER FOR HOME USE  -     WALKER FOR HOME USE    Pain of right great toe  -     SUBSEQUENT HOME HEALTH ORDERS  -     oxyCODONE (ROXICODONE) 10 mg Tab immediate release tablet; Take 1 tablet (10 mg total) by mouth every 4 (four) hours as needed for Pain.  Dispense: 36 tablet; Refill: 0    Tobacco abuse    Impaired mobility  -     Cancel: WALKER FOR HOME USE  -     WALKER FOR HOME USE    Impaired weight bearing  -     Cancel: WALKER FOR HOME USE  -     WALKER FOR HOME USE    Vitamin D deficiency  -     ergocalciferol (ERGOCALCIFEROL) 50,000 unit Cap; Take 1 capsule (50,000 Units total) by mouth every 7 days. for 4 doses  Dispense: 4 capsule; Refill: 0      Thorough discussion is had with the patient today, concerning the diagnosis, its etiology, and the treatment algorithm at present.     Take note, the harmful  effects of nicotine/tobacco/cigarette/ marijuana smoking, especially in relation to the lower extremity. Strongly consider consultation with primary care provider or [facility] for further discussion of smoking cessation methods. Smoking & Tobacco use cessation couseling is recommended.    XRAYS are reviewed in detail with the patient. All questions and concerns regarding findings and its/their implications are outlined and discussed.    Continue Vit. D.    [facility] with [facility].    Sutures are removed.    RICE therapy.    Sx. Shoe.    Rx for rolling walker with seat.    Rolling Walker/Rollator:  The mobility limitation cannot be sufficiently resolved by the use of a cane.   Patient's functional mobility deficit can be sufficiently resolved with the use of a (Rollator, Rolling Walker or Walker).  Patient's mobility limitation significantly impairs their ability to participate in one of more activities of daily living.  The use of a (Rollator, RW or Walker) will significantly improve the patient's ability to participate in MRADLS and the patient will use it on regular basis in the home.             Future Appointments   Date Time Provider Department [place]   3/13/2025 11:00 AM Chadwick Rodas DPM [facility]   5/29/2025  9:20 AM Sergio Obando MD ON CARDIO  Medical C

## 2025-03-26 ENCOUNTER — OFFICE VISIT (OUTPATIENT)
Dept: PODIATRY | Facility: CLINIC | Age: 65
End: 2025-03-26
Payer: MEDICARE

## 2025-03-26 VITALS — HEIGHT: 62 IN | WEIGHT: 158.06 LBS | BODY MASS INDEX: 29.08 KG/M2

## 2025-03-26 DIAGNOSIS — Z72.0 TOBACCO ABUSE: ICD-10-CM

## 2025-03-26 DIAGNOSIS — M79.674 PAIN OF RIGHT GREAT TOE: ICD-10-CM

## 2025-03-26 DIAGNOSIS — M20.11 HAV (HALLUX ABDUCTO VALGUS), RIGHT: ICD-10-CM

## 2025-03-26 DIAGNOSIS — Z09 POSTOP CHECK: Primary | ICD-10-CM

## 2025-03-26 DIAGNOSIS — T81.89XA DELAYED SURGICAL WOUND HEALING, INITIAL ENCOUNTER: ICD-10-CM

## 2025-03-26 PROCEDURE — 87070 CULTURE OTHR SPECIMN AEROBIC: CPT | Performed by: PODIATRIST

## 2025-03-26 PROCEDURE — 87075 CULTR BACTERIA EXCEPT BLOOD: CPT | Performed by: PODIATRIST

## 2025-03-26 PROCEDURE — 99999 PR PBB SHADOW E&M-EST. PATIENT-LVL IV: CPT | Mod: PBBFAC,,, | Performed by: PODIATRIST

## 2025-03-26 RX ORDER — OXYCODONE AND ACETAMINOPHEN 10; 325 MG/1; MG/1
1 TABLET ORAL EVERY 6 HOURS PRN
Qty: 28 TABLET | Refills: 0 | Status: SHIPPED | OUTPATIENT
Start: 2025-03-26 | End: 2025-04-02

## 2025-03-26 NOTE — PROGRESS NOTES
Subjective:       Patient ID: Karyna Sanders is a 65 y.o. female.    Chief Complaint: Post-op Evaluation (Post op, rate pain 8/10, pt is wearing post op shoe on right foot and tennis on left, nondiabetic )    HPI:  Karyna Sanders presents to the office today, s/p 2/28/2025 RLE 1st MTPJ (she is also s/p 12/6/2024 LLE 1st MTPJ fusion, gatroc recession with metatarsal osteotomy, LLE, 4th).  WB with Sx. Shoe. States moderate pains. Does have Home Health with [facility]. States smoking less. States RICE therapy. States Vit. D. States DM control.      Hemoglobin A1C   Date Value Ref Range Status   01/02/2025 6.0 (H) 4.0 - 5.6 % Final     Comment:     ADA Screening Guidelines:  5.7-6.4%  Consistent with prediabetes  >or=6.5%  Consistent with diabetes    High levels of fetal hemoglobin interfere with the HbA1C  assay. Heterozygous hemoglobin variants (HbS, HgC, etc)do  not significantly interfere with this assay.   However, presence of multiple variants may affect accuracy.     05/01/2024 6.3 (H) 4.0 - 5.6 % Final     Comment:     ADA Screening Guidelines:  5.7-6.4%  Consistent with prediabetes  >or=6.5%  Consistent with diabetes    High levels of fetal hemoglobin interfere with the HbA1C  assay. Heterozygous hemoglobin variants (HbS, HgC, etc)do  not significantly interfere with this assay.   However, presence of multiple variants may affect accuracy.     08/29/2023 6.1 (H) 4.0 - 5.6 % Final     Comment:     ADA Screening Guidelines:  5.7-6.4%  Consistent with prediabetes  >or=6.5%  Consistent with diabetes    High levels of fetal hemoglobin interfere with the HbA1C  assay. Heterozygous hemoglobin variants (HbS, HgC, etc)do  not significantly interfere with this assay.   However, presence of multiple variants may affect accuracy.         Review of patient's allergies indicates:   Allergen Reactions    Aspirin      Told to avoid due to aneurysm repair     Nsaids (non-steroidal anti-inflammatory drug)      Due to  aneurysm    Ibuprofen Anxiety     Told to avoid due to aneurysm repair        Past Medical History:   Diagnosis Date    Anemia     Aneurysm     Anorexia 4/26/2017    Anxiety     Asthma     Atherosclerosis of aorta 1/5/2022    CT ABDOMEN PELVIS WITH CONTRAST 01/03/2021 FINDINGS: Mild atherosclerosis within the abdominal aorta which is nonaneurysmal.    Bipolar disorder     Carpal tunnel syndrome, bilateral     Cerebral aneurysm     Chronic nausea 11/15/2018    Chronic pain syndrome     Cigarette nicotine dependence     Depression     Essential hypertension 8/29/2014    GERD (gastroesophageal reflux disease)     Hyperlipidemia     Hypertension     Insomnia     Osteoporosis     Pure hypercholesterolemia 9/8/2014    Recurrent tension-type headache, not intractable 7/24/2019    Schizophrenia     Stroke     Subarachnoid hemorrhage        Family History   Problem Relation Name Age of Onset    Cancer Mother      Glaucoma Mother      Stroke Mother      Diabetes type II Mother      Heart disease Mother      Hypertension Mother      Birth defects Sister      Brain cancer Sister      Diabetes type II Sister      Birth defects Sister      Bone cancer Sister      Cancer Brother      Hypertension Brother         Social History     Socioeconomic History    Marital status:     Number of children: 3    Years of education: 11th Grade   Tobacco Use    Smoking status: Every Day     Current packs/day: 1.00     Average packs/day: 1 pack/day for 52.5 years (52.5 ttl pk-yrs)     Types: Cigarettes, Cigars, Vaping with nicotine     Start date: 1976    Smokeless tobacco: Never    Tobacco comments:     in smoking program 5/13/19   Substance and Sexual Activity    Alcohol use: Yes     Comment: occasional    Drug use: No    Sexual activity: Not Currently     Partners: Male   Social History Narrative    Patient describes herself as disabled.     Social Drivers of Health     Financial Resource Strain: Medium Risk (11/16/2024)    Overall  Financial Resource Strain (CARDIA)     Difficulty of Paying Living Expenses: Somewhat hard   Food Insecurity: No Food Insecurity (11/16/2024)    Hunger Vital Sign     Worried About Running Out of Food in the Last Year: Never true     Ran Out of Food in the Last Year: Never true   Recent Concern: Food Insecurity - Food Insecurity Present (10/16/2024)    Hunger Vital Sign     Worried About Running Out of Food in the Last Year: Sometimes true     Ran Out of Food in the Last Year: Never true   Transportation Needs: No Transportation Needs (10/16/2024)    PRAPARE - Transportation     Lack of Transportation (Medical): No     Lack of Transportation (Non-Medical): No   Physical Activity: Insufficiently Active (11/16/2024)    Exercise Vital Sign     Days of Exercise per Week: 2 days     Minutes of Exercise per Session: 30 min   Stress: No Stress Concern Present (11/16/2024)    [employer] [place] [employer] - Occupational Stress Questionnaire     Feeling of Stress : Not at all   Recent Concern: Stress - Stress Concern Present (10/16/2024)    [employer] [place] [employer] - Occupational Stress Questionnaire     Feeling of Stress : Very much   Housing Stability: Unknown (11/16/2024)    Housing Stability Vital Sign     Unable to Pay for Housing in the Last Year: No     Homeless in the Last Year: No       Past Surgical History:   Procedure Laterality Date    BUNIONECTOMY Right     COLONOSCOPY N/A 02/07/2022    Procedure: COLONOSCOPY;  Surgeon: Kamila Lund MD;  Location: [facility];  Service: Endoscopy;  Laterality: N/A;    COLONOSCOPY W/ BIOPSIES AND POLYPECTOMY      ESOPHAGOGASTRODUODENOSCOPY N/A 02/07/2022    Procedure: EGD (ESOPHAGOGASTRODUODENOSCOPY);  Surgeon: Kamila Lund MD;  Location: [facility];  Service: Endoscopy;  Laterality: N/A;    FUSION OF METATARSOPHALANGEAL JOINT Left 12/6/2024    Procedure: FUSION, MTP JOINT;  Surgeon: Chadwick Rodas DPM;  Location: [facility];  Service:  Podiatry;  Laterality: Left;    FUSION OF METATARSOPHALANGEAL JOINT Right 2/28/2025    Procedure: FUSION, MTP JOINT;  Surgeon: Chadwick Rodas DPM;  Location: [facility] OR;  Service: Podiatry;  Laterality: Right;    HYSTERECTOMY      OSTEOTOMY OF METATARSAL BONE Left 12/6/2024    Procedure: OSTEOTOMY, METATARSAL BONE;  Surgeon: Chadwick Rodas DPM;  Location: [facility] OR;  Service: Podiatry;  Laterality: Left;    PARTIAL HYSTERECTOMY      Bilateral Ovaries Remain    REPAIR OF ANEURYSM      clip and coil    RESECTION OF GASTROCNEMIUS MUSCLE Left 12/6/2024    Procedure: RESECTION, MUSCLE, GASTROCNEMIUS;  Surgeon: Chadwick Rodas DPM;  Location: [facility] OR;  Service: Podiatry;  Laterality: Left;    TRANSCRANIAL DOPPLER STUDY - COMPLETE  08/28/2014         TRANSCRANIAL DOPPLER STUDY - COMPLETE  08/29/2014         TRANSCRANIAL DOPPLER STUDY - COMPLETE  08/30/2014         TRANSCRANIAL DOPPLER STUDY - COMPLETE  08/31/2014         TRANSCRANIAL DOPPLER STUDY - COMPLETE  09/01/2014         TRANSCRANIAL DOPPLER STUDY - COMPLETE  09/02/2014         TRANSCRANIAL DOPPLER STUDY - COMPLETE  09/03/2014         TRANSCRANIAL DOPPLER STUDY - COMPLETE  09/04/2014         TRANSCRANIAL DOPPLER STUDY - COMPLETE  09/05/2014         TRANSCRANIAL DOPPLER STUDY - COMPLETE  09/06/2014         VAGINAL DELIVERY      X 3       Review of Systems   Constitutional:  Negative for chills, fatigue and fever.   HENT:  Negative for hearing loss.    Eyes:  Negative for photophobia and visual disturbance.   Respiratory:  Negative for cough, chest tightness, shortness of breath and wheezing.    Cardiovascular:  Negative for chest pain and palpitations.   Gastrointestinal:  Negative for constipation, diarrhea, nausea and vomiting.   Endocrine: Negative for cold intolerance and heat intolerance.   Genitourinary:  Negative for flank pain.   Musculoskeletal:  Positive for gait problem. Negative for neck pain and neck stiffness.   Neurological:  Negative for  "light-headedness and headaches.   Psychiatric/Behavioral:  Negative for sleep disturbance.             Objective:   Ht 5' 2" (1.575 m)   Wt 71.7 kg (158 lb 1.1 oz)   BMI 28.91 kg/m²       Physical Exam  LOWER EXTREMITY PHYSICAL EXAMINATION  DERMATOLOGY: Skin is supple, dry and intact. Minimal delayed healing, proximal and distal without infection.  No plate exposure.    ORTHOPEDIC: Rectus alignment is noted, RLE 1st MTPJ.  Mild and improved edema is noted.     VASCULAR: No ipsilateral calf tenderness is noted. No palpable cords are noted.    Assessment:     1. Postop check    2. Hav (hallux abducto valgus), right    3. Pain of right great toe    4. Tobacco abuse    5. Delayed surgical wound healing, initial encounter              Plan:     Postop check  -     Aerobic culture  -     Culture, Anaerobic  -     X-Ray Foot Complete Right; Future; Expected date: 03/26/2025  -     SUBSEQUENT HOME HEALTH ORDERS    Hav (hallux abducto valgus), right  -     oxyCODONE-acetaminophen (PERCOCET)  mg per tablet; Take 1 tablet by mouth every 6 (six) hours as needed for Pain.  Dispense: 28 tablet; Refill: 0  -     X-Ray Foot Complete Right; Future; Expected date: 03/26/2025    Pain of right great toe  -     oxyCODONE-acetaminophen (PERCOCET)  mg per tablet; Take 1 tablet by mouth every 6 (six) hours as needed for Pain.  Dispense: 28 tablet; Refill: 0  -     X-Ray Foot Complete Right; Future; Expected date: 03/26/2025    Tobacco abuse    Delayed surgical wound healing, initial encounter  -     Aerobic culture  -     Culture, Anaerobic        Thorough discussion is had with the patient today, concerning the diagnosis, its etiology, and the treatment algorithm at present.     Take note, the harmful effects of nicotine/tobacco/cigarette/ marijuana smoking, especially in relation to the lower extremity. Strongly consider consultation with primary care provider or [facility] for further discussion of smoking " cessation methods. Smoking & Tobacco use cessation couseling is recommended.    Update XR upon follow up.    Continue Vit. D.    [facility].     RICE therapy.    Sx. Shoe.    Rx for rolling walker with seat.    Rolling Walker/Rollator:  The mobility limitation cannot be sufficiently resolved by the use of a cane.   Patient's functional mobility deficit can be sufficiently resolved with the use of a (Rollator, Rolling Walker or Walker).  Patient's mobility limitation significantly impairs their ability to participate in one of more activities of daily living.  The use of a (Rollator, RW or Walker) will significantly improve the patient's ability to participate in MRADLS and the patient will use it on regular basis in the home.             Future Appointments   Date Time Provider Department Center   5/29/2025  9:20 AM Sergio Obando MD ON CARDIO  Medical C

## 2025-03-28 LAB — BACTERIA SPEC ANAEROBE CULT: NORMAL

## 2025-03-29 ENCOUNTER — RESULTS FOLLOW-UP (OUTPATIENT)
Dept: PODIATRY | Facility: CLINIC | Age: 65
End: 2025-03-29

## 2025-03-29 DIAGNOSIS — T81.89XA DELAYED SURGICAL WOUND HEALING, INITIAL ENCOUNTER: Primary | ICD-10-CM

## 2025-03-29 LAB — BACTERIA SPEC AEROBE CULT: ABNORMAL

## 2025-03-29 RX ORDER — DOXYCYCLINE 100 MG/1
100 CAPSULE ORAL EVERY 12 HOURS
Qty: 28 CAPSULE | Refills: 0 | Status: SHIPPED | OUTPATIENT
Start: 2025-03-29 | End: 2025-04-12

## 2025-04-02 ENCOUNTER — TELEPHONE (OUTPATIENT)
Dept: PODIATRY | Facility: CLINIC | Age: 65
End: 2025-04-02
Payer: MEDICARE

## 2025-04-02 NOTE — TELEPHONE ENCOUNTER
Spoke with patient and informed her of results from culture. Antibiotics were sent to [facility]. Patient states she will  and start today.Call ended pleasantly.

## 2025-04-03 DIAGNOSIS — E55.9 VITAMIN D DEFICIENCY: ICD-10-CM

## 2025-04-03 RX ORDER — ERGOCALCIFEROL 1.25 MG/1
50000 CAPSULE ORAL
Qty: 12 CAPSULE | Refills: 0 | Status: SHIPPED | OUTPATIENT
Start: 2025-04-03 | End: 2025-06-20

## 2025-04-09 ENCOUNTER — HOSPITAL ENCOUNTER (OUTPATIENT)
Dept: RADIOLOGY | Facility: HOSPITAL | Age: 65
Discharge: HOME | End: 2025-04-09
Attending: PODIATRIST
Payer: MEDICARE

## 2025-04-09 ENCOUNTER — OFFICE VISIT (OUTPATIENT)
Dept: PODIATRY | Facility: CLINIC | Age: 65
End: 2025-04-09
Payer: MEDICARE

## 2025-04-09 VITALS — BODY MASS INDEX: 29.08 KG/M2 | HEIGHT: 62 IN | WEIGHT: 158.06 LBS

## 2025-04-09 DIAGNOSIS — Z09 POSTOP CHECK: ICD-10-CM

## 2025-04-09 DIAGNOSIS — Z09 POSTOP CHECK: Primary | ICD-10-CM

## 2025-04-09 DIAGNOSIS — M20.11 HAV (HALLUX ABDUCTO VALGUS), RIGHT: ICD-10-CM

## 2025-04-09 DIAGNOSIS — M79.674 PAIN OF RIGHT GREAT TOE: ICD-10-CM

## 2025-04-09 PROCEDURE — 73630 X-RAY EXAM OF FOOT: CPT | Mod: 26,HCNC,RT, | Performed by: RADIOLOGY

## 2025-04-09 PROCEDURE — 73630 X-RAY EXAM OF FOOT: CPT | Mod: TC,HCNC,RT

## 2025-04-09 PROCEDURE — 99999 PR PBB SHADOW E&M-EST. PATIENT-LVL IV: CPT | Mod: PBBFAC,HCNC,, | Performed by: PODIATRIST

## 2025-04-09 RX ORDER — OXYCODONE HYDROCHLORIDE 5 MG/1
5 TABLET ORAL EVERY 6 HOURS PRN
Qty: 28 TABLET | Refills: 0 | Status: SHIPPED | OUTPATIENT
Start: 2025-04-09 | End: 2025-04-16

## 2025-04-09 NOTE — PROGRESS NOTES
Subjective:       Patient ID: Karyna Sanders is a 65 y.o. female.    Chief Complaint: Post-op Evaluation (Post op, rate pain 7/10, nondiabetic, pt is wearing post op shoe on right and tennis on left )    HPI:  Karyna Sanders presents to the office today, s/p 2/28/2025 RLE 1st MTPJ (she is also s/p 12/6/2024 LLE 1st MTPJ fusion, gatroc recession with metatarsal osteotomy, LLE, 4th).  WB with Sx. Shoe. States minimal pains. Does have Home Health with [facility]. States smoking less. States RICE therapy. States Vit. D. States DM control.      Hemoglobin A1C   Date Value Ref Range Status   01/02/2025 6.0 (H) 4.0 - 5.6 % Final     Comment:     ADA Screening Guidelines:  5.7-6.4%  Consistent with prediabetes  >or=6.5%  Consistent with diabetes    High levels of fetal hemoglobin interfere with the HbA1C  assay. Heterozygous hemoglobin variants (HbS, HgC, etc)do  not significantly interfere with this assay.   However, presence of multiple variants may affect accuracy.     05/01/2024 6.3 (H) 4.0 - 5.6 % Final     Comment:     ADA Screening Guidelines:  5.7-6.4%  Consistent with prediabetes  >or=6.5%  Consistent with diabetes    High levels of fetal hemoglobin interfere with the HbA1C  assay. Heterozygous hemoglobin variants (HbS, HgC, etc)do  not significantly interfere with this assay.   However, presence of multiple variants may affect accuracy.     08/29/2023 6.1 (H) 4.0 - 5.6 % Final     Comment:     ADA Screening Guidelines:  5.7-6.4%  Consistent with prediabetes  >or=6.5%  Consistent with diabetes    High levels of fetal hemoglobin interfere with the HbA1C  assay. Heterozygous hemoglobin variants (HbS, HgC, etc)do  not significantly interfere with this assay.   However, presence of multiple variants may affect accuracy.         Review of patient's allergies indicates:   Allergen Reactions    Aspirin      Told to avoid due to aneurysm repair     Nsaids (non-steroidal anti-inflammatory drug)      Due to aneurysm     Ibuprofen Anxiety     Told to avoid due to aneurysm repair        Past Medical History:   Diagnosis Date    Anemia     Aneurysm     Anorexia 4/26/2017    Anxiety     Asthma     Atherosclerosis of aorta 1/5/2022    CT ABDOMEN PELVIS WITH CONTRAST 01/03/2021 FINDINGS: Mild atherosclerosis within the abdominal aorta which is nonaneurysmal.    Bipolar disorder     Carpal tunnel syndrome, bilateral     Cerebral aneurysm     Chronic nausea 11/15/2018    Chronic pain syndrome     Cigarette nicotine dependence     Depression     Essential hypertension 8/29/2014    GERD (gastroesophageal reflux disease)     Hyperlipidemia     Hypertension     Insomnia     Osteoporosis     Pure hypercholesterolemia 9/8/2014    Recurrent tension-type headache, not intractable 7/24/2019    Schizophrenia     Stroke     Subarachnoid hemorrhage        Family History   Problem Relation Name Age of Onset    Cancer Mother      Glaucoma Mother      Stroke Mother      Diabetes type II Mother      Heart disease Mother      Hypertension Mother      Birth defects Sister      Brain cancer Sister      Diabetes type II Sister      Birth defects Sister      Bone cancer Sister      Cancer Brother      Hypertension Brother         Social History     Socioeconomic History    Marital status:     Number of children: 3    Years of education: 11th Grade   Tobacco Use    Smoking status: Every Day     Current packs/day: 1.00     Average packs/day: 1 pack/day for 52.6 years (52.6 ttl pk-yrs)     Types: Cigarettes, Cigars, Vaping with nicotine     Start date: 1976    Smokeless tobacco: Never    Tobacco comments:     in smoking program 5/13/19   Substance and Sexual Activity    Alcohol use: Yes     Comment: occasional    Drug use: No    Sexual activity: Not Currently     Partners: Male   Social History Narrative    Patient describes herself as disabled.     Social Drivers of Health     Financial Resource Strain: Medium Risk (11/16/2024)    Overall Financial  Resource Strain (CARDIA)     Difficulty of Paying Living Expenses: Somewhat hard   Food Insecurity: No Food Insecurity (11/16/2024)    Hunger Vital Sign     Worried About Running Out of Food in the Last Year: Never true     Ran Out of Food in the Last Year: Never true   Recent Concern: Food Insecurity - Food Insecurity Present (10/16/2024)    Hunger Vital Sign     Worried About Running Out of Food in the Last Year: Sometimes true     Ran Out of Food in the Last Year: Never true   Transportation Needs: No Transportation Needs (10/16/2024)    PRAPARE - Transportation     Lack of Transportation (Medical): No     Lack of Transportation (Non-Medical): No   Physical Activity: Insufficiently Active (11/16/2024)    Exercise Vital Sign     Days of Exercise per Week: 2 days     Minutes of Exercise per Session: 30 min   Stress: No Stress Concern Present (11/16/2024)    [employer] [place] [employer] - Occupational Stress Questionnaire     Feeling of Stress : Not at all   Recent Concern: Stress - Stress Concern Present (10/16/2024)    [employer] [place] - Occupational Stress Questionnaire     Feeling of Stress : Very much   Housing Stability: Unknown (11/16/2024)    Housing Stability Vital Sign     Unable to Pay for Housing in the Last Year: No     Homeless in the Last Year: No       Past Surgical History:   Procedure Laterality Date    BUNIONECTOMY Right     COLONOSCOPY N/A 02/07/2022    Procedure: COLONOSCOPY;  Surgeon: Kamila Lund MD;  Location: [facility];  Service: Endoscopy;  Laterality: N/A;    COLONOSCOPY W/ BIOPSIES AND POLYPECTOMY      ESOPHAGOGASTRODUODENOSCOPY N/A 02/07/2022    Procedure: EGD (ESOPHAGOGASTRODUODENOSCOPY);  Surgeon: Kamila Lund MD;  Location: [facility];  Service: Endoscopy;  Laterality: N/A;    FUSION OF METATARSOPHALANGEAL JOINT Left 12/6/2024    Procedure: FUSION, MTP JOINT;  Surgeon: Chadwick Rodas DPM;  Location: [facility];  Service: Podiatry;   Laterality: Left;    FUSION OF METATARSOPHALANGEAL JOINT Right 2/28/2025    Procedure: FUSION, MTP JOINT;  Surgeon: Chadwick Rodas DPM;  Location: [facility] OR;  Service: Podiatry;  Laterality: Right;    HYSTERECTOMY      OSTEOTOMY OF METATARSAL BONE Left 12/6/2024    Procedure: OSTEOTOMY, METATARSAL BONE;  Surgeon: Chadwick Rodas DPM;  Location: [facility] OR;  Service: Podiatry;  Laterality: Left;    PARTIAL HYSTERECTOMY      Bilateral Ovaries Remain    REPAIR OF ANEURYSM      clip and coil    RESECTION OF GASTROCNEMIUS MUSCLE Left 12/6/2024    Procedure: RESECTION, MUSCLE, GASTROCNEMIUS;  Surgeon: Chadwick Rodas DPM;  Location: [facility] OR;  Service: Podiatry;  Laterality: Left;    TRANSCRANIAL DOPPLER STUDY - COMPLETE  08/28/2014         TRANSCRANIAL DOPPLER STUDY - COMPLETE  08/29/2014         TRANSCRANIAL DOPPLER STUDY - COMPLETE  08/30/2014         TRANSCRANIAL DOPPLER STUDY - COMPLETE  08/31/2014         TRANSCRANIAL DOPPLER STUDY - COMPLETE  09/01/2014         TRANSCRANIAL DOPPLER STUDY - COMPLETE  09/02/2014         TRANSCRANIAL DOPPLER STUDY - COMPLETE  09/03/2014         TRANSCRANIAL DOPPLER STUDY - COMPLETE  09/04/2014         TRANSCRANIAL DOPPLER STUDY - COMPLETE  09/05/2014         TRANSCRANIAL DOPPLER STUDY - COMPLETE  09/06/2014         VAGINAL DELIVERY      X 3       Review of Systems   Constitutional:  Negative for chills, fatigue and fever.   HENT:  Negative for hearing loss.    Eyes:  Negative for photophobia and visual disturbance.   Respiratory:  Negative for cough, chest tightness, shortness of breath and wheezing.    Cardiovascular:  Negative for chest pain and palpitations.   Gastrointestinal:  Negative for constipation, diarrhea, nausea and vomiting.   Endocrine: Negative for cold intolerance and heat intolerance.   Genitourinary:  Negative for flank pain.   Musculoskeletal:  Positive for gait problem. Negative for neck pain and neck stiffness.   Neurological:  Negative for light-headedness and  "headaches.   Psychiatric/Behavioral:  Negative for sleep disturbance.         Objective:   Ht 5' 2" (1.575 m)   Wt 71.7 kg (158 lb 1.1 oz)   BMI 28.91 kg/m²       Physical Exam  LOWER EXTREMITY PHYSICAL EXAMINATION  DERMATOLOGY: Skin is supple, dry and intact. Complete wound healing is noted. There are no suggestions of infection.    ORTHOPEDIC: Rectus 1st ray is noted. Minimal edema is noted.    VASCULAR: No ipsilateral calf tenderness is noted. No palpable cords are noted.    Assessment:     1. Postop check    2. Hav (hallux abducto valgus), right    3. Pain of right great toe              Plan:     Postop check  -     SUBSEQUENT HOME HEALTH ORDERS  -     oxyCODONE (ROXICODONE) 5 MG immediate release tablet; Take 1 tablet (5 mg total) by mouth every 6 (six) hours as needed for Pain.  Dispense: 28 tablet; Refill: 0    Hav (hallux abducto valgus), right  -     SUBSEQUENT HOME HEALTH ORDERS  -     oxyCODONE (ROXICODONE) 5 MG immediate release tablet; Take 1 tablet (5 mg total) by mouth every 6 (six) hours as needed for Pain.  Dispense: 28 tablet; Refill: 0    Pain of right great toe  -     SUBSEQUENT HOME HEALTH ORDERS  -     oxyCODONE (ROXICODONE) 5 MG immediate release tablet; Take 1 tablet (5 mg total) by mouth every 6 (six) hours as needed for Pain.  Dispense: 28 tablet; Refill: 0        Thorough discussion is had with the patient today, concerning the diagnosis, its etiology, and the treatment algorithm at present.     XRAYS are reviewed in detail with the patient. All questions and concerns regarding findings and its/their implications are outlined and discussed.    Take note, the harmful effects of nicotine/tobacco/cigarette/ marijuana smoking, especially in relation to the lower extremity. Strongly consider consultation with primary care provider or [facility] for further discussion of smoking cessation methods. Smoking & Tobacco use cessation couseling is recommended.    Continue Vit. " D.    D/C Hara.     RICE therapy.    Sx. Shoe until 4/18/2025.     Patient will start/continue BID warm water Epsom salt soaks for duration of 15-20 minutes each time (verify water is of the appropriate temperature).     Patient will perform appropriate wound care with [facility] thereafter.           Future Appointments   Date Time Provider Department Center   4/9/2025  9:00 AM [facility]   5/29/2025  9:20 AM Sergio Obando MD [facility]

## 2025-05-08 ENCOUNTER — TELEPHONE (OUTPATIENT)
Dept: INTERNAL MEDICINE | Facility: CLINIC | Age: 65
End: 2025-05-08
Payer: MEDICARE

## 2025-05-08 NOTE — TELEPHONE ENCOUNTER
Copied from CRM #[id number]. Topic: Appointments - Appointment Scheduling  >> May 8, 2025  1:30 PM Abigail wrote:  Type:  Sooner Apoointment Request    Caller is requesting a sooner appointment.  Caller declined first available appointment listed below.  Caller will not accept being placed on the waitlist and is requesting a message be sent to doctor.  Name of Caller:pt  When is the first available appointment?past june  Symptoms:pains and recent fall  Would the patient rather a call back or a response via [facility]? call  Best Call Back Number:[phone]  Additional Information: requesting a call for a sooner appt

## 2025-05-20 ENCOUNTER — OFFICE VISIT (OUTPATIENT)
Dept: INTERNAL MEDICINE | Facility: CLINIC | Age: 65
End: 2025-05-20
Payer: MEDICARE

## 2025-05-20 VITALS
DIASTOLIC BLOOD PRESSURE: 82 MMHG | HEART RATE: 91 BPM | SYSTOLIC BLOOD PRESSURE: 118 MMHG | HEIGHT: 62 IN | WEIGHT: 152.56 LBS | OXYGEN SATURATION: 98 % | TEMPERATURE: 97 F | BODY MASS INDEX: 28.07 KG/M2

## 2025-05-20 DIAGNOSIS — G56.03 BILATERAL CARPAL TUNNEL SYNDROME: Chronic | ICD-10-CM

## 2025-05-20 DIAGNOSIS — R29.898 WEAKNESS OF BOTH LOWER EXTREMITIES: Primary | ICD-10-CM

## 2025-05-20 DIAGNOSIS — G62.9 PERIPHERAL POLYNEUROPATHY: Chronic | ICD-10-CM

## 2025-05-20 DIAGNOSIS — M54.17 LUMBOSACRAL RADICULOPATHY AT S1: ICD-10-CM

## 2025-05-20 DIAGNOSIS — R73.03 PREDIABETES: Chronic | ICD-10-CM

## 2025-05-20 DIAGNOSIS — Z12.31 ENCOUNTER FOR SCREENING MAMMOGRAM FOR MALIGNANT NEOPLASM OF BREAST: ICD-10-CM

## 2025-05-20 DIAGNOSIS — G56.21 CUBITAL TUNNEL SYNDROME, RIGHT: ICD-10-CM

## 2025-05-20 DIAGNOSIS — R29.6 FREQUENT FALLS: ICD-10-CM

## 2025-05-20 DIAGNOSIS — Z79.899 LONG-TERM CURRENT USE OF HIGH RISK MEDICATION OTHER THAN ANTICOAGULANT: Chronic | ICD-10-CM

## 2025-05-20 DIAGNOSIS — M77.42 METATARSALGIA OF LEFT FOOT: ICD-10-CM

## 2025-05-20 DIAGNOSIS — R26.81 GAIT INSTABILITY: ICD-10-CM

## 2025-05-20 DIAGNOSIS — R53.81 DEBILITY: ICD-10-CM

## 2025-05-20 DIAGNOSIS — F41.9 ANXIETY: Chronic | ICD-10-CM

## 2025-05-20 DIAGNOSIS — I10 ESSENTIAL HYPERTENSION: Chronic | ICD-10-CM

## 2025-05-20 DIAGNOSIS — F31.9 BIPOLAR 1 DISORDER: Chronic | ICD-10-CM

## 2025-05-20 DIAGNOSIS — M62.81 MUSCLE WEAKNESS OF RIGHT UPPER EXTREMITY: ICD-10-CM

## 2025-05-20 DIAGNOSIS — R63.0 APPETITE LOSS: ICD-10-CM

## 2025-05-20 DIAGNOSIS — F25.0 SCHIZOAFFECTIVE DISORDER, BIPOLAR TYPE: Chronic | ICD-10-CM

## 2025-05-20 DIAGNOSIS — Z79.891 CHRONIC USE OF OPIATE DRUG FOR THERAPEUTIC PURPOSE: ICD-10-CM

## 2025-05-20 DIAGNOSIS — R23.2 HOT FLASHES: ICD-10-CM

## 2025-05-20 DIAGNOSIS — R20.0 BILATERAL HAND NUMBNESS: ICD-10-CM

## 2025-05-20 PROCEDURE — G2211 COMPLEX E/M VISIT ADD ON: HCPCS | Mod: HCNC,S$GLB,, | Performed by: PHYSICIAN ASSISTANT

## 2025-05-20 PROCEDURE — 3044F HG A1C LEVEL LT 7.0%: CPT | Mod: CPTII,HCNC,S$GLB, | Performed by: PHYSICIAN ASSISTANT

## 2025-05-20 PROCEDURE — 1125F AMNT PAIN NOTED PAIN PRSNT: CPT | Mod: CPTII,HCNC,S$GLB, | Performed by: PHYSICIAN ASSISTANT

## 2025-05-20 PROCEDURE — 99214 OFFICE O/P EST MOD 30 MIN: CPT | Mod: HCNC,S$GLB,, | Performed by: PHYSICIAN ASSISTANT

## 2025-05-20 PROCEDURE — 1159F MED LIST DOCD IN RCRD: CPT | Mod: CPTII,HCNC,S$GLB, | Performed by: PHYSICIAN ASSISTANT

## 2025-05-20 PROCEDURE — 3008F BODY MASS INDEX DOCD: CPT | Mod: CPTII,HCNC,S$GLB, | Performed by: PHYSICIAN ASSISTANT

## 2025-05-20 PROCEDURE — 3288F FALL RISK ASSESSMENT DOCD: CPT | Mod: CPTII,HCNC,S$GLB, | Performed by: PHYSICIAN ASSISTANT

## 2025-05-20 PROCEDURE — 99999 PR PBB SHADOW E&M-EST. PATIENT-LVL V: CPT | Mod: PBBFAC,HCNC,, | Performed by: PHYSICIAN ASSISTANT

## 2025-05-20 PROCEDURE — 1101F PT FALLS ASSESS-DOCD LE1/YR: CPT | Mod: CPTII,HCNC,S$GLB, | Performed by: PHYSICIAN ASSISTANT

## 2025-05-20 PROCEDURE — 3079F DIAST BP 80-89 MM HG: CPT | Mod: CPTII,HCNC,S$GLB, | Performed by: PHYSICIAN ASSISTANT

## 2025-05-20 PROCEDURE — 3074F SYST BP LT 130 MM HG: CPT | Mod: CPTII,HCNC,S$GLB, | Performed by: PHYSICIAN ASSISTANT

## 2025-05-20 PROCEDURE — 1160F RVW MEDS BY RX/DR IN RCRD: CPT | Mod: CPTII,HCNC,S$GLB, | Performed by: PHYSICIAN ASSISTANT

## 2025-05-20 RX ORDER — PAROXETINE 10 MG/1
10 TABLET, FILM COATED ORAL NIGHTLY
Qty: 30 TABLET | Refills: 11 | Status: SHIPPED | OUTPATIENT
Start: 2025-05-20 | End: 2026-05-20

## 2025-05-20 NOTE — PROGRESS NOTES
Subjective:      Patient ID: Karyna Sanders is a 65 y.o. female.    Chief Complaint: Fall    HPI  History of Present Illness    CHIEF COMPLAINT:  Ms. Sanders presents today for evaluation of frequent falls. Requesting an order for a stair lift. Reports frequent falls up and down her stairs due to her debility. Also requesting refill on tramadol for chronic pain management.     FALLS AND NEUROLOGICAL SYMPTOMS:  She experiences frequent falls due to her legs giving out without warning, occurring both outdoors and indoors, including on stairs. She attributes this to lower extremity weakness/debility from multiple foot surgeries. She denies any associated dizziness.She is requesting an order for a stairs lift. She is unable to afford to move and needs to use the stairs on a daily basis multiple times a day.   She frequently falls down the stairs due to her lower extremity weakness and debility.     MUSCULOSKELETAL:  She reports right wrist pain that has progressed to involve the knuckles. EMG + for carpal tunnel. She previously used Tramadol with effective pain relief but discontinued when symptoms improved. She would like a new rx sent in.    She receives physical therapy through home health services with minimal improvement in her symptoms. Declines another referral for more therapy.     SURGICAL HISTORY:  She has history of bilateral foot surgeries with placement of plates and screws in both great toes resulting in limited toe mobility, and surgical removal of plantar corns.    MENOPAUSAL SYMPTOMS:  She reports recurrence of hot flashes after a 10-year hiatus, describing severe episodes with excessive sweating even in air-conditioned environments.  Celexa and lexapro are both listed as meds on her list but pt states that she has not taken either of those medications in months.     MEDICATIONS:  She discontinued gabapentin due to concerns after reading about potential adverse effects.    APPETITE:  She reports  decreased appetite and forces herself to eat despite not feeling hungry. Stopped periactin. She would like something to help with her appetite. She has been losing weight over the past month (6lbs).    Medications were reviewed    Wt Readings from Last 3 Encounters:   05/20/25 0836 69.2 kg (152 lb 8.9 oz)   04/09/25 0814 71.7 kg (158 lb 1.1 oz)   03/26/25 0828 71.7 kg (158 lb 1.1 oz)          Recurrent falls due to lower extremity weakness/paresthesias. She also reports right wrist carpal tunnel pains. Declines referral to ortho for injection or surgery consult.     Problem List[1]    Current Medications[2]    Review of Systems   Constitutional:  Positive for appetite change, diaphoresis and unexpected weight change. Negative for activity change, chills, fatigue and fever.   HENT: Negative.  Negative for congestion, hearing loss, postnasal drip, rhinorrhea, sore throat, trouble swallowing and voice change.    Eyes: Negative.  Negative for visual disturbance.   Respiratory: Negative.  Negative for cough, choking, chest tightness and shortness of breath.    Cardiovascular:  Negative for chest pain, palpitations and leg swelling.   Gastrointestinal:  Negative for abdominal distention, abdominal pain, blood in stool, constipation, diarrhea, nausea and vomiting.   Endocrine: Negative for cold intolerance, heat intolerance, polydipsia and polyuria.   Genitourinary: Negative.  Negative for difficulty urinating and frequency.   Musculoskeletal:  Positive for arthralgias, gait problem and myalgias. Negative for back pain, joint swelling, neck pain and neck stiffness.   Skin:  Negative for color change, pallor, rash and wound.   Neurological:  Positive for weakness and numbness. Negative for dizziness, tremors, seizures, syncope, facial asymmetry, speech difficulty, light-headedness and headaches.   Hematological:  Negative for adenopathy.   Psychiatric/Behavioral:  Negative for behavioral problems, confusion, self-injury,  "sleep disturbance and suicidal ideas. The patient is not nervous/anxious.      Objective:   /82 (BP Location: Left arm, Patient Position: Sitting)   Pulse 91   Temp 97.2 °F (36.2 °C) (Tympanic)   Ht 5' 2" (1.575 m)   Wt 69.2 kg (152 lb 8.9 oz)   SpO2 98%   BMI 27.90 kg/m²     Physical Exam  Vitals and nursing note reviewed.   Constitutional:       General: She is not in acute distress.     Appearance: Normal appearance. She is well-developed. She is not ill-appearing, toxic-appearing or diaphoretic.   HENT:      Head: Normocephalic and atraumatic.   Cardiovascular:      Rate and Rhythm: Normal rate and regular rhythm.      Heart sounds: Normal heart sounds. No murmur heard.     No friction rub. No gallop.   Pulmonary:      Effort: Pulmonary effort is normal. No respiratory distress.      Breath sounds: Normal breath sounds. No wheezing or rales.   Musculoskeletal:      Right wrist: No swelling or deformity. Normal range of motion. Normal pulse.      Right hand: No swelling, deformity, lacerations or tenderness. Decreased range of motion. Decreased strength. Decreased sensation of the median distribution. There is no disruption of two-point discrimination. Normal capillary refill. Normal pulse.      Right hip: Decreased strength (3/5 strength on the right).      Left hip: Decreased strength (4/5 strength on the left).      Comments: Right upper and lower extremity strength 3/5. Unable to move limb against resistance.    Skin:     General: Skin is warm.      Capillary Refill: Capillary refill takes less than 2 seconds.      Findings: No rash.   Neurological:      Mental Status: She is alert and oriented to person, place, and time.      Motor: No weakness.      Gait: Gait normal.   Psychiatric:         Mood and Affect: Mood normal.         Behavior: Behavior normal.         Thought Content: Thought content normal.         Judgment: Judgment normal.       Lab Results   Component Value Date    HGBA1C 6.0 (H) " 01/02/2025     CMP  Sodium   Date Value Ref Range Status   02/24/2025 140 136 - 145 mmol/L Final     Potassium   Date Value Ref Range Status   02/24/2025 4.1 3.5 - 5.1 mmol/L Final     Chloride   Date Value Ref Range Status   02/24/2025 103 95 - 110 mmol/L Final     CO2   Date Value Ref Range Status   02/24/2025 29 23 - 29 mmol/L Final     Glucose   Date Value Ref Range Status   02/24/2025 87 70 - 110 mg/dL Final     BUN   Date Value Ref Range Status   02/24/2025 7 (L) 8 - 23 mg/dL Final     Creatinine   Date Value Ref Range Status   02/24/2025 0.9 0.5 - 1.4 mg/dL Final     Calcium   Date Value Ref Range Status   02/24/2025 9.8 8.7 - 10.5 mg/dL Final     Total Protein   Date Value Ref Range Status   02/24/2025 7.2 6.0 - 8.4 g/dL Final     Albumin   Date Value Ref Range Status   02/24/2025 4.0 3.5 - 5.2 g/dL Final     Total Bilirubin   Date Value Ref Range Status   02/24/2025 0.2 0.1 - 1.0 mg/dL Final     Comment:     For infants and newborns, interpretation of results should be based  on gestational age, weight and in agreement with clinical  observations.    Premature Infant recommended reference ranges:  Up to 24 hours.............<8.0 mg/dL  Up to 48 hours............<12.0 mg/dL  3-5 days..................<15.0 mg/dL  6-29 days.................<15.0 mg/dL       Alkaline Phosphatase   Date Value Ref Range Status   02/24/2025 90 40 - 150 U/L Final     AST   Date Value Ref Range Status   02/24/2025 14 10 - 40 U/L Final     ALT   Date Value Ref Range Status   02/24/2025 14 10 - 44 U/L Final     Anion Gap   Date Value Ref Range Status   02/24/2025 8 8 - 16 mmol/L Final     eGFR   Date Value Ref Range Status   02/24/2025 >60 >60 mL/min/1.73 m^2 Final       6/28/24 EMG: There is electrodiagnostic evidence of a MILD demyelinating median neuropathy (Carpal tunnel syndrome) across BILATERAL 2. There is electrodiagnostic evidence of a MILD demyelinating median neuropathy (Carpal tunnel syndrome) across BILATERAL wrists and  a MILD demyelinating ulnar neuropathy (Cubital tunnel syndrome) across the RIGHT elbow. There is a chronic radiculopathy of the left C6-1 nerve roots   Assessment:     1. Weakness of both lower extremities    2. Muscle weakness of right upper extremity    3. Metatarsalgia of left foot    4. Lumbosacral radiculopathy at S1    5. Peripheral polyneuropathy    6. Bilateral hand numbness    7. Bilateral carpal tunnel syndrome    8. Cubital tunnel syndrome, right    9. Frequent falls    10. Chronic use of tramadol for therapeutic purpose    11. Gait instability    12. Debility    13. Appetite loss    14. Hot flashes    15. Bipolar 1 disorder    16. Schizoaffective disorder, bipolar type    17. Anxiety    18. Essential hypertension    19. Long-term current use of high risk medication other than anticoagulant    20. Prediabetes    21. Encounter for screening mammogram for malignant neoplasm of breast      Plan:   Assessed frequent falls  Evaluated carpal tunnel syndrome symptoms and treatment options.  Evaluated hot flash symptoms and considered hormone-free treatment options.  Clarified current medication regimen, noting Celexa and lexapro not being taken.  Discussed potential benefits of orthopaedic interventions for carpal/cubital tunnel syndrome.      Weakness of both lower extremities  Muscle weakness of right upper extremity  Lumbosacral radiculopathy at S1  Peripheral polyneuropathy  - Ms. Sanders's legs give out without warning, causing falls, related to radiculopathy.  - Neurology appointment in January recommended orthopedic evaluation.  - Referred to orthopedics for further assessment of radiculopathy.    Metatarsalgia of left foot  -cont seeing podiatry    Bilateral hand numbness  Bilateral carpal tunnel syndrome  Cubital tunnel syndrome, right  - Ms. Sanders experiences severe wrist and knuckle pain related to carpal tunnel syndrome, affecting daily activities.  - Recommend wearing splints at night to reduce  inflammation and emphasized importance of consistent nightly use.  - Strongly recommend referral to orthopedics. Pt declines. Declines injection and declines surgery.   - Considered Tramadol for pain management, pending approval from Dr. Boothe.  - Pain extends from wrist to knuckles in the right hand, affecting daily activities.  - Management plan aligns with wrist pain treatment approach.    Frequent falls  -recommend continued pt/ot    Chronic use of tramadol for therapeutic purpose  -will message her pcp for refill request  -I see on  that oxycodone was filled in April    Gait instability  Debility  -recommend continued physical therapy  -pt declines    Appetite loss  -     paroxetine (PAXIL) 10 MG tablet; Take 1 tablet (10 mg total) by mouth every evening.  Dispense: 30 tablet; Refill: 11  - Ms. Sanders reports lack of appetite and forces herself to eat.  -start paxil  -cont to monitor weight. Follow up in 1 month.     Hot flashes  -     paroxetine (PAXIL) 10 MG tablet; Take 1 tablet (10 mg total) by mouth every evening.  Dispense: 30 tablet; Refill: 11  - Ms. Sanders reports experiencing hot flashes again after stopping medication due to cancer risk.  - Discussed switching from Lexapro to Paxil to help manage hot flashes.  - Discontinued Lexapro and prescribed Paxil to be taken at nighttime.    Bipolar 1 disorder  Schizoaffective disorder, bipolar type  Anxiety  - make sure not taking lexapro or celexa. Stop if you are taking  - Start Paxil, which also helps with depression, mood, hot flashes, and appetite improvement.  - Informed patient about gradual onset of Paxil's effects, emphasizing need for consistent use over several weeks.    Essential hypertension  -stable and controlled    Long-term current use of high risk medication other than anticoagulant  -she has been on opiates and benzos    Prediabetes  -cont to monitor    Encounter for screening mammogram for malignant neoplasm of breast  -      Mammo Digital Screening Bilat w/ [facility] ([facility]); Future; Expected date: 05/20/2025      FOLLOW-UP:  - Follow up in 1 month.       This note was generated with the assistance of ambient listening technology. Verbal consent was obtained by the patient and accompanying visitor(s) for the recording of patient appointment to facilitate this note. I attest to having reviewed and edited the generated note for accuracy, though some syntax or spelling errors may persist. Please contact the author of this note for any clarification.        Follow up in about 4 weeks (around 6/17/2025), or if symptoms worsen or fail to improve.           [1]   Patient Active Problem List  Diagnosis    Schizoaffective disorder, bipolar type    Bipolar 1 disorder    Anxiety    Cigarette nicotine dependence without complication    History of spontaneous subarachnoid intracranial hemorrhage due to cerebral aneurysm    Essential hypertension    Pure hypercholesterolemia    Prediabetes    Anorexia    Long-term current use of high risk medication other than anticoagulant    Gastroesophageal reflux disease without esophagitis    Nonadherence to medical treatment    History of hysterectomy for benign disease    Primary localized osteoarthrosis of multiple sites    Peripheral polyneuropathy    Tension headache    Bilateral carpal tunnel syndrome    Chronic seasonal allergic rhinitis    Cubital tunnel syndrome, right    Lumbosacral radiculopathy at S1    Chronic nausea    Chest pain    Recurrent tension-type headache, not intractable    Medication overuse headache    Chronic use of tramadol for therapeutic purpose    Tardive dyskinesia    Early satiety    Atherosclerosis of aorta    Tubular adenoma of transverse colon (Next colonoscopy due 12/2026)    Chronic bronchitis    BMI 29.0-29.9,adult    Osteopenia of multiple sites    Pruritus    Chronic constipation    Numbness and tingling in left arm    Cervical radiculopathy    Plantar wart of left foot     Metatarsalgia of left foot    Asthma    Bilateral hand numbness    Hav (hallux abducto valgus), right    Nicotine dependence   [2]   Current Outpatient Medications:     acetaminophen (TYLENOL ORAL), Take 650 mg by mouth as needed., Disp: , Rfl:     albuterol (PROVENTIL) 2.5 mg /3 mL (0.083 %) nebulizer solution, Take 3 mLs (2.5 mg total) by nebulization every 6 (six) hours as needed for Wheezing. Rescue, Disp: 150 mL, Rfl: 3    albuterol (PROVENTIL/VENTOLIN HFA) 90 mcg/actuation inhaler, INHALE 2 PUFFS INTO THE LUNGS EVERY 6 HOURS AS NEEDED FOR SHORTNESS OF BREATH OR WHEEZING, Disp: 54 g, Rfl: 3    alendronate (FOSAMAX) 70 MG tablet, Take 1 tablet every week by oral route., Disp: , Rfl:     alprazolam (XANAX) 0.5 MG tablet, Take 0.5 mg by mouth daily as needed. , Disp: , Rfl:     amitriptyline (ELAVIL) 50 MG tablet, Take 1 tablet (50 mg total) by mouth every evening. Take every night and not as needed!, Disp: 90 tablet, Rfl: 3    amLODIPine (NORVASC) 5 MG tablet, Take 1 tablet (5 mg total) by mouth once daily., Disp: 90 tablet, Rfl: 3    atorvastatin (LIPITOR) 80 MG tablet, Take 1 tablet (80 mg total) by mouth every evening., Disp: 90 tablet, Rfl: 3    BLOOD PRESSURE CUFF Misc, Use to check blood pressure once daily, Disp: 1 each, Rfl: 0    butalbital-acetaminophen-caffeine -40 mg (FIORICET, ESGIC) -40 mg per tablet, Take 1 tablet by mouth every 8 (eight) hours. DO NOT USE MORE THAN 3 DAYS PER WEEK (Patient taking differently: Take 1 tablet by mouth every 8 (eight) hours as needed. DO NOT USE MORE THAN 3 DAYS PER WEEK), Disp: 15 tablet, Rfl: 2    CALCIUM CARBONATE/VITAMIN D3 (CALCIUM 600 + D,3, ORAL), Take 2 tablets by mouth once daily., Disp: , Rfl:     COMBIVENT RESPIMAT  mcg/actuation inhaler, Inhale 1 puff into the lungs every 6 (six) hours as needed., Disp: , Rfl:     ergocalciferol (ERGOCALCIFEROL) 50,000 unit Cap, Take 1 capsule (50,000 Units total) by mouth every 7 days. for 12 doses, Disp:  12 capsule, Rfl: 0    hydrOXYzine HCL (ATARAX) 25 MG tablet, Take 1 tablet (25 mg total) by mouth 3 (three) times daily as needed for Itching., Disp: 60 tablet, Rfl: 5    nebulizer accessories Kit, NEBULIZER ACCESSORIES - Use as directed for nebulized medications prescribed by me., Disp: 10 kit, Rfl: 11    nebulizer and compressor [place], NEBULIZER DEVICE - Use as directed, Disp: 1 each, Rfl: 0    nitroGLYCERIN (NITROSTAT) 0.4 MG SL tablet, Place 0.4 mg under the tongue every 5 (five) minutes as needed for Chest pain., Disp: , Rfl:     omeprazole (PRILOSEC) 40 MG capsule, Take 1 capsule (40 mg total) by mouth every morning., Disp: 90 capsule, Rfl: 3    OXcarbazepine (TRILEPTAL) 600 MG Tab, Take 150 mg by mouth 2 (two) times daily., Disp: , Rfl:     polyethylene glycol (GLYCOLAX) 17 gram/dose powder, Take 17 g by mouth daily as needed for Constipation., Disp: 510 g, Rfl: 11    QUEtiapine (SEROQUEL) 100 MG Tab, Take 100 mg by mouth every evening., Disp: , Rfl:     traMADoL (ULTRAM) 50 mg tablet, Take 50 mg by mouth every 6 (six) hours as needed for Pain., Disp: , Rfl:     paroxetine (PAXIL) 10 MG tablet, Take 1 tablet (10 mg total) by mouth every evening., Disp: 30 tablet, Rfl: 11

## 2025-05-20 NOTE — PATIENT INSTRUCTIONS
MAKE SURE NOT TAKING LEXAPRO OR CELEXA. YOU NEED TO STOP BOTH OF THESE MEDICATIONS  START PAXIL (NEW PRESCRIPTION FOR HOT FLASHES, APPETITE, DEPRESSION/ANXIENTY)

## 2025-05-29 ENCOUNTER — OFFICE VISIT (OUTPATIENT)
Dept: CARDIOLOGY | Facility: CLINIC | Age: 65
End: 2025-05-29
Payer: MEDICARE

## 2025-05-29 VITALS
HEART RATE: 89 BPM | DIASTOLIC BLOOD PRESSURE: 73 MMHG | BODY MASS INDEX: 27.77 KG/M2 | HEIGHT: 62 IN | OXYGEN SATURATION: 97 % | WEIGHT: 150.88 LBS | SYSTOLIC BLOOD PRESSURE: 110 MMHG | RESPIRATION RATE: 16 BRPM

## 2025-05-29 DIAGNOSIS — R73.03 PREDIABETES: ICD-10-CM

## 2025-05-29 DIAGNOSIS — I73.9 CLAUDICATION: ICD-10-CM

## 2025-05-29 DIAGNOSIS — R09.89 CAROTID BRUIT, UNSPECIFIED LATERALITY: ICD-10-CM

## 2025-05-29 DIAGNOSIS — I10 ESSENTIAL HYPERTENSION: ICD-10-CM

## 2025-05-29 DIAGNOSIS — E78.49 OTHER HYPERLIPIDEMIA: ICD-10-CM

## 2025-05-29 DIAGNOSIS — I10 PRIMARY HYPERTENSION: ICD-10-CM

## 2025-05-29 DIAGNOSIS — M79.606 PAIN OF LOWER EXTREMITY, UNSPECIFIED LATERALITY: ICD-10-CM

## 2025-05-29 DIAGNOSIS — I70.0 ATHEROSCLEROSIS OF AORTA: Primary | ICD-10-CM

## 2025-05-29 DIAGNOSIS — F17.210 CIGARETTE NICOTINE DEPENDENCE WITHOUT COMPLICATION: ICD-10-CM

## 2025-05-29 PROCEDURE — 99999 PR PBB SHADOW E&M-EST. PATIENT-LVL V: CPT | Mod: PBBFAC,HCNC,, | Performed by: INTERNAL MEDICINE

## 2025-05-29 NOTE — PROGRESS NOTES
Subjective:   Patient ID:  Karyna Sanders is a 65 y.o. female who presents for cardiac consult of No chief complaint on file.      Referral by: No referring provider defined for this encounter.     Reason for consult:       HPI  The patient came in today for cardiac consult of No chief complaint on file.      Karyna Sanders is a 65 y.o. female pt with remote MI, HTN, HLD, asthma, GERD, h/o subarachnoid haemorrhagia and aneurysm in , bipolar is here for preop CV eval.     2/26/25  Pt seen in 2019 by Dr. Acharya.   ECHO 5/2024 with normal bi V function and valves.   Recent ECG with sinus tach, nonsp ST changes -stable.     She has upcoming MTP joint fusion surgery with Dr. Eller  Has rare atypical CP with asthma attacks.   No prior complications with surgery.     Sinus tachycardia   Nonspecific ST abnormality   Abnormal ECG   Confirmed by Skyler Girard ([id number]) on 2/25/2025 4:30:23 PM     5/29/25  She is s/p foot surgery.   BP and HR stable today. BMI 27 - 150 lbs   She has more foot pain since surgery.   She is still smoking 10 cigs/day trying to quit      Results for orders placed during the hospital encounter of 05/01/24    Echo    Interpretation Summary    Left Ventricle: The left ventricle is normal in size. Normal wall thickness. There is normal systolic function with a visually estimated ejection fraction of 55 - 70%. Ejection fraction by visual approximation is 60%. There is normal diastolic function.    Right Ventricle: Normal right ventricular cavity size. Wall thickness is normal. Systolic function is normal.    IVC/SVC: Normal venous pressure at 3 mmHg.      No results found for this or any previous visit.      No results found for this or any previous visit.      No cardiac monitor results found for the past 12 months         Past Medical History:   Diagnosis Date    Anemia     Aneurysm     Anorexia 4/26/2017    Anxiety     Asthma     Atherosclerosis of aorta 1/5/2022    CT ABDOMEN PELVIS WITH  CONTRAST 01/03/2021 FINDINGS: Mild atherosclerosis within the abdominal aorta which is nonaneurysmal.    Bipolar disorder     Carpal tunnel syndrome, bilateral     Cerebral aneurysm     Chronic nausea 11/15/2018    Chronic pain syndrome     Cigarette nicotine dependence     Depression     Essential hypertension 8/29/2014    GERD (gastroesophageal reflux disease)     Hyperlipidemia     Hypertension     Insomnia     Osteoporosis     Pure hypercholesterolemia 9/8/2014    Recurrent tension-type headache, not intractable 7/24/2019    Schizophrenia     Stroke     Subarachnoid hemorrhage        Past Surgical History:   Procedure Laterality Date    BRAIN SURGERY  Don't remember the date but the year of 2014    BUNIONECTOMY Right     COLONOSCOPY N/A 02/07/2022    Procedure: COLONOSCOPY;  Surgeon: Kamila Lund MD;  Location: [facility] ENDO;  Service: Endoscopy;  Laterality: N/A;    COLONOSCOPY W/ BIOPSIES AND POLYPECTOMY      ESOPHAGOGASTRODUODENOSCOPY N/A 02/07/2022    Procedure: EGD (ESOPHAGOGASTRODUODENOSCOPY);  Surgeon: Kamila Lund MD;  Location: [facility] ENDO;  Service: Endoscopy;  Laterality: N/A;    EYE SURGERY  Don't remember sometime last year sometime last year    FUSION OF METATARSOPHALANGEAL JOINT Left 12/06/2024    Procedure: FUSION, MTP JOINT;  Surgeon: Chadwick Rodas DPM;  Location: [facility] OR;  Service: Podiatry;  Laterality: Left;    FUSION OF METATARSOPHALANGEAL JOINT Right 02/28/2025    Procedure: FUSION, MTP JOINT;  Surgeon: Chadwick Rodas DPM;  Location: [facility] OR;  Service: Podiatry;  Laterality: Right;    HYSTERECTOMY      OSTEOTOMY OF METATARSAL BONE Left 12/06/2024    Procedure: OSTEOTOMY, METATARSAL BONE;  Surgeon: Chadwick Rodas DPM;  Location: [facility] OR;  Service: Podiatry;  Laterality: Left;    PARTIAL HYSTERECTOMY      Bilateral Ovaries Remain    REPAIR OF ANEURYSM      clip and coil    RESECTION OF GASTROCNEMIUS MUSCLE Left 12/06/2024    Procedure: RESECTION, MUSCLE, GASTROCNEMIUS;   Surgeon: Chadwick Rodas DPM;  Location: [facility];  Service: Podiatry;  Laterality: Left;    TRANSCRANIAL DOPPLER STUDY - COMPLETE  08/28/2014         TRANSCRANIAL DOPPLER STUDY - COMPLETE  08/29/2014         TRANSCRANIAL DOPPLER STUDY - COMPLETE  08/30/2014         TRANSCRANIAL DOPPLER STUDY - COMPLETE  08/31/2014         TRANSCRANIAL DOPPLER STUDY - COMPLETE  09/01/2014         TRANSCRANIAL DOPPLER STUDY - COMPLETE  09/02/2014         TRANSCRANIAL DOPPLER STUDY - COMPLETE  09/03/2014         TRANSCRANIAL DOPPLER STUDY - COMPLETE  09/04/2014         TRANSCRANIAL DOPPLER STUDY - COMPLETE  09/05/2014         TRANSCRANIAL DOPPLER STUDY - COMPLETE  09/06/2014         VAGINAL DELIVERY      X 3       Social History[1]    Family History   Problem Relation Name Age of Onset    Cancer Mother      Glaucoma Mother      Stroke Mother      Diabetes type II Mother      Heart disease Mother      Hypertension Mother      Birth defects Sister      Brain cancer Sister      Diabetes type II Sister      Birth defects Sister      Bone cancer Sister      Cancer Brother      Hypertension Brother         Patient's Medications   New Prescriptions    No medications on file   Previous Medications    ACETAMINOPHEN (TYLENOL ORAL)    Take 650 mg by mouth as needed.    ALBUTEROL (PROVENTIL) 2.5 MG /3 ML (0.083 %) NEBULIZER SOLUTION    Take 3 mLs (2.5 mg total) by nebulization every 6 (six) hours as needed for Wheezing. Rescue    ALBUTEROL (PROVENTIL/VENTOLIN HFA) 90 MCG/ACTUATION INHALER    INHALE 2 PUFFS INTO THE LUNGS EVERY 6 HOURS AS NEEDED FOR SHORTNESS OF BREATH OR WHEEZING    ALENDRONATE (FOSAMAX) 70 MG TABLET    Take 1 tablet every week by oral route.    ALPRAZOLAM (XANAX) 0.5 MG TABLET    Take 0.5 mg by mouth daily as needed.     AMITRIPTYLINE (ELAVIL) 50 MG TABLET    Take 1 tablet (50 mg total) by mouth every evening. Take every night and not as needed!    AMLODIPINE (NORVASC) 5 MG TABLET    Take 1 tablet (5 mg total) by mouth once  daily.    ATORVASTATIN (LIPITOR) 80 MG TABLET    Take 1 tablet (80 mg total) by mouth every evening.    BLOOD PRESSURE CUFF MISC    Use to check blood pressure once daily    BUTALBITAL-ACETAMINOPHEN-CAFFEINE -40 MG (FIORICET, ESGIC) -40 MG PER TABLET    Take 1 tablet by mouth every 8 (eight) hours. DO NOT USE MORE THAN 3 DAYS PER WEEK    CALCIUM CARBONATE/VITAMIN D3 (CALCIUM 600 + D,3, ORAL)    Take 2 tablets by mouth once daily.    COMBIVENT RESPIMAT  MCG/ACTUATION INHALER    Inhale 1 puff into the lungs every 6 (six) hours as needed.    ERGOCALCIFEROL (ERGOCALCIFEROL) 50,000 UNIT CAP    Take 1 capsule (50,000 Units total) by mouth every 7 days. for 12 doses    HYDROXYZINE HCL (ATARAX) 25 MG TABLET    Take 1 tablet (25 mg total) by mouth 3 (three) times daily as needed for Itching.    NEBULIZER ACCESSORIES KIT    NEBULIZER ACCESSORIES - Use as directed for nebulized medications prescribed by me.    NEBULIZER AND COMPRESSOR JAMAR    NEBULIZER DEVICE - Use as directed    NITROGLYCERIN (NITROSTAT) 0.4 MG SL TABLET    Place 0.4 mg under the tongue every 5 (five) minutes as needed for Chest pain.    OMEPRAZOLE (PRILOSEC) 40 MG CAPSULE    Take 1 capsule (40 mg total) by mouth every morning.    OXCARBAZEPINE (TRILEPTAL) 600 MG TAB    Take 150 mg by mouth 2 (two) times daily.    PAROXETINE (PAXIL) 10 MG TABLET    Take 1 tablet (10 mg total) by mouth every evening.    POLYETHYLENE GLYCOL (GLYCOLAX) 17 GRAM/DOSE POWDER    Take 17 g by mouth daily as needed for Constipation.    QUETIAPINE (SEROQUEL) 100 MG TAB    Take 100 mg by mouth every evening.    TRAMADOL (ULTRAM) 50 MG TABLET    Take 50 mg by mouth every 6 (six) hours as needed for Pain.   Modified Medications    No medications on file   Discontinued Medications    No medications on file       Review of Systems   Constitutional:  Positive for malaise/fatigue.   HENT: Negative.     Eyes: Negative.    Respiratory: Negative.     Cardiovascular: Negative.   "  Gastrointestinal: Negative.    Genitourinary: Negative.    Musculoskeletal:  Positive for joint pain.   Skin: Negative.    Neurological: Negative.    Endo/Heme/Allergies: Negative.    Psychiatric/Behavioral: Negative.     All 12 systems otherwise negative.      Wt Readings from Last 3 Encounters:   05/29/25 68.5 kg (150 lb 14.5 oz)   05/20/25 69.2 kg (152 lb 8.9 oz)   04/09/25 71.7 kg (158 lb 1.1 oz)     Temp Readings from Last 3 Encounters:   05/20/25 97.2 °F (36.2 °C) (Tympanic)   02/28/25 98 °F (36.7 °C) (Temporal)   02/24/25 97 °F (36.1 °C) (Temporal)     BP Readings from Last 3 Encounters:   05/29/25 110/73   05/20/25 118/82   02/28/25 131/78     Pulse Readings from Last 3 Encounters:   05/29/25 89   05/20/25 91   02/28/25 93       /73   Pulse 89   Resp 16   Ht 5' 2" (1.575 m)   Wt 68.5 kg (150 lb 14.5 oz)   SpO2 97%   BMI 27.60 kg/m²     Objective:   Physical Exam  Vitals and nursing note reviewed.   Constitutional:       General: She is not in acute distress.     Appearance: She is well-developed. She is not diaphoretic.   HENT:      Head: Normocephalic and atraumatic.      Nose: Nose normal.   Eyes:      General: No scleral icterus.     Conjunctiva/sclera: Conjunctivae normal.   Neck:      Thyroid: No thyromegaly.      Vascular: No JVD.   Cardiovascular:      Rate and Rhythm: Normal rate and regular rhythm.      Heart sounds: S1 normal and S2 normal. Murmur heard.      No friction rub. No gallop. No S3 or S4 sounds.   Pulmonary:      Effort: Pulmonary effort is normal. No respiratory distress.      Breath sounds: Normal breath sounds. No stridor. No wheezing or rales.   Chest:      Chest wall: No tenderness.   Abdominal:      General: Bowel sounds are normal. There is no distension.      Palpations: Abdomen is soft. There is no mass.      Tenderness: There is no abdominal tenderness. There is no rebound.   Genitourinary:     Comments: Deferred  Musculoskeletal:         General: No tenderness or " deformity. Normal range of motion.      Cervical back: Normal range of motion and neck supple.   Lymphadenopathy:      Cervical: No cervical adenopathy.   Skin:     General: Skin is warm and dry.      Coloration: Skin is not pale.      Findings: No erythema or rash.   Neurological:      Mental Status: She is alert and oriented to person, place, and time.      Motor: No abnormal muscle tone.      Coordination: Coordination normal.   Psychiatric:         Behavior: Behavior normal.         Thought Content: Thought content normal.         Judgment: Judgment normal.         Lab Results   Component Value Date     02/24/2025    K 4.1 02/24/2025     02/24/2025    CO2 29 02/24/2025    BUN 7 (L) 02/24/2025    CREATININE 0.9 02/24/2025    GLU 87 02/24/2025    HGBA1C 6.0 (H) 01/02/2025    MG 2.0 05/02/2024    AST 14 02/24/2025    ALT 14 02/24/2025    ALBUMIN 4.0 02/24/2025    PROT 7.2 02/24/2025    BILITOT 0.2 02/24/2025    WBC 9.08 02/24/2025    HGB 13.1 02/24/2025    HCT 40.1 02/24/2025    MCV 91 02/24/2025     02/24/2025    INR 1.1 05/02/2024    TSH 1.915 05/01/2024    CHOL 164 01/02/2025    HDL 55 01/02/2025    LDLCALC 94.4 01/02/2025    TRIG 73 01/02/2025    BNP <10 08/23/2022         BNP (pg/mL)   Date Value   08/23/2022 <10   04/03/2017 <10   02/07/2014 10     INR (no units)   Date Value   05/02/2024 1.1   05/01/2024 1.0   04/03/2017 1.0   08/29/2014 1.0          Assessment:      1. Atherosclerosis of aorta    2. Other hyperlipidemia    3. Prediabetes    4. Essential hypertension    5. Pain of lower extremity, unspecified laterality    6. Primary hypertension    7. Claudication    8. Cigarette nicotine dependence without complication    9. Carotid bruit, unspecified laterality          Plan:       Leg pain, claudication, h/o TIA  - order LE u/s and LILY   - was told cannot take asa due to brain aneurysm repair  - order carotid us/     2. HTN, h/o MI - atypical CP  - titrate meds  - will discuss further  workup as needed if recurrent     3. HLD aortic atherosc  - cont statin    4. Overweight/ PreDM  - cont weight loss    5. Tobacco abuse  - needs to quit    Visit today included increased complexity associated with the care of the episodic problem leg pain addressed and managing the longitudinal care of the patient due to the serious and/or complex managed problem(s) .      Thank you for allowing me to participate in this patient's care. Please do not hesitate to contact me with any questions or concerns. Consult note has been forwarded to the referral physician.            [1]   Social History  Tobacco Use    Smoking status: Some Days     Current packs/day: 1.00     Average packs/day: 1 pack/day for 52.7 years (52.7 ttl pk-yrs)     Types: Cigarettes     Start date: 1976    Smokeless tobacco: Never    Tobacco comments:     in smoking program 5/13/19   Substance Use Topics    Alcohol use: Not Currently     Alcohol/week: 2.0 standard drinks of alcohol     Types: 1 Cans of beer, 1 Drinks containing 0.5 oz of alcohol per week     Comment: occasional    Drug use: No

## 2025-06-23 ENCOUNTER — HOSPITAL ENCOUNTER (OUTPATIENT)
Dept: CARDIOLOGY | Facility: HOSPITAL | Age: 65
Discharge: HOME | End: 2025-06-23
Attending: INTERNAL MEDICINE
Payer: MEDICARE

## 2025-06-23 VITALS
BODY MASS INDEX: 27.6 KG/M2 | DIASTOLIC BLOOD PRESSURE: 70 MMHG | WEIGHT: 150 LBS | BODY MASS INDEX: 27.6 KG/M2 | DIASTOLIC BLOOD PRESSURE: 70 MMHG | SYSTOLIC BLOOD PRESSURE: 142 MMHG | WEIGHT: 150 LBS | HEIGHT: 62 IN | SYSTOLIC BLOOD PRESSURE: 142 MMHG | SYSTOLIC BLOOD PRESSURE: 142 MMHG | BODY MASS INDEX: 27.6 KG/M2 | WEIGHT: 150 LBS | HEIGHT: 62 IN | DIASTOLIC BLOOD PRESSURE: 70 MMHG | HEIGHT: 62 IN

## 2025-06-23 DIAGNOSIS — I73.9 CLAUDICATION: ICD-10-CM

## 2025-06-23 DIAGNOSIS — R09.89 CAROTID BRUIT, UNSPECIFIED LATERALITY: ICD-10-CM

## 2025-06-23 LAB
LEFT ANT TIBIAL SYS PSV: 56 CM/S
LEFT ARM DIASTOLIC BLOOD PRESSURE: 70 MMHG
LEFT ARM SYSTOLIC BLOOD PRESSURE: 142 MMHG
LEFT CBA DIAS: 25 CM/S
LEFT CBA SYS: 71 CM/S
LEFT CCA DIST DIAS: 23 CM/S
LEFT CCA DIST SYS: 69 CM/S
LEFT CCA MID DIAS: 25 CM/S
LEFT CCA MID SYS: 75 CM/S
LEFT CCA PROX DIAS: 28 CM/S
LEFT CCA PROX SYS: 74 CM/S
LEFT CFA PSV: 124 CM/S
LEFT ECA DIAS: 20 CM/S
LEFT ECA SYS: 76 CM/S
LEFT ICA DIST DIAS: 38 CM/S
LEFT ICA DIST SYS: 88 CM/S
LEFT ICA MID DIAS: 33 CM/S
LEFT ICA MID SYS: 83 CM/S
LEFT ICA PROX DIAS: 18 CM/S
LEFT ICA PROX SYS: 57 CM/S
LEFT PERONEAL SYS PSV: 55 CM/S
LEFT POPLITEAL PSV: 64 CM/S
LEFT POST TIBIAL SYS PSV: 75 CM/S
LEFT PROFUNDA SYS PSV: 67 CM/S
LEFT SUPER FEMORAL DIST SYS PSV: 80 CM/S
LEFT SUPER FEMORAL MID SYS PSV: 80 CM/S
LEFT SUPER FEMORAL OSTIAL SYS PSV: 67 CM/S
LEFT SUPER FEMORAL PROX SYS PSV: 103 CM/S
LEFT TIB/PER TRUNK SYS PSV: 69 CM/S
LEFT VERTEBRAL DIAS: 13 CM/S
LEFT VERTEBRAL SYS: 42 CM/S
OHS CV CAROTID RIGHT ICA EDV HIGHEST: 35
OHS CV CAROTID ULTRASOUND LEFT ICA/CCA RATIO: 1.28
OHS CV CAROTID ULTRASOUND RIGHT ICA/CCA RATIO: 1.59
OHS CV LEFT LOWER EXTREMITY ABI (NO CALC): 1.15
OHS CV PV CAROTID LEFT HIGHEST CCA: 75
OHS CV PV CAROTID LEFT HIGHEST ICA: 88
OHS CV PV CAROTID RIGHT HIGHEST CCA: 58
OHS CV PV CAROTID RIGHT HIGHEST ICA: 92
OHS CV RIGHT ABI LOWER EXTREMITY (NO CALC): 1.11
OHS CV US CAROTID LEFT HIGHEST EDV: 38
RIGHT ANT TIBIAL SYS PSV: 65 CM/S
RIGHT ARM DIASTOLIC BLOOD PRESSURE: 71 MMHG
RIGHT ARM SYSTOLIC BLOOD PRESSURE: 130 MMHG
RIGHT CBA DIAS: 26 CM/S
RIGHT CBA SYS: 61 CM/S
RIGHT CCA DIST DIAS: 24 CM/S
RIGHT CCA DIST SYS: 58 CM/S
RIGHT CCA MID DIAS: 22 CM/S
RIGHT CCA MID SYS: 49 CM/S
RIGHT CCA PROX DIAS: 17 CM/S
RIGHT CCA PROX SYS: 48 CM/S
RIGHT CFA PSV: 141 CM/S
RIGHT ECA DIAS: 12 CM/S
RIGHT ECA SYS: 48 CM/S
RIGHT ICA DIST DIAS: 35 CM/S
RIGHT ICA DIST SYS: 92 CM/S
RIGHT ICA MID DIAS: 27 CM/S
RIGHT ICA MID SYS: 64 CM/S
RIGHT ICA PROX DIAS: 29 CM/S
RIGHT ICA PROX SYS: 73 CM/S
RIGHT PERONEAL SYS PSV: 67 CM/S
RIGHT POPLITEAL PSV: 63 CM/S
RIGHT POST TIBIAL SYS PSV: 58 CM/S
RIGHT PROFUNDA SYS PSV: 94 CM/S
RIGHT SUPER FEMORAL DIST SYS PSV: 82 CM/S
RIGHT SUPER FEMORAL MID SYS PSV: 90 CM/S
RIGHT SUPER FEMORAL OSTIAL SYS PSV: 116 CM/S
RIGHT SUPER FEMORAL PROX SYS PSV: 109 CM/S
RIGHT TIB/PER TRUNK SYS PSV: 52 CM/S
RIGHT VERTEBRAL DIAS: 23 CM/S
RIGHT VERTEBRAL SYS: 58 CM/S

## 2025-06-23 PROCEDURE — 93880 EXTRACRANIAL BILAT STUDY: CPT | Mod: HCNC

## 2025-06-23 PROCEDURE — 93925 LOWER EXTREMITY STUDY: CPT | Mod: 26,HCNC,, | Performed by: INTERNAL MEDICINE

## 2025-06-23 PROCEDURE — 93880 EXTRACRANIAL BILAT STUDY: CPT | Mod: 26,HCNC,, | Performed by: INTERNAL MEDICINE

## 2025-06-23 PROCEDURE — 93925 LOWER EXTREMITY STUDY: CPT | Mod: HCNC

## 2025-06-23 PROCEDURE — 93922 UPR/L XTREMITY ART 2 LEVELS: CPT | Mod: HCNC

## 2025-06-24 ENCOUNTER — RESULTS FOLLOW-UP (OUTPATIENT)
Dept: CARDIOLOGY | Facility: CLINIC | Age: 65
End: 2025-06-24

## 2025-06-24 LAB
LEFT ABI: 1.03
LEFT ARM BP: 158 MMHG
LEFT DORSALIS PEDIS: 137 MMHG
LEFT POSTERIOR TIBIAL: 163 MMHG
LEFT TBI: 0.73
LEFT TOE PRESSURE: 116 MMHG
RIGHT ABI: 1
RIGHT ARM BP: 130 MMHG
RIGHT DORSALIS PEDIS: 142 MMHG
RIGHT POSTERIOR TIBIAL: 158 MMHG
RIGHT TBI: 0.71
RIGHT TOE PRESSURE: 112 MMHG

## 2025-06-26 DIAGNOSIS — E55.9 VITAMIN D DEFICIENCY: ICD-10-CM

## 2025-06-26 RX ORDER — ERGOCALCIFEROL 1.25 MG/1
50000 CAPSULE ORAL
Qty: 12 CAPSULE | Refills: 0 | Status: SHIPPED | OUTPATIENT
Start: 2025-06-26 | End: 2025-09-12

## 2025-06-27 ENCOUNTER — HOSPITAL ENCOUNTER (OUTPATIENT)
Dept: RADIOLOGY | Facility: HOSPITAL | Age: 65
Discharge: HOME | End: 2025-06-27
Attending: PHYSICIAN ASSISTANT
Payer: MEDICARE

## 2025-06-27 DIAGNOSIS — Z12.31 ENCOUNTER FOR SCREENING MAMMOGRAM FOR MALIGNANT NEOPLASM OF BREAST: ICD-10-CM

## 2025-06-27 PROCEDURE — 77063 BREAST TOMOSYNTHESIS BI: CPT | Mod: 26,HCNC,, | Performed by: STUDENT IN AN ORGANIZED HEALTH CARE EDUCATION/TRAINING PROGRAM

## 2025-06-27 PROCEDURE — 77063 BREAST TOMOSYNTHESIS BI: CPT | Mod: TC,HCNC

## 2025-06-27 PROCEDURE — 77067 SCR MAMMO BI INCL CAD: CPT | Mod: 26,HCNC,, | Performed by: STUDENT IN AN ORGANIZED HEALTH CARE EDUCATION/TRAINING PROGRAM

## 2025-07-21 ENCOUNTER — PATIENT OUTREACH (OUTPATIENT)
Facility: OTHER | Age: 65
End: 2025-07-21
Payer: MEDICARE

## 2025-07-21 ENCOUNTER — OFFICE VISIT (OUTPATIENT)
Dept: INTERNAL MEDICINE | Facility: CLINIC | Age: 65
End: 2025-07-21
Payer: MEDICARE

## 2025-07-21 ENCOUNTER — NURSE TRIAGE (OUTPATIENT)
Dept: ADMINISTRATIVE | Facility: CLINIC | Age: 65
End: 2025-07-21
Payer: MEDICARE

## 2025-07-21 ENCOUNTER — LAB VISIT (OUTPATIENT)
Dept: LAB | Facility: HOSPITAL | Age: 65
End: 2025-07-21
Payer: MEDICARE

## 2025-07-21 ENCOUNTER — OCHSNER VIRTUAL EMERGENCY DEPARTMENT (OUTPATIENT)
Facility: CLINIC | Age: 65
End: 2025-07-21
Payer: MEDICARE

## 2025-07-21 VITALS
TEMPERATURE: 98 F | HEART RATE: 106 BPM | SYSTOLIC BLOOD PRESSURE: 126 MMHG | WEIGHT: 147.5 LBS | OXYGEN SATURATION: 98 % | HEIGHT: 62 IN | BODY MASS INDEX: 27.14 KG/M2 | DIASTOLIC BLOOD PRESSURE: 74 MMHG | RESPIRATION RATE: 18 BRPM

## 2025-07-21 DIAGNOSIS — Z79.899 MEDICATION MANAGEMENT: ICD-10-CM

## 2025-07-21 DIAGNOSIS — Z13.21 ENCOUNTER FOR VITAMIN DEFICIENCY SCREENING: ICD-10-CM

## 2025-07-21 DIAGNOSIS — R20.0 NUMBNESS AROUND MOUTH: Primary | ICD-10-CM

## 2025-07-21 DIAGNOSIS — R20.2 PARESTHESIA: Primary | ICD-10-CM

## 2025-07-21 DIAGNOSIS — R20.0 NUMBNESS AROUND MOUTH: ICD-10-CM

## 2025-07-21 LAB
25(OH)D3+25(OH)D2 SERPL-MCNC: 21 NG/ML (ref 30–96)
ABSOLUTE EOSINOPHIL (OHS): 0.19 K/UL
ABSOLUTE MONOCYTE (OHS): 0.71 K/UL (ref 0.3–1)
ABSOLUTE NEUTROPHIL COUNT (OHS): 4.13 K/UL (ref 1.8–7.7)
ALBUMIN SERPL BCP-MCNC: 4.2 G/DL (ref 3.5–5.2)
ALP SERPL-CCNC: 67 UNIT/L (ref 40–150)
ALT SERPL W/O P-5'-P-CCNC: 11 UNIT/L (ref 10–44)
ANION GAP (OHS): 12 MMOL/L (ref 8–16)
AST SERPL-CCNC: 17 UNIT/L (ref 11–45)
BASOPHILS # BLD AUTO: 0.04 K/UL
BASOPHILS NFR BLD AUTO: 0.4 %
BILIRUB SERPL-MCNC: 0.3 MG/DL (ref 0.1–1)
BUN SERPL-MCNC: 6 MG/DL (ref 8–23)
CALCIUM SERPL-MCNC: 9.4 MG/DL (ref 8.7–10.5)
CHLORIDE SERPL-SCNC: 106 MMOL/L (ref 95–110)
CO2 SERPL-SCNC: 23 MMOL/L (ref 23–29)
CREAT SERPL-MCNC: 0.9 MG/DL (ref 0.5–1.4)
ERYTHROCYTE [DISTWIDTH] IN BLOOD BY AUTOMATED COUNT: 14.5 % (ref 11.5–14.5)
GFR SERPLBLD CREATININE-BSD FMLA CKD-EPI: >60 ML/MIN/1.73/M2
GLUCOSE SERPL-MCNC: 91 MG/DL (ref 70–110)
HCT VFR BLD AUTO: 39.5 % (ref 37–48.5)
HGB BLD-MCNC: 12.9 GM/DL (ref 12–16)
IMM GRANULOCYTES # BLD AUTO: 0.02 K/UL (ref 0–0.04)
IMM GRANULOCYTES NFR BLD AUTO: 0.2 % (ref 0–0.5)
LYMPHOCYTES # BLD AUTO: 4.15 K/UL (ref 1–4.8)
MCH RBC QN AUTO: 29.7 PG (ref 27–31)
MCHC RBC AUTO-ENTMCNC: 32.7 G/DL (ref 32–36)
MCV RBC AUTO: 91 FL (ref 82–98)
NUCLEATED RBC (/100WBC) (OHS): 0 /100 WBC
PLATELET # BLD AUTO: 320 K/UL (ref 150–450)
PMV BLD AUTO: 9.8 FL (ref 9.2–12.9)
POTASSIUM SERPL-SCNC: 3.7 MMOL/L (ref 3.5–5.1)
PROT SERPL-MCNC: 7.1 GM/DL (ref 6–8.4)
RBC # BLD AUTO: 4.34 M/UL (ref 4–5.4)
RELATIVE EOSINOPHIL (OHS): 2.1 %
RELATIVE LYMPHOCYTE (OHS): 44.9 % (ref 18–48)
RELATIVE MONOCYTE (OHS): 7.7 % (ref 4–15)
RELATIVE NEUTROPHIL (OHS): 44.7 % (ref 38–73)
SODIUM SERPL-SCNC: 141 MMOL/L (ref 136–145)
TSH SERPL-ACNC: 2.08 UIU/ML (ref 0.4–4)
VIT B12 SERPL-MCNC: 266 PG/ML (ref 210–950)
WBC # BLD AUTO: 9.24 K/UL (ref 3.9–12.7)

## 2025-07-21 PROCEDURE — 3074F SYST BP LT 130 MM HG: CPT | Mod: CPTII,HCNC,S$GLB,

## 2025-07-21 PROCEDURE — 3288F FALL RISK ASSESSMENT DOCD: CPT | Mod: CPTII,HCNC,S$GLB,

## 2025-07-21 PROCEDURE — 85025 COMPLETE CBC W/AUTO DIFF WBC: CPT | Mod: HCNC

## 2025-07-21 PROCEDURE — 84443 ASSAY THYROID STIM HORMONE: CPT | Mod: HCNC

## 2025-07-21 PROCEDURE — 1126F AMNT PAIN NOTED NONE PRSNT: CPT | Mod: CPTII,HCNC,S$GLB,

## 2025-07-21 PROCEDURE — 1100F PTFALLS ASSESS-DOCD GE2>/YR: CPT | Mod: CPTII,HCNC,S$GLB,

## 2025-07-21 PROCEDURE — 3008F BODY MASS INDEX DOCD: CPT | Mod: CPTII,HCNC,S$GLB,

## 2025-07-21 PROCEDURE — 82607 VITAMIN B-12: CPT | Mod: HCNC

## 2025-07-21 PROCEDURE — 82306 VITAMIN D 25 HYDROXY: CPT | Mod: HCNC

## 2025-07-21 PROCEDURE — 3078F DIAST BP <80 MM HG: CPT | Mod: CPTII,HCNC,S$GLB,

## 2025-07-21 PROCEDURE — 82040 ASSAY OF SERUM ALBUMIN: CPT | Mod: HCNC

## 2025-07-21 PROCEDURE — 3044F HG A1C LEVEL LT 7.0%: CPT | Mod: CPTII,HCNC,S$GLB,

## 2025-07-21 PROCEDURE — 99999 PR PBB SHADOW E&M-EST. PATIENT-LVL V: CPT | Mod: PBBFAC,HCNC,,

## 2025-07-21 PROCEDURE — 99214 OFFICE O/P EST MOD 30 MIN: CPT | Mod: HCNC,S$GLB,,

## 2025-07-21 PROCEDURE — 1160F RVW MEDS BY RX/DR IN RCRD: CPT | Mod: CPTII,HCNC,S$GLB,

## 2025-07-21 PROCEDURE — 1159F MED LIST DOCD IN RCRD: CPT | Mod: CPTII,HCNC,S$GLB,

## 2025-07-21 PROCEDURE — 36415 COLL VENOUS BLD VENIPUNCTURE: CPT | Mod: HCNC

## 2025-07-21 RX ORDER — QUETIAPINE FUMARATE 200 MG/1
200 TABLET, FILM COATED ORAL NIGHTLY
COMMUNITY
Start: 2025-04-30

## 2025-07-21 NOTE — TELEPHONE ENCOUNTER
Pt states that her upper lip has been tingling and now the top of mouth and tongue have also started tingling. Denies numbness. Denies swelling. Pt denies taking any new medications that would cause this. Denies difficulty breathing. Just started about 1 hour ago. Dispo- Go to ED/C now or to office with PCP approval.   Reached out to [facility], Dr.Kody Cummins, who advised, Does pt have weakness or sensation changes in the extremities, no vision change or difficulty speaking. Pt states no to all of these symptoms. Dr. Cummins advised, isolated tingling to the lips/roof of mouth can f/u with PCP when able. If she develops any of those other symptoms, should go to ED ASAP. Pt was disconnected, able to call pt back. Pt aware of the above and VU. Neeta Saunders LPN able to book pt an appt for today @ 4:00 pm with Dr. Giovanna Ibrahim. Pt aware and VU. Advised to call back with any further questions, concerns or worsening symptoms.           Reason for Disposition   Patient sounds very sick or weak to the triager    Additional Information   Negative: SEVERE weakness (e.g., unable to walk or barely able to walk, requires support) and new-onset or getting worse   Negative: Difficult to awaken or acting confused (e.g., disoriented, slurred speech)   Negative: Sudden onset of weakness of the face, arm or leg on one side of the body and present now (Exception: Bell's palsy suspected: weakness on one side of the face developing over hours to days, with no other symptoms.)   Negative: Sudden onset of numbness of the face, arm or leg on one side of the body and present now   Negative: Sudden onset of loss of speech or garbled speech and present now   Negative: Sounds like a life-threatening emergency to the triager   Negative: Headache (with neurologic deficit)   Negative: Unable to urinate (or only a few drops) and bladder feels very full   Negative: Loss of bladder or bowel control (urine or bowel incontinence; wetting self, leaking  stool) of new-onset   Negative: Back pain with numbness (loss of sensation) in groin or rectal area   Negative: Neurologic deficit that was brief (now gone), ANY of the following: * Weakness of the face, arm, or leg on one side of the body * Numbness of the face, arm, or leg on one side of the body * Loss of speech or garbled speech    Protocols used: Neurologic Deficit-A-OH

## 2025-07-21 NOTE — PLAN OF CARE-OVED
[facility] Emergency Department Plan of Care Note  Referral Source: Nurse On-Call                          Referral Comment: Maria Fernanda Curtis    Chief Complaint   Patient presents with    Numbness     Isolate paresthesia to upper lip       Recommendation: Primary Care                       Recommendation comment: PCP f/u recommended as there is abscence of ancillary neurological complaints making a central process unlikely. There is also no swelling to indicate angioedema, and no other signs to indicate allergic rxn/anaphylaxis. Told to go to ED if any of these develop.    Encounter Diagnosis   Name Primary?    Paresthesia Yes

## 2025-07-21 NOTE — PROGRESS NOTES
"Patient spoke with OOC RN on 7/21/2025 with complaint of "Pt states that her upper lip has been tingling and now the top of mouth and tongue have also started tingling. Denies numbness. .Pt denies taking any new medications that would cause this. Denies difficulty breathing. Just started about 1 hour ago."    [facility]OC RN consulted with [facility] provider, Dr. Cummins, and disposition recommended was PCP.  Patient scheduled same day PCP appointment.  Will follow up with patient during the week of July 22-26, 2025 to assess for any additional concerns/needs to be addressed.   "

## 2025-07-21 NOTE — PROGRESS NOTES
Karyna Sanders  07/21/2025  [id number]    DIMA Boothe MD  Patient Care Team:  DIMA Boothe MD as PCP - General (Family Medicine)  DIMA Boothe MD as Hypertension Digital Medicine Responsible Provider (Family Medicine)  Cora Gross, Sarah as Hypertension Digital Medicine Clinician (Pharmacist)  Medicare, [employer] Managed as Hypertension Digital Medicine Contract  Lewis Alejandre OD as Consulting Physician (Optometry)          Visit Type:an urgent visit for a new problem    Chief Complaint:  Chief Complaint   Patient presents with    Numbness    Tingling     Pt is c/o numbness and tingling of her lips, tongue, and roof of her mouth x 1 hr. Pt denies any shortness of breath or throat swelling.        History of Present Illness:    History of Present Illness    CHIEF COMPLAINT:  Patient presents today with numbness and tingling around mouth.    CURRENT SYMPTOMS:  She reports new onset of numbness and tingling affecting lips, root of mouth, and tongue, which started approximately 1.5-2 hours ago. Symptoms are constant and progressively spread from lips to tongue and root of mouth. She denies any associated swelling. These symptoms have never occurred before. She denies any specific triggers.    DIET AND HYDRATION:  She reports no food intake today due to poor appetite. Hydration limited to water and two cups of coffee.    MEDICATIONS:  She took her regular medications this morning at 7:00 AM with no recent changes. She has hydroxyzine at home.    SOCIAL HISTORY:  She is currently relocating from a two-story unit due to frequent falling incidents. She reports increased stress due to managing the moving process independently, which is planned for the first week of August.      ROS:  General: -fever, -chills, -fatigue, -weight gain, -weight loss  Eyes: -vision changes, -redness, -discharge  ENT: -ear pain, -nasal congestion, -sore throat  Cardiovascular: -chest pain, -palpitations, -lower  extremity edema  Respiratory: -cough, -shortness of breath  Gastrointestinal: -abdominal pain, -nausea, -vomiting, -diarrhea, -constipation, -blood in stool, +loss of appetite  Genitourinary: -dysuria, -hematuria, -frequency  Musculoskeletal: -joint pain, -muscle pain  Skin: -rash, -lesion  Neurological: -headache, -dizziness, +numbness, +tingling, +falling, +speech difficulty  Psychiatric: -anxiety, -depression, -sleep difficulty, +nervousness            The following were reviewed at this visit: active problem list, medication list, allergies, family history, social history, and health maintenance.  History:  Past Medical History:   Diagnosis Date    Anemia     Aneurysm     Anorexia 4/26/2017    Anxiety     Asthma     Atherosclerosis of aorta 1/5/2022    CT ABDOMEN PELVIS WITH CONTRAST 01/03/2021 FINDINGS: Mild atherosclerosis within the abdominal aorta which is nonaneurysmal.    Bipolar disorder     Carpal tunnel syndrome, bilateral     Cerebral aneurysm     Chronic nausea 11/15/2018    Chronic pain syndrome     Cigarette nicotine dependence     Depression     Essential hypertension 8/29/2014    GERD (gastroesophageal reflux disease)     Hyperlipidemia     Hypertension     Insomnia     Osteoporosis     Pure hypercholesterolemia 9/8/2014    Recurrent tension-type headache, not intractable 7/24/2019    Schizophrenia     Stroke     Subarachnoid hemorrhage      Past Surgical History:   Procedure Laterality Date    BRAIN SURGERY  Don't remember the date but the year of 2014    BUNIONECTOMY Right     COLONOSCOPY N/A 02/07/2022    Procedure: COLONOSCOPY;  Surgeon: Kamila Lund MD;  Location: [facility];  Service: Endoscopy;  Laterality: N/A;    COLONOSCOPY W/ BIOPSIES AND POLYPECTOMY      ESOPHAGOGASTRODUODENOSCOPY N/A 02/07/2022    Procedure: EGD (ESOPHAGOGASTRODUODENOSCOPY);  Surgeon: Kamila Lund MD;  Location: [facility];  Service: Endoscopy;  Laterality: N/A;    EYE SURGERY  Don't remember sometime last  year sometime last year    FUSION OF METATARSOPHALANGEAL JOINT Left 12/06/2024    Procedure: FUSION, MTP JOINT;  Surgeon: Chadwick Rodas DPM;  Location: [facility] OR;  Service: Podiatry;  Laterality: Left;    FUSION OF METATARSOPHALANGEAL JOINT Right 02/28/2025    Procedure: FUSION, MTP JOINT;  Surgeon: Chadwick Rodas DPM;  Location: [facility] OR;  Service: Podiatry;  Laterality: Right;    HYSTERECTOMY      OSTEOTOMY OF METATARSAL BONE Left 12/06/2024    Procedure: OSTEOTOMY, METATARSAL BONE;  Surgeon: Chadwick Rodas DPM;  Location: [facility] OR;  Service: Podiatry;  Laterality: Left;    PARTIAL HYSTERECTOMY      Bilateral Ovaries Remain    REPAIR OF ANEURYSM      clip and coil    RESECTION OF GASTROCNEMIUS MUSCLE Left 12/06/2024    Procedure: RESECTION, MUSCLE, GASTROCNEMIUS;  Surgeon: Chadwick Rodas DPM;  Location: [facility] OR;  Service: Podiatry;  Laterality: Left;    TRANSCRANIAL DOPPLER STUDY - COMPLETE  08/28/2014         TRANSCRANIAL DOPPLER STUDY - COMPLETE  08/29/2014         TRANSCRANIAL DOPPLER STUDY - COMPLETE  08/30/2014         TRANSCRANIAL DOPPLER STUDY - COMPLETE  08/31/2014         TRANSCRANIAL DOPPLER STUDY - COMPLETE  09/01/2014         TRANSCRANIAL DOPPLER STUDY - COMPLETE  09/02/2014         TRANSCRANIAL DOPPLER STUDY - COMPLETE  09/03/2014         TRANSCRANIAL DOPPLER STUDY - COMPLETE  09/04/2014         TRANSCRANIAL DOPPLER STUDY - COMPLETE  09/05/2014         TRANSCRANIAL DOPPLER STUDY - COMPLETE  09/06/2014         VAGINAL DELIVERY      X 3     Problem List[1]    Medications:  Medications Ordered Prior to Encounter[2]    Medications have been reviewed and reconciled with patient at this visit.    Exam:  Wt Readings from Last 3 Encounters:   07/21/25 66.9 kg (147 lb 7.8 oz)   06/23/25 68 kg (150 lb)   06/23/25 68 kg (150 lb)     Temp Readings from Last 3 Encounters:   07/21/25 98.3 °F (36.8 °C) (Tympanic)   05/20/25 97.2 °F (36.2 °C) (Tympanic)   02/28/25 98 °F (36.7 °C) (Temporal)     BP Readings  from Last 3 Encounters:   07/21/25 126/74   06/23/25 (!) 142/70   06/23/25 (!) 142/70     Pulse Readings from Last 3 Encounters:   07/21/25 106   05/29/25 89   05/20/25 91     Body mass index is 26.98 kg/m².      Physical Exam  Vitals reviewed.   Constitutional:       Appearance: Normal appearance.   HENT:      Nose: Nose normal.      Mouth/Throat:      Lips: No lesions.      Mouth: Mucous membranes are moist. No oral lesions.      Dentition: Has dentures.      Pharynx: Oropharynx is clear. No pharyngeal swelling or posterior oropharyngeal erythema.   Cardiovascular:      Rate and Rhythm: Normal rate and regular rhythm.      Pulses: Normal pulses.      Heart sounds: Normal heart sounds.   Pulmonary:      Effort: Pulmonary effort is normal. No respiratory distress.   Abdominal:      Palpations: Abdomen is soft.   Musculoskeletal:         General: Normal range of motion.      Cervical back: Normal range of motion.   Neurological:      General: No focal deficit present.      Mental Status: She is alert and oriented to person, place, and time.   Psychiatric:         Mood and Affect: Mood normal.         Behavior: Behavior normal.         Thought Content: Thought content normal.         Judgment: Judgment normal.       Physical Exam             Laboratory Reviewed ({Yes)    Lab Results   Component Value Date    WBC 9.08 02/24/2025    HGB 13.1 02/24/2025    HCT 40.1 02/24/2025     02/24/2025    CHOL 164 01/02/2025    TRIG 73 01/02/2025    HDL 55 01/02/2025    ALT 14 02/24/2025    AST 14 02/24/2025     02/24/2025    K 4.1 02/24/2025     02/24/2025    CREATININE 0.9 02/24/2025    BUN 7 (L) 02/24/2025    CO2 29 02/24/2025    TSH 1.915 05/01/2024    INR 1.1 05/02/2024    HGBA1C 6.0 (H) 01/02/2025     Karyna was seen today for numbness and tingling.    Diagnoses and all orders for this visit:    Numbness around mouth  -     Comprehensive Metabolic Panel; Future  -     Vitamin D; Future  -     CBC Auto  Differential; Future  -     TSH; Future  -     Vitamin B12; Future    Encounter for vitamin deficiency screening    Medication management  -     Vitamin D; Future    Assessment & Plan    IMPRESSION:  - Investigated cause of sudden onset numbness and tingling around mouth, lips, and tongue.  - Considered potential allergic reaction, medication side effects, or stress-related symptoms.  - Assessed for signs of anaphylaxis or other serious conditions.    NUMBNESS AND TINGLING (MOUTH BREATHING):  - Examined throat and mouth with no concerns noted.  - Recommend hydroxyzine (Vistaril) for symptom relief of numbness and tingling around the mouth, lips, tongue, and roof of the mouth.  - Ordered labs to investigate potential causes.    ANOREXIA:  - Patient reports lack of appetite and not eating today.    FOLLOW-UP:  - Advised follow-up with primary care if symptoms persist.             Care Plan/Goals: Reviewed     Follow up: No follow-ups on file.    After visit summary was printed and given to patient upon discharge today.  Patient goals and care plan are included in After Visit Summary.      This note was generated with the assistance of ambient listening technology. Verbal consent was obtained by the patient and accompanying visitor(s) for the recording of patient appointment to facilitate this note. I attest to having reviewed and edited the generated note for accuracy, though some syntax or spelling errors may persist. Please contact the author of this note for any clarification.              [1]   Patient Active Problem List  Diagnosis    Schizoaffective disorder, bipolar type    Bipolar 1 disorder    Anxiety    Cigarette nicotine dependence without complication    History of spontaneous subarachnoid intracranial hemorrhage due to cerebral aneurysm    Essential hypertension    Pure hypercholesterolemia    Prediabetes    Anorexia    Long-term current use of high risk medication other than anticoagulant     Gastroesophageal reflux disease without esophagitis    Nonadherence to medical treatment    History of hysterectomy for benign disease    Primary localized osteoarthrosis of multiple sites    Peripheral polyneuropathy    Tension headache    Bilateral carpal tunnel syndrome    Chronic seasonal allergic rhinitis    Cubital tunnel syndrome, right    Lumbosacral radiculopathy at S1    Chronic nausea    Chest pain    Recurrent tension-type headache, not intractable    Medication overuse headache    Chronic use of tramadol for therapeutic purpose    Tardive dyskinesia    Early satiety    Atherosclerosis of aorta    Tubular adenoma of transverse colon (Next colonoscopy due 12/2026)    Chronic bronchitis    BMI 29.0-29.9,adult    Osteopenia of multiple sites    Pruritus    Chronic constipation    Numbness and tingling in left arm    Cervical radiculopathy    Plantar wart of left foot    Metatarsalgia of left foot    Asthma    Bilateral hand numbness    Hav (hallux abducto valgus), right    Nicotine dependence   [2]   Current Outpatient Medications on File Prior to Visit   Medication Sig Dispense Refill    acetaminophen (TYLENOL ORAL) Take 650 mg by mouth as needed.      albuterol (PROVENTIL) 2.5 mg /3 mL (0.083 %) nebulizer solution Take 3 mLs (2.5 mg total) by nebulization every 6 (six) hours as needed for Wheezing. Rescue 150 mL 3    albuterol (PROVENTIL/VENTOLIN HFA) 90 mcg/actuation inhaler INHALE 2 PUFFS INTO THE LUNGS EVERY 6 HOURS AS NEEDED FOR SHORTNESS OF BREATH OR WHEEZING 54 g 3    alendronate (FOSAMAX) 70 MG tablet Take 1 tablet every week by oral route.      alprazolam (XANAX) 0.5 MG tablet Take 0.5 mg by mouth daily as needed.       amitriptyline (ELAVIL) 50 MG tablet Take 1 tablet (50 mg total) by mouth every evening. Take every night and not as needed! 90 tablet 3    amLODIPine (NORVASC) 5 MG tablet Take 1 tablet (5 mg total) by mouth once daily. 90 tablet 3    atorvastatin (LIPITOR) 80 MG tablet Take 1  tablet (80 mg total) by mouth every evening. 90 tablet 3    BLOOD PRESSURE CUFF Misc Use to check blood pressure once daily 1 each 0    butalbital-acetaminophen-caffeine -40 mg (FIORICET, ESGIC) -40 mg per tablet Take 1 tablet by mouth every 8 (eight) hours. DO NOT USE MORE THAN 3 DAYS PER WEEK (Patient taking differently: Take 1 tablet by mouth every 8 (eight) hours as needed. DO NOT USE MORE THAN 3 DAYS PER WEEK) 15 tablet 2    CALCIUM CARBONATE/VITAMIN D3 (CALCIUM 600 + D,3, ORAL) Take 2 tablets by mouth once daily.      COMBIVENT RESPIMAT  mcg/actuation inhaler Inhale 1 puff into the lungs every 6 (six) hours as needed.      ergocalciferol (ERGOCALCIFEROL) 50,000 unit Cap TAKE 1 CAPSULE (50,000 UNITS TOTAL) BY MOUTH EVERY 7 DAYS. FOR 12 DOSES 12 capsule 0    hydrOXYzine HCL (ATARAX) 25 MG tablet Take 1 tablet (25 mg total) by mouth 3 (three) times daily as needed for Itching. 60 tablet 5    nebulizer accessories Kit NEBULIZER ACCESSORIES - Use as directed for nebulized medications prescribed by me. 10 kit 11    nebulizer and compressor [employer] NEBULIZER DEVICE - Use as directed 1 each 0    nitroGLYCERIN (NITROSTAT) 0.4 MG SL tablet Place 0.4 mg under the tongue every 5 (five) minutes as needed for Chest pain.      omeprazole (PRILOSEC) 40 MG capsule Take 1 capsule (40 mg total) by mouth every morning. 90 capsule 3    OXcarbazepine (TRILEPTAL) 600 MG Tab Take 150 mg by mouth 2 (two) times daily.      paroxetine (PAXIL) 10 MG tablet Take 1 tablet (10 mg total) by mouth every evening. 30 tablet 11    polyethylene glycol (GLYCOLAX) 17 gram/dose powder Take 17 g by mouth daily as needed for Constipation. 510 g 11    QUEtiapine (SEROQUEL) 100 MG Tab Take 100 mg by mouth every evening.      QUEtiapine (SEROQUEL) 200 MG Tab Take 200 mg by mouth every evening.      traMADoL (ULTRAM) 50 mg tablet Take 50 mg by mouth every 6 (six) hours as needed for Pain.       No current facility-administered medications  on file prior to visit.

## 2025-08-11 ENCOUNTER — OFFICE VISIT (OUTPATIENT)
Dept: PODIATRY | Facility: CLINIC | Age: 65
End: 2025-08-11
Payer: MEDICARE

## 2025-08-11 ENCOUNTER — HOSPITAL ENCOUNTER (OUTPATIENT)
Dept: RADIOLOGY | Facility: HOSPITAL | Age: 65
Discharge: HOME | End: 2025-08-11
Attending: PODIATRIST
Payer: MEDICARE

## 2025-08-11 DIAGNOSIS — Z98.1 HISTORY OF SURGICAL FUSION JOINT: Primary | ICD-10-CM

## 2025-08-11 DIAGNOSIS — B35.1 ONYCHOMYCOSIS: ICD-10-CM

## 2025-08-11 DIAGNOSIS — M20.61 ACQUIRED TOE DEFORMITY, RIGHT: ICD-10-CM

## 2025-08-11 DIAGNOSIS — Z98.1 HISTORY OF SURGICAL FUSION JOINT: ICD-10-CM

## 2025-08-11 DIAGNOSIS — M79.674 PAIN OF RIGHT GREAT TOE: ICD-10-CM

## 2025-08-11 DIAGNOSIS — B35.3 TINEA PEDIS OF LEFT FOOT: ICD-10-CM

## 2025-08-11 PROCEDURE — 99214 OFFICE O/P EST MOD 30 MIN: CPT | Mod: HCNC,S$GLB,, | Performed by: PODIATRIST

## 2025-08-11 PROCEDURE — 73630 X-RAY EXAM OF FOOT: CPT | Mod: TC,50,HCNC

## 2025-08-11 PROCEDURE — 3044F HG A1C LEVEL LT 7.0%: CPT | Mod: CPTII,HCNC,S$GLB, | Performed by: PODIATRIST

## 2025-08-11 PROCEDURE — 3288F FALL RISK ASSESSMENT DOCD: CPT | Mod: CPTII,HCNC,S$GLB, | Performed by: PODIATRIST

## 2025-08-11 PROCEDURE — 1101F PT FALLS ASSESS-DOCD LE1/YR: CPT | Mod: CPTII,HCNC,S$GLB, | Performed by: PODIATRIST

## 2025-08-11 PROCEDURE — 1125F AMNT PAIN NOTED PAIN PRSNT: CPT | Mod: CPTII,HCNC,S$GLB, | Performed by: PODIATRIST

## 2025-08-11 PROCEDURE — 99999 PR PBB SHADOW E&M-EST. PATIENT-LVL II: CPT | Mod: PBBFAC,HCNC,, | Performed by: PODIATRIST

## 2025-08-11 PROCEDURE — 1160F RVW MEDS BY RX/DR IN RCRD: CPT | Mod: CPTII,HCNC,S$GLB, | Performed by: PODIATRIST

## 2025-08-11 PROCEDURE — 73630 X-RAY EXAM OF FOOT: CPT | Mod: 26,50,HCNC, | Performed by: RADIOLOGY

## 2025-08-11 PROCEDURE — 1159F MED LIST DOCD IN RCRD: CPT | Mod: CPTII,HCNC,S$GLB, | Performed by: PODIATRIST

## 2025-08-11 RX ORDER — KETOCONAZOLE 20 MG/G
CREAM TOPICAL 2 TIMES DAILY
Qty: 30 G | Refills: 2 | Status: SHIPPED | OUTPATIENT
Start: 2025-08-11 | End: 2025-09-10

## 2025-08-11 RX ORDER — OXYCODONE AND ACETAMINOPHEN 5; 325 MG/1; MG/1
1 TABLET ORAL EVERY 6 HOURS PRN
Qty: 28 TABLET | Refills: 0 | Status: SHIPPED | OUTPATIENT
Start: 2025-08-11 | End: 2025-08-18

## 2025-08-11 RX ORDER — TERBINAFINE HYDROCHLORIDE 250 MG/1
250 TABLET ORAL DAILY
Qty: 90 TABLET | Refills: 0 | Status: SHIPPED | OUTPATIENT
Start: 2025-08-11 | End: 2025-11-09

## 2025-08-20 ENCOUNTER — TELEPHONE (OUTPATIENT)
Dept: INTERNAL MEDICINE | Facility: CLINIC | Age: 65
End: 2025-08-20
Payer: MEDICARE

## (undated) DEVICE — DRAPE C-ARMOR EQUIPMENT COVER

## (undated) DEVICE — DRAPE U SPLIT SHEET 54X76IN

## (undated) DEVICE — SYR LUER LOCK STERILE 10ML

## (undated) DEVICE — PAD CAST SPECIALIST STRL 6

## (undated) DEVICE — DRESSING SPONGE 16PLY 4X4IN

## (undated) DEVICE — SUT ETHICON 3-0 BLK MONO PS

## (undated) DEVICE — BLADE SURG CARBON STEEL #10

## (undated) DEVICE — DRAPE THREE-QTR REINF 53X77IN

## (undated) DEVICE — 2.0 DRILL BIT

## (undated) DEVICE — DRAPE INVISISHIELD TWL 18X24IN

## (undated) DEVICE — BANDAGE ESMARK ELASTIC ST 4X9

## (undated) DEVICE — GLOVE SENSICARE PI GRN 8

## (undated) DEVICE — KWIRE SMTH TRCR TIP 1.1X150MM
Type: IMPLANTABLE DEVICE | Site: FOOT | Status: NON-FUNCTIONAL
Removed: 2024-12-06

## (undated) DEVICE — GAUZE CNFRM STRL 4INX4.1YD

## (undated) DEVICE — DRESSING XEROFORM NONADH 1X8IN

## (undated) DEVICE — DRAPE MOBILE C-ARM

## (undated) DEVICE — SYR 10CC LUER LOCK

## (undated) DEVICE — PAD ABDOMINAL STERILE 8X10IN

## (undated) DEVICE — PACK BASIC SETUP SC BR

## (undated) DEVICE — GLOVE BIOGEL ORTHOPEDIC 7.5

## (undated) DEVICE — DRAPE T EXTRM SURG 121X128X90

## (undated) DEVICE — HEADREST ROUND DISP FOAM 9IN

## (undated) DEVICE — BANDAGE MATRIX HK LOOP 4IN 5YD

## (undated) DEVICE — PAD CAST SPECIALIST STRL 4

## (undated) DEVICE — Device

## (undated) DEVICE — BANDAGE ROLL COTTN 4.5INX4.1YD

## (undated) DEVICE — BLADE SCALP OPHTL RND TIP

## (undated) DEVICE — MANIFOLD 4 PORT

## (undated) DEVICE — UNDERPAD DELUXE FLUFF 30X30IN

## (undated) DEVICE — APPLICATOR CHLORAPREP ORN 26ML

## (undated) DEVICE — ELECTRODE REM PLYHSV RETURN 9

## (undated) DEVICE — BURR CARBIDE OVAL STERILE 4MM

## (undated) DEVICE — NDL ECLIPSE SAFETY 18GX1-1/2IN

## (undated) DEVICE — COVER LIGHT HANDLE 80/CA

## (undated) DEVICE — PENCIL ELECTROCAUTERY W/ HLSTR

## (undated) DEVICE — SUT VICRYL ANTIMICRO VIL BR

## (undated) DEVICE — DRESSING N ADH OIL EMUL 3X3

## (undated) DEVICE — SPONGE COTTON TRAY 4X4IN

## (undated) DEVICE — TOURNIQUET SB QC DP 18X4IN

## (undated) DEVICE — 2.1 DRILL BIT

## (undated) DEVICE — BANDAGE MATRIX HK LOOP 6IN 5YD

## (undated) DEVICE — GAUZE CNFRM STRL 2INX4.1YD

## (undated) DEVICE — YANKAUER FLEX NO VENT REG CAP

## (undated) DEVICE — COVER CAMERA OPERATING ROOM

## (undated) DEVICE — BLADE LONG 31.0MM X 9.0MM

## (undated) DEVICE — BNDG COFLEX FOAM LF2 ST 3X5YD

## (undated) DEVICE — NDL ECLIPSE SAF REG 25GX1.5IN

## (undated) DEVICE — BLADE SURG #15 CARBON STEEL

## (undated) DEVICE — STRAP ARMBOARD DISP 1.5X32IN

## (undated) DEVICE — 2.4 DRILL BIT

## (undated) DEVICE — KWIRE SMTH TRCR TIP 1.6X150MM
Type: IMPLANTABLE DEVICE | Site: FOOT | Status: NON-FUNCTIONAL
Removed: 2024-12-06